# Patient Record
Sex: FEMALE | Race: BLACK OR AFRICAN AMERICAN | NOT HISPANIC OR LATINO | Employment: FULL TIME | URBAN - METROPOLITAN AREA
[De-identification: names, ages, dates, MRNs, and addresses within clinical notes are randomized per-mention and may not be internally consistent; named-entity substitution may affect disease eponyms.]

---

## 2017-01-05 ENCOUNTER — OFFICE VISIT (OUTPATIENT)
Dept: OBSTETRICS AND GYNECOLOGY | Facility: CLINIC | Age: 42
End: 2017-01-05
Payer: COMMERCIAL

## 2017-01-05 DIAGNOSIS — R10.9 COMBINED ABDOMINAL AND PELVIC PAIN, UNSPECIFIED LATERALITY: Primary | ICD-10-CM

## 2017-01-05 DIAGNOSIS — R10.2 COMBINED ABDOMINAL AND PELVIC PAIN, UNSPECIFIED LATERALITY: Primary | ICD-10-CM

## 2017-01-05 DIAGNOSIS — R10.30 LOWER ABDOMINAL PAIN: ICD-10-CM

## 2017-01-05 PROCEDURE — 99999 PR PBB SHADOW E&M-EST. PATIENT-LVL II: CPT | Mod: PBBFAC,,, | Performed by: ADVANCED PRACTICE MIDWIFE

## 2017-01-05 PROCEDURE — 99213 OFFICE O/P EST LOW 20 MIN: CPT | Mod: S$GLB,,, | Performed by: ADVANCED PRACTICE MIDWIFE

## 2017-01-05 PROCEDURE — 1159F MED LIST DOCD IN RCRD: CPT | Mod: S$GLB,,, | Performed by: ADVANCED PRACTICE MIDWIFE

## 2017-01-06 PROBLEM — R10.9 ABDOMINAL PAIN: Status: ACTIVE | Noted: 2017-01-06

## 2017-01-06 NOTE — PROGRESS NOTES
"Pt presents with C/O abdominal discomfort and feels "like something coming out"  Hx significant for hysterectomy and bilateral salpingectomy Aug 31st 2016 with Dr Johnston. I reviewed records and see that there was extensive FU and testing/treatment with Dr Johnston and Dr Patel. Pt was also evaluated by GI - gastric reflux and Neurology- Unremarkable. Treated for UTI with Cipro last week, states still has some symptoms but "urine was checked and clean". Also given Diflucan and "STI" tests pending    Appears a little anxious, reassurance given  O/E   Abdomen- obese and difficult to determine but no localized tenderness, vague discomfort in lower abdomen   Speculum- Normal D/C noted. Pink healthy appearance  Pelvic- Uterus/cervix- absent, unable to appreciate any masses or tenderness, restricted exam secondary to habitus    Assessment. Low abdominal discomfort    PLAN  D/W Dr Johnston who pt desires to see and FU with  A CT scan and FU with Dr Johnston is scheduled. Pt happy with this plan  "

## 2017-01-10 ENCOUNTER — HOSPITAL ENCOUNTER (OUTPATIENT)
Dept: RADIOLOGY | Facility: HOSPITAL | Age: 42
Discharge: HOME | End: 2017-01-10
Attending: OBSTETRICS & GYNECOLOGY
Payer: COMMERCIAL

## 2017-01-10 DIAGNOSIS — R10.9 COMBINED ABDOMINAL AND PELVIC PAIN, UNSPECIFIED LATERALITY: ICD-10-CM

## 2017-01-10 DIAGNOSIS — R10.2 COMBINED ABDOMINAL AND PELVIC PAIN, UNSPECIFIED LATERALITY: ICD-10-CM

## 2017-01-10 PROCEDURE — 74178 CT ABD&PLV WO CNTR FLWD CNTR: CPT | Mod: TC,PO

## 2017-01-10 PROCEDURE — 25500020 PHARM REV CODE 255: Mod: PO | Performed by: OBSTETRICS & GYNECOLOGY

## 2017-01-10 PROCEDURE — 74178 CT ABD&PLV WO CNTR FLWD CNTR: CPT | Mod: 26,,, | Performed by: RADIOLOGY

## 2017-01-10 RX ADMIN — IOHEXOL 100 ML: 350 INJECTION, SOLUTION INTRAVENOUS at 10:01

## 2017-01-10 RX ADMIN — IOHEXOL 30 ML: 350 INJECTION, SOLUTION INTRAVENOUS at 09:01

## 2017-01-30 ENCOUNTER — TELEPHONE (OUTPATIENT)
Dept: OBSTETRICS AND GYNECOLOGY | Facility: CLINIC | Age: 42
End: 2017-01-30

## 2017-01-30 NOTE — TELEPHONE ENCOUNTER
Pt was not able to come to 1/12 appt. Pt reports pain not as bad. CT results discussed. Observation at this time

## 2017-02-17 ENCOUNTER — OFFICE VISIT (OUTPATIENT)
Dept: INTERNAL MEDICINE | Facility: CLINIC | Age: 42
End: 2017-02-17
Payer: COMMERCIAL

## 2017-02-17 ENCOUNTER — LAB VISIT (OUTPATIENT)
Dept: LAB | Facility: HOSPITAL | Age: 42
End: 2017-02-17
Attending: NURSE PRACTITIONER
Payer: COMMERCIAL

## 2017-02-17 VITALS
TEMPERATURE: 99 F | OXYGEN SATURATION: 98 % | SYSTOLIC BLOOD PRESSURE: 121 MMHG | WEIGHT: 254.88 LBS | DIASTOLIC BLOOD PRESSURE: 56 MMHG | HEART RATE: 55 BPM | HEIGHT: 65 IN | BODY MASS INDEX: 42.46 KG/M2 | RESPIRATION RATE: 20 BRPM

## 2017-02-17 DIAGNOSIS — Z11.3 SCREEN FOR STD (SEXUALLY TRANSMITTED DISEASE): ICD-10-CM

## 2017-02-17 DIAGNOSIS — E66.01 MORBID OBESITY DUE TO EXCESS CALORIES: ICD-10-CM

## 2017-02-17 DIAGNOSIS — D50.9 IRON DEFICIENCY ANEMIA, UNSPECIFIED IRON DEFICIENCY ANEMIA TYPE: ICD-10-CM

## 2017-02-17 DIAGNOSIS — E11.9 TYPE 2 DIABETES MELLITUS WITHOUT COMPLICATION, WITHOUT LONG-TERM CURRENT USE OF INSULIN: ICD-10-CM

## 2017-02-17 DIAGNOSIS — F41.9 ANXIETY: ICD-10-CM

## 2017-02-17 DIAGNOSIS — E55.9 VITAMIN D DEFICIENCY: ICD-10-CM

## 2017-02-17 DIAGNOSIS — F32.A DEPRESSION, UNSPECIFIED DEPRESSION TYPE: ICD-10-CM

## 2017-02-17 DIAGNOSIS — N76.0 BV (BACTERIAL VAGINOSIS): Primary | ICD-10-CM

## 2017-02-17 DIAGNOSIS — K80.20 GALLSTONES: ICD-10-CM

## 2017-02-17 DIAGNOSIS — R73.02 IMPAIRED GLUCOSE TOLERANCE: ICD-10-CM

## 2017-02-17 DIAGNOSIS — R44.0 AUDITORY HALLUCINATIONS: ICD-10-CM

## 2017-02-17 DIAGNOSIS — B96.89 BV (BACTERIAL VAGINOSIS): Primary | ICD-10-CM

## 2017-02-17 LAB
ALBUMIN SERPL BCP-MCNC: 3.5 G/DL
ALP SERPL-CCNC: 53 U/L
ALT SERPL W/O P-5'-P-CCNC: 14 U/L
ANION GAP SERPL CALC-SCNC: 11 MMOL/L
AST SERPL-CCNC: 18 U/L
BASOPHILS # BLD AUTO: 0.02 K/UL
BASOPHILS NFR BLD: 0.4 %
BILIRUB SERPL-MCNC: 0.3 MG/DL
BILIRUB SERPL-MCNC: NEGATIVE MG/DL
BLOOD URINE, POC: ABNORMAL
BUN SERPL-MCNC: 9 MG/DL
CALCIUM SERPL-MCNC: 9 MG/DL
CHLORIDE SERPL-SCNC: 104 MMOL/L
CHOLEST/HDLC SERPL: 2.9 {RATIO}
CO2 SERPL-SCNC: 23 MMOL/L
COLOR, POC UA: YELLOW
CREAT SERPL-MCNC: 0.8 MG/DL
DIFFERENTIAL METHOD: ABNORMAL
EOSINOPHIL # BLD AUTO: 0.1 K/UL
EOSINOPHIL NFR BLD: 1.1 %
ERYTHROCYTE [DISTWIDTH] IN BLOOD BY AUTOMATED COUNT: 14.1 %
EST. GFR  (AFRICAN AMERICAN): >60 ML/MIN/1.73 M^2
EST. GFR  (NON AFRICAN AMERICAN): >60 ML/MIN/1.73 M^2
GARDNERELLA VAGINALIS: POSITIVE
GLUCOSE SERPL-MCNC: 104 MG/DL
GLUCOSE UR QL STRIP: NEGATIVE
HCT VFR BLD AUTO: 34.3 %
HDL/CHOLESTEROL RATIO: 34.1 %
HDLC SERPL-MCNC: 132 MG/DL
HDLC SERPL-MCNC: 45 MG/DL
HGB BLD-MCNC: 11.5 G/DL
KETONES UR QL STRIP: NEGATIVE
LDLC SERPL CALC-MCNC: 72.4 MG/DL
LEUKOCYTE ESTERASE URINE, POC: NEGATIVE
LYMPHOCYTES # BLD AUTO: 2.3 K/UL
LYMPHOCYTES NFR BLD: 40 %
MCH RBC QN AUTO: 28.3 PG
MCHC RBC AUTO-ENTMCNC: 33.5 %
MCV RBC AUTO: 85 FL
MONOCYTES # BLD AUTO: 0.4 K/UL
MONOCYTES NFR BLD: 7.2 %
NEUTROPHILS # BLD AUTO: 2.9 K/UL
NEUTROPHILS NFR BLD: 51.1 %
NITRITE, POC UA: NEGATIVE
NONHDLC SERPL-MCNC: 87 MG/DL
OTHER MICROSC. OBSERVATIONS: ABNORMAL
PH, POC UA: 5
PLATELET # BLD AUTO: 328 K/UL
PMV BLD AUTO: 11.2 FL
POTASSIUM SERPL-SCNC: 3.9 MMOL/L
PROT SERPL-MCNC: 7.6 G/DL
PROTEIN, POC: ABNORMAL
RBC # BLD AUTO: 4.06 M/UL
SODIUM SERPL-SCNC: 138 MMOL/L
SPECIFIC GRAVITY, POC UA: 1.02
T4 FREE SERPL-MCNC: 1.25 NG/DL
TRICHOMONAS, POC: NEGATIVE
TRIGL SERPL-MCNC: 73 MG/DL
TSH SERPL DL<=0.005 MIU/L-ACNC: 0.21 UIU/ML
UROBILINOGEN, POC UA: NORMAL
WBC # BLD AUTO: 5.67 K/UL
YEAST WET PREP: NEGATIVE

## 2017-02-17 PROCEDURE — 36415 COLL VENOUS BLD VENIPUNCTURE: CPT | Mod: PO

## 2017-02-17 PROCEDURE — 83036 HEMOGLOBIN GLYCOSYLATED A1C: CPT

## 2017-02-17 PROCEDURE — 86703 HIV-1/HIV-2 1 RESULT ANTBDY: CPT

## 2017-02-17 PROCEDURE — 80061 LIPID PANEL: CPT

## 2017-02-17 PROCEDURE — 87591 N.GONORRHOEAE DNA AMP PROB: CPT

## 2017-02-17 PROCEDURE — 84443 ASSAY THYROID STIM HORMONE: CPT

## 2017-02-17 PROCEDURE — 99999 PR PBB SHADOW E&M-EST. PATIENT-LVL IV: CPT | Mod: PBBFAC,,, | Performed by: NURSE PRACTITIONER

## 2017-02-17 PROCEDURE — 80053 COMPREHEN METABOLIC PANEL: CPT

## 2017-02-17 PROCEDURE — 99203 OFFICE O/P NEW LOW 30 MIN: CPT | Mod: 25,S$GLB,, | Performed by: NURSE PRACTITIONER

## 2017-02-17 PROCEDURE — 84439 ASSAY OF FREE THYROXINE: CPT

## 2017-02-17 PROCEDURE — 85025 COMPLETE CBC W/AUTO DIFF WBC: CPT

## 2017-02-17 PROCEDURE — 2022F DILAT RTA XM EVC RTNOPTHY: CPT | Mod: S$GLB,,, | Performed by: NURSE PRACTITIONER

## 2017-02-17 PROCEDURE — 3060F POS MICROALBUMINURIA REV: CPT | Mod: S$GLB,,, | Performed by: NURSE PRACTITIONER

## 2017-02-17 PROCEDURE — 86695 HERPES SIMPLEX TYPE 1 TEST: CPT

## 2017-02-17 PROCEDURE — 86592 SYPHILIS TEST NON-TREP QUAL: CPT

## 2017-02-17 PROCEDURE — 3044F HG A1C LEVEL LT 7.0%: CPT | Mod: S$GLB,,, | Performed by: NURSE PRACTITIONER

## 2017-02-17 PROCEDURE — 81002 URINALYSIS NONAUTO W/O SCOPE: CPT | Mod: S$GLB,,, | Performed by: NURSE PRACTITIONER

## 2017-02-17 RX ORDER — LANCETS
EACH MISCELLANEOUS
COMMUNITY
Start: 2016-12-07 | End: 2017-02-17 | Stop reason: SDUPTHER

## 2017-02-17 RX ORDER — DEXTROSE 4 G
TABLET,CHEWABLE ORAL
COMMUNITY
Start: 2016-12-07 | End: 2017-02-17 | Stop reason: SDUPTHER

## 2017-02-17 RX ORDER — METRONIDAZOLE 500 MG/1
500 TABLET ORAL EVERY 12 HOURS
Qty: 14 TABLET | Refills: 0 | Status: SHIPPED | OUTPATIENT
Start: 2017-02-17 | End: 2017-02-24

## 2017-02-17 RX ORDER — DEXTROSE 4 G
TABLET,CHEWABLE ORAL
Qty: 1 EACH | Refills: 0 | Status: SHIPPED | OUTPATIENT
Start: 2017-02-17 | End: 2019-05-23 | Stop reason: ALTCHOICE

## 2017-02-17 RX ORDER — LANCETS
1 EACH MISCELLANEOUS 3 TIMES DAILY
Qty: 100 EACH | Refills: 0 | Status: SHIPPED | OUTPATIENT
Start: 2017-02-17 | End: 2018-02-07 | Stop reason: SDUPTHER

## 2017-02-17 RX ORDER — SUCRALFATE 1 G/10ML
1 SUSPENSION ORAL DAILY
COMMUNITY
Start: 2016-11-03 | End: 2017-06-15

## 2017-02-17 RX ORDER — ESCITALOPRAM OXALATE 10 MG/1
15 TABLET ORAL DAILY PRN
Refills: 0 | COMMUNITY
Start: 2017-02-15 | End: 2017-06-12 | Stop reason: SDUPTHER

## 2017-02-17 NOTE — MR AVS SNAPSHOT
[facility] - Internal Medicine  [address] [address] [address]  Phone: [phone]                  Yuli Milton   2017 11:40 AM   Office Visit    Description:  Female : 1975   Provider:  ANNA Davis FNP-C   Department:  [facility] Internal Medicine           Reason for Visit     Dysuria           Diagnoses this Visit        Comments    BV (bacterial vaginosis)    -  Primary     Screen for STD (sexually transmitted disease)         Morbid obesity due to excess calories         Impaired glucose tolerance         Iron deficiency anemia, unspecified iron deficiency anemia type                To Do List           Future Appointments        Provider Department Dept Phone    2017 1:40 PM [facility] LABORATORY [facility] [phone]    2017 1:00 PM ANNA Davis FNP-C [facility] [phone]      Goals (5 Years of Data)     None       These Medications        Disp Refills Start End    metronidazole (FLAGYL) 500 MG tablet 14 tablet 0 2017    Take 1 tablet (500 mg total) by mouth every 12 (twelve) hours. - Oral    Pharmacy: [facility] [address] [place] [address] [facility] Ph #: [phone]       lancets Misc 100 each 0 2017     1 each by Other route 3 (three) times daily. - Other    Pharmacy: [facility] [address] [place] [address] AT [facility] Courtney Cooley Ph #: [phone]       blood-glucose meter Misc 1 each 0 2017     Dispense 1    Pharmacy: [facility] [address] [place] [address] AT [facility] Courtney Cooley Ph #: [phone]       blood sugar diagnostic Strp 100 each 11 2017     1 each by Other route 3 (three) times daily. - Other    Pharmacy: [facility] [address] [place] [address] AT [facility] Courtney Quintero Ph #: [phone]          [facility]     [facility] Care [facility] -  Assistance  Registered nurses in the [facility] provide clinical advisement, health education, appointment booking, and other advisory services.  Call for this free service at [phone].             Medications           Message regarding Medications     Verify the changes and/or additions to your medication regime listed below are the same as discussed with your clinician today.  If any of these changes or additions are incorrect, please notify your healthcare provider.        START taking these NEW medications        Refills    metronidazole (FLAGYL) 500 MG tablet 0    Sig: Take 1 tablet (500 mg total) by mouth every 12 (twelve) hours.    Class: Normal    Route: Oral    lancets Misc 0    Si each by Other route 3 (three) times daily.    Class: Normal    Route: Other    blood-glucose meter Misc 0    Sig: Dispense 1    Class: Normal    blood sugar diagnostic Strp 11    Si each by Other route 3 (three) times daily.    Class: Normal    Route: Other           Verify that the below list of medications is an accurate representation of the medications you are currently taking.  If none reported, the list may be blank. If incorrect, please contact your healthcare provider. Carry this list with you in case of emergency.           Current Medications     ALBUTEROL INHL Inhale 2 puffs into the lungs continuous prn.    alprazolam (XANAX) 0.5 MG tablet Take 1 tablet by mouth daily as needed.    blood sugar diagnostic Strp 1 each by Other route 3 (three) times daily.    blood-glucose meter Misc Dispense 1    cyclobenzaprine (FLEXERIL) 10 MG tablet TAKE 1 TABLET BY MOUTH 3 TIMES A DAY FOR 4 DAYS AS NEEDED FOR SPASM    escitalopram oxalate (LEXAPRO) 10 MG tablet 5 mg.    gabapentin (NEURONTIN) 300 MG capsule     hydrocodone-acetaminophen 5-325mg (NORCO) 5-325 mg per tablet     ibuprofen (ADVIL,MOTRIN) 800 MG tablet Take 1 tablet (800 mg total) by  "mouth 3 (three) times daily.    lancets Misc 1 each by Other route 3 (three) times daily.    levalbuterol (XOPENEX) 0.63 mg/3 mL nebulizer solution Take 1 ampule by nebulization every 4 (four) hours as needed for Wheezing.    meloxicam (MOBIC) 15 MG tablet     metformin (GLUCOPHAGE) 500 MG tablet Take 500 mg by mouth 2 (two) times daily with meals.    methocarbamol (ROBAXIN) 750 MG Tab     metronidazole (FLAGYL) 500 MG tablet Take 1 tablet (500 mg total) by mouth every 12 (twelve) hours.    MULTIVIT-MIN/FA/HERBAL NO.245 (ALIVE WOMEN'S GUMMY VITAMINS ORAL) Take by mouth.    sucralfate (CARAFATE) 100 mg/mL suspension Take 1 g by mouth.           Clinical Reference Information           Your Vitals Were     BP Pulse Temp Resp Height Weight    121/56 (BP Location: Left arm, Patient Position: Sitting, BP Method: Automatic) 55 98.9 °F (37.2 °C) 20 5' 5" (1.651 m) 115.6 kg (254 lb 13.6 oz)    Last Period SpO2 BMI          07/22/2016 98% 42.41 kg/m2        Blood Pressure          Most Recent Value    BP  (!)  121/56      Allergies as of 2/17/2017     No Known Allergies      Immunizations Administered on Date of Encounter - 2/17/2017     None      Orders Placed During Today's Visit      Normal Orders This Visit    C. trachomatis/N. gonorrhoeae by AMP DNA Urine     POCT urine dipstick without microscope     Future Labs/Procedures Expected by Expires    CBC auto differential  2/17/2017 4/18/2018    Comprehensive metabolic panel  2/17/2017 5/18/2017    Hemoglobin A1c  2/17/2017 2/17/2018    Herpes simplex type 1&2 IgG,Herpes titer  2/17/2017 5/18/2017    HIV-1 and HIV-2 antibodies  2/17/2017 4/18/2018    Lipid panel  2/17/2017 4/18/2018    RPR  2/17/2017 4/18/2018    TSH  2/17/2017 4/18/2018 2/17/2017 12:23 PM - Анна Carrillo LPN      Component Results     Component    Color, UA    Yellow    Spec Grav, UA    1.020    pH, UA    5    WBC, UA    negative    Nitrite, UA    negative    Protein, UA    trace    " Glucose, UA    negative    Ketones, UA    negative    Urobilinogen, UA    normal    Bilirubin, UA    negative    Blood, UA    trace            Instructions      Bacterial Vaginosis    You have a vaginal infection called bacterial vaginosis (BV). Both good and bad bacteria are present in a healthy vagina. BV occurs when these bacteria get out of balance. The number of bad bacteria increase. And the number of good bacteria decrease.  BV may or may not cause symptoms. If symptoms do occur, they can include:  · Thin, gray, milky-white, or sometimes green discharge  · Unpleasant odor or fishy smell  · Itching, burning, or pain in or around the vagina  It is not known what causes BV, but certain factors can make the problem more likely. This can include:  · Douching  · Having sex with a new partner  · Having sex with more than one partner  BV will sometimes go away on its own. But treatment is usually recommended. This is because untreated BV can increase the risk of more serious health problems such as:  · Pelvic inflammatory disease (PID)  ·  delivery (giving birth to a baby early if youre pregnant)  · HIV and certain other sexually transmitted diseases (STDs)  · Infection after surgery on the reproductive organs  Home care  General care  · BV is most often treated with medicines called antibiotics. These may be given as pills or as a vaginal cream. If antibiotics are prescribed, be sure to use them exactly as directed. Also, be sure to complete all of the medicine, even if your symptoms go away.  · Avoid douching or having sex during treatment.  · If you have sex with a female partner, ask your healthcare provider if she should also be treated.  Prevention  · Limit or avoid douching.  · Avoid having sex. If you do have sex, then take steps to lower your risk:  ¨ Use condoms when having sex.  ¨ Limit the number of partners you have sex with.  Follow-up care  Follow up with your healthcare provider, or as  advised.  When to seek medical advice  Call your healthcare provider right away if:  · You have a fever of 100.4ºF (38ºC) or higher, or as directed by your provider.  · Your symptoms worsen, or they dont go away within a few days of starting treatment.  · You have new pain in the lower belly or pelvic region.  · You have side effects that bother you or a reaction to the pills or cream youre prescribed.  · You or any partners you have sex with have new symptoms, such as a rash, joint pain, or sores.  Date Last Reviewed: 7/30/2015 © 2000-2016 [employer]. [address] [place] [address]. All rights reserved. This information is not intended as a substitute for professional medical care. Always follow your healthcare professional's instructions.             Language Assistance Services     ATTENTION: Language assistance services are available, free of charge. Please call [phone].      ATENCIÓN: Si navid antoinette, tiene a hernandez disposición servicios gratuitos de asistencia lingüística. Llame al [phone].     [facility] Ý: N?u b?n nói Ti?ng Vi?t, có các d?ch v? h? tr? ngôn ng? mi?n phí dành cho b?n. G?i s? [phone].         [facility] - Internal Medicine complies with applicable Federal civil rights laws and does not discriminate on the basis of race, color, national origin, age, disability, or sex.

## 2017-02-17 NOTE — PATIENT INSTRUCTIONS

## 2017-02-18 LAB
ESTIMATED AVG GLUCOSE: 137 MG/DL
HBA1C MFR BLD HPLC: 6.4 %
RPR SER QL: NORMAL

## 2017-02-20 LAB — HIV 1+2 AB+HIV1 P24 AG SERPL QL IA: NEGATIVE

## 2017-02-21 LAB
C TRACH DNA SPEC QL NAA+PROBE: NEGATIVE
HSV1 IGG SERPL QL IA: POSITIVE
HSV2 IGG SERPL QL IA: POSITIVE
N GONORRHOEA DNA SPEC QL NAA+PROBE: NEGATIVE

## 2017-02-22 ENCOUNTER — TELEPHONE (OUTPATIENT)
Dept: INTERNAL MEDICINE | Facility: CLINIC | Age: 42
End: 2017-02-22

## 2017-02-22 DIAGNOSIS — R79.89 LOW TSH LEVEL: Primary | ICD-10-CM

## 2017-02-23 NOTE — TELEPHONE ENCOUNTER
----- Message from Raleigh Pickard sent at 2/23/2017  1:43 PM CST -----  Contact: pt  She's calling in regards to a missed call, please advise, [phone] (home)

## 2017-02-23 NOTE — TELEPHONE ENCOUNTER
She's calling in regards to a missed call, please advise, [phone] (home)

## 2017-02-23 NOTE — TELEPHONE ENCOUNTER
Attempted to contact patient in [facility] to lab again, unable to leave VM    Will send to [facility] and discuss at follow up scheduled 2/27    1. Blood count is improving since Hysterectomy.   2. Kidney and liver function appropriate. Electrolytes good.   3. Diabetes marker, A1C, is good. Continue current meds.  4. Cholesterol good  5. Negative for syphilis, HIV, gonorrhea and chlamydia   6. Positive for Type 1 and Type 2 herpes.  7. Thyroid function is abnormal. Need to have repeat labs prior to follow up schedule for 2/27/2017.

## 2017-02-23 NOTE — TELEPHONE ENCOUNTER
It appears that the pt has viewed her results per russ. I have called her again and it was busy, If she calls back please give her the lab results as below. Thanks

## 2017-02-24 ENCOUNTER — TELEPHONE (OUTPATIENT)
Dept: INTERNAL MEDICINE | Facility: CLINIC | Age: 42
End: 2017-02-24

## 2017-02-24 DIAGNOSIS — A60.04 HERPES SIMPLEX VULVOVAGINITIS: ICD-10-CM

## 2017-02-24 PROBLEM — A60.00 HERPESVIRUS 2: Status: ACTIVE | Noted: 2017-02-24

## 2017-02-24 RX ORDER — VALACYCLOVIR HYDROCHLORIDE 1 G/1
1000 TABLET, FILM COATED ORAL 2 TIMES DAILY
Qty: 20 TABLET | Refills: 0 | Status: SHIPPED | OUTPATIENT
Start: 2017-02-24 | End: 2017-03-06

## 2017-02-24 NOTE — TELEPHONE ENCOUNTER
The patient called requesting to speak directly to Gabby Carter. She would like some clarification in regards to positive labs as well to know if any medications is needed at this time. Please review and advise. Thank you.

## 2017-02-24 NOTE — TELEPHONE ENCOUNTER
Please give results to pt per previous message if she calls back. If she has specific questions please let her know that it may be best for her to send them via [employer] because I am unable to get an answer when I call.

## 2017-02-24 NOTE — TELEPHONE ENCOUNTER
----- Message from Wanda Baires sent at 2/24/2017  1:02 PM CST -----  Contact: Pt  Pt is requesting to speak to the nurse regarding test results and medication. Pls call pt back at [phone].

## 2017-02-24 NOTE — TELEPHONE ENCOUNTER
Called patient, she's requesting a call from Gabby Carter in regards to labs. Please contact the patient at [phone].

## 2017-02-24 NOTE — TELEPHONE ENCOUNTER
Pt is concerned that she is having a current outbreak as has irritation to her vagina, will treat over the weekend and eval at follow up

## 2017-02-27 ENCOUNTER — OFFICE VISIT (OUTPATIENT)
Dept: INTERNAL MEDICINE | Facility: CLINIC | Age: 42
End: 2017-02-27
Payer: COMMERCIAL

## 2017-02-27 VITALS
SYSTOLIC BLOOD PRESSURE: 127 MMHG | RESPIRATION RATE: 18 BRPM | OXYGEN SATURATION: 95 % | BODY MASS INDEX: 42.54 KG/M2 | WEIGHT: 255.31 LBS | TEMPERATURE: 98 F | HEART RATE: 60 BPM | DIASTOLIC BLOOD PRESSURE: 67 MMHG | HEIGHT: 65 IN

## 2017-02-27 DIAGNOSIS — F41.9 ANXIETY: Primary | ICD-10-CM

## 2017-02-27 DIAGNOSIS — A60.00 GENITAL HERPES SIMPLEX, UNSPECIFIED SITE: ICD-10-CM

## 2017-02-27 DIAGNOSIS — R79.89 LOW TSH LEVEL: ICD-10-CM

## 2017-02-27 PROCEDURE — 99999 PR PBB SHADOW E&M-EST. PATIENT-LVL III: CPT | Mod: PBBFAC,,, | Performed by: FAMILY MEDICINE

## 2017-02-27 PROCEDURE — 99214 OFFICE O/P EST MOD 30 MIN: CPT | Mod: S$GLB,,, | Performed by: FAMILY MEDICINE

## 2017-02-27 PROCEDURE — 1160F RVW MEDS BY RX/DR IN RCRD: CPT | Mod: S$GLB,,, | Performed by: FAMILY MEDICINE

## 2017-02-27 RX ORDER — ALPRAZOLAM 0.5 MG/1
0.5 TABLET ORAL DAILY PRN
Qty: 10 TABLET | Refills: 0 | Status: SHIPPED | OUTPATIENT
Start: 2017-02-27 | End: 2017-05-15 | Stop reason: ALTCHOICE

## 2017-02-27 NOTE — MR AVS SNAPSHOT
[facility] - Internal Medicine  [address] [address] [address]  Phone: [phone]                  Yuli Milton   2017 2:00 PM   Office Visit    Description:  Female : 1975   Provider:  Scottie Buitrago DO   Department:  [facility] - Internal Medicine           Reason for Visit     Follow-up     Anxiety           Diagnoses this Visit        Comments    Anxiety    -  Primary     Genital herpes simplex, unspecified site         Low TSH level                To Do List           Future Appointments        Provider Department Dept Phone    2017 2:00 PM Scottie Buitrago DO [facility] Internal Medicine [phone]      Goals (5 Years of Data)     None      Follow-Up and Disposition     Return in about 3 months (around 2017).    Follow-up and Disposition History       These Medications        Disp Refills Start End    alprazolam (XANAX) 0.5 MG tablet 10 tablet 0 2017 3/9/2017    Take 1 tablet (0.5 mg total) by mouth daily as needed. - Oral    Pharmacy: [facility] [address] [place] [address] AT [facility] [address] Ph #: [phone]         [facility]     [facility] Care [facility] -  Assistance  Registered nurses in the [facility] provide clinical advisement, health education, appointment booking, and other advisory services.  Call for this free service at [phone].             Medications           Message regarding Medications     Verify the changes and/or additions to your medication regime listed below are the same as discussed with your clinician today.  If any of these changes or additions are incorrect, please notify your healthcare provider.        CHANGE how you are taking these medications     Start Taking Instead of    alprazolam (XANAX) 0.5 MG tablet alprazolam (XANAX) 0.5 MG tablet    Dosage:  Take 1 tablet (0.5 mg total) by mouth daily as needed. Dosage:  Take 1 tablet by mouth daily  as needed.    Reason for Change:  Reorder       STOP taking these medications     cyclobenzaprine (FLEXERIL) 10 MG tablet TAKE 1 TABLET BY MOUTH 3 TIMES A DAY FOR 4 DAYS AS NEEDED FOR SPASM    gabapentin (NEURONTIN) 300 MG capsule     hydrocodone-acetaminophen 5-325mg (NORCO) 5-325 mg per tablet     meloxicam (MOBIC) 15 MG tablet     methocarbamol (ROBAXIN) 750 MG Tab            Verify that the below list of medications is an accurate representation of the medications you are currently taking.  If none reported, the list may be blank. If incorrect, please contact your healthcare provider. Carry this list with you in case of emergency.           Current Medications     ALBUTEROL INHL Inhale 2 puffs into the lungs continuous prn.    alprazolam (XANAX) 0.5 MG tablet Take 1 tablet (0.5 mg total) by mouth daily as needed.    blood sugar diagnostic Strp 1 each by Other route 3 (three) times daily.    blood-glucose meter Misc Dispense 1    escitalopram oxalate (LEXAPRO) 10 MG tablet 5 mg.    ibuprofen (ADVIL,MOTRIN) 800 MG tablet Take 1 tablet (800 mg total) by mouth 3 (three) times daily.    lancets Misc 1 each by Other route 3 (three) times daily.    levalbuterol (XOPENEX) 0.63 mg/3 mL nebulizer solution Take 1 ampule by nebulization every 4 (four) hours as needed for Wheezing.    metformin (GLUCOPHAGE) 500 MG tablet Take 500 mg by mouth 2 (two) times daily with meals.    MULTIVIT-MIN/FA/HERBAL NO.245 (ALIVE WOMEN'S GUMMY VITAMINS ORAL) Take by mouth.    sucralfate (CARAFATE) 100 mg/mL suspension Take 1 g by mouth.    valacyclovir (VALTREX) 1000 MG tablet Take 1 tablet (1,000 mg total) by mouth 2 (two) times daily.           Clinical Reference Information           Your Vitals Were     BP                   127/67 (BP Location: Left arm, Patient Position: Sitting, BP Method: Automatic)           Blood Pressure          Most Recent Value    BP  127/67      Allergies as of 2/27/2017     No Known Allergies      Immunizations  Administered on Date of Encounter - 2/27/2017     None      Language Assistance Services     ATTENTION: Language assistance services are available, free of charge. Please call [phone].      ATENCIÓN: Si habdeana curry, tiene a hernandez disposición servicios gratuitos de asistencia lingüística. Llame al [phone].     CHÚ Ý: N?u b?n nói Ti?ng Vi?t, có các d?ch v? h? tr? ngôn ng? mi?n phí dành cho b?n. G?i s? [phone].         [facility] - [facility] complies with applicable Federal civil rights laws and does not discriminate on the basis of race, color, national origin, age, disability, or sex.

## 2017-02-27 NOTE — PROGRESS NOTES
Subjective:       Patient ID: Yuli Milton is a 41 y.o. female.    Chief Complaint: Follow-up and Anxiety    HPI  Here today to follow-up on lab results.  She also has a few other concerns.  Positive HSV serology: She was concerned about positive HSV serology and was wondering if we could tell her when she may have been in contact with this.  I explained to her that many people have this and I'm not sure why testing was done.  She has never had any outbreak other than possible fever blisters.  Low TSH: Explained to her that since the T4 was normal there is nothing further 1 units of 2.  We can recheck later in about 3-6 months to see if this has completely normalized.  Anxiety: She has been seeing a psychiatrist and was recently put on 2.5 mg of Lexapro but continues to have a lot of anxiety and panic attacks.  She will sometimes take Xanax from her boyfriend which helps her to relax and fall asleep.  Previously she was put on 10 mg of Lexapro but this made her feel nauseous.  She continues to follow-up with psychiatrist and has an appointment next month.    Family History   Problem Relation Age of Onset    Breast cancer Paternal Grandmother     Diabetes Maternal Grandmother     Hypertension Mother     Breast cancer Maternal Aunt     Colon cancer Maternal Aunt     Miscarriages / Stillbirths Cousin     Stroke Maternal Aunt     Ovarian cancer Neg Hx     Deep vein thrombosis Neg Hx     Pulmonary embolism Neg Hx        Current Outpatient Prescriptions:     ALBUTEROL INHL, Inhale 2 puffs into the lungs continuous prn., Disp: , Rfl:     blood sugar diagnostic Strp, 1 each by Other route 3 (three) times daily., Disp: 100 each, Rfl: 11    blood-glucose meter Misc, Dispense 1, Disp: 1 each, Rfl: 0    escitalopram oxalate (LEXAPRO) 10 MG tablet, 5 mg., Disp: , Rfl: 0    ibuprofen (ADVIL,MOTRIN) 800 MG tablet, Take 1 tablet (800 mg total) by mouth 3 (three) times daily., Disp: 90 tablet, Rfl: 0    lancets  "Misc, 1 each by Other route 3 (three) times daily., Disp: 100 each, Rfl: 0    levalbuterol (XOPENEX) 0.63 mg/3 mL nebulizer solution, Take 1 ampule by nebulization every 4 (four) hours as needed for Wheezing., Disp: , Rfl:     metformin (GLUCOPHAGE) 500 MG tablet, Take 500 mg by mouth 2 (two) times daily with meals., Disp: , Rfl:     MULTIVIT-MIN/FA/HERBAL NO.245 (ALIVE WOMEN'S GUMMY VITAMINS ORAL), Take by mouth., Disp: , Rfl:     sucralfate (CARAFATE) 100 mg/mL suspension, Take 1 g by mouth., Disp: , Rfl:     valacyclovir (VALTREX) 1000 MG tablet, Take 1 tablet (1,000 mg total) by mouth 2 (two) times daily., Disp: 20 tablet, Rfl: 0    alprazolam (XANAX) 0.5 MG tablet, Take 1 tablet (0.5 mg total) by mouth daily as needed., Disp: 10 tablet, Rfl: 0    Review of Systems   Constitutional: Negative for chills and fever.   HENT: Negative for congestion and sore throat.    Eyes: Negative for visual disturbance.   Respiratory: Negative for cough and shortness of breath.    Cardiovascular: Negative for chest pain.   Gastrointestinal: Negative for abdominal pain, constipation and diarrhea.   Genitourinary: Negative for difficulty urinating and menstrual problem.   Skin: Negative for rash.   Neurological: Negative for dizziness.   Psychiatric/Behavioral: The patient is nervous/anxious.        Objective:   /67 (BP Location: Left arm, Patient Position: Sitting, BP Method: Automatic)  Pulse 60  Temp 98 °F (36.7 °C) (Tympanic)   Resp 18  Ht 5' 5" (1.651 m)  Wt 115.8 kg (255 lb 4.7 oz)  LMP 07/22/2016  SpO2 95%  BMI 42.48 kg/m2     Physical Exam   Constitutional: She is oriented to person, place, and time. She appears well-developed and well-nourished.   HENT:   Head: Normocephalic and atraumatic.   Eyes: Conjunctivae are normal.   Cardiovascular: Normal rate.    Pulmonary/Chest: Effort normal. No respiratory distress.   Musculoskeletal: She exhibits no edema.   Neurological: She is alert and oriented to " person, place, and time. Coordination normal.   Skin: Skin is warm and dry. No rash noted.   Psychiatric: She has a normal mood and affect. Her behavior is normal.   Vitals reviewed.      Assessment & Plan     1. Anxiety  Advised her to maintain follow-up with psychiatry.  Suggested she may try to increase to 5 mg of Lexapro since she has been tolerating the 2.5 well.  Gave her 10 tablets of Xanax for breakthrough anxiety/panic.  Counseled her on the importance of not becoming dependent on this medication as it should not be used once we are able to achieve adequate dose of Lexapro.    2. Genital herpes simplex, unspecified site  Reassured her.  Once her that if she does have any outbreak of lesions and she should be seen so that she can be evaluated for appropriate treatment.  Otherwise, there is nothing she needs to at this time.    3. Low TSH level  Normal T4 levels.  May consider rechecking in 3-6 months.

## 2017-03-09 ENCOUNTER — OFFICE VISIT (OUTPATIENT)
Dept: INTERNAL MEDICINE | Facility: CLINIC | Age: 42
End: 2017-03-09
Payer: COMMERCIAL

## 2017-03-09 VITALS
TEMPERATURE: 97 F | OXYGEN SATURATION: 100 % | DIASTOLIC BLOOD PRESSURE: 73 MMHG | HEART RATE: 62 BPM | SYSTOLIC BLOOD PRESSURE: 115 MMHG | HEIGHT: 65 IN | WEIGHT: 253.5 LBS | BODY MASS INDEX: 42.24 KG/M2 | RESPIRATION RATE: 18 BRPM

## 2017-03-09 DIAGNOSIS — R31.9 URINARY TRACT INFECTION WITH HEMATURIA, SITE UNSPECIFIED: Primary | ICD-10-CM

## 2017-03-09 DIAGNOSIS — B96.89 BACTERIAL VAGINOSIS: ICD-10-CM

## 2017-03-09 DIAGNOSIS — N76.0 BACTERIAL VAGINOSIS: ICD-10-CM

## 2017-03-09 DIAGNOSIS — N39.0 URINARY TRACT INFECTION WITH HEMATURIA, SITE UNSPECIFIED: Primary | ICD-10-CM

## 2017-03-09 LAB
BILIRUB SERPL-MCNC: ABNORMAL MG/DL
BLOOD URINE, POC: ABNORMAL
COLOR, POC UA: ABNORMAL
GLUCOSE UR QL STRIP: ABNORMAL
KETONES UR QL STRIP: ABNORMAL
LEUKOCYTE ESTERASE URINE, POC: ABNORMAL
NITRITE, POC UA: ABNORMAL
PH, POC UA: 6
PROTEIN, POC: ABNORMAL
SPECIFIC GRAVITY, POC UA: 1.02
UROBILINOGEN, POC UA: ABNORMAL

## 2017-03-09 PROCEDURE — 1160F RVW MEDS BY RX/DR IN RCRD: CPT | Mod: S$GLB,,, | Performed by: NURSE PRACTITIONER

## 2017-03-09 PROCEDURE — 99999 PR PBB SHADOW E&M-EST. PATIENT-LVL IV: CPT | Mod: PBBFAC,,, | Performed by: NURSE PRACTITIONER

## 2017-03-09 PROCEDURE — 81002 URINALYSIS NONAUTO W/O SCOPE: CPT | Mod: S$GLB,,, | Performed by: NURSE PRACTITIONER

## 2017-03-09 PROCEDURE — 99214 OFFICE O/P EST MOD 30 MIN: CPT | Mod: 25,S$GLB,, | Performed by: NURSE PRACTITIONER

## 2017-03-09 RX ORDER — METRONIDAZOLE 500 MG/1
500 TABLET ORAL EVERY 12 HOURS
Qty: 14 TABLET | Refills: 0 | Status: SHIPPED | OUTPATIENT
Start: 2017-03-09 | End: 2017-03-16

## 2017-03-09 RX ORDER — CIPROFLOXACIN 500 MG/1
500 TABLET ORAL EVERY 12 HOURS
Qty: 14 TABLET | Refills: 0 | Status: SHIPPED | OUTPATIENT
Start: 2017-03-09 | End: 2017-03-16

## 2017-03-09 NOTE — MR AVS SNAPSHOT
[facility]  [address] [address] [address]  Phone: [phone]                  Yuli Milton   3/9/2017 10:40 AM   Office Visit    Description:  Female : 1975   Provider:  ANNA Davis FNP-C   Department:  [facility] - Internal Medicine           Reason for Visit     Vaginal Pain     Abdominal Pain           Diagnoses this Visit        Comments    Urinary tract infection with hematuria, site unspecified    -  Primary     Bacterial vaginosis                To Do List           Future Appointments        Provider Department Dept Phone    2017 2:00 PM ANNA Davis,SHAGUFTA [facility] Internal Medicine [phone]      Goals (5 Years of Data)     None       These Medications        Disp Refills Start End    metronidazole (FLAGYL) 500 MG tablet 14 tablet 0 3/9/2017 3/16/2017    Take 1 tablet (500 mg total) by mouth every 12 (twelve) hours. - Oral    Pharmacy: [facility] [address] [place] [address] AT [facility] [address] Jaden Dang [facility] Ph #: [phone]       ciprofloxacin HCl (CIPRO) 500 MG tablet 14 tablet 0 3/9/2017 3/16/2017    Take 1 tablet (500 mg total) by mouth every 12 (twelve) hours. - Oral    Pharmacy: [facility] [place] [address] AT [facility] [address] Jaden Dang [facility] Ph #: [phone]         PURCHASE these Medications (No prescription required)        Start End    lactobacillus combination no.4 (PROBIOTIC) 3 billion cell Cap 3/9/2017     Sig - Route: Take 1 capsule by mouth once daily. - Oral    Class: OTC      [facility]     [facility] -  Assistance  Registered nurses in the [facility] provide clinical advisement, health education, appointment booking, and other advisory services.  Call for this free service at [phone].             Medications           Message regarding Medications     Verify the changes and/or additions  to your medication regime listed below are the same as discussed with your clinician today.  If any of these changes or additions are incorrect, please notify your healthcare provider.        START taking these NEW medications        Refills    metronidazole (FLAGYL) 500 MG tablet 0    Sig: Take 1 tablet (500 mg total) by mouth every 12 (twelve) hours.    Class: Normal    Route: Oral    lactobacillus combination no.4 (PROBIOTIC) 3 billion cell Cap     Sig: Take 1 capsule by mouth once daily.    Class: OTC    Route: Oral    ciprofloxacin HCl (CIPRO) 500 MG tablet 0    Sig: Take 1 tablet (500 mg total) by mouth every 12 (twelve) hours.    Class: Normal    Route: Oral           Verify that the below list of medications is an accurate representation of the medications you are currently taking.  If none reported, the list may be blank. If incorrect, please contact your healthcare provider. Carry this list with you in case of emergency.           Current Medications     ALBUTEROL INHL Inhale 2 puffs into the lungs continuous prn.    alprazolam (XANAX) 0.5 MG tablet Take 1 tablet (0.5 mg total) by mouth daily as needed.    blood sugar diagnostic Strp 1 each by Other route 3 (three) times daily.    blood-glucose meter Misc Dispense 1    escitalopram oxalate (LEXAPRO) 10 MG tablet 5 mg.    ibuprofen (ADVIL,MOTRIN) 800 MG tablet Take 1 tablet (800 mg total) by mouth 3 (three) times daily.    lancets Misc 1 each by Other route 3 (three) times daily.    levalbuterol (XOPENEX) 0.63 mg/3 mL nebulizer solution Take 1 ampule by nebulization every 4 (four) hours as needed for Wheezing.    metformin (GLUCOPHAGE) 500 MG tablet Take 500 mg by mouth 2 (two) times daily with meals.    MULTIVIT-MIN/FA/HERBAL NO.245 (ALIVE WOMEN'S GUMMY VITAMINS ORAL) Take by mouth.    sucralfate (CARAFATE) 100 mg/mL suspension Take 1 g by mouth.    ciprofloxacin HCl (CIPRO) 500 MG tablet Take 1 tablet (500 mg total) by mouth every 12 (twelve) hours.     "lactobacillus combination no.4 (PROBIOTIC) 3 billion cell Cap Take 1 capsule by mouth once daily.    metronidazole (FLAGYL) 500 MG tablet Take 1 tablet (500 mg total) by mouth every 12 (twelve) hours.           Clinical Reference Information           Your Vitals Were     BP Pulse Temp Resp Height    115/73 (BP Location: Left arm, Patient Position: Sitting, BP Method: Automatic) 62 96.9 °F (36.1 °C) (Tympanic) 18 5' 5" (1.651 m)    Weight Last Period SpO2 BMI    115 kg (253 lb 8.5 oz) 07/22/2016 100% 42.19 kg/m2      Blood Pressure          Most Recent Value    BP  115/73      Allergies as of 3/9/2017     No Known Allergies      Immunizations Administered on Date of Encounter - 3/9/2017     None      Orders Placed During Today's Visit      Normal Orders This Visit    POCT urine dipstick without microscope          3/9/2017 11:44 AM - Gabby Carter, APRN,FNP-C      Component Results     Component    Color    mick    Spec Grav    1.020    pH, UA    6    WBC, UA    +    Nitrite    +    Protein    neg    Glucose, UA    neg    Ketones, UA    neg    Urobilinogen    neg    Bilirubin    neg    Blood, UA    +            Language Assistance Services     ATTENTION: Language assistance services are available, free of charge. Please call [phone].      ATENCIÓN: Si habla antoinette, tiene a hernandez disposición servicios gratuitos de asistencia lingüística. Llame al [phone].     CHÚ Ý: N?u b?n nói Ti?ng Vi?t, có các d?ch v? h? tr? ngôn ng? mi?n phí dành cho b?n. G?i s? [phone].         [facility] complies with applicable Federal civil rights laws and does not discriminate on the basis of race, color, national origin, age, disability, or sex.        "

## 2017-03-09 NOTE — PROGRESS NOTES
Subjective:       Patient ID: Yuli Milton is a 41 y.o. female.    Chief Complaint: Vaginal Pain (Intermittement since after hysterectomy in 08/2016. Worsening last night. ) and Abdominal Pain (Lowver abd pain with vaginal pain)    HPI Comments: Vaginal burning. No d/c no itching. No odor. Admits to using dial soap to clean vagina. States feels same as when had BV few weeks ago      Abdominal Pain   This is a chronic (intermittently) problem. The onset quality is sudden. The problem occurs constantly. The problem has been gradually worsening. The pain is located in the LLQ. The pain is mild. The quality of the pain is aching (pressure). Pain radiation: thighs. Associated symptoms include constipation. Pertinent negatives include no arthralgias, diarrhea, dysuria, fever, frequency, hematuria, myalgias, nausea or vomiting. Nothing aggravates the pain. The pain is relieved by nothing. Treatments tried: IBP 800mg. The treatment provided no relief.     Review of Systems   Constitutional: Negative for chills, diaphoresis, fatigue and fever.   Respiratory: Negative for shortness of breath and wheezing.    Cardiovascular: Negative for chest pain and palpitations.   Gastrointestinal: Positive for abdominal pain and constipation. Negative for abdominal distention, anal bleeding, blood in stool, diarrhea, nausea and vomiting.   Genitourinary: Positive for vaginal pain (associated with abd pain). Negative for decreased urine volume, difficulty urinating, dysuria, flank pain, frequency, hematuria and urgency.   Musculoskeletal: Negative for arthralgias and myalgias.   Neurological: Negative for dizziness, weakness and light-headedness.   Hematological: Negative for adenopathy.       Objective:      Physical Exam   Constitutional: She is oriented to person, place, and time. She appears well-developed and well-nourished.   obese   Eyes: Conjunctivae are normal. Pupils are equal, round, and reactive to light. Right eye  exhibits no discharge. Left eye exhibits no discharge.   Neck: Normal range of motion. Neck supple.   Cardiovascular: Normal rate, regular rhythm, normal heart sounds and intact distal pulses.  Exam reveals no friction rub.    No murmur heard.  Pulmonary/Chest: Effort normal and breath sounds normal. No respiratory distress. She has no wheezes.   Abdominal: Soft. Bowel sounds are normal. She exhibits no distension and no mass. There is tenderness (suprapubic).   Musculoskeletal: Normal range of motion.   Lymphadenopathy:     She has no cervical adenopathy.   Neurological: She is alert and oriented to person, place, and time.   Skin: Skin is warm and dry. She is not diaphoretic.   Psychiatric: She has a normal mood and affect. Her behavior is normal. Judgment and thought content normal.       Assessment:       1. Urinary tract infection with hematuria, site unspecified    2. Bacterial vaginosis        Plan:       *Urinary tract infection with hematuria, site unspecified  -     POCT urine dipstick without microscope  -     ciprofloxacin HCl (CIPRO) 500 MG tablet; Take 1 tablet (500 mg total) by mouth every 12 (twelve) hours.  Dispense: 14 tablet; Refill: 0    Bacterial vaginosis  -     metronidazole (FLAGYL) 500 MG tablet; Take 1 tablet (500 mg total) by mouth every 12 (twelve) hours.  Dispense: 14 tablet; Refill: 0  -     lactobacillus combination no.4 (PROBIOTIC) 3 billion cell Cap; Take 1 capsule by mouth once daily.      Abdominal pain may be exacerbated by adhesions from hyst due to chronic, intermittent nature of the pain. Discussed this with pt, if continues will see her OB for eval.    **  Discussed cleanliness and proper hygiene. No baths, scented or antibacterial, wipe front to back. Hydrate well  Follow up if not improving over next 2-3 days.

## 2017-03-09 NOTE — LETTER
March 9, 2017                 [facility] Internal Medicine  Internal Medicine  [address] [place] [address]  Phone: [phone]   March 9, 2017     Patient: Yuli Milton   YOB: 1975   Date of Visit: 3/9/2017       To Whom it May Concern:    Yuli Milton was seen in my clinic on 3/9/2017. She may return to work on 3/10/2017.    If you have any questions or concerns, please don't hesitate to call.    Sincerely,         ANNA Davis,FNP-C

## 2017-03-24 ENCOUNTER — HOSPITAL ENCOUNTER (OUTPATIENT)
Dept: CARDIOLOGY | Facility: CLINIC | Age: 42
Discharge: HOME | End: 2017-03-24
Payer: COMMERCIAL

## 2017-03-24 ENCOUNTER — TELEPHONE (OUTPATIENT)
Dept: INTERNAL MEDICINE | Facility: CLINIC | Age: 42
End: 2017-03-24

## 2017-03-24 ENCOUNTER — OFFICE VISIT (OUTPATIENT)
Dept: INTERNAL MEDICINE | Facility: CLINIC | Age: 42
End: 2017-03-24
Payer: COMMERCIAL

## 2017-03-24 VITALS
DIASTOLIC BLOOD PRESSURE: 68 MMHG | OXYGEN SATURATION: 99 % | BODY MASS INDEX: 42.69 KG/M2 | SYSTOLIC BLOOD PRESSURE: 121 MMHG | HEIGHT: 65 IN | HEART RATE: 72 BPM | RESPIRATION RATE: 18 BRPM | TEMPERATURE: 97 F | WEIGHT: 256.19 LBS

## 2017-03-24 DIAGNOSIS — R44.0 AUDITORY HALLUCINATIONS: ICD-10-CM

## 2017-03-24 DIAGNOSIS — R07.89 ATYPICAL CHEST PAIN: Primary | ICD-10-CM

## 2017-03-24 DIAGNOSIS — F33.1 MODERATE EPISODE OF RECURRENT MAJOR DEPRESSIVE DISORDER: ICD-10-CM

## 2017-03-24 DIAGNOSIS — R07.89 ATYPICAL CHEST PAIN: ICD-10-CM

## 2017-03-24 DIAGNOSIS — F41.9 ANXIETY: ICD-10-CM

## 2017-03-24 PROCEDURE — 99214 OFFICE O/P EST MOD 30 MIN: CPT | Mod: S$GLB,,, | Performed by: NURSE PRACTITIONER

## 2017-03-24 PROCEDURE — 1160F RVW MEDS BY RX/DR IN RCRD: CPT | Mod: S$GLB,,, | Performed by: NURSE PRACTITIONER

## 2017-03-24 PROCEDURE — 99999 PR PBB SHADOW E&M-EST. PATIENT-LVL IV: CPT | Mod: PBBFAC,,, | Performed by: NURSE PRACTITIONER

## 2017-03-24 PROCEDURE — 93005 ELECTROCARDIOGRAM TRACING: CPT | Mod: S$GLB,,, | Performed by: NURSE PRACTITIONER

## 2017-03-24 PROCEDURE — 93010 ELECTROCARDIOGRAM REPORT: CPT | Mod: S$GLB,,, | Performed by: INTERNAL MEDICINE

## 2017-03-24 NOTE — PROGRESS NOTES
Subjective:       Patient ID: Yuli Milton is a 41 y.o. female.    Chief Complaint: Shortness of Breath; Chest Injury; Palpitations; Numbness (right arm); and Back Pain (upper)    Chest Pain    This is a new problem. The current episode started in the past 7 days. The onset quality is gradual. The problem occurs intermittently. The problem has been waxing and waning. The pain is present in the substernal region. The pain is moderate. The quality of the pain is described as sharp and pressure. The pain radiates to the right arm and upper back. Associated symptoms include back pain (upper), diaphoresis, dizziness, headaches, palpitations and shortness of breath. Pertinent negatives include no abdominal pain, claudication, cough, exertional chest pressure, fever, hemoptysis, irregular heartbeat, leg pain, lower extremity edema, malaise/fatigue, nausea, near-syncope, numbness, syncope, vomiting or weakness. She has tried nothing for the symptoms. Risk factors include lack of exercise, obesity, sedentary lifestyle and stress.   Her past medical history is significant for anxiety/panic attacks and diabetes.   Pertinent negatives for past medical history include no arrhythmia, no CAD, no congenital heart disease, no connective tissue disease, no COPD, no CHF, no DVT, no mitral valve prolapse, no strokes, no TIA and no valve disorder.     Review of Systems   Constitutional: Positive for diaphoresis. Negative for fever and malaise/fatigue.   Respiratory: Positive for shortness of breath. Negative for cough and hemoptysis.    Cardiovascular: Positive for chest pain and palpitations. Negative for claudication, syncope and near-syncope.   Gastrointestinal: Negative for abdominal pain, nausea and vomiting.   Musculoskeletal: Positive for back pain (upper).   Neurological: Positive for dizziness and headaches. Negative for weakness and numbness.       Objective:      Physical Exam   Constitutional: She is oriented to  person, place, and time. Vital signs are normal. She appears well-developed.   obese   HENT:   Head: Normocephalic and atraumatic.   Eyes: Conjunctivae, EOM and lids are normal. Pupils are equal, round, and reactive to light.   Neck: Trachea normal. No tracheal deviation present.   Cardiovascular: Normal rate, regular rhythm, S1 normal, S2 normal, normal heart sounds and intact distal pulses.    No murmur heard.  Pulmonary/Chest: Effort normal and breath sounds normal. No respiratory distress. She has no wheezes. She exhibits no tenderness.   Abdominal: Soft. Normal appearance and bowel sounds are normal. She exhibits no distension. There is no tenderness.   Musculoskeletal: Normal range of motion.   Lymphadenopathy:     She has no cervical adenopathy.   Neurological: She is alert and oriented to person, place, and time. She has normal reflexes.   Skin: Skin is warm, dry and intact. No rash noted.   Psychiatric: She has a normal mood and affect. Her behavior is normal.   Vitals reviewed.      Assessment:       1. Atypical chest pain    2. Anxiety    3. Moderate episode of recurrent major depressive disorder    4. Auditory hallucinations        Plan:       *Atypical chest pain  EKG showed sinus otf. Due to stable VS and rhythm likely not cardiac in nature. Most likely r/t her psych illnesses as below.  -     EKG 12-lead; Future; Expected date: 3/24/17    Anxiety  Moderate episode of recurrent major depressive disorder  Auditory hallucinations  Continue current meds  Continue follow up with Psych. Recommended her to contact him today in regards to her anxiety reaction.   **

## 2017-03-24 NOTE — TELEPHONE ENCOUNTER
Pt has overdue TSH, T3, T3Free, T4, T4Free ordered on 02/22/17. She had an appt today, how would you like to proceed with labs?

## 2017-03-24 NOTE — MR AVS SNAPSHOT
[facility] Internal Medicine  [address] [address] [address]  Phone: [phone]                  Yuli Milton   3/24/2017 10:00 AM   Office Visit    Description:  Female : 1975   Provider:  ANNA Davis FNP-C   Department:  [facility] Internal Medicine           Reason for Visit     Shortness of Breath     Chest Injury     Palpitations     Numbness     Back Pain           Diagnoses this Visit        Comments    Atypical chest pain    -  Primary     Anxiety         Moderate episode of recurrent major depressive disorder         Auditory hallucinations                To Do List           Future Appointments        Provider Department Dept Phone    3/24/2017  1:00 PM EKG, [facility] Cardiology [phone]    2017 2:00 PM ANNA Davis,SHAGUFTA [facility] Internal Medicine [phone]      Goals (5 Years of Data)     None      [facility]     [facility] -  Assistance  Registered nurses in the [facility] provide clinical advisement, health education, appointment booking, and other advisory services.  Call for this free service at [phone].             Medications           Message regarding Medications     Verify the changes and/or additions to your medication regime listed below are the same as discussed with your clinician today.  If any of these changes or additions are incorrect, please notify your healthcare provider.             Verify that the below list of medications is an accurate representation of the medications you are currently taking.  If none reported, the list may be blank. If incorrect, please contact your healthcare provider. Carry this list with you in case of emergency.           Current Medications     ALBUTEROL INHL Inhale 2 puffs into the lungs continuous prn.    blood sugar diagnostic Strp 1 each by Other route 3 (three) times daily.    blood-glucose meter Misc Dispense 1     "escitalopram oxalate (LEXAPRO) 10 MG tablet 15 mg.    ibuprofen (ADVIL,MOTRIN) 800 MG tablet Take 1 tablet (800 mg total) by mouth 3 (three) times daily.    lactobacillus combination no.4 (PROBIOTIC) 3 billion cell Cap Take 1 capsule by mouth once daily.    lancets Misc 1 each by Other route 3 (three) times daily.    levalbuterol (XOPENEX) 0.63 mg/3 mL nebulizer solution Take 1 ampule by nebulization every 4 (four) hours as needed for Wheezing.    metformin (GLUCOPHAGE) 500 MG tablet Take 500 mg by mouth 2 (two) times daily with meals.    MULTIVIT-MIN/FA/HERBAL NO.245 (ALIVE WOMEN'S GUMMY VITAMINS ORAL) Take by mouth.    sucralfate (CARAFATE) 100 mg/mL suspension Take 1 g by mouth.    alprazolam (XANAX) 0.5 MG tablet Take 1 tablet (0.5 mg total) by mouth daily as needed.           Clinical Reference Information           Your Vitals Were     BP Pulse Temp Resp Height    121/68 (BP Location: Left arm, Patient Position: Sitting, BP Method: Automatic) 72 97.2 °F (36.2 °C) (Tympanic) 18 5' 5" (1.651 m)    Weight Last Period SpO2 BMI    116.2 kg (256 lb 2.8 oz) 08/21/2016 99% 42.63 kg/m2      Blood Pressure          Most Recent Value    BP  121/68      Allergies as of 3/24/2017     No Known Allergies      Immunizations Administered on Date of Encounter - 3/24/2017     None      Orders Placed During Today's Visit     Future Labs/Procedures Expected by Expires    EKG 12-lead  3/24/2017 3/24/2018      Language Assistance Services     ATTENTION: Language assistance services are available, free of charge. Please call [phone].      ATENCIÓN: Si habla español, tiene a hernandez disposición servicios gratuitos de asistencia lingüística. Llame al [phone].     CHÚ Ý: N?u b?n nói Ti?ng Vi?t, có các d?ch v? h? tr? ngôn ng? mi?n phí dành cho b?n. G?i s? [phone].         [facility] complies with applicable Federal civil rights laws and does not discriminate on the basis of race, color, national origin, " age, disability, or sex.

## 2017-04-24 ENCOUNTER — HOSPITAL ENCOUNTER (OUTPATIENT)
Dept: RADIOLOGY | Facility: HOSPITAL | Age: 42
Discharge: HOME | End: 2017-04-24
Attending: NURSE PRACTITIONER
Payer: COMMERCIAL

## 2017-04-24 ENCOUNTER — OFFICE VISIT (OUTPATIENT)
Dept: INTERNAL MEDICINE | Facility: CLINIC | Age: 42
End: 2017-04-24
Payer: COMMERCIAL

## 2017-04-24 ENCOUNTER — INITIAL CONSULT (OUTPATIENT)
Dept: GASTROENTEROLOGY | Facility: CLINIC | Age: 42
End: 2017-04-24
Payer: COMMERCIAL

## 2017-04-24 VITALS
SYSTOLIC BLOOD PRESSURE: 122 MMHG | HEIGHT: 65 IN | WEIGHT: 252.44 LBS | HEART RATE: 72 BPM | DIASTOLIC BLOOD PRESSURE: 94 MMHG | BODY MASS INDEX: 42.06 KG/M2

## 2017-04-24 VITALS
OXYGEN SATURATION: 98 % | DIASTOLIC BLOOD PRESSURE: 62 MMHG | TEMPERATURE: 98 F | RESPIRATION RATE: 16 BRPM | HEART RATE: 66 BPM | SYSTOLIC BLOOD PRESSURE: 125 MMHG | HEIGHT: 65 IN | BODY MASS INDEX: 42.09 KG/M2 | WEIGHT: 252.63 LBS

## 2017-04-24 DIAGNOSIS — K21.9 GASTROESOPHAGEAL REFLUX DISEASE, ESOPHAGITIS PRESENCE NOT SPECIFIED: ICD-10-CM

## 2017-04-24 DIAGNOSIS — R07.89 ATYPICAL CHEST PAIN: ICD-10-CM

## 2017-04-24 DIAGNOSIS — R10.9 ABDOMINAL CRAMPING: ICD-10-CM

## 2017-04-24 DIAGNOSIS — R10.84 GENERALIZED ABDOMINAL PAIN: ICD-10-CM

## 2017-04-24 DIAGNOSIS — K59.00 CONSTIPATION, UNSPECIFIED CONSTIPATION TYPE: ICD-10-CM

## 2017-04-24 DIAGNOSIS — K21.9 GASTROESOPHAGEAL REFLUX DISEASE, ESOPHAGITIS PRESENCE NOT SPECIFIED: Primary | ICD-10-CM

## 2017-04-24 DIAGNOSIS — R07.89 ATYPICAL CHEST PAIN: Primary | ICD-10-CM

## 2017-04-24 PROCEDURE — 99213 OFFICE O/P EST LOW 20 MIN: CPT | Mod: S$GLB,,, | Performed by: NURSE PRACTITIONER

## 2017-04-24 PROCEDURE — 74020 XR ABDOMEN FLAT AND ERECT: CPT | Mod: 26,,, | Performed by: RADIOLOGY

## 2017-04-24 PROCEDURE — 1160F RVW MEDS BY RX/DR IN RCRD: CPT | Mod: S$GLB,,, | Performed by: NURSE PRACTITIONER

## 2017-04-24 PROCEDURE — 99244 OFF/OP CNSLTJ NEW/EST MOD 40: CPT | Mod: S$GLB,,, | Performed by: NURSE PRACTITIONER

## 2017-04-24 PROCEDURE — 99999 PR PBB SHADOW E&M-EST. PATIENT-LVL IV: CPT | Mod: PBBFAC,,, | Performed by: NURSE PRACTITIONER

## 2017-04-24 PROCEDURE — 99999 PR PBB SHADOW E&M-EST. PATIENT-LVL III: CPT | Mod: PBBFAC,,, | Performed by: NURSE PRACTITIONER

## 2017-04-24 PROCEDURE — 74020 XR ABDOMEN FLAT AND ERECT: CPT | Mod: TC

## 2017-04-24 RX ORDER — DICYCLOMINE HYDROCHLORIDE 10 MG/1
10 CAPSULE ORAL
Qty: 120 CAPSULE | Refills: 0 | Status: SHIPPED | OUTPATIENT
Start: 2017-04-24 | End: 2017-05-24

## 2017-04-24 RX ORDER — PANTOPRAZOLE SODIUM 40 MG/1
40 TABLET, DELAYED RELEASE ORAL DAILY
Qty: 30 TABLET | Refills: 5 | Status: SHIPPED | OUTPATIENT
Start: 2017-04-24 | End: 2017-11-02

## 2017-04-24 NOTE — PROGRESS NOTES
Subjective:       Patient ID: Yuli Milton is a 41 y.o. female.    Chief Complaint: Follow-up (ER for stomach pains)    Abdominal Pain   This is a new problem. The current episode started in the past 7 days. The onset quality is sudden. The problem occurs constantly. The problem has been unchanged. The pain is located in the epigastric region, LLQ and RLQ. The pain is severe. The quality of the pain is sharp and aching. The abdominal pain radiates to the back. Associated symptoms include flatus and nausea. Pertinent negatives include no anorexia, arthralgias, belching, constipation, diarrhea, fever, frequency, headaches, hematochezia, hematuria, melena, myalgias, vomiting or weight loss. The pain is aggravated by eating. The pain is relieved by nothing. She has tried proton pump inhibitors (carafate) for the symptoms. The treatment provided no relief. Prior diagnostic workup includes CT scan. Her past medical history is significant for GERD. There is no history of abdominal surgery, colon cancer, Crohn's disease, gallstones, irritable bowel syndrome, pancreatitis, PUD or ulcerative colitis.     Review of Systems   Constitutional: Negative.  Negative for fever and weight loss.   HENT: Negative.    Respiratory: Negative.    Cardiovascular: Negative.    Gastrointestinal: Positive for abdominal pain, flatus and nausea. Negative for anorexia, constipation, diarrhea, hematochezia, melena and vomiting.   Genitourinary: Negative.  Negative for frequency and hematuria.   Musculoskeletal: Negative.  Negative for arthralgias and myalgias.   Neurological: Negative.  Negative for headaches.   Psychiatric/Behavioral: Negative.        Objective:      Physical Exam   Constitutional: She is oriented to person, place, and time. She appears well-developed and well-nourished.   Eyes: Lids are normal.   Neck: Trachea normal and normal range of motion. Neck supple. No thyromegaly present.   Cardiovascular: Normal rate, regular  rhythm, S1 normal, S2 normal, normal heart sounds and intact distal pulses.    No murmur heard.  Pulmonary/Chest: Effort normal and breath sounds normal. No respiratory distress. She has no wheezes. She exhibits no tenderness.   Abdominal: Soft. Normal appearance. She exhibits no distension. Bowel sounds are increased. There is tenderness in the right lower quadrant, epigastric area and left lower quadrant.   Musculoskeletal: Normal range of motion.   Lymphadenopathy:     She has no cervical adenopathy.   Neurological: She is alert and oriented to person, place, and time. She has normal reflexes.   Skin: Skin is warm, dry and intact. No rash noted.   Psychiatric: She has a normal mood and affect. Her behavior is normal. Judgment and thought content normal.   Vitals reviewed.      Assessment:       1. Gastroesophageal reflux disease, esophagitis presence not specified    2. Abdominal cramping        Plan:       *Gastroesophageal reflux disease, esophagitis presence not specified  Continue prilosec and carafate as RX by [facility]    Abdominal cramping  Pt requesting something for the pain and to be sent to GI. She has been est with [facility] for years but missed her appt this morning and states that she has been going there for years and they have not foxed her problems.   She did take a laxative this AM and the lower abd pain is likely related to this. Also pt's anxiety could be playing a large part in her symptoms  -     Ambulatory consult to Gastroenterology  -     dicyclomine (BENTYL) 10 MG capsule; Take 1 capsule (10 mg total) by mouth 4 (four) times daily before meals and nightly.  Dispense: 120 capsule; Refill: 0    **

## 2017-04-24 NOTE — MR AVS SNAPSHOT
[facility]  [address] [address] [address]  Phone: [phone]                  Yuli Milton   2017 1:20 PM   Office Visit    Description:  Female : 1975   Provider:  ANNA Davis FNP-C   Department:  [facility] Internal Medicine           Reason for Visit     Follow-up           Diagnoses this Visit        Comments    Abdominal cramping    -  Primary            To Do List           Future Appointments        Provider Department Dept Phone    2017 3:40 PM Lisa Melendez NP [facility] [phone]    2017 2:00 PM ANNA Davis,SHAGUFTA [facility] [phone]      Goals (5 Years of Data)     None       These Medications        Disp Refills Start End    dicyclomine (BENTYL) 10 MG capsule 120 capsule 0 2017    Take 1 capsule (10 mg total) by mouth 4 (four) times daily before meals and nightly. - Oral    Pharmacy: [facility] [address] [place] [address] [address] [place] [address] Ph #: [phone]         [facility]     [facility] On [facility] Nurse Care Line -  Assistance  Unless otherwise directed by your provider, please contact [facility], our nurse care line that is available for  assistance.     Registered nurses in the [facility] Center provide: appointment scheduling, clinical advisement, health education, and other advisory services.  Call: [phone] (toll free)               Medications           Message regarding Medications     Verify the changes and/or additions to your medication regime listed below are the same as discussed with your clinician today.  If any of these changes or additions are incorrect, please notify your healthcare provider.        START taking these NEW medications        Refills    dicyclomine (BENTYL) 10 MG capsule 0    Sig: Take 1 capsule (10 mg total) by mouth 4 (four) times daily before  "meals and nightly.    Class: Normal    Route: Oral           Verify that the below list of medications is an accurate representation of the medications you are currently taking.  If none reported, the list may be blank. If incorrect, please contact your healthcare provider. Carry this list with you in case of emergency.           Current Medications     ALBUTEROL INHL Inhale 2 puffs into the lungs continuous prn.    blood sugar diagnostic Strp 1 each by Other route 3 (three) times daily.    blood-glucose meter Misc Dispense 1    escitalopram oxalate (LEXAPRO) 10 MG tablet 15 mg.    ibuprofen (ADVIL,MOTRIN) 800 MG tablet Take 1 tablet (800 mg total) by mouth 3 (three) times daily.    lancets Misc 1 each by Other route 3 (three) times daily.    levalbuterol (XOPENEX) 0.63 mg/3 mL nebulizer solution Take 1 ampule by nebulization every 4 (four) hours as needed for Wheezing.    metformin (GLUCOPHAGE) 500 MG tablet Take 500 mg by mouth 2 (two) times daily with meals.    MULTIVIT-MIN/FA/HERBAL NO.245 (ALIVE WOMEN'S GUMMY VITAMINS ORAL) Take by mouth.    sucralfate (CARAFATE) 100 mg/mL suspension Take 1 g by mouth.    alprazolam (XANAX) 0.5 MG tablet Take 1 tablet (0.5 mg total) by mouth daily as needed.    dicyclomine (BENTYL) 10 MG capsule Take 1 capsule (10 mg total) by mouth 4 (four) times daily before meals and nightly.    lactobacillus combination no.4 (PROBIOTIC) 3 billion cell Cap Take 1 capsule by mouth once daily.           Clinical Reference Information           Your Vitals Were     BP Pulse Temp Resp Height    125/62 (BP Location: Left arm, Patient Position: Sitting, BP Method: Automatic) 66 97.6 °F (36.4 °C) (Tympanic) 16 5' 5" (1.651 m)    Weight Last Period SpO2 BMI    114.6 kg (252 lb 10.4 oz) 08/21/2016 98% 42.04 kg/m2      Blood Pressure          Most Recent Value    BP  125/62      Allergies as of 4/24/2017     No Known Allergies      Immunizations Administered on Date of Encounter - 4/24/2017     None "      Orders Placed During Today's Visit      Normal Orders This Visit    Ambulatory consult to Gastroenterology       Language Assistance Services     ATTENTION: Language assistance services are available, free of charge. Please call [phone].      ATENCIÓN: Si habla antoinette, tiene a hernandez disposición servicios gratuitos de asistencia lingüística. Llame al [phone].     CHÚ Ý: N?u b?n nói Ti?ng Vi?t, có các d?ch v? h? tr? ngôn ng? mi?n phí dành cho b?n. G?i s? [phone].         [facility] complies with applicable Federal civil rights laws and does not discriminate on the basis of race, color, national origin, age, disability, or sex.

## 2017-04-24 NOTE — MR AVS SNAPSHOT
[facility]  [address] [facility] [address]  Phone: [phone]  Fax: [phone]                  Yuli Milton   2017 3:40 PM   Initial consult    Description:  Female : 1975   Provider:  Lisa Melendez NP   Department:  [facility]           Reason for Visit     Abdominal Cramping           Diagnoses this Visit        Comments    Atypical chest pain    -  Primary     Gastroesophageal reflux disease, esophagitis presence not specified         Generalized abdominal pain         Constipation, unspecified constipation type                To Do List           Future Appointments        Provider Department Dept Phone    2017 4:30 PM ON XR1- [facility] [phone]    2017 11:00 AM Lisa Melendez NP [facility] [phone]    2017 2:00 PM ANNA Davis,FNP-C [facility] [phone]      Goals (5 Years of Data)     None      Follow-Up and Disposition     Return in about 4 weeks (around 2017).       These Medications        Disp Refills Start End    pantoprazole (PROTONIX) 40 MG tablet 30 tablet 5 2017    Take 1 tablet (40 mg total) by mouth once daily. - Oral    Pharmacy: [facility] [address] [place] [address] AT [facility] [address] Jaden Dang [facility] Ph #: [phone]         [facility]     [facility] Nurse Care Line -  Assistance  Unless otherwise directed by your provider, please contact [facility], our nurse care line that is available for  assistance.     Registered nurses in the [facility] provide: appointment scheduling, clinical advisement, health education, and other advisory services.  Call: [phone] (toll free)               Medications           Message regarding Medications     Verify the changes and/or additions to your medication regime listed below are the  "same as discussed with your clinician today.  If any of these changes or additions are incorrect, please notify your healthcare provider.        START taking these NEW medications        Refills    pantoprazole (PROTONIX) 40 MG tablet 5    Sig: Take 1 tablet (40 mg total) by mouth once daily.    Class: Normal    Route: Oral           Verify that the below list of medications is an accurate representation of the medications you are currently taking.  If none reported, the list may be blank. If incorrect, please contact your healthcare provider. Carry this list with you in case of emergency.           Current Medications     ALBUTEROL INHL Inhale 2 puffs into the lungs continuous prn.    blood sugar diagnostic Strp 1 each by Other route 3 (three) times daily.    blood-glucose meter Misc Dispense 1    escitalopram oxalate (LEXAPRO) 10 MG tablet 15 mg.    ibuprofen (ADVIL,MOTRIN) 800 MG tablet Take 1 tablet (800 mg total) by mouth 3 (three) times daily.    lancets Misc 1 each by Other route 3 (three) times daily.    levalbuterol (XOPENEX) 0.63 mg/3 mL nebulizer solution Take 1 ampule by nebulization every 4 (four) hours as needed for Wheezing.    metformin (GLUCOPHAGE) 500 MG tablet Take 500 mg by mouth 2 (two) times daily with meals.    MULTIVIT-MIN/FA/HERBAL NO.245 (ALIVE WOMEN'S GUMMY VITAMINS ORAL) Take by mouth.    sucralfate (CARAFATE) 100 mg/mL suspension Take 1 g by mouth.    alprazolam (XANAX) 0.5 MG tablet Take 1 tablet (0.5 mg total) by mouth daily as needed.    dicyclomine (BENTYL) 10 MG capsule Take 1 capsule (10 mg total) by mouth 4 (four) times daily before meals and nightly.    lactobacillus combination no.4 (PROBIOTIC) 3 billion cell Cap Take 1 capsule by mouth once daily.    pantoprazole (PROTONIX) 40 MG tablet Take 1 tablet (40 mg total) by mouth once daily.           Clinical Reference Information           Your Vitals Were     BP Pulse Height Weight Last Period BMI    122/94 72 5' 5" (1.651 m) 114.5 " kg (252 lb 6.8 oz) 08/21/2016 42.01 kg/m2      Blood Pressure          Most Recent Value    BP  (!)  122/94      Allergies as of 4/24/2017     No Known Allergies      Immunizations Administered on Date of Encounter - 4/24/2017     None      Orders Placed During Today's Visit     Future Labs/Procedures Expected by Expires    X-Ray Abdomen Flat And Erect  4/24/2017 4/24/2018      Instructions    May continue Carafate and Pepcid as prescribed for the next few days, then stop Carafate. May continue Pepcid 20mg twice a day as needed. Take Miralax once to twice a day for constipation.        Language Assistance Services     ATTENTION: Language assistance services are available, free of charge. Please call [phone].      ATENCIÓN: Si kasila antoinette, tiene a hernandez disposición servicios gratuitos de asistencia lingüística. Llame al [phone].     CHÚ Ý: N?u b?n nói Ti?ng Vi?t, có các d?ch v? h? tr? ngôn ng? mi?n phí dành cho b?n. G?i s? [phone].         [facility] complies with applicable Federal civil rights laws and does not discriminate on the basis of race, color, national origin, age, disability, or sex.

## 2017-04-24 NOTE — LETTER
April 25, 2017      ANNA Davis,FNP-C  [address] [address] [address]           [facility]  [address] [place] [address]  Phone: [phone]  Fax: [phone]          Patient: Yuli Milton   MR Number: [id number]   YOB: 1975   Date of Visit: 4/24/2017       Dear Gabby Carter:    Thank you for referring Yuli Milton to me for evaluation. Attached you will find relevant portions of my assessment and plan of care.    If you have questions, please do not hesitate to call me. I look forward to following Yuli Milton along with you.    Sincerely,    Lisa Melendez, NP    Enclosure  CC:  No Recipients    If you would like to receive this communication electronically, please contact [email or web address] or [phone] to request more information on [facility] access.    For providers and/or their staff who would like to refer a patient to [facility], please contact us through our one-stop-shop provider referral line, [facility] , at [phone].    If you feel you have received this communication in error or would no longer like to receive these types of communications, please e-mail [email or web address]

## 2017-04-24 NOTE — PATIENT INSTRUCTIONS
May continue Carafate and Pepcid as prescribed for the next few days, then stop Carafate. May continue Pepcid 20mg twice a day as needed. Take Miralax once to twice a day for constipation.

## 2017-04-25 ENCOUNTER — TELEPHONE (OUTPATIENT)
Dept: INTERNAL MEDICINE | Facility: CLINIC | Age: 42
End: 2017-04-25

## 2017-04-25 DIAGNOSIS — Z12.31 ENCOUNTER FOR SCREENING MAMMOGRAM FOR BREAST CANCER: Primary | ICD-10-CM

## 2017-04-25 NOTE — TELEPHONE ENCOUNTER
----- Message from Megan Hastings sent at 4/25/2017 10:57 AM CDT -----  Please put a order in for mammo. Pt is schedule for 5-02-17. Thanks

## 2017-04-25 NOTE — PROGRESS NOTES
"Clinic Consult:  [facility] Consultation Note    Reason for Consult:  The primary encounter diagnosis was Atypical chest pain. Diagnoses of Gastroesophageal reflux disease, esophagitis presence not specified, Generalized abdominal pain, and Constipation, unspecified constipation type were also pertinent to this visit.    PCP: Scottie Buitrago   [address] [facility] [address] [facility] [address]    HPI:  This is a 41 y.o. female here for evaluation of the above. She was referred to me by PRANAV Carter and presents to clinic with continued complaints of GERD, chest pain, and abdominal pain. Patient has had similar symptoms for a few years now and has had prior workup by [facility] GI department that included EGD and colonosocpy. She reports no improvement in symptoms while seeing them. She was recently seen in the ER for her symptoms. She complains of episodic atypical chest pain that radiates to her back. She reports it feels as if she has "trapped gas". She also complains of generalized abdominal pain and associated abdominal bloating and pressure. Symptoms are worsened by eating but cannot identify an relieving factors. He does have a history of constipation. She reports having a bowel movement once every few days and may go as long as a week. Her stools are hard in character. She denies any hematochezia or melena, or weight loss. She is currently taking Pepcid 20mg and Carafate. She has had no improvements on medications. Recent CT scan reviewed with patient and noted adrenal nodule that appears unchanged from previous scans. Patient will follow up with PCP related to adrenal cyst.     Review of Systems   Constitutional: Negative for fever, malaise/fatigue and weight loss.   HENT: Negative for sore throat.    Respiratory: Negative for cough and wheezing.    Cardiovascular: Positive for chest pain. Negative for palpitations.   Gastrointestinal: Positive for abdominal pain (generalized), constipation, " heartburn and nausea. Negative for blood in stool, diarrhea, melena and vomiting.   Musculoskeletal: Positive for back pain. Negative for joint pain, myalgias and neck pain.   Skin: Negative for itching and rash.   Neurological: Negative for dizziness, speech change, seizures, loss of consciousness and headaches.   Psychiatric/Behavioral: Negative for depression and substance abuse. The patient is not nervous/anxious.        Medical History:  has a past medical history of Abnormal Pap smear of cervix; Abnormal Pap smear of vagina; Anemia; Anxiety and depression; Asthma; Depression (emotion); Diabetes mellitus; Gallstones; Gastritis; History of ovarian cyst; History of syphilis; History of trichomoniasis; Mental disorder; and PONV (postoperative nausea and vomiting).    Surgical History:  has a past surgical history that includes Appendectomy; Dilation and curettage of uterus; and Hysterectomy (08/31/2016).    Family History: family history includes Breast cancer in her maternal aunt and paternal grandmother; Colon cancer in her maternal aunt; Diabetes in her maternal grandmother; Hypertension in her mother; Miscarriages / Stillbirths in her cousin; Stroke in her maternal aunt. There is no history of Ovarian cancer, Deep vein thrombosis, or Pulmonary embolism..     Social History:  reports that she has never smoked. She has never used smokeless tobacco. She reports that she does not drink alcohol or use illicit drugs.    Allergies: Reviewed    Home Medications:   Current Outpatient Prescriptions on File Prior to Visit   Medication Sig Dispense Refill    ALBUTEROL INHL Inhale 2 puffs into the lungs continuous prn.      blood sugar diagnostic Strp 1 each by Other route 3 (three) times daily. 100 each 11    blood-glucose meter Misc Dispense 1 1 each 0    escitalopram oxalate (LEXAPRO) 10 MG tablet 15 mg.  0    ibuprofen (ADVIL,MOTRIN) 800 MG tablet Take 1 tablet (800 mg total) by mouth 3 (three) times daily. 90  "tablet 0    lancets Misc 1 each by Other route 3 (three) times daily. 100 each 0    levalbuterol (XOPENEX) 0.63 mg/3 mL nebulizer solution Take 1 ampule by nebulization every 4 (four) hours as needed for Wheezing.      metformin (GLUCOPHAGE) 500 MG tablet Take 500 mg by mouth 2 (two) times daily with meals.      MULTIVIT-MIN/FA/HERBAL NO.245 (ALIVE WOMEN'S GUMMY VITAMINS ORAL) Take by mouth.      sucralfate (CARAFATE) 100 mg/mL suspension Take 1 g by mouth.      alprazolam (XANAX) 0.5 MG tablet Take 1 tablet (0.5 mg total) by mouth daily as needed. 10 tablet 0    dicyclomine (BENTYL) 10 MG capsule Take 1 capsule (10 mg total) by mouth 4 (four) times daily before meals and nightly. 120 capsule 0    lactobacillus combination no.4 (PROBIOTIC) 3 billion cell Cap Take 1 capsule by mouth once daily.       No current facility-administered medications on file prior to visit.        Physical Exam:  Vital Signs:  BP (!) 122/94  Pulse 72  Ht 5' 5" (1.651 m)  Wt 114.5 kg (252 lb 6.8 oz)  LMP 08/21/2016  BMI 42.01 kg/m2  Body mass index is 42.01 kg/(m^2).  Physical Exam   Constitutional: She is oriented to person, place, and time and well-developed, well-nourished, and in no distress. No distress.   HENT:   Head: Normocephalic.   Eyes: Conjunctivae are normal. Pupils are equal, round, and reactive to light.   Cardiovascular: Normal rate, regular rhythm and normal heart sounds.    Pulmonary/Chest: Effort normal and breath sounds normal. No respiratory distress.   Abdominal: Soft. Bowel sounds are normal. She exhibits no distension.   Neurological: She is alert and oriented to person, place, and time. No cranial nerve deficit.   Skin: Skin is warm and dry. No rash noted.   Psychiatric: Mood and affect normal.       Labs: Pertinent labs reviewed.    Endoscopy:  Had had prior EGD. Getting medical records.    CRC Screening: Has had prior. Getting medical records.     Assessment:  1. Atypical chest pain    2. " Gastroesophageal reflux disease, esophagitis presence not specified    3. Generalized abdominal pain    4. Constipation, unspecified constipation type           Recommendations:  Atypical chest pain  Gastroesophageal reflux disease, esophagitis presence not specified  Generalized abdominal pain  Constipation, unspecified constipation type  - No improvement in symptoms despite medications  - Will start daily PPI. Continue current medications for the next week then try and discontinue Pepcid and Carafate. However, she may take them as needed.   - Suspect patient has constipation and possible is a contributory factor in chest pain/GERD symptoms.   - Will get abdominal xray to assess extent of constipation.  - Will start Miralax once to twice a day.  - Will treat constipation and re-evaluate at follow up visit in 4 weeks.   -     X-Ray Abdomen Flat And Erect; Future; Expected date: 4/24/17  -     pantoprazole (PROTONIX) 40 MG tablet; Take 1 tablet (40 mg total) by mouth once daily.  Dispense: 30 tablet; Refill: 5    Return in about 4 weeks (around 5/22/2017).    Thank you so much for allowing me to participate in the care of SHAGUFTA Tian

## 2017-05-01 ENCOUNTER — OFFICE VISIT (OUTPATIENT)
Dept: GASTROENTEROLOGY | Facility: CLINIC | Age: 42
End: 2017-05-01
Payer: COMMERCIAL

## 2017-05-01 VITALS
BODY MASS INDEX: 41.77 KG/M2 | DIASTOLIC BLOOD PRESSURE: 70 MMHG | WEIGHT: 250.69 LBS | HEART RATE: 64 BPM | SYSTOLIC BLOOD PRESSURE: 114 MMHG | HEIGHT: 65 IN

## 2017-05-01 DIAGNOSIS — R07.89 ATYPICAL CHEST PAIN: Primary | ICD-10-CM

## 2017-05-01 DIAGNOSIS — K59.00 CONSTIPATION, UNSPECIFIED CONSTIPATION TYPE: ICD-10-CM

## 2017-05-01 DIAGNOSIS — R10.11 RUQ PAIN: ICD-10-CM

## 2017-05-01 PROCEDURE — 1160F RVW MEDS BY RX/DR IN RCRD: CPT | Mod: S$GLB,,, | Performed by: NURSE PRACTITIONER

## 2017-05-01 PROCEDURE — 99999 PR PBB SHADOW E&M-EST. PATIENT-LVL III: CPT | Mod: PBBFAC,,, | Performed by: NURSE PRACTITIONER

## 2017-05-01 PROCEDURE — 99213 OFFICE O/P EST LOW 20 MIN: CPT | Mod: S$GLB,,, | Performed by: NURSE PRACTITIONER

## 2017-05-01 RX ORDER — ONDANSETRON 4 MG/1
4 TABLET, FILM COATED ORAL
COMMUNITY
Start: 2017-04-30 | End: 2017-06-22

## 2017-05-01 RX ORDER — HYDROCODONE BITARTRATE AND ACETAMINOPHEN 5; 325 MG/1; MG/1
1 TABLET ORAL
COMMUNITY
Start: 2017-04-30 | End: 2017-05-15 | Stop reason: ALTCHOICE

## 2017-05-01 NOTE — MR AVS SNAPSHOT
[facility]  [address] [facility] [address]  Phone: [phone]  Fax: [phone]                  Yuli Milton   2017 11:40 AM   Office Visit    Description:  Female : 1975   Provider:  Lisa Melendez NP   Department:  [facility]           Reason for Visit     Abdominal Pain           Diagnoses this Visit        Comments    Atypical chest pain    -  Primary     RUQ pain         Constipation, unspecified constipation type                To Do List           Future Appointments        Provider Department Dept Phone    2017 10:30 AM [facility] MAMMO1 [facility] [phone]    2017 11:00 AM Lisa Melendez NP [facility]astroenter[facility] [phone]    2017 2:00 PM ANNA Davis,FNP-C [facility] Internal Medicine [phone]      Goals (5 Years of Data)     None       These Medications        Disp Refills Start End    linaclotide (LINZESS) 145 mcg Cap capsule 30 capsule 5 2017     Take 1 capsule (145 mcg total) by mouth once daily. - Oral    Pharmacy: [facility] [address] [place] [address] AT [facility] [address] Jaden Dang [facility] Ph #: [phone]         [facility] On [facility]     [facility] Nurse Care Line -  Assistance  Unless otherwise directed by your provider, please contact [facility], our nurse care line that is available for  assistance.     Registered nurses in the [facility] Center provide: appointment scheduling, clinical advisement, health education, and other advisory services.  Call: [phone] (toll free)               Medications           Message regarding Medications     Verify the changes and/or additions to your medication regime listed below are the same as discussed with your clinician today.  If any of these changes or additions are incorrect, please notify your healthcare provider.        START taking these NEW medications         Refills    linaclotide (LINZESS) 145 mcg Cap capsule 5    Sig: Take 1 capsule (145 mcg total) by mouth once daily.    Class: Normal    Route: Oral      STOP taking these medications     lactobacillus combination no.4 (PROBIOTIC) 3 billion cell Cap Take 1 capsule by mouth once daily.           Verify that the below list of medications is an accurate representation of the medications you are currently taking.  If none reported, the list may be blank. If incorrect, please contact your healthcare provider. Carry this list with you in case of emergency.           Current Medications     ALBUTEROL INHL Inhale 2 puffs into the lungs continuous prn.    blood sugar diagnostic Strp 1 each by Other route 3 (three) times daily.    blood-glucose meter Misc Dispense 1    dicyclomine (BENTYL) 10 MG capsule Take 1 capsule (10 mg total) by mouth 4 (four) times daily before meals and nightly.    escitalopram oxalate (LEXAPRO) 10 MG tablet 15 mg.    hydrocodone-acetaminophen 5-325mg (NORCO) 5-325 mg per tablet 1 tablet as needed.     ibuprofen (ADVIL,MOTRIN) 800 MG tablet Take 1 tablet (800 mg total) by mouth 3 (three) times daily.    lancets Misc 1 each by Other route 3 (three) times daily.    levalbuterol (XOPENEX) 0.63 mg/3 mL nebulizer solution Take 1 ampule by nebulization every 4 (four) hours as needed for Wheezing.    metformin (GLUCOPHAGE) 500 MG tablet Take 500 mg by mouth 2 (two) times daily with meals.    MULTIVIT-MIN/FA/HERBAL NO.245 (ALIVE WOMEN'S GUMMY VITAMINS ORAL) Take by mouth.    ondansetron (ZOFRAN) 4 MG tablet Take 4 mg by mouth.    pantoprazole (PROTONIX) 40 MG tablet Take 1 tablet (40 mg total) by mouth once daily.    sucralfate (CARAFATE) 100 mg/mL suspension Take 1 g by mouth.    alprazolam (XANAX) 0.5 MG tablet Take 1 tablet (0.5 mg total) by mouth daily as needed.    linaclotide (LINZESS) 145 mcg Cap capsule Take 1 capsule (145 mcg total) by mouth once daily.           Clinical Reference Information     "       Your Vitals Were     BP Pulse Height Weight Last Period BMI    114/70 64 5' 5" (1.651 m) 113.7 kg (250 lb 10.6 oz) 08/21/2016 41.71 kg/m2      Blood Pressure          Most Recent Value    BP  114/70      Allergies as of 5/1/2017     No Known Allergies      Immunizations Administered on Date of Encounter - 5/1/2017     None      Orders Placed During Today's Visit     Future Labs/Procedures Expected by Expires    NM Hepatobiliary (HIDA) W Pharm and [facility]  5/1/2017 5/1/2018      Language Assistance Services     ATTENTION: Language assistance services are available, free of charge. Please call [phone].      ATENCIÓN: Si kasila antoinette, tiene a hernandez disposición servicios gratuitos de asistencia lingüística. Llame al [phone].     CHÚ Ý: N?u b?n nói Ti?ng Vi?t, có các d?ch v? h? tr? ngôn ng? mi?n phí dành cho b?n. G?i s? [phone].         [facility] complies with applicable Federal civil rights laws and does not discriminate on the basis of race, color, national origin, age, disability, or sex.        "

## 2017-05-02 ENCOUNTER — HOSPITAL ENCOUNTER (OUTPATIENT)
Dept: RADIOLOGY | Facility: HOSPITAL | Age: 42
Discharge: HOME | End: 2017-05-02
Attending: FAMILY MEDICINE
Payer: COMMERCIAL

## 2017-05-02 DIAGNOSIS — Z12.31 ENCOUNTER FOR SCREENING MAMMOGRAM FOR BREAST CANCER: ICD-10-CM

## 2017-05-02 PROCEDURE — 77067 SCR MAMMO BI INCL CAD: CPT | Mod: TC

## 2017-05-02 PROCEDURE — 77067 SCR MAMMO BI INCL CAD: CPT | Mod: 26,,, | Performed by: RADIOLOGY

## 2017-05-03 ENCOUNTER — TELEPHONE (OUTPATIENT)
Dept: GASTROENTEROLOGY | Facility: CLINIC | Age: 42
End: 2017-05-03

## 2017-05-03 DIAGNOSIS — K21.9 GASTROESOPHAGEAL REFLUX DISEASE, ESOPHAGITIS PRESENCE NOT SPECIFIED: Primary | ICD-10-CM

## 2017-05-03 DIAGNOSIS — R10.11 RUQ PAIN: ICD-10-CM

## 2017-05-03 DIAGNOSIS — R07.89 ATYPICAL CHEST PAIN: ICD-10-CM

## 2017-05-03 NOTE — TELEPHONE ENCOUNTER
Spoke with patient. Notified her that we are still out of CCK and unable to perform the HIDA scan.  She agreed to do the upper endoscopy.  States she has never been diagnosed with H. Pylori.  Informed her will call back to schedule once the orders are placed. Pt voiced understanding.

## 2017-05-03 NOTE — TELEPHONE ENCOUNTER
Please call patient and notify her we are still out of [facility] and we are unable to perform the HIDA scan at this point. Has she made a decision on whether or not to do the upper endoscopy? If so, I will place the order and we can get it scheduled. If she still doesn't want to do the EGD, I would at least like to do a blood test for H. Pylori. Have you ever been diagnosed with that previously?

## 2017-05-03 NOTE — PROGRESS NOTES
Clinic Follow Up:  [facility] Follow Up Note    Reason for Follow Up:  The primary encounter diagnosis was Atypical chest pain. Diagnoses of RUQ pain and Constipation, unspecified constipation type were also pertinent to this visit.    PCP: Scottie Buitrago       HPI:  This is a 41 y.o. female here for follow up of the above. Since our last visit, patient had to return to the ER for intense abdominal pain and chest pain. She reports no improvement in constipation with Miralax. Recent CT scan in January revealed unremarkable gallbladder. Her abdominal pain is located in her RUQ and worsens with eating. She also has associated atypical chest pain. This chest pain is intermittent as well and worsens after eating. She denies any dysphagia or recent NSAID use. I received past medical records from her ER visit on 4/22/17 for RUQ pain chest pain. CT scan with and without was done at that visit but makes no mention of gallbladder. It was recommended she follow up with GI for a HIDA scan. She denies any hematochezia, melena, nausea and vomiting.     Review of Systems   Constitutional: Negative for activity change, appetite change and fever.        As per interval history above   Respiratory: Negative for cough and shortness of breath.    Cardiovascular: Negative for chest pain.   Gastrointestinal: Positive for abdominal pain (RUQ), blood in stool and constipation. Negative for anal bleeding, diarrhea, nausea, rectal pain and vomiting.   Skin: Negative for color change and rash.       Medical History:  Past Medical History:   Diagnosis Date    Abnormal Pap smear of cervix     treatment??    Abnormal Pap smear of vagina     Anemia     Anxiety and depression     Asthma     Depression (emotion)     Diabetes mellitus     Gallstones     Gastritis     History of ovarian cyst     History of syphilis     History of trichomoniasis     Mental disorder     PONV (postoperative nausea and vomiting)         Surgical History:   Past Surgical History:   Procedure Laterality Date    APPENDECTOMY      DILATION AND CURETTAGE OF UTERUS      bleeding    HYSTERECTOMY  08/31/2016    BS       Family History:   Family History   Problem Relation Age of Onset    Breast cancer Paternal Grandmother     Diabetes Maternal Grandmother     Hypertension Mother     Breast cancer Maternal Aunt     Colon cancer Maternal Aunt     Miscarriages / Stillbirths Cousin     Stroke Maternal Aunt     Ovarian cancer Neg Hx     Deep vein thrombosis Neg Hx     Pulmonary embolism Neg Hx        Social History:   Social History   Substance Use Topics    Smoking status: Never Smoker    Smokeless tobacco: Never Used    Alcohol use No       Allergies: Review of patient's allergies indicates:  No Known Allergies    Home Medications:  Current Outpatient Prescriptions on File Prior to Visit   Medication Sig Dispense Refill    ALBUTEROL INHL Inhale 2 puffs into the lungs continuous prn.      blood sugar diagnostic Strp 1 each by Other route 3 (three) times daily. 100 each 11    blood-glucose meter Misc Dispense 1 1 each 0    dicyclomine (BENTYL) 10 MG capsule Take 1 capsule (10 mg total) by mouth 4 (four) times daily before meals and nightly. 120 capsule 0    escitalopram oxalate (LEXAPRO) 10 MG tablet 15 mg.  0    ibuprofen (ADVIL,MOTRIN) 800 MG tablet Take 1 tablet (800 mg total) by mouth 3 (three) times daily. 90 tablet 0    lancets Misc 1 each by Other route 3 (three) times daily. 100 each 0    levalbuterol (XOPENEX) 0.63 mg/3 mL nebulizer solution Take 1 ampule by nebulization every 4 (four) hours as needed for Wheezing.      metformin (GLUCOPHAGE) 500 MG tablet Take 500 mg by mouth 2 (two) times daily with meals.      MULTIVIT-MIN/FA/HERBAL NO.245 (ALIVE WOMEN'S GUMMY VITAMINS ORAL) Take by mouth.      pantoprazole (PROTONIX) 40 MG tablet Take 1 tablet (40 mg total) by mouth once daily. 30 tablet 5    sucralfate (CARAFATE)  "100 mg/mL suspension Take 1 g by mouth.      alprazolam (XANAX) 0.5 MG tablet Take 1 tablet (0.5 mg total) by mouth daily as needed. 10 tablet 0     No current facility-administered medications on file prior to visit.        Physical Exam:  Vital Signs:  /70  Pulse 64  Ht 5' 5" (1.651 m)  Wt 113.7 kg (250 lb 10.6 oz)  LMP 08/21/2016  BMI 41.71 kg/m2  Body mass index is 41.71 kg/(m^2).  Physical Exam   Constitutional: She is oriented to person, place, and time and well-developed, well-nourished, and in no distress. No distress.   HENT:   Head: Normocephalic.   Eyes: Conjunctivae are normal. Pupils are equal, round, and reactive to light.   Neck: Neck supple.   Cardiovascular: Normal rate, regular rhythm and normal heart sounds.    Pulmonary/Chest: Effort normal and breath sounds normal. No respiratory distress.   Abdominal: Soft. Bowel sounds are normal. There is tenderness (generalized to upper abdomen).   Neurological: She is alert and oriented to person, place, and time. No cranial nerve deficit.   Skin: Skin is warm and dry. No rash noted.   Psychiatric: Mood and affect normal.       Labs: Pertinent labs reviewed.    CRC Screening: unknown    Assessment:  1. Atypical chest pain    2. RUQ pain    3. Constipation, unspecified constipation type        Recommendations:  Atypical chest pain  RUQ pain  Constipation, unspecified constipation type  - Patient with constipation despite taking Miralax. Will try Linzess for constipation.  - Persistent RUQ and chest pain. Will try to schedule HIDA scan with [facility] for further evaluation of gallbladder.   - Recommended EGD to R/O PUD vs gastritis vs esophagitis vs H. Pylori. Patient does not wish to get EGD done at this time but states she will think about it.   - Patient will send a message through patient portal in about 2 weeks for an update on her symptoms.   -     NM Hepatobiliary (HIDA) [facility]; Future; Expected date: 5/1/17  -     linaclotide (LINZESS) " 145 mcg Cap capsule; Take 1 capsule (145 mcg total) by mouth once daily.  Dispense: 30 capsule; Refill: 5    Thank you for the opportunity to participate in the care of this patient.  SHAGUFTA Hernandez

## 2017-05-07 ENCOUNTER — PATIENT MESSAGE (OUTPATIENT)
Dept: GASTROENTEROLOGY | Facility: CLINIC | Age: 42
End: 2017-05-07

## 2017-05-08 NOTE — TELEPHONE ENCOUNTER
Spoke with patient. States that she will have to check her work schedule and will call back to schedule. Contact information given to patient. Pt voiced understanding.

## 2017-05-08 NOTE — TELEPHONE ENCOUNTER
Spoke to patient about her email sent to me. She reports a worsening epigastric pain and chest pain. She feels food is now getting stuck. She also reports that she is having blurry vision and feels it is either the Protonix or Bentyl. Recommended patient stop Bentyl and continue Protonix. She will notify me if her symptoms continue. She has not scheduled her EGD yet. Told her my nurse will call her today to schedule her EGD. She verbalized understanding.

## 2017-05-11 ENCOUNTER — OFFICE VISIT (OUTPATIENT)
Dept: INTERNAL MEDICINE | Facility: CLINIC | Age: 42
End: 2017-05-11
Payer: COMMERCIAL

## 2017-05-11 ENCOUNTER — LAB VISIT (OUTPATIENT)
Dept: LAB | Facility: HOSPITAL | Age: 42
End: 2017-05-11
Attending: FAMILY MEDICINE
Payer: COMMERCIAL

## 2017-05-11 VITALS
TEMPERATURE: 96 F | HEIGHT: 65 IN | RESPIRATION RATE: 16 BRPM | BODY MASS INDEX: 42.24 KG/M2 | WEIGHT: 253.5 LBS | OXYGEN SATURATION: 98 % | DIASTOLIC BLOOD PRESSURE: 79 MMHG | HEART RATE: 70 BPM | SYSTOLIC BLOOD PRESSURE: 131 MMHG

## 2017-05-11 DIAGNOSIS — R82.4 KETONURIA: ICD-10-CM

## 2017-05-11 DIAGNOSIS — R31.29 HEMATURIA, MICROSCOPIC: ICD-10-CM

## 2017-05-11 DIAGNOSIS — R80.9 PROTEINURIA, UNSPECIFIED TYPE: ICD-10-CM

## 2017-05-11 DIAGNOSIS — M54.17 RIGHT LUMBOSACRAL RADICULOPATHY: Primary | ICD-10-CM

## 2017-05-11 LAB
BILIRUB SERPL-MCNC: ABNORMAL MG/DL
BLOOD URINE, POC: ABNORMAL
COLOR, POC UA: YELLOW
GLUCOSE UR QL STRIP: ABNORMAL
KETONES UR QL STRIP: ABNORMAL
LEUKOCYTE ESTERASE URINE, POC: ABNORMAL
NITRITE, POC UA: ABNORMAL
PH, POC UA: 5
PROTEIN, POC: ABNORMAL
SPECIFIC GRAVITY, POC UA: 1.02
UROBILINOGEN, POC UA: ABNORMAL

## 2017-05-11 PROCEDURE — 81002 URINALYSIS NONAUTO W/O SCOPE: CPT | Mod: S$GLB,,, | Performed by: NURSE PRACTITIONER

## 2017-05-11 PROCEDURE — 1160F RVW MEDS BY RX/DR IN RCRD: CPT | Mod: S$GLB,,, | Performed by: NURSE PRACTITIONER

## 2017-05-11 PROCEDURE — 99999 PR PBB SHADOW E&M-EST. PATIENT-LVL V: CPT | Mod: PBBFAC,,, | Performed by: NURSE PRACTITIONER

## 2017-05-11 PROCEDURE — 99214 OFFICE O/P EST MOD 30 MIN: CPT | Mod: 25,S$GLB,, | Performed by: NURSE PRACTITIONER

## 2017-05-11 PROCEDURE — 83036 HEMOGLOBIN GLYCOSYLATED A1C: CPT

## 2017-05-11 PROCEDURE — 87086 URINE CULTURE/COLONY COUNT: CPT

## 2017-05-11 PROCEDURE — 36415 COLL VENOUS BLD VENIPUNCTURE: CPT | Mod: PO

## 2017-05-11 RX ORDER — CYCLOBENZAPRINE HCL 10 MG
10 TABLET ORAL 2 TIMES DAILY PRN
Qty: 60 TABLET | Refills: 0 | Status: SHIPPED | OUTPATIENT
Start: 2017-05-11 | End: 2017-05-21

## 2017-05-11 RX ORDER — MELOXICAM 15 MG/1
15 TABLET ORAL DAILY
Qty: 30 TABLET | Refills: 5 | Status: SHIPPED | OUTPATIENT
Start: 2017-05-11 | End: 2017-06-22

## 2017-05-11 NOTE — MR AVS SNAPSHOT
[facility]  [address] [address] [address]  Phone: [phone]                  Yuli Milton   2017 10:20 AM   Office Visit    Description:  Female : 1975   Provider:  ANNA Davis FNP-C   Department:  [facility]           Reason for Visit     Urinary Tract Infection     Hip Pain           Diagnoses this Visit        Comments    Right lumbosacral radiculopathy    -  Primary     Ketonuria         Hematuria, microscopic         Proteinuria, unspecified type                To Do List           Future Appointments        Provider Department Dept Phone    2017 11:55 AM [facility] LABORATORY [facility] [phone]    2017 11:00 AM Lisa Melendez NP [facility] - Gastroenterology [phone]    2017 2:00 PM ANNA Davis,SHAGUFTA [facility] Internal [facility] [phone]      Goals (5 Years of Data)     None       These Medications        Disp Refills Start End    meloxicam (MOBIC) 15 MG tablet 30 tablet 5 2017     Take 1 tablet (15 mg total) by mouth once daily. - Oral    Pharmacy: [facility] [address] [place] [address] AT [facility] Ph #: [phone]       cyclobenzaprine (FLEXERIL) 10 MG tablet 60 tablet 0 2017    Take 1 tablet (10 mg total) by mouth 2 (two) times daily as needed for Muscle spasms. - Oral    Pharmacy: [facility] [address] [place] [address] AT [facility] Courtney Cooley[address] Ph #: [phone]         [facility]     [facility] Nurse Care Line -  Assistance  Unless otherwise directed by your provider, please contact [facility], our nurse care line that is available for  assistance.     Registered nurses in the [facility] provide: appointment scheduling, clinical advisement, health education, and other advisory services.  Call: [phone] (toll  free)               Medications           Message regarding Medications     Verify the changes and/or additions to your medication regime listed below are the same as discussed with your clinician today.  If any of these changes or additions are incorrect, please notify your healthcare provider.        START taking these NEW medications        Refills    meloxicam (MOBIC) 15 MG tablet 5    Sig: Take 1 tablet (15 mg total) by mouth once daily.    Class: Normal    Route: Oral    cyclobenzaprine (FLEXERIL) 10 MG tablet 0    Sig: Take 1 tablet (10 mg total) by mouth 2 (two) times daily as needed for Muscle spasms.    Class: Normal    Route: Oral           Verify that the below list of medications is an accurate representation of the medications you are currently taking.  If none reported, the list may be blank. If incorrect, please contact your healthcare provider. Carry this list with you in case of emergency.           Current Medications     ALBUTEROL INHL Inhale 2 puffs into the lungs continuous prn.    blood sugar diagnostic Strp 1 each by Other route 3 (three) times daily.    blood-glucose meter Misc Dispense 1    dicyclomine (BENTYL) 10 MG capsule Take 1 capsule (10 mg total) by mouth 4 (four) times daily before meals and nightly.    escitalopram oxalate (LEXAPRO) 10 MG tablet 15 mg.    lancets Misc 1 each by Other route 3 (three) times daily.    levalbuterol (XOPENEX) 0.63 mg/3 mL nebulizer solution Take 1 ampule by nebulization every 4 (four) hours as needed for Wheezing.    linaclotide (LINZESS) 145 mcg Cap capsule Take 1 capsule (145 mcg total) by mouth once daily.    metformin (GLUCOPHAGE) 500 MG tablet Take 500 mg by mouth 2 (two) times daily with meals.    MULTIVIT-MIN/FA/HERBAL NO.245 (ALIVE WOMEN'S GUMMY VITAMINS ORAL) Take by mouth.    sucralfate (CARAFATE) 100 mg/mL suspension Take 1 g by mouth.    alprazolam (XANAX) 0.5 MG tablet Take 1 tablet (0.5 mg total) by mouth daily as needed.     "cyclobenzaprine (FLEXERIL) 10 MG tablet Take 1 tablet (10 mg total) by mouth 2 (two) times daily as needed for Muscle spasms.    hydrocodone-acetaminophen 5-325mg (NORCO) 5-325 mg per tablet 1 tablet as needed.     ibuprofen (ADVIL,MOTRIN) 800 MG tablet Take 1 tablet (800 mg total) by mouth 3 (three) times daily.    meloxicam (MOBIC) 15 MG tablet Take 1 tablet (15 mg total) by mouth once daily.    ondansetron (ZOFRAN) 4 MG tablet Take 4 mg by mouth.    pantoprazole (PROTONIX) 40 MG tablet Take 1 tablet (40 mg total) by mouth once daily.           Clinical Reference Information           Your Vitals Were     BP Pulse Temp Resp Height    131/79 (BP Location: Left arm, Patient Position: Sitting, BP Method: Automatic) 70 96.1 °F (35.6 °C) (Tympanic) 16 5' 5" (1.651 m)    Weight Last Period SpO2 BMI    115 kg (253 lb 8.5 oz) 08/21/2016 98% 42.19 kg/m2      Blood Pressure          Most Recent Value    BP  131/79      Allergies as of 5/11/2017     No Known Allergies      Immunizations Administered on Date of Encounter - 5/11/2017     None      Orders Placed During Today's Visit      Normal Orders This Visit    Ambulatory Referral to Physical/Occupational Therapy     POCT urine dipstick without microscope     Urine culture     Future Labs/Procedures Expected by Expires    Hemoglobin A1c  5/11/2017 8/9/2017 5/11/2017 10:44 AM - Lyly Sheldon LPN      Component Results     Component    Color, UA    yellow    Spec Grav UA    1.025    pH, UA    5    WBC, UA    neg    Nitrite, UA    neg    Protein    trace    Glucose, UA    neg    Ketones, UA    small    Urobilinogen, UA    nor    Bilirubin    neg    Blood, UA    about 50            Instructions      Sciatica    Sciatica is a condition that causes pain in the lower back that spreads down into the buttock, hip, and leg. Sometimes the leg pain can happen without any back pain. Sciatica happens when a spinal nerve is irritated or has pressure put on it as comes " out of the spinal canal in the lower back. This most often happens when a bulge or rupture of a nearby spinal disk presses on the nerve. Sciatica can also be caused by a narrowing of the spinal canal (spinal stenosis) or spasm of the muscle in the buttocks that the sciatic nerve passes through (pyriform muscle). Sciatica is also called lumbar radiculopathy.  Sciatica may begin after a sudden twisting or bending force, such as in a car accident. Or it can happen after a simple awkward movement. In either case, muscle spasm often also happens. Muscle spasm makes the pain worse.  A healthcare provider makes a diagnosis of sciatica from your symptoms and a physical exam. Unless you had an injury from a car accident or fall, you usually wont have X-rays taken at this time. This is because the nerves and disks in your back cant be seen on an X-ray. If the provider sees signs of a compressed nerve, you will need to schedule an MRI scan as an outpatient. Signs of a compressed nerve include loss of strength in a leg.  Most sciatica gets better with medicine, exercise, and physical therapy. If your symptoms continue after at least 3 months of medical treatment, you may need surgery or injections to your lower back.  Home care  Follow these tips when caring for yourself at home:  · You may need to stay in bed the first few days. But as soon as possible, begin sitting up or walking. This will help you avoid problems that come from staying in bed for long periods.  · When in bed, try to find a position that is comfortable. A firm mattress is best. Try lying flat on your back with pillows under your knees. You can also try lying on your side with your knees bent up toward your chest and a pillow between your knees.  · Avoid sitting for long periods. This puts more stress on your lower back than standing or walking.  · Use heat from a hot shower, hot bath, or heating pad to help ease pain. Massage can also help. You can also try  using an ice pack. You can make your own ice pack by putting ice cubes in a plastic bag. Wrap the bag in a thin towel. Try both heat and cold to see which works best. Use the method that feels best for 20 minutes several times a day.  · You may use acetaminophen or ibuprofen to ease pain, unless another pain medicine was prescribed. Note: If you have chronic liver or kidney disease, talk with your healthcare provider before taking these medicines. Also talk with your provider if youve had a stomach ulcer or gastrointestinal bleeding.  · Use safe lifting methods. Dont lift anything heavier than 15 pounds until all of the pain is gone.  Follow-up care  Follow up with your healthcare provider, or as advised. You may need physical therapy or additional tests.  If X-rays were taken, a radiologist will look at them. You will be told of any new findings that may affect your care.  When to seek medical advice  Call your healthcare provider right away if any of these occur:  · Pain gets worse even after taking prescribed medicine  · Weakness or numbness in 1 or both legs or hips  · Numbness in your groin or genital area  · You cant control your bowel or bladder  · Fever  · Redness or swelling over your back or spine   Date Last Reviewed: 8/1/2016  © 2367-4217 [employer]. [address] [place] [address], [place] [address]. All rights reserved. This information is not intended as a substitute for professional medical care. Always follow your healthcare professional's instructions.        Caring for Your Back Throughout the Day  Take care of your back throughout the day. You will likely have fewer back problems if you do. Try to warm up before you move. Shift positions often. Also do your best to form healthy habits.    Warm up for the day  Do a few slow, catlike stretches before starting your day. This simple warmup can soften your disks, stretch your back muscles, and help prevent injuries.  Shift positions often  At  work and at home, change positions often. This helps keep your body from getting stiff. Stand up or lean back while you sit. If you can, get up and move every 1/2 hour.  Form healthy habits  Here are some suggestions:   · Keep a healthy weight. When you weigh too much, your back is under excess strain. But losing just a few extra pounds can help a lot.  · Try not to overeat. Learn about serving sizes. The size of a serving depends on the food and the food group. Many foods list serving sizes on the labels.  · Handle minor aches with cold and heat. Apply cold the first 24 to 48 hours. Use heat after that. Always place a thin cloth between your skin and the source of cold or heat.  · Take medicines as directed. This helps keep pain under control. Always read labels, and call your healthcare provider or pharmacist if you have any questions.  Walk each day  A daily walk keeps your back and thigh muscles stretched and strong. This gives your back better support. Be sure to walk with your spines three curves aligned, by keeping your head, hips, and toes connected by a vertical line.   Date Last Reviewed: 10/18/2015  © 9946-5194 [employer] [address] [place] [address]. All rights reserved. This information is not intended as a substitute for professional medical care. Always follow your healthcare professional's instructions.        Back Exercises: Arm Reach    Do this exercise on your hands and knees. Keep your knees under your hips and your hands under your shoulders. Keep your spine in a neutral position (not arched or sagging). Be sure to maintain your necks natural curve:  · Stretch one arm straight out in front of you. Dont raise your head or let your supporting shoulder sag.  · Hold for 5 seconds.  · Return to starting position.  · Repeat 5 times.  · Switch arms.    Date Last Reviewed: 8/16/2015  © 1529-8979 [employer] [address] [place] [address]. All  rights reserved. This information is not intended as a substitute for professional medical care. Always follow your healthcare professional's instructions.        Back Exercises: Back Press    Do this exercise on your hands and knees. Keep your knees under your hips and your hands under your shoulders. Keep your spine in a neutral position (not arched or sagging). Be sure to maintain your necks natural curve:  · Tighten your stomach and buttock muscles to press your back upward. Let your head drop slightly.  · Hold for 5 seconds. Return to starting position.  · Repeat 5 times.  Date Last Reviewed: 10/11/2015  © 8563-3034 [employer]. [address] [place] [address]. All rights reserved. This information is not intended as a substitute for professional medical care. Always follow your healthcare professional's instructions.        Back Exercises: Back Release  Do this exercise on your hands and knees. Keep your knees under your hips and your hands under your shoulders.  · Relax your abdominal and buttocks muscles, lift your head, and let your back sag. Be sure to keep your weight evenly distributed. Dont sit back on your hips.   · Hold for 5 seconds.  · Return to starting position.  · Tuck your head and lift (arch) your back.  · Hold for 5 seconds  · Return to starting position.  · Repeat 5 times.  Date Last Reviewed: 8/16/2015  © 4213-3619 [employer]. [address] [place] [address]. All rights reserved. This information is not intended as a substitute for professional medical care. Always follow your healthcare professional's instructions.        Back Exercises: Bridge  The bridge exercise strengthens your abdominal, buttock, and hamstring muscles. This helps keep your back stable and aligned when you walk.  · Lie on the floor with your back and palms flat. Bend your knees. Keep your feet flat on the floor.  · Contract your abdominal and buttock muscles. Slowly  lift your buttocks off the floor until there is a straight line from your knees to your shoulders.  · Hold for 5 to 15  seconds. Repeat 5 to10 times.    Date Last Reviewed: 8/31/2015  © 1439-8121 [employer]. [address] [place] [address]. All rights reserved. This information is not intended as a substitute for professional medical care. Always follow your healthcare professional's instructions.        Back Exercises: Back Extension with Elbow Press    To start, lie face down on your stomach, feet slightly apart, forehead on the floor. Breathe deeply. You should feel comfortable and relaxed in this position.  · Press up on your forearms. Keep your stomach and hips on the floor. Stay within your painfree range.  · Hold for 20 seconds. Lower slowly.  · Repeat 2 times.  · Return to starting position.  Date Last Reviewed: 10/13/2015  © 3702-1648 [employer]. [address] [place] [address]. All rights reserved. This information is not intended as a substitute for professional medical care. Always follow your healthcare professional's instructions.        Back Exercises: Hip Rotator Stretch        To start, lie on your back with your knees bent and feet flat on the floor. Dont press your neck or lower back to the floor. Breathe deeply. You should feel comfortable and relaxed in this position.  · Rest your right ankle on your left knee.  · Place a towel behind your left thigh, and use it to pull the knee toward your chest. Feel the stretch in your buttocks.  · Hold for 30 to 60 seconds. Release.  · Repeat 2 times.  · Switch legs.   · If there is any pain other than stretch in the knee or buttock, stop and contact your healthcare provider.  For your safety, check with your healthcare provider before starting an exercise program.   Date Last Reviewed: 8/16/2015  © 8711-6228 [employer]. [address] [place] [address]. All rights reserved. This  information is not intended as a substitute for professional medical care. Always follow your healthcare professional's instructions.      Back Exercise to Keep Fit for Low Back Pain  To help in your recovery and to prevent further back problems, keep your back, abdominal muscles and legs strong. Walk daily as soon as you can. Gradually add other physical activities such as swimming and biking, which can help improve lower back strength. Begin as soon as you can do them comfortably. Do not do any exercises that make your pain a lot worse. The following are some back exercises that can help relieve low back pain.     Pelvic tilt   Repeat 5-10 times, twice per day.  Lie flat on your back (or stand with your back to a wall), knees bent, feet flat on the floor, body relaxed. Tighten your abdominal and buttock muscles, and tilt your pelvis. The curve of the small of your back should flatten towards the floor (or wall). Hold 10 seconds and then relax.       Knee raise     Repeat 5-10 times, twice per day.    Lie flat on your back, knees bent. Bring one knee slowly to your chest. Hug your knee gently. Then lower your leg toward the floor, keeping your knee bent. Do not straighten your legs. Repeat exercise with your other leg.              Partial press-up     Lie face down on a soft, firm surface. Do not turn your head to either side. Rest your arms bent at the elbows alongside your body. Relax for a few minutes. Then raise your upper body enough to lean on your elbows. Relax your lower back and legs as much as possible. Hold this position for 30 seconds at first. Gradually work up to five minutes. Or try slow press-ups. Hold each for five seconds, and repeat five to six times.              Copyright © 2012 by [employer]   Chris SILVA, Linwood DONALDSON, Yajaira CONLEY, Brock VOGEL, Mark R, Aron B, Nicolsá K, Eber C, Karlee B, Vinnie S, Trini L, Abbie WOO. [employer]. Adult Acute and Subacute Low Back Pain. Updated November 2012.     Lumbar Flexion (Flexibility)    1. Lie on your back on the floor, with your knees bent and your feet flat on the floor.  2. Gently pull your knees up toward your chest. Put your hands under your thighs to help pull your knees up.  3. Press your lower back down to the floor. Hold for 20 seconds.  4. Lower your legs back down to the floor and relax.  5. Repeat 2 times, or as instructed.  Date Last Reviewed: 3/10/2016  © 3678-0584 [employer]. [address] [place] [address]. All rights reserved. This information is not intended as a substitute for professional medical care. Always follow your healthcare professional's instructions.             Language Assistance Services     ATTENTION: Language assistance services are available, free of charge. Please call [phone].      ATENCIÓN: Si navid curry, tiene a hernandez disposición servicios gratuitos de asistencia lingüística. Llame al [phone].     MARIBEL Ý: N?u b?n nói Ti?ng Vi?t, có các d?ch v? h? tr? ngôn ng? mi?n phí dành cho b?n. G?i s? [phone].         [facility] complies with applicable Federal civil rights laws and does not discriminate on the basis of race, color, national origin, age, disability, or sex.

## 2017-05-11 NOTE — LETTER
May 11, 2017               [facility]  Internal Medicine  [address] [place] [address]  Phone: [phone]   May 11, 2017     Patient: Yuli Milton   YOB: 1975   Date of Visit: 5/11/2017       To Whom it May Concern:    Yuli Milton was seen in my clinic on 5/11/2017. She may return to work on 5/12/2017.    If you have any questions or concerns, please don't hesitate to call.    Sincerely,         ANNA Davis,FNP-C

## 2017-05-11 NOTE — PATIENT INSTRUCTIONS
Sciatica    Sciatica is a condition that causes pain in the lower back that spreads down into the buttock, hip, and leg. Sometimes the leg pain can happen without any back pain. Sciatica happens when a spinal nerve is irritated or has pressure put on it as comes out of the spinal canal in the lower back. This most often happens when a bulge or rupture of a nearby spinal disk presses on the nerve. Sciatica can also be caused by a narrowing of the spinal canal (spinal stenosis) or spasm of the muscle in the buttocks that the sciatic nerve passes through (pyriform muscle). Sciatica is also called lumbar radiculopathy.  Sciatica may begin after a sudden twisting or bending force, such as in a car accident. Or it can happen after a simple awkward movement. In either case, muscle spasm often also happens. Muscle spasm makes the pain worse.  A healthcare provider makes a diagnosis of sciatica from your symptoms and a physical exam. Unless you had an injury from a car accident or fall, you usually wont have X-rays taken at this time. This is because the nerves and disks in your back cant be seen on an X-ray. If the provider sees signs of a compressed nerve, you will need to schedule an MRI scan as an outpatient. Signs of a compressed nerve include loss of strength in a leg.  Most sciatica gets better with medicine, exercise, and physical therapy. If your symptoms continue after at least 3 months of medical treatment, you may need surgery or injections to your lower back.  Home care  Follow these tips when caring for yourself at home:  · You may need to stay in bed the first few days. But as soon as possible, begin sitting up or walking. This will help you avoid problems that come from staying in bed for long periods.  · When in bed, try to find a position that is comfortable. A firm mattress is best. Try lying flat on your back with pillows under your knees. You can also try lying on your side with your knees bent up  toward your chest and a pillow between your knees.  · Avoid sitting for long periods. This puts more stress on your lower back than standing or walking.  · Use heat from a hot shower, hot bath, or heating pad to help ease pain. Massage can also help. You can also try using an ice pack. You can make your own ice pack by putting ice cubes in a plastic bag. Wrap the bag in a thin towel. Try both heat and cold to see which works best. Use the method that feels best for 20 minutes several times a day.  · You may use acetaminophen or ibuprofen to ease pain, unless another pain medicine was prescribed. Note: If you have chronic liver or kidney disease, talk with your healthcare provider before taking these medicines. Also talk with your provider if youve had a stomach ulcer or gastrointestinal bleeding.  · Use safe lifting methods. Dont lift anything heavier than 15 pounds until all of the pain is gone.  Follow-up care  Follow up with your healthcare provider, or as advised. You may need physical therapy or additional tests.  If X-rays were taken, a radiologist will look at them. You will be told of any new findings that may affect your care.  When to seek medical advice  Call your healthcare provider right away if any of these occur:  · Pain gets worse even after taking prescribed medicine  · Weakness or numbness in 1 or both legs or hips  · Numbness in your groin or genital area  · You cant control your bowel or bladder  · Fever  · Redness or swelling over your back or spine   Date Last Reviewed: 8/1/2016  © 3141-4161 [employer]. [address] [place] [address], [place] [address]. All rights reserved. This information is not intended as a substitute for professional medical care. Always follow your healthcare professional's instructions.        Caring for Your Back Throughout the Day  Take care of your back throughout the day. You will likely have fewer back problems if you do. Try to warm up before you move.  Shift positions often. Also do your best to form healthy habits.    Warm up for the day  Do a few slow, catlike stretches before starting your day. This simple warmup can soften your disks, stretch your back muscles, and help prevent injuries.  Shift positions often  At work and at home, change positions often. This helps keep your body from getting stiff. Stand up or lean back while you sit. If you can, get up and move every 1/2 hour.  Form healthy habits  Here are some suggestions:   · Keep a healthy weight. When you weigh too much, your back is under excess strain. But losing just a few extra pounds can help a lot.  · Try not to overeat. Learn about serving sizes. The size of a serving depends on the food and the food group. Many foods list serving sizes on the labels.  · Handle minor aches with cold and heat. Apply cold the first 24 to 48 hours. Use heat after that. Always place a thin cloth between your skin and the source of cold or heat.  · Take medicines as directed. This helps keep pain under control. Always read labels, and call your healthcare provider or pharmacist if you have any questions.  Walk each day  A daily walk keeps your back and thigh muscles stretched and strong. This gives your back better support. Be sure to walk with your spines three curves aligned, by keeping your head, hips, and toes connected by a vertical line.   Date Last Reviewed: 10/18/2015  © 3733-0731 [employer]. [address] [place] [address], [place] [address]. All rights reserved. This information is not intended as a substitute for professional medical care. Always follow your healthcare professional's instructions.        Back Exercises: Arm Reach    Do this exercise on your hands and knees. Keep your knees under your hips and your hands under your shoulders. Keep your spine in a neutral position (not arched or sagging). Be sure to maintain your necks natural curve:  · Stretch one arm straight out in front of you.  Dont raise your head or let your supporting shoulder sag.  · Hold for 5 seconds.  · Return to starting position.  · Repeat 5 times.  · Switch arms.    Date Last Reviewed: 8/16/2015  © 6146-1265 [employer]. [address] [place] [address]. All rights reserved. This information is not intended as a substitute for professional medical care. Always follow your healthcare professional's instructions.        Back Exercises: Back Press    Do this exercise on your hands and knees. Keep your knees under your hips and your hands under your shoulders. Keep your spine in a neutral position (not arched or sagging). Be sure to maintain your necks natural curve:  · Tighten your stomach and buttock muscles to press your back upward. Let your head drop slightly.  · Hold for 5 seconds. Return to starting position.  · Repeat 5 times.  Date Last Reviewed: 10/11/2015  © 3203-1428 [employer]. [address] [place] [address]. All rights reserved. This information is not intended as a substitute for professional medical care. Always follow your healthcare professional's instructions.        Back Exercises: Back Release  Do this exercise on your hands and knees. Keep your knees under your hips and your hands under your shoulders.  · Relax your abdominal and buttocks muscles, lift your head, and let your back sag. Be sure to keep your weight evenly distributed. Dont sit back on your hips.   · Hold for 5 seconds.  · Return to starting position.  · Tuck your head and lift (arch) your back.  · Hold for 5 seconds  · Return to starting position.  · Repeat 5 times.  Date Last Reviewed: 8/16/2015 © 2000-2016 [employer]. [address] [place] [address]. All rights reserved. This information is not intended as a substitute for professional medical care. Always follow your healthcare professional's instructions.        Back Exercises: Bridge  The bridge exercise strengthens  your abdominal, buttock, and hamstring muscles. This helps keep your back stable and aligned when you walk.  · Lie on the floor with your back and palms flat. Bend your knees. Keep your feet flat on the floor.  · Contract your abdominal and buttock muscles. Slowly lift your buttocks off the floor until there is a straight line from your knees to your shoulders.  · Hold for 5 to 15  seconds. Repeat 5 to10 times.    Date Last Reviewed: 8/31/2015  © 0616-4627 [employer]. [address] [place] [address]. All rights reserved. This information is not intended as a substitute for professional medical care. Always follow your healthcare professional's instructions.        Back Exercises: Back Extension with Elbow Press    To start, lie face down on your stomach, feet slightly apart, forehead on the floor. Breathe deeply. You should feel comfortable and relaxed in this position.  · Press up on your forearms. Keep your stomach and hips on the floor. Stay within your painfree range.  · Hold for 20 seconds. Lower slowly.  · Repeat 2 times.  · Return to starting position.  Date Last Reviewed: 10/13/2015  © 7985-3008 [employer]. [address] [place] [address]. All rights reserved. This information is not intended as a substitute for professional medical care. Always follow your healthcare professional's instructions.        Back Exercises: Hip Rotator Stretch        To start, lie on your back with your knees bent and feet flat on the floor. Dont press your neck or lower back to the floor. Breathe deeply. You should feel comfortable and relaxed in this position.  · Rest your right ankle on your left knee.  · Place a towel behind your left thigh, and use it to pull the knee toward your chest. Feel the stretch in your buttocks.  · Hold for 30 to 60 seconds. Release.  · Repeat 2 times.  · Switch legs.   · If there is any pain other than stretch in the knee or buttock, stop and  contact your healthcare provider.  For your safety, check with your healthcare provider before starting an exercise program.   Date Last Reviewed: 8/16/2015  © 7296-0251 [employer]. [address] [place] [address], [place] [address]. All rights reserved. This information is not intended as a substitute for professional medical care. Always follow your healthcare professional's instructions.      Back Exercise to Keep Fit for Low Back Pain  To help in your recovery and to prevent further back problems, keep your back, abdominal muscles and legs strong. Walk daily as soon as you can. Gradually add other physical activities such as swimming and biking, which can help improve lower back strength. Begin as soon as you can do them comfortably. Do not do any exercises that make your pain a lot worse. The following are some back exercises that can help relieve low back pain.     Pelvic tilt   Repeat 5-10 times, twice per day.  Lie flat on your back (or stand with your back to a wall), knees bent, feet flat on the floor, body relaxed. Tighten your abdominal and buttock muscles, and tilt your pelvis. The curve of the small of your back should flatten towards the floor (or wall). Hold 10 seconds and then relax.       Knee raise     Repeat 5-10 times, twice per day.    Lie flat on your back, knees bent. Bring one knee slowly to your chest. Hug your knee gently. Then lower your leg toward the floor, keeping your knee bent. Do not straighten your legs. Repeat exercise with your other leg.              Partial press-up     Lie face down on a soft, firm surface. Do not turn your head to either side. Rest your arms bent at the elbows alongside your body. Relax for a few minutes. Then raise your upper body enough to lean on your elbows. Relax your lower back and legs as much as possible. Hold this position for 30 seconds at first. Gradually work up to five minutes. Or try slow press-ups. Hold each for five seconds, and repeat  five to six times.              Copyright © 2012 by [employer]   Chirs M, Linwood D, Yajaira J, Brock B, Mark R, Aron B, Nicolás K, Eber C, Karlee B, Vinnie S, Trini L, Abbie R. [employer]. Adult Acute and Subacute Low Back Pain. Updated November 2012.     Lumbar Flexion (Flexibility)    1. Lie on your back on the floor, with your knees bent and your feet flat on the floor.  2. Gently pull your knees up toward your chest. Put your hands under your thighs to help pull your knees up.  3. Press your lower back down to the floor. Hold for 20 seconds.  4. Lower your legs back down to the floor and relax.  5. Repeat 2 times, or as instructed.  Date Last Reviewed: 3/10/2016  © 3215-5015 [employer]. [address] [place] [address], [place] [address]. All rights reserved. This information is not intended as a substitute for professional medical care. Always follow your healthcare professional's instructions.

## 2017-05-11 NOTE — PROGRESS NOTES
Subjective:       Patient ID: Yuli Milton is a 41 y.o. female.    Chief Complaint: Urinary Tract Infection and Hip Pain (radiates down rt leg)    HPI Comments: Mrs Milton is here for UTI symptoms and flare of her sciatica as below. She has had sciatica for about 10 years.     Urinary Tract Infection    This is a new problem. The current episode started in the past 7 days. The problem occurs every urination. The quality of the pain is described as burning. The pain is moderate. There has been no fever. She is sexually active. There is no history of pyelonephritis. Associated symptoms include frequency and nausea. Pertinent negatives include no discharge, flank pain, hematuria, hesitancy, sweats, urgency, vomiting, rash or withholding. She has tried nothing for the symptoms. Her past medical history is significant for diabetes mellitus. There is no history of hypertension, kidney stones or recurrent UTIs.   Hip Pain    The incident occurred 3 to 5 days ago. The incident occurred at home. There was no injury mechanism. The pain is present in the right hip. The quality of the pain is described as burning. The pain is moderate. The pain has been constant since onset. Associated symptoms include tingling. Pertinent negatives include no inability to bear weight, loss of motion, loss of sensation, muscle weakness or numbness. The symptoms are aggravated by weight bearing. She has tried NSAIDs (muscle cream) for the symptoms. The treatment provided mild relief.     Review of Systems   Constitutional: Negative.    Respiratory: Negative.    Cardiovascular: Negative.    Gastrointestinal: Positive for nausea. Negative for vomiting.   Endocrine: Positive for polyphagia and polyuria. Negative for polydipsia.   Genitourinary: Positive for dysuria and frequency. Negative for flank pain, hematuria, hesitancy, pelvic pain, urgency, vaginal discharge and vaginal pain.   Musculoskeletal: Positive for myalgias. Negative for  arthralgias.   Skin: Negative for rash and wound.   Neurological: Positive for tingling. Negative for numbness.       Objective:      Physical Exam   Constitutional: She is oriented to person, place, and time. Vital signs are normal. She appears well-developed and well-nourished.   obese   Cardiovascular: Normal rate, regular rhythm and normal heart sounds.    Pulmonary/Chest: Effort normal and breath sounds normal.   Abdominal: Soft. Bowel sounds are normal. She exhibits no distension. There is no tenderness. There is no CVA tenderness.   Musculoskeletal:        Lumbar back: She exhibits tenderness, pain and spasm. She exhibits normal range of motion and no bony tenderness.        Legs:  Neurological: She is alert and oriented to person, place, and time.   Skin: Skin is warm and dry. No rash noted.   Psychiatric: She has a normal mood and affect. Her behavior is normal. Judgment and thought content normal.   Vitals reviewed.      Assessment:       1. UTI symptoms    2. Right sciatic nerve pain        Plan:       *Right lumbosacral radiculopathy  Discussed supportive home care such as massage, ice/heat, gentle stretching. Also discussed proper ergonomics for lifting/bending and home exercises.  Discussed need for core and low back strengthening after recovery for prevention. Follow up if pain not resolved over next 1-2 weeks will consider referral  To pain mgmt at that time since this is a chronic intermittent issue.  -     meloxicam (MOBIC) 15 MG tablet; Take 1 tablet (15 mg total) by mouth once daily.  Dispense: 30 tablet; Refill: 5  -     cyclobenzaprine (FLEXERIL) 10 MG tablet; Take 1 tablet (10 mg total) by mouth 2 (two) times daily as needed for Muscle spasms.  Dispense: 60 tablet; Refill: 0  -     Ambulatory Referral to Physical/Occupational Therapy    Ketonuria/ Hematuria, microscopic/ Proteinuria, unspecified type  Unsure of cause of abnormal UA. Will culture to see if infection since no WBC or nitrates.  Could be worsening of DM or kidney stone. Stone less likely since she has no CVA tenderness. Will follow after culture and lab results  -     POCT urine dipstick without microscope  -     Hemoglobin A1c; Future; Expected date: 5/11/17  -     Urine culture    **

## 2017-05-12 ENCOUNTER — TELEPHONE (OUTPATIENT)
Dept: INTERNAL MEDICINE | Facility: CLINIC | Age: 42
End: 2017-05-12

## 2017-05-12 LAB
BACTERIA UR CULT: NO GROWTH
ESTIMATED AVG GLUCOSE: 137 MG/DL
HBA1C MFR BLD HPLC: 6.4 %

## 2017-05-12 NOTE — TELEPHONE ENCOUNTER
Pt contacted results given as per NP. Pt voiced understanding. Pt also request to have ABX sent to pharmacy as discussed at office visit.

## 2017-05-15 ENCOUNTER — OFFICE VISIT (OUTPATIENT)
Dept: INTERNAL MEDICINE | Facility: CLINIC | Age: 42
End: 2017-05-15
Payer: COMMERCIAL

## 2017-05-15 ENCOUNTER — HOSPITAL ENCOUNTER (OUTPATIENT)
Dept: RADIOLOGY | Facility: HOSPITAL | Age: 42
Discharge: HOME | End: 2017-05-15
Attending: FAMILY MEDICINE
Payer: COMMERCIAL

## 2017-05-15 ENCOUNTER — PATIENT OUTREACH (OUTPATIENT)
Dept: ADMINISTRATIVE | Facility: HOSPITAL | Age: 42
End: 2017-05-15
Payer: MEDICAID

## 2017-05-15 ENCOUNTER — TELEPHONE (OUTPATIENT)
Dept: INTERNAL MEDICINE | Facility: CLINIC | Age: 42
End: 2017-05-15

## 2017-05-15 VITALS
HEIGHT: 65 IN | DIASTOLIC BLOOD PRESSURE: 70 MMHG | RESPIRATION RATE: 18 BRPM | TEMPERATURE: 96 F | WEIGHT: 254.63 LBS | OXYGEN SATURATION: 98 % | SYSTOLIC BLOOD PRESSURE: 102 MMHG | BODY MASS INDEX: 42.42 KG/M2 | HEART RATE: 71 BPM

## 2017-05-15 DIAGNOSIS — E11.9 TYPE 2 DIABETES MELLITUS WITHOUT COMPLICATION, WITHOUT LONG-TERM CURRENT USE OF INSULIN: Primary | ICD-10-CM

## 2017-05-15 DIAGNOSIS — M54.41 CHRONIC RIGHT-SIDED LOW BACK PAIN WITH RIGHT-SIDED SCIATICA: Primary | ICD-10-CM

## 2017-05-15 DIAGNOSIS — G89.29 CHRONIC RIGHT-SIDED LOW BACK PAIN WITH RIGHT-SIDED SCIATICA: ICD-10-CM

## 2017-05-15 DIAGNOSIS — E11.9 TYPE 2 DIABETES MELLITUS WITHOUT COMPLICATION, WITHOUT LONG-TERM CURRENT USE OF INSULIN: ICD-10-CM

## 2017-05-15 DIAGNOSIS — G89.29 CHRONIC RIGHT-SIDED LOW BACK PAIN WITH RIGHT-SIDED SCIATICA: Primary | ICD-10-CM

## 2017-05-15 DIAGNOSIS — M54.41 CHRONIC RIGHT-SIDED LOW BACK PAIN WITH RIGHT-SIDED SCIATICA: ICD-10-CM

## 2017-05-15 PROCEDURE — 72114 X-RAY EXAM L-S SPINE BENDING: CPT | Mod: 26,,, | Performed by: RADIOLOGY

## 2017-05-15 PROCEDURE — 96372 THER/PROPH/DIAG INJ SC/IM: CPT | Mod: S$GLB,,, | Performed by: FAMILY MEDICINE

## 2017-05-15 PROCEDURE — 72114 X-RAY EXAM L-S SPINE BENDING: CPT | Mod: TC,PO

## 2017-05-15 PROCEDURE — 3060F POS MICROALBUMINURIA REV: CPT | Mod: 8P,S$GLB,, | Performed by: FAMILY MEDICINE

## 2017-05-15 PROCEDURE — 99214 OFFICE O/P EST MOD 30 MIN: CPT | Mod: 25,S$GLB,, | Performed by: FAMILY MEDICINE

## 2017-05-15 PROCEDURE — 99999 PR PBB SHADOW E&M-EST. PATIENT-LVL III: CPT | Mod: PBBFAC,,, | Performed by: FAMILY MEDICINE

## 2017-05-15 PROCEDURE — 1160F RVW MEDS BY RX/DR IN RCRD: CPT | Mod: S$GLB,,, | Performed by: FAMILY MEDICINE

## 2017-05-15 PROCEDURE — 3044F HG A1C LEVEL LT 7.0%: CPT | Mod: S$GLB,,, | Performed by: FAMILY MEDICINE

## 2017-05-15 RX ORDER — HYDROCODONE BITARTRATE AND ACETAMINOPHEN 5; 325 MG/1; MG/1
1 TABLET ORAL EVERY 8 HOURS PRN
Qty: 15 TABLET | Refills: 0 | Status: ON HOLD | OUTPATIENT
Start: 2017-05-15 | End: 2017-06-08

## 2017-05-15 RX ORDER — KETOROLAC TROMETHAMINE 30 MG/ML
30 INJECTION, SOLUTION INTRAMUSCULAR; INTRAVENOUS ONCE
Status: COMPLETED | OUTPATIENT
Start: 2017-05-15 | End: 2017-05-15

## 2017-05-15 RX ORDER — METHOCARBAMOL 750 MG/1
750 TABLET, FILM COATED ORAL 3 TIMES DAILY
Qty: 45 TABLET | Refills: 0 | Status: SHIPPED | OUTPATIENT
Start: 2017-05-15 | End: 2017-05-25

## 2017-05-15 RX ORDER — KETOROLAC TROMETHAMINE 30 MG/ML
30 INJECTION, SOLUTION INTRAMUSCULAR; INTRAVENOUS ONCE
Status: DISCONTINUED | OUTPATIENT
Start: 2017-05-15 | End: 2017-05-15

## 2017-05-15 RX ADMIN — KETOROLAC TROMETHAMINE 30 MG: 30 INJECTION, SOLUTION INTRAMUSCULAR; INTRAVENOUS at 10:05

## 2017-05-15 NOTE — MR AVS SNAPSHOT
[facility]  [address]  [place] [address]  Phone: [phone]  Fax: [phone]                  Yuli Milton   5/15/2017 10:20 AM   Office Visit    Description:  Female : 1975   Provider:  Tony Salinas MD   Department:  [facility] - Internal Medicine           Reason for Visit     Sciatica     Dysuria           Diagnoses this Visit        Comments    Chronic right-sided low back pain with right-sided sciatica    -  Primary Will do xray of lumbar and add methocarbamol 750 mg and flexirl. Will do ketorolac.     Type 2 diabetes mellitus without complication, without long-term current use of insulin     Not doing steroids due to DM.            To Do List           Future Appointments        Provider Department Dept Phone    2017 11:00 AM Lisa Melendez NP [facility] Gastroenterology [phone]    2017 2:00 PM ANNA Davis,FNP-C [facility] Internal Medicine [phone]      Goals (5 Years of Data)     None       These Medications        Disp Refills Start End    methocarbamol (ROBAXIN) 750 MG Tab 45 tablet 0 5/15/2017 2017    Take 1 tablet (750 mg total) by mouth 3 (three) times daily. - Oral    Pharmacy: [facility] [address] [place] [address] [facility] Ph #: [phone]       hydrocodone-acetaminophen 5-325mg (NORCO) 5-325 mg per tablet 15 tablet 0 5/15/2017     Take 1 tablet by mouth every 8 (eight) hours as needed. - Oral    Pharmacy: [facility] [address] [place] [address] [address] [facility] Ph #: [phone]         [facility]     [facility] - / Assistance  Unless otherwise directed by your provider, please contact [facility], our nurse care line that is available for / assistance.     Registered nurses in the [facility] provide: appointment scheduling, clinical advisement, health education,  and other advisory services.  Call: [phone] (toll free)               Medications           Message regarding Medications     Verify the changes and/or additions to your medication regime listed below are the same as discussed with your clinician today.  If any of these changes or additions are incorrect, please notify your healthcare provider.        START taking these NEW medications        Refills    methocarbamol (ROBAXIN) 750 MG Tab 0    Sig: Take 1 tablet (750 mg total) by mouth 3 (three) times daily.    Class: Normal    Route: Oral      These medications were administered today        Dose Freq    ketorolac injection 30 mg 30 mg Once    Sig: Inject 30 mg into the vein once.    Class: Normal    Route: Intravenous      CHANGE how you are taking these medications     Start Taking Instead of    hydrocodone-acetaminophen 5-325mg (NORCO) 5-325 mg per tablet hydrocodone-acetaminophen 5-325mg (NORCO) 5-325 mg per tablet    Dosage:  Take 1 tablet by mouth every 8 (eight) hours as needed. Dosage:  1 tablet as needed.     Reason for Change:  Therapy completed       STOP taking these medications     alprazolam (XANAX) 0.5 MG tablet Take 1 tablet (0.5 mg total) by mouth daily as needed.           Verify that the below list of medications is an accurate representation of the medications you are currently taking.  If none reported, the list may be blank. If incorrect, please contact your healthcare provider. Carry this list with you in case of emergency.           Current Medications     ALBUTEROL INHL Inhale 2 puffs into the lungs continuous prn.    blood sugar diagnostic Strp 1 each by Other route 3 (three) times daily.    blood-glucose meter Misc Dispense 1    cyclobenzaprine (FLEXERIL) 10 MG tablet Take 1 tablet (10 mg total) by mouth 2 (two) times daily as needed for Muscle spasms.    dicyclomine (BENTYL) 10 MG capsule Take 1 capsule (10 mg total) by mouth 4 (four) times daily before meals and nightly.     "escitalopram oxalate (LEXAPRO) 10 MG tablet Take 15 mg by mouth daily as needed.     ibuprofen (ADVIL,MOTRIN) 800 MG tablet Take 1 tablet (800 mg total) by mouth 3 (three) times daily.    lancets Misc 1 each by Other route 3 (three) times daily.    levalbuterol (XOPENEX) 0.63 mg/3 mL nebulizer solution Take 1 ampule by nebulization every 4 (four) hours as needed for Wheezing.    linaclotide (LINZESS) 145 mcg Cap capsule Take 1 capsule (145 mcg total) by mouth once daily.    meloxicam (MOBIC) 15 MG tablet Take 1 tablet (15 mg total) by mouth once daily.    MULTIVIT-MIN/FA/HERBAL NO.245 (ALIVE WOMEN'S GUMMY VITAMINS ORAL) Take by mouth as needed.     ondansetron (ZOFRAN) 4 MG tablet Take 4 mg by mouth as needed.     pantoprazole (PROTONIX) 40 MG tablet Take 1 tablet (40 mg total) by mouth once daily.    sucralfate (CARAFATE) 100 mg/mL suspension Take 1 g by mouth once daily.     hydrocodone-acetaminophen 5-325mg (NORCO) 5-325 mg per tablet Take 1 tablet by mouth every 8 (eight) hours as needed.    metformin (GLUCOPHAGE) 500 MG tablet Take 500 mg by mouth 2 (two) times daily with meals.    methocarbamol (ROBAXIN) 750 MG Tab Take 1 tablet (750 mg total) by mouth 3 (three) times daily.           Clinical Reference Information           Your Vitals Were     BP Pulse Temp Resp Height    102/70 (BP Location: Right arm, Patient Position: Sitting, BP Method: Manual) 71 96.2 °F (35.7 °C) (Tympanic) 18 5' 5" (1.651 m)    Weight Last Period SpO2 BMI    115.5 kg (254 lb 10.1 oz) 08/21/2016 98% 42.37 kg/m2      Blood Pressure          Most Recent Value    BP  102/70      Allergies as of 5/15/2017     No Known Allergies      Immunizations Administered on Date of Encounter - 5/15/2017     None      Orders Placed During Today's Visit     Future Labs/Procedures Expected by Expires    X-Ray Lumbar Complete With Flex And Ext  5/15/2017 5/15/2018      Language Assistance Services     ATTENTION: Language assistance services are available, " free of charge. Please call [phone].      ATENCIÓN: Si habla español, tiene a hernandez disposición servicios gratuitos de asistencia lingüística. Llame al [phone].     CHÚ Ý: N?u b?n nói Ti?ng Vi?t, có các d?ch v? h? tr? ngôn ng? mi?n phí dành cho b?n. G?i s? [phone].         [facility] complies with applicable Federal civil rights laws and does not discriminate on the basis of race, color, national origin, age, disability, or sex.

## 2017-05-15 NOTE — TELEPHONE ENCOUNTER
----- Message from Kallie Armstrong sent at 5/15/2017  8:10 AM CDT -----  states that she's still having sciatica pain (saw kellie), pls adv..[phone]
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
## 2017-05-15 NOTE — TELEPHONE ENCOUNTER
----- Message from ANNA Davis,FNP-C sent at 5/15/2017  7:17 AM CDT -----  No sign of bladder infection

## 2017-05-15 NOTE — TELEPHONE ENCOUNTER
Nurse was unable to leave voice message at this time. Voice message box unavailable to accept message.

## 2017-05-16 ENCOUNTER — TELEPHONE (OUTPATIENT)
Dept: INTERNAL MEDICINE | Facility: CLINIC | Age: 42
End: 2017-05-16

## 2017-05-16 NOTE — TELEPHONE ENCOUNTER
Patient stated that she is taking all prescribed medication that was given to her yesterday. She stated that she is still in a lot of pain and wants to know what can be done. The pain is in her lower back down to her leg.

## 2017-05-16 NOTE — TELEPHONE ENCOUNTER
----- Message from Hortencia Phelps sent at 5/16/2017  8:13 AM CDT -----  Call pt at [phone]//pt say the medication that she has is not helping at all she still in so much pain and want to know if she can come back in//thks ht

## 2017-05-19 DIAGNOSIS — E11.9 TYPE 2 DIABETES MELLITUS WITHOUT COMPLICATION: ICD-10-CM

## 2017-05-25 ENCOUNTER — TELEPHONE (OUTPATIENT)
Dept: INTERNAL MEDICINE | Facility: CLINIC | Age: 42
End: 2017-05-25

## 2017-05-25 NOTE — TELEPHONE ENCOUNTER
What does she want antibiotics for? The last time she wanted ABX it was for UTI symptoms however her UA and culture were both normal. She did not have any infection in her urine or need any antibiotics. She should come in if she is having new issues for evaluation

## 2017-05-25 NOTE — TELEPHONE ENCOUNTER
----- Message from Raleigh Pickard sent at 5/24/2017  2:00 PM CDT -----  Contact: pt  She's calling in regards to a new RX for antibiotics, [facility] in [place], please advise, [phone] (home)

## 2017-05-25 NOTE — TELEPHONE ENCOUNTER
No voice message system set up at this time. Pt call requesting a new Rx for antibiotics. Nurse unable to speak with pt. Please advise

## 2017-06-05 ENCOUNTER — OFFICE VISIT (OUTPATIENT)
Dept: INTERNAL MEDICINE | Facility: CLINIC | Age: 42
End: 2017-06-05
Payer: COMMERCIAL

## 2017-06-05 VITALS
RESPIRATION RATE: 20 BRPM | DIASTOLIC BLOOD PRESSURE: 67 MMHG | TEMPERATURE: 97 F | WEIGHT: 256.19 LBS | BODY MASS INDEX: 41.17 KG/M2 | HEART RATE: 77 BPM | SYSTOLIC BLOOD PRESSURE: 121 MMHG | HEIGHT: 66 IN | OXYGEN SATURATION: 98 %

## 2017-06-05 DIAGNOSIS — E11.9 TYPE 2 DIABETES MELLITUS WITHOUT COMPLICATION, WITHOUT LONG-TERM CURRENT USE OF INSULIN: ICD-10-CM

## 2017-06-05 DIAGNOSIS — B96.89 BACTERIAL VAGINITIS: Primary | ICD-10-CM

## 2017-06-05 DIAGNOSIS — N76.0 BACTERIAL VAGINITIS: Primary | ICD-10-CM

## 2017-06-05 DIAGNOSIS — E27.8 MASS OF ADRENAL GLAND: ICD-10-CM

## 2017-06-05 DIAGNOSIS — Z23 NEED FOR PNEUMOCOCCAL VACCINE: ICD-10-CM

## 2017-06-05 PROCEDURE — 99214 OFFICE O/P EST MOD 30 MIN: CPT | Mod: S$GLB,,, | Performed by: NURSE PRACTITIONER

## 2017-06-05 PROCEDURE — 90471 IMMUNIZATION ADMIN: CPT | Mod: S$GLB,,, | Performed by: NURSE PRACTITIONER

## 2017-06-05 PROCEDURE — 82570 ASSAY OF URINE CREATININE: CPT

## 2017-06-05 PROCEDURE — 3044F HG A1C LEVEL LT 7.0%: CPT | Mod: S$GLB,,, | Performed by: NURSE PRACTITIONER

## 2017-06-05 PROCEDURE — 90732 PPSV23 VACC 2 YRS+ SUBQ/IM: CPT | Mod: S$GLB,,, | Performed by: NURSE PRACTITIONER

## 2017-06-05 PROCEDURE — 99999 PR PBB SHADOW E&M-EST. PATIENT-LVL V: CPT | Mod: PBBFAC,,, | Performed by: NURSE PRACTITIONER

## 2017-06-05 RX ORDER — METRONIDAZOLE 500 MG/1
500 TABLET ORAL EVERY 12 HOURS
Qty: 14 TABLET | Refills: 0 | Status: SHIPPED | OUTPATIENT
Start: 2017-06-05 | End: 2017-06-22

## 2017-06-05 NOTE — Clinical Note
Ms Milton is scheduled to see you next week for consideration of weightloss meds. She mentioned at the end of last visit that she had an adrenal mass found incidentally on CT that was evaluated by gen sx about a year ago and may need follow up eval. See my note. All scans/labs and surgeons note are on care everywhere

## 2017-06-05 NOTE — PROGRESS NOTES
Subjective:       Patient ID: Yuli Milton is a 41 y.o. female.    Chief Complaint: Vaginal Pain (c/o vaginal discomfort and burning. Also c/o intermittent vaginal odor. ) and Follow-up    Vaginal Discharge   The patient's primary symptoms include genital itching, a genital odor and vaginal discharge. The patient's pertinent negatives include no genital lesions, genital rash, pelvic pain or vaginal bleeding. This is a recurrent problem. The current episode started 1 to 4 weeks ago. The problem occurs constantly. The problem has been waxing and waning. The pain is moderate. Associated symptoms include diarrhea and painful intercourse. Pertinent negatives include no abdominal pain, anorexia, back pain, chills, constipation, discolored urine, dysuria, fever, flank pain, frequency, headaches, hematuria, joint pain, joint swelling, nausea, rash, sore throat, urgency or vomiting. The vaginal discharge was normal. There has been no bleeding. The symptoms are aggravated by intercourse. She has tried nothing for the symptoms. The treatment provided no relief. She is sexually active. No, her partner does not have an STD. She uses hysterectomy for contraception.     Review of Systems   Constitutional: Negative for chills and fever.   HENT: Negative.  Negative for sore throat.    Respiratory: Negative.    Cardiovascular: Negative.    Gastrointestinal: Positive for diarrhea. Negative for abdominal pain, anorexia, constipation, nausea and vomiting.   Endocrine: Negative.    Genitourinary: Positive for vaginal discharge. Negative for dysuria, flank pain, frequency, hematuria, pelvic pain, urgency and vaginal bleeding.   Musculoskeletal: Negative for back pain, joint pain and myalgias.   Skin: Negative.  Negative for rash.   Allergic/Immunologic: Negative.    Neurological: Negative.  Negative for headaches.   Hematological: Negative.    Psychiatric/Behavioral: The patient is nervous/anxious.        Objective:      Physical  Exam   Constitutional: She is oriented to person, place, and time. She appears well-developed. No distress.   obese   Abdominal: Soft. Bowel sounds are normal. She exhibits no distension. There is no tenderness.   Genitourinary: There is no rash, tenderness or lesion on the right labia. There is no rash, tenderness or lesion on the left labia. No tenderness or bleeding in the vagina. Vaginal discharge (grey, thin) found.   Neurological: She is alert and oriented to person, place, and time.   Skin: Skin is warm and dry. No rash noted. She is not diaphoretic.   Psychiatric: She has a normal mood and affect. Her behavior is normal.       Assessment:       1. Bacterial vaginitis    2. Type 2 diabetes mellitus without complication, without long-term current use of insulin    3. Need for pneumococcal vaccine    4. Mass of adrenal gland        Plan:       *Bacterial vaginitis  -     POCT Wet Prep- showed clue cells  -      C. trachomatis/N. gonorrhoeae by AMP DNA Urine   metronidazole (FLAGYL) 500 MG tablet; Take 1 tablet (500 mg total) by mouth every 12 (twelve) hours.  Dispense: 14 tablet; Refill: 0    Type 2 diabetes mellitus without complication, without long-term current use of insulin  -     MICROALBUMIN / CREATININE RATIO URINE  -     Pneumococcal Polysaccharide Vaccine (23 Valent) (SQ/IM)    Need for pneumococcal vaccine  -     Pneumococcal Polysaccharide Vaccine (23 Valent) (SQ/IM)    Mass of adrenal gland  8/29/2015 CT Abd w/o contrast showed 1.7 cm left adrenal adenoma.  10/29/2016 CT Abd w/o contrast showed   Small 1.58 cm left adrenal nodule is unchanged in appearance when compared with previous exam.  4/22/2017 CT abd w/o contrast showed  the adrenal glands appear normal  Pt was seen and evaluated by Dr Pompa, general sx, 3/22/16. All relevant labs were done to rule out autonomy as well as pheochromocytoma and aldosteronoma.   Recommendation was to have a follow up CT in 1 year. A recent non contrast CT was  done and showed normal adrenals, may need contrast CT.   Guidelines also recommend following labs for 4 years, would be due now if needed.  Discuss need for further evaluation at next visit    Follow up 1 week with Dr Buitrago for consideration of weight loss medication as requested by pt     **

## 2017-06-05 NOTE — PATIENT INSTRUCTIONS
Bacterial Vaginosis    You have a vaginal infection called bacterial vaginosis (BV). Both good and bad bacteria are present in a healthy vagina. BV occurs when these bacteria get out of balance. The number of bad bacteria increase. And the number of good bacteria decrease.  BV may or may not cause symptoms. If symptoms do occur, they can include:  · Thin, gray, milky-white, or sometimes green discharge  · Unpleasant odor or fishy smell  · Itching, burning, or pain in or around the vagina  It is not known what causes BV, but certain factors can make the problem more likely. This can include:  · Douching  · Having sex with a new partner  · Having sex with more than one partner  BV will sometimes go away on its own. But treatment is usually recommended. This is because untreated BV can increase the risk of more serious health problems such as:  · Pelvic inflammatory disease (PID)  ·  delivery (giving birth to a baby early if youre pregnant)  · HIV and certain other sexually transmitted diseases (STDs)  · Infection after surgery on the reproductive organs  Home care  General care  · BV is most often treated with medicines called antibiotics. These may be given as pills or as a vaginal cream. If antibiotics are prescribed, be sure to use them exactly as directed. Also, be sure to complete all of the medicine, even if your symptoms go away.  · Avoid douching or having sex during treatment.  · If you have sex with a female partner, ask your healthcare provider if she should also be treated.  Prevention  · Limit or avoid douching.  · Avoid having sex. If you do have sex, then take steps to lower your risk:  ¨ Use condoms when having sex.  ¨ Limit the number of partners you have sex with.  Follow-up care  Follow up with your healthcare provider, or as advised.  When to seek medical advice  Call your healthcare provider right away if:  · You have a fever of 100.4ºF (38ºC) or higher, or as directed by your  provider.  · Your symptoms worsen, or they dont go away within a few days of starting treatment.  · You have new pain in the lower belly or pelvic region.  · You have side effects that bother you or a reaction to the pills or cream youre prescribed.  · You or any partners you have sex with have new symptoms, such as a rash, joint pain, or sores.  Date Last Reviewed: 7/30/2015 © 2000-2016 [employer]. [address] [place] [address], [place] [address]. All rights reserved. This information is not intended as a substitute for professional medical care. Always follow your healthcare professional's instructions.        Vaginal Infection: Understanding the Vaginal Environment  The vagina is a canal. It connects the uterus (womb) to the outside of the body. It is home to many types of bacteria and other tiny organisms. These different bacteria most often stay balanced in number. This keeps the vagina healthy. If the balance changes, it can cause infection.   A healthy environment  Many types of bacteria are present in a healthy vagina. They dont cause problems if their amounts stay balanced. Small amounts of yeast may also be present without causing problems. The most common type of bacteria in the vagina is lactobacillus. It helps keep the vagina at a low pH. A low pH keeps bad bacteria from taking over.  Normal vaginal discharge  The vagina makes fluid. It is sent out as discharge. This also keeps the vagina healthy. Normal discharge can be clear, white, or yellowish. Most women find that normal discharge varies in amount and color through the month.  An unhealthy environment  The vaginal environment may get out of balance. This may result in a vaginal infection. There are a few reasons this can happen. The pH may have changed. The amount of one organism, such as yeast, may increase. Or an outside organism may get into the vagina and throw off the balance:  · Bacterial vaginosis (BV). BV is due to an imbalance  in the normal bacteria in the vagina. Lactobacillus bacteria decrease. As a result, the numbers of bad bacteria increase.  · Candidiasis (yeast infection). Yeast is a type of fungus. A yeast infection occurs when yeast cells in the vagina increase. They then attack vaginal tissues. A type of yeast called Candida albicans is often involved.  · Trichomoniasis (trich). Trich is a parasite. It is passed from one person to another during sex. Men with trich often dont have any symptoms. In women, it can take weeks or months before symptoms appear.  Date Last Reviewed: 5/12/2015  © 1879-4184 [employer]. [address] [place] [address]. All rights reserved. This information is not intended as a substitute for professional medical care. Always follow your healthcare professional's instructions.        Vaginal Infection: Bacterial Vaginosis  Both good and bad bacteria are present in a healthy vagina. Bacterial vaginosis (BV) occurs when these bacteria get out of balance. The numbers of good bacteria decrease. This allows the numbers of bad bacteria to increase and cause BV. In most cases, BV is not a serious problem.  Causes of bacterial vaginosis  The cause of BV is not clear. Douching may lead to it. Having sex with a new partner or more than 1 partner makes it more likely.  Symptoms of bacterial vaginosis  Symptoms of BV vary for each woman. Some women have few symptoms or none at all. If symptoms are present, they can include:  · Thin, milky white or gray or sometimes green discharge  · Unpleasant fishy odor  · Irritation, itching, and burning at opening of vagina which may indicate mixed vaginitis    · Burning or irritation with sex or urination which may indicate mixed vaginitis  Diagnosing bacterial vaginosis  Your health care provider will ask about your symptoms and health history. He or she will also do a pelvic exam. This is an exam of your vagina and cervix. A sample of vaginal fluid  or discharge may be taken. This sample is checked for signs of BV.  Treating bacterial vaginosis  BV is often treated with antibiotics. They may be given in oral pill form or as a vaginal cream. To use these medications:  · Be sure to take all of your medication, even if your symptoms go away.  · If youre taking antibiotic pills, do not drink alcohol until youre finished with all of your medication.  · If youre using vaginal cream, apply it as directed. Be aware that the cream may make condoms and diaphragms less effective.  · Call your health care provider if symptoms do not go away within 4 days of starting treatment. Also call if you have a reaction to the medication.  Why treatment matters  Even if you have no symptoms or your symptoms are mild, BV should be treated. Untreated BV can lead to health problems such as:  · Increased risk of  delivery if youre pregnant.  · Increased risk of complications after surgery on the reproductive organs.  · Possible increased risk of pelvic inflammatory disease (PID).   Date Last Reviewed: 2015  © 6438-5204 [employer]. [address] [place] [address], [place] [address]. All rights reserved. This information is not intended as a substitute for professional medical care. Always follow your healthcare professional's instructions.

## 2017-06-06 ENCOUNTER — TELEPHONE (OUTPATIENT)
Dept: INTERNAL MEDICINE | Facility: CLINIC | Age: 42
End: 2017-06-06

## 2017-06-06 ENCOUNTER — PATIENT MESSAGE (OUTPATIENT)
Dept: GASTROENTEROLOGY | Facility: CLINIC | Age: 42
End: 2017-06-06

## 2017-06-06 DIAGNOSIS — N89.8 VAGINAL DISCHARGE: Primary | ICD-10-CM

## 2017-06-06 LAB
CREAT UR-MCNC: 279 MG/DL
GARDNERELLA VAGINALIS: POSITIVE
MICROALBUMIN UR DL<=1MG/L-MCNC: 9 UG/ML
MICROALBUMIN/CREATININE RATIO: 3.2 UG/MG
OTHER MICROSC. OBSERVATIONS: ABNORMAL
TRICHOMONAS, POC: NEGATIVE
YEAST WET PREP: NEGATIVE

## 2017-06-06 NOTE — TELEPHONE ENCOUNTER
Please let pt know there was not enough urine in her cup yesterday to do both microalbumin / creatinine and test for STDs. If pt would still like to be screened for   Gonorrhea and chlamydia she will need to come give another sample. If she wants to do this please schedule, the order is in    Mircoalbumin/creatinine was normal, no sign of kidney disease from DM

## 2017-06-06 NOTE — TELEPHONE ENCOUNTER
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 We had a problem with one of the labs on your patient if you could please call us at [phone] or [phone] that would be great. ANYONE in my department can help. Thank You.

## 2017-06-06 NOTE — TELEPHONE ENCOUNTER
Spoke with patient. Still having problems with reflux.  Patient requesting an appointment. States she has not done the EGD yet. Please advise.

## 2017-06-07 ENCOUNTER — LAB VISIT (OUTPATIENT)
Dept: LAB | Facility: HOSPITAL | Age: 42
End: 2017-06-07
Attending: FAMILY MEDICINE
Payer: COMMERCIAL

## 2017-06-07 ENCOUNTER — TELEPHONE (OUTPATIENT)
Dept: GASTROENTEROLOGY | Facility: CLINIC | Age: 42
End: 2017-06-07

## 2017-06-07 DIAGNOSIS — N89.8 VAGINAL DISCHARGE: ICD-10-CM

## 2017-06-07 PROCEDURE — 87591 N.GONORRHOEAE DNA AMP PROB: CPT

## 2017-06-07 NOTE — TELEPHONE ENCOUNTER
----- Message from Aarti Ravi sent at 6/7/2017  8:03 AM CDT -----  Contact: [phone]  Patient returning call. Please call back at [phone].    Thanks,  Aarti Ravi

## 2017-06-08 ENCOUNTER — SURGERY (OUTPATIENT)
Age: 42
End: 2017-06-08

## 2017-06-08 ENCOUNTER — ANESTHESIA EVENT (OUTPATIENT)
Dept: ENDOSCOPY | Facility: HOSPITAL | Age: 42
End: 2017-06-08
Payer: COMMERCIAL

## 2017-06-08 ENCOUNTER — HOSPITAL ENCOUNTER (OUTPATIENT)
Facility: HOSPITAL | Age: 42
Discharge: HOME | End: 2017-06-08
Attending: INTERNAL MEDICINE | Admitting: INTERNAL MEDICINE
Payer: COMMERCIAL

## 2017-06-08 ENCOUNTER — ANESTHESIA (OUTPATIENT)
Dept: ENDOSCOPY | Facility: HOSPITAL | Age: 42
End: 2017-06-08
Payer: COMMERCIAL

## 2017-06-08 VITALS
BODY MASS INDEX: 42.32 KG/M2 | WEIGHT: 254 LBS | RESPIRATION RATE: 18 BRPM | TEMPERATURE: 98 F | DIASTOLIC BLOOD PRESSURE: 76 MMHG | SYSTOLIC BLOOD PRESSURE: 122 MMHG | OXYGEN SATURATION: 100 % | HEIGHT: 65 IN | HEART RATE: 66 BPM

## 2017-06-08 DIAGNOSIS — K29.70 GASTRITIS WITHOUT BLEEDING, UNSPECIFIED CHRONICITY, UNSPECIFIED GASTRITIS TYPE: ICD-10-CM

## 2017-06-08 DIAGNOSIS — R07.89 ATYPICAL CHEST PAIN: Primary | ICD-10-CM

## 2017-06-08 LAB
C TRACH DNA SPEC QL NAA+PROBE: NOT DETECTED
N GONORRHOEA DNA SPEC QL NAA+PROBE: NOT DETECTED
POCT GLUCOSE: 129 MG/DL (ref 70–110)

## 2017-06-08 PROCEDURE — 25000003 PHARM REV CODE 250: Performed by: NURSE ANESTHETIST, CERTIFIED REGISTERED

## 2017-06-08 PROCEDURE — 88305 TISSUE EXAM BY PATHOLOGIST: CPT | Mod: 26,,, | Performed by: PATHOLOGY

## 2017-06-08 PROCEDURE — 88305 TISSUE EXAM BY PATHOLOGIST: CPT | Performed by: PATHOLOGY

## 2017-06-08 PROCEDURE — 25000003 PHARM REV CODE 250: Performed by: INTERNAL MEDICINE

## 2017-06-08 PROCEDURE — 63600175 PHARM REV CODE 636 W HCPCS: Performed by: NURSE ANESTHETIST, CERTIFIED REGISTERED

## 2017-06-08 PROCEDURE — 43239 EGD BIOPSY SINGLE/MULTIPLE: CPT | Performed by: INTERNAL MEDICINE

## 2017-06-08 PROCEDURE — 37000009 HC ANESTHESIA EA ADD 15 MINS: Performed by: INTERNAL MEDICINE

## 2017-06-08 PROCEDURE — 82962 GLUCOSE BLOOD TEST: CPT | Performed by: INTERNAL MEDICINE

## 2017-06-08 PROCEDURE — 37000008 HC ANESTHESIA 1ST 15 MINUTES: Performed by: INTERNAL MEDICINE

## 2017-06-08 PROCEDURE — 43239 EGD BIOPSY SINGLE/MULTIPLE: CPT | Mod: ,,, | Performed by: INTERNAL MEDICINE

## 2017-06-08 PROCEDURE — 27201012 HC FORCEPS, HOT/COLD, DISP: Performed by: INTERNAL MEDICINE

## 2017-06-08 RX ORDER — LIDOCAINE HCL/PF 100 MG/5ML
SYRINGE (ML) INTRAVENOUS
Status: DISCONTINUED | OUTPATIENT
Start: 2017-06-08 | End: 2017-06-08

## 2017-06-08 RX ORDER — PROPOFOL 10 MG/ML
INJECTION, EMULSION INTRAVENOUS
Status: DISCONTINUED | OUTPATIENT
Start: 2017-06-08 | End: 2017-06-08

## 2017-06-08 RX ORDER — SODIUM CHLORIDE, SODIUM LACTATE, POTASSIUM CHLORIDE, CALCIUM CHLORIDE 600; 310; 30; 20 MG/100ML; MG/100ML; MG/100ML; MG/100ML
INJECTION, SOLUTION INTRAVENOUS CONTINUOUS
Status: DISCONTINUED | OUTPATIENT
Start: 2017-06-08 | End: 2017-06-08 | Stop reason: HOSPADM

## 2017-06-08 RX ORDER — SODIUM CHLORIDE, SODIUM LACTATE, POTASSIUM CHLORIDE, CALCIUM CHLORIDE 600; 310; 30; 20 MG/100ML; MG/100ML; MG/100ML; MG/100ML
INJECTION, SOLUTION INTRAVENOUS CONTINUOUS PRN
Status: DISCONTINUED | OUTPATIENT
Start: 2017-06-08 | End: 2017-06-08

## 2017-06-08 RX ADMIN — SODIUM CHLORIDE, SODIUM LACTATE, POTASSIUM CHLORIDE, AND CALCIUM CHLORIDE: 600; 310; 30; 20 INJECTION, SOLUTION INTRAVENOUS at 11:06

## 2017-06-08 RX ADMIN — PROPOFOL 140 MG: 10 INJECTION, EMULSION INTRAVENOUS at 11:06

## 2017-06-08 RX ADMIN — PROPOFOL 30 MG: 10 INJECTION, EMULSION INTRAVENOUS at 11:06

## 2017-06-08 RX ADMIN — SODIUM CHLORIDE, SODIUM LACTATE, POTASSIUM CHLORIDE, AND CALCIUM CHLORIDE: .6; .31; .03; .02 INJECTION, SOLUTION INTRAVENOUS at 10:06

## 2017-06-08 RX ADMIN — LIDOCAINE HYDROCHLORIDE 50 MG: 20 INJECTION, SOLUTION INTRAVENOUS at 11:06

## 2017-06-08 NOTE — DISCHARGE INSTRUCTIONS
Gastritis (Adult)    Gastritis is inflammation and irritation of the stomach lining. It can be present for a short time (acute) or be long lasting (chronic). Gastritis is often caused by infection with bacteria called H pylori. More than a third of people in the [place] have this bacteria in their bodies. In many cases, H pylori causes no problems or symptoms. In some people, though, the infection irritates the stomach lining and causes gastritis. Other causes of stomach irritation include drinking alcohol or taking pain-relieving medicines called NSAIDs (such as aspirin or ibuprofen).   Symptoms of gastritis can include:  · Abdominal pain or bloating  · Loss of appetite  · Nausea or vomiting  · Vomiting blood or having black stools  · Feeling more tired than usual  An inflamed and irritated stomach lining is more likely to develop a sore called an ulcer. To help prevent this, gastritis should be treated.  Home care  If needed, medicines may be prescribed. If you have H pylori infection, treating it will likely relieve your symptoms. Other changes can help reduce stomach irritation and help it heal.  · If you have been prescribed medicines for H pylori infection, take them as directed. Take all of the medicine until it is finished or your healthcare provider tells you to stop, even if you feel better.  · Your healthcare provider may recommend avoiding NSAIDs. If you take daily aspirin for your heart or other medical reasons, do not stop without talking to your healthcare provider first.  · Avoid drinking alcohol.  · Stop smoking. Smoking can irritate the stomach and delay healing. As much as possible, stay away from second hand smoke.  Follow-up care  Follow up with your healthcare provider, or as advised by our staff. Testing may be needed to check for inflammation or an ulcer.  When to seek medical advice  Call your healthcare provider for any of the following:  · Stomach pain that gets worse or moves to the lower  right abdomen (appendix area)  · Chest pain that appears or gets worse, or spreads to the back, neck, shoulder, or arm  · Frequent vomiting (cant keep down liquids)  · Blood in the stool or vomit (red or black in color)  · Feeling weak or dizzy  · Fever of 100.4ºF (38ºC) or higher, or as directed by your healthcare provider  Date Last Reviewed: 6/22/2015  © 1788-5028 [employer]. [address] [place] [address]. All rights reserved. This information is not intended as a substitute for professional medical care. Always follow your healthcare professional's instructions.

## 2017-06-08 NOTE — TRANSFER OF CARE
"Anesthesia Transfer of Care Note    Patient: Yuli Milton    Procedure(s) Performed: Procedure(s) (LRB):  ESOPHAGOGASTRODUODENOSCOPY (EGD) (N/A)    Patient location: PACU    Anesthesia Type: MAC    Transport from OR: Transported from OR on room air with adequate spontaneous ventilation    Post pain: adequate analgesia    Post assessment: no apparent anesthetic complications    Post vital signs: stable    Level of consciousness: awake, alert and oriented    Nausea/Vomiting: no nausea/vomiting    Complications: none    Transfer of care protocol was followed      Last vitals:   Visit Vitals  BP (!) 124/42 (BP Location: Left arm, Patient Position: Lying, BP Method: Automatic)   Pulse 63   Temp 36.6 °C (97.9 °F) (Oral)   Resp 17   Ht 5' 5" (1.651 m)   Wt 115.2 kg (254 lb)   LMP 08/21/2016   SpO2 99%   Breastfeeding? No   BMI 42.27 kg/m²     "

## 2017-06-08 NOTE — ANESTHESIA RELEASE NOTE
"Anesthesia Release from PACU Note    Patient: Yuli Milton    Procedure(s) Performed: Procedure(s) (LRB):  ESOPHAGOGASTRODUODENOSCOPY (EGD) (N/A)    Anesthesia type: MAC    Post pain: Adequate analgesia    Post assessment: no apparent anesthetic complications, tolerated procedure well and no evidence of recall    Last Vitals:   Visit Vitals  BP (!) 124/42 (BP Location: Left arm, Patient Position: Lying, BP Method: Automatic)   Pulse 63   Temp 36.6 °C (97.9 °F) (Oral)   Resp 17   Ht 5' 5" (1.651 m)   Wt 115.2 kg (254 lb)   LMP 08/21/2016   SpO2 99%   Breastfeeding? No   BMI 42.27 kg/m²       Post vital signs: stable    Level of consciousness: awake, alert  and oriented    Nausea/Vomiting: no nausea/no vomiting    Complications: none    Airway Patency: patent    Respiratory: unassisted, spontaneous ventilation, room air    Cardiovascular: stable    Hydration: euvolemic  "

## 2017-06-08 NOTE — ANESTHESIA PREPROCEDURE EVALUATION
06/08/2017  Yuli Milton is a 41 y.o., female.    Anesthesia Evaluation    I have reviewed the Patient Summary Reports.    I have reviewed the Nursing Notes.   I have reviewed the Medications.     Review of Systems  Anesthesia Hx:  Hx of Anesthetic complications    Social:  Non-Smoker, No Alcohol Use    Hematology/Oncology:     Oncology Normal    -- Anemia:   Cardiovascular:   ECG has been reviewed. Sinus bradycardia  Otherwise normal ECG  No previous ECGs available   Pulmonary:   Asthma moderate Shortness of breath Snore   Renal/:  Renal/ Normal     Hepatic/GI:   GERD, poorly controlled 2200 last drink of fluid.  2100 last of solid fluid.  gastriis   Musculoskeletal:   Arthritis     Neurological:  Neurology Normal    Endocrine:   Diabetes, well controlled, type 2    Psych:   Psychiatric History          Physical Exam  General:  Well nourished, Morbid Obesity    Airway/Jaw/Neck:  Airway Findings: Mallampati: II                Anesthesia Plan  Type of Anesthesia, risks & benefits discussed:  Anesthesia Type:  MAC  Patient's Preference:   Intra-op Monitoring Plan:   Intra-op Monitoring Plan Comments:   Post Op Pain Control Plan:   Post Op Pain Control Plan Comments:   Induction:   IV  Beta Blocker:  Patient is not currently on a Beta-Blocker (No further documentation required).       Informed Consent: Patient understands risks and agrees with Anesthesia plan.  Questions answered. Anesthesia consent signed with patient.  ASA Score: 3     Day of Surgery Review of History & Physical: I have interviewed and examined the patient. I have reviewed the patient's H&P dated: 06/08/17. There are no significant changes.  H&P update referred to the surgeon.         Ready For Surgery From Anesthesia Perspective.

## 2017-06-08 NOTE — ANESTHESIA POSTPROCEDURE EVALUATION
"Anesthesia Post Evaluation    Patient: Yuli Milton    Procedure(s) Performed: Procedure(s) (LRB):  ESOPHAGOGASTRODUODENOSCOPY (EGD) (N/A)    Final Anesthesia Type: MAC  Patient location during evaluation: PACU  Patient participation: Yes- Able to Participate  Level of consciousness: awake and alert and oriented  Post-procedure vital signs: reviewed and stable  Pain management: adequate  Airway patency: patent  PONV status at discharge: No PONV  Anesthetic complications: no      Cardiovascular status: blood pressure returned to baseline  Respiratory status: unassisted, spontaneous ventilation and room air  Hydration status: euvolemic  Follow-up not needed.        Visit Vitals  BP (!) 124/42 (BP Location: Left arm, Patient Position: Lying, BP Method: Automatic)   Pulse 63   Temp 36.6 °C (97.9 °F) (Oral)   Resp 17   Ht 5' 5" (1.651 m)   Wt 115.2 kg (254 lb)   LMP 08/21/2016   SpO2 99%   Breastfeeding? No   BMI 42.27 kg/m²       Pain/Rosamaria Score: Pain Assessment Performed: Yes (6/8/2017 10:37 AM)  Presence of Pain: denies (6/8/2017 10:37 AM)      "

## 2017-06-08 NOTE — H&P
Short Stay Endoscopy History and Physical    PCP - Scottie Buitrago, DO    Procedure -EGD  ASA - 3  Mallampati - per anesthesia  History of Anesthesia problems - no  Family history Anesthesia problems -  no     HPI:  This is a 41 y.o.female here for evaluation of : Atypical chest and RUQ abdominal pain    Reflux - no  Dysphagia - no  Abdominal pain - yes  Diarrhea - no  Anemia - no  GI bleeding - no  Nausea- yes and vomiting-no  Early satiety-no  aversion to sight or smell of food-no    ROS:  Constitutional: No fevers, chills, No weight loss  ENT: No allergies  CV: No chest pain  Pulm: No cough, No shortness of breath  Ophtho: No vision changes  GI: see HPI  Derm: No rash  Heme: No lymphadenopathy, No bruising  MSK: No arthritis  : No dysuria, No hematuria  Endo: No hot or cold intolerance  Neuro: No syncope, No seizure  Psych: No anxiety, No depression    Medical History:  Past Medical History:   Diagnosis Date    Abnormal Pap smear of cervix     treatment??    Abnormal Pap smear of vagina     Anemia     Anxiety and depression     Asthma     Depression (emotion)     Diabetes mellitus     Gallstones     Gastritis     History of ovarian cyst     History of syphilis     History of trichomoniasis     Mental disorder     PONV (postoperative nausea and vomiting)        Surgical History:  Past Surgical History:   Procedure Laterality Date    APPENDECTOMY      DILATION AND CURETTAGE OF UTERUS      bleeding    HYSTERECTOMY  08/31/2016    BS       Family History:  Family History   Problem Relation Age of Onset    Breast cancer Paternal Grandmother     Diabetes Maternal Grandmother     Hypertension Mother     Breast cancer Maternal Aunt     Colon cancer Maternal Aunt     Miscarriages / Stillbirths Cousin     Stroke Maternal Aunt     Ovarian cancer Neg Hx     Deep vein thrombosis Neg Hx     Pulmonary embolism Neg Hx        Social History:  Social History     Social History    Marital status:       Spouse name: N/A    Number of children: 3    Years of education: N/A     Occupational History    Not on file.     Social History Main Topics    Smoking status: Never Smoker    Smokeless tobacco: Never Used    Alcohol use No    Drug use: No    Sexual activity: Yes     Partners: Male     Birth control/ protection: See Surgical Hx, Post-menopausal      Comment: mut monog     Other Topics Concern    Not on file     Social History Narrative    Full time employed.       Allergies: Review of patient's allergies indicates:  No Known Allergies    Medications:   No current facility-administered medications on file prior to encounter.      Current Outpatient Prescriptions on File Prior to Encounter   Medication Sig Dispense Refill    ALBUTEROL INHL Inhale 2 puffs into the lungs continuous prn.      blood sugar diagnostic Strp 1 each by Other route 3 (three) times daily. 100 each 11    blood-glucose meter Misc Dispense 1 1 each 0    escitalopram oxalate (LEXAPRO) 10 MG tablet Take 15 mg by mouth daily as needed.   0    ibuprofen (ADVIL,MOTRIN) 800 MG tablet Take 1 tablet (800 mg total) by mouth 3 (three) times daily. 90 tablet 0    lancets Misc 1 each by Other route 3 (three) times daily. 100 each 0    levalbuterol (XOPENEX) 0.63 mg/3 mL nebulizer solution Take 1 ampule by nebulization every 4 (four) hours as needed for Wheezing.      linaclotide (LINZESS) 145 mcg Cap capsule Take 1 capsule (145 mcg total) by mouth once daily. 30 capsule 5    metformin (GLUCOPHAGE) 500 MG tablet Take 500 mg by mouth 2 (two) times daily with meals.      MULTIVIT-MIN/FA/HERBAL NO.245 (ALIVE WOMEN'S GUMMY VITAMINS ORAL) Take by mouth as needed.       ondansetron (ZOFRAN) 4 MG tablet Take 4 mg by mouth as needed.       pantoprazole (PROTONIX) 40 MG tablet Take 1 tablet (40 mg total) by mouth once daily. 30 tablet 5    sucralfate (CARAFATE) 100 mg/mL suspension Take 1 g by mouth once daily.          Objective  Findings:    Vital Signs:There were no vitals filed for this visit.        Physical Exam:  General Appearance: Well appearing in no acute distress  Eyes:    No scleral icterus  ENT: Neck supple, Lips, mucosa, and tongue normal; teeth and gums normal  Lungs: CTA bilaterally in anterior and posterior fields, no wheezes, no crackles.  Heart:  Regular rate, S1, S2 normal, no murmurs heard.  Abdomen: Soft, non tender, non distended with normal bowel sounds. No hepatosplenomegaly, ascites, or mass.  Extremities: No clubbing, cyanosis or edema  Skin: No rash    Labs:  Reviewed    Plan:EGD  I have explained the risks and benefits of endoscopy procedures to the patient including but not limited to bleeding, perforation, infection, and death. The patient wishes to proceed.

## 2017-06-08 NOTE — DISCHARGE SUMMARY
[facility]  Brief Operative Note     SUMMARY     Surgery Date: 6/8/2017     Surgeon(s) and Role:     * Too Cordova III, MD - Primary    Assisting Surgeon: None    Pre-op Diagnosis:  RUQ pain [R10.11]  Atypical chest pain [R07.89]  Gastroesophageal reflux disease, esophagitis presence not specified [K21.9]    Post-op Diagnosis:  Post-Op Diagnosis Codes:     * RUQ pain [R10.11]     * Atypical chest pain [R07.89]     * Gastroesophageal reflux disease, esophagitis presence not specified [K21.9]      - Gastritis  Procedure(s) (LRB):  ESOPHAGOGASTRODUODENOSCOPY (EGD) (N/A)    Anesthesia: Monitor Anesthesia Care    Description of the findings of the procedure: Procedures completed. See Procedure note for full details.    Findings/Key Components: Procedures completed. See Procedure note for full details.    Prosthetics/Devices: None    Estimated Blood Loss: * No values recorded between 6/8/2017 12:00 AM and 6/8/2017 12:15 PM *         Specimens:   Specimen (12h ago through future)    Start     Ordered    06/08/17 1157  Specimen to Pathology - Surgery  Once     Comments:  #1 Antral Bx's mild gastritis R/O H pylori      06/08/17 1158          Discharge Note    SUMMARY     Admit Date: 6/8/2017    Discharge Date and Time: 6/8/2017    Hospital Course (synopsis of major diagnoses, care, treatment, and services provided during the course of the hospital stay):  Procedures completed. See Procedure note for full details. Discharge patient when discharge criteria met.    Final Diagnosis: Post-Op Diagnosis Codes:     * RUQ pain [R10.11]     * Atypical chest pain [R07.89]     * Gastroesophageal reflux disease, esophagitis presence not specified [K21.9]      - Gastritis  Disposition: Discharge patient when discharge criteria met.    Follow Up/Patient Instructions:       Medications:  Reconciled Home Medications: Current Discharge Medication List      CONTINUE these medications which have NOT CHANGED    Details    ALBUTEROL INHL Inhale 2 puffs into the lungs continuous prn.      levalbuterol (XOPENEX) 0.63 mg/3 mL nebulizer solution Take 1 ampule by nebulization every 4 (four) hours as needed for Wheezing.      linaclotide (LINZESS) 145 mcg Cap capsule Take 1 capsule (145 mcg total) by mouth once daily.  Qty: 30 capsule, Refills: 5    Associated Diagnoses: Atypical chest pain; RUQ pain; Constipation, unspecified constipation type      metronidazole (FLAGYL) 500 MG tablet Take 1 tablet (500 mg total) by mouth every 12 (twelve) hours.  Qty: 14 tablet, Refills: 0      pantoprazole (PROTONIX) 40 MG tablet Take 1 tablet (40 mg total) by mouth once daily.  Qty: 30 tablet, Refills: 5    Associated Diagnoses: Atypical chest pain; Gastroesophageal reflux disease, esophagitis presence not specified; Generalized abdominal pain; Constipation, unspecified constipation type      sucralfate (CARAFATE) 100 mg/mL suspension Take 1 g by mouth once daily.       blood sugar diagnostic Strp 1 each by Other route 3 (three) times daily.  Qty: 100 each, Refills: 11    Associated Diagnoses: Type 2 diabetes mellitus without complication, without long-term current use of insulin      blood-glucose meter Misc Dispense 1  Qty: 1 each, Refills: 0    Associated Diagnoses: Type 2 diabetes mellitus without complication, without long-term current use of insulin      escitalopram oxalate (LEXAPRO) 10 MG tablet Take 15 mg by mouth daily as needed.   Refills: 0      ibuprofen (ADVIL,MOTRIN) 800 MG tablet Take 1 tablet (800 mg total) by mouth 3 (three) times daily.  Qty: 90 tablet, Refills: 0      lancets Misc 1 each by Other route 3 (three) times daily.  Qty: 100 each, Refills: 0    Associated Diagnoses: Type 2 diabetes mellitus without complication, without long-term current use of insulin      meloxicam (MOBIC) 15 MG tablet Take 1 tablet (15 mg total) by mouth once daily.  Qty: 30 tablet, Refills: 5    Associated Diagnoses: Right lumbosacral radiculopathy       metformin (GLUCOPHAGE) 500 MG tablet Take 500 mg by mouth 2 (two) times daily with meals.      MULTIVIT-MIN/FA/HERBAL NO.245 (ALIVE WOMEN'S GUMMY VITAMINS ORAL) Take by mouth as needed.       ondansetron (ZOFRAN) 4 MG tablet Take 4 mg by mouth as needed.               Discharge Procedure Orders  Diet general     Activity as tolerated

## 2017-06-12 ENCOUNTER — OFFICE VISIT (OUTPATIENT)
Dept: INTERNAL MEDICINE | Facility: CLINIC | Age: 42
End: 2017-06-12
Payer: COMMERCIAL

## 2017-06-12 VITALS
TEMPERATURE: 97 F | HEIGHT: 65 IN | HEART RATE: 62 BPM | BODY MASS INDEX: 42.86 KG/M2 | WEIGHT: 257.25 LBS | RESPIRATION RATE: 18 BRPM | DIASTOLIC BLOOD PRESSURE: 57 MMHG | OXYGEN SATURATION: 97 % | SYSTOLIC BLOOD PRESSURE: 112 MMHG

## 2017-06-12 DIAGNOSIS — E27.8 ADRENAL MASS: ICD-10-CM

## 2017-06-12 DIAGNOSIS — F33.3 SEVERE EPISODE OF RECURRENT MAJOR DEPRESSIVE DISORDER, WITH PSYCHOTIC FEATURES: ICD-10-CM

## 2017-06-12 DIAGNOSIS — E66.01 MORBID OBESITY DUE TO EXCESS CALORIES: ICD-10-CM

## 2017-06-12 DIAGNOSIS — E11.9 TYPE 2 DIABETES MELLITUS WITHOUT COMPLICATION, WITHOUT LONG-TERM CURRENT USE OF INSULIN: Primary | ICD-10-CM

## 2017-06-12 PROBLEM — R10.9 ABDOMINAL PAIN: Status: RESOLVED | Noted: 2017-01-06 | Resolved: 2017-06-12

## 2017-06-12 PROCEDURE — 3044F HG A1C LEVEL LT 7.0%: CPT | Mod: S$GLB,,, | Performed by: FAMILY MEDICINE

## 2017-06-12 PROCEDURE — 99999 PR PBB SHADOW E&M-EST. PATIENT-LVL III: CPT | Mod: PBBFAC,,, | Performed by: FAMILY MEDICINE

## 2017-06-12 PROCEDURE — 99214 OFFICE O/P EST MOD 30 MIN: CPT | Mod: S$GLB,,, | Performed by: FAMILY MEDICINE

## 2017-06-12 RX ORDER — PEN NEEDLE, DIABETIC 33 GX5/32"
1 NEEDLE, DISPOSABLE MISCELLANEOUS NIGHTLY
Qty: 100 EACH | Refills: 5 | Status: SHIPPED | OUTPATIENT
Start: 2017-06-12 | End: 2019-01-21

## 2017-06-12 RX ORDER — ESCITALOPRAM OXALATE 20 MG/1
20 TABLET ORAL DAILY
Qty: 30 TABLET | Refills: 5 | Status: SHIPPED | OUTPATIENT
Start: 2017-06-12 | End: 2017-10-04 | Stop reason: DRUGHIGH

## 2017-06-12 NOTE — MEDICAL/APP STUDENT
Subjective:       Patient ID: Yuli Milton is a 41 y.o. female.    Chief Complaint: Weight Loss (Wants to discuss medication options. )    HPI  42 yo female presents to discuss weight loss medications. She states that she has had increased her junk food intake since her hysterectomy last year. She reports that she is unable to lose weight long term, and always fluctuates 5-10 lbs. She is interested in joining a gym.     She further reports that she is no longer taking her Lexapro as her psychiatrist is in [place] and it was too difficult to make the appointments. She has noticed depressed mood, anhedonia, and increased appetite. She has a history of psychiatric hospitalization last year for what sounds like severe depression with psychotic features.    She currently endorses occasional passive suicidal ideation, but denies any organized plan or plans to harm others. She reports occasional auditory hallucinations which are of a self-harm nature. Her protective factors are her 3 children and her partner, although she states she is unable to discuss some of her issues with her partner. She does not have any friends that she's able to share her thoughts with. 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   Patient reports dyspareunia that has worsened since her hysterectomy, but denies any history of sexual abuse.   Patient denies having issues with concentration, or sleeping.    With regards to her on-going issues with constipation, she reports taking Linzess every 3-4 days which makes her go all day. She says she has been having recurring right-sided abdominal pain and previous imaging revealed significant amounts of stool.     With regards to her DM2, she checks her sugars occasionally, and reports ranges wnl. Her most recent BS was 129.      Review of Systems   Constitutional: Positive for appetite change. Negative for  activity change, fatigue and unexpected weight change.   HENT: Negative.    Eyes: Negative.    Respiratory: Negative for chest tightness and shortness of breath.    Cardiovascular: Negative for chest pain and palpitations.   Gastrointestinal: Positive for abdominal pain and constipation. Negative for abdominal distention, nausea and vomiting.   Endocrine: Negative.    Genitourinary: Negative.    Musculoskeletal: Negative.  Negative for back pain.   Skin: Negative.    Allergic/Immunologic: Negative.    Neurological: Negative.  Negative for headaches.   Hematological: Negative.    Psychiatric/Behavioral: Positive for dysphoric mood, hallucinations and suicidal ideas. Negative for agitation, behavioral problems, confusion, decreased concentration, self-injury and sleep disturbance. The patient is not nervous/anxious and is not hyperactive.        Objective:      Physical Exam   Constitutional: She is oriented to person, place, and time. She appears well-developed and well-nourished. No distress.   HENT:   Head: Normocephalic and atraumatic.   Eyes: Conjunctivae and EOM are normal. Pupils are equal, round, and reactive to light.   Neck: Normal range of motion. Neck supple.   Cardiovascular: Normal rate, regular rhythm, normal heart sounds and intact distal pulses.    No murmur heard.  Pulmonary/Chest: Effort normal and breath sounds normal.   Abdominal: Soft. Bowel sounds are normal. She exhibits no distension. There is no tenderness.   Musculoskeletal: Normal range of motion.   Neurological: She is alert and oriented to person, place, and time.   Skin: Skin is warm and dry. Capillary refill takes less than 2 seconds.   Psychiatric: Judgment normal. Her mood appears not anxious. Her affect is not blunt, not labile and not inappropriate. Her speech is not rapid and/or pressured and not tangential. She is not hyperactive, not slowed, not withdrawn and not actively hallucinating. Thought content is not paranoid and not  delusional. Cognition and memory are normal. She does not express impulsivity or inappropriate judgment. She does not exhibit a depressed mood. She expresses no homicidal ideation. She expresses no suicidal plans and no homicidal plans.   Occasional passive suicidal ideation, none today She is attentive.         Assessment & Plan:       Depression with likely psychotic features  -Restart Lexapro 20mg  -F/u in 2 weeks to discuss any improvement or worsening sx    Weight loss  -Rx sent for Dulaglutide injection once a week  -Discussed diet & exercise options    Constipation  -Recommended halving her Linzess and taking every day to promote regular BM    Health Maintenance  -Foot exam performed today: Monofilament sensation intact    VISHNU Phillips IV

## 2017-06-12 NOTE — PROGRESS NOTES
Subjective:       Patient ID: Yuli Milton is a 41 y.o. female.    Chief Complaint: Weight Loss (Wants to discuss medication options. )    HPI  Came in today mainly with a concern about losing weight.  She has been struggling with losing weight and admits to not having much motivation for exercise recently.  She also has been having some issues controlling her appetite and has been succumbing to eating a lot of junk food.  She does eat normal, healthy food also.  She is wondering if there is some sort of treatment that she could get for the obesity.  She has a history of depression and in the past she was on Lexapro.  Says that she had a psychiatrist that she saw in [place] but it has been difficult for her to get to those appointments.  Reports being hospitalized at our [facility] at some point last year whenever she was hearing voices.  She occasionally still hears the voices but not as bad as they were.  She has had no suicidal attempts nor significant ideations.    Family History   Problem Relation Age of Onset    Breast cancer Paternal Grandmother     Diabetes Maternal Grandmother     Hypertension Mother     Breast cancer Maternal Aunt     Cancer Maternal Aunt      colon cancer    Colon cancer Maternal Aunt     Miscarriages / Stillbirths Cousin     Stroke Maternal Aunt     Ovarian cancer Neg Hx     Deep vein thrombosis Neg Hx     Pulmonary embolism Neg Hx        Current Outpatient Prescriptions:     ALBUTEROL INHL, Inhale 2 puffs into the lungs continuous prn., Disp: , Rfl:     blood sugar diagnostic Strp, 1 each by Other route 3 (three) times daily., Disp: 100 each, Rfl: 11    blood-glucose meter Misc, Dispense 1, Disp: 1 each, Rfl: 0    escitalopram oxalate (LEXAPRO) 20 MG tablet, Take 1 tablet (20 mg total) by mouth once daily., Disp: 30 tablet, Rfl: 5    ibuprofen (ADVIL,MOTRIN) 800 MG tablet, Take 1 tablet (800 mg total) by mouth 3 (three) times daily., Disp: 90 tablet, Rfl:  "0    lancets Misc, 1 each by Other route 3 (three) times daily., Disp: 100 each, Rfl: 0    levalbuterol (XOPENEX) 0.63 mg/3 mL nebulizer solution, Take 1 ampule by nebulization every 4 (four) hours as needed for Wheezing., Disp: , Rfl:     linaclotide (LINZESS) 145 mcg Cap capsule, Take 1 capsule (145 mcg total) by mouth once daily., Disp: 30 capsule, Rfl: 5    meloxicam (MOBIC) 15 MG tablet, Take 1 tablet (15 mg total) by mouth once daily., Disp: 30 tablet, Rfl: 5    metformin (GLUCOPHAGE) 500 MG tablet, Take 500 mg by mouth 2 (two) times daily with meals., Disp: , Rfl:     metronidazole (FLAGYL) 500 MG tablet, Take 1 tablet (500 mg total) by mouth every 12 (twelve) hours., Disp: 14 tablet, Rfl: 0    MULTIVIT-MIN/FA/HERBAL NO.245 (ALIVE WOMEN'S GUMMY VITAMINS ORAL), Take by mouth as needed. , Disp: , Rfl:     ondansetron (ZOFRAN) 4 MG tablet, Take 4 mg by mouth as needed. , Disp: , Rfl:     pantoprazole (PROTONIX) 40 MG tablet, Take 1 tablet (40 mg total) by mouth once daily., Disp: 30 tablet, Rfl: 5    sucralfate (CARAFATE) 100 mg/mL suspension, Take 1 g by mouth once daily. , Disp: , Rfl:     dulaglutide 0.75 mg/0.5 mL PnIj, Inject 0.5 mLs (0.75 mg total) into the skin every 7 days., Disp: 6 mL, Rfl: 3    pen needle, diabetic (COMFORT EZ PEN NEEDLES) 33 gauge x 1/4" Ndle, 1 Units by Misc.(Non-Drug; Combo Route) route every evening., Disp: 100 each, Rfl: 5    Review of Systems   Constitutional: Negative for activity change and unexpected weight change.   HENT: Negative for hearing loss, rhinorrhea and trouble swallowing.    Eyes: Negative for discharge and visual disturbance.   Respiratory: Negative for chest tightness and wheezing.    Cardiovascular: Negative for chest pain and palpitations.   Gastrointestinal: Negative for blood in stool, constipation, diarrhea and vomiting.   Endocrine: Positive for polydipsia and polyuria.   Genitourinary: Negative for difficulty urinating, dysuria, hematuria and " "menstrual problem.   Musculoskeletal: Positive for arthralgias. Negative for joint swelling and neck pain.   Neurological: Negative for weakness and headaches.   Psychiatric/Behavioral: Positive for dysphoric mood. Negative for confusion.       Objective:   BP (!) 112/57 (BP Location: Left arm, Patient Position: Sitting, BP Method: Automatic)   Pulse 62   Temp 97.2 °F (36.2 °C) (Tympanic)   Resp 18   Ht 5' 5" (1.651 m)   Wt 116.7 kg (257 lb 4.4 oz)   LMP 08/21/2016   SpO2 97%   BMI 42.81 kg/m²      Physical Exam   Constitutional: She is oriented to person, place, and time. She appears well-developed and well-nourished.   HENT:   Head: Normocephalic and atraumatic.   Eyes: Conjunctivae are normal.   Cardiovascular: Normal rate.    Pulmonary/Chest: Effort normal. No respiratory distress.   Musculoskeletal: She exhibits no edema.   Neurological: She is alert and oriented to person, place, and time. Coordination normal.   Skin: Skin is warm and dry. No rash noted.   Psychiatric: She has a normal mood and affect. Her behavior is normal. Judgment and thought content normal.   Vitals reviewed.      Assessment & Plan     1. Type 2 diabetes mellitus without complication, without long-term current use of insulin  Her last hemoglobin A1c was 6.4 and she has been on metformin.  Discussed the desire to lose weight and the benefit that Trulicity could offer to help curb her appetite and also possibly help her lose some weight.    2. Morbid obesity due to excess calories  She needs to improve her eating habits and become more motivated to get the gym and exercise.  Part of this comes down to treating her depression adequately.    3. Severe episode of recurrent major depressive disorder, with psychotic features  Restarting on Lexapro at 20 mg daily.  In the past she was on 15 mg and tolerated it fine.  It does not seem that she was ever on any antipsychotic medication however I am concerned with her report of voices that " she may need different therapy.  We'll have her follow-up in 2 weeks for monitoring of this.    4. Adrenal mass  Did not discuss this today during the encounter but I evaluated the past nose from [facility] and she is due for a repeat CT of the abdomen with contrast.

## 2017-06-13 ENCOUNTER — PATIENT MESSAGE (OUTPATIENT)
Dept: GASTROENTEROLOGY | Facility: CLINIC | Age: 42
End: 2017-06-13

## 2017-06-13 ENCOUNTER — TELEPHONE (OUTPATIENT)
Dept: GASTROENTEROLOGY | Facility: CLINIC | Age: 42
End: 2017-06-13

## 2017-06-13 DIAGNOSIS — R07.89 ATYPICAL CHEST PAIN: Primary | ICD-10-CM

## 2017-06-13 NOTE — TELEPHONE ENCOUNTER
----- Message from Lisa Melendez NP sent at 6/13/2017 12:55 PM CDT -----  Please see that patient received my email response. Please schedule her an appt to see me as well as a cardiology appt. Thank you!

## 2017-06-13 NOTE — TELEPHONE ENCOUNTER
Spoke with patient. Appointments scheduled. Pt will call back to reschedule if needed. Pt voiced understanding.

## 2017-06-15 ENCOUNTER — OFFICE VISIT (OUTPATIENT)
Dept: GASTROENTEROLOGY | Facility: CLINIC | Age: 42
End: 2017-06-15
Payer: COMMERCIAL

## 2017-06-15 VITALS
HEIGHT: 65 IN | SYSTOLIC BLOOD PRESSURE: 104 MMHG | BODY MASS INDEX: 42.61 KG/M2 | HEART RATE: 60 BPM | DIASTOLIC BLOOD PRESSURE: 62 MMHG | WEIGHT: 255.75 LBS

## 2017-06-15 DIAGNOSIS — R10.13 EPIGASTRIC PAIN: ICD-10-CM

## 2017-06-15 DIAGNOSIS — R07.89 ATYPICAL CHEST PAIN: ICD-10-CM

## 2017-06-15 DIAGNOSIS — R10.30 LOWER ABDOMINAL PAIN: Primary | ICD-10-CM

## 2017-06-15 DIAGNOSIS — K21.9 GASTROESOPHAGEAL REFLUX DISEASE, ESOPHAGITIS PRESENCE NOT SPECIFIED: ICD-10-CM

## 2017-06-15 PROCEDURE — 99999 PR PBB SHADOW E&M-EST. PATIENT-LVL III: CPT | Mod: PBBFAC,,, | Performed by: NURSE PRACTITIONER

## 2017-06-15 PROCEDURE — 99214 OFFICE O/P EST MOD 30 MIN: CPT | Mod: S$GLB,,, | Performed by: NURSE PRACTITIONER

## 2017-06-15 RX ORDER — SUCRALFATE 1 G/1
1 TABLET ORAL
Qty: 120 TABLET | Refills: 0 | Status: SHIPPED | OUTPATIENT
Start: 2017-06-15 | End: 2017-06-22

## 2017-06-15 NOTE — PROGRESS NOTES
Clinic Follow Up:  [facility] Follow Up Note    Reason for Follow Up:  The primary encounter diagnosis was Lower abdominal pain. Diagnoses of Epigastric pain and Atypical chest pain were also pertinent to this visit.    PCP: Scottie Buitrago       HPI:  This is a 41 y.o. female here for follow up of the above. Patient reports no improvement in symptoms. She recently had an EGD done which showed area of mild gastritis, otherwise was unremarkable. She still complaints of atypical chest pain and epigastric pain. Patient is currently taking Protonix 40mg daily and Carafate QID. She reports having lower abdominal pain as well. Onset of these symptoms started after her hysterectomy last year. She has not mentioned this to me in prior visits/communications. This is a constant pain. She is unable to identify any aggravating or relieving factors. Her constipation has improved on Linzess. She reports daily bowel movements with the feeling of complete evacuation. She denies any nausea and vomiting, hematochezia, melena, or weight loss. She recently had an EGD which showed localized mildly erythematous mucosa in the antrum and prepyloric region of the stomach. Pathology was consistent with mild gastritis. Prior workup also included CT scan which was unrevealing for abdominal pain.     Review of Systems   Constitutional: Negative for activity change and appetite change.   HENT: Negative for sore throat and trouble swallowing.    Eyes: Negative for pain and discharge.   Respiratory: Negative for cough and shortness of breath.    Cardiovascular: Positive for chest pain. Negative for palpitations.   Gastrointestinal: Positive for abdominal pain (lower abdominal pain). Negative for abdominal distention, anal bleeding, blood in stool, constipation, diarrhea, nausea, rectal pain and vomiting.        GERD   Genitourinary: Negative for dysuria.   Skin: Negative for color change and rash.   Neurological: Negative for  speech difficulty, weakness and headaches.   Psychiatric/Behavioral: Negative for confusion and sleep disturbance.       Medical History:  Past Medical History:   Diagnosis Date    Abnormal Pap smear of cervix     treatment??    Abnormal Pap smear of vagina     Anemia     Anxiety and depression     Asthma     Depression (emotion)     Diabetes mellitus     Gallstones     Gastritis     History of ovarian cyst     History of syphilis     History of trichomoniasis     Mental disorder     PONV (postoperative nausea and vomiting)        Surgical History:   Past Surgical History:   Procedure Laterality Date    APPENDECTOMY      DILATION AND CURETTAGE OF UTERUS      bleeding    HYSTERECTOMY  08/31/2016    BS       Family History:   Family History   Problem Relation Age of Onset    Breast cancer Paternal Grandmother     Diabetes Maternal Grandmother     Hypertension Mother     Breast cancer Maternal Aunt     Cancer Maternal Aunt      colon cancer    Colon cancer Maternal Aunt     Miscarriages / Stillbirths Cousin     Stroke Maternal Aunt     Ovarian cancer Neg Hx     Deep vein thrombosis Neg Hx     Pulmonary embolism Neg Hx        Social History:   Social History   Substance Use Topics    Smoking status: Never Smoker    Smokeless tobacco: Never Used    Alcohol use No       Allergies: Review of patient's allergies indicates:  No Known Allergies    Home Medications:  Current Outpatient Prescriptions on File Prior to Visit   Medication Sig Dispense Refill    ALBUTEROL INHL Inhale 2 puffs into the lungs continuous prn.      blood sugar diagnostic Strp 1 each by Other route 3 (three) times daily. 100 each 11    blood-glucose meter Misc Dispense 1 1 each 0    dulaglutide 0.75 mg/0.5 mL PnIj Inject 0.5 mLs (0.75 mg total) into the skin every 7 days. 6 mL 3    escitalopram oxalate (LEXAPRO) 20 MG tablet Take 1 tablet (20 mg total) by mouth once daily. 30 tablet 5    ibuprofen (ADVIL,MOTRIN) 800  "MG tablet Take 1 tablet (800 mg total) by mouth 3 (three) times daily. 90 tablet 0    lancets Misc 1 each by Other route 3 (three) times daily. 100 each 0    levalbuterol (XOPENEX) 0.63 mg/3 mL nebulizer solution Take 1 ampule by nebulization every 4 (four) hours as needed for Wheezing.      linaclotide (LINZESS) 145 mcg Cap capsule Take 1 capsule (145 mcg total) by mouth once daily. 30 capsule 5    meloxicam (MOBIC) 15 MG tablet Take 1 tablet (15 mg total) by mouth once daily. 30 tablet 5    metformin (GLUCOPHAGE) 500 MG tablet Take 500 mg by mouth 2 (two) times daily with meals.      metronidazole (FLAGYL) 500 MG tablet Take 1 tablet (500 mg total) by mouth every 12 (twelve) hours. 14 tablet 0    MULTIVIT-MIN/FA/HERBAL NO.245 (ALIVE WOMEN'S GUMMY VITAMINS ORAL) Take by mouth as needed.       ondansetron (ZOFRAN) 4 MG tablet Take 4 mg by mouth as needed.       pantoprazole (PROTONIX) 40 MG tablet Take 1 tablet (40 mg total) by mouth once daily. 30 tablet 5    pen needle, diabetic (COMFORT EZ PEN NEEDLES) 33 gauge x 1/4" Ndle 1 Units by Misc.(Non-Drug; Combo Route) route every evening. 100 each 5    [DISCONTINUED] sucralfate (CARAFATE) 100 mg/mL suspension Take 1 g by mouth once daily.        No current facility-administered medications on file prior to visit.        Physical Exam:  Vital Signs:  /62   Pulse 60   Ht 5' 5" (1.651 m)   Wt 116 kg (255 lb 11.7 oz)   LMP 08/21/2016   BMI 42.56 kg/m²   Body mass index is 42.56 kg/m².  Physical Exam   Constitutional: She is oriented to person, place, and time and well-developed, well-nourished, and in no distress. No distress.   HENT:   Head: Normocephalic.   Eyes: Conjunctivae are normal. Pupils are equal, round, and reactive to light.   Cardiovascular: Normal rate, regular rhythm and normal heart sounds.    Pulmonary/Chest: Effort normal and breath sounds normal. No respiratory distress.   Abdominal: Soft. Bowel sounds are normal. She exhibits no " distension. There is tenderness (lower abdomen).   Neurological: She is alert and oriented to person, place, and time. No cranial nerve deficit.   Skin: Skin is warm and dry. No rash noted.   Psychiatric: Mood and affect normal.       Labs: Pertinent labs reviewed.    Endoscopy:  EGD 6/8/17     Assessment:  1. Lower abdominal pain    2. Epigastric pain    3. Atypical chest pain        Recommendations:  Lower abdominal pain  - Constipation improved.   - Recommended colonoscopy at this point. Patient does not wish to have colonoscopy now.  - Will refer patient to GYN to R/O ovarian cyst vs other GYN  Etiologies as a cause of her lower abdominal pain  - ? Possible adhesions?  - If GYN evaluation unrevealing, will schedule colonoscopy at that time.  -     Ambulatory Referral to Gynecology    Epigastric pain  Atypical chest pain  - EGD findings not consistent with the severity of symptoms  - Referred patient to Cardiology to R/O cardiac etiologies for persistent chest pain  - Will max her our on reflux medication. She will take Protonix BID, Zantac BID, and Carafate QID  - If cardiology evaluation unrevealing and no improvement with medications, will schedule manometry with pH impedence.   -     sucralfate (CARAFATE) 1 gram tablet; Take 1 tablet (1 g total) by mouth 4 (four) times daily before meals and nightly.  Dispense: 120 tablet; Refill: 0    Return to Clinic:  After cardiology and GYN evaluations.     Thank you for the opportunity to participate in the care of this patient.  SHAGUFTA Hernandez

## 2017-06-15 NOTE — PATIENT INSTRUCTIONS
Pantoprazole 40mg twice a day  Zantac 150mg twice a day  Follow up after evaluations by Cardiology and GYN

## 2017-06-19 ENCOUNTER — PATIENT MESSAGE (OUTPATIENT)
Dept: GASTROENTEROLOGY | Facility: CLINIC | Age: 42
End: 2017-06-19

## 2017-06-19 NOTE — TELEPHONE ENCOUNTER
Spoke with patient. C/o constant abdominal pain, like a burning pain that medication is not helping. States she is taking all medication as prescribed but nothing is helping. It hurts worst after eating.  She does have appt with OB/GYN and Cardiology scheduled.  Please advise.

## 2017-06-21 ENCOUNTER — PATIENT MESSAGE (OUTPATIENT)
Dept: INTERNAL MEDICINE | Facility: CLINIC | Age: 42
End: 2017-06-21

## 2017-06-22 ENCOUNTER — OFFICE VISIT (OUTPATIENT)
Dept: OBSTETRICS AND GYNECOLOGY | Facility: CLINIC | Age: 42
End: 2017-06-22
Payer: COMMERCIAL

## 2017-06-22 VITALS
DIASTOLIC BLOOD PRESSURE: 70 MMHG | WEIGHT: 256 LBS | SYSTOLIC BLOOD PRESSURE: 124 MMHG | HEIGHT: 65 IN | BODY MASS INDEX: 42.65 KG/M2

## 2017-06-22 DIAGNOSIS — R10.2 PELVIC PAIN IN FEMALE: Primary | ICD-10-CM

## 2017-06-22 PROCEDURE — 99999 PR PBB SHADOW E&M-EST. PATIENT-LVL III: CPT | Mod: PBBFAC,,, | Performed by: NURSE PRACTITIONER

## 2017-06-22 PROCEDURE — 99214 OFFICE O/P EST MOD 30 MIN: CPT | Mod: S$GLB,,, | Performed by: NURSE PRACTITIONER

## 2017-06-22 NOTE — PROGRESS NOTES
"Yuli Milton is a 41 y.o. female  presents for chronic pelvic pain over last year.  Hysterectomy done with Dr. Johnston and has seen Dr. Patel and Midwife Wolf since for the pain.  Has not actually seen Dr. Johnston but a CT scan in 2017 was ordered and Dr. Herzog reviewed and it negative.   Has had ovarian cyst - pt states her pain is all over pelvis but more painful to right side.     Past Medical History:   Diagnosis Date    Abnormal Pap smear of cervix     treatment??    Abnormal Pap smear of vagina     Anemia     Anxiety and depression     Asthma     Depression (emotion)     Diabetes mellitus     Gallstones     Gastritis     History of ovarian cyst     History of syphilis     History of trichomoniasis     Mental disorder     PONV (postoperative nausea and vomiting)      Past Surgical History:   Procedure Laterality Date    APPENDECTOMY      DILATION AND CURETTAGE OF UTERUS      bleeding    HYSTERECTOMY  2016    BS     Family History   Problem Relation Age of Onset    Breast cancer Paternal Grandmother     Diabetes Maternal Grandmother     Hypertension Mother     Breast cancer Maternal Aunt     Cancer Maternal Aunt      colon cancer    Colon cancer Maternal Aunt     Miscarriages / Stillbirths Cousin     Stroke Maternal Aunt     Ovarian cancer Neg Hx     Deep vein thrombosis Neg Hx     Pulmonary embolism Neg Hx      Social History   Substance Use Topics    Smoking status: Never Smoker    Smokeless tobacco: Never Used    Alcohol use No     OB History      Para Term  AB Living    3 3 2 1   3    SAB TAB Ectopic Multiple Live Births          1 4          /70   Ht 5' 5" (1.651 m)   Wt 116.1 kg (256 lb)   LMP 2016   BMI 42.60 kg/m²     ROS:  Per hpi    PE:   APPEARANCE: Well nourished, well developed, in no acute distress.  AFFECT: WNL, alert and oriented x 3.  SKIN: No acne or hirsutism.    PELVIC: Normal external female genitalia without " lesions. Normal hair distribution. Adequate perineal body, normal urethral meatus. Vagina atrophic without lesions or discharge. No significant cystocele or rectocele. Bimanual exam shows uterus and cervix to be surgically absent. Adnexa difficult to assess due to body habitus but appear without masses or tenderness.      1. Pelvic pain in female  US Pelvis Comp with Transvag NON-OB (xpd    and PLAN:    Check pelvic us and f/u with Dr. Johnston

## 2017-06-22 NOTE — LETTER
June 22, 2017      Lisa Melendez NP  [address] [facility]  [place] [address]           [facility]  [address] [place] [address]  Phone: [phone]  Fax: [phone]          Patient: Yuli Milton   MR Number: [id number]   YOB: 1975   Date of Visit: 6/22/2017       Dear Lisa Melendez:    Thank you for referring Yuli Milton to me for evaluation. Attached you will find relevant portions of my assessment and plan of care.    If you have questions, please do not hesitate to call me. I look forward to following Yuli Milton along with you.    Sincerely,    Carrie Warner NP    Enclosure  CC:  No Recipients    If you would like to receive this communication electronically, please contact [email or web address] or [phone] to request more information on [employer] Link access.    For providers and/or their staff who would like to refer a patient to [facility], please contact us through our one-stop-shop provider referral line, [facility], at [phone].    If you feel you have received this communication in error or would no longer like to receive these types of communications, please e-mail [email or web address]

## 2017-06-26 ENCOUNTER — OFFICE VISIT (OUTPATIENT)
Dept: CARDIOLOGY | Facility: CLINIC | Age: 42
End: 2017-06-26
Payer: COMMERCIAL

## 2017-06-26 ENCOUNTER — TELEPHONE (OUTPATIENT)
Dept: RADIOLOGY | Facility: HOSPITAL | Age: 42
End: 2017-06-26

## 2017-06-26 VITALS
SYSTOLIC BLOOD PRESSURE: 102 MMHG | HEIGHT: 65 IN | DIASTOLIC BLOOD PRESSURE: 68 MMHG | HEART RATE: 64 BPM | BODY MASS INDEX: 42.33 KG/M2 | WEIGHT: 254.06 LBS

## 2017-06-26 DIAGNOSIS — E66.01 MORBID OBESITY DUE TO EXCESS CALORIES: ICD-10-CM

## 2017-06-26 DIAGNOSIS — R00.2 PALPITATION: ICD-10-CM

## 2017-06-26 DIAGNOSIS — R07.89 CHEST PAIN, ATYPICAL: Primary | ICD-10-CM

## 2017-06-26 DIAGNOSIS — E11.9 TYPE 2 DIABETES MELLITUS WITHOUT COMPLICATION, WITHOUT LONG-TERM CURRENT USE OF INSULIN: ICD-10-CM

## 2017-06-26 DIAGNOSIS — R06.09 DOE (DYSPNEA ON EXERTION): ICD-10-CM

## 2017-06-26 PROCEDURE — 99244 OFF/OP CNSLTJ NEW/EST MOD 40: CPT | Mod: S$GLB,,, | Performed by: INTERNAL MEDICINE

## 2017-06-26 PROCEDURE — 99999 PR PBB SHADOW E&M-EST. PATIENT-LVL III: CPT | Mod: PBBFAC,,, | Performed by: INTERNAL MEDICINE

## 2017-06-26 NOTE — LETTER
June 26, 2017      [place]  [address] [facility] [address]           [facility]  [address] [place] [address]  Phone: [phone]  Fax: [phone]          Patient: Yuli Milton   MR Number: [id number]   YOB: 1975   Date of Visit: 6/26/2017       Dear Lisa Melendez:    Thank you for referring Yuli Milton to me for evaluation. Attached you will find relevant portions of my assessment and plan of care.    If you have questions, please do not hesitate to call me. I look forward to following Yuli Milton along with you.    Sincerely,    Isaac Velázquez MD    Enclosure  CC:  No Recipients    If you would like to receive this communication electronically, please contact [email or web address] or [phone] to request more information on [employer] Link access.    For providers and/or their staff who would like to refer a patient to [facility], please contact us through our one-stop-shop provider referral line, Jennifer Bautista, at [phone].    If you feel you have received this communication in error or would no longer like to receive these types of communications, please e-mail [email or web address]

## 2017-06-26 NOTE — PROGRESS NOTES
Subjective:   Patient ID:  Yuli Milton is a 41 y.o. 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  PMH DM, obesity, depression and asthma.   Chest pressure pain for years, on-and-off, with shoulder and neck pain on right side.  SANTOS, by walking around. Partially helped by inhaler. No chest pan. Has intermittent palpitation.  Has pelvic pain since hysterectomy in 2016.  No smoking/drinking. 3 kids  No f/h premature CAD   EKG NSR            Past Medical History:   Diagnosis Date    Abnormal Pap smear of cervix     treatment??    Abnormal Pap smear of vagina     Anemia     Anxiety and depression     Asthma     Depression (emotion)     Diabetes mellitus     Gallstones     Gastritis     History of ovarian cyst     History of syphilis     History of trichomoniasis     Mental disorder     PONV (postoperative nausea and vomiting)        Past Surgical History:   Procedure Laterality Date    APPENDECTOMY      DILATION AND CURETTAGE OF UTERUS      bleeding    HYSTERECTOMY  08/31/2016    BS       Social History   Substance Use Topics    Smoking status: Never Smoker    Smokeless tobacco: Never Used    Alcohol use No       Family History   Problem Relation Age of Onset    Breast cancer Paternal Grandmother     Diabetes Maternal Grandmother     Hypertension Mother     Breast cancer Maternal Aunt     Cancer Maternal Aunt      colon cancer    Colon cancer Maternal Aunt     Miscarriages / Stillbirths Cousin     Stroke Maternal Aunt     Ovarian cancer Neg Hx     Deep vein thrombosis Neg Hx     Pulmonary embolism Neg Hx        Review of Systems   Constitution: Negative for decreased appetite, diaphoresis, fever, weakness, malaise/fatigue and night sweats.   HENT: Negative for headaches and nosebleeds.    Eyes: Negative for blurred vision and double vision.   Cardiovascular: Positive for chest  pain, dyspnea on exertion and palpitations. Negative for claudication, irregular heartbeat, leg swelling, near-syncope, orthopnea, paroxysmal nocturnal dyspnea and syncope.   Respiratory: Negative for cough, shortness of breath, sleep disturbances due to breathing, snoring, sputum production and wheezing.    Endocrine: Negative for cold intolerance and polyuria.   Hematologic/Lymphatic: Does not bruise/bleed easily.   Skin: Negative for rash.   Musculoskeletal: Negative for back pain, falls, joint pain, joint swelling and neck pain.   Gastrointestinal: Negative for abdominal pain, heartburn, nausea and vomiting.   Genitourinary: Negative for dysuria, frequency and hematuria.   Neurological: Negative for difficulty with concentration, dizziness, focal weakness, light-headedness, numbness and seizures.   Psychiatric/Behavioral: Negative for depression, memory loss and substance abuse. The patient does not have insomnia.    Allergic/Immunologic: Negative for HIV exposure and hives.       Objective:   Physical Exam   Constitutional: She is oriented to person, place, and time. She appears well-nourished.   HENT:   Head: Normocephalic.   Eyes: Pupils are equal, round, and reactive to light.   Neck: Normal carotid pulses and no JVD present. Carotid bruit is not present. No thyromegaly present.   Cardiovascular: Normal rate, regular rhythm, normal heart sounds and normal pulses.   No extrasystoles are present. PMI is not displaced.  Exam reveals no gallop and no S3.    No murmur heard.  Pulmonary/Chest: Breath sounds normal. No stridor. No respiratory distress.   Abdominal: Soft. Bowel sounds are normal. There is no tenderness. There is no rebound.   Musculoskeletal: Normal range of motion.   Neurological: She is alert and oriented to person, place, and time.   Skin: Skin is intact. No rash noted.   Psychiatric: Her behavior is normal.       Lab Results   Component Value Date    CHOL 132 02/17/2017     Lab Results    Component Value Date    HDL 45 02/17/2017     Lab Results   Component Value Date    LDLCALC 72.4 02/17/2017     Lab Results   Component Value Date    TRIG 73 02/17/2017     Lab Results   Component Value Date    CHOLHDL 34.1 02/17/2017       Chemistry        Component Value Date/Time     02/17/2017 1310    K 3.9 02/17/2017 1310     02/17/2017 1310    CO2 23 02/17/2017 1310    BUN 9 02/17/2017 1310    CREATININE 0.8 02/17/2017 1310     02/17/2017 1310        Component Value Date/Time    CALCIUM 9.0 02/17/2017 1310    ALKPHOS 53 (L) 02/17/2017 1310    AST 18 02/17/2017 1310    ALT 14 02/17/2017 1310    BILITOT 0.3 02/17/2017 1310          Lab Results   Component Value Date    TSH 0.209 (L) 02/17/2017     Lab Results   Component Value Date    INR 1.0 09/10/2016     Lab Results   Component Value Date    WBC 5.67 02/17/2017    HGB 11.5 (L) 02/17/2017    HCT 34.3 (L) 02/17/2017    MCV 85 02/17/2017     02/17/2017     BNP  @LABRCNTIP(BNP,BNPTRIAGEBLO)@  CrCl cannot be calculated (Patient's most recent sCr result is older than the maximum 14 days allowed.).     Assessment:      1. Chest pain, atypical    2. SANTOS (dyspnea on exertion)    3. Palpitation    4. Type 2 diabetes mellitus without complication, without long-term current use of insulin    5. Morbid obesity due to excess calories        Plan:   Echo and ETT  Will call for the results  Continue current meds.  Recommend heart-healthy diet, weight control and regular exercise.  Terri. Risk modification.   RTC as indicated    I have reviewed all pertinent labs and cardiac studies. Plans and recommendations have been formulated under my direct supervision. All questions answered and patient voiced understanding. Patient to continue current medications.

## 2017-06-27 ENCOUNTER — PATIENT MESSAGE (OUTPATIENT)
Dept: OBSTETRICS AND GYNECOLOGY | Facility: CLINIC | Age: 42
End: 2017-06-27

## 2017-06-27 ENCOUNTER — HOSPITAL ENCOUNTER (OUTPATIENT)
Dept: RADIOLOGY | Facility: HOSPITAL | Age: 42
Discharge: HOME | End: 2017-06-27
Attending: NURSE PRACTITIONER
Payer: COMMERCIAL

## 2017-06-27 ENCOUNTER — OFFICE VISIT (OUTPATIENT)
Dept: INTERNAL MEDICINE | Facility: CLINIC | Age: 42
End: 2017-06-27
Payer: COMMERCIAL

## 2017-06-27 VITALS
HEART RATE: 61 BPM | BODY MASS INDEX: 42.86 KG/M2 | DIASTOLIC BLOOD PRESSURE: 59 MMHG | TEMPERATURE: 96 F | HEIGHT: 65 IN | WEIGHT: 257.25 LBS | OXYGEN SATURATION: 99 % | SYSTOLIC BLOOD PRESSURE: 116 MMHG | RESPIRATION RATE: 16 BRPM

## 2017-06-27 DIAGNOSIS — F43.21 ADJUSTMENT DISORDER WITH DEPRESSED MOOD: Primary | ICD-10-CM

## 2017-06-27 DIAGNOSIS — R35.0 URINARY FREQUENCY: ICD-10-CM

## 2017-06-27 DIAGNOSIS — E66.01 MORBID OBESITY DUE TO EXCESS CALORIES: ICD-10-CM

## 2017-06-27 DIAGNOSIS — E11.9 TYPE 2 DIABETES MELLITUS WITHOUT COMPLICATION, WITHOUT LONG-TERM CURRENT USE OF INSULIN: ICD-10-CM

## 2017-06-27 DIAGNOSIS — E27.8 ADRENAL MASS: ICD-10-CM

## 2017-06-27 DIAGNOSIS — F41.9 ANXIETY: ICD-10-CM

## 2017-06-27 DIAGNOSIS — R44.0 AUDITORY HALLUCINATIONS: ICD-10-CM

## 2017-06-27 DIAGNOSIS — R10.2 PELVIC PAIN IN FEMALE: ICD-10-CM

## 2017-06-27 LAB
BILIRUB SERPL-MCNC: NORMAL MG/DL
BLOOD URINE, POC: NORMAL
COLOR, POC UA: CLEAR
GLUCOSE UR QL STRIP: NORMAL
KETONES UR QL STRIP: NORMAL
LEUKOCYTE ESTERASE URINE, POC: NORMAL
NITRITE, POC UA: NORMAL
PH, POC UA: 7
PROTEIN, POC: NORMAL
SPECIFIC GRAVITY, POC UA: 1
UROBILINOGEN, POC UA: NORMAL

## 2017-06-27 PROCEDURE — 76830 TRANSVAGINAL US NON-OB: CPT | Mod: 26,,, | Performed by: RADIOLOGY

## 2017-06-27 PROCEDURE — 81002 URINALYSIS NONAUTO W/O SCOPE: CPT | Mod: S$GLB,,, | Performed by: NURSE PRACTITIONER

## 2017-06-27 PROCEDURE — 3044F HG A1C LEVEL LT 7.0%: CPT | Mod: S$GLB,,, | Performed by: NURSE PRACTITIONER

## 2017-06-27 PROCEDURE — 76856 US EXAM PELVIC COMPLETE: CPT | Mod: 26,,, | Performed by: RADIOLOGY

## 2017-06-27 PROCEDURE — 99214 OFFICE O/P EST MOD 30 MIN: CPT | Mod: 25,S$GLB,, | Performed by: NURSE PRACTITIONER

## 2017-06-27 PROCEDURE — 99999 PR PBB SHADOW E&M-EST. PATIENT-LVL V: CPT | Mod: PBBFAC,,, | Performed by: NURSE PRACTITIONER

## 2017-06-27 PROCEDURE — 76856 US EXAM PELVIC COMPLETE: CPT | Mod: TC,PO

## 2017-06-27 NOTE — PROGRESS NOTES
"Subjective:       Patient ID: Yuli Milton is a 41 y.o. female.    Chief Complaint: Follow-up; Abdominal Pain (lower 6/10); and Anxiety    Abdominal pain- Lower, had transvag us today and this is being followed by OBGYN post hysterectomy  Anxiety/ adjustment disorder- Recently restarted lexapro. Starting to improve but only been 2 weeks. Still having some issues. No adverse effects from lexapro at this time  DM/ obesity- Has not started trulicity as she was not aware that PA was received. She also stopped metformin as she states it makes her "feel funny" She has not taken steps to improve diet or start exercise      Review of Systems   Constitutional: Negative.    HENT: Negative.    Respiratory: Negative.    Cardiovascular: Negative.    Gastrointestinal: Positive for abdominal pain. Negative for diarrhea and nausea.   Endocrine: Negative.    Genitourinary: Negative.    Musculoskeletal: Negative.    Skin: Negative.    Neurological: Negative.    Psychiatric/Behavioral: Positive for hallucinations (improving) and sleep disturbance. Negative for agitation, dysphoric mood, self-injury and suicidal ideas. The patient is nervous/anxious.        Objective:      Physical Exam   Constitutional: She is oriented to person, place, and time. She appears well-developed. No distress.   obese   HENT:   Head: Normocephalic and atraumatic.   Right Ear: External ear normal.   Left Ear: External ear normal.   Nose: Nose normal.   Mouth/Throat: Oropharynx is clear and moist. No oropharyngeal exudate.   Eyes: Conjunctivae are normal. Pupils are equal, round, and reactive to light.   Cardiovascular: Normal rate, regular rhythm and normal heart sounds.    Pulmonary/Chest: Effort normal. No respiratory distress.   Abdominal: Soft. Bowel sounds are normal. There is tenderness (lower mild).   Musculoskeletal: Normal range of motion. She exhibits no edema or tenderness.   Neurological: She is alert and oriented to person, place, and time. " Coordination normal.   Skin: Skin is warm and dry. No rash noted. She is not diaphoretic.   Psychiatric: Her behavior is normal. Judgment and thought content normal. Her mood appears anxious. She does not exhibit a depressed mood.   Nursing note and vitals reviewed.      Assessment:       1. Adjustment disorder with depressed mood    2. Adrenal mass    3. Anxiety    4. Auditory hallucinations    5. Type 2 diabetes mellitus without complication, without long-term current use of insulin    6. Morbid obesity due to excess calories    7. Urinary frequency        Plan:       *Adjustment disorder with depressed mood/ Anxiety/ Auditory hallucinations  Continue lexapro. Pt not wanting to go back to psych at this point, may push for this in the future    Adrenal mass  Will determine need for referral and further f/u based on CT  -     CT Abdomen With Without Contrast; Future; Expected date: 06/27/2017    Type 2 diabetes mellitus without complication, without long-term current use of insulin/ Morbid obesity due to excess calories  Start trulicity today  Educated on importance of healthy/balanced diet and getting 150 minutes of exercise per week to promote weight loss, reach optimal BMI, improve comorbidities and improve lifestyle.     Urinary frequency  Continue to hydrate with 60-80 oz water daily  -     POCT urine dipstick without microscope    RTC 4 weeks to assess efficacy of lexapro and trulicity   **

## 2017-06-28 ENCOUNTER — TELEPHONE (OUTPATIENT)
Dept: INTERNAL MEDICINE | Facility: CLINIC | Age: 42
End: 2017-06-28

## 2017-06-28 NOTE — TELEPHONE ENCOUNTER
----- Message from Sissy Phelps sent at 6/28/2017 12:51 PM CDT -----  Contact: pt  She's calling to check on the status of a prior authorization for RX Tralixity going to [facility] in [place], please advise,  [phone] (home)

## 2017-06-30 ENCOUNTER — TELEPHONE (OUTPATIENT)
Dept: RADIOLOGY | Facility: HOSPITAL | Age: 42
End: 2017-06-30

## 2017-06-30 ENCOUNTER — TELEPHONE (OUTPATIENT)
Dept: INTERNAL MEDICINE | Facility: CLINIC | Age: 42
End: 2017-06-30

## 2017-06-30 NOTE — TELEPHONE ENCOUNTER
Spoke with Pharm and pt who states copay is $75. Patient unable to pay copay. I printed online coupons to help with copay. Pt will come  Monday to see if they help. If not, she will let us know so that we can look at rx'ing other alternatives.

## 2017-06-30 NOTE — TELEPHONE ENCOUNTER
Can we ensure her [facility] PA was completed? I see that you emailed her it was done a week ago and I discussed this with her on her visit this week but she called yesterday inquiring. Maybe we need to confirm the pharmacy received the PA. Or maybe she is concerned that the copay is still too high even after PA

## 2017-07-03 ENCOUNTER — HOSPITAL ENCOUNTER (OUTPATIENT)
Dept: RADIOLOGY | Facility: HOSPITAL | Age: 42
Discharge: HOME | End: 2017-07-03
Attending: NURSE PRACTITIONER
Payer: MEDICAID

## 2017-07-03 PROCEDURE — 74170 CT ABD WO CNTRST FLWD CNTRST: CPT | Mod: TC,PO

## 2017-07-03 PROCEDURE — 25500020 PHARM REV CODE 255: Mod: PO | Performed by: NURSE PRACTITIONER

## 2017-07-03 PROCEDURE — 74170 CT ABD WO CNTRST FLWD CNTRST: CPT | Mod: 26,,, | Performed by: RADIOLOGY

## 2017-07-03 RX ADMIN — IOHEXOL 15 ML: 350 INJECTION, SOLUTION INTRAVENOUS at 11:07

## 2017-07-03 RX ADMIN — IOHEXOL 100 ML: 350 INJECTION, SOLUTION INTRAVENOUS at 11:07

## 2017-07-05 ENCOUNTER — PATIENT MESSAGE (OUTPATIENT)
Dept: GASTROENTEROLOGY | Facility: CLINIC | Age: 42
End: 2017-07-05

## 2017-07-07 DIAGNOSIS — R10.30 LOWER ABDOMINAL PAIN: Primary | ICD-10-CM

## 2017-07-07 RX ORDER — SODIUM, POTASSIUM,MAG SULFATES 17.5-3.13G
SOLUTION, RECONSTITUTED, ORAL ORAL
Qty: 354 ML | Refills: 0 | Status: ON HOLD | OUTPATIENT
Start: 2017-07-07 | End: 2017-07-31 | Stop reason: CLARIF

## 2017-07-10 ENCOUNTER — OFFICE VISIT (OUTPATIENT)
Dept: OBSTETRICS AND GYNECOLOGY | Facility: CLINIC | Age: 42
End: 2017-07-10
Payer: MEDICAID

## 2017-07-10 ENCOUNTER — PATIENT MESSAGE (OUTPATIENT)
Dept: GASTROENTEROLOGY | Facility: CLINIC | Age: 42
End: 2017-07-10

## 2017-07-10 ENCOUNTER — PATIENT OUTREACH (OUTPATIENT)
Dept: ADMINISTRATIVE | Facility: HOSPITAL | Age: 42
End: 2017-07-10

## 2017-07-10 VITALS
SYSTOLIC BLOOD PRESSURE: 110 MMHG | BODY MASS INDEX: 42.78 KG/M2 | DIASTOLIC BLOOD PRESSURE: 86 MMHG | WEIGHT: 257.06 LBS

## 2017-07-10 DIAGNOSIS — R31.9 HEMATURIA: Primary | ICD-10-CM

## 2017-07-10 DIAGNOSIS — R10.2 PELVIC PAIN IN FEMALE: ICD-10-CM

## 2017-07-10 DIAGNOSIS — N89.8 VAGINAL ODOR: ICD-10-CM

## 2017-07-10 DIAGNOSIS — R35.0 URINARY FREQUENCY: ICD-10-CM

## 2017-07-10 LAB
BILIRUB UR QL STRIP: NEGATIVE
CLARITY UR REFRACT.AUTO: CLEAR
COLOR UR AUTO: ABNORMAL
GLUCOSE UR QL STRIP: NEGATIVE
HGB UR QL STRIP: ABNORMAL
KETONES UR QL STRIP: NEGATIVE
LEUKOCYTE ESTERASE UR QL STRIP: NEGATIVE
MICROSCOPIC COMMENT: NORMAL
NITRITE UR QL STRIP: NEGATIVE
PH UR STRIP: 6 [PH] (ref 5–8)
PROT UR QL STRIP: NEGATIVE
RBC #/AREA URNS AUTO: 2 /HPF (ref 0–4)
SP GR UR STRIP: 1.01 (ref 1–1.03)
SQUAMOUS #/AREA URNS AUTO: 0 /HPF
URN SPEC COLLECT METH UR: ABNORMAL
UROBILINOGEN UR STRIP-ACNC: NEGATIVE EU/DL
WBC #/AREA URNS AUTO: 0 /HPF (ref 0–5)

## 2017-07-10 PROCEDURE — 87086 URINE CULTURE/COLONY COUNT: CPT

## 2017-07-10 PROCEDURE — 81001 URINALYSIS AUTO W/SCOPE: CPT

## 2017-07-10 PROCEDURE — 99999 PR PBB SHADOW E&M-EST. PATIENT-LVL III: CPT | Mod: PBBFAC,,, | Performed by: OBSTETRICS & GYNECOLOGY

## 2017-07-10 PROCEDURE — 99213 OFFICE O/P EST LOW 20 MIN: CPT | Mod: S$PBB,,, | Performed by: OBSTETRICS & GYNECOLOGY

## 2017-07-10 PROCEDURE — 99213 OFFICE O/P EST LOW 20 MIN: CPT | Mod: PBBFAC | Performed by: OBSTETRICS & GYNECOLOGY

## 2017-07-10 PROCEDURE — 87070 CULTURE OTHR SPECIMN AEROBIC: CPT

## 2017-07-10 PROCEDURE — 87210 SMEAR WET MOUNT SALINE/INK: CPT | Mod: PBBFAC | Performed by: OBSTETRICS & GYNECOLOGY

## 2017-07-10 RX ORDER — LOTEPREDNOL ETABONATE 5 MG/G
1 GEL OPHTHALMIC
Refills: 0 | COMMUNITY
Start: 2017-06-28 | End: 2018-02-07 | Stop reason: ALTCHOICE

## 2017-07-11 ENCOUNTER — CLINICAL SUPPORT (OUTPATIENT)
Dept: CARDIOLOGY | Facility: CLINIC | Age: 42
End: 2017-07-11
Payer: MEDICAID

## 2017-07-11 ENCOUNTER — TELEPHONE (OUTPATIENT)
Dept: GASTROENTEROLOGY | Facility: CLINIC | Age: 42
End: 2017-07-11

## 2017-07-11 DIAGNOSIS — R07.89 CHEST PAIN, ATYPICAL: ICD-10-CM

## 2017-07-11 DIAGNOSIS — R06.09 DOE (DYSPNEA ON EXERTION): ICD-10-CM

## 2017-07-11 LAB
DIASTOLIC DYSFUNCTION: NO
DIASTOLIC DYSFUNCTION: NO
ESTIMATED PA SYSTOLIC PRESSURE: 31.73
RETIRED EF AND QEF - SEE NOTES: 60 (ref 55–65)
TRICUSPID VALVE REGURGITATION: NORMAL

## 2017-07-11 PROCEDURE — 93016 CV STRESS TEST SUPVJ ONLY: CPT | Mod: S$PBB,,, | Performed by: INTERNAL MEDICINE

## 2017-07-11 PROCEDURE — 93018 CV STRESS TEST I&R ONLY: CPT | Mod: S$PBB,,, | Performed by: INTERNAL MEDICINE

## 2017-07-11 PROCEDURE — 93306 TTE W/DOPPLER COMPLETE: CPT | Mod: PBBFAC,PO | Performed by: NUCLEAR MEDICINE

## 2017-07-11 PROCEDURE — 93017 CV STRESS TEST TRACING ONLY: CPT | Mod: PBBFAC,PO | Performed by: INTERNAL MEDICINE

## 2017-07-11 NOTE — PROGRESS NOTES
Subjective:       Patient ID: Yuli Milton is a 41 y.o. female.    Chief Complaint:  Follow-up and Gynecologic Exam (vaginal odor)      History of Present Illness  HPI  presents c/o lower abdominal pain, vaginal odor but no discharge. had u/s done  showing only 3.8 cm simple cyst in right ovary. pt has h/o RALH/BS with re-admit for IV antibiotics secondary to pelvic fluid collection/abscess. normal appetite. pain mild today. Also reports urinary frequency    GYN & OB History  Patient's last menstrual period was 2016.   Date of Last Pap: 2016    OB History    Para Term  AB Living   3 3 2 1   3   SAB TAB Ectopic Multiple Live Births         1 4      # Outcome Date GA Lbr Christopher/2nd Weight Sex Delivery Anes PTL Lv   3 Term      Vag-Spont EPI  MAX   2 Term      Vag-Spont EPI  MAX   1A      M Vag-Spont EPI  MAX   1B      F Vag-Spont EPI  ND          Review of Systems  Review of Systems   All other systems reviewed and are negative.          Objective:    Physical Exam:   Constitutional: She is oriented to person, place, and time. She appears well-developed and well-nourished.        Pulmonary/Chest: Effort normal.          Genitourinary:   Genitourinary Comments: Normal external genitalia/cuff, normal bimanual exam.  Wet prep negative               Neurological: She is alert and oriented to person, place, and time.    Skin: Skin is warm and dry.    Psychiatric: She has a normal mood and affect. Her behavior is normal.        wet prep negative; urine dip some blood    Assessment:        1. Hematuria    2. Urinary frequency    3. Vaginal odor    4. Pelvic pain in female      Plan:      1. Urinalysis/culture  2. Genital culture  3. Pt scheduling colonoscopy   4. Diff dx pelvic discussed

## 2017-07-11 NOTE — PROGRESS NOTES
Spoke with patient in attempt  to schedule colonoscopy. Patient states that she will have to call back once she gets her work schedule. Scheduling contact information was given to patient. Pt voiced understanding of what was told to her.

## 2017-07-11 NOTE — TELEPHONE ENCOUNTER
Patient was given results from EGD (see result note).  She would like to know if more medications will be prescribed for reactive gastritis because what she is taking is not working (pantoprazole, carafate,pll form, and ranitidine).  Please advise.

## 2017-07-11 NOTE — TELEPHONE ENCOUNTER
----- Message from Khadra Elliott sent at 7/11/2017  9:22 AM CDT -----  Contact: [phone]  Pt is requesting to speak with the nurse in regards to her test results. Please advise [phone] (home)

## 2017-07-12 ENCOUNTER — PATIENT MESSAGE (OUTPATIENT)
Dept: OBSTETRICS AND GYNECOLOGY | Facility: HOSPITAL | Age: 42
End: 2017-07-12

## 2017-07-12 LAB — BACTERIA UR CULT: NORMAL

## 2017-07-12 RX ORDER — POLYETHYLENE GLYCOL 3350, SODIUM SULFATE ANHYDROUS, SODIUM BICARBONATE, SODIUM CHLORIDE, POTASSIUM CHLORIDE 236; 22.74; 6.74; 5.86; 2.97 G/4L; G/4L; G/4L; G/4L; G/4L
4000 POWDER, FOR SOLUTION ORAL ONCE
Qty: 4000 ML | Refills: 0 | Status: SHIPPED | OUTPATIENT
Start: 2017-07-12 | End: 2017-07-12

## 2017-07-12 NOTE — TELEPHONE ENCOUNTER
Patient notified per provider that findings on the EGD and pathology report is consistent with mild gastritis. Pantoprazole twice daily, Zantac twice daily, and Carafate four times a day is the max dose of medications. There are really no additional medication. Patient states that she is taking the medication and they are not doing any good. Explained to patient the there is a dosage adjustment that the provider would like her to try. Patient instructed to proceed with the colonoscopy. Patient voiced understanding of what was told to her.

## 2017-07-14 ENCOUNTER — TELEPHONE (OUTPATIENT)
Dept: GASTROENTEROLOGY | Facility: CLINIC | Age: 42
End: 2017-07-14

## 2017-07-14 LAB — BACTERIA GENITAL AEROBE CULT: NORMAL

## 2017-07-14 NOTE — TELEPHONE ENCOUNTER
Spoke with patient. Informed her that another prescription that your insurance should cover has been sent in to your pharmacy. Patient voiced understanding.

## 2017-07-14 NOTE — TELEPHONE ENCOUNTER
----- Message from Maya Shipman sent at 7/12/2017  3:35 PM CDT -----  Contact: Patient   Patient stated that a prior authorization is needed Rx for she is not sure which once she said two was called in for her, Please call her at [phone].      [facility] [id number] - [place] - [address] AT [facility] [id number] [place] [address]  Phone: [phone] Fax: [phone]    Thanks  td

## 2017-07-25 ENCOUNTER — OFFICE VISIT (OUTPATIENT)
Dept: INTERNAL MEDICINE | Facility: CLINIC | Age: 42
End: 2017-07-25
Payer: MEDICAID

## 2017-07-25 ENCOUNTER — TELEPHONE (OUTPATIENT)
Dept: INTERNAL MEDICINE | Facility: CLINIC | Age: 42
End: 2017-07-25

## 2017-07-25 VITALS
WEIGHT: 254.19 LBS | BODY MASS INDEX: 42.35 KG/M2 | HEART RATE: 72 BPM | SYSTOLIC BLOOD PRESSURE: 154 MMHG | RESPIRATION RATE: 18 BRPM | OXYGEN SATURATION: 98 % | TEMPERATURE: 96 F | HEIGHT: 65 IN | DIASTOLIC BLOOD PRESSURE: 67 MMHG

## 2017-07-25 DIAGNOSIS — K29.70 GASTRITIS, PRESENCE OF BLEEDING UNSPECIFIED, UNSPECIFIED CHRONICITY, UNSPECIFIED GASTRITIS TYPE: ICD-10-CM

## 2017-07-25 DIAGNOSIS — E66.01 MORBID OBESITY DUE TO EXCESS CALORIES: ICD-10-CM

## 2017-07-25 DIAGNOSIS — R44.0 AUDITORY HALLUCINATION: Primary | ICD-10-CM

## 2017-07-25 DIAGNOSIS — L97.309 DIABETIC ULCER OF ANKLE: ICD-10-CM

## 2017-07-25 DIAGNOSIS — E11.622 DIABETIC ULCER OF ANKLE: ICD-10-CM

## 2017-07-25 DIAGNOSIS — E11.9 TYPE 2 DIABETES MELLITUS WITHOUT COMPLICATION, WITHOUT LONG-TERM CURRENT USE OF INSULIN: ICD-10-CM

## 2017-07-25 PROCEDURE — 99214 OFFICE O/P EST MOD 30 MIN: CPT | Mod: S$PBB,,, | Performed by: FAMILY MEDICINE

## 2017-07-25 PROCEDURE — 99999 PR PBB SHADOW E&M-EST. PATIENT-LVL III: CPT | Mod: PBBFAC,,, | Performed by: FAMILY MEDICINE

## 2017-07-25 PROCEDURE — 3044F HG A1C LEVEL LT 7.0%: CPT | Mod: ,,, | Performed by: FAMILY MEDICINE

## 2017-07-25 PROCEDURE — 99213 OFFICE O/P EST LOW 20 MIN: CPT | Mod: PBBFAC,PO | Performed by: FAMILY MEDICINE

## 2017-07-25 RX ORDER — SULFAMETHOXAZOLE AND TRIMETHOPRIM 800; 160 MG/1; MG/1
1 TABLET ORAL 2 TIMES DAILY
Qty: 10 TABLET | Refills: 0 | Status: SHIPPED | OUTPATIENT
Start: 2017-07-25 | End: 2017-07-31

## 2017-07-25 NOTE — MEDICAL/APP STUDENT
[facility] Internal Medicine  History & Physical      SUBJECTIVE:     Chief Complaint/Reason for Admission: fu Abdominal pain, DM, auditory hallucinations, and fatigue    History of Present Illness:  Patient is a 41 y.o. female with a history of DM, anxiety and depression who presented with multiple complaints.    Abdominal pain: Pain is burning in quality and midline. Formerly worsened with meals, but has progressed to chronic. Has tried zantac and pantoprazole without relief.    DM: Patient has not been checking fasting glucose levels. No hypoglycemic episodes. No adverse effects from recent switch to Trulicity. Patient saw optometrist yesterday. Some complaints of intermittent mild paresthesias in toes and fingers. Two week old dog scratch on R foot that is still present, though no pus or other signs of infection at present.    Fatigue: Ms. Milton has noted generalized fatigue without muscle weakness. Sleep is roughly 6 hours per night, but she reports feeling fatigued in mornings with occasional waking headache and waking up from sleep with a choking sensation.    Auditory hallucinations: Have not improved since starting lexapro. Anxiety still present. Family history of depression and psychiatric hospital admission.    Review of patient's allergies indicates:  No Known Allergies    Past Medical History:   Diagnosis Date    Abnormal Pap smear of cervix     treatment??    Abnormal Pap smear of vagina     Anemia     Anxiety and depression     Asthma     Chlamydia     Depression (emotion)     Diabetes mellitus     Gallstones     Gastritis     History of ovarian cyst     History of syphilis     History of trichomoniasis     Mental disorder     PONV (postoperative nausea and vomiting)      Past Surgical History:   Procedure Laterality Date    APPENDECTOMY      DILATION AND CURETTAGE OF UTERUS      bleeding    HYSTERECTOMY  08/31/2016    BS     Family History   Problem Relation Age of Onset    Breast  cancer Paternal Grandmother     Diabetes Maternal Grandmother     Hypertension Mother     Breast cancer Maternal Aunt     Cancer Maternal Aunt      colon cancer    Colon cancer Maternal Aunt     Miscarriages / Stillbirths Cousin     Stroke Maternal Aunt     Ovarian cancer Neg Hx     Deep vein thrombosis Neg Hx     Pulmonary embolism Neg Hx      Social History   Substance Use Topics    Smoking status: Never Smoker    Smokeless tobacco: Never Used    Alcohol use No        Review of Systems:  Constitutional- No recent weight change. Fatigue present. No fever.  Eyes- No vision changes.  ENT- No hearing changes. No runny nose or epistaxis. Sore throat present.   CV- No chest pain. No edema.  Resp- No SOB.  GI- Abdominal pain present. Reduced appetite. Nausea present but no vomiting.  MSK- No chronic pain. No recent trauma.  Neuro- Headache present. Numbness in fingers and toes present.  Endocrine- No hot or cold intolerance.  Psych- Anxiety present along with auditory hallucinations.    OBJECTIVE:     Vital Signs (Most Recent):  Temp: 96 °F (35.6 °C) (07/25/17 0901)  Pulse: 72 (07/25/17 0901)  Resp: 18 (07/25/17 0901)  BP: (!) 154/67 (07/25/17 0901)  SpO2: 98 % (07/25/17 0901)    Physical Exam:  General- Patient alert and oriented x3 in NAD  Extrem- R ankle - Ulcer present. No erythema, warmth, but cracked with secretion of serous fluid. No cyanosis, clubbing, edema.   Neuro- Intact sensation to monofilament in feet bilaterally.    Laboratory Data:  Hemoglobin A1C   Date Value Ref Range Status   05/11/2017 6.4 (H) 4.5 - 6.2 % Final     Comment:     According to ADA guidelines, hemoglobin A1C <7.0% represents  optimal control in non-pregnant diabetic patients.  Different  metrics may apply to specific populations.   Standards of Medical Care in Diabetes - 2016.  For the purpose of screening for the presence of diabetes:  <5.7%     Consistent with the absence of diabetes  5.7-6.4%  Consistent with increasing  risk for diabetes   (prediabetes)  >or=6.5%  Consistent with diabetes  Currently no consensus exists for use of hemoglobin A1C  for diagnosis of diabetes for children.     02/17/2017 6.4 (H) 4.5 - 6.2 % Final     Comment:     According to ADA guidelines, hemoglobin A1C <7.0% represents  optimal control in non-pregnant diabetic patients.  Different  metrics may apply to specific populations.   Standards of Medical Care in Diabetes - 2016.  For the purpose of screening for the presence of diabetes:  <5.7%     Consistent with the absence of diabetes  5.7-6.4%  Consistent with increasing risk for diabetes   (prediabetes)  >or=6.5%  Consistent with diabetes  Currently no consensus exists for use of hemoglobin A1C  for diagnosis of diabetes for children.     09/01/2016 6.6 (H) 4.5 - 6.2 % Final     Comment:     According to ADA guidelines, hemoglobin A1C <7.0% represents  optimal control in non-pregnant diabetic patients.  Different  metrics may apply to specific populations.   Standards of Medical Care in Diabetes - 2016.  For the purpose of screening for the presence of diabetes:  <5.7%     Consistent with the absence of diabetes  5.7-6.4%  Consistent with increasing risk for diabetes   (prediabetes)  >or=6.5%  Consistent with diabetes  Currently no consensus exists for use of hemoglobin A1C  for diagnosis of diabetes for children.           ASSESSMENT/PLAN:     Ms. Yuli Milton is a 41 y.o. who is generally well, but here for management of multiple medical conditions.    #Well controlled type 2 diabetes mellitus: Continue trulicity. One week follow up for observation of ulcer on R ankle.    #Abdominal pain: Failure of medical therapy for GERD. Endoscopy and colonoscopy    #Fatigue: Concern for Sleep Apnea, referral for sleep study.    #Auditory hallucinations: Psychiatry consult for diagnosis and optimization of management.    Follow up: 1 week  Tevin PEREZ-[facility] Student

## 2017-07-25 NOTE — TELEPHONE ENCOUNTER
----- Message from Loan Tate sent at 7/25/2017  4:00 PM CDT -----  States she needs a prior authorization. Pt uses.  [facility] [address] [place] [address] [place] [id number] [facility] [id number] [address]  Phone: [phone] Fax: [phone]    Please call back at [phone]. thanks

## 2017-07-25 NOTE — PROGRESS NOTES
Subjective:       Patient ID: Yuli Milton is a 41 y.o. female.    Chief Complaint: Follow-up; Diabetes; and Abdominal Pain    HPI  Here today to follow-up on numerous issues.  She has been taking trulicity on a weekly basis now.  We will switch to this in June and efforts to help her lose weight.  Previously she was taking metformin for diabetes and her last hemoglobin A1c was well controlled below 7.  She has not lost any weight despite changing her eating habits somewhat.  She is not exercising.  She is concerned that maybe the Medicaid which she is now going to be on exclusively will not continue to cover this medication.  Also continues to have abdominal pain both epigastric and lower abdominal pain.  She had EGD in the past that showed gastritis and she has been taking pantoprazole once a day as well as intermittent Zantac and Carafate.  She has been seen by gastroenterology already and is currently trying to get a colonoscopy covered.  She has awoken at times with a sour taste in her mouth and also has felt a bit short of breath and had a choking sensation.  Reports that her voice is more hoarse than normal.  Has been back on the Lexapro now for about a month and she continues to have some anxiety symptoms and reports auditory hallucinations.  She is not having any command hallucinations that are telling her to harm herself.  She does not have any paranoid type of symptoms.  No known history of schizophrenia.  Has not seen psychiatry in quite some time.  Previously it was difficult for her to travel back and forth to [place]    Family History   Problem Relation Age of Onset    Breast cancer Paternal Grandmother     Diabetes Maternal Grandmother     Hypertension Mother     Breast cancer Maternal Aunt     Cancer Maternal Aunt      colon cancer    Colon cancer Maternal Aunt     Miscarriages / Stillbirths Cousin     Stroke Maternal Aunt     Ovarian cancer Neg Hx     Deep vein thrombosis Neg Hx  "    Pulmonary embolism Neg Hx        Current Outpatient Prescriptions:     ALBUTEROL INHL, Inhale 2 puffs into the lungs continuous prn., Disp: , Rfl:     blood sugar diagnostic Strp, 1 each by Other route 3 (three) times daily., Disp: 100 each, Rfl: 11    blood-glucose meter Misc, Dispense 1, Disp: 1 each, Rfl: 0    dulaglutide 0.75 mg/0.5 mL PnIj, Inject 0.5 mLs (0.75 mg total) into the skin every 7 days., Disp: 6 mL, Rfl: 3    escitalopram oxalate (LEXAPRO) 20 MG tablet, Take 1 tablet (20 mg total) by mouth once daily., Disp: 30 tablet, Rfl: 5    ibuprofen (ADVIL,MOTRIN) 800 MG tablet, Take 1 tablet (800 mg total) by mouth 3 (three) times daily., Disp: 90 tablet, Rfl: 0    lancets [facility], 1 each by Other route 3 (three) times daily., Disp: 100 each, Rfl: 0    levalbuterol (XOPENEX) 0.63 mg/3 mL nebulizer solution, Take 1 ampule by nebulization every 4 (four) hours as needed for Wheezing., Disp: , Rfl:     linaclotide (LINZESS) 145 mcg Cap capsule, Take 1 capsule (145 mcg total) by mouth once daily., Disp: 30 capsule, Rfl: 5    LOTEMAX 0.5 % DrpG, Place 1 drop into both eyes., Disp: , Rfl: 0    pantoprazole (PROTONIX) 40 MG tablet, Take 1 tablet (40 mg total) by mouth once daily., Disp: 30 tablet, Rfl: 5    pen needle, diabetic (COMFORT EZ PEN NEEDLES) 33 gauge x 1/4" Ndle, 1 Units by Misc.(Non-Drug; Combo Route) route every evening., Disp: 100 each, Rfl: 5    sulfamethoxazole-trimethoprim 800-160mg (BACTRIM DS) 800-160 mg Tab, Take 1 tablet by mouth 2 (two) times daily., Disp: 10 tablet, Rfl: 0    SUPREP BOWEL PREP KIT 17.5-3.13-1.6 gram SolR, Use as directed, Disp: 354 mL, Rfl: 0    Review of Systems   Constitutional: Negative for activity change and unexpected weight change.   HENT: Positive for voice change. Negative for hearing loss, rhinorrhea and trouble swallowing.    Eyes: Negative for discharge and visual disturbance.   Respiratory: Negative for chest tightness and wheezing.  " "  Cardiovascular: Negative for chest pain and palpitations.   Gastrointestinal: Positive for abdominal pain. Negative for blood in stool, constipation, diarrhea and vomiting.        Reflux   Endocrine: Negative for polydipsia and polyuria.   Genitourinary: Negative for difficulty urinating, dysuria, hematuria and menstrual problem.   Musculoskeletal: Positive for arthralgias. Negative for joint swelling and neck pain.   Skin: Positive for wound.   Neurological: Negative for weakness and headaches.   Psychiatric/Behavioral: Positive for dysphoric mood, hallucinations and sleep disturbance. Negative for confusion and suicidal ideas. The patient is nervous/anxious.        Objective:   BP (!) 154/67 (BP Location: Left arm, Patient Position: Sitting, BP Method: Automatic)   Pulse 72   Temp 96 °F (35.6 °C) (Tympanic)   Resp 18   Ht 5' 5" (1.651 m)   Wt 115.3 kg (254 lb 3.1 oz)   LMP 08/21/2016   SpO2 98%   BMI 42.30 kg/m²      Physical Exam   Constitutional: She is oriented to person, place, and time. She appears well-developed and well-nourished.   HENT:   Head: Normocephalic and atraumatic.   Voice notably hoarse   Eyes: Conjunctivae are normal.   Cardiovascular: Normal rate.    Pulmonary/Chest: Effort normal. No respiratory distress.   Musculoskeletal: She exhibits no edema.        Feet:    Neurological: She is alert and oriented to person, place, and time. Coordination normal.   Skin: Skin is warm and dry. No rash noted.   Psychiatric: She has a normal mood and affect. Her behavior is normal.   Vitals reviewed.      Assessment & Plan     1. Auditory hallucination  Need to refer to psychiatry for assistance with diagnosis and the proper treatment.  She does not have any acute psychotic symptoms nor is she on to herself at this time.  I advised her to continue taking the Lexapro for now  - Ambulatory Referral to Psychiatry    2. Type 2 diabetes mellitus without complication, without long-term current use of " insulin  Continue on Trulicity for now.  If her new insurance does not continue to cover that have to switch back to metformin.  I emphasized the importance of needing to exercise.    3. Morbid obesity due to excess calories  Counseled on importance on diet and exercise to decrease risk of comorbid conditions and for improved quality of life.    4. Gastritis, presence of bleeding unspecified, unspecified chronicity, unspecified gastritis type  Emphasized that she should try to take the pantoprazole twice daily for at least the next couple weeks.  Continue follow-up with gastroenterology.    5. Diabetic ulcer of ankle  Due to the fact that she has had this for several days now and it is not completely resolved and has some purulence I'm treating with Bactrim and having her follow up in one week

## 2017-07-25 NOTE — TELEPHONE ENCOUNTER
Pt call returned, informed pt will retrieve prior auth from pharmacy. Pt informed 24-48  To process form once completed and submitted. Pt voiced understanding

## 2017-07-31 ENCOUNTER — ANESTHESIA EVENT (OUTPATIENT)
Dept: ENDOSCOPY | Facility: HOSPITAL | Age: 42
End: 2017-07-31
Payer: MEDICAID

## 2017-07-31 ENCOUNTER — ANESTHESIA (OUTPATIENT)
Dept: ENDOSCOPY | Facility: HOSPITAL | Age: 42
End: 2017-07-31
Payer: MEDICAID

## 2017-07-31 ENCOUNTER — SURGERY (OUTPATIENT)
Age: 42
End: 2017-07-31

## 2017-07-31 ENCOUNTER — HOSPITAL ENCOUNTER (OUTPATIENT)
Facility: HOSPITAL | Age: 42
Discharge: HOME | End: 2017-07-31
Attending: INTERNAL MEDICINE | Admitting: INTERNAL MEDICINE
Payer: MEDICAID

## 2017-07-31 VITALS
OXYGEN SATURATION: 98 % | TEMPERATURE: 98 F | HEIGHT: 65 IN | BODY MASS INDEX: 41.48 KG/M2 | DIASTOLIC BLOOD PRESSURE: 74 MMHG | SYSTOLIC BLOOD PRESSURE: 121 MMHG | WEIGHT: 249 LBS | RESPIRATION RATE: 16 BRPM | HEART RATE: 55 BPM

## 2017-07-31 DIAGNOSIS — R10.30 LOWER ABDOMINAL PAIN: Primary | ICD-10-CM

## 2017-07-31 LAB — POCT GLUCOSE: 74 MG/DL (ref 70–110)

## 2017-07-31 PROCEDURE — 45378 DIAGNOSTIC COLONOSCOPY: CPT | Mod: ,,, | Performed by: INTERNAL MEDICINE

## 2017-07-31 PROCEDURE — 45378 DIAGNOSTIC COLONOSCOPY: CPT | Performed by: INTERNAL MEDICINE

## 2017-07-31 PROCEDURE — 25000003 PHARM REV CODE 250: Performed by: INTERNAL MEDICINE

## 2017-07-31 PROCEDURE — 63600175 PHARM REV CODE 636 W HCPCS: Performed by: NURSE ANESTHETIST, CERTIFIED REGISTERED

## 2017-07-31 PROCEDURE — 37000009 HC ANESTHESIA EA ADD 15 MINS: Performed by: INTERNAL MEDICINE

## 2017-07-31 PROCEDURE — 25000003 PHARM REV CODE 250: Performed by: NURSE ANESTHETIST, CERTIFIED REGISTERED

## 2017-07-31 PROCEDURE — 37000008 HC ANESTHESIA 1ST 15 MINUTES: Performed by: INTERNAL MEDICINE

## 2017-07-31 RX ORDER — PROPOFOL 10 MG/ML
VIAL (ML) INTRAVENOUS
Status: DISCONTINUED | OUTPATIENT
Start: 2017-07-31 | End: 2017-07-31

## 2017-07-31 RX ORDER — LIDOCAINE HYDROCHLORIDE 10 MG/ML
INJECTION INFILTRATION; PERINEURAL
Status: DISCONTINUED | OUTPATIENT
Start: 2017-07-31 | End: 2017-07-31

## 2017-07-31 RX ORDER — SODIUM CHLORIDE, SODIUM LACTATE, POTASSIUM CHLORIDE, CALCIUM CHLORIDE 600; 310; 30; 20 MG/100ML; MG/100ML; MG/100ML; MG/100ML
INJECTION, SOLUTION INTRAVENOUS CONTINUOUS
Status: DISCONTINUED | OUTPATIENT
Start: 2017-07-31 | End: 2017-07-31 | Stop reason: HOSPADM

## 2017-07-31 RX ADMIN — SODIUM CHLORIDE, SODIUM LACTATE, POTASSIUM CHLORIDE, AND CALCIUM CHLORIDE: .6; .31; .03; .02 INJECTION, SOLUTION INTRAVENOUS at 02:07

## 2017-07-31 RX ADMIN — PROPOFOL 50 MG: 10 INJECTION, EMULSION INTRAVENOUS at 03:07

## 2017-07-31 RX ADMIN — LIDOCAINE HYDROCHLORIDE 40 MG: 10 INJECTION, SOLUTION INFILTRATION; PERINEURAL at 03:07

## 2017-07-31 RX ADMIN — PROPOFOL 100 MG: 10 INJECTION, EMULSION INTRAVENOUS at 03:07

## 2017-07-31 NOTE — ANESTHESIA PREPROCEDURE EVALUATION
07/31/2017  Yuli Milton is a 41 y.o., female.    Anesthesia Evaluation    I have reviewed the Patient Summary Reports.    I have reviewed the Nursing Notes.   I have reviewed the Medications.     Review of Systems  Anesthesia Hx:  Hx of Anesthetic complications PONV Denies Family Hx of Anesthesia complications.  Personal Hx of Anesthesia complications, Post-Operative Nausea/Vomiting   Social:  Non-Smoker    Cardiovascular:  Cardiovascular Normal     Pulmonary:   Asthma moderate    Renal/:  Renal/ Normal     Hepatic/GI:   GERD    Musculoskeletal:  Musculoskeletal Normal    Neurological:  Neurology Normal    Endocrine:   Diabetes, type 2    Dermatological:  Skin Normal    Psych:   anxiety depression          Physical Exam  General:  Obesity    Airway/Jaw/Neck:  Airway Findings: Mouth Opening: Normal Tongue: Normal  General Airway Assessment: Adult  Mallampati: II  TM Distance: Normal, at least 6 cm  Jaw/Neck Findings:  Neck ROM: Normal ROM      Dental:  Dental Findings: In tact   Chest/Lungs:  Chest/Lungs Findings: Clear to auscultation, Normal Respiratory Rate     Heart/Vascular:  Heart Findings: Rate: Normal  Rhythm: Regular Rhythm             Anesthesia Plan  Type of Anesthesia, risks & benefits discussed:  Anesthesia Type:  MAC  Patient's Preference:   Intra-op Monitoring Plan:   Intra-op Monitoring Plan Comments:   Post Op Pain Control Plan:   Post Op Pain Control Plan Comments:   Induction:   IV  Beta Blocker:  Patient is not currently on a Beta-Blocker (No further documentation required).       Informed Consent: Patient understands risks and agrees with Anesthesia plan.  Questions answered. Anesthesia consent signed with patient.  ASA Score: 2     Day of Surgery Review of History & Physical: I have interviewed and examined the patient. I have reviewed the patient's H&P dated:  There are no  significant changes.          Ready For Surgery From Anesthesia Perspective.

## 2017-07-31 NOTE — ANESTHESIA POSTPROCEDURE EVALUATION
"Anesthesia Post Evaluation    Patient: Yuli Milton    Procedure(s) Performed: Procedure(s) (LRB):  COLONOSCOPY (N/A)    Final Anesthesia Type: MAC  Patient location during evaluation: GI PACU  Patient participation: Yes- Able to Participate  Level of consciousness: awake and alert  Post-procedure vital signs: reviewed and stable  Pain management: adequate  Airway patency: patent  PONV status at discharge: No PONV  Anesthetic complications: no      Cardiovascular status: blood pressure returned to baseline  Respiratory status: unassisted and spontaneous ventilation  Hydration status: euvolemic  Follow-up not needed.        Visit Vitals  /75   Pulse 66   Temp 36.4 °C (97.6 °F) (Oral)   Resp 14   Ht 5' 5" (1.651 m)   Wt 112.9 kg (249 lb)   LMP 08/21/2016   SpO2 100%   Breastfeeding? No   BMI 41.44 kg/m²       Pain/Rosamaria Score: Pain Assessment Performed: Yes (7/31/2017  2:26 PM)  Presence of Pain: denies (7/31/2017  2:26 PM)      "

## 2017-07-31 NOTE — TRANSFER OF CARE
"Anesthesia Transfer of Care Note    Patient: Yuli Milton    Procedure(s) Performed: Procedure(s) (LRB):  COLONOSCOPY (N/A)    Patient location: GI    Anesthesia Type: MAC    Transport from OR: Transported from OR on room air with adequate spontaneous ventilation    Post pain: adequate analgesia    Post assessment: no apparent anesthetic complications    Post vital signs: stable    Level of consciousness: awake, alert and oriented    Nausea/Vomiting: no nausea/vomiting    Complications: none    Transfer of care protocol was followed      Last vitals:   Visit Vitals  /75   Pulse 66   Temp 36.4 °C (97.6 °F) (Oral)   Resp 14   Ht 5' 5" (1.651 m)   Wt 112.9 kg (249 lb)   LMP 08/21/2016   SpO2 100%   Breastfeeding? No   BMI 41.44 kg/m²     "

## 2017-07-31 NOTE — DISCHARGE INSTRUCTIONS
Colonoscopy     A camera attached to a flexible tube with a viewing lens is used to take video pictures.     Colonoscopy is a test to view the inside of your lower digestive tract (colon and rectum). Sometimes it can show the last part of the small intestine (ileum). During the test, small pieces of tissue may be removed for testing. This is called a biopsy. Small growths, such as polyps, may also be removed.   Why is colonoscopy done?  The test is done to help look for colon cancer. And it can help find the source of abdominal pain, bleeding, and changes in bowel habits. It may be needed once a year, depending on factors such as your:  · Age  · Health history  · Family health history  · Symptoms  · Results from any prior colonoscopy  Risks and possible complications  These include:  · Bleeding               · A puncture or tear in the colon   · Risks of anesthesia  · A cancer lesion not being seen  Getting ready   To prepare for the test:  · Talk with your healthcare provider about the risks of the test (see below). Also ask your healthcare provider about alternatives to the test.  · Tell your healthcare provider about any medicines you take. Also tell him or her about any health conditions you may have.  · Make sure your rectum and colon are empty for the test. Follow the diet and bowel prep instructions exactly. If you dont, the test may need to be rescheduled.  · Plan for a friend or family member to drive you home after the test.     Colonoscopy provides an inside view of the entire colon.     You may discuss the results with your doctor right away or at a future visit.  During the test   The test is usually done in the hospital on an outpatient basis. This means you go home the same day. The procedure takes about 30 minutes. During that time:  · You are given relaxing (sedating) medicine through an IV line. You may be drowsy, or fully asleep.  · The healthcare provider will first give you a physical exam to  check for anal and rectal problems.  · Then the anus is lubricated and the scope inserted.  · If you are awake, you may have a feeling similar to needing to have a bowel movement. You may also feel pressure as air is pumped into the colon. Its OK to pass gas during the procedure.  · Biopsy, polyp removal, or other treatments may be done during the test.  After the test   You may have gas right after the test. It can help to try to pass it to help prevent later bloating. Your healthcare provider may discuss the results with you right away. Or you may need to schedule a follow-up visit to talk about the results. After the test, you can go back to your normal eating and other activities. You may be tired from the sedation and need to rest for a few hours.  Date Last Reviewed: 11/1/2016  © 8870-7125 [employer]. [address] [place] [address], [place] [address]. All rights reserved. This information is not intended as a substitute for professional medical care. Always follow your healthcare professional's instructions.

## 2017-07-31 NOTE — ANESTHESIA RELEASE NOTE
"Anesthesia Release from PACU Note    Patient: Yuli Milton    Procedure(s) Performed: Procedure(s) (LRB):  COLONOSCOPY (N/A)    Anesthesia type: MAC    Post pain: Adequate analgesia    Post assessment: no apparent anesthetic complications, tolerated procedure well and no evidence of recall    Last Vitals:   Visit Vitals  /75   Pulse 66   Temp 36.4 °C (97.6 °F) (Oral)   Resp 14   Ht 5' 5" (1.651 m)   Wt 112.9 kg (249 lb)   LMP 08/21/2016   SpO2 100%   Breastfeeding? No   BMI 41.44 kg/m²       Post vital signs: stable    Level of consciousness: awake and alert     Nausea/Vomiting: no nausea/no vomiting    Complications: none    Airway Patency: patent    Respiratory: unassisted, spontaneous ventilation    Cardiovascular: stable and blood pressure at baseline    Hydration: euvolemic  "

## 2017-07-31 NOTE — PROGRESS NOTES
Short Stay Endoscopy History and Physical    PCP - Scottie Buitrago, DO    Procedure - Colonoscopy    H/o lower abdominal pain. No acute issues.    ROS:  Constitutional: No fevers, chills, No weight loss  ENT: No allergies  CV: No chest pain  Pulm: No cough, No shortness of breath  Ophtho: No vision changes  GI: see HPI  Derm: No rash  Heme: No lymphadenopathy, No bruising  MSK: No arthritis  : No dysuria, No hematuria  Endo: No hot or cold intolerance  Neuro: No syncope, No seizure  Psych: No anxiety, No depression    Medical History:  has a past medical history of Abnormal Pap smear of cervix; Abnormal Pap smear of vagina; Anemia; Anxiety and depression; Asthma; Chlamydia; Depression (emotion); Diabetes mellitus; Gallstones; Gastritis; History of ovarian cyst; History of syphilis; History of trichomoniasis; Mental disorder; and PONV (postoperative nausea and vomiting).    Surgical History:  has a past surgical history that includes Appendectomy; Dilation and curettage of uterus; and Hysterectomy (08/31/2016).    Family History: family history includes Breast cancer in her maternal aunt and paternal grandmother; Cancer in her maternal aunt; Colon cancer in her maternal aunt; Diabetes in her maternal grandmother; Hypertension in her mother; Miscarriages / Stillbirths in her cousin; Stroke in her maternal aunt.. Otherwise no colon cancer, inflammatory bowel disease, or GI malignancies.    Social History:  reports that she has never smoked. She has never used smokeless tobacco. She reports that she does not drink alcohol or use drugs.    Review of patient's allergies indicates:  No Known Allergies    Medications:   Prescriptions Prior to Admission   Medication Sig Dispense Refill Last Dose    ALBUTEROL INHL Inhale 2 puffs into the lungs continuous prn.   Past Week at Unknown time    blood sugar diagnostic Strp 1 each by Other route 3 (three) times daily. 100 each 11 7/30/2017 at Unknown time    blood-glucose meter  "Misc Dispense 1 1 each 0 7/30/2017 at Unknown time    dulaglutide 0.75 mg/0.5 mL PnIj Inject 0.5 mLs (0.75 mg total) into the skin every 7 days. 6 mL 3 Past Week at Unknown time    escitalopram oxalate (LEXAPRO) 20 MG tablet Take 1 tablet (20 mg total) by mouth once daily. 30 tablet 5 7/30/2017 at Unknown time    lancets Mis 1 each by Other route 3 (three) times daily. 100 each 0 7/30/2017 at Unknown time    levalbuterol (XOPENEX) 0.63 mg/3 mL nebulizer solution Take 1 ampule by nebulization every 4 (four) hours as needed for Wheezing.   Past Month at Unknown time    linaclotide (LINZESS) 145 mcg Cap capsule Take 1 capsule (145 mcg total) by mouth once daily. 30 capsule 5 Past Week at Unknown time    LOTEMAX 0.5 % DrpG Place 1 drop into both eyes.  0 7/30/2017 at Unknown time    pantoprazole (PROTONIX) 40 MG tablet Take 1 tablet (40 mg total) by mouth once daily. 30 tablet 5 7/30/2017 at Unknown time    pen needle, diabetic (COMFORT EZ PEN NEEDLES) 33 gauge x 1/4" Ndle 1 Units by Misc.(Non-Drug; Combo Route) route every evening. 100 each 5 7/30/2017 at Unknown time    sulfamethoxazole-trimethoprim 800-160mg (BACTRIM DS) 800-160 mg Tab Take 1 tablet by mouth 2 (two) times daily. 10 tablet 0 Past Week at Unknown time    ibuprofen (ADVIL,MOTRIN) 800 MG tablet Take 1 tablet (800 mg total) by mouth 3 (three) times daily. 90 tablet 0 More than a month at Unknown time       Objective Findings:    Vital Signs:   Vitals:    07/31/17 1431   BP: 128/75   Pulse:    Resp:    Temp:          Physical Exam:  General Appearance: Well appearing in no acute distress  Eyes:    No scleral icterus  ENT: Neck supple, Lips, mucosa, and tongue normal; teeth and gums normal  Lungs: CTA bilaterally in anterior and posterior fields, no wheezes, no crackles.  Heart:  Regular rate, S1, S2 normal, no murmurs heard.  Abdomen: Soft, non tender, non distended with normal bowel sounds. No hepatosplenomegaly, ascites, or mass.  Extremities: " No clubbing, cyanosis or edema  Skin: No rash    Labs:  Lab Results   Component Value Date    WBC 5.67 02/17/2017    HGB 11.5 (L) 02/17/2017    HCT 34.3 (L) 02/17/2017     02/17/2017    CHOL 132 02/17/2017    TRIG 73 02/17/2017    HDL 45 02/17/2017    ALT 14 02/17/2017    AST 18 02/17/2017     02/17/2017    K 3.9 02/17/2017     02/17/2017    CREATININE 0.8 02/17/2017    BUN 9 02/17/2017    CO2 23 02/17/2017    TSH 0.209 (L) 02/17/2017    INR 1.0 09/10/2016    GLUF 107 06/16/2016    HGBA1C 6.4 (H) 05/11/2017       I have explained the risks and benefits of endoscopy procedures to the patient including but not limited to bleeding, perforation, infection, and death.    Proceed with colonoscopy for evaluation of lower abdominal pain.

## 2017-08-01 ENCOUNTER — OFFICE VISIT (OUTPATIENT)
Dept: INTERNAL MEDICINE | Facility: CLINIC | Age: 42
End: 2017-08-01
Payer: MEDICAID

## 2017-08-01 VITALS
OXYGEN SATURATION: 98 % | DIASTOLIC BLOOD PRESSURE: 56 MMHG | WEIGHT: 250.69 LBS | RESPIRATION RATE: 18 BRPM | TEMPERATURE: 97 F | SYSTOLIC BLOOD PRESSURE: 109 MMHG | HEART RATE: 79 BPM | HEIGHT: 65 IN | BODY MASS INDEX: 41.77 KG/M2

## 2017-08-01 DIAGNOSIS — L97.411 DIABETIC ULCER OF RIGHT MIDFOOT ASSOCIATED WITH TYPE 2 DIABETES MELLITUS, LIMITED TO BREAKDOWN OF SKIN: ICD-10-CM

## 2017-08-01 DIAGNOSIS — R10.13 DYSPEPSIA: Primary | ICD-10-CM

## 2017-08-01 DIAGNOSIS — R14.1 ABDOMINAL GAS PAIN: ICD-10-CM

## 2017-08-01 DIAGNOSIS — E11.621 DIABETIC ULCER OF RIGHT MIDFOOT ASSOCIATED WITH TYPE 2 DIABETES MELLITUS, LIMITED TO BREAKDOWN OF SKIN: ICD-10-CM

## 2017-08-01 PROCEDURE — 99214 OFFICE O/P EST MOD 30 MIN: CPT | Mod: PBBFAC,PO | Performed by: FAMILY MEDICINE

## 2017-08-01 PROCEDURE — 3044F HG A1C LEVEL LT 7.0%: CPT | Mod: ,,, | Performed by: FAMILY MEDICINE

## 2017-08-01 PROCEDURE — 99214 OFFICE O/P EST MOD 30 MIN: CPT | Mod: S$PBB,,, | Performed by: FAMILY MEDICINE

## 2017-08-01 PROCEDURE — 99999 PR PBB SHADOW E&M-EST. PATIENT-LVL IV: CPT | Mod: PBBFAC,,, | Performed by: FAMILY MEDICINE

## 2017-08-01 RX ORDER — SIMETHICONE 125 MG
125 TABLET,CHEWABLE ORAL EVERY 6 HOURS PRN
Qty: 30 TABLET | Refills: 0 | Status: SHIPPED | OUTPATIENT
Start: 2017-08-01 | End: 2017-11-02

## 2017-08-01 NOTE — PATIENT INSTRUCTIONS
Abdominal Pain    Abdominal pain is pain in the stomach or belly area. Everyone has this pain from time to time. In many cases it goes away on its own. But abdominal pain can sometimes be due to a serious problem, such as appendicitis. So its important to know when to seek help.  Causes of abdominal pain  There are many possible causes of abdominal pain. Common causes in adults include:  · Constipation, diarrhea, or gas  · Stomach acid flowing back up into the esophagus (acid reflux or heartburn)  · Severe acid reflux, called GERD (gastroesophageal reflux disease)  · A sore in the lining of the stomach or small intestine (peptic ulcer)  · Inflammation of the gallbladder, liver, or pancreas  · Gallstones or kidney stones  · Appendicitis   · Intestinal blockage   · An internal organ pushing through a muscle or other tissue (hernia)  · Urinary tract infections  · In women, menstrual cramps, fibroids, or endometriosis  · Inflammation or infection of the intestines  Diagnosing the cause of abdominal pain  Your healthcare provider will do a physical exam help find the cause of your pain. If needed, tests will be ordered. Belly pain has many possible causes. So it can be hard to find the reason for your pain. Giving details about your pain can help. Tell your provider where and when you feel the pain, and what makes it better or worse. Also let your provider know if you have other symptoms such as:  · Fever  · Tiredness  · Upset stomach (nausea)  · Vomiting  · Changes in bathroom habits  Treating abdominal pain  Some causes of pain need emergency medical treatment right away. These include appendicitis or a bowel blockage. Other problems can be treated with rest, fluids, or medicines. Your healthcare provider can give you specific instructions for treatment or self-care based on what is causing your pain.  If you have vomiting or diarrhea, sip water or other clear fluids. When you are ready to eat solid foods again,  start with small amounts of easy-to-digest, low-fat foods. These include apple sauce, toast, or crackers.   When to seek medical care  Call 911 or go to the hospital right away if you:  · Cant pass stool and are vomiting  · Are vomiting blood or have bloody diarrhea or black, tarry diarrhea  · Have chest, neck, or shoulder pain  · Feel like you might pass out  · Have pain in your shoulder blades with nausea  · Have sudden, severe belly pain  · Have new, severe pain unlike any you have felt before  · Have a belly that is rigid, hard, and tender to touch  Call your healthcare provider if you have:  · Pain for more than 5 days  · Bloating for more than 2 days  · Diarrhea for more than 5 days  · A fever of 100.4°F (38.0°C) or higher, or as directed by your provider  · Pain that gets worse  · Weight loss for no reason  · Continued lack of appetite  · Blood in your stool  How to prevent abdominal pain  Here are some tips to help prevent abdominal pain:  · Eat smaller amounts of food at one time.  · Avoid greasy, fried, or other high-fat foods.  · Avoid foods that give you gas.  · Exercise regularly.  · Drink plenty of fluids.  To help prevent GERD symptoms:  · Quit smoking.  · Reduce alcohol and certain foods that increase stomach acid.  · Avoid aspirin and over-the-counter pain and fever medicines (NSAIDS or nonsteroidal anti-inflammatory drugs), if possible  · Lose extra weight.  · Finish eating at least 2 hours before you go to bed or lie down.  · Raise the head of your bed.  Date Last Reviewed: 7/1/2016  © 0954-1617 [employer]. [address] [place] [address], [place] [address]. All rights reserved. This information is not intended as a substitute for professional medical care. Always follow your healthcare professional's instructions.

## 2017-08-01 NOTE — PROGRESS NOTES
Subjective:       Patient ID: Yuli Milton is a 41 y.o. female.    Chief Complaint: Follow-up    HPI  Came in today to follow-up on right foot sore.  At the last visit we evaluated and treated with 5 days of Bactrim.  It has had some delayed healing and at the last visit had very scant purulence.  She finished the antibiotic course and feels that it has not changed very much and continues to have a slight amount of tenderness.  Also complaining of new onset of diffuse abdominal pain.  She had a colonoscopy yesterday and since then has just been having some back pain and diffuse abdominal pain but more so in the upper abdomen.  She tried taking pantoprazole as well as Carafate.  She doesn't feel like these medications helped.  She has not had any nausea nor vomiting.  Has not yet passed any stools but has had flatulence.  No fever    Family History   Problem Relation Age of Onset    Breast cancer Paternal Grandmother     Diabetes Maternal Grandmother     Hypertension Mother     Breast cancer Maternal Aunt     Cancer Maternal Aunt      colon cancer    Colon cancer Maternal Aunt     Miscarriages / Stillbirths Cousin     Stroke Maternal Aunt     Ovarian cancer Neg Hx     Deep vein thrombosis Neg Hx     Pulmonary embolism Neg Hx        Current Outpatient Prescriptions:     ALBUTEROL INHL, Inhale 2 puffs into the lungs continuous prn., Disp: , Rfl:     blood sugar diagnostic Strp, 1 each by Other route 3 (three) times daily., Disp: 100 each, Rfl: 11    blood-glucose meter Misc, Dispense 1, Disp: 1 each, Rfl: 0    dulaglutide 0.75 mg/0.5 mL PnIj, Inject 0.5 mLs (0.75 mg total) into the skin every 7 days., Disp: 6 mL, Rfl: 3    escitalopram oxalate (LEXAPRO) 20 MG tablet, Take 1 tablet (20 mg total) by mouth once daily., Disp: 30 tablet, Rfl: 5    ibuprofen (ADVIL,MOTRIN) 800 MG tablet, Take 1 tablet (800 mg total) by mouth 3 (three) times daily., Disp: 90 tablet, Rfl: 0    lancets Misc, 1 each by  "Other route 3 (three) times daily., Disp: 100 each, Rfl: 0    levalbuterol (XOPENEX) 0.63 mg/3 mL nebulizer solution, Take 1 ampule by nebulization every 4 (four) hours as needed for Wheezing., Disp: , Rfl:     linaclotide (LINZESS) 145 mcg Cap capsule, Take 1 capsule (145 mcg total) by mouth once daily., Disp: 30 capsule, Rfl: 5    LOTEMAX 0.5 % DrpG, Place 1 drop into both eyes., Disp: , Rfl: 0    pantoprazole (PROTONIX) 40 MG tablet, Take 1 tablet (40 mg total) by mouth once daily., Disp: 30 tablet, Rfl: 5    pen needle, diabetic (COMFORT EZ PEN NEEDLES) 33 gauge x 1/4" Ndle, 1 Units by Misc.(Non-Drug; Combo Route) route every evening., Disp: 100 each, Rfl: 5    simethicone (MYLICON) 125 MG chewable tablet, Take 1 tablet (125 mg total) by mouth every 6 (six) hours as needed for Flatulence., Disp: 30 tablet, Rfl: 0  No current facility-administered medications for this visit.     Review of Systems   Constitutional: Negative for chills and fever.   Respiratory: Negative for cough and shortness of breath.    Cardiovascular: Negative for chest pain.   Gastrointestinal: Positive for abdominal pain. Negative for anal bleeding, blood in stool, diarrhea, nausea and vomiting.   Skin: Positive for wound. Negative for rash.   Neurological: Negative for dizziness.       Objective:   BP (!) 109/56 (BP Location: Left arm, Patient Position: Sitting, BP Method: Automatic)   Pulse 79   Temp 96.9 °F (36.1 °C) (Tympanic)   Resp 18   Ht 5' 5" (1.651 m)   Wt 113.7 kg (250 lb 10.6 oz)   LMP 08/21/2016   SpO2 98%   BMI 41.71 kg/m²      Physical Exam   Constitutional: She is oriented to person, place, and time. She appears well-developed and well-nourished.   HENT:   Head: Normocephalic and atraumatic.   Eyes: Conjunctivae are normal.   Cardiovascular: Normal rate.    Pulmonary/Chest: Effort normal. No respiratory distress.   Abdominal: Soft. Bowel sounds are normal. She exhibits no distension and no mass. There is " tenderness. There is no rebound and no guarding.   Musculoskeletal: She exhibits no edema.   Neurological: She is alert and oriented to person, place, and time. Coordination normal.   Skin: Skin is warm and dry. No rash noted.   Wound on the lateral surface of her right foot looks unchanged since the last visit.  There is no erythema but she has some mild tenderness to palpation around the perimeter of ulceration.   Psychiatric: She has a normal mood and affect. Her behavior is normal.   Vitals reviewed.      Assessment & Plan     1. Dyspepsia  Recommended continuing the medicine she was on of pantoprazole and Carafate.    2. Abdominal gas pain  No signs of acute abdomen.  Recommended that she use simethicone as some of her symptoms may be related to gas pain.  Gave her warning signs to look out for such as fever or inability to keep down liquids.  These could be from all obstruction or bowel perforation.  She understands and will return to care if not improving.    3. Diabetic ulcer of right midfoot associated with type 2 diabetes mellitus, limited to breakdown of skin  She has finished antibiotics without much change.  Due to the delayed healing I'm recommending that she go to wound care.  - Ambulatory Referral to Wound Clinic

## 2017-08-02 ENCOUNTER — PATIENT MESSAGE (OUTPATIENT)
Dept: GASTROENTEROLOGY | Facility: CLINIC | Age: 42
End: 2017-08-02

## 2017-08-14 ENCOUNTER — HOSPITAL ENCOUNTER (OUTPATIENT)
Dept: RADIOLOGY | Facility: HOSPITAL | Age: 42
Discharge: HOME | End: 2017-08-14
Attending: NURSE PRACTITIONER
Payer: MEDICAID

## 2017-08-14 DIAGNOSIS — K59.00 CONSTIPATION, UNSPECIFIED CONSTIPATION TYPE: ICD-10-CM

## 2017-08-14 DIAGNOSIS — R10.11 RUQ PAIN: ICD-10-CM

## 2017-08-14 DIAGNOSIS — R07.89 ATYPICAL CHEST PAIN: ICD-10-CM

## 2017-08-14 PROCEDURE — 78227 HEPATOBIL SYST IMAGE W/DRUG: CPT | Mod: TC,PO

## 2017-08-14 PROCEDURE — A9537 TC99M MEBROFENIN: HCPCS | Mod: PO

## 2017-08-14 PROCEDURE — 78227 HEPATOBIL SYST IMAGE W/DRUG: CPT | Mod: 26,,, | Performed by: RADIOLOGY

## 2017-08-15 DIAGNOSIS — R07.89 ATYPICAL CHEST PAIN: ICD-10-CM

## 2017-08-15 DIAGNOSIS — R10.13 EPIGASTRIC PAIN: ICD-10-CM

## 2017-08-15 DIAGNOSIS — R10.30 LOWER ABDOMINAL PAIN: ICD-10-CM

## 2017-08-16 ENCOUNTER — TELEPHONE (OUTPATIENT)
Dept: GASTROENTEROLOGY | Facility: CLINIC | Age: 42
End: 2017-08-16

## 2017-08-16 RX ORDER — SUCRALFATE 1 G/1
TABLET ORAL
Qty: 120 TABLET | Refills: 0 | Status: SHIPPED | OUTPATIENT
Start: 2017-08-16 | End: 2018-01-22 | Stop reason: SDUPTHER

## 2017-08-16 NOTE — TELEPHONE ENCOUNTER
Spoke with patient and informed her per provider that she had a complete Gastroenterology evaluation which essentially negative for any identifiable causes of her abdominal pain.  Recommended to schedule a followup visit to discuss potential referral to general surgeon to further evaluate possible adhesions from previous abdominal surgery.  Appointment for Office visit scheduled and patient expressed understanding of what was told to her.

## 2017-08-16 NOTE — TELEPHONE ENCOUNTER
Patient contacted with BEVERLEY garrett. States what's next, she is still having the stomach pains.  Please advise

## 2017-08-23 ENCOUNTER — OFFICE VISIT (OUTPATIENT)
Dept: PSYCHIATRY | Facility: CLINIC | Age: 42
End: 2017-08-23
Payer: MEDICAID

## 2017-08-23 DIAGNOSIS — F33.3 SEVERE RECURRENT MAJOR DEPRESSION WITH PSYCHOTIC FEATURES: Primary | ICD-10-CM

## 2017-08-23 DIAGNOSIS — Z63.0 STRESS DUE TO MARITAL PROBLEMS: ICD-10-CM

## 2017-08-23 PROCEDURE — 90791 PSYCH DIAGNOSTIC EVALUATION: CPT | Mod: AJ,HB,S$PBB, | Performed by: SOCIAL WORKER

## 2017-08-23 PROCEDURE — 90791 PSYCH DIAGNOSTIC EVALUATION: CPT | Mod: PBBFAC,PO | Performed by: SOCIAL WORKER

## 2017-08-23 SDOH — SOCIAL DETERMINANTS OF HEALTH (SDOH): PROBLEMS IN RELATIONSHIP WITH SPOUSE OR PARTNER: Z63.0
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
## 2017-09-06 ENCOUNTER — TELEPHONE (OUTPATIENT)
Dept: INTERNAL MEDICINE | Facility: CLINIC | Age: 42
End: 2017-09-06

## 2017-09-06 NOTE — TELEPHONE ENCOUNTER
Spoke with pt and she said that for the past 2 weeks she has been tired, light headed, nauseated, and had chills. Pt then spoke with LESLIE Fabian and Caren explained to her that  did not have anything available right now and that [facility] did not have an opening until 9/29. That if she did not want to wait she could go to an urgent care clinic. PT said she just might do that.

## 2017-09-06 NOTE — TELEPHONE ENCOUNTER
----- Message from Loan Elliott sent at 9/6/2017  2:01 PM CDT -----  Est medicaid patient-Requesting an appt for tomorrow. Patient states she do not feel good. Patient was advised of availability and declined to see a different provider. Patient was offered an appt on 9/29 with [facility], but declined.    Please adv/call [phone].//thanks. cw

## 2017-09-11 NOTE — PROGRESS NOTES
9/13/2017 4:46 PM 
 
Ms. Morelia Rojas [address] [facility] [id number] Morelia Rojas, 19 y.o. female is here today returning for PPD reading. PPD administered on 09/11/2017 No redness or induration noted on left forearm , 0 mm. No allergic reactions noted. Patient verbalized an understanding of results and did not voice any concerns at this time. Subjective:       Patient ID: Yuli Milton is a 41 y.o. female.    Chief Complaint: Dysuria (burning and pressure in abd area upon urination x 2-3 days. Pt requesting STD testing. )    Urinary Tract Infection    This is a new problem. Episode onset: 2/14. The pain is mild. There has been no fever. Sexually active: not for 4 months. There is no history of pyelonephritis. Associated symptoms include frequency, nausea and urgency. Pertinent negatives include no chills, discharge, flank pain, hematuria, sweats, vomiting or rash. Associated symptoms comments: Foul smell with urination . She has tried increased fluids for the symptoms. The treatment provided no relief. Her past medical history is significant for diabetes mellitus, recurrent UTIs and STD. There is no history of kidney stones. partial hyst, PID     Review of Systems   Constitutional: Positive for appetite change and fatigue. Negative for chills, diaphoresis and fever.   Gastrointestinal: Positive for abdominal pain (suprapubic) and nausea. Negative for diarrhea and vomiting.   Endocrine: Positive for polydipsia, polyphagia and polyuria.   Genitourinary: Positive for dysuria, frequency, pelvic pain and urgency. Negative for difficulty urinating, dyspareunia, flank pain and hematuria.   Musculoskeletal: Negative for arthralgias and myalgias.   Skin: Negative for rash.   Neurological: Positive for headaches. Negative for dizziness, weakness and light-headedness.   Psychiatric/Behavioral: Positive for confusion, dysphoric mood and hallucinations (auditory, nonviolent). Negative for self-injury, sleep disturbance and suicidal ideas. The patient is nervous/anxious.        Objective:      Physical Exam   Constitutional: She is oriented to person, place, and time. She appears well-developed.   obese   Eyes: Conjunctivae are normal. Pupils are equal, round, and reactive to light.   Cardiovascular: Normal rate, regular rhythm, normal heart sounds and intact  distal pulses.    Pulmonary/Chest: Effort normal and breath sounds normal.   Abdominal: Soft. Bowel sounds are normal. She exhibits no distension. There is tenderness (suprapubic).   Genitourinary: Pelvic exam was performed with patient supine. There is no rash, tenderness or lesion on the right labia. There is no rash, tenderness or lesion on the left labia. Cervix exhibits discharge. Cervix exhibits no motion tenderness. Vaginal discharge found.   Neurological: She is alert and oriented to person, place, and time.   Skin: Skin is warm and dry. No rash noted.   Vitals reviewed.      Assessment:       1. BV (bacterial vaginosis)    2. Screen for STD (sexually transmitted disease)    3. Morbid obesity due to excess calories    4. Type 2 diabetes mellitus without complication, without long-term current use of insulin    5. Iron deficiency anemia, unspecified iron deficiency anemia type    6. Depression, unspecified depression type    7. Anxiety    8. Auditory hallucinations    9. Vitamin D deficiency    10. Gallstones        Plan:       *BV (bacterial vaginosis)  Discussed vag hygine. Do not use scented productes  No drinking while on flagyl  -     POCT urine dipstick without microscope  -     metronidazole (FLAGYL) 500 MG tablet; Take 1 tablet (500 mg total) by mouth every 12 (twelve) hours.  Dispense: 14 tablet; Refill: 0  -     POCT Wet Prep    Screen for STD (sexually transmitted disease)  Discussed safe sex practices  -     RPR; Future; Expected date: 2/17/17  -     HIV-1 and HIV-2 antibodies; Future; Expected date: 2/17/17  -     C. trachomatis/N. gonorrhoeae by AMP DNA Urine  -     Herpes simplex type 1&2 IgG,Herpes titer; Future; Expected date: 2/17/17    Morbid obesity due to excess calories  -     TSH; Future; Expected date: 2/17/17  -     Lipid panel; Future; Expected date: 2/17/17    Type 2 diabetes mellitus without complication, without long-term current use of insulin  F/u in 2 weeks  -     Comprehensive  metabolic panel; Future; Expected date: 2/17/17  -     Hemoglobin A1c; Future; Expected date: 2/17/17  -     lancets Misc; 1 each by Other route 3 (three) times daily.  Dispense: 100 each; Refill: 0  -     blood-glucose meter Misc; Dispense 1  Dispense: 1 each; Refill: 0  -     blood sugar diagnostic Strp; 1 each by Other route 3 (three) times daily.  Dispense: 100 each; Refill: 11    Iron deficiency anemia, unspecified iron deficiency anemia type  Likely improved after hyst, will treat per labs   -     CBC auto differential; Future; Expected date: 2/17/17    Depression, unspecified depression type/ Anxiety/ Auditory hallucinations   Seen by psych, Dr Reeves with [facility]. Has recommended lexapro and seropuel. Instructed her to f/u with him as scheduled and reach out to him with any psych issues    Vitamin D deficiency   As per med records through care everywhere- evaluate at next visit in 2 weeks    Gallstones   Saw gen [facility] and has not f/u on their recommendation for a vinnie- discuss at next visit.       **    Reviewed records from prev PCP through care everywhere

## 2017-09-21 ENCOUNTER — TELEPHONE (OUTPATIENT)
Dept: INTERNAL MEDICINE | Facility: CLINIC | Age: 42
End: 2017-09-21

## 2017-09-21 NOTE — TELEPHONE ENCOUNTER
----- Message from Kallie Armstrong sent at 9/21/2017  1:43 PM CDT -----  pls work pt in tmrw, available in morning..[phone] (will see kellie also)

## 2017-09-28 ENCOUNTER — TELEPHONE (OUTPATIENT)
Dept: INTERNAL MEDICINE | Facility: CLINIC | Age: 42
End: 2017-09-28

## 2017-09-28 NOTE — TELEPHONE ENCOUNTER
----- Message from Karin Meneses sent at 9/28/2017 10:04 AM CDT -----  Contact: pt  Please call pt @ [phone] regarding an worked in appt to see doctor today, pt is having some mental issues.

## 2017-09-28 NOTE — TELEPHONE ENCOUNTER
Advise patient that will check with provider to see if can work her in,but she can go to our walk in /urgerent clinic on [address] or [address]. As advise will contact her as soon as .    Spoke with patient states she will think about going to [facility] as recommended by doctor. Patient states she do not want to be put in hospital. Strongly advise patient to go to [facility] as recommended.
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
## 2017-10-04 ENCOUNTER — OFFICE VISIT (OUTPATIENT)
Dept: PSYCHIATRY | Facility: CLINIC | Age: 42
End: 2017-10-04
Payer: MEDICAID

## 2017-10-04 DIAGNOSIS — F41.9 ANXIETY: ICD-10-CM

## 2017-10-04 DIAGNOSIS — F33.9 EPISODE OF RECURRENT MAJOR DEPRESSIVE DISORDER, UNSPECIFIED DEPRESSION EPISODE SEVERITY: ICD-10-CM

## 2017-10-04 DIAGNOSIS — R44.0 AUDITORY HALLUCINATIONS: Primary | ICD-10-CM

## 2017-10-04 PROCEDURE — 90792 PSYCH DIAG EVAL W/MED SRVCS: CPT | Mod: AF,HB,S$PBB, | Performed by: PSYCHIATRY & NEUROLOGY

## 2017-10-04 PROCEDURE — 90792 PSYCH DIAG EVAL W/MED SRVCS: CPT | Mod: PBBFAC,PO | Performed by: PSYCHIATRY & NEUROLOGY

## 2017-10-04 RX ORDER — ESCITALOPRAM OXALATE 20 MG/1
TABLET ORAL
Qty: 30 TABLET | Refills: 2 | Status: SHIPPED | OUTPATIENT
Start: 2017-10-04 | End: 2017-11-02

## 2017-10-04 RX ORDER — ARIPIPRAZOLE 10 MG/1
10 TABLET ORAL DAILY
Qty: 30 TABLET | Refills: 2 | Status: SHIPPED | OUTPATIENT
Start: 2017-10-04 | End: 2017-10-11

## 2017-10-10 ENCOUNTER — TELEPHONE (OUTPATIENT)
Dept: PSYCHIATRY | Facility: CLINIC | Age: 42
End: 2017-10-10

## 2017-10-10 PROBLEM — M35.01 KERATOCONJUNCTIVITIS SICCA: Status: ACTIVE | Noted: 2017-10-10

## 2017-10-10 PROBLEM — H16.229 KERATOCONJUNCTIVITIS SICCA: Status: ACTIVE | Noted: 2017-10-10

## 2017-10-10 NOTE — TELEPHONE ENCOUNTER
Pt states the Lexapro is making her ill with symptoms of; irritability, sleepiness, upset stomach, and anxiousness.  She has not started the Abilify yet because her pharmacy told her it requires a Prior Auth and we have not received the fax yet.  Please call pt at [phone] to advise.

## 2017-10-11 RX ORDER — RISPERIDONE 1 MG/1
1 TABLET ORAL NIGHTLY
Qty: 30 TABLET | Refills: 1 | Status: SHIPPED | OUTPATIENT
Start: 2017-10-11 | End: 2017-11-02

## 2017-11-01 ENCOUNTER — TELEPHONE (OUTPATIENT)
Dept: INTERNAL MEDICINE | Facility: CLINIC | Age: 42
End: 2017-11-01

## 2017-11-01 NOTE — TELEPHONE ENCOUNTER
----- Message from Nikkie Nazario sent at 11/1/2017  9:34 AM CDT -----  This is an established medicaid patient.  She has a UTI and she also needs a TB test done for her job.   She would like to be worked in today.  Call her at [phone].                                                  de jesus

## 2017-11-02 ENCOUNTER — OFFICE VISIT (OUTPATIENT)
Dept: INTERNAL MEDICINE | Facility: CLINIC | Age: 42
End: 2017-11-02
Payer: MEDICAID

## 2017-11-02 ENCOUNTER — TELEPHONE (OUTPATIENT)
Dept: OBSTETRICS AND GYNECOLOGY | Facility: CLINIC | Age: 42
End: 2017-11-02

## 2017-11-02 ENCOUNTER — LAB VISIT (OUTPATIENT)
Dept: LAB | Facility: HOSPITAL | Age: 42
End: 2017-11-02
Attending: FAMILY MEDICINE
Payer: MEDICAID

## 2017-11-02 VITALS
TEMPERATURE: 97 F | HEIGHT: 65 IN | RESPIRATION RATE: 16 BRPM | DIASTOLIC BLOOD PRESSURE: 65 MMHG | HEART RATE: 71 BPM | WEIGHT: 254.63 LBS | BODY MASS INDEX: 42.42 KG/M2 | SYSTOLIC BLOOD PRESSURE: 136 MMHG

## 2017-11-02 DIAGNOSIS — Z91.148 NONCOMPLIANCE WITH MEDICATIONS: ICD-10-CM

## 2017-11-02 DIAGNOSIS — F33.3 SEVERE EPISODE OF RECURRENT MAJOR DEPRESSIVE DISORDER, WITH PSYCHOTIC FEATURES: ICD-10-CM

## 2017-11-02 DIAGNOSIS — N30.01 ACUTE CYSTITIS WITH HEMATURIA: Primary | ICD-10-CM

## 2017-11-02 DIAGNOSIS — E66.01 MORBID OBESITY WITH BMI OF 40.0-44.9, ADULT: ICD-10-CM

## 2017-11-02 DIAGNOSIS — N89.8 VAGINAL DRYNESS: ICD-10-CM

## 2017-11-02 DIAGNOSIS — E11.9 TYPE 2 DIABETES MELLITUS WITHOUT COMPLICATION, WITHOUT LONG-TERM CURRENT USE OF INSULIN: ICD-10-CM

## 2017-11-02 LAB
ALBUMIN SERPL BCP-MCNC: 3.5 G/DL
ALP SERPL-CCNC: 68 U/L
ALT SERPL W/O P-5'-P-CCNC: 18 U/L
ANION GAP SERPL CALC-SCNC: 9 MMOL/L
AST SERPL-CCNC: 15 U/L
BASOPHILS # BLD AUTO: 0.02 K/UL
BASOPHILS NFR BLD: 0.4 %
BILIRUB SERPL-MCNC: 0.5 MG/DL
BILIRUB SERPL-MCNC: 2 MG/DL
BLOOD URINE, POC: 250
BUN SERPL-MCNC: 9 MG/DL
CALCIUM SERPL-MCNC: 9.3 MG/DL
CHLORIDE SERPL-SCNC: 106 MMOL/L
CO2 SERPL-SCNC: 25 MMOL/L
COLOR, POC UA: ABNORMAL
CREAT SERPL-MCNC: 0.8 MG/DL
DIFFERENTIAL METHOD: NORMAL
EOSINOPHIL # BLD AUTO: 0.1 K/UL
EOSINOPHIL NFR BLD: 1.1 %
ERYTHROCYTE [DISTWIDTH] IN BLOOD BY AUTOMATED COUNT: 14.1 %
EST. GFR  (AFRICAN AMERICAN): >60 ML/MIN/1.73 M^2
EST. GFR  (NON AFRICAN AMERICAN): >60 ML/MIN/1.73 M^2
ESTIMATED AVG GLUCOSE: 131 MG/DL
GLUCOSE SERPL-MCNC: 97 MG/DL
GLUCOSE UR QL STRIP: ABNORMAL
HBA1C MFR BLD HPLC: 6.2 %
HCT VFR BLD AUTO: 37.5 %
HGB BLD-MCNC: 12.2 G/DL
KETONES UR QL STRIP: 2
LEUKOCYTE ESTERASE URINE, POC: ABNORMAL
LYMPHOCYTES # BLD AUTO: 2.2 K/UL
LYMPHOCYTES NFR BLD: 46.4 %
MCH RBC QN AUTO: 28.2 PG
MCHC RBC AUTO-ENTMCNC: 32.5 G/DL
MCV RBC AUTO: 87 FL
MONOCYTES # BLD AUTO: 0.3 K/UL
MONOCYTES NFR BLD: 7.1 %
NEUTROPHILS # BLD AUTO: 2.1 K/UL
NEUTROPHILS NFR BLD: 45 %
NITRITE, POC UA: ABNORMAL
PH, POC UA: 5
PLATELET # BLD AUTO: 347 K/UL
PMV BLD AUTO: 10.3 FL
POTASSIUM SERPL-SCNC: 3.9 MMOL/L
PROT SERPL-MCNC: 8.4 G/DL
PROTEIN, POC: ABNORMAL
RBC # BLD AUTO: 4.32 M/UL
SODIUM SERPL-SCNC: 140 MMOL/L
SPECIFIC GRAVITY, POC UA: 1.02
T4 FREE SERPL-MCNC: 1 NG/DL
TSH SERPL DL<=0.005 MIU/L-ACNC: 0.35 UIU/ML
UROBILINOGEN, POC UA: ABNORMAL
WBC # BLD AUTO: 4.63 K/UL

## 2017-11-02 PROCEDURE — 84443 ASSAY THYROID STIM HORMONE: CPT | Mod: PO

## 2017-11-02 PROCEDURE — 81002 URINALYSIS NONAUTO W/O SCOPE: CPT | Mod: PBBFAC,PO | Performed by: FAMILY MEDICINE

## 2017-11-02 PROCEDURE — 84439 ASSAY OF FREE THYROXINE: CPT | Mod: PO

## 2017-11-02 PROCEDURE — 36415 COLL VENOUS BLD VENIPUNCTURE: CPT | Mod: PO

## 2017-11-02 PROCEDURE — 99213 OFFICE O/P EST LOW 20 MIN: CPT | Mod: PBBFAC,PO | Performed by: FAMILY MEDICINE

## 2017-11-02 PROCEDURE — 99999 PR PBB SHADOW E&M-EST. PATIENT-LVL III: CPT | Mod: PBBFAC,,, | Performed by: FAMILY MEDICINE

## 2017-11-02 PROCEDURE — 80053 COMPREHEN METABOLIC PANEL: CPT | Mod: PO

## 2017-11-02 PROCEDURE — 99215 OFFICE O/P EST HI 40 MIN: CPT | Mod: S$PBB,,, | Performed by: FAMILY MEDICINE

## 2017-11-02 PROCEDURE — 83036 HEMOGLOBIN GLYCOSYLATED A1C: CPT

## 2017-11-02 PROCEDURE — 85025 COMPLETE CBC W/AUTO DIFF WBC: CPT | Mod: PO

## 2017-11-02 RX ORDER — METFORMIN HYDROCHLORIDE 500 MG/1
500 TABLET ORAL 2 TIMES DAILY WITH MEALS
Qty: 180 TABLET | Refills: 3 | Status: SHIPPED | OUTPATIENT
Start: 2017-11-02 | End: 2018-07-27 | Stop reason: SDUPTHER

## 2017-11-02 RX ORDER — CIPROFLOXACIN 500 MG/1
500 TABLET ORAL EVERY 12 HOURS
Qty: 20 TABLET | Refills: 0 | Status: SHIPPED | OUTPATIENT
Start: 2017-11-02 | End: 2017-11-13 | Stop reason: ALTCHOICE

## 2017-11-02 NOTE — TELEPHONE ENCOUNTER
LEft msg for pt that all the other meds she needed were sent to her pharmacy to call us back if she has any issues.

## 2017-11-02 NOTE — TELEPHONE ENCOUNTER
----- Message from Elmira Shipman sent at 11/2/2017  3:17 PM CDT -----  Contact: pt   Call pt regarding her scripts not being at the pharmacy. Pt states that she is suppose to have a total of 3 scripts and only 1 is at the pharmacy.   .[phone] (home)

## 2017-11-02 NOTE — PROGRESS NOTES
Subjective:       Patient ID: Yuli Milton is a 42 y.o. female.    Chief Complaint: Abdominal Pain (lower); Dysuria; Shortness of Breath; and Chest Pain (times several weeks)    Abdominal Pain   This is a new problem. The current episode started in the past 7 days. The onset quality is gradual. The problem occurs constantly. The problem has been waxing and waning. The pain is located in the suprapubic region. The pain is mild. The quality of the pain is burning and cramping. The abdominal pain radiates to the right flank. Associated symptoms include dysuria and frequency. Pertinent negatives include no diarrhea, fever, flatus, headaches, hematochezia, hematuria, melena, nausea, vomiting or weight loss. Nothing aggravates the pain. The pain is relieved by nothing. She has tried nothing for the symptoms.     Has self discontinued trulicity.  She had some issues with taking metformin in the past.  Claims to have had some gastrointestinal issues.  Has not been exercising.  Stop taking the Lexapro and also does not want to stay on risperidone which she never started since he has psychiatry due to concerns about side effects and the way it makes her feel.  She hasn't really been having the negative auditory hallucinations recently. No SI.    Patient Active Problem List   Diagnosis    Adjustment disorder with depressed mood    Adrenal mass    Anxiety    Asthma    Bronchitis    Depression    Iron deficiency anemia    Morbid obesity due to excess calories    Type 2 diabetes mellitus without complication, without long-term current use of insulin    Status post laparoscopic hysterectomy    Pelvic fluid collection    Auditory hallucinations    Vitamin D deficiency    Gallstones    Herpesvirus 2    Chronic right-sided low back pain with right-sided sciatica    Atypical chest pain    Chest pain, atypical    SANTOS (dyspnea on exertion)    Palpitation    Lower abdominal pain    Keratoconjunctivitis sicca  "      Family History   Problem Relation Age of Onset    Breast cancer Paternal Grandmother     Diabetes Maternal Grandmother     Hypertension Mother     Breast cancer Maternal Aunt     Cancer Maternal Aunt      colon cancer    Colon cancer Maternal Aunt     Miscarriages / Stillbirths Cousin     Stroke Maternal Aunt     Ovarian cancer Neg Hx     Deep vein thrombosis Neg Hx     Pulmonary embolism Neg Hx      Past Surgical History:   Procedure Laterality Date    APPENDECTOMY      COLONOSCOPY N/A 7/31/2017    Procedure: COLONOSCOPY;  Surgeon: Yuliana Michael MD;  Location: [facility];  Service: Endoscopy;  Laterality: N/A;    DILATION AND CURETTAGE OF UTERUS      bleeding    HYSTERECTOMY  08/31/2016    BS         Current Outpatient Prescriptions:     ALBUTEROL INHL, Inhale 2 puffs into the lungs continuous prn., Disp: , Rfl:     blood sugar diagnostic Strp, 1 each by Other route 3 (three) times daily., Disp: 100 each, Rfl: 11    blood-glucose meter Misc, Dispense 1, Disp: 1 each, Rfl: 0    ibuprofen (ADVIL,MOTRIN) 800 MG tablet, Take 1 tablet (800 mg total) by mouth 3 (three) times daily., Disp: 90 tablet, Rfl: 0    lancets Mis, 1 each by Other route 3 (three) times daily., Disp: 100 each, Rfl: 0    levalbuterol (XOPENEX) 0.63 mg/3 mL nebulizer solution, Take 1 ampule by nebulization every 4 (four) hours as needed for Wheezing., Disp: , Rfl:     LOTEMAX 0.5 % DrpG, Place 1 drop into both eyes., Disp: , Rfl: 0    pantoprazole (PROTONIX) 40 MG tablet, Take 1 tablet (40 mg total) by mouth once daily., Disp: 30 tablet, Rfl: 5    pen needle, diabetic (COMFORT EZ PEN NEEDLES) 33 gauge x 1/4" Ndle, 1 Units by Misc.(Non-Drug; Combo Route) route every evening., Disp: 100 each, Rfl: 5    sucralfate (CARAFATE) 1 gram tablet, TAKE 1 TABLET(1 GRAM) BY MOUTH FOUR TIMES DAILY BEFORE MEALS AND AT NIGHT, Disp: 120 tablet, Rfl: 0    escitalopram oxalate (LEXAPRO) 20 MG tablet, Take 1/2 tablet daily for 3 days then " "1 tablet daily, Disp: 30 tablet, Rfl: 2    linaclotide (LINZESS) 145 mcg Cap capsule, Take 1 capsule (145 mcg total) by mouth once daily., Disp: 30 capsule, Rfl: 5    risperidone (RISPERDAL) 1 MG tablet, Take 1 tablet (1 mg total) by mouth every evening., Disp: 30 tablet, Rfl: 1    simethicone (MYLICON) 125 MG chewable tablet, Take 1 tablet (125 mg total) by mouth every 6 (six) hours as needed for Flatulence., Disp: 30 tablet, Rfl: 0    Review of Systems   Constitutional: Positive for chills and fatigue. Negative for fever and weight loss.   HENT: Negative for congestion.    Respiratory: Negative for cough and shortness of breath.    Cardiovascular: Negative for chest pain.   Gastrointestinal: Negative for abdominal pain, diarrhea, flatus, hematochezia, melena, nausea and vomiting.   Endocrine: Positive for polyuria.   Genitourinary: Positive for dysuria, flank pain, frequency and urgency. Negative for hematuria.   Musculoskeletal: Positive for back pain.   Skin: Negative for rash.   Neurological: Negative for dizziness and headaches.   Psychiatric/Behavioral: The patient is not nervous/anxious.        Objective:   /65 (BP Location: Left arm, Patient Position: Sitting, BP Method: X-Large (Automatic))   Pulse 71   Temp 96.9 °F (36.1 °C) (Tympanic)   Resp 16   Ht 5' 5" (1.651 m)   Wt 115.5 kg (254 lb 10.1 oz)   LMP 08/21/2016   BMI 42.37 kg/m²      Physical Exam   Constitutional: She is oriented to person, place, and time. She appears well-developed and well-nourished. No distress.   HENT:   Head: Normocephalic and atraumatic.   Nose: Nose normal.   Eyes: Conjunctivae and EOM are normal. Pupils are equal, round, and reactive to light. Right eye exhibits no discharge. Left eye exhibits no discharge.   Neck: No thyromegaly present.   Cardiovascular: Normal rate and regular rhythm.    No murmur heard.  Pulmonary/Chest: Effort normal and breath sounds normal. No respiratory distress.   Abdominal: Soft. She " exhibits no distension.   Musculoskeletal: She exhibits no edema.   No exquisite CVA tenderness   Neurological: She is alert and oriented to person, place, and time.   Skin: No rash noted. She is not diaphoretic.   Psychiatric: She has a normal mood and affect. Her behavior is normal.       Assessment & Plan     1. Acute cystitis with hematuria  Treating with higher dose of Cipro for 1 week since she has been having symptoms for a while and has some symptoms that could be consistent with pyelonephritis.  She has no nausea and no fever and I cautioned that if she develops any of that she should come to the emergency room.  - POCT URINE DIPSTICK WITHOUT MICROSCOPE    2. Type 2 diabetes mellitus without complication, without long-term current use of insulin  Has not been consistent with trulicity and in the past has had issues with metformin but would try to get her on metformin again.  We discussed an exercise plan and will have her follow-up in 3 weeks.  - Comprehensive metabolic panel; Future  - TSH; Future  - CBC auto differential; Future  - Hemoglobin A1c; Future    3. Noncompliance with medications  She does not want to take risperidone nor Lexapro any longer.  She doesn't like the side effects of these.  She prefers to focus on lifestyle modifications to improve this.    4. Morbid obesity with BMI of 40.0-44.9, adult  Counseled on importance on diet and exercise to decrease risk of comorbid conditions and for improved quality of life.    5. Vaginal dryness  Recommended topical Premarin    6. Severe episode of recurrent major depressive disorder, with psychotic features  Certainly needs to follow-up closely as she may need to be restarted on medication despite some of the negative side effects.  Continue to follow up with psychiatry.  Implement exercise.

## 2017-11-02 NOTE — TELEPHONE ENCOUNTER
Pt said only the Rx for cipro was at the pharmacy. It shows in Epic that is all that was sent too. She said she needs the Rx's for trulicity, metformin, and a cream for vaginal dryness. Please advise.

## 2017-11-07 ENCOUNTER — TELEPHONE (OUTPATIENT)
Dept: INTERNAL MEDICINE | Facility: CLINIC | Age: 42
End: 2017-11-07

## 2017-11-07 DIAGNOSIS — Z11.1 PPD SCREENING TEST: Primary | ICD-10-CM

## 2017-11-10 ENCOUNTER — TELEPHONE (OUTPATIENT)
Dept: INTERNAL MEDICINE | Facility: CLINIC | Age: 42
End: 2017-11-10

## 2017-11-10 NOTE — TELEPHONE ENCOUNTER
Spoke with pt and informed her per Gabby Carter NP that she may need to go to Urgent care wherever she is today bc it would be worse to give her an additional ABX that is ineffective. She has to have a UA and culture for additional ABX. Pt scheduled with Gabby Carter, for Monday.

## 2017-11-10 NOTE — TELEPHONE ENCOUNTER
Patient is unable to come in today,she is out of town. She can come in on Monday to do the urine . Is there anything she can do until then.

## 2017-11-10 NOTE — TELEPHONE ENCOUNTER
She may need to go to urgent care wherever she is today bc it would be worse to give her an additional ABX that is ineffective. She has to have UA and culture for addition ABX. If she wants you can schedule her with me on monday

## 2017-11-10 NOTE — TELEPHONE ENCOUNTER
----- Message from Holly Bonds sent at 11/9/2017  1:27 PM CST -----  Pt is requesting a refill on an antibiotics.        Please call pt back at [phone]        .  [facility] [id number] - [place], [place] - [address] [place] [address]  AT [facility] [id number] [place] [address]  Phone: [phone] Fax: [phone]

## 2017-11-10 NOTE — TELEPHONE ENCOUNTER
She really should be seen so that a urine culture can be performed. At that time Gabby or Dr. Gutierrez can decide on antibiotic depending on her clinical presentation.

## 2017-11-10 NOTE — TELEPHONE ENCOUNTER
Spoke with pt and she said the Cipro  prescribed her for her bladder infection is not working. Can he please send her in something else. I explained to her he is not in today. But as soon as we get an answer from him or another provider I will call her back. Pt verbalized understanding.

## 2017-11-11 ENCOUNTER — HOSPITAL ENCOUNTER (EMERGENCY)
Facility: HOSPITAL | Age: 42
Discharge: HOME | End: 2017-11-11
Attending: INTERNAL MEDICINE
Payer: MEDICAID

## 2017-11-11 ENCOUNTER — OFFICE VISIT (OUTPATIENT)
Dept: URGENT CARE | Facility: CLINIC | Age: 42
End: 2017-11-11
Payer: MEDICAID

## 2017-11-11 VITALS
DIASTOLIC BLOOD PRESSURE: 80 MMHG | TEMPERATURE: 96 F | HEIGHT: 65 IN | BODY MASS INDEX: 42.54 KG/M2 | HEART RATE: 52 BPM | OXYGEN SATURATION: 98 % | WEIGHT: 255.31 LBS | SYSTOLIC BLOOD PRESSURE: 116 MMHG

## 2017-11-11 VITALS
SYSTOLIC BLOOD PRESSURE: 130 MMHG | RESPIRATION RATE: 18 BRPM | TEMPERATURE: 98 F | BODY MASS INDEX: 42.69 KG/M2 | DIASTOLIC BLOOD PRESSURE: 80 MMHG | HEIGHT: 65 IN | WEIGHT: 256.19 LBS | HEART RATE: 98 BPM | OXYGEN SATURATION: 98 %

## 2017-11-11 DIAGNOSIS — M54.2 NECK PAIN: ICD-10-CM

## 2017-11-11 DIAGNOSIS — R51.9 NONINTRACTABLE HEADACHE, UNSPECIFIED CHRONICITY PATTERN, UNSPECIFIED HEADACHE TYPE: ICD-10-CM

## 2017-11-11 DIAGNOSIS — R07.9 CHEST PAIN, UNSPECIFIED TYPE: Primary | ICD-10-CM

## 2017-11-11 DIAGNOSIS — M47.22 OSTEOARTHRITIS OF SPINE WITH RADICULOPATHY, CERVICAL REGION: Primary | ICD-10-CM

## 2017-11-11 DIAGNOSIS — E11.9 TYPE 2 DIABETES MELLITUS WITHOUT COMPLICATION, WITHOUT LONG-TERM CURRENT USE OF INSULIN: ICD-10-CM

## 2017-11-11 DIAGNOSIS — R07.9 CHEST PAIN: ICD-10-CM

## 2017-11-11 PROCEDURE — 99214 OFFICE O/P EST MOD 30 MIN: CPT | Mod: PBBFAC,25,PO | Performed by: NURSE PRACTITIONER

## 2017-11-11 PROCEDURE — 93010 ELECTROCARDIOGRAM REPORT: CPT | Mod: ,,, | Performed by: INTERNAL MEDICINE

## 2017-11-11 PROCEDURE — 63600175 PHARM REV CODE 636 W HCPCS: Performed by: INTERNAL MEDICINE

## 2017-11-11 PROCEDURE — 99284 EMERGENCY DEPT VISIT MOD MDM: CPT | Mod: 25,27

## 2017-11-11 PROCEDURE — 93000 ELECTROCARDIOGRAM COMPLETE: CPT | Mod: S$GLB,,, | Performed by: NUCLEAR MEDICINE

## 2017-11-11 PROCEDURE — 99214 OFFICE O/P EST MOD 30 MIN: CPT | Mod: S$PBB,,, | Performed by: NURSE PRACTITIONER

## 2017-11-11 PROCEDURE — 93005 ELECTROCARDIOGRAM TRACING: CPT

## 2017-11-11 PROCEDURE — 99999 PR PBB SHADOW E&M-EST. PATIENT-LVL IV: CPT | Mod: PBBFAC,,, | Performed by: NURSE PRACTITIONER

## 2017-11-11 PROCEDURE — 96372 THER/PROPH/DIAG INJ SC/IM: CPT

## 2017-11-11 RX ORDER — ZIPRASIDONE MESYLATE 20 MG/ML
20 INJECTION, POWDER, LYOPHILIZED, FOR SOLUTION INTRAMUSCULAR
Status: COMPLETED | OUTPATIENT
Start: 2017-11-11 | End: 2017-11-11

## 2017-11-11 RX ORDER — ASPIRIN 325 MG
325 TABLET ORAL
Status: COMPLETED | OUTPATIENT
Start: 2017-11-11 | End: 2017-11-11

## 2017-11-11 RX ORDER — GABAPENTIN 100 MG/1
100 CAPSULE ORAL 3 TIMES DAILY
Qty: 90 CAPSULE | Refills: 11 | Status: SHIPPED | OUTPATIENT
Start: 2017-11-11 | End: 2017-12-04

## 2017-11-11 RX ADMIN — Medication 325 MG: at 03:11

## 2017-11-11 RX ADMIN — ZIPRASIDONE MESYLATE 20 MG: 20 INJECTION, POWDER, LYOPHILIZED, FOR SOLUTION INTRAMUSCULAR at 05:11

## 2017-11-11 NOTE — ED PROVIDER NOTES
SCRIBE #1 NOTE: I, Loreto Pickard, am scribing for, and in the presence of, Aiyana Mcgregor MD. I have scribed the entire note.      History      Chief Complaint   Patient presents with    Chest Pain     was sent by [facility]       Review of patient's allergies indicates:  No Known Allergies     HPI   HPI    11/11/2017, 4:27 PM   History obtained from the patient      History of Present Illness: Yuli Milton is a 42 y.o. female patient who presents to the Emergency Department for CP which onset gradually today around 11 AM. Pt states that she was told to come to ED by [facility]. Pt has a hx of schizophrenia. Symptoms are constant and moderate in severity.  No mitigating or exacerbating factors reported. Associated sxs include R arm numbness, palpations, SOB, HA, and R neck pain. Patient denies any fever, chills, SOB, diaphoresis, extremity weakness/numbness, leg pain/swelling, dizziness, cough, n/v, and all other sxs at this time. Prior Tx includes BC powder. No further complaints or concerns at this time.         Arrival mode: Personal vehicle      PCP: Scottie Buitrago DO       Past Medical History:  Past Medical History:   Diagnosis Date    Abnormal Pap smear of cervix     treatment??    Abnormal Pap smear of vagina     Anemia     Anxiety and depression     Asthma     Chlamydia     Depression (emotion)     Diabetes mellitus     Gallstones     Gastritis     History of ovarian cyst     History of syphilis     History of trichomoniasis     Mental disorder     PONV (postoperative nausea and vomiting)        Past Surgical History:  Past Surgical History:   Procedure Laterality Date    APPENDECTOMY      COLONOSCOPY N/A 7/31/2017    Procedure: COLONOSCOPY;  Surgeon: Yuliana Michael MD;  Location: [facility];  Service: Endoscopy;  Laterality: N/A;    DILATION AND CURETTAGE OF UTERUS      bleeding    HYSTERECTOMY  08/31/2016    BS         Family History:  Family History   Problem Relation Age  of Onset    Breast cancer Paternal Grandmother     Diabetes Maternal Grandmother     Hypertension Mother     Breast cancer Maternal Aunt     Cancer Maternal Aunt      colon cancer    Colon cancer Maternal Aunt     Miscarriages / Stillbirths Cousin     Stroke Maternal Aunt     Ovarian cancer Neg Hx     Deep vein thrombosis Neg Hx     Pulmonary embolism Neg Hx        Social History:  Social History     Social History Main Topics    Smoking status: Never Smoker    Smokeless tobacco: Never Used    Alcohol use No    Drug use: No    Sexual activity: Yes     Partners: Male     Birth control/ protection: See Surgical Hx, Post-menopausal      Comment: mut monog       ROS   Review of Systems   Constitutional: Negative for chills, diaphoresis and fever.   HENT: Negative for sore throat.    Respiratory: Positive for shortness of breath. Negative for cough.    Cardiovascular: Positive for chest pain and palpitations. Negative for leg swelling.   Gastrointestinal: Negative for nausea and vomiting.   Genitourinary: Negative for dysuria.   Musculoskeletal: Positive for neck pain. Negative for back pain.        - leg pain    Skin: Negative for rash.   Neurological: Positive for numbness. Negative for dizziness and weakness.   Hematological: Does not bruise/bleed easily.       Physical Exam      Initial Vitals [11/11/17 1534]   BP Pulse Resp Temp SpO2   129/76 107 -- 97.9 °F (36.6 °C) 96 %      MAP       93.67          Physical Exam  Nursing Notes and Vital Signs Reviewed.  Constitutional: Patient is in no apparent distress. Well-developed and well-nourished.  Head: Atraumatic. Normocephalic.  Eyes: PERRL. EOM intact. Conjunctivae are not pale. No scleral icterus.  ENT: Mucous membranes are moist. Oropharynx is clear and symmetric.    Neck: Supple. Full ROM. No lymphadenopathy. R neck TTP.   Cardiovascular: Regular rate. Regular rhythm. No murmurs, rubs, or gallops. Distal pulses are 2+ and  "symmetric.  Pulmonary/Chest: No respiratory distress. Clear to auscultation bilaterally. No wheezing, rales, or rhonchi.  Abdominal: Soft and non-distended.  There is no tenderness.  No rebound, guarding, or rigidity. Good bowel sounds.  Genitourinary: No CVA tenderness  Musculoskeletal: Moves all extremities. No obvious deformities. No edema. No calf tenderness.  Skin: Warm and dry.  Neurological:  Alert, awake, and appropriate.  Normal speech.  No acute focal neurological deficits are appreciated.  Psychiatric: Normal affect. Good eye contact. Appropriate in content.    ED Course    Procedures  ED Vital Signs:  Vitals:    11/11/17 1534   BP: 129/76   Pulse: 107   Temp: 97.9 °F (36.6 °C)   SpO2: 96%   Weight: 116.2 kg (256 lb 2.8 oz)   Height: 5' 5" (1.651 m)         Imaging Results:  Imaging Results          X-Ray Cervical Spine AP And Lateral (Final result)  Result time 11/11/17 16:54:35    Final result by Bridgette Hoang III, MD (11/11/17 16:54:35)                 Impression:     No acute cervical spine abnormality identified.       Electronically signed by: BRIDGETTE HOANG MD  Date:     11/11/17  Time:    16:54              Narrative:    XR CERVICAL SPINE AP LATERAL    Clinical history: M54.2 Cervicalgia    Findings: No fracture, prevertebral soft tissue swelling or other acute abnormality is seen. Cervical spine alignment is anatomic.  There is minimal disc height loss at C4-5.  There are small anterior osteophytes at C6-7 without significant disc height loss.  The remaining intervertebral disk heights are well-maintained.                                  The EKG was ordered, reviewed, and independently interpreted by the ED provider.  Interpretation time: 1543  Rate: 54 BPM  Rhythm: sinus bradycardia  Interpretation: No STEMI.  When compared to EKG performed 3/2017 and 7/2017, there are no significant changes.      The Emergency Provider reviewed the vital signs and test results, which are outlined above.    ED " Discussion   6:35 PM: Reassessed pt at this time.  Pt states her condition has improved at this time. Discussed with pt all pertinent ED information and results. Discussed pt dx and plan of tx. Gave pt all f/u and return to the ED instructions. All questions and concerns were addressed at this time. Pt expresses understanding of information and instructions, and is comfortable with plan to discharge. Pt is stable for discharge.      I have discussed with patient and/or family/caretaker chest pain precautions, specifically to return for worsening chest pain, shortness of breath, fever, or any concern.  I have low suspicion for cardiopulmonary, vascular, infectious, respiratory, or other emergent medical condition based on my evaluation in the ED.      ED Medication(s):  Medications   ziprasidone injection 20 mg (20 mg Intramuscular Given 11/11/17 1741)   ziprasidone injection 20 mg (0 mg Intramuscular Override Pull 11/11/17 1745)       New Prescriptions    GABAPENTIN (NEURONTIN) 100 MG CAPSULE    Take 1 capsule (100 mg total) by mouth 3 (three) times daily.             Medical Decision Making    Medical Decision Making:   Clinical Tests:   Radiological Study: Ordered and Reviewed  Medical Tests: Ordered and Reviewed           Scribe Attestation:   Scribe #1: I performed the above scribed service and the documentation accurately describes the services I performed. I attest to the accuracy of the note.    Attending:   Physician Attestation Statement for Scribe #1: I, Aiyana Mcgregor MD, personally performed the services described in this documentation, as scribed by Loreto Pickard, in my presence, and it is both accurate and complete.          Clinical Impression       ICD-10-CM ICD-9-CM   1. Osteoarthritis of spine with radiculopathy, cervical region M47.22 721.0   2. Chest pain R07.9 786.50   3. Neck pain M54.2 723.1       After detailed evaluation of the patient has come to the conclusion the patient has chronic  chest pain with a negative stress test.  She has been having this chest pain for many months.  Has been evaluated many times including cardiology.  In the emergency room on her history and physical did not pertain to any cardiac etiology of chest pain.  EKG done in the emergency room shows no new changes.  I have suggested the patient to follow-up with cardiology for chest pain for further evaluation if needed.    Patient also has paresthesias which may be related to her current medications.  No clinically relevant signs or symptoms of stroke or found in the emergency room.  No head CT was performed.  Further evaluation by neurology as recommended.    I have also reviewed her psychiatry records in detail.    I have also tried to get more history from the patient's daughter which was not very helpful.  According the daughter patient always has some complaint all  her life which are not significant.        Disposition:   Disposition: Discharged  Condition: Stable         Aiyana Mcgregor MD  11/12/17 0757

## 2017-11-11 NOTE — PROGRESS NOTES
"Subjective:      Patient ID: Yuli Milton is a 42 y.o. female.    Chief Complaint: No chief complaint on file.    Chest Pain    This is a recurrent problem. The current episode started 1 to 4 weeks ago (2 weeks). The onset quality is gradual. The problem occurs intermittently. The problem has been waxing and waning. The pain is present in the substernal region. The pain is moderate. The quality of the pain is described as pressure. The pain radiates to the right arm. Associated symptoms include exertional chest pressure, headaches, palpitations and shortness of breath. Pertinent negatives include no fever. The pain is aggravated by nothing. She has tried nothing for the symptoms. The treatment provided no relief. Risk factors include obesity.   Her past medical history is significant for diabetes.     Review of Systems   Constitutional: Negative for fever.   HENT: Negative.    Respiratory: Positive for shortness of breath.    Cardiovascular: Positive for chest pain and palpitations.   Gastrointestinal: Negative.    Genitourinary: Negative.    Musculoskeletal: Negative.    Skin: Negative.    Neurological: Positive for headaches.   Psychiatric/Behavioral: The patient is nervous/anxious.        Objective:   /80 (BP Location: Left arm, Patient Position: Sitting, BP Method: Large (Manual))   Pulse (!) 52   Temp 96 °F (35.6 °C) (Tympanic)   Ht 5' 5" (1.651 m)   Wt 115.8 kg (255 lb 4.7 oz)   LMP 08/21/2016   SpO2 98%   BMI 42.48 kg/m²   Physical Exam   Constitutional: She is oriented to person, place, and time. She appears well-developed and well-nourished. No distress.   Complete exam deferred as patient was sent to ER for further workup.    HENT:   Head: Normocephalic and atraumatic.   Nose: Nose normal.   Eyes: Conjunctivae are normal.   Neck: Normal range of motion. Neck supple.   Cardiovascular: Normal rate.    Pulmonary/Chest: Effort normal. No respiratory distress.   Musculoskeletal: Normal range " of motion.   Neurological: She is alert and oriented to person, place, and time.   Skin: Skin is warm and dry. She is not diaphoretic.   Psychiatric: Her speech is normal and behavior is normal. Thought content normal. Her mood appears anxious.   Nursing note and vitals reviewed.    Assessment:      1. Chest pain, unspecified type    2. Nonintractable headache, unspecified chronicity pattern, unspecified headache type    3. Type 2 diabetes mellitus without complication, without long-term current use of insulin    4. Class 3 obesity with body mass index (BMI) of 40.0 to 44.9 in adult, unspecified obesity type, unspecified whether serious comorbidity present       Plan:   Chest pain, unspecified type  -     aspirin tablet 325 mg; Take 1 tablet (325 mg total) by mouth one time.    Nonintractable headache, unspecified chronicity pattern, unspecified headache type    Type 2 diabetes mellitus without complication, without long-term current use of insulin    Class 3 obesity with body mass index (BMI) of 40.0 to 44.9 in adult, unspecified obesity type, unspecified whether serious comorbidity present    Advised to go to ER for further workup due to possible change on EKG and inability to obtain stat cardiac labs at this time. Offered ambulance transport, however, her daughter is here and will drive her. I called report to Yisel at [facility].   ASA administered prior to discharge from Urgent Care.

## 2017-11-12 NOTE — ED NOTES
Patient complains of neck pain and chest discomfort. Symptoms have been present since approx 11 am today.   Level of Consciousness: The patient is awake, alert, and oriented.  Appearance: Sitting up in ED stretcher with no acute distress noted. Clothing and hygiene are clean and worn appropriately.  Skin: Skin is intact; color consistent with ethnicity.    Musculoskeletal: Moves all extremities well in full range of motion. No obvious deformities or swelling noted.  Respiratory: Airway open and patent, respirations spontaneous, even and unlabored. No accessory muscles in use.   Cardiac: Regular rate, no peripheral edema noted..  Abdomen:  No distention noted.  Neurologic: PERRLA, face exhibits symmetrical expression, reports normal sensation to all extremities and face.    Patient verbalized understanding of status and plan of care.

## 2017-11-13 ENCOUNTER — OFFICE VISIT (OUTPATIENT)
Dept: INTERNAL MEDICINE | Facility: CLINIC | Age: 42
End: 2017-11-13
Payer: MEDICAID

## 2017-11-13 VITALS
TEMPERATURE: 96 F | HEIGHT: 65 IN | RESPIRATION RATE: 18 BRPM | WEIGHT: 258.81 LBS | BODY MASS INDEX: 43.12 KG/M2 | DIASTOLIC BLOOD PRESSURE: 68 MMHG | SYSTOLIC BLOOD PRESSURE: 110 MMHG | HEART RATE: 84 BPM | OXYGEN SATURATION: 98 %

## 2017-11-13 DIAGNOSIS — N30.01 ACUTE CYSTITIS WITH HEMATURIA: Primary | ICD-10-CM

## 2017-11-13 LAB
BILIRUB SERPL-MCNC: 2 MG/DL
BLOOD, POC UA: 50
GLUCOSE UR QL STRIP: NORMAL
KETONES UR QL STRIP: NEGATIVE
LEUKOCYTE ESTERASE URINE, POC: ABNORMAL
NITRITE, POC UA: NEGATIVE
PH, POC UA: 6
PROTEIN, POC: NEGATIVE
SPECIFIC GRAVITY, POC UA: 1.02
UROBILINOGEN, POC UA: 1

## 2017-11-13 PROCEDURE — 99215 OFFICE O/P EST HI 40 MIN: CPT | Mod: PBBFAC,PO | Performed by: NURSE PRACTITIONER

## 2017-11-13 PROCEDURE — 99999 PR PBB SHADOW E&M-EST. PATIENT-LVL V: CPT | Mod: PBBFAC,,, | Performed by: NURSE PRACTITIONER

## 2017-11-13 PROCEDURE — 99213 OFFICE O/P EST LOW 20 MIN: CPT | Mod: S$PBB,,, | Performed by: NURSE PRACTITIONER

## 2017-11-13 PROCEDURE — 81000 URINALYSIS NONAUTO W/SCOPE: CPT | Mod: PBBFAC,PO | Performed by: NURSE PRACTITIONER

## 2017-11-13 PROCEDURE — 87086 URINE CULTURE/COLONY COUNT: CPT

## 2017-11-13 RX ORDER — DULAGLUTIDE 0.75 MG/.5ML
INJECTION, SOLUTION SUBCUTANEOUS
Refills: 2 | COMMUNITY
Start: 2017-11-02 | End: 2018-02-07 | Stop reason: SDUPTHER

## 2017-11-13 RX ORDER — NITROFURANTOIN 25; 75 MG/1; MG/1
100 CAPSULE ORAL 2 TIMES DAILY
Qty: 20 CAPSULE | Refills: 0 | Status: SHIPPED | OUTPATIENT
Start: 2017-11-13 | End: 2017-11-23

## 2017-11-15 LAB — BACTERIA UR CULT: NO GROWTH

## 2017-11-17 ENCOUNTER — PATIENT MESSAGE (OUTPATIENT)
Dept: INTERNAL MEDICINE | Facility: CLINIC | Age: 42
End: 2017-11-17

## 2017-11-21 ENCOUNTER — PATIENT OUTREACH (OUTPATIENT)
Dept: ADMINISTRATIVE | Facility: HOSPITAL | Age: 42
End: 2017-11-21

## 2017-11-27 ENCOUNTER — OFFICE VISIT (OUTPATIENT)
Dept: INTERNAL MEDICINE | Facility: CLINIC | Age: 42
End: 2017-11-27
Payer: MEDICAID

## 2017-11-27 VITALS
HEIGHT: 65 IN | HEART RATE: 77 BPM | BODY MASS INDEX: 42.79 KG/M2 | DIASTOLIC BLOOD PRESSURE: 74 MMHG | SYSTOLIC BLOOD PRESSURE: 116 MMHG | TEMPERATURE: 96 F | WEIGHT: 256.81 LBS | RESPIRATION RATE: 18 BRPM | OXYGEN SATURATION: 98 %

## 2017-11-27 DIAGNOSIS — J30.89 ACUTE NON-SEASONAL ALLERGIC RHINITIS, UNSPECIFIED TRIGGER: Primary | ICD-10-CM

## 2017-11-27 PROCEDURE — 99214 OFFICE O/P EST MOD 30 MIN: CPT | Mod: S$PBB,,, | Performed by: FAMILY MEDICINE

## 2017-11-27 PROCEDURE — 99213 OFFICE O/P EST LOW 20 MIN: CPT | Mod: PBBFAC,PO | Performed by: FAMILY MEDICINE

## 2017-11-27 PROCEDURE — 99999 PR PBB SHADOW E&M-EST. PATIENT-LVL III: CPT | Mod: PBBFAC,,, | Performed by: FAMILY MEDICINE

## 2017-11-27 RX ORDER — FLUTICASONE PROPIONATE 50 MCG
1 SPRAY, SUSPENSION (ML) NASAL DAILY
Qty: 16 G | Refills: 5 | Status: SHIPPED | OUTPATIENT
Start: 2017-11-27 | End: 2018-02-05

## 2017-11-27 RX ORDER — METHYLPREDNISOLONE 4 MG/1
TABLET ORAL
Qty: 1 PACKAGE | Refills: 0 | Status: SHIPPED | OUTPATIENT
Start: 2017-11-27 | End: 2017-12-04 | Stop reason: ALTCHOICE

## 2017-11-27 RX ORDER — POLYETHYLENE GLYCOL 400 AND PROPYLENE GLYCOL 4; 3 MG/ML; MG/ML
SOLUTION/ DROPS OPHTHALMIC
Refills: 6 | COMMUNITY
Start: 2017-11-09 | End: 2019-01-21

## 2017-11-27 NOTE — PROGRESS NOTES
Subjective:       Patient ID: Yuli Milton is a 42 y.o. female.    Chief Complaint: Follow-up and Cough    HPI  Has been having a cough for almost two weeks now  Went to [facility] on 11/16 and had steroid shot and has tried tessalon. Feeling sob and has used albuterol nebulizer. No fever/chills. Cough worse at night and she feels she has to keep clearing her throat.     Moving into a new older home recently.     Patient Active Problem List   Diagnosis    Adjustment disorder with depressed mood    Adrenal mass    Anxiety    Asthma    Bronchitis    Depression    Iron deficiency anemia    Morbid obesity due to excess calories    Type 2 diabetes mellitus without complication, without long-term current use of insulin    Status post laparoscopic hysterectomy    Pelvic fluid collection    Auditory hallucinations    Vitamin D deficiency    Gallstones    Herpesvirus 2    Chronic right-sided low back pain with right-sided sciatica    Atypical chest pain    Chest pain, atypical    SANTOS (dyspnea on exertion)    Palpitation    Lower abdominal pain    Keratoconjunctivitis sicca       Family History   Problem Relation Age of Onset    Breast cancer Paternal Grandmother     Diabetes Maternal Grandmother     Hypertension Mother     Breast cancer Maternal Aunt     Cancer Maternal Aunt      colon cancer    Colon cancer Maternal Aunt     Miscarriages / Stillbirths Cousin     Stroke Maternal Aunt     Ovarian cancer Neg Hx     Deep vein thrombosis Neg Hx     Pulmonary embolism Neg Hx      Past Surgical History:   Procedure Laterality Date    APPENDECTOMY      COLONOSCOPY N/A 7/31/2017    Procedure: COLONOSCOPY;  Surgeon: Yuliana Michael MD;  Location[facility];  Service: Endoscopy;  Laterality: N/A;    DILATION AND CURETTAGE OF UTERUS      bleeding    HYSTERECTOMY  08/31/2016    BS         Current Outpatient Prescriptions:     ALBUTEROL INHL, Inhale 2 puffs into the lungs continuous prn., Disp: , Rfl:  "    blood sugar diagnostic Str, 1 each by Other route 3 (three) times daily., Disp: 100 each, Rfl: 11    blood-glucose meter Misc, Dispense 1, Disp: 1 each, Rfl: 0    conjugated estrogens (PREMARIN) vaginal cream, Place 0.5 g vaginally once daily., Disp: 1 applicator, Rfl: 5    gabapentin (NEURONTIN) 100 MG capsule, Take 1 capsule (100 mg total) by mouth 3 (three) times daily., Disp: 90 capsule, Rfl: 11    lancets [facility], 1 each by Other route 3 (three) times daily., Disp: 100 each, Rfl: 0    levalbuterol (XOPENEX) 0.63 mg/3 mL nebulizer solution, Take 1 ampule by nebulization every 4 (four) hours as needed for Wheezing., Disp: , Rfl:     LOTEMAX 0.5 % DrpG, Place 1 drop into both eyes., Disp: , Rfl: 0    metFORMIN (GLUCOPHAGE) 500 MG tablet, Take 1 tablet (500 mg total) by mouth 2 (two) times daily with meals., Disp: 180 tablet, Rfl: 3    pen needle, diabetic (COMFORT EZ PEN NEEDLES) 33 gauge x 1/4" Ndle, 1 Units by Misc.(Non-Drug; Combo Route) route every evening., Disp: 100 each, Rfl: 5    sucralfate (CARAFATE) 1 gram tablet, TAKE 1 TABLET(1 GRAM) BY MOUTH FOUR TIMES DAILY BEFORE MEALS AND AT NIGHT, Disp: 120 tablet, Rfl: 0    TRULICITY 0.75 mg/0.5 mL PnIj, INJECT 0.75 MG  INTO THE SKIN EVERY 7 DAYS, Disp: , Rfl: 2    SYSTANE, PROPYLENE GLYCOL, 0.4-0.3 % Drop, INSTILL 1 GTT INTO OU BID, Disp: , Rfl: 6    Review of Systems   Constitutional: Negative for chills and fever.   HENT: Positive for congestion and postnasal drip.    Respiratory: Positive for cough and shortness of breath.    Cardiovascular: Negative for chest pain.   Gastrointestinal: Negative for abdominal pain.   Skin: Negative for rash.   Neurological: Negative for dizziness.       Objective:   /74 (BP Location: Right arm, Patient Position: Sitting, BP Method: Large (Manual))   Pulse 77   Temp 96 °F (35.6 °C) (Tympanic)   Resp 18   Ht 5' 5" (1.651 m)   Wt 116.5 kg (256 lb 13.4 oz)   LMP 08/21/2016   SpO2 98%   BMI 42.74 kg/m² "      Physical Exam   Constitutional: She is oriented to person, place, and time. She appears well-developed and well-nourished. No distress.   HENT:   Head: Normocephalic and atraumatic.   Nose: Mucosal edema present. Right sinus exhibits no maxillary sinus tenderness. Left sinus exhibits no maxillary sinus tenderness.   Eyes: Conjunctivae and EOM are normal. Pupils are equal, round, and reactive to light. Right eye exhibits no discharge. Left eye exhibits no discharge.   Neck: No thyromegaly present.   Cardiovascular: Normal rate and regular rhythm.    No murmur heard.  Pulmonary/Chest: Effort normal and breath sounds normal. No respiratory distress. She has no wheezes.   Abdominal: Soft. She exhibits no distension.   Musculoskeletal: She exhibits no edema.   Neurological: She is alert and oriented to person, place, and time.   Skin: No rash noted. She is not diaphoretic.   Psychiatric: She has a normal mood and affect. Her behavior is normal.       Assessment & Plan      1. Acute non-seasonal allergic rhinitis, unspecified trigger  Symptoms and exam consistent with allergic cough. Maybe triggered by new exposures in her new home. Recommended medrol dosepak and flonase.

## 2017-11-27 NOTE — PATIENT INSTRUCTIONS

## 2017-12-04 ENCOUNTER — OFFICE VISIT (OUTPATIENT)
Dept: INTERNAL MEDICINE | Facility: CLINIC | Age: 42
End: 2017-12-04
Payer: MEDICAID

## 2017-12-04 ENCOUNTER — TELEPHONE (OUTPATIENT)
Dept: INTERNAL MEDICINE | Facility: CLINIC | Age: 42
End: 2017-12-04

## 2017-12-04 VITALS
DIASTOLIC BLOOD PRESSURE: 70 MMHG | WEIGHT: 255.5 LBS | OXYGEN SATURATION: 99 % | TEMPERATURE: 97 F | HEART RATE: 62 BPM | RESPIRATION RATE: 16 BRPM | SYSTOLIC BLOOD PRESSURE: 108 MMHG | BODY MASS INDEX: 42.57 KG/M2 | HEIGHT: 65 IN

## 2017-12-04 DIAGNOSIS — J45.909 ASTHMA, UNSPECIFIED ASTHMA SEVERITY, UNSPECIFIED WHETHER COMPLICATED, UNSPECIFIED WHETHER PERSISTENT: Primary | ICD-10-CM

## 2017-12-04 DIAGNOSIS — J11.1 FLU SYNDROME: ICD-10-CM

## 2017-12-04 DIAGNOSIS — R00.2 PALPITATION: ICD-10-CM

## 2017-12-04 LAB
FLUAV AG SPEC QL IA: NEGATIVE
FLUBV AG SPEC QL IA: NEGATIVE
SPECIMEN SOURCE: NORMAL

## 2017-12-04 PROCEDURE — 99214 OFFICE O/P EST MOD 30 MIN: CPT | Mod: S$PBB,,, | Performed by: FAMILY MEDICINE

## 2017-12-04 PROCEDURE — 87400 INFLUENZA A/B EACH AG IA: CPT | Mod: 59,PO

## 2017-12-04 PROCEDURE — 99214 OFFICE O/P EST MOD 30 MIN: CPT | Mod: PBBFAC,PO | Performed by: FAMILY MEDICINE

## 2017-12-04 PROCEDURE — 99999 PR PBB SHADOW E&M-EST. PATIENT-LVL IV: CPT | Mod: PBBFAC,,, | Performed by: FAMILY MEDICINE

## 2017-12-04 RX ORDER — MONTELUKAST SODIUM 10 MG/1
10 TABLET ORAL NIGHTLY
Qty: 30 TABLET | Refills: 0 | Status: SHIPPED | OUTPATIENT
Start: 2017-12-04 | End: 2018-01-03

## 2017-12-04 RX ORDER — CETIRIZINE HYDROCHLORIDE 10 MG/1
10 TABLET ORAL DAILY
COMMUNITY
Start: 2017-12-04 | End: 2018-05-03

## 2017-12-04 NOTE — PROGRESS NOTES
Subjective:       Patient ID: Yuli Milton is a 42 y.o. female.    Chief Complaint: Cough; Fatigue; Palpitations; and Shortness of Breath    Patient has been assessed at [facility]  As well as Dr. Buitrago recently, and given steroids.  Patient does not have resolution of symptoms.       Cough   This is a chronic problem. The current episode started more than 1 month ago. The problem has been gradually worsening. The problem occurs constantly. Associated symptoms include shortness of breath. She has tried steroid inhaler and oral steroids for the symptoms. The treatment provided no relief. Her past medical history is significant for asthma and bronchitis.   Fatigue   Associated symptoms include coughing and fatigue.   Palpitations    Associated symptoms include coughing and shortness of breath.   Shortness of Breath   Her past medical history is significant for asthma.     Review of Systems   Constitutional: Positive for fatigue.   Respiratory: Positive for cough and shortness of breath.    Cardiovascular: Positive for palpitations.       Objective:      Physical Exam   Constitutional: She appears well-developed and well-nourished. She appears distressed.   HENT:   Head: Normocephalic and atraumatic.   Nose: Nose normal.   Mouth/Throat: Oropharynx is clear and moist.   Pulmonary/Chest: Effort normal and breath sounds normal. No respiratory distress. She has no wheezes.   Skin: Skin is warm and dry. No rash noted. She is not diaphoretic. No erythema.   Nursing note and vitals reviewed.      Assessment:       1. Asthma, unspecified asthma severity, unspecified whether complicated, unspecified whether persistent    2. Flu syndrome    3. Palpitation        Plan:           Palpitation  patient states that she is having she feels like his palpitations. Very recent EKG performed, no changes in care from cardiology    Asthma  Patient states that she is short of breath just walking to the bathroom.  She has no  wheezing on exam, but feel it would be appropriate to repeat patient's PFT at this time    Flu syndrome  Flu swab negative    Asthma, unspecified asthma severity, unspecified whether complicated, unspecified whether persistent  -     cetirizine (ZYRTEC) 10 MG tablet; Take 1 tablet (10 mg total) by mouth once daily.  -     montelukast (SINGULAIR) 10 mg tablet; Take 1 tablet (10 mg total) by mouth every evening.  Dispense: 30 tablet; Refill: 0  -     Complete PFT with bronchodilator; Future; Expected date: 12/05/2017    Flu syndrome  -     Cancel: POCT Influenza A/B  -     Influenza antigen Nasal Swab    Palpitation

## 2017-12-04 NOTE — ASSESSMENT & PLAN NOTE
patient states that she is having she feels like his palpitations. Very recent EKG performed, no changes in care from cardiology

## 2017-12-04 NOTE — TELEPHONE ENCOUNTER
Pt call returned, pt scheduled with Dr Gutierrez for 1pm next appt with Dr. Buitrago for 3:40 did not work, pt offered sooner appt with another provider and agreed. appt confirmed.

## 2017-12-04 NOTE — ASSESSMENT & PLAN NOTE
Patient states that she is short of breath just walking to the bathroom.  She has no wheezing on exam, but feel it would be appropriate to repeat patient's PFT at this time

## 2017-12-04 NOTE — LETTER
12/04/2017    }             [facility]  [address] [address] [address]  Phone: [phone]   12/04/2017    Patient: Yuli Milton   YOB: 1975   Date of Visit: 12/4/2017       To Whom it May Concern:    Yuli Milton was seen in my clinic on 12/4/2017. She may return to work on 12/5/17.    If you have any questions or concerns, please don't hesitate to call.    Sincerely,         Lisa Lanier LPN

## 2017-12-05 ENCOUNTER — PATIENT OUTREACH (OUTPATIENT)
Dept: ADMINISTRATIVE | Facility: HOSPITAL | Age: 42
End: 2017-12-05

## 2017-12-11 ENCOUNTER — PROCEDURE VISIT (OUTPATIENT)
Dept: PULMONOLOGY | Facility: CLINIC | Age: 42
End: 2017-12-11
Payer: MEDICAID

## 2017-12-11 DIAGNOSIS — J45.909 ASTHMA, UNSPECIFIED ASTHMA SEVERITY, UNSPECIFIED WHETHER COMPLICATED, UNSPECIFIED WHETHER PERSISTENT: ICD-10-CM

## 2017-12-11 LAB
POST FEF 25 75: 2.42 L/S (ref 2.41–3.68)
POST FET 100: 9.94 S
POST FEV1 FVC: 79 %
POST FEV1: 2.44 L (ref 2.73–3.33)
POST FIF 50: 1.86 L/S
POST FVC: 3.07 L (ref 3.4–4.12)
POST PEF: 5.15 L/S (ref 6.17–7.93)
PRE DLCO: 17.09 ML/MMHG/MIN (ref 25.28–33.57)
PRE ERV: 0.74 L
PRE FEF 25 75: 2.29 L/S (ref 2.41–3.68)
PRE FET 100: 10.49 S
PRE FEV1 FVC: 78 %
PRE FEV1: 2.57 L (ref 2.73–3.33)
PRE FIF 50: 5.03 L/S
PRE FRC PL: 2.74 L (ref 1.58–2.53)
PRE FVC: 3.29 L (ref 3.4–4.12)
PRE KROGHS K: 4.72 1/MIN
PRE PEF: 6.17 L/S (ref 6.17–7.93)
PRE RV: 1.29 L (ref 1.43–2.13)
PRE SVC: 3.29 L
PRE TLC: 4.59 L (ref 4.84–5.61)
PREDICTED DLCO: 29.42 ML/MMHG/MIN (ref 25.28–33.57)
PREDICTED FEV1 FVC: 81.46 % (ref 76.56–86.36)
PREDICTED FEV1: 3.03 L (ref 2.73–3.33)
PREDICTED FRC N2: 2.06 L (ref 1.58–2.53)
PREDICTED FRC PL: 2.06 L (ref 1.58–2.53)
PREDICTED FVC: 3.76 L (ref 3.4–4.12)
PREDICTED RV: 1.78 L (ref 1.43–2.13)
PREDICTED SVC: 3.39 L
PREDICTED TLC: 5.22 L (ref 4.84–5.61)
PROVOCATION PROTOCOL: ABNORMAL

## 2017-12-11 PROCEDURE — 94729 DIFFUSING CAPACITY: CPT | Mod: PBBFAC,PO

## 2017-12-11 PROCEDURE — 94060 EVALUATION OF WHEEZING: CPT | Mod: PBBFAC,PO

## 2017-12-11 PROCEDURE — 94060 EVALUATION OF WHEEZING: CPT | Mod: 26,S$PBB,, | Performed by: INTERNAL MEDICINE

## 2017-12-11 PROCEDURE — 94729 DIFFUSING CAPACITY: CPT | Mod: 26,S$PBB,, | Performed by: INTERNAL MEDICINE

## 2017-12-11 PROCEDURE — 94726 PLETHYSMOGRAPHY LUNG VOLUMES: CPT | Mod: PBBFAC,PO

## 2017-12-11 PROCEDURE — 94726 PLETHYSMOGRAPHY LUNG VOLUMES: CPT | Mod: 26,S$PBB,, | Performed by: INTERNAL MEDICINE

## 2017-12-18 ENCOUNTER — TELEPHONE (OUTPATIENT)
Dept: PULMONOLOGY | Facility: CLINIC | Age: 42
End: 2017-12-18

## 2018-01-05 ENCOUNTER — OFFICE VISIT (OUTPATIENT)
Dept: INTERNAL MEDICINE | Facility: CLINIC | Age: 43
End: 2018-01-05
Payer: MEDICAID

## 2018-01-05 ENCOUNTER — TELEPHONE (OUTPATIENT)
Dept: INTERNAL MEDICINE | Facility: CLINIC | Age: 43
End: 2018-01-05

## 2018-01-05 VITALS
HEIGHT: 66 IN | WEIGHT: 261 LBS | HEART RATE: 106 BPM | RESPIRATION RATE: 16 BRPM | DIASTOLIC BLOOD PRESSURE: 70 MMHG | TEMPERATURE: 96 F | OXYGEN SATURATION: 100 % | SYSTOLIC BLOOD PRESSURE: 108 MMHG | BODY MASS INDEX: 41.95 KG/M2

## 2018-01-05 DIAGNOSIS — B96.89 BACTERIAL VAGINOSIS: ICD-10-CM

## 2018-01-05 DIAGNOSIS — J06.9 VIRAL UPPER RESPIRATORY TRACT INFECTION: ICD-10-CM

## 2018-01-05 DIAGNOSIS — N76.0 BACTERIAL VAGINOSIS: ICD-10-CM

## 2018-01-05 DIAGNOSIS — Z72.51 HIGH-RISK SEXUAL BEHAVIOR: ICD-10-CM

## 2018-01-05 DIAGNOSIS — N89.8 VAGINAL ITCHING: Primary | ICD-10-CM

## 2018-01-05 LAB
BACTERIA GENITAL QL WET PREP: ABNORMAL
CLUE CELLS VAG QL WET PREP: ABNORMAL
FILAMENT FUNGI VAG WET PREP-#/AREA: ABNORMAL
SPECIMEN SOURCE: ABNORMAL
T VAGINALIS GENITAL QL WET PREP: ABNORMAL
WBC #/AREA VAG WET PREP: ABNORMAL
YEAST GENITAL QL WET PREP: ABNORMAL

## 2018-01-05 PROCEDURE — 87491 CHLMYD TRACH DNA AMP PROBE: CPT

## 2018-01-05 PROCEDURE — 99213 OFFICE O/P EST LOW 20 MIN: CPT | Mod: PBBFAC,PO | Performed by: FAMILY MEDICINE

## 2018-01-05 PROCEDURE — 87210 SMEAR WET MOUNT SALINE/INK: CPT | Mod: PO

## 2018-01-05 PROCEDURE — 99999 PR PBB SHADOW E&M-EST. PATIENT-LVL III: CPT | Mod: PBBFAC,,, | Performed by: FAMILY MEDICINE

## 2018-01-05 PROCEDURE — 99214 OFFICE O/P EST MOD 30 MIN: CPT | Mod: S$PBB,,, | Performed by: FAMILY MEDICINE

## 2018-01-05 RX ORDER — PROMETHAZINE HYDROCHLORIDE AND DEXTROMETHORPHAN HYDROBROMIDE 6.25; 15 MG/5ML; MG/5ML
5 SYRUP ORAL NIGHTLY
Qty: 118 ML | Refills: 0 | Status: SHIPPED | OUTPATIENT
Start: 2018-01-05 | End: 2018-01-15

## 2018-01-05 RX ORDER — CYCLOSPORINE 0.5 MG/ML
1 EMULSION OPHTHALMIC 2 TIMES DAILY
COMMUNITY
End: 2020-07-13

## 2018-01-05 RX ORDER — METRONIDAZOLE 500 MG/1
500 TABLET ORAL EVERY 12 HOURS
Qty: 14 TABLET | Refills: 0 | Status: SHIPPED | OUTPATIENT
Start: 2018-01-05 | End: 2018-02-05 | Stop reason: ALTCHOICE

## 2018-01-05 NOTE — TELEPHONE ENCOUNTER
Spoke with pt and she said that she had sex and the condom broke. That she has vaginal itching and irritation and wants to be checked for STD's. No foul smell.  Pt scheduled to see  today at 1:40pm.

## 2018-01-05 NOTE — TELEPHONE ENCOUNTER
----- Message from Lali Serrato sent at 1/5/2018  9:00 AM CST -----  Pt at [phone]//states she is having female issues and would like to know if possible to be seen today//pt has Medicaid//please call//thanks/willie

## 2018-01-05 NOTE — PROGRESS NOTES
Subjective:       Patient ID: Yuli Milton is a 42 y.o. female.    Chief Complaint: Vaginal Itching; Dysuria; Nausea; and Sore Throat    Throat pain   O: 2 d  L throat  C: achy /sore  D:constant  Domingo / exac: none      Vaginal Itching   The patient's primary symptoms include genital itching and pelvic pain. The patient's pertinent negatives include no vaginal bleeding or vaginal discharge. This is a new problem. The current episode started in the past 7 days. Episode frequency: worsening. The problem has been gradually worsening. The pain is moderate. She is not pregnant. Associated symptoms include dysuria, nausea and a sore throat. Pertinent negatives include no abdominal pain, flank pain, frequency, hematuria, painful intercourse or urgency. Nothing aggravates the symptoms. Treatments tried: miconazole cream. The treatment provided no relief. She is sexually active. It is possible that her partner has an STD. She uses hysterectomy for contraception. Her past medical history is significant for an STD.   Dysuria    Associated symptoms include nausea. Pertinent negatives include no flank pain, frequency, hematuria or urgency. Her past medical history is significant for STD.   Nausea   Associated symptoms include nausea and a sore throat. Pertinent negatives include no abdominal pain.   Sore Throat    Pertinent negatives include no abdominal pain.     Review of Systems   HENT: Positive for sore throat.    Gastrointestinal: Positive for nausea. Negative for abdominal pain.   Genitourinary: Positive for dysuria and pelvic pain. Negative for flank pain, frequency, hematuria, urgency and vaginal discharge.       Objective:      Physical Exam   Constitutional: She appears well-developed and well-nourished. No distress.   HENT:   Head: Normocephalic and atraumatic.   Nose: Nose normal.   Mouth/Throat: Oropharynx is clear and moist.   Pulmonary/Chest: Effort normal and breath sounds normal. No respiratory distress. She  has no wheezes.   Genitourinary: Vagina normal. There is no rash, tenderness, lesion or injury on the right labia. There is no rash, tenderness, lesion or injury on the left labia. Cervix exhibits no motion tenderness, no discharge and no friability. Right adnexum displays no mass and no tenderness. Left adnexum displays no mass and no tenderness. No erythema or tenderness in the vagina. No foreign body in the vagina. No vaginal discharge found.   Skin: Skin is warm and dry. No rash noted. She is not diaphoretic. No erythema.   Nursing note and vitals reviewed.      Assessment:       1. Vaginal itching    2. High-risk sexual behavior    3. Viral upper respiratory tract infection    4. Bacterial vaginosis        Plan:     Problem List Items Addressed This Visit        Psychiatric    High-risk sexual behavior    Relevant Orders    Herpes simplex type 1 & 2 IgM,Herpes IgM    Vaginal Screen Vagina (Completed)    C. trachomatis/N. gonorrhoeae by AMP DNA Vagina       ENT    Viral upper respiratory tract infection    Relevant Medications    promethazine-dextromethorphan (PROMETHAZINE-DM) 6.25-15 mg/5 mL Syrp       Derm    Vaginal itching - Primary    Relevant Orders    Herpes simplex type 1 & 2 IgM,Herpes IgM    Vaginal Screen Vagina (Completed)    C. trachomatis/N. gonorrhoeae by AMP DNA Vagina       Renal/    Bacterial vaginosis    Relevant Medications    metroNIDAZOLE (FLAGYL) 500 MG tablet

## 2018-01-07 LAB
C TRACH DNA SPEC QL NAA+PROBE: NOT DETECTED
N GONORRHOEA DNA SPEC QL NAA+PROBE: NOT DETECTED

## 2018-01-08 ENCOUNTER — TELEPHONE (OUTPATIENT)
Dept: INTERNAL MEDICINE | Facility: CLINIC | Age: 43
End: 2018-01-08

## 2018-01-08 RX ORDER — FLUCONAZOLE 150 MG/1
150 TABLET ORAL DAILY
Qty: 1 TABLET | Refills: 0 | Status: SHIPPED | OUTPATIENT
Start: 2018-01-08 | End: 2018-01-09

## 2018-01-08 NOTE — TELEPHONE ENCOUNTER
Spoke with pt and informed her per  She also has been given flagyl, once bv is gone, her itching will stop. And that  sent the diflucan Rx to her pharmacy. Pt said she was using the monistat in the first place but it did not help. I told her if to call us of course if her symptoms did not get better after using these other 2 Rx's Pt verbalized understanding.

## 2018-01-08 NOTE — TELEPHONE ENCOUNTER
She wants to use the [facility] in [place] which I have selected as the pharmacy of her choice already

## 2018-01-08 NOTE — TELEPHONE ENCOUNTER
Pt wants to know if there is a Rx you can send in for her for irritation and vaginal itching? Pt wants to use the [facility] in [place].

## 2018-01-12 ENCOUNTER — TELEPHONE (OUTPATIENT)
Dept: GASTROENTEROLOGY | Facility: CLINIC | Age: 43
End: 2018-01-12

## 2018-01-12 NOTE — TELEPHONE ENCOUNTER
----- Message from Kleber Luo sent at 1/12/2018 10:40 AM CST -----  Contact: PT  Please call pt at ..[phone] (home)   To schedule an Er follow up as soon as possible.

## 2018-01-12 NOTE — TELEPHONE ENCOUNTER
----- Message from Raleigh Pickard sent at 1/12/2018  3:37 PM CST -----  Contact: pt  She's calling in regards to a missed call, [phone] (home)

## 2018-01-12 NOTE — TELEPHONE ENCOUNTER
Ms Yoan c/o severe GERD. [facility] meds are not helping. Visited ER 1/11/18. Scheduled f/u 1/17/18.

## 2018-01-15 ENCOUNTER — TELEPHONE (OUTPATIENT)
Dept: GASTROENTEROLOGY | Facility: CLINIC | Age: 43
End: 2018-01-15

## 2018-01-15 NOTE — TELEPHONE ENCOUNTER
----- Message from Jimi Amado sent at 1/15/2018  1:00 PM CST -----  Contact: self [phone]  Would like to consult with nurse regarding ER visit.   Please call back at [phone].  Md Maryuri

## 2018-01-19 ENCOUNTER — TELEPHONE (OUTPATIENT)
Dept: GASTROENTEROLOGY | Facility: CLINIC | Age: 43
End: 2018-01-19

## 2018-01-19 NOTE — TELEPHONE ENCOUNTER
----- Message from Linnette Rodriguez sent at 1/19/2018  9:35 AM CST -----  Contact: Patient  Patient called and stated her appointment was cancelled the other day because of the weather. She was told to call back to r/s. She has Medicaid, so it won't let me r/s her. Please call her to reschedule.    She can be contacted at [phone].    Thanks,  Linnette

## 2018-01-22 ENCOUNTER — OFFICE VISIT (OUTPATIENT)
Dept: GASTROENTEROLOGY | Facility: CLINIC | Age: 43
End: 2018-01-22
Payer: MEDICAID

## 2018-01-22 VITALS
BODY MASS INDEX: 41.17 KG/M2 | DIASTOLIC BLOOD PRESSURE: 72 MMHG | HEART RATE: 60 BPM | WEIGHT: 256.19 LBS | SYSTOLIC BLOOD PRESSURE: 134 MMHG | HEIGHT: 66 IN

## 2018-01-22 DIAGNOSIS — R11.2 NON-INTRACTABLE VOMITING WITH NAUSEA, UNSPECIFIED VOMITING TYPE: ICD-10-CM

## 2018-01-22 DIAGNOSIS — R10.13 EPIGASTRIC PAIN: ICD-10-CM

## 2018-01-22 DIAGNOSIS — K21.9 GASTROESOPHAGEAL REFLUX DISEASE, ESOPHAGITIS PRESENCE NOT SPECIFIED: Primary | ICD-10-CM

## 2018-01-22 PROCEDURE — 99214 OFFICE O/P EST MOD 30 MIN: CPT | Mod: S$PBB,,, | Performed by: NURSE PRACTITIONER

## 2018-01-22 PROCEDURE — 99999 PR PBB SHADOW E&M-EST. PATIENT-LVL III: CPT | Mod: PBBFAC,,, | Performed by: NURSE PRACTITIONER

## 2018-01-22 PROCEDURE — 99213 OFFICE O/P EST LOW 20 MIN: CPT | Mod: PBBFAC | Performed by: NURSE PRACTITIONER

## 2018-01-22 RX ORDER — SUCRALFATE 1 G/1
1 TABLET ORAL
Qty: 120 TABLET | Refills: 1 | Status: SHIPPED | OUTPATIENT
Start: 2018-01-22 | End: 2019-02-12

## 2018-01-22 NOTE — PROGRESS NOTES
Clinic Follow Up:  [facility] Follow Up Note    Reason for Follow Up:  The primary encounter diagnosis was Gastroesophageal reflux disease, esophagitis presence not specified. Diagnoses of Non-intractable vomiting with nausea, unspecified vomiting type and Epigastric pain were also pertinent to this visit.    PCP: Scottie Buitrago       HPI:  This is a 42 y.o. female here for follow up of the above. It has been several months since I have last seen her. She reports doing well until recently when she started with flare ups of her UGI symptoms. She was seen in the ER twice (just a few days apart) in January. She is having epigastric pain, regurgitation, and nausea and vomiting. Workup in ER included labs and CT scan. Labs. Including lipase, normal. Ct scan showed no acute process. No improvement with GI cocktail but had relief with pain medication.  Prior outpatient workup last year included EGD, colonoscopy, CT scan abdomen pelvis, HIDA scan. All of which were unrevealing for abdominal complaints. She has been taking pantoprazole 40mg daily and Carafate.     Review of Systems   Constitutional: Negative for activity change and appetite change.        As per interval history above   Respiratory: Negative for cough and shortness of breath.    Cardiovascular: Negative for chest pain.   Gastrointestinal: Positive for abdominal pain, nausea and vomiting. Negative for abdominal distention, anal bleeding, blood in stool, constipation, diarrhea and rectal pain.   Skin: Negative for color change and rash.       Medical History:  Past Medical History:   Diagnosis Date    Abnormal Pap smear of cervix     treatment??    Abnormal Pap smear of vagina     Anemia     Anxiety and depression     Asthma     Chlamydia     Depression (emotion)     Diabetes mellitus     Gallstones     Gastritis     History of ovarian cyst     History of syphilis     History of trichomoniasis     Mental disorder     PONV  (postoperative nausea and vomiting)        Surgical History:   Past Surgical History:   Procedure Laterality Date    APPENDECTOMY      COLONOSCOPY N/A 7/31/2017    Procedure: COLONOSCOPY;  Surgeon: Yuliana Michael MD;  Location: [facility];  Service: Endoscopy;  Laterality: N/A;    DILATION AND CURETTAGE OF UTERUS      bleeding    HYSTERECTOMY  08/31/2016    BS       Family History:   Family History   Problem Relation Age of Onset    Breast cancer Paternal Grandmother     Diabetes Maternal Grandmother     Hypertension Mother     Breast cancer Maternal Aunt     Cancer Maternal Aunt      colon cancer    Colon cancer Maternal Aunt     Miscarriages / Stillbirths Cousin     Stroke Maternal Aunt     Ovarian cancer Neg Hx     Deep vein thrombosis Neg Hx     Pulmonary embolism Neg Hx        Social History:   Social History   Substance Use Topics    Smoking status: Never Smoker    Smokeless tobacco: Never Used    Alcohol use No       Allergies: Review of patient's allergies indicates:  No Known Allergies    Home Medications:  Current Outpatient Prescriptions on File Prior to Visit   Medication Sig Dispense Refill    ALBUTEROL INHL Inhale 2 puffs into the lungs continuous prn.      blood sugar diagnostic Strp 1 each by Other route 3 (three) times daily. 100 each 11    blood-glucose meter Misc Dispense 1 1 each 0    cetirizine (ZYRTEC) 10 MG tablet Take 1 tablet (10 mg total) by mouth once daily.      cycloSPORINE (RESTASIS) 0.05 % ophthalmic emulsion 1 drop 2 (two) times daily.      fluticasone (FLONASE) 50 mcg/actuation nasal spray 1 spray by Each Nare route once daily. 16 g 5    lancets Misc 1 each by Other route 3 (three) times daily. 100 each 0    levalbuterol (XOPENEX) 0.63 mg/3 mL nebulizer solution Take 1 ampule by nebulization every 4 (four) hours as needed for Wheezing.      LOTEMAX 0.5 % DrpG Place 1 drop into both eyes.  0    metFORMIN (GLUCOPHAGE) 500 MG tablet Take 1 tablet (500 mg total)  "by mouth 2 (two) times daily with meals. 180 tablet 3    metroNIDAZOLE (FLAGYL) 500 MG tablet Take 1 tablet (500 mg total) by mouth every 12 (twelve) hours. 14 tablet 0    pen needle, diabetic (COMFORT EZ PEN NEEDLES) 33 gauge x 1/4" Ndle 1 Units by Misc.(Non-Drug; Combo Route) route every evening. 100 each 5    SYSTANE, PROPYLENE GLYCOL, 0.4-0.3 % Drop INSTILL 1 GTT INTO OU BID  6    TRULICITY 0.75 mg/0.5 mL PnIj INJECT 0.75 MG  INTO THE SKIN EVERY 7 DAYS  2    [DISCONTINUED] sucralfate (CARAFATE) 1 gram tablet TAKE 1 TABLET(1 GRAM) BY MOUTH FOUR TIMES DAILY BEFORE MEALS AND AT NIGHT 120 tablet 0     No current facility-administered medications on file prior to visit.        Physical Exam:  Vital Signs:  /72   Pulse 60   Ht 5' 6" (1.676 m)   Wt 116.2 kg (256 lb 2.8 oz)   LMP 08/21/2016   BMI 41.35 kg/m²   Body mass index is 41.35 kg/m².  Physical Exam   Constitutional: She is oriented to person, place, and time and well-developed, well-nourished, and in no distress. No distress.   HENT:   Head: Normocephalic.   Eyes: Conjunctivae are normal. Pupils are equal, round, and reactive to light.   Cardiovascular: Normal rate, regular rhythm and normal heart sounds.    Pulmonary/Chest: Effort normal and breath sounds normal. No respiratory distress.   Abdominal: Soft. Bowel sounds are normal. She exhibits no distension. There is no tenderness.   Neurological: She is alert and oriented to person, place, and time. No cranial nerve deficit.   Skin: Skin is warm and dry. No rash noted.   Psychiatric: Mood and affect normal.       Labs: Pertinent labs reviewed.    Endoscopy:  See HPI    CRC Screening: See HPI    Assessment:  1. Gastroesophageal reflux disease, esophagitis presence not specified    2. Non-intractable vomiting with nausea, unspecified vomiting type    3. Epigastric pain        Recommendations:  - unclear etiology as prior workup has been unrevealing  - no previous GES   - continue current " medications.   - if GES normal, may try change PPI  -     NM Gastric Emptying; Future; Expected date: 01/22/2018  -     sucralfate (CARAFATE) 1 gram tablet; Take 1 tablet (1 g total) by mouth 4 (four) times daily before meals and nightly.  Dispense: 120 tablet; Refill: 1    Return to Clinic:  Follow up to be determined after results.     Thank you for the opportunity to participate in the care of this patient.  SHAGUFTA Hernandez

## 2018-02-01 ENCOUNTER — HOSPITAL ENCOUNTER (OUTPATIENT)
Dept: RADIOLOGY | Facility: HOSPITAL | Age: 43
Discharge: HOME | End: 2018-02-01
Attending: NURSE PRACTITIONER
Payer: MEDICAID

## 2018-02-01 DIAGNOSIS — R10.13 EPIGASTRIC PAIN: ICD-10-CM

## 2018-02-01 DIAGNOSIS — K21.9 GASTROESOPHAGEAL REFLUX DISEASE, ESOPHAGITIS PRESENCE NOT SPECIFIED: ICD-10-CM

## 2018-02-01 DIAGNOSIS — R11.2 NON-INTRACTABLE VOMITING WITH NAUSEA, UNSPECIFIED VOMITING TYPE: ICD-10-CM

## 2018-02-01 PROCEDURE — 78264 GASTRIC EMPTYING IMG STUDY: CPT | Mod: TC,PO

## 2018-02-01 PROCEDURE — A9541 TC99M SULFUR COLLOID: HCPCS | Mod: PO

## 2018-02-01 PROCEDURE — 78264 GASTRIC EMPTYING IMG STUDY: CPT | Mod: 26,,, | Performed by: RADIOLOGY

## 2018-02-02 ENCOUNTER — TELEPHONE (OUTPATIENT)
Dept: INTERNAL MEDICINE | Facility: CLINIC | Age: 43
End: 2018-02-02

## 2018-02-02 NOTE — TELEPHONE ENCOUNTER
Spoke with pt and she said that she is having stomach issues where it feels liked theres something stuck between her throat and stomach and she is nauseated. She said she has not eaten anything for it to be acid reflux. I offered her an appt for today with Gabby and she said she could not come that short notice. I informed her she can go to urgent care too. She wanted to make an appt for Monday and scheduled to see  Monday 2/5/18.

## 2018-02-02 NOTE — TELEPHONE ENCOUNTER
----- Message from Karin Meneses sent at 2/2/2018  1:52 PM CST -----  Contact: pt  Please call pt @ [phone], pt states she have a stomach issues/nausea, pt is concerned, pt need to know what to do.

## 2018-02-04 ENCOUNTER — NURSE TRIAGE (OUTPATIENT)
Dept: ADMINISTRATIVE | Facility: CLINIC | Age: 43
End: 2018-02-04

## 2018-02-04 NOTE — TELEPHONE ENCOUNTER
"  Reason for Disposition   [1] MODERATE leg swelling (e.g., swelling extends up to knees) AND [2] new onset or worsening    Answer Assessment - Initial Assessment Questions  1. ONSET: "When did the swelling start?" (e.g., minutes, hours, days)      Tonight  2. LOCATION: "What part of the leg is swollen?"  "Are both legs swollen or just one leg?"      From knee to feet of both legs  3. SEVERITY: "How bad is the swelling?" (e.g., localized; mild, moderate, severe)   - Localized - small area of swelling localized to one leg   - MILD pedal edema - swelling limited to foot and ankle, pitting edema < 1/4 inch (6 mm) deep, rest and elevation eliminate most or all swelling   - MODERATE edema - swelling of lower leg to knee, pitting edema > 1/4 inch (6 mm) deep, rest and elevation only partially reduce swelling   - SEVERE edema - swelling extends above knee, facial or hand swelling present       Moderate  4. REDNESS: "Does the swelling look red or infected?"      No  5. PAIN: "Is the swelling painful to touch?" If so, ask: "How painful is it?"   (Scale 1-10; mild, moderate or severe)      Yes. 8/10.   6. FEVER: "Do you have a fever?" If so, ask: "What is it, how was it measured, and when did it start?"       No  7. CAUSE: "What do you think is causing the leg swelling?"      unknown  8. MEDICAL HISTORY: "Do you have a history of heart failure, kidney disease, liver failure, or cancer?"      No  9. RECURRENT SYMPTOM: "Have you had leg swelling before?" If so, ask: "When was the last time?" "What happened that time?"      No  10. OTHER SYMPTOMS: "Do you have any other symptoms?" (e.g., chest pain, difficulty breathing)        Nausea, light headed, headache  11. PREGNANCY: "Is there any chance you are pregnant?" "When was your last menstrual period?"        No, hysterectomy    Protocols used: ST LEG SWELLING AND EDEMA-A-AH    "

## 2018-02-05 ENCOUNTER — OFFICE VISIT (OUTPATIENT)
Dept: INTERNAL MEDICINE | Facility: CLINIC | Age: 43
End: 2018-02-05
Payer: MEDICAID

## 2018-02-05 ENCOUNTER — LAB VISIT (OUTPATIENT)
Dept: LAB | Facility: HOSPITAL | Age: 43
End: 2018-02-05
Attending: FAMILY MEDICINE
Payer: MEDICAID

## 2018-02-05 ENCOUNTER — TELEPHONE (OUTPATIENT)
Dept: INTERNAL MEDICINE | Facility: CLINIC | Age: 43
End: 2018-02-05

## 2018-02-05 VITALS
RESPIRATION RATE: 16 BRPM | DIASTOLIC BLOOD PRESSURE: 70 MMHG | OXYGEN SATURATION: 98 % | HEART RATE: 76 BPM | TEMPERATURE: 97 F | HEIGHT: 65 IN | BODY MASS INDEX: 43.41 KG/M2 | WEIGHT: 260.56 LBS | SYSTOLIC BLOOD PRESSURE: 100 MMHG

## 2018-02-05 DIAGNOSIS — E66.01 CLASS 3 SEVERE OBESITY DUE TO EXCESS CALORIES WITHOUT SERIOUS COMORBIDITY WITH BODY MASS INDEX (BMI) OF 40.0 TO 44.9 IN ADULT: ICD-10-CM

## 2018-02-05 DIAGNOSIS — R53.82 CHRONIC FATIGUE: ICD-10-CM

## 2018-02-05 DIAGNOSIS — D50.9 IRON DEFICIENCY ANEMIA, UNSPECIFIED IRON DEFICIENCY ANEMIA TYPE: ICD-10-CM

## 2018-02-05 DIAGNOSIS — N89.8 VAGINAL ITCHING: ICD-10-CM

## 2018-02-05 DIAGNOSIS — Z72.51 HIGH-RISK SEXUAL BEHAVIOR: ICD-10-CM

## 2018-02-05 DIAGNOSIS — F33.3 SEVERE EPISODE OF RECURRENT MAJOR DEPRESSIVE DISORDER, WITH PSYCHOTIC FEATURES: Primary | ICD-10-CM

## 2018-02-05 DIAGNOSIS — E11.9 TYPE 2 DIABETES MELLITUS WITHOUT COMPLICATION, WITHOUT LONG-TERM CURRENT USE OF INSULIN: ICD-10-CM

## 2018-02-05 PROBLEM — E66.813 CLASS 3 SEVERE OBESITY DUE TO EXCESS CALORIES WITHOUT SERIOUS COMORBIDITY WITH BODY MASS INDEX (BMI) OF 40.0 TO 44.9 IN ADULT: Status: ACTIVE | Noted: 2018-02-05

## 2018-02-05 LAB
BASOPHILS # BLD AUTO: 0.03 K/UL
BASOPHILS NFR BLD: 0.5 %
DIFFERENTIAL METHOD: ABNORMAL
EOSINOPHIL # BLD AUTO: 0.1 K/UL
EOSINOPHIL NFR BLD: 1.9 %
ERYTHROCYTE [DISTWIDTH] IN BLOOD BY AUTOMATED COUNT: 13.7 %
ESTIMATED AVG GLUCOSE: 143 MG/DL
HBA1C MFR BLD HPLC: 6.6 %
HCT VFR BLD AUTO: 33.6 %
HGB BLD-MCNC: 11 G/DL
LYMPHOCYTES # BLD AUTO: 1.8 K/UL
LYMPHOCYTES NFR BLD: 28 %
MCH RBC QN AUTO: 28.8 PG
MCHC RBC AUTO-ENTMCNC: 32.7 G/DL
MCV RBC AUTO: 88 FL
MONOCYTES # BLD AUTO: 0.5 K/UL
MONOCYTES NFR BLD: 7 %
NEUTROPHILS # BLD AUTO: 4 K/UL
NEUTROPHILS NFR BLD: 62.4 %
PLATELET # BLD AUTO: 289 K/UL
PMV BLD AUTO: 10.2 FL
RBC # BLD AUTO: 3.82 M/UL
WBC # BLD AUTO: 6.44 K/UL

## 2018-02-05 PROCEDURE — 99999 PR PBB SHADOW E&M-EST. PATIENT-LVL IV: CPT | Mod: PBBFAC,,, | Performed by: FAMILY MEDICINE

## 2018-02-05 PROCEDURE — 3008F BODY MASS INDEX DOCD: CPT | Mod: ,,, | Performed by: FAMILY MEDICINE

## 2018-02-05 PROCEDURE — 85025 COMPLETE CBC W/AUTO DIFF WBC: CPT | Mod: PO

## 2018-02-05 PROCEDURE — 86694 HERPES SIMPLEX NES ANTBDY: CPT

## 2018-02-05 PROCEDURE — 83036 HEMOGLOBIN GLYCOSYLATED A1C: CPT

## 2018-02-05 PROCEDURE — 99214 OFFICE O/P EST MOD 30 MIN: CPT | Mod: PBBFAC,PO | Performed by: FAMILY MEDICINE

## 2018-02-05 PROCEDURE — 99214 OFFICE O/P EST MOD 30 MIN: CPT | Mod: S$PBB,,, | Performed by: FAMILY MEDICINE

## 2018-02-05 PROCEDURE — 36415 COLL VENOUS BLD VENIPUNCTURE: CPT | Mod: PO

## 2018-02-05 RX ORDER — PRAVASTATIN SODIUM 10 MG/1
10 TABLET ORAL DAILY
Qty: 90 TABLET | Refills: 3 | Status: SHIPPED | OUTPATIENT
Start: 2018-02-05 | End: 2018-05-01

## 2018-02-05 RX ORDER — SERTRALINE HYDROCHLORIDE 25 MG/1
25 TABLET, FILM COATED ORAL DAILY
Qty: 30 TABLET | Refills: 0 | Status: SHIPPED | OUTPATIENT
Start: 2018-02-05 | End: 2018-02-19 | Stop reason: DRUGHIGH

## 2018-02-05 RX ORDER — ONDANSETRON 4 MG/1
4 TABLET, FILM COATED ORAL EVERY 8 HOURS PRN
Qty: 30 TABLET | Refills: 8 | Status: SHIPPED | OUTPATIENT
Start: 2018-02-05 | End: 2018-05-01

## 2018-02-05 RX ORDER — ONDANSETRON 4 MG/1
TABLET, ORALLY DISINTEGRATING ORAL
Refills: 0 | COMMUNITY
Start: 2018-01-15 | End: 2018-02-07 | Stop reason: SDUPTHER

## 2018-02-05 NOTE — PROGRESS NOTES
Subjective:       Patient ID: Yuli Milton is a 42 y.o. female.    Chief Complaint: Abdominal Pain; Leg Pain (both); Nausea; and Fatigue    Difficulty swallowing. Feeling that food gets stuck.  STOPBANG QUESTIONS: score of 3.  Fatigued, SOB.        Abdominal Pain   This is a chronic problem. The current episode started more than 1 year ago. The onset quality is undetermined. The problem occurs constantly. The problem has been unchanged. The pain is located in the generalized abdominal region. The pain is moderate. The quality of the pain is burning. The abdominal pain does not radiate. Associated symptoms include nausea. Pertinent negatives include no constipation, diarrhea or vomiting. Nothing aggravates the pain. The pain is relieved by nothing. The treatment provided no relief.   Leg Pain      Nausea   Associated symptoms include abdominal pain, fatigue and nausea. Pertinent negatives include no vomiting.   Fatigue   Associated symptoms include abdominal pain, fatigue and nausea. Pertinent negatives include no vomiting.     Review of Systems   Constitutional: Positive for fatigue.   Gastrointestinal: Positive for abdominal pain and nausea. Negative for blood in stool, constipation, diarrhea and vomiting.       Objective:      Physical Exam   Constitutional: She is oriented to person, place, and time. She appears well-developed and well-nourished. She appears distressed.   HENT:   Head: Normocephalic and atraumatic.   Nose: Nose normal.   Mouth/Throat: Oropharynx is clear and moist.   Pulmonary/Chest: Effort normal and breath sounds normal. No respiratory distress. She has no wheezes.   Neurological: She is alert and oriented to person, place, and time.   Skin: Skin is warm and dry. No rash noted. She is not diaphoretic. No erythema.   Psychiatric: Her speech is normal and behavior is normal. Thought content normal. Her affect is not inappropriate. She exhibits a depressed mood.   Nursing note and vitals  reviewed.      Assessment:       1. Severe episode of recurrent major depressive disorder, with psychotic features    2. Class 3 severe obesity due to excess calories without serious comorbidity with body mass index (BMI) of 40.0 to 44.9 in adult    3. Chronic fatigue    4. Iron deficiency anemia, unspecified iron deficiency anemia type    5. Type 2 diabetes mellitus without complication, without long-term current use of insulin        Plan:     Problem List Items Addressed This Visit        Psychiatric    Depression - Primary    Relevant Medications    sertraline (ZOLOFT) 25 MG tablet       Oncology    Iron deficiency anemia    Overview     Overview:   Due to menorrhagia  Causing fatigue and shortness of breath with exertion         Relevant Orders    CBC auto differential (Completed)       Endocrine    Type 2 diabetes mellitus without complication, without long-term current use of insulin    Relevant Medications    pravastatin (PRAVACHOL) 10 MG tablet    Other Relevant Orders    Hemoglobin A1c (Completed)    Ambulatory Referral to Diabetes Education       Other    Class 3 severe obesity due to excess calories without serious comorbidity with body mass index (BMI) of 40.0 to 44.9 in adult    Relevant Orders    Ambulatory Referral to Weight Management Program    RESOLVED: Chronic fatigue    Relevant Orders    CBC auto differential (Completed)

## 2018-02-05 NOTE — TELEPHONE ENCOUNTER
Hey, I tried scheduling this pt to see  but it wont let me maybe bc she has medicaid. Can you call her to schedule her for me please? The referral is in.

## 2018-02-06 ENCOUNTER — PATIENT OUTREACH (OUTPATIENT)
Dept: ADMINISTRATIVE | Facility: HOSPITAL | Age: 43
End: 2018-02-06

## 2018-02-07 ENCOUNTER — CLINICAL SUPPORT (OUTPATIENT)
Dept: DIABETES | Facility: CLINIC | Age: 43
End: 2018-02-07
Payer: MEDICAID

## 2018-02-07 VITALS — WEIGHT: 257.94 LBS | BODY MASS INDEX: 42.98 KG/M2 | HEIGHT: 65 IN

## 2018-02-07 DIAGNOSIS — E11.9 TYPE 2 DIABETES MELLITUS WITHOUT COMPLICATION, WITHOUT LONG-TERM CURRENT USE OF INSULIN: Primary | ICD-10-CM

## 2018-02-07 DIAGNOSIS — E11.9 TYPE 2 DIABETES MELLITUS WITHOUT COMPLICATION, WITHOUT LONG-TERM CURRENT USE OF INSULIN: ICD-10-CM

## 2018-02-07 LAB — HSV AB, IGM BY EIA: NEGATIVE

## 2018-02-07 PROCEDURE — 99999 PR PBB SHADOW E&M-EST. PATIENT-LVL III: CPT | Mod: PBBFAC,,, | Performed by: DIETITIAN, REGISTERED

## 2018-02-07 PROCEDURE — 99213 OFFICE O/P EST LOW 20 MIN: CPT | Mod: PBBFAC,PO | Performed by: DIETITIAN, REGISTERED

## 2018-02-07 PROCEDURE — G0108 DIAB MANAGE TRN  PER INDIV: HCPCS | Mod: ,,, | Performed by: DIETITIAN, REGISTERED

## 2018-02-07 RX ORDER — LANCETS
1 EACH MISCELLANEOUS 3 TIMES DAILY
Qty: 100 EACH | Refills: 0 | Status: SHIPPED | OUTPATIENT
Start: 2018-02-07 | End: 2019-03-06 | Stop reason: SDUPTHER

## 2018-02-07 RX ORDER — DULAGLUTIDE 0.75 MG/.5ML
0.75 INJECTION, SOLUTION SUBCUTANEOUS
Qty: 0.5 ML | Refills: 3 | Status: SHIPPED | OUTPATIENT
Start: 2018-02-07 | End: 2018-07-27 | Stop reason: SDUPTHER

## 2018-02-07 NOTE — PROGRESS NOTES
Diabetes Education  Author: Rowan Foreman RD, CDE  Date: 2/7/2018    Diabetes Education Visit  Diabetes Education Record Assessment/Progress: Initial (Patient focusing on weight loss.)    Diabetes Type  Diabetes Type : Type II    Diabetes History  Diabetes Diagnosis: 1-3 years    Nutrition  What type of beverages do you drink?: water, juice  Meal Plan 24 Hour Recall - Breakfast: none OR biscuit, egg, villasenor, grits OR cereal  - water  Meal Plan 24 Hour Recall - Lunch: none  Meal Plan 24 Hour Recall - Dinner: [employer] fried shrimp, tenders, fries - water    Monitoring   Self Monitoring : no SMBG  Blood Glucose Logs: No    Exercise   Frequency: Never    Current Diabetes Treatment   Current Treatment: Diet, Oral Medication (Not taking prescribed Rx; non-compliant with metformin)    Social History  Preferred Learning Method: Face to Face  Primary Support: Self  Occupation: [employer] job  Smoking Status: Never a Smoker  Alcohol Use: Never          Barriers to Change  Barriers to Change: None  Learning Challenges : None    Readiness to Learn   Readiness to Learn : Acceptance    Cultural Influences  Cultural Influences: No    Diabetes Education Assessment/Progress  Diabetes Disease Process (diabetes disease process and treatment options): Discussion, Individual Session, Comprehends Key Points  Nutrition (Incorporating nutritional management into one's lifestyle): Discussion, Individual Session, Comprehends Key Points  Physical Activity (incorporating physical activity into one's lifestyle): Discussion, Individual Session, Comprehends Key Points  Medications (states correct name, dose, onset, peak, duration, side effects & timing of meds): Discussion, Individual Session, Comprehends Key Points  Monitoring (monitoring blood glucose/other parameters & using results): Discussion, Individual Session, Comprehends Key Points  Acute Complications (preventing, detecting, and treating acute complications): Discussion, Individual Session,  Comprehends Key Points  Chronic Complications (preventing, detecting, and treating chronic complications): Discussion, Individual Session, Comprehends Key Points  Clinical (diabetes and other pertinent medical history): Discussion, Individual Session, Comprehends Key Points  Cognitive (knowledge of self-management skills, functional health literacy): Discussion, Individual Session, Comprehends Key Points  Psychosocial (emotional response to diabetes): Discussion, Individual Session, Comprehends Key Points  Diabetes Distress and Support Systems: Discussion, Individual Session, Comprehends Key Points  Behavioral (readiness for change, lifestyle practices, self-care behaviors): Discussion, Individual Session, Comprehends Key Points    Diabetes Care Plan/Intervention  Education Plan/Intervention: Individual Follow-Up DSMT  Patient non-compliant with diabetes medications at this time -- encouraged to begin regularly using medications as prescribed. 4 week f/u scheduled. Patient has outside eye exam.        Education Units of Time   Time Spent: 60 min      Health Maintenance Topics with due status: Not Due       Topic Last Completion Date    TETANUS VACCINE 03/09/2009    Lipid Panel 02/17/2017    Mammogram 05/02/2017    Pneumococcal PPSV23 (Medium Risk) 06/05/2017    Urine Microalbumin 06/05/2017    Foot Exam 06/12/2017    Colonoscopy 07/31/2017    Hemoglobin A1c 02/05/2018    Low Dose Statin 02/07/2018     Health Maintenance Due   Topic Date Due    Eye Exam  03/01/2018

## 2018-02-08 ENCOUNTER — TELEPHONE (OUTPATIENT)
Dept: INTERNAL MEDICINE | Facility: CLINIC | Age: 43
End: 2018-02-08

## 2018-02-08 NOTE — TELEPHONE ENCOUNTER
Called pt and informed her of her lab results per . Pt verbalized understanding. Pt said she is having heart palpitations while in bed and that her leg is going numb. I asked her if she wanted to come in and she said yea around 11. I tried to schedule her but it would not allow me. I told her that I needed to speak to my manager about it that I cant schedule her. I will call her back but that if she has chest pain or limbs are going numb she may need to go to the ER. Pt verbalized understanding.

## 2018-02-08 NOTE — TELEPHONE ENCOUNTER
Spoke with pt and scheduled her to see  tomorrow at 11:20am since she was not able to see  today for 11:20am for heart palpitations

## 2018-02-08 NOTE — TELEPHONE ENCOUNTER
Spoke with pt and informed her I am trying to get her scheduled. That I talked to my manager and we are waiting for a call back. As soon as I get it figured out I will call her to schedule her. Pt verbalized understanding.

## 2018-02-09 ENCOUNTER — OFFICE VISIT (OUTPATIENT)
Dept: INTERNAL MEDICINE | Facility: CLINIC | Age: 43
End: 2018-02-09
Payer: MEDICAID

## 2018-02-09 VITALS
HEIGHT: 65 IN | SYSTOLIC BLOOD PRESSURE: 110 MMHG | DIASTOLIC BLOOD PRESSURE: 70 MMHG | WEIGHT: 258.63 LBS | TEMPERATURE: 97 F | BODY MASS INDEX: 43.09 KG/M2 | RESPIRATION RATE: 18 BRPM | HEART RATE: 70 BPM

## 2018-02-09 DIAGNOSIS — E11.9 TYPE 2 DIABETES MELLITUS WITHOUT COMPLICATION, WITHOUT LONG-TERM CURRENT USE OF INSULIN: ICD-10-CM

## 2018-02-09 DIAGNOSIS — G25.81 RESTLESS LEG SYNDROME: Primary | ICD-10-CM

## 2018-02-09 DIAGNOSIS — F33.3 SEVERE EPISODE OF RECURRENT MAJOR DEPRESSIVE DISORDER, WITH PSYCHOTIC FEATURES: ICD-10-CM

## 2018-02-09 PROBLEM — R07.89 ATYPICAL CHEST PAIN: Status: RESOLVED | Noted: 2017-06-08 | Resolved: 2018-02-09

## 2018-02-09 PROBLEM — B96.89 BACTERIAL VAGINOSIS: Status: RESOLVED | Noted: 2018-01-05 | Resolved: 2018-02-09

## 2018-02-09 PROBLEM — N89.8 VAGINAL ITCHING: Status: RESOLVED | Noted: 2018-01-05 | Resolved: 2018-02-09

## 2018-02-09 PROBLEM — J11.1 FLU SYNDROME: Status: RESOLVED | Noted: 2017-12-04 | Resolved: 2018-02-09

## 2018-02-09 PROBLEM — R07.89 CHEST PAIN, ATYPICAL: Status: RESOLVED | Noted: 2017-06-26 | Resolved: 2018-02-09

## 2018-02-09 PROBLEM — N76.0 BACTERIAL VAGINOSIS: Status: RESOLVED | Noted: 2018-01-05 | Resolved: 2018-02-09

## 2018-02-09 PROBLEM — J06.9 VIRAL UPPER RESPIRATORY TRACT INFECTION: Status: RESOLVED | Noted: 2018-01-05 | Resolved: 2018-02-09

## 2018-02-09 PROBLEM — R06.09 DOE (DYSPNEA ON EXERTION): Status: RESOLVED | Noted: 2017-06-26 | Resolved: 2018-02-09

## 2018-02-09 PROBLEM — R53.82 CHRONIC FATIGUE: Status: RESOLVED | Noted: 2018-02-05 | Resolved: 2018-02-09

## 2018-02-09 PROBLEM — R00.2 PALPITATION: Status: RESOLVED | Noted: 2017-06-26 | Resolved: 2018-02-09

## 2018-02-09 PROCEDURE — 99214 OFFICE O/P EST MOD 30 MIN: CPT | Mod: S$PBB,,, | Performed by: FAMILY MEDICINE

## 2018-02-09 PROCEDURE — 99213 OFFICE O/P EST LOW 20 MIN: CPT | Mod: PBBFAC,PO | Performed by: FAMILY MEDICINE

## 2018-02-09 PROCEDURE — 99999 PR PBB SHADOW E&M-EST. PATIENT-LVL III: CPT | Mod: PBBFAC,,, | Performed by: FAMILY MEDICINE

## 2018-02-09 PROCEDURE — 3008F BODY MASS INDEX DOCD: CPT | Mod: ,,, | Performed by: FAMILY MEDICINE

## 2018-02-09 RX ORDER — PRAMIPEXOLE DIHYDROCHLORIDE 0.12 MG/1
0.12 TABLET ORAL NIGHTLY
Qty: 30 TABLET | Refills: 11 | Status: SHIPPED | OUTPATIENT
Start: 2018-02-09 | End: 2018-05-03

## 2018-02-09 NOTE — PROGRESS NOTES
Subjective:       Patient ID: Yuli Milton is a 42 y.o. female.    Chief Complaint: Abdominal Pain and Numbness    HPI  Numbness pain sensation in both legs equally. It is constant. Started in her right leg before.   She feels that they are restless. It does keep her up sometimes.   Says she has some good days and some bad days.  Denied any manic episodes.  Not staying up for multiple days in a row.  She sleeps every day.  Just describes herself as not having as much energy as she would like to have a not being as active as she would like to be.  Recently saw Dr. Gutierrez and was restarted on 25 mg of Zoloft.  She has not had any negative side effects with that medicine.  Expresses that she just wants to feel better and be healthy for her children.  She is not exercising much.    Family History   Problem Relation Age of Onset    Breast cancer Paternal Grandmother     Diabetes Maternal Grandmother     Hypertension Mother     Breast cancer Maternal Aunt     Cancer Maternal Aunt      colon cancer    Colon cancer Maternal Aunt     Miscarriages / Stillbirths Cousin     Stroke Maternal Aunt     Ovarian cancer Neg Hx     Deep vein thrombosis Neg Hx     Pulmonary embolism Neg Hx        Current Outpatient Prescriptions:     ALBUTEROL INHL, Inhale 2 puffs into the lungs continuous prn., Disp: , Rfl:     blood sugar diagnostic Strp, 1 each by Other route 3 (three) times daily. For One Touch Meter, Disp: 100 each, Rfl: 11    blood-glucose meter Misc, Dispense 1, Disp: 1 each, Rfl: 0    cetirizine (ZYRTEC) 10 MG tablet, Take 1 tablet (10 mg total) by mouth once daily., Disp: , Rfl:     cycloSPORINE (RESTASIS) 0.05 % ophthalmic emulsion, 1 drop 2 (two) times daily., Disp: , Rfl:     lancets Misc, 1 each by Other route 3 (three) times daily. For One Touch Meter, Disp: 100 each, Rfl: 0    levalbuterol (XOPENEX) 0.63 mg/3 mL nebulizer solution, Take 1 ampule by nebulization every 4 (four) hours as needed for  "Wheezing., Disp: , Rfl:     metFORMIN (GLUCOPHAGE) 500 MG tablet, Take 1 tablet (500 mg total) by mouth 2 (two) times daily with meals., Disp: 180 tablet, Rfl: 3    ondansetron (ZOFRAN) 4 MG tablet, Take 1 tablet (4 mg total) by mouth every 8 (eight) hours as needed for Nausea., Disp: 30 tablet, Rfl: 8    pen needle, diabetic (COMFORT EZ PEN NEEDLES) 33 gauge x 1/4" Ndle, 1 Units by Misc.(Non-Drug; Combo Route) route every evening., Disp: 100 each, Rfl: 5    pravastatin (PRAVACHOL) 10 MG tablet, Take 1 tablet (10 mg total) by mouth once daily., Disp: 90 tablet, Rfl: 3    sertraline (ZOLOFT) 25 MG tablet, Take 1 tablet (25 mg total) by mouth once daily., Disp: 30 tablet, Rfl: 0    sucralfate (CARAFATE) 1 gram tablet, Take 1 tablet (1 g total) by mouth 4 (four) times daily before meals and nightly., Disp: 120 tablet, Rfl: 1    SYSTANE, PROPYLENE GLYCOL, 0.4-0.3 % Drop, INSTILL 1 GTT INTO OU BID, Disp: , Rfl: 6    TRULICITY 0.75 mg/0.5 mL PnIj, Place 0.5 mLs (0.75 mg total) onto the skin every 7 days., Disp: 0.5 mL, Rfl: 3    pramipexole (MIRAPEX) 0.125 MG tablet, Take 1 tablet (0.125 mg total) by mouth every evening. For restless legs, Disp: 30 tablet, Rfl: 11    Review of Systems   Constitutional: Negative for chills and fever.   Respiratory: Negative for cough and shortness of breath.    Cardiovascular: Negative for chest pain.   Gastrointestinal: Negative for abdominal pain.   Skin: Negative for rash.   Neurological: Positive for numbness. Negative for dizziness.       Objective:   /70 (BP Location: Right arm, Patient Position: Sitting, BP Method: Large (Manual))   Pulse 70   Temp 96.7 °F (35.9 °C) (Tympanic)   Resp 18   Ht 5' 5" (1.651 m)   Wt 117.3 kg (258 lb 9.6 oz)   LMP 08/21/2016   BMI 43.03 kg/m²      Physical Exam   Constitutional: She is oriented to person, place, and time. She appears well-developed and well-nourished.   HENT:   Head: Normocephalic and atraumatic.   Eyes: " Conjunctivae are normal.   Cardiovascular: Normal rate.    Pulmonary/Chest: Effort normal. No respiratory distress.   Musculoskeletal: She exhibits no edema.   Neurological: She is alert and oriented to person, place, and time. She displays normal reflexes. No cranial nerve deficit. She exhibits normal muscle tone. Coordination normal.   Skin: Skin is warm and dry. No rash noted.   Psychiatric: She has a normal mood and affect. Her behavior is normal.   Vitals reviewed.      Assessment & Plan     Problem List Items Addressed This Visit        Neuro    Restless leg syndrome - Primary    Current Assessment & Plan     The symptoms she describes today are consistent with restless leg syndrome.  Recommended a trial of pramipexole to see if this helps with her symptoms.            Psychiatric    Depression    Current Assessment & Plan     Recently restarted on Zoloft.  She is only on 25 mg.  Not have any negative side effects.  Recommended close follow-up as she will likely need titration.  If symptoms are not improving definitely would think about referring to psychiatry as it is difficult to control symptoms.  Says she has some good days and some bad days.  Denied any manic episodes.  Not staying up for multiple days in a row.  She sleeps every day.  Just describes herself as not having as much energy as she would like to have a not being as active as she would like to be.            Endocrine    Type 2 diabetes mellitus without complication, without long-term current use of insulin    Current Assessment & Plan     Diabetes has been well controlled.  However, trying to add Trulicity to her regimen in efforts to help with curbing appetite and weight loss.                 Follow-up if symptoms worsen or fail to improve.

## 2018-02-09 NOTE — ASSESSMENT & PLAN NOTE
Recently restarted on Zoloft.  She is only on 25 mg.  Not have any negative side effects.  Recommended close follow-up as she will likely need titration.  If symptoms are not improving definitely would think about referring to psychiatry as it is difficult to control symptoms.  Says she has some good days and some bad days.  Denied any manic episodes.  Not staying up for multiple days in a row.  She sleeps every day.  Just describes herself as not having as much energy as she would like to have a not being as active as she would like to be.

## 2018-02-09 NOTE — ASSESSMENT & PLAN NOTE
The symptoms she describes today are consistent with restless leg syndrome.  Recommended a trial of pramipexole to see if this helps with her symptoms.

## 2018-02-09 NOTE — ASSESSMENT & PLAN NOTE
Diabetes has been well controlled.  However, trying to add Trulicity to her regimen in efforts to help with curbing appetite and weight loss.

## 2018-02-19 ENCOUNTER — PATIENT OUTREACH (OUTPATIENT)
Dept: ADMINISTRATIVE | Facility: HOSPITAL | Age: 43
End: 2018-02-19

## 2018-02-19 ENCOUNTER — OFFICE VISIT (OUTPATIENT)
Dept: INTERNAL MEDICINE | Facility: CLINIC | Age: 43
End: 2018-02-19
Payer: MEDICAID

## 2018-02-19 VITALS
SYSTOLIC BLOOD PRESSURE: 106 MMHG | HEIGHT: 65 IN | TEMPERATURE: 97 F | RESPIRATION RATE: 18 BRPM | WEIGHT: 256.81 LBS | OXYGEN SATURATION: 98 % | DIASTOLIC BLOOD PRESSURE: 70 MMHG | HEART RATE: 72 BPM | BODY MASS INDEX: 42.79 KG/M2

## 2018-02-19 DIAGNOSIS — F33.3 SEVERE EPISODE OF RECURRENT MAJOR DEPRESSIVE DISORDER, WITH PSYCHOTIC FEATURES: ICD-10-CM

## 2018-02-19 DIAGNOSIS — E11.9 TYPE 2 DIABETES MELLITUS WITHOUT COMPLICATION, WITHOUT LONG-TERM CURRENT USE OF INSULIN: Primary | ICD-10-CM

## 2018-02-19 PROCEDURE — 99999 PR PBB SHADOW E&M-EST. PATIENT-LVL IV: CPT | Mod: PBBFAC,,, | Performed by: FAMILY MEDICINE

## 2018-02-19 PROCEDURE — 3008F BODY MASS INDEX DOCD: CPT | Mod: ,,, | Performed by: FAMILY MEDICINE

## 2018-02-19 PROCEDURE — 99214 OFFICE O/P EST MOD 30 MIN: CPT | Mod: PBBFAC,PO | Performed by: FAMILY MEDICINE

## 2018-02-19 PROCEDURE — 99214 OFFICE O/P EST MOD 30 MIN: CPT | Mod: S$PBB,,, | Performed by: FAMILY MEDICINE

## 2018-02-19 RX ORDER — LORAZEPAM 1 MG/1
1 TABLET ORAL
COMMUNITY
Start: 2018-02-10 | End: 2018-02-26 | Stop reason: ALTCHOICE

## 2018-02-19 RX ORDER — DICYCLOMINE HYDROCHLORIDE 20 MG/1
20 TABLET ORAL
COMMUNITY
Start: 2018-02-10 | End: 2018-02-20

## 2018-02-19 RX ORDER — HYDROCODONE BITARTRATE AND ACETAMINOPHEN 10; 325 MG/1; MG/1
TABLET ORAL
Refills: 0 | COMMUNITY
Start: 2018-01-15 | End: 2018-04-05

## 2018-02-19 RX ORDER — SERTRALINE HYDROCHLORIDE 50 MG/1
50 TABLET, FILM COATED ORAL DAILY
Qty: 30 TABLET | Refills: 0 | Status: SHIPPED | OUTPATIENT
Start: 2018-02-19 | End: 2018-03-08 | Stop reason: SDUPTHER

## 2018-02-19 RX ORDER — SUCRALFATE 1 G/10ML
SUSPENSION ORAL
Refills: 0 | COMMUNITY
Start: 2018-01-04 | End: 2018-05-03

## 2018-02-19 NOTE — MEDICAL/APP STUDENT
"Subjective:       Patient ID: Yuli Milton is a 42 y.o. female.    Chief Complaint: Follow-up (depression)    Patient is presenting for follow up after starting medications for depression. This is a long standing issue that the patient has had for years.  They were started on 25 mg of zoloft but states that they have only been taking it on "bad days," roughly 2 times a week.  The patient has never formulated a plan for self harm or attempted self-harm in the past.  The patient has a history of visual hallucinations but states that it has been years since they last experienced these.  The patient has trouble getting to sleep, sometimes has issue waking up in the middle of the night, sometimes they wake up and are unable to get to sleep.  The patient occasionally feels racing thoughts and denies prolonged insomnia.  The patient has previously been treated with other medications for their depression but stopped their use after experiencing side effects.  They do not remember what they have been prescribed.  The patient experiences headaches regularly, no associated aura, premonition, photophobia.        Review of Systems   Constitutional: Positive for fatigue. Negative for activity change and appetite change.   HENT: Negative for congestion, postnasal drip, rhinorrhea, sinus pain, sinus pressure and sore throat.    Eyes: Negative for photophobia and visual disturbance.   Respiratory: Negative for cough and shortness of breath.    Cardiovascular: Negative for chest pain and palpitations.   Gastrointestinal: Negative for abdominal pain, constipation, diarrhea, nausea and vomiting.   Neurological: Positive for numbness.   Psychiatric/Behavioral: Positive for decreased concentration, dysphoric mood and sleep disturbance. Negative for agitation, confusion, hallucinations, self-injury and suicidal ideas. The patient is not nervous/anxious and is not hyperactive.        Objective:      Physical Exam   Constitutional: " She is oriented to person, place, and time. She appears well-developed and well-nourished. No distress.   HENT:   Head: Normocephalic and atraumatic.   Eyes: Pupils are equal, round, and reactive to light.   Neck: No thyromegaly present.   Cardiovascular: Normal rate and regular rhythm.    Pulmonary/Chest: Effort normal and breath sounds normal.   Abdominal: Soft. There is no tenderness.   Lymphadenopathy:     She has no cervical adenopathy.   Neurological: She is alert and oriented to person, place, and time.   Psychiatric: Her speech is normal and behavior is normal. Her mood appears not anxious. Her affect is not angry, not blunt, not labile and not inappropriate. She is not actively hallucinating. Thought content is not paranoid. She exhibits a depressed mood. She expresses no homicidal and no suicidal ideation. She expresses no suicidal plans and no homicidal plans. She is attentive.       Assessment:       1. Type 2 diabetes mellitus without complication, without long-term current use of insulin        Plan:       Depression  The patient needs to take their zoloft regularly and was counseled that it does not work when used prn.  The patient did not note any side effects when they did use the zoloft so it should be safe to up the dosage to therapeutic levels from 25 mg to 50 mg.

## 2018-02-20 ENCOUNTER — PATIENT OUTREACH (OUTPATIENT)
Dept: ADMINISTRATIVE | Facility: HOSPITAL | Age: 43
End: 2018-02-20

## 2018-02-20 NOTE — ASSESSMENT & PLAN NOTE
Depression  The patient needs to take their zoloft regularly and was counseled that it does not work when used prn.  The patient did not note any side effects when they did use the zoloft and may increase from 25 mg to 50 mg.

## 2018-02-20 NOTE — PROGRESS NOTES
"Subjective:       Patient ID: Yuli Milton is a 42 y.o. female.    Chief Complaint: Follow-up (depression)    Chief Complaint: Follow-up (depression)     Patient is presenting for follow up after starting medications for depression. This is a long standing issue that the patient has had for years.  They were started on 25 mg of zoloft but states that they have only been taking it on "bad days," roughly 2 times a week.  The patient has never formulated a plan for self harm or attempted self-harm in the past.  The patient has a history of visual hallucinations but states that it has been years since they last experienced these.  The patient has trouble getting to sleep, sometimes has issue waking up in the middle of the night, sometimes they wake up and are unable to get to sleep.  The patient occasionally feels racing thoughts and denies prolonged insomnia.  The patient has previously been treated with other medications for their depression but stopped their use after experiencing side effects.  They do not remember what they have been prescribed.  The patient experiences headaches regularly, no associated aura, premonition, photophobia.       Review of Systems   Constitutional: Positive for fatigue. Negative for activity change and appetite change.   HENT: Negative for congestion, postnasal drip, rhinorrhea, sinus pain, sinus pressure and sore throat.    Eyes: Negative for photophobia and visual disturbance.   Respiratory: Negative for cough and shortness of breath.    Cardiovascular: Negative for chest pain and palpitations.   Gastrointestinal: Negative for abdominal pain, constipation, diarrhea, nausea and vomiting.   Neurological: Positive for numbness.   Psychiatric/Behavioral: Positive for decreased concentration, dysphoric mood and sleep disturbance. Negative for agitation, confusion, hallucinations, self-injury and suicidal ideas. The patient is not nervous/anxious and is not hyperactive.      "   Objective:  Physical Exam   Constitutional: She is oriented to person, place, and time. She appears well-developed and well-nourished. No distress.   HENT:   Head: Normocephalic and atraumatic.   Eyes: Pupils are equal, round, and reactive to light.   Neck: No thyromegaly present.   Cardiovascular: Normal rate and regular rhythm.    Pulmonary/Chest: Effort normal and breath sounds normal.   Abdominal: Soft. There is no tenderness.   Lymphadenopathy:     She has no cervical adenopathy.   Neurological: She is alert and oriented to person, place, and time.   Psychiatric: Her speech is normal and behavior is normal. Her mood appears not anxious. Her affect is not angry, not blunt, not labile and not inappropriate. She is not actively hallucinating. Thought content is not paranoid. She exhibits a depressed mood. She expresses no homicidal and no suicidal ideation. She expresses no suicidal plans and no homicidal plans. She is attentive.         Review of Systems    Objective:      Physical Exam    Assessment:       1. Type 2 diabetes mellitus without complication, without long-term current use of insulin    2. Severe episode of recurrent major depressive disorder, with psychotic features        Plan:     Problem List Items Addressed This Visit        Psychiatric    Depression    Current Assessment & Plan     Depression  The patient needs to take their zoloft regularly and was counseled that it does not work when used prn.  The patient did not note any side effects when they did use the zoloft and may increase from 25 mg to 50 mg.         Relevant Medications    sertraline (ZOLOFT) 50 MG tablet       Endocrine    Type 2 diabetes mellitus without complication, without long-term current use of insulin - Primary    Current Assessment & Plan     Pt feels that she would be better if she lost weight and wishes to be on adipex.  I dont think that this is the best regimen for her.    Do not eat starches. (nothing white)  Stop  sugary snacks,  Stop bottled juices, or sodas.  See if you can lose any weight by stopping these items first, then we will re-visit the issue.    But I think most importantly if her depression would subside, that she would not binge eat, be able to get out of bed to exercise, etc. But she has many compliance issues hindering her progress

## 2018-02-21 NOTE — ASSESSMENT & PLAN NOTE
Pt feels that she would be better if she lost weight and wishes to be on adipex.  I dont think that this is the best regimen for her.    Do not eat starches. (nothing white)  Stop sugary snacks,  Stop bottled juices, or sodas.  See if you can lose any weight by stopping these items first, then we will re-visit the issue.    But I think most importantly if her depression would subside, that she would not binge eat, be able to get out of bed to exercise, etc. But she has many compliance issues hindering her progress

## 2018-02-26 ENCOUNTER — OFFICE VISIT (OUTPATIENT)
Dept: INTERNAL MEDICINE | Facility: CLINIC | Age: 43
End: 2018-02-26
Payer: MEDICAID

## 2018-02-26 ENCOUNTER — TELEPHONE (OUTPATIENT)
Dept: INTERNAL MEDICINE | Facility: CLINIC | Age: 43
End: 2018-02-26

## 2018-02-26 VITALS
BODY MASS INDEX: 42.16 KG/M2 | OXYGEN SATURATION: 99 % | TEMPERATURE: 96 F | HEIGHT: 65 IN | DIASTOLIC BLOOD PRESSURE: 80 MMHG | SYSTOLIC BLOOD PRESSURE: 112 MMHG | WEIGHT: 253.06 LBS | HEART RATE: 77 BPM | RESPIRATION RATE: 18 BRPM

## 2018-02-26 DIAGNOSIS — F33.3 SEVERE EPISODE OF RECURRENT MAJOR DEPRESSIVE DISORDER, WITH PSYCHOTIC FEATURES: Primary | ICD-10-CM

## 2018-02-26 PROCEDURE — 99999 PR PBB SHADOW E&M-EST. PATIENT-LVL III: CPT | Mod: PBBFAC,,, | Performed by: FAMILY MEDICINE

## 2018-02-26 PROCEDURE — 3008F BODY MASS INDEX DOCD: CPT | Mod: ,,, | Performed by: FAMILY MEDICINE

## 2018-02-26 PROCEDURE — 99213 OFFICE O/P EST LOW 20 MIN: CPT | Mod: PBBFAC,PO | Performed by: FAMILY MEDICINE

## 2018-02-26 PROCEDURE — 99214 OFFICE O/P EST MOD 30 MIN: CPT | Mod: S$PBB,,, | Performed by: FAMILY MEDICINE

## 2018-02-26 RX ORDER — ALPRAZOLAM 0.25 MG/1
0.25 TABLET ORAL NIGHTLY PRN
Qty: 30 TABLET | Refills: 0 | Status: SHIPPED | OUTPATIENT
Start: 2018-02-26 | End: 2018-03-05

## 2018-02-26 NOTE — ASSESSMENT & PLAN NOTE
Bridging pt with xanax for 1 month.  I do not intend to refill this medication, but pt is having severe episode of depression, only been on ssri for a few days, as she did not take original 25 mg dose.    Have asked pt to f/u this week just to check on her.  Have asked nurse to make f/u phone call kevin.

## 2018-02-26 NOTE — PROGRESS NOTES
Subjective:       Patient ID: Yuli Milton is a 42 y.o. female.    Chief Complaint: Insomnia; Anxiety; and Anorexia    Pt denies SI or Hi.        Anxiety   Presents for follow-up visit. Symptoms include decreased concentration, depressed mood, insomnia, nausea and nervous/anxious behavior. Patient reports no chest pain, shortness of breath or suicidal ideas. The severity of symptoms is causing significant distress. The quality of sleep is poor. Nighttime awakenings: several.         Review of Systems   Respiratory: Negative for shortness of breath.    Cardiovascular: Negative for chest pain.   Gastrointestinal: Positive for nausea.   Psychiatric/Behavioral: Positive for decreased concentration. Negative for suicidal ideas. The patient is nervous/anxious and has insomnia.        Objective:      Physical Exam   Constitutional: She appears well-developed and well-nourished. No distress.   HENT:   Head: Normocephalic and atraumatic.   Right Ear: External ear normal.   Left Ear: External ear normal.   Nose: Nose normal.   Pulmonary/Chest: Effort normal and breath sounds normal. No respiratory distress. She has no wheezes.   Skin: Skin is warm and dry. No rash noted. She is not diaphoretic. No erythema.   Nursing note and vitals reviewed.      Assessment:       1. Severe episode of recurrent major depressive disorder, with psychotic features        Plan:     Problem List Items Addressed This Visit        Psychiatric    Depression - Primary    Current Assessment & Plan     Bridging pt with xanax for 1 month.  I do not intend to refill this medication, but pt is having severe episode of depression, only been on ssri for a few days, as she did not take original 25 mg dose.    Have asked pt to f/u this week just to check on her.  Have asked nurse to make f/u phone call kevin.         Relevant Medications    ALPRAZolam (XANAX) 0.25 MG tablet

## 2018-02-26 NOTE — LETTER
02/26/2018                 [facility]  [address] [address] [address]  Phone: [phone]   02/26/2018    Patient: Yuli Milton   YOB: 1975   Date of Visit: 2/26/2018       To Whom it May Concern:    Yuli Milton was seen in my clinic on 2/26/2018. She may return to work on 2/27/2018.    If you have any questions or concerns, please don't hesitate to call.    Sincerely,   MD Lisa Olmedo LPN

## 2018-02-26 NOTE — TELEPHONE ENCOUNTER
----- Message from Maya Almonte sent at 2/26/2018  9:21 AM CST -----  Contact: Pt  Pt calling in regards to she does not feel good and not eating and feels depressed.  Pt would like to be seen today if possible.  Pt can be reached at .[phone] (home)

## 2018-02-26 NOTE — TELEPHONE ENCOUNTER
Spoke with pt and she said that she has not been able to eat or sleep, And has been crying a lot. Pt scheduled to see  today at 4:40 pm.

## 2018-02-26 NOTE — LETTER
02/26/2018                 [facility]  [address] [address] [address]  Phone: [phone]   02/26/2018    Patient: Yuli Milton   YOB: 1975   Date of Visit: 2/26/2018       To Whom it May Concern:    Yuli Milton was seen in my clinic on 2/26/2018. She may return to work on 2/28/18.    If you have any questions or concerns, please don't hesitate to call.    Sincerely,         Lisa Lanier LPN

## 2018-02-27 ENCOUNTER — TELEPHONE (OUTPATIENT)
Dept: INTERNAL MEDICINE | Facility: CLINIC | Age: 43
End: 2018-02-27

## 2018-02-27 NOTE — TELEPHONE ENCOUNTER
Spoke with pt and informed her per  As counseling may be appropriate.   SSRI are not effective for 4-6 weeks.   The xanax should help her sx, until the SSRI starts working. Pt verbalized understanding and I gave her the number to Psychiatry.

## 2018-02-27 NOTE — TELEPHONE ENCOUNTER
Spoke with pt and asked how she was feeling and she said she was feeling the same. I told her I would let  know. Pt verbalized understanding.

## 2018-03-01 ENCOUNTER — TELEPHONE (OUTPATIENT)
Dept: INTERNAL MEDICINE | Facility: CLINIC | Age: 43
End: 2018-03-01

## 2018-03-01 NOTE — TELEPHONE ENCOUNTER
Spoke with pt and informed her per  that She can come in, but he likely would not change med, BC it hasn't had time reach peak effectiveness.   Did she contact psych? Pt said she did and their next available was in April. I explained to her there are other psychiatrist around that she can try but she may have to pay for it. She said she may try that and scheduled an appt to see  tomorrow at 10:20 am BC she says the xanax is not helping her anxiety.

## 2018-03-01 NOTE — TELEPHONE ENCOUNTER
Spoke with pt and she said that her medication is not working.She wants something different. That the Zoloft nor the xanax is working that she is jittery and anxious and not eating. I explained to her that the Zoloft takes about 30 days to kick in. That I will let  know that she wants a diff med but that she will probably have to come for a medication change. Pt verbalized understanding.

## 2018-03-01 NOTE — TELEPHONE ENCOUNTER
----- Message from Maya Almonte sent at 3/1/2018  2:15 PM CST -----  Contact: Pt  Pt calling in regards to her medication. Pt is requesting another type of medication due to the meds she is taking does not seem to be helping her.    She can be reached at .[phone] (home)

## 2018-03-02 ENCOUNTER — TELEPHONE (OUTPATIENT)
Dept: INTERNAL MEDICINE | Facility: CLINIC | Age: 43
End: 2018-03-02

## 2018-03-02 NOTE — TELEPHONE ENCOUNTER
Spoke with pt to see how she is feeling today and bc she missed her appt with  today at 10:20 am. To see if she wanted to reschedule. She said she has an appt to see  Monday 3/5/18 so she will see us then.

## 2018-03-05 ENCOUNTER — OFFICE VISIT (OUTPATIENT)
Dept: INTERNAL MEDICINE | Facility: CLINIC | Age: 43
End: 2018-03-05
Payer: MEDICAID

## 2018-03-05 VITALS
DIASTOLIC BLOOD PRESSURE: 80 MMHG | HEART RATE: 76 BPM | SYSTOLIC BLOOD PRESSURE: 130 MMHG | WEIGHT: 249.13 LBS | OXYGEN SATURATION: 99 % | RESPIRATION RATE: 18 BRPM | TEMPERATURE: 96 F | HEIGHT: 65 IN | BODY MASS INDEX: 41.51 KG/M2

## 2018-03-05 DIAGNOSIS — F33.3 SEVERE EPISODE OF RECURRENT MAJOR DEPRESSIVE DISORDER, WITH PSYCHOTIC FEATURES: ICD-10-CM

## 2018-03-05 PROCEDURE — 99999 PR PBB SHADOW E&M-EST. PATIENT-LVL IV: CPT | Mod: PBBFAC,,, | Performed by: FAMILY MEDICINE

## 2018-03-05 PROCEDURE — 99214 OFFICE O/P EST MOD 30 MIN: CPT | Mod: S$PBB,,, | Performed by: FAMILY MEDICINE

## 2018-03-05 PROCEDURE — 99214 OFFICE O/P EST MOD 30 MIN: CPT | Mod: PBBFAC,PO | Performed by: FAMILY MEDICINE

## 2018-03-05 RX ORDER — OLANZAPINE 5 MG/1
5 TABLET ORAL NIGHTLY
Qty: 30 TABLET | Refills: 11 | Status: SHIPPED | OUTPATIENT
Start: 2018-03-05 | End: 2018-04-02

## 2018-03-05 NOTE — ASSESSMENT & PLAN NOTE
Considering her depression seems to have some psychotic features, will add Zyprexa in the evening to be 50 mg of Zoloft that she is taking during the day.  We'll attempt to get her into psychiatry sooner possibly this week but if not, we'll have her follow-up with me in 3 days to plan to titrate up the Zyprexa.

## 2018-03-05 NOTE — PROGRESS NOTES
Subjective:       Patient ID: Yuli Milton is a 42 y.o. female.    Chief Complaint: Follow-up; Depression (severe this weekend ); and Anorexia    HPI  Here today to follow-up on depression.  She had a very bad weekend has been feeling more and more depressed.  She continues to hear voices and also sometimes has visual hallucinations.  She does admit to having suicidal ideation but has not developed a plan and has never had any attempts.  She says the thing that keeps her going is her case isn't knowing that she has to stick, take care of them. Has psych appt but not for a month.   Recently started taking zoloft 50mg on a consistent basis and was also taking xanax in evening without any effect noticed    Family History   Problem Relation Age of Onset    Breast cancer Paternal Grandmother     Diabetes Maternal Grandmother     Hypertension Mother     Breast cancer Maternal Aunt     Cancer Maternal Aunt      colon cancer    Colon cancer Maternal Aunt     Miscarriages / Stillbirths Cousin     Stroke Maternal Aunt     Ovarian cancer Neg Hx     Deep vein thrombosis Neg Hx     Pulmonary embolism Neg Hx        Current Outpatient Prescriptions:     ALBUTEROL INHL, Inhale 2 puffs into the lungs continuous prn., Disp: , Rfl:     blood sugar diagnostic Strp, 1 each by Other route 3 (three) times daily. For One Touch Meter, Disp: 100 each, Rfl: 11    blood-glucose meter Misc, Dispense 1, Disp: 1 each, Rfl: 0    CARAFATE 100 mg/mL suspension, SHAKE LIQUID AND TAKE 10 ML PO QID, Disp: , Rfl: 0    cetirizine (ZYRTEC) 10 MG tablet, Take 1 tablet (10 mg total) by mouth once daily., Disp: , Rfl:     cycloSPORINE (RESTASIS) 0.05 % ophthalmic emulsion, 1 drop 2 (two) times daily., Disp: , Rfl:     lancets Misc, 1 each by Other route 3 (three) times daily. For One Touch Meter, Disp: 100 each, Rfl: 0    levalbuterol (XOPENEX) 0.63 mg/3 mL nebulizer solution, Take 1 ampule by nebulization every 4 (four) hours as  "needed for Wheezing., Disp: , Rfl:     metFORMIN (GLUCOPHAGE) 500 MG tablet, Take 1 tablet (500 mg total) by mouth 2 (two) times daily with meals., Disp: 180 tablet, Rfl: 3    ondansetron (ZOFRAN) 4 MG tablet, Take 1 tablet (4 mg total) by mouth every 8 (eight) hours as needed for Nausea., Disp: 30 tablet, Rfl: 8    pen needle, diabetic (COMFORT EZ PEN NEEDLES) 33 gauge x 1/4" Ndle, 1 Units by Misc.(Non-Drug; Combo Route) route every evening., Disp: 100 each, Rfl: 5    pramipexole (MIRAPEX) 0.125 MG tablet, Take 1 tablet (0.125 mg total) by mouth every evening. For restless legs, Disp: 30 tablet, Rfl: 11    pravastatin (PRAVACHOL) 10 MG tablet, Take 1 tablet (10 mg total) by mouth once daily., Disp: 90 tablet, Rfl: 3    sertraline (ZOLOFT) 50 MG tablet, Take 1 tablet (50 mg total) by mouth once daily., Disp: 30 tablet, Rfl: 0    sucralfate (CARAFATE) 1 gram tablet, Take 1 tablet (1 g total) by mouth 4 (four) times daily before meals and nightly., Disp: 120 tablet, Rfl: 1    SYSTANE, PROPYLENE GLYCOL, 0.4-0.3 % Drop, INSTILL 1 GTT INTO OU BID, Disp: , Rfl: 6    TRULICITY 0.75 mg/0.5 mL PnIj, Place 0.5 mLs (0.75 mg total) onto the skin every 7 days., Disp: 0.5 mL, Rfl: 3    hydrocodone-acetaminophen 10-325mg (NORCO)  mg Tab, TK 1 T PO Q 6 H PRN P FOR UP TO 4 DAYS, Disp: , Rfl: 0    OLANZapine (ZYPREXA) 5 MG tablet, Take 1 tablet (5 mg total) by mouth every evening., Disp: 30 tablet, Rfl: 11    Review of Systems   Constitutional: Negative for chills and fever.   Respiratory: Negative for cough and shortness of breath.    Cardiovascular: Negative for chest pain.   Gastrointestinal: Negative for abdominal pain.   Skin: Negative for rash.   Neurological: Negative for dizziness.   Psychiatric/Behavioral: Positive for decreased concentration, dysphoric mood, hallucinations, sleep disturbance and suicidal ideas.       Objective:   /80 (BP Location: Right arm, Patient Position: Sitting, BP Method: " "X-Large (Manual))   Pulse 76   Temp 96 °F (35.6 °C) (Tympanic)   Resp 18   Ht 5' 5" (1.651 m)   Wt 113 kg (249 lb 1.9 oz)   LMP 08/21/2016   SpO2 99%   BMI 41.46 kg/m²      Physical Exam   Constitutional: She is oriented to person, place, and time. She appears well-developed and well-nourished.   HENT:   Head: Normocephalic and atraumatic.   Eyes: Conjunctivae are normal.   Cardiovascular: Normal rate.    Pulmonary/Chest: Effort normal. No respiratory distress.   Musculoskeletal: She exhibits no edema.   Neurological: She is alert and oriented to person, place, and time. Coordination normal.   Skin: Skin is warm and dry. No rash noted.   Psychiatric: Her speech is normal and behavior is normal. Judgment and thought content normal. Thought content is not delusional. Cognition and memory are normal. She does not express impulsivity or inappropriate judgment. She exhibits a depressed mood (tearful). Suicidal: no active suicidal plan. only passive thoughts. She expresses no suicidal plans and no homicidal plans.   She understands that she needs to keep going to keep a positive attitude for her children.  She is not a suicidal threat.  Advised her to call 911 if she feels worse and also talked to her daughter and told her daughter that if there is concern for them to also on the 1 range to the ER.   Vitals reviewed.      Assessment & Plan     Problem List Items Addressed This Visit        Psychiatric    Depression    Current Assessment & Plan     Considering her depression seems to have some psychotic features, will add Zyprexa in the evening to be 50 mg of Zoloft that she is taking during the day.  We'll attempt to get her into psychiatry sooner possibly this week but if not, we'll have her follow-up with me in 3 days to plan to titrate up the Zyprexa.                 Follow-up in about 3 days (around 3/8/2018).  "

## 2018-03-08 ENCOUNTER — OFFICE VISIT (OUTPATIENT)
Dept: INTERNAL MEDICINE | Facility: CLINIC | Age: 43
End: 2018-03-08
Payer: MEDICAID

## 2018-03-08 VITALS
HEIGHT: 65 IN | WEIGHT: 254 LBS | HEART RATE: 68 BPM | SYSTOLIC BLOOD PRESSURE: 148 MMHG | TEMPERATURE: 96 F | RESPIRATION RATE: 18 BRPM | DIASTOLIC BLOOD PRESSURE: 70 MMHG | BODY MASS INDEX: 42.32 KG/M2

## 2018-03-08 DIAGNOSIS — F33.3 SEVERE EPISODE OF RECURRENT MAJOR DEPRESSIVE DISORDER, WITH PSYCHOTIC FEATURES: ICD-10-CM

## 2018-03-08 PROCEDURE — 99214 OFFICE O/P EST MOD 30 MIN: CPT | Mod: S$PBB,,, | Performed by: FAMILY MEDICINE

## 2018-03-08 PROCEDURE — 99999 PR PBB SHADOW E&M-EST. PATIENT-LVL III: CPT | Mod: PBBFAC,,, | Performed by: FAMILY MEDICINE

## 2018-03-08 PROCEDURE — 99213 OFFICE O/P EST LOW 20 MIN: CPT | Mod: PBBFAC,PO | Performed by: FAMILY MEDICINE

## 2018-03-08 RX ORDER — SERTRALINE HYDROCHLORIDE 50 MG/1
100 TABLET, FILM COATED ORAL DAILY
Qty: 30 TABLET | Refills: 0
Start: 2018-03-08 | End: 2018-03-15

## 2018-03-08 NOTE — PROGRESS NOTES
Subjective:       Patient ID: Yuli Milton is a 42 y.o. female.    Chief Complaint: Follow-up (depression) and anxious    HPI  Mrs. Shen is here today to follow-up on depression.  At the last visit 3 days ago she was at quite a low point and we had decided to start her on Zyprexa since she was having psychotic symptoms of hallucinations and having suicidal thoughts.  Since that time she is feeling better although not normal.  She still has some passive suicidal thoughts but has not developed a plan and has not had any attempts.  She continues to have a good support system and motivation for helping herself since she has children.  She went to a local psychology clinic and did the intake earlier today however she says that she would rather not be seen in the clinic since she knows a lot of the people as they are from her home town.  She still has an upcoming visit with psychiatry next month    Says that she has known since being on the Zyprexa she does feel a little bit groggy in the morning.    Family History   Problem Relation Age of Onset    Breast cancer Paternal Grandmother     Diabetes Maternal Grandmother     Hypertension Mother     Breast cancer Maternal Aunt     Cancer Maternal Aunt      colon cancer    Colon cancer Maternal Aunt     Miscarriages / Stillbirths Cousin     Stroke Maternal Aunt     Ovarian cancer Neg Hx     Deep vein thrombosis Neg Hx     Pulmonary embolism Neg Hx        Current Outpatient Prescriptions:     ALBUTEROL INHL, Inhale 2 puffs into the lungs continuous prn., Disp: , Rfl:     blood sugar diagnostic Strp, 1 each by Other route 3 (three) times daily. For One Touch Meter, Disp: 100 each, Rfl: 11    blood-glucose meter Misc, Dispense 1, Disp: 1 each, Rfl: 0    CARAFATE 100 mg/mL suspension, SHAKE LIQUID AND TAKE 10 ML PO QID, Disp: , Rfl: 0    cetirizine (ZYRTEC) 10 MG tablet, Take 1 tablet (10 mg total) by mouth once daily., Disp: , Rfl:     cycloSPORINE  "(RESTASIS) 0.05 % ophthalmic emulsion, 1 drop 2 (two) times daily., Disp: , Rfl:     hydrocodone-acetaminophen 10-325mg (NORCO)  mg Tab, TK 1 T PO Q 6 H PRN P FOR UP TO 4 DAYS, Disp: , Rfl: 0    lancets Misc, 1 each by Other route 3 (three) times daily. For One Touch Meter, Disp: 100 each, Rfl: 0    levalbuterol (XOPENEX) 0.63 mg/3 mL nebulizer solution, Take 1 ampule by nebulization every 4 (four) hours as needed for Wheezing., Disp: , Rfl:     metFORMIN (GLUCOPHAGE) 500 MG tablet, Take 1 tablet (500 mg total) by mouth 2 (two) times daily with meals., Disp: 180 tablet, Rfl: 3    OLANZapine (ZYPREXA) 5 MG tablet, Take 1 tablet (5 mg total) by mouth every evening., Disp: 30 tablet, Rfl: 11    ondansetron (ZOFRAN) 4 MG tablet, Take 1 tablet (4 mg total) by mouth every 8 (eight) hours as needed for Nausea., Disp: 30 tablet, Rfl: 8    pen needle, diabetic (COMFORT EZ PEN NEEDLES) 33 gauge x 1/4" Ndle, 1 Units by Misc.(Non-Drug; Combo Route) route every evening., Disp: 100 each, Rfl: 5    pramipexole (MIRAPEX) 0.125 MG tablet, Take 1 tablet (0.125 mg total) by mouth every evening. For restless legs, Disp: 30 tablet, Rfl: 11    pravastatin (PRAVACHOL) 10 MG tablet, Take 1 tablet (10 mg total) by mouth once daily., Disp: 90 tablet, Rfl: 3    sertraline (ZOLOFT) 50 MG tablet, Take 2 tablets (100 mg total) by mouth once daily., Disp: 30 tablet, Rfl: 0    sucralfate (CARAFATE) 1 gram tablet, Take 1 tablet (1 g total) by mouth 4 (four) times daily before meals and nightly., Disp: 120 tablet, Rfl: 1    SYSTANE, PROPYLENE GLYCOL, 0.4-0.3 % Drop, INSTILL 1 GTT INTO OU BID, Disp: , Rfl: 6    TRULICITY 0.75 mg/0.5 mL PnIj, Place 0.5 mLs (0.75 mg total) onto the skin every 7 days., Disp: 0.5 mL, Rfl: 3    Review of Systems   Constitutional: Negative for chills and fever.   Respiratory: Negative for cough and shortness of breath.    Cardiovascular: Negative for chest pain.   Gastrointestinal: Negative for abdominal " "pain.   Skin: Negative for rash.   Neurological: Negative for dizziness.   Psychiatric/Behavioral: Positive for decreased concentration, dysphoric mood, hallucinations, sleep disturbance and suicidal ideas.       Objective:   BP (!) 148/70 (BP Location: Right arm, Patient Position: Sitting, BP Method: X-Large (Manual))   Pulse 68   Temp 96 °F (35.6 °C) (Tympanic)   Resp 18   Ht 5' 5" (1.651 m)   Wt 115.2 kg (253 lb 15.5 oz)   LMP 08/21/2016   BMI 42.26 kg/m²      Physical Exam   Constitutional: She is oriented to person, place, and time. She appears well-developed and well-nourished.   Overall she appears much more put together today.  She is smiling.  Not tearful as she was a couple days ago.   HENT:   Head: Normocephalic and atraumatic.   Eyes: Conjunctivae are normal.   Cardiovascular: Normal rate.    Pulmonary/Chest: Effort normal. No respiratory distress.   Musculoskeletal: She exhibits no edema.   Neurological: She is alert and oriented to person, place, and time. Coordination normal.   Skin: Skin is warm and dry. No rash noted.   Psychiatric: She has a normal mood and affect. Her behavior is normal.   Vitals reviewed.      Assessment & Plan     Problem List Items Addressed This Visit        Psychiatric    Depression    Current Assessment & Plan     Continue with same dose of Zyprexa in the evenings but will augment the Zoloft 200 mg daily.  Will have her follow-up in 1 week and possibly increased Zyprexa however the limiting factor may be the somnolence or grogginess that she has from that.         Relevant Medications    sertraline (ZOLOFT) 50 MG tablet            Follow-up in about 1 week (around 3/15/2018).  "

## 2018-03-08 NOTE — ASSESSMENT & PLAN NOTE
Continue with same dose of Zyprexa in the evenings but will augment the Zoloft 200 mg daily.  Will have her follow-up in 1 week and possibly increased Zyprexa however the limiting factor may be the somnolence or grogginess that she has from that.

## 2018-03-09 ENCOUNTER — PRE FLIGHT (OUTPATIENT)
Dept: ADMINISTRATIVE | Facility: HOSPITAL | Age: 43
End: 2018-03-09

## 2018-03-15 ENCOUNTER — TELEPHONE (OUTPATIENT)
Dept: INTERNAL MEDICINE | Facility: CLINIC | Age: 43
End: 2018-03-15

## 2018-03-15 ENCOUNTER — OFFICE VISIT (OUTPATIENT)
Dept: INTERNAL MEDICINE | Facility: CLINIC | Age: 43
End: 2018-03-15
Payer: MEDICAID

## 2018-03-15 VITALS
HEART RATE: 80 BPM | BODY MASS INDEX: 43.27 KG/M2 | SYSTOLIC BLOOD PRESSURE: 109 MMHG | RESPIRATION RATE: 18 BRPM | HEIGHT: 65 IN | WEIGHT: 259.69 LBS | TEMPERATURE: 96 F | DIASTOLIC BLOOD PRESSURE: 77 MMHG

## 2018-03-15 DIAGNOSIS — F33.3 SEVERE EPISODE OF RECURRENT MAJOR DEPRESSIVE DISORDER, WITH PSYCHOTIC FEATURES: ICD-10-CM

## 2018-03-15 DIAGNOSIS — F33.3 SEVERE EPISODE OF RECURRENT MAJOR DEPRESSIVE DISORDER, WITH PSYCHOTIC FEATURES: Primary | ICD-10-CM

## 2018-03-15 PROCEDURE — 99213 OFFICE O/P EST LOW 20 MIN: CPT | Mod: PBBFAC,PO | Performed by: FAMILY MEDICINE

## 2018-03-15 PROCEDURE — 99213 OFFICE O/P EST LOW 20 MIN: CPT | Mod: S$PBB,,, | Performed by: FAMILY MEDICINE

## 2018-03-15 PROCEDURE — 99999 PR PBB SHADOW E&M-EST. PATIENT-LVL III: CPT | Mod: PBBFAC,,, | Performed by: FAMILY MEDICINE

## 2018-03-15 RX ORDER — SERTRALINE HYDROCHLORIDE 100 MG/1
100 TABLET, FILM COATED ORAL DAILY
Qty: 30 TABLET | Refills: 11 | Status: CANCELLED | OUTPATIENT
Start: 2018-03-15 | End: 2019-03-15

## 2018-03-15 RX ORDER — SERTRALINE HYDROCHLORIDE 50 MG/1
100 TABLET, FILM COATED ORAL DAILY
Qty: 30 TABLET | Refills: 0 | Status: CANCELLED
Start: 2018-03-15 | End: 2019-03-15

## 2018-03-15 NOTE — ASSESSMENT & PLAN NOTE
Overall her symptoms have greatly improved.  I recommend that she continues on the Zoloft and Zyprexa at the same dose for now.  Encouraged her to try exercising as this should help with her mood and also help to counteract some of the increased appetite and she has had.  I told her to keep follow-up with psychiatry but to let me know she needs anything before then.  She may need different medication adjustment after she has a thorough psychiatric evaluation.

## 2018-03-15 NOTE — PROGRESS NOTES
Subjective:       Patient ID: Yuli Milton is a 42 y.o. female.    Chief Complaint: Follow-up (depression)    HPI  Ms. Sanchez is here today to follow-up on depression.  She has improved since the last time she was seen here about one week ago.  She has been taking One Touch milligrams of Zoloft daily and taking the Zyprexa every evening at a dose of 5 mg.  She does find it a bit difficult to get up in the morning due to drowsiness from the Zyprexa but overall has been doing okay.  Still has some hallucinations but this is better than it was before.  No suicidal ideations.  She has an appointment with psychiatry on April 3.    Family History   Problem Relation Age of Onset    Breast cancer Paternal Grandmother     Diabetes Maternal Grandmother     Hypertension Mother     Breast cancer Maternal Aunt     Cancer Maternal Aunt      colon cancer    Colon cancer Maternal Aunt     Miscarriages / Stillbirths Cousin     Stroke Maternal Aunt     Ovarian cancer Neg Hx     Deep vein thrombosis Neg Hx     Pulmonary embolism Neg Hx        Current Outpatient Prescriptions:     ALBUTEROL INHL, Inhale 2 puffs into the lungs continuous prn., Disp: , Rfl:     blood sugar diagnostic Strp, 1 each by Other route 3 (three) times daily. For One Touch Meter, Disp: 100 each, Rfl: 11    blood-glucose meter Misc, Dispense 1, Disp: 1 each, Rfl: 0    CARAFATE 100 mg/mL suspension, SHAKE LIQUID AND TAKE 10 ML PO QID, Disp: , Rfl: 0    cetirizine (ZYRTEC) 10 MG tablet, Take 1 tablet (10 mg total) by mouth once daily., Disp: , Rfl:     cycloSPORINE (RESTASIS) 0.05 % ophthalmic emulsion, 1 drop 2 (two) times daily., Disp: , Rfl:     hydrocodone-acetaminophen 10-325mg (NORCO)  mg Tab, TK 1 T PO Q 6 H PRN P FOR UP TO 4 DAYS, Disp: , Rfl: 0    lancets Misc, 1 each by Other route 3 (three) times daily. For One Touch Meter, Disp: 100 each, Rfl: 0    levalbuterol (XOPENEX) 0.63 mg/3 mL nebulizer solution, Take 1 ampule by  "nebulization every 4 (four) hours as needed for Wheezing., Disp: , Rfl:     metFORMIN (GLUCOPHAGE) 500 MG tablet, Take 1 tablet (500 mg total) by mouth 2 (two) times daily with meals., Disp: 180 tablet, Rfl: 3    OLANZapine (ZYPREXA) 5 MG tablet, Take 1 tablet (5 mg total) by mouth every evening., Disp: 30 tablet, Rfl: 11    ondansetron (ZOFRAN) 4 MG tablet, Take 1 tablet (4 mg total) by mouth every 8 (eight) hours as needed for Nausea., Disp: 30 tablet, Rfl: 8    pen needle, diabetic (COMFORT EZ PEN NEEDLES) 33 gauge x 1/4" Ndle, 1 Units by Misc.(Non-Drug; Combo Route) route every evening., Disp: 100 each, Rfl: 5    pramipexole (MIRAPEX) 0.125 MG tablet, Take 1 tablet (0.125 mg total) by mouth every evening. For restless legs, Disp: 30 tablet, Rfl: 11    pravastatin (PRAVACHOL) 10 MG tablet, Take 1 tablet (10 mg total) by mouth once daily., Disp: 90 tablet, Rfl: 3    sertraline (ZOLOFT) 50 MG tablet, Take 2 tablets (100 mg total) by mouth once daily., Disp: 30 tablet, Rfl: 0    sucralfate (CARAFATE) 1 gram tablet, Take 1 tablet (1 g total) by mouth 4 (four) times daily before meals and nightly., Disp: 120 tablet, Rfl: 1    SYSTANE, PROPYLENE GLYCOL, 0.4-0.3 % Drop, INSTILL 1 GTT INTO OU BID, Disp: , Rfl: 6    TRULICITY 0.75 mg/0.5 mL PnIj, Place 0.5 mLs (0.75 mg total) onto the skin every 7 days., Disp: 0.5 mL, Rfl: 3    Review of Systems   Constitutional: Negative for chills and fever.   Respiratory: Negative for cough and shortness of breath.    Cardiovascular: Negative for chest pain.   Gastrointestinal: Negative for abdominal pain.   Skin: Negative for rash.   Neurological: Negative for dizziness.   Psychiatric/Behavioral: Positive for decreased concentration and hallucinations.       Objective:   /77 (BP Location: Right arm, Patient Position: Sitting, BP Method: X-Large (Manual))   Pulse 80   Temp 96.1 °F (35.6 °C) (Tympanic)   Resp 18   Ht 5' 5" (1.651 m)   Wt 117.8 kg (259 lb 11.2 oz)   " LMP 08/21/2016   BMI 43.22 kg/m²      Physical Exam   Constitutional: She is oriented to person, place, and time. She appears well-developed and well-nourished.   HENT:   Head: Normocephalic and atraumatic.   Eyes: Conjunctivae are normal.   Cardiovascular: Normal rate.    Pulmonary/Chest: Effort normal. No respiratory distress.   Musculoskeletal: She exhibits no edema.   Neurological: She is alert and oriented to person, place, and time. Coordination normal.   Skin: Skin is warm and dry. No rash noted.   Psychiatric: She has a normal mood and affect. Her behavior is normal.   Vitals reviewed.      Assessment & Plan     Problem List Items Addressed This Visit        Psychiatric    Severe episode of recurrent major depressive disorder, with psychotic features - Primary    Current Assessment & Plan     Overall her symptoms have greatly improved.  I recommend that she continues on the Zoloft and Zyprexa at the same dose for now.  Encouraged her to try exercising as this should help with her mood and also help to counteract some of the increased appetite and she has had.  I told her to keep follow-up with psychiatry but to let me know she needs anything before then.  She may need different medication adjustment after she has a thorough psychiatric evaluation.                 No Follow-up on file.

## 2018-03-15 NOTE — TELEPHONE ENCOUNTER
----- Message from Stephanie Salgado sent at 3/15/2018  1:02 PM CDT -----  Contact: patient  1. What is the name of the medication you are requesting? Zoloft generic   2. What is the dose? 50 mg  3. How do you take the medication? Orally, topically, etc? oral  4. How often do you take this medication? 2 a dyana  5. Do you need a 30 day or 90 day supply? 30   6. How many refills are you requesting? 1  7. What is your preferred pharmacy and location of the pharmacy?   [facility] [id number] [place], [place] [address], [place] - [address] [facility], [place] AT [place] [address]  [address] [facility] [address]  Phone: [phone] Fax: [phone]      8. Who can we contact with further questions? Patient @ [phone]. Thanks, Alice

## 2018-03-16 DIAGNOSIS — E11.9 TYPE 2 DIABETES MELLITUS WITHOUT COMPLICATION: ICD-10-CM

## 2018-03-26 ENCOUNTER — TELEPHONE (OUTPATIENT)
Dept: INTERNAL MEDICINE | Facility: CLINIC | Age: 43
End: 2018-03-26

## 2018-03-26 NOTE — TELEPHONE ENCOUNTER
----- Message from Angie Crouch sent at 3/26/2018  2:08 PM CDT -----  Contact: pt  States she needs to schedule a f/u for depression and anxiety. She an established patient, nothing coming up until May. Please call pt at [phone]. Thank you

## 2018-03-27 ENCOUNTER — OFFICE VISIT (OUTPATIENT)
Dept: INTERNAL MEDICINE | Facility: CLINIC | Age: 43
End: 2018-03-27
Payer: MEDICAID

## 2018-03-27 VITALS
SYSTOLIC BLOOD PRESSURE: 110 MMHG | RESPIRATION RATE: 18 BRPM | BODY MASS INDEX: 43.41 KG/M2 | WEIGHT: 260.56 LBS | DIASTOLIC BLOOD PRESSURE: 62 MMHG | HEIGHT: 65 IN | TEMPERATURE: 96 F | OXYGEN SATURATION: 99 % | HEART RATE: 68 BPM

## 2018-03-27 DIAGNOSIS — R30.0 DYSURIA: Primary | ICD-10-CM

## 2018-03-27 DIAGNOSIS — F33.3 SEVERE EPISODE OF RECURRENT MAJOR DEPRESSIVE DISORDER, WITH PSYCHOTIC FEATURES: ICD-10-CM

## 2018-03-27 LAB
BILIRUB SERPL-MCNC: ABNORMAL MG/DL
BLOOD URINE, POC: ABNORMAL
COLOR, POC UA: ABNORMAL
GLUCOSE UR QL STRIP: ABNORMAL
KETONES UR QL STRIP: ABNORMAL
LEUKOCYTE ESTERASE URINE, POC: ABNORMAL
NITRITE, POC UA: ABNORMAL
PH, POC UA: 5
PROTEIN, POC: ABNORMAL
SPECIFIC GRAVITY, POC UA: 1.03
UROBILINOGEN, POC UA: ABNORMAL

## 2018-03-27 PROCEDURE — 99214 OFFICE O/P EST MOD 30 MIN: CPT | Mod: S$PBB,,, | Performed by: FAMILY MEDICINE

## 2018-03-27 PROCEDURE — 81002 URINALYSIS NONAUTO W/O SCOPE: CPT | Mod: PBBFAC,PO | Performed by: FAMILY MEDICINE

## 2018-03-27 PROCEDURE — 99213 OFFICE O/P EST LOW 20 MIN: CPT | Mod: PBBFAC,PO | Performed by: FAMILY MEDICINE

## 2018-03-27 PROCEDURE — 99999 PR PBB SHADOW E&M-EST. PATIENT-LVL III: CPT | Mod: PBBFAC,,, | Performed by: FAMILY MEDICINE

## 2018-03-27 PROCEDURE — 87086 URINE CULTURE/COLONY COUNT: CPT

## 2018-03-27 RX ORDER — SERTRALINE HYDROCHLORIDE 100 MG/1
TABLET, FILM COATED ORAL
Refills: 11 | COMMUNITY
Start: 2018-03-20 | End: 2019-01-21

## 2018-03-27 NOTE — PROGRESS NOTES
Subjective:       Patient ID: Yuli Milton is a 42 y.o. female.    Chief Complaint: Follow-up (severe depression) and Urinary Frequency    HPI  Here today to follow-up on depression.  Over the last several weeks we have been titrating up her Zoloft and using Zyprexa since she has had some psychotic features associated with the depression.  Since the initial visit she has improved however does continue to have a lot of ups and downs.  She continues to negative voices although she has not contemplated any suicidal nor homicidal thoughts.  She says she has also had some thoughts of cutting herself but has been able to rationalize her way out of that.  She is wondering why she is having these issues.  She knows that in the past she was found to have some adrenal gland issues but it was confirmed to be stable on repeat imaging.  She is concerned about her increased appetite since being on the Zyprexa and she is not happy about gaining weight.  She has not had the motivation to start exercising.    Family History   Problem Relation Age of Onset    Breast cancer Paternal Grandmother     Diabetes Maternal Grandmother     Hypertension Mother     Breast cancer Maternal Aunt     Cancer Maternal Aunt      colon cancer    Colon cancer Maternal Aunt     Miscarriages / Stillbirths Cousin     Stroke Maternal Aunt     Ovarian cancer Neg Hx     Deep vein thrombosis Neg Hx     Pulmonary embolism Neg Hx        Current Outpatient Prescriptions:     ALBUTEROL INHL, Inhale 2 puffs into the lungs continuous prn., Disp: , Rfl:     blood sugar diagnostic Strp, 1 each by Other route 3 (three) times daily. For One Touch Meter, Disp: 100 each, Rfl: 11    blood-glucose meter Misc, Dispense 1, Disp: 1 each, Rfl: 0    CARAFATE 100 mg/mL suspension, SHAKE LIQUID AND TAKE 10 ML PO QID, Disp: , Rfl: 0    cetirizine (ZYRTEC) 10 MG tablet, Take 1 tablet (10 mg total) by mouth once daily., Disp: , Rfl:     cycloSPORINE (RESTASIS)  "0.05 % ophthalmic emulsion, 1 drop 2 (two) times daily., Disp: , Rfl:     lancets Misc, 1 each by Other route 3 (three) times daily. For One Touch Meter, Disp: 100 each, Rfl: 0    levalbuterol (XOPENEX) 0.63 mg/3 mL nebulizer solution, Take 1 ampule by nebulization every 4 (four) hours as needed for Wheezing., Disp: , Rfl:     metFORMIN (GLUCOPHAGE) 500 MG tablet, Take 1 tablet (500 mg total) by mouth 2 (two) times daily with meals., Disp: 180 tablet, Rfl: 3    OLANZapine (ZYPREXA) 5 MG tablet, Take 1 tablet (5 mg total) by mouth every evening., Disp: 30 tablet, Rfl: 11    ondansetron (ZOFRAN) 4 MG tablet, Take 1 tablet (4 mg total) by mouth every 8 (eight) hours as needed for Nausea., Disp: 30 tablet, Rfl: 8    pen needle, diabetic (COMFORT EZ PEN NEEDLES) 33 gauge x 1/4" Ndle, 1 Units by Misc.(Non-Drug; Combo Route) route every evening., Disp: 100 each, Rfl: 5    pramipexole (MIRAPEX) 0.125 MG tablet, Take 1 tablet (0.125 mg total) by mouth every evening. For restless legs, Disp: 30 tablet, Rfl: 11    pravastatin (PRAVACHOL) 10 MG tablet, Take 1 tablet (10 mg total) by mouth once daily., Disp: 90 tablet, Rfl: 3    sertraline (ZOLOFT) 100 MG tablet, TK 1 T PO  QD, Disp: , Rfl: 11    sucralfate (CARAFATE) 1 gram tablet, Take 1 tablet (1 g total) by mouth 4 (four) times daily before meals and nightly., Disp: 120 tablet, Rfl: 1    SYSTANE, PROPYLENE GLYCOL, 0.4-0.3 % Drop, INSTILL 1 GTT INTO OU BID, Disp: , Rfl: 6    TRULICITY 0.75 mg/0.5 mL PnIj, Place 0.5 mLs (0.75 mg total) onto the skin every 7 days., Disp: 0.5 mL, Rfl: 3    hydrocodone-acetaminophen 10-325mg (NORCO)  mg Tab, TK 1 T PO Q 6 H PRN P FOR UP TO 4 DAYS, Disp: , Rfl: 0    Review of Systems   Constitutional: Negative for chills and fever.   Respiratory: Negative for cough and shortness of breath.    Cardiovascular: Negative for chest pain.   Gastrointestinal: Negative for abdominal pain.   Skin: Negative for rash.   Neurological: " "Negative for dizziness.   Psychiatric/Behavioral: Positive for hallucinations.       Objective:   /62 (BP Location: Right arm, Patient Position: Sitting, BP Method: X-Large (Manual))   Pulse 68   Temp 96 °F (35.6 °C) (Tympanic)   Resp 18   Ht 5' 5" (1.651 m)   Wt 118.2 kg (260 lb 9.3 oz)   LMP 08/21/2016   SpO2 99%   BMI 43.36 kg/m²      Physical Exam   Constitutional: She is oriented to person, place, and time. She appears well-developed and well-nourished.   HENT:   Head: Normocephalic and atraumatic.   Eyes: Conjunctivae are normal.   Cardiovascular: Normal rate.    Pulmonary/Chest: Effort normal. No respiratory distress.   Musculoskeletal: She exhibits no edema.   Neurological: She is alert and oriented to person, place, and time. Coordination normal.   Skin: Skin is warm and dry. No rash noted.   Psychiatric: She has a normal mood and affect. Her behavior is normal.   Vitals reviewed.      Assessment & Plan     Problem List Items Addressed This Visit        Psychiatric    Severe episode of recurrent major depressive disorder, with psychotic features    Current Assessment & Plan     She has not had satisfactory resolution with the current dose of Zoloft and Zyprexa.  I am considering switching her to something different such as possibly Seroquel since she continues to have negative flows Nations and having a lot of ups and downs with pervasive depression.  She has an appointment pending with psychology.            Renal/    Dysuria - Primary    Current Assessment & Plan     Symptoms are questionable for UTI and urine dip is not consistent with this.  Will not treat with antibiotics pending urine culture.         Relevant Orders    POCT URINE DIPSTICK WITHOUT MICROSCOPE (Completed)    CULTURE, URINE (Completed)            Follow-up in about 4 weeks (around 4/24/2018).  "

## 2018-03-29 ENCOUNTER — TELEPHONE (OUTPATIENT)
Dept: INTERNAL MEDICINE | Facility: CLINIC | Age: 43
End: 2018-03-29

## 2018-03-29 LAB — BACTERIA UR CULT: NO GROWTH

## 2018-03-31 PROBLEM — R30.0 DYSURIA: Status: ACTIVE | Noted: 2018-03-31

## 2018-04-01 ENCOUNTER — NURSE TRIAGE (OUTPATIENT)
Dept: ADMINISTRATIVE | Facility: CLINIC | Age: 43
End: 2018-04-01

## 2018-04-01 NOTE — ASSESSMENT & PLAN NOTE
Symptoms are questionable for UTI and urine dip is not consistent with this.  Will not treat with antibiotics pending urine culture.

## 2018-04-01 NOTE — ASSESSMENT & PLAN NOTE
She has not had satisfactory resolution with the current dose of Zoloft and Zyprexa.  I am considering switching her to something different such as possibly Seroquel since she continues to have negative flows Nations and having a lot of ups and downs with pervasive depression.  She has an appointment pending with psychology.

## 2018-04-02 ENCOUNTER — TELEPHONE (OUTPATIENT)
Dept: INTERNAL MEDICINE | Facility: CLINIC | Age: 43
End: 2018-04-02

## 2018-04-02 ENCOUNTER — OFFICE VISIT (OUTPATIENT)
Dept: INTERNAL MEDICINE | Facility: CLINIC | Age: 43
End: 2018-04-02
Payer: MEDICAID

## 2018-04-02 VITALS
HEIGHT: 65 IN | TEMPERATURE: 97 F | DIASTOLIC BLOOD PRESSURE: 64 MMHG | OXYGEN SATURATION: 99 % | RESPIRATION RATE: 18 BRPM | HEART RATE: 58 BPM | WEIGHT: 263.69 LBS | BODY MASS INDEX: 43.93 KG/M2 | SYSTOLIC BLOOD PRESSURE: 114 MMHG

## 2018-04-02 DIAGNOSIS — F33.3 SEVERE EPISODE OF RECURRENT MAJOR DEPRESSIVE DISORDER, WITH PSYCHOTIC FEATURES: ICD-10-CM

## 2018-04-02 DIAGNOSIS — G89.18 POST-OP PAIN: Primary | ICD-10-CM

## 2018-04-02 PROCEDURE — 99999 PR PBB SHADOW E&M-EST. PATIENT-LVL III: CPT | Mod: PBBFAC,,, | Performed by: FAMILY MEDICINE

## 2018-04-02 PROCEDURE — 99214 OFFICE O/P EST MOD 30 MIN: CPT | Mod: S$PBB,,, | Performed by: FAMILY MEDICINE

## 2018-04-02 PROCEDURE — 99213 OFFICE O/P EST LOW 20 MIN: CPT | Mod: PBBFAC,PO | Performed by: FAMILY MEDICINE

## 2018-04-02 RX ORDER — DOCUSATE SODIUM 100 MG/1
100 CAPSULE, LIQUID FILLED ORAL 2 TIMES DAILY
COMMUNITY
End: 2019-07-01

## 2018-04-02 RX ORDER — ACETAMINOPHEN AND CODEINE PHOSPHATE 300; 30 MG/1; MG/1
TABLET ORAL EVERY 6 HOURS
COMMUNITY
End: 2018-04-03

## 2018-04-02 RX ORDER — QUETIAPINE FUMARATE 100 MG/1
TABLET, FILM COATED ORAL
Qty: 90 TABLET | Refills: 0 | Status: SHIPPED | OUTPATIENT
Start: 2018-04-02 | End: 2019-02-12

## 2018-04-02 NOTE — TELEPHONE ENCOUNTER
----- Message from Jaelyn Prado sent at 4/2/2018  9:44 AM CDT -----  Contact: self [phone]  States that she needs to speak to nurse regarding being worked in for an appt to get a release to go back to work after gallbladder surgery. Please call back at [phone]//thank you acc

## 2018-04-02 NOTE — TELEPHONE ENCOUNTER
----- Message from Elmira Shipman sent at 4/2/2018 10:16 AM CDT -----  Contact: pt   Call pt regarding appt. Pt states that she has a appt today but want to know if she can come in this morning cause she wont have a ride at 2:20.     .[phone] (home)

## 2018-04-02 NOTE — ASSESSMENT & PLAN NOTE
She is continuing to have ileus but no concern for bowel obstruction.  I encouraged her to ambulate and be active.  Warned if she develops any fever or vomiting or worsening abdominal pain she needs to be seen in the ER.  Will have her follow-up on this Thursday for staple removal.  No Signs of wound infection today.

## 2018-04-02 NOTE — ASSESSMENT & PLAN NOTE
We'll attempt to switch from Zyprexa in the evening to Seroquel to see if this helps with the depression and hearing of voices.

## 2018-04-02 NOTE — TELEPHONE ENCOUNTER
Pt called re going out of town. Seen in ER [place] ED Fri pm- emergency surg on Gallbladder. Home today. No pain meds given. Dr Jarod Avila surg   walg Thibo - norco doesn't work.   Spoke with Shereen OCHOA at [facility] - RN states that she will get in touch with MD on call and contact pt re pain meds.   Reason for Disposition   Caller has URGENT medication question about med that PCP prescribed and triager unable to answer question    Protocols used: ST MEDICATION QUESTION CALL-A-AH

## 2018-04-03 ENCOUNTER — NURSE TRIAGE (OUTPATIENT)
Dept: ADMINISTRATIVE | Facility: CLINIC | Age: 43
End: 2018-04-03

## 2018-04-03 RX ORDER — OXYCODONE AND ACETAMINOPHEN 10; 325 MG/1; MG/1
0.5 TABLET ORAL EVERY 8 HOURS PRN
Qty: 9 TABLET | Refills: 0 | Status: SHIPPED | OUTPATIENT
Start: 2018-04-03 | End: 2018-04-09

## 2018-04-03 RX ORDER — HYDROCODONE BITARTRATE AND ACETAMINOPHEN 10; 325 MG/1; MG/1
TABLET ORAL
Refills: 0 | OUTPATIENT
Start: 2018-04-03

## 2018-04-03 NOTE — TELEPHONE ENCOUNTER
Sent low supply of percocet. She should start with half tablet no more than 3 times a day. If pain is worsening she needs to be evaluated. Has she had Bowel movement yet?

## 2018-04-03 NOTE — TELEPHONE ENCOUNTER
PT said that the tylenol 3 is not working and she wants to know if she can get something else for pain. Please advise.

## 2018-04-03 NOTE — TELEPHONE ENCOUNTER
----- Message from Kallie Armstrong sent at 4/3/2018  2:11 PM CDT -----  pain med needed...[phone] (home)   ..  [facility] [employer] [id number] [address] [facility] [address] [facility] [id number] [address]  Phone: [phone] Fax: [phone]

## 2018-04-03 NOTE — TELEPHONE ENCOUNTER
Spoke with pt and informed her per  that he Sent a low supply of percocet. She should start with half tablet no more than 3 times a day. If pain is worsening she needs to be evaluated. Has she had Bowel movement yet?     Pt said yes she had a bowel movement this morning.

## 2018-04-04 NOTE — TELEPHONE ENCOUNTER
Reason for Disposition   Caller has NON-URGENT medication question about med that PCP prescribed and triager unable to answer question    Protocols used: ST MEDICATION QUESTION CALL-A-JASON Roldan called to say the 9 Percocet that was prescribed in clinic by Dr Buitrago today requires prior auth.  Explained to her that this will be taken care of by clinic staff, and during clinic hours.  Explained to her also that if she does not want to wait for the prior authorization, then she can purchase the 9 Percocet herself.  She said she wants her insurance to pay for it.  Message to Dr Buitrago. Please contact caller directly with any additional care advice.

## 2018-04-04 NOTE — TELEPHONE ENCOUNTER
----- Message from Dia Elliott sent at 4/4/2018 11:11 AM CDT -----  Contact: pt  The pt states she needs a prior authorization on her pain meds, the pt can be reached at [phone]///thxMW

## 2018-04-05 ENCOUNTER — OFFICE VISIT (OUTPATIENT)
Dept: INTERNAL MEDICINE | Facility: CLINIC | Age: 43
End: 2018-04-05
Payer: MEDICAID

## 2018-04-05 VITALS
TEMPERATURE: 96 F | RESPIRATION RATE: 18 BRPM | DIASTOLIC BLOOD PRESSURE: 73 MMHG | HEART RATE: 57 BPM | SYSTOLIC BLOOD PRESSURE: 130 MMHG | OXYGEN SATURATION: 99 % | WEIGHT: 265.63 LBS | BODY MASS INDEX: 44.26 KG/M2 | HEIGHT: 65 IN

## 2018-04-05 DIAGNOSIS — F33.3 SEVERE EPISODE OF RECURRENT MAJOR DEPRESSIVE DISORDER, WITH PSYCHOTIC FEATURES: ICD-10-CM

## 2018-04-05 DIAGNOSIS — Z48.02 ENCOUNTER FOR STAPLE REMOVAL: Primary | ICD-10-CM

## 2018-04-05 PROBLEM — G89.18 POST-OP PAIN: Status: RESOLVED | Noted: 2018-04-02 | Resolved: 2018-04-05

## 2018-04-05 PROCEDURE — 99999 PR PBB SHADOW E&M-EST. PATIENT-LVL III: CPT | Mod: PBBFAC,,, | Performed by: FAMILY MEDICINE

## 2018-04-05 PROCEDURE — 99213 OFFICE O/P EST LOW 20 MIN: CPT | Mod: PBBFAC,PO | Performed by: FAMILY MEDICINE

## 2018-04-05 PROCEDURE — 99024 POSTOP FOLLOW-UP VISIT: CPT | Mod: ,,, | Performed by: FAMILY MEDICINE

## 2018-04-05 PROCEDURE — 99213 OFFICE O/P EST LOW 20 MIN: CPT | Mod: S$PBB,,, | Performed by: FAMILY MEDICINE

## 2018-04-05 RX ORDER — ACETAMINOPHEN AND CODEINE PHOSPHATE 300; 30 MG/1; MG/1
TABLET ORAL
COMMUNITY
Start: 2018-04-02 | End: 2018-05-01

## 2018-04-05 NOTE — ASSESSMENT & PLAN NOTE
Remove the staples without any problem.  Told her that her pain will likely improve since some of them had quite a bit of tension.  There are no signs of wound the since and no issues with infection.  Told her that she can likely return to the work next week but will have her call the surgeon and see what they say.

## 2018-04-05 NOTE — ASSESSMENT & PLAN NOTE
Still need more time to assess whether Seroquel will be more appropriate for her.  Not having any new negative symptoms at this time.

## 2018-04-05 NOTE — PROGRESS NOTES
Subjective:       Patient ID: Yuli Milton is a 42 y.o. female.    Chief Complaint: Suture / Staple Removal (abd)    HPI  Came in today for staple removal there having laparoscopic cholecystectomy last week.  She is doing well but does continue to have some pain of the abdomen.  Mainly focused over the umbilicus region.  She is passing bowel movements without any problems now.  Recently.  switched over to Seroquel and is doing okay with that over the last couple of days.  She changed from Zyprexa.    Family History   Problem Relation Age of Onset    Breast cancer Paternal Grandmother     Diabetes Maternal Grandmother     Hypertension Mother     Breast cancer Maternal Aunt     Cancer Maternal Aunt      colon cancer    Colon cancer Maternal Aunt     Miscarriages / Stillbirths Cousin     Stroke Maternal Aunt     Ovarian cancer Neg Hx     Deep vein thrombosis Neg Hx     Pulmonary embolism Neg Hx        Current Outpatient Prescriptions:     acetaminophen-codeine 300-30mg (TYLENOL #3) 300-30 mg Tab, Take by mouth., Disp: , Rfl:     ALBUTEROL INHL, Inhale 2 puffs into the lungs continuous prn., Disp: , Rfl:     blood sugar diagnostic Strp, 1 each by Other route 3 (three) times daily. For One Touch Meter, Disp: 100 each, Rfl: 11    blood-glucose meter Misc, Dispense 1, Disp: 1 each, Rfl: 0    CARAFATE 100 mg/mL suspension, SHAKE LIQUID AND TAKE 10 ML PO QID, Disp: , Rfl: 0    cetirizine (ZYRTEC) 10 MG tablet, Take 1 tablet (10 mg total) by mouth once daily., Disp: , Rfl:     cycloSPORINE (RESTASIS) 0.05 % ophthalmic emulsion, 1 drop 2 (two) times daily., Disp: , Rfl:     docusate sodium (COLACE) 100 MG capsule, Take 100 mg by mouth 2 (two) times daily., Disp: , Rfl:     lancets Misc, 1 each by Other route 3 (three) times daily. For One Touch Meter, Disp: 100 each, Rfl: 0    levalbuterol (XOPENEX) 0.63 mg/3 mL nebulizer solution, Take 1 ampule by nebulization every 4 (four) hours as needed for  "Wheezing., Disp: , Rfl:     metFORMIN (GLUCOPHAGE) 500 MG tablet, Take 1 tablet (500 mg total) by mouth 2 (two) times daily with meals., Disp: 180 tablet, Rfl: 3    ondansetron (ZOFRAN) 4 MG tablet, Take 1 tablet (4 mg total) by mouth every 8 (eight) hours as needed for Nausea., Disp: 30 tablet, Rfl: 8    pen needle, diabetic (COMFORT EZ PEN NEEDLES) 33 gauge x 1/4" Ndle, 1 Units by Misc.(Non-Drug; Combo Route) route every evening., Disp: 100 each, Rfl: 5    pramipexole (MIRAPEX) 0.125 MG tablet, Take 1 tablet (0.125 mg total) by mouth every evening. For restless legs, Disp: 30 tablet, Rfl: 11    pravastatin (PRAVACHOL) 10 MG tablet, Take 1 tablet (10 mg total) by mouth once daily., Disp: 90 tablet, Rfl: 3    QUEtiapine (SEROQUEL) 100 MG Tab, Start with 100 mg at night on first day then increase to 200 on the next night followed by 300 mg thereafter., Disp: 90 tablet, Rfl: 0    sertraline (ZOLOFT) 100 MG tablet, TK 1 T PO  QD, Disp: , Rfl: 11    sucralfate (CARAFATE) 1 gram tablet, Take 1 tablet (1 g total) by mouth 4 (four) times daily before meals and nightly., Disp: 120 tablet, Rfl: 1    SYSTANE, PROPYLENE GLYCOL, 0.4-0.3 % Drop, INSTILL 1 GTT INTO OU BID, Disp: , Rfl: 6    TRULICITY 0.75 mg/0.5 mL PnIj, Place 0.5 mLs (0.75 mg total) onto the skin every 7 days., Disp: 0.5 mL, Rfl: 3    oxyCODONE-acetaminophen (PERCOCET)  mg per tablet, Take 0.5 tablets by mouth every 8 (eight) hours as needed for Pain (severe pain only)., Disp: 9 tablet, Rfl: 0    Review of Systems   Constitutional: Negative for chills and fever.   Respiratory: Negative for cough and shortness of breath.    Cardiovascular: Negative for chest pain.   Gastrointestinal: Positive for abdominal pain.   Skin: Negative for rash.   Neurological: Negative for dizziness.       Objective:   /73 (BP Location: Right arm, Patient Position: Sitting, BP Method: X-Large (Manual))   Pulse (!) 57   Temp 96 °F (35.6 °C) (Tympanic)   Resp 18 " "  Ht 5' 5" (1.651 m)   Wt 120.5 kg (265 lb 10.5 oz)   LMP 08/21/2016   SpO2 99%   BMI 44.21 kg/m²      Physical Exam   Constitutional: She is oriented to person, place, and time. She appears well-developed and well-nourished.   HENT:   Head: Normocephalic and atraumatic.   Eyes: Conjunctivae are normal.   Cardiovascular: Normal rate.    Pulmonary/Chest: Effort normal. No respiratory distress.   Abdominal:   No signs of infection around the staple sites.   Musculoskeletal: She exhibits no edema.   Neurological: She is alert and oriented to person, place, and time. Coordination normal.   Skin: Skin is warm and dry. No rash noted.   Psychiatric: She has a normal mood and affect. Her behavior is normal.   Vitals reviewed.      Procedure note:  Was able to remove 7 staples from her surgical wounds.  She has a deep umbilicus and I explored using a light to make sure that we had all of them because one was quite deep inside.  Patient tolerated the procedure well.  Had Nursing place Neosporin and Band-Aids over wounds.  Assessment & Plan     Problem List Items Addressed This Visit        Psychiatric    Severe episode of recurrent major depressive disorder, with psychotic features    Current Assessment & Plan     Still need more time to assess whether Seroquel will be more appropriate for her.  Not having any new negative symptoms at this time.            Other    Encounter for staple removal - Primary    Current Assessment & Plan     Remove the staples without any problem.  Told her that her pain will likely improve since some of them had quite a bit of tension.  There are no signs of wound the since and no issues with infection.  Told her that she can likely return to the work next week but will have her call the surgeon and see what they say.                 No Follow-up on file.  "

## 2018-04-11 ENCOUNTER — TELEPHONE (OUTPATIENT)
Dept: INTERNAL MEDICINE | Facility: CLINIC | Age: 43
End: 2018-04-11

## 2018-04-11 NOTE — TELEPHONE ENCOUNTER
----- Message from Sissy Phelps sent at 4/11/2018  4:43 PM CDT -----  Contact: pt  She's calling stating that she faxed over a form for Dr. Buitrago to complete, wanted to know if it was done, please advise [phone] (home)

## 2018-04-11 NOTE — TELEPHONE ENCOUNTER
----- Message from Angie Crouch sent at 4/11/2018 10:12 AM CDT -----  Contact: pt  States she needs to speak to the nurse regarding a paper she needs to get filled out today. She would like to know if she can fax it or if the needs to bring it in. Please call pt at [phone]. Thank you

## 2018-04-11 NOTE — TELEPHONE ENCOUNTER
Called patient will fax forms that needs to be filled out. Informed patient to put a number where it needs to be fax back too.

## 2018-04-12 ENCOUNTER — TELEPHONE (OUTPATIENT)
Dept: INTERNAL MEDICINE | Facility: CLINIC | Age: 43
End: 2018-04-12

## 2018-04-12 NOTE — TELEPHONE ENCOUNTER
Spoke with patient, inform that paper work is not completed at this time. Will contact her when it is ready

## 2018-04-12 NOTE — TELEPHONE ENCOUNTER
----- Message from Abdulaziz Nevarez sent at 4/12/2018 11:49 AM CDT -----  Contact: pt  Call pt at [phone] (home)   Regarding paperwork that was brought in to be filled out. Pt wants to check the status on when the paperwork will be faxed back

## 2018-04-16 ENCOUNTER — TELEPHONE (OUTPATIENT)
Dept: INTERNAL MEDICINE | Facility: CLINIC | Age: 43
End: 2018-04-16

## 2018-04-16 NOTE — TELEPHONE ENCOUNTER
----- Message from Lali Serrato sent at 4/16/2018  3:42 PM CDT -----  Pt at [phone]//states she is checking on the status of her paperwork//please call//thanks/[facility]

## 2018-04-16 NOTE — TELEPHONE ENCOUNTER
Pt is wanting to know the status of her paper work, I explained to her that Cecilia is out today and may be back tomorrow that we think it was filled out but not sure where it is. That I will check with  and get back with her sometime today or tomorrow once I figure out where it is. Pt verbalized understanding.

## 2018-04-17 NOTE — TELEPHONE ENCOUNTER
Spoke with pt and informed her that  has not completed that paper work yet. That we are not sure when it will be done bc we are in the middle of clinic and he gets to paper work when he can. That we will call her once its done. Pt verbalized understanding.

## 2018-04-18 ENCOUNTER — TELEPHONE (OUTPATIENT)
Dept: INTERNAL MEDICINE | Facility: CLINIC | Age: 43
End: 2018-04-18

## 2018-04-18 NOTE — TELEPHONE ENCOUNTER
Call and spoke with patient, states information that was filled out is incorrect. She need it form the time she had surgery up until she was seen in our office for follow up. Inform patient we do not have acsses to those record and we can not fill the form out. She should try and contact the doctor who preformed the surgery. Patient states she has tried several times and no one is returning her call. Patient will come by an sign a BALTAZAR so we can get record for hospital.

## 2018-04-18 NOTE — TELEPHONE ENCOUNTER
----- Message from Loan Elliott sent at 4/18/2018  2:03 PM CDT -----  Patient state she would like to speak to the nurse regarding a form that was filled out for her. Please adv/call [phone].//thanks.cw

## 2018-04-24 ENCOUNTER — PATIENT OUTREACH (OUTPATIENT)
Dept: ADMINISTRATIVE | Facility: HOSPITAL | Age: 43
End: 2018-04-24

## 2018-04-24 DIAGNOSIS — E11.9 TYPE 2 DIABETES MELLITUS WITHOUT COMPLICATION, WITHOUT LONG-TERM CURRENT USE OF INSULIN: Primary | ICD-10-CM

## 2018-04-24 DIAGNOSIS — Z12.39 ENCOUNTER FOR SPECIAL SCREENING EXAMINATION FOR NEOPLASM OF BREAST: ICD-10-CM

## 2018-05-01 ENCOUNTER — TELEPHONE (OUTPATIENT)
Dept: INTERNAL MEDICINE | Facility: CLINIC | Age: 43
End: 2018-05-01

## 2018-05-01 ENCOUNTER — OFFICE VISIT (OUTPATIENT)
Dept: URGENT CARE | Facility: CLINIC | Age: 43
End: 2018-05-01
Payer: MEDICAID

## 2018-05-01 VITALS
HEART RATE: 66 BPM | DIASTOLIC BLOOD PRESSURE: 78 MMHG | OXYGEN SATURATION: 98 % | BODY MASS INDEX: 43.32 KG/M2 | SYSTOLIC BLOOD PRESSURE: 126 MMHG | WEIGHT: 260 LBS | HEIGHT: 65 IN | TEMPERATURE: 97 F | RESPIRATION RATE: 16 BRPM

## 2018-05-01 DIAGNOSIS — J32.9 SINUSITIS, BACTERIAL: Primary | ICD-10-CM

## 2018-05-01 DIAGNOSIS — B37.31 VAGINAL CANDIDIASIS: ICD-10-CM

## 2018-05-01 DIAGNOSIS — B96.89 SINUSITIS, BACTERIAL: Primary | ICD-10-CM

## 2018-05-01 DIAGNOSIS — R59.0 CERVICAL LYMPHADENOPATHY: ICD-10-CM

## 2018-05-01 LAB — GLUCOSE SERPL-MCNC: 152 MG/DL (ref 70–110)

## 2018-05-01 PROCEDURE — 99999 PR PBB SHADOW E&M-EST. PATIENT-LVL III: CPT | Mod: PBBFAC,,, | Performed by: FAMILY MEDICINE

## 2018-05-01 PROCEDURE — 82948 REAGENT STRIP/BLOOD GLUCOSE: CPT | Mod: PBBFAC,PO | Performed by: FAMILY MEDICINE

## 2018-05-01 PROCEDURE — 99214 OFFICE O/P EST MOD 30 MIN: CPT | Mod: S$PBB,,, | Performed by: FAMILY MEDICINE

## 2018-05-01 PROCEDURE — 99213 OFFICE O/P EST LOW 20 MIN: CPT | Mod: PBBFAC,PO | Performed by: FAMILY MEDICINE

## 2018-05-01 RX ORDER — FLUCONAZOLE 150 MG/1
150 TABLET ORAL ONCE
Qty: 2 TABLET | Refills: 0 | Status: SHIPPED | OUTPATIENT
Start: 2018-05-01 | End: 2018-05-01

## 2018-05-01 RX ORDER — AZITHROMYCIN 250 MG/1
TABLET, FILM COATED ORAL
Qty: 6 TABLET | Refills: 0 | Status: SHIPPED | OUTPATIENT
Start: 2018-05-01 | End: 2018-05-29

## 2018-05-01 RX ORDER — BETAMETHASONE SODIUM PHOSPHATE AND BETAMETHASONE ACETATE 3; 3 MG/ML; MG/ML
6 INJECTION, SUSPENSION INTRA-ARTICULAR; INTRALESIONAL; INTRAMUSCULAR; SOFT TISSUE
Status: COMPLETED | OUTPATIENT
Start: 2018-05-01 | End: 2018-05-01

## 2018-05-01 RX ADMIN — BETAMETHASONE ACETATE AND BETAMETHASONE SODIUM PHOSPHATE 6 MG: 3; 3 INJECTION, SUSPENSION INTRA-ARTICULAR; INTRALESIONAL; INTRAMUSCULAR; SOFT TISSUE at 12:05

## 2018-05-01 NOTE — TELEPHONE ENCOUNTER
----- Message from No Diaz sent at 5/1/2018  7:32 AM CDT -----  Pt needs to know if she can be fit in today due to pain in her ear and not feeling well/please call [phone]/ma

## 2018-05-01 NOTE — TELEPHONE ENCOUNTER
"Ricardo Hatfield PT (PT- Physical Therapist)            [facility]  Outpatient PT - Daily Note     Date of Service: 2017     Visit 3/10    Patient Name: Fabiana Torres   YOB: Patient has an appointment for Thursday at 2:00. Next available until 08/2018. Patient states will keep appointment that is schedule for Thursday and if get worse will got to urgent care   

## 2018-05-01 NOTE — PROGRESS NOTES
"Subjective:       Patient ID: Yuli Milton is a 42 y.o. female.    Chief Complaint: Headache (sore throat, ear pain x 1 week )    /78   Pulse 66   Temp 97.3 °F (36.3 °C) (Tympanic)   Resp 16   Ht 5' 5" (1.651 m)   Wt 117.9 kg (260 lb)   LMP 08/21/2016   SpO2 98%   BMI 43.27 kg/m²     HPI  Pt presented with sinus congestion headache right ear pain for about 10 days. And sore throat. Vaginal itchy discharge. Hx of diabetes. Has not checked BS in a while. PCP appointment in 2 days.       Review of Systems   Constitutional: Negative.  Negative for chills and fever.   HENT: Positive for congestion, ear pain, sinus pain, sinus pressure and sore throat. Negative for facial swelling, hearing loss, postnasal drip and rhinorrhea.    Eyes: Negative.    Respiratory: Negative.  Negative for cough, shortness of breath and wheezing.    Cardiovascular: Negative.    Genitourinary: Positive for vaginal discharge (itch).   Neurological: Positive for headaches.       Objective:      Physical Exam   Constitutional: She is oriented to person, place, and time. She appears well-developed and well-nourished. No distress.   HENT:   Head: Normocephalic and atraumatic.   Mouth/Throat: No oropharyngeal exudate.   Left TM no erythema, fibrous   Right TM mild erythema, (++) effusion   Eyes: EOM are normal. Pupils are equal, round, and reactive to light. Right eye exhibits no discharge. Left eye exhibits no discharge.   Neck: Normal range of motion. Neck supple.   Cardiovascular: Normal rate, regular rhythm and normal heart sounds.    No murmur heard.  Pulmonary/Chest: Effort normal and breath sounds normal. She has no wheezes. She has no rales.   Musculoskeletal: Normal range of motion.   Lymphadenopathy:     She has no cervical adenopathy (right submandibular nodes mobile).   Neurological: She is alert and oriented to person, place, and time.   Skin: Skin is warm and dry. She is not diaphoretic.   Nursing note and vitals " reviewed.      Assessment:       1. Sinusitis, bacterial    2. Cervical lymphadenopathy    3. Vaginal candidiasis        Plan:     Yuli was seen today for headache.    Diagnoses and all orders for this visit:    Sinusitis, bacterial  -     azithromycin (Z-LIDYA) 250 MG tablet; As per packet instructions  -     betamethasone acetate-betamethasone sodium phosphate injection 6 mg; Inject 1 mL (6 mg total) into the muscle one time.    Cervical lymphadenopathy  -     azithromycin (Z-LIDYA) 250 MG tablet; As per packet instructions    Vaginal candidiasis  -     POCT Glucose - 150. fasting  -     fluconazole (DIFLUCAN) 150 MG Tab; Take 1 tablet (150 mg total) by mouth once. Repeat in 3 days if symptoms still persist.        Instructions  1. Fluids, rest, OTC cold medications, ibuprofen or acetaminophen for sore throat or fever   2. Keep your PCP appointment in 2 days    Discussed with pt/family all information and results pertaining to this visit. Discussed diagnosis and plan of treatment.  All questions and concerns were addressed at this time. Pt/family expresses understanding of information and instructions.  Care and follow up instruction provided.

## 2018-05-01 NOTE — PATIENT INSTRUCTIONS
1. Fluids, rest, OTC cold medications, ibuprofen or acetaminophen for sore throat or fever   2. Keep your PCP appointment in 2 days      Vaginal Infection: Understanding the Vaginal Environment  The vagina is a canal. It connects the uterus (womb) to the outside of the body. It is home to many types of bacteria and other tiny organisms. These different bacteria most often stay balanced in number. This keeps the vagina healthy. If the balance changes, it can cause infection.   A healthy environment  Many types of bacteria are present in a healthy vagina. When balanced, they dont cause problems. Small amounts of yeast may also be present without causing problems. The most common type of bacteria in the vagina is lactobacillus. It helps keep the vagina at a low pH. A low pH keeps bad bacteria from taking over.  Normal vaginal discharge  The vagina makes fluid. It is sent out as discharge. This also keeps the vagina healthy. Normal discharge can be clear, white, or yellowish. Most women find that normal discharge varies in amount and color through the month.  An unhealthy environment  The vaginal environment may get out of balance. This may result in a vaginal infection. There are a few reasons this can happen. The pH may have changed. The amount of one organism, such as yeast, may increase. Or an outside organism may get into the vagina and throw off the balance:  · Bacterial vaginosis (BV). BV is due to an imbalance in the normal bacteria in the vagina. Lactobacillus bacteria decrease. As a result, the numbers of bad bacteria increase.  · Candidiasis (yeast infection). Yeast is a type of fungus. A yeast infection occurs when yeast cells in the vagina increase. They then attack vaginal tissues. A type of yeast called Candida albicans is often involved.  · Trichomoniasis (trich). Trich is a parasite. It is passed from one person to another during sex. Men with trich often dont have any symptoms. In women, it can  take weeks or months before symptoms appear.  Date Last Reviewed: 3/1/2017  © 1246-9595 [employer]. [address] [place] [address], [place] [address]. All rights reserved. This information is not intended as a substitute for professional medical care. Always follow your healthcare professional's instructions.

## 2018-05-03 ENCOUNTER — HOSPITAL ENCOUNTER (OUTPATIENT)
Dept: RADIOLOGY | Facility: HOSPITAL | Age: 43
Discharge: HOME | End: 2018-05-03
Attending: FAMILY MEDICINE
Payer: MEDICAID

## 2018-05-03 ENCOUNTER — OFFICE VISIT (OUTPATIENT)
Dept: INTERNAL MEDICINE | Facility: CLINIC | Age: 43
End: 2018-05-03
Payer: MEDICAID

## 2018-05-03 VITALS
RESPIRATION RATE: 16 BRPM | DIASTOLIC BLOOD PRESSURE: 76 MMHG | WEIGHT: 260.38 LBS | SYSTOLIC BLOOD PRESSURE: 100 MMHG | OXYGEN SATURATION: 98 % | HEART RATE: 68 BPM | TEMPERATURE: 97 F | HEIGHT: 65 IN | BODY MASS INDEX: 43.38 KG/M2

## 2018-05-03 DIAGNOSIS — R61 NIGHT SWEATS: ICD-10-CM

## 2018-05-03 DIAGNOSIS — F33.3 SEVERE EPISODE OF RECURRENT MAJOR DEPRESSIVE DISORDER, WITH PSYCHOTIC FEATURES: ICD-10-CM

## 2018-05-03 DIAGNOSIS — E11.9 TYPE 2 DIABETES MELLITUS WITHOUT COMPLICATION, WITHOUT LONG-TERM CURRENT USE OF INSULIN: Primary | ICD-10-CM

## 2018-05-03 DIAGNOSIS — Z12.31 ENCOUNTER FOR SCREENING MAMMOGRAM FOR BREAST CANCER: ICD-10-CM

## 2018-05-03 DIAGNOSIS — Z72.51 HIGH-RISK SEXUAL BEHAVIOR: ICD-10-CM

## 2018-05-03 DIAGNOSIS — N89.8 VAGINAL ITCHING: ICD-10-CM

## 2018-05-03 PROBLEM — R30.0 DYSURIA: Status: RESOLVED | Noted: 2018-03-31 | Resolved: 2018-05-03

## 2018-05-03 PROBLEM — Z48.02 ENCOUNTER FOR STAPLE REMOVAL: Status: RESOLVED | Noted: 2018-04-05 | Resolved: 2018-05-03

## 2018-05-03 LAB
BACTERIA #/AREA URNS AUTO: NORMAL /HPF
BILIRUB UR QL STRIP: NEGATIVE
CAOX CRY UR QL COMP ASSIST: NORMAL
CLARITY UR REFRACT.AUTO: CLEAR
COLOR UR AUTO: YELLOW
GLUCOSE UR QL STRIP: NEGATIVE
HGB UR QL STRIP: ABNORMAL
KETONES UR QL STRIP: ABNORMAL
LEUKOCYTE ESTERASE UR QL STRIP: NEGATIVE
MICROSCOPIC COMMENT: NORMAL
NITRITE UR QL STRIP: NEGATIVE
PH UR STRIP: 6 [PH] (ref 5–8)
PROT UR QL STRIP: NEGATIVE
RBC #/AREA URNS AUTO: 1 /HPF (ref 0–4)
SP GR UR STRIP: >=1.03 (ref 1–1.03)
SQUAMOUS #/AREA URNS AUTO: 1 /HPF
TRICHOMONAS UR QL COMP ASSIST: NORMAL
URN SPEC COLLECT METH UR: ABNORMAL
UROBILINOGEN UR STRIP-ACNC: NEGATIVE EU/DL

## 2018-05-03 PROCEDURE — 99213 OFFICE O/P EST LOW 20 MIN: CPT | Mod: PBBFAC,PO | Performed by: FAMILY MEDICINE

## 2018-05-03 PROCEDURE — 99214 OFFICE O/P EST MOD 30 MIN: CPT | Mod: S$PBB,,, | Performed by: FAMILY MEDICINE

## 2018-05-03 PROCEDURE — 77067 SCR MAMMO BI INCL CAD: CPT | Mod: TC,PO

## 2018-05-03 PROCEDURE — 77067 SCR MAMMO BI INCL CAD: CPT | Mod: 26,,, | Performed by: RADIOLOGY

## 2018-05-03 PROCEDURE — 99999 PR PBB SHADOW E&M-EST. PATIENT-LVL III: CPT | Mod: PBBFAC,,, | Performed by: FAMILY MEDICINE

## 2018-05-03 PROCEDURE — 81000 URINALYSIS NONAUTO W/SCOPE: CPT | Mod: PO

## 2018-05-03 NOTE — PROGRESS NOTES
Subjective:       Patient ID: Yuli Milton is a 42 y.o. female.    Chief Complaint: Otalgia; Vaginal Itching; and Diabetes    HPI  Here today with concerns about some ear pain and right-sided jaw pain for the last few days.  Also has been having some night sweats over the last few days.  Not having any fever.  No weight loss.  No body aches.  Did have some associated nasal congestion and she was started on azithromycin after she was seen at urgent care recently.  She also was treated for vaginal itching with Diflucan as well as Monistat that she has been using over-the-counter.  She does not find that these symptoms are helping much.  She does not have any dysuria.  No vaginal discharge and no foul odor.  She admits to having sex with a man about 16 days ago.  They did use a condom.  She says that it popped.  She is concerned about possible STDs.  In regards to her depression, she has been doing better.  Continues to take the Effexor on a routine basis but has been taking the Seroquel somewhat intermittently and not every day.  She missed her psychiatry appointment    Family History   Problem Relation Age of Onset    Breast cancer Paternal Grandmother     Diabetes Maternal Grandmother     Hypertension Mother     Breast cancer Maternal Aunt     Cancer Maternal Aunt      colon cancer    Colon cancer Maternal Aunt     Miscarriages / Stillbirths Cousin     Stroke Maternal Aunt     Ovarian cancer Neg Hx     Deep vein thrombosis Neg Hx     Pulmonary embolism Neg Hx        Current Outpatient Prescriptions:     ALBUTEROL INHL, Inhale 2 puffs into the lungs continuous prn., Disp: , Rfl:     azithromycin (Z-LIDYA) 250 MG tablet, As per packet instructions, Disp: 6 tablet, Rfl: 0    blood sugar diagnostic Strp, 1 each by Other route 3 (three) times daily. For One Touch Meter, Disp: 100 each, Rfl: 11    blood-glucose meter Misc, Dispense 1, Disp: 1 each, Rfl: 0    cycloSPORINE (RESTASIS) 0.05 % ophthalmic  "emulsion, 1 drop 2 (two) times daily., Disp: , Rfl:     docusate sodium (COLACE) 100 MG capsule, Take 100 mg by mouth 2 (two) times daily., Disp: , Rfl:     lancets Misc, 1 each by Other route 3 (three) times daily. For One Touch Meter, Disp: 100 each, Rfl: 0    levalbuterol (XOPENEX) 0.63 mg/3 mL nebulizer solution, Take 1 ampule by nebulization every 4 (four) hours as needed for Wheezing., Disp: , Rfl:     metFORMIN (GLUCOPHAGE) 500 MG tablet, Take 1 tablet (500 mg total) by mouth 2 (two) times daily with meals., Disp: 180 tablet, Rfl: 3    pen needle, diabetic (COMFORT EZ PEN NEEDLES) 33 gauge x 1/4" Ndle, 1 Units by Misc.(Non-Drug; Combo Route) route every evening., Disp: 100 each, Rfl: 5    QUEtiapine (SEROQUEL) 100 MG Tab, Start with 100 mg at night on first day then increase to 200 on the next night followed by 300 mg thereafter., Disp: 90 tablet, Rfl: 0    sertraline (ZOLOFT) 100 MG tablet, TK 1 T PO  QD, Disp: , Rfl: 11    sucralfate (CARAFATE) 1 gram tablet, Take 1 tablet (1 g total) by mouth 4 (four) times daily before meals and nightly., Disp: 120 tablet, Rfl: 1    SYSTANE, PROPYLENE GLYCOL, 0.4-0.3 % Drop, INSTILL 1 GTT INTO OU BID, Disp: , Rfl: 6    TRULICITY 0.75 mg/0.5 mL PnIj, Place 0.5 mLs (0.75 mg total) onto the skin every 7 days., Disp: 0.5 mL, Rfl: 3    Review of Systems   Constitutional: Positive for diaphoresis. Negative for chills and fever.   HENT: Positive for congestion and ear pain.    Respiratory: Positive for cough. Negative for shortness of breath.    Cardiovascular: Negative for chest pain.   Gastrointestinal: Negative for abdominal pain.   Genitourinary: Negative for decreased urine volume, dysuria, urgency, vaginal bleeding, vaginal discharge and vaginal pain.   Skin: Negative for rash.   Neurological: Negative for dizziness.       Objective:   /76   Pulse 68   Temp 96.5 °F (35.8 °C) (Tympanic)   Resp 16   Ht 5' 5" (1.651 m)   Wt 118.1 kg (260 lb 5.8 oz)   LMP " 08/21/2016   SpO2 98%   BMI 43.33 kg/m²      Physical Exam   Constitutional: She is oriented to person, place, and time. She appears well-developed and well-nourished. No distress.   HENT:   Head: Normocephalic and atraumatic.   Nose: Nose normal.   Some tenderness over the right TMJ.   Eyes: Conjunctivae and EOM are normal. Pupils are equal, round, and reactive to light. Right eye exhibits no discharge. Left eye exhibits no discharge.   Neck: No thyromegaly present.   Cardiovascular: Normal rate and regular rhythm.    No murmur heard.  Pulmonary/Chest: Effort normal and breath sounds normal. No respiratory distress.   Abdominal: Soft. She exhibits no distension.   Musculoskeletal: She exhibits no edema.   Neurological: She is alert and oriented to person, place, and time.   Skin: No rash noted. She is not diaphoretic.   Psychiatric: She has a normal mood and affect. Her behavior is normal.       Assessment & Plan     Problem List Items Addressed This Visit        Psychiatric    High-risk sexual behavior    Current Assessment & Plan     Considering that she recently had sexual intercourse about 16 days ago and is having some systemic symptoms which could be consistent with acute HIV infection, will get viral load testing.         Relevant Orders    URINALYSIS    RPR    C. trachomatis/N. gonorrhoeae by AMP DNA Urine    HIV RNA, QUANTITATIVE, PCR    Severe episode of recurrent major depressive disorder, with psychotic features    Current Assessment & Plan     Encouraged her to continue with the Effexor but also to take the Seroquel on a more consistent basis.            Derm    Vaginal itching    Current Assessment & Plan     Evaluation for Trichomonas.  Low chance that she has a yeast infection since the Monistat and Diflucan done nothing for her.            Endocrine    Type 2 diabetes mellitus without complication, without long-term current use of insulin - Primary    Relevant Medications    blood sugar  diagnostic Strp    Other Relevant Orders    Lipid panel      Other Visit Diagnoses     Night sweats        Encounter for screening mammogram for breast cancer        Relevant Orders    Mammo Digital Screening Bilat with CAD            Follow-up in about 3 months (around 8/3/2018).

## 2018-05-03 NOTE — ASSESSMENT & PLAN NOTE
Considering that she recently had sexual intercourse about 16 days ago and is having some systemic symptoms which could be consistent with acute HIV infection, will get viral load testing.

## 2018-05-03 NOTE — ASSESSMENT & PLAN NOTE
Evaluation for Trichomonas.  Low chance that she has a yeast infection since the Monistat and Diflucan done nothing for her.

## 2018-05-03 NOTE — ASSESSMENT & PLAN NOTE
Encouraged her to continue with the Effexor but also to take the Seroquel on a more consistent basis.

## 2018-05-03 NOTE — LETTER
May 3, 2018                   [facility]  Internal Medicine  [id number] [place] [id number]  Phone: [phone]   May 3, 2018     Patient: Yuli Milton   YOB: 1975   Date of Visit: 5/3/2018       To Whom it May Concern:    Yuli Milton was seen in my clinic on 5/3/2018. She may return to work on 05/04/2018.    If you have any questions or concerns, please don't hesitate to call.    Sincerely,       DO Kasey Byrne MA

## 2018-05-24 ENCOUNTER — NURSE TRIAGE (OUTPATIENT)
Dept: ADMINISTRATIVE | Facility: CLINIC | Age: 43
End: 2018-05-24

## 2018-05-24 NOTE — TELEPHONE ENCOUNTER
Reason for Disposition   New or worsened shortness of breath with activity (dyspnea on exertion)    Protocols used: ST HEART RATE AND HEARTBEAT [id number]  pt reports palpitations with chest pain on and off for several days. Denies any symptoms right now but does reports shortness of breath with exertion. Per protocol advised ER. Please contact patient to further advise

## 2018-05-24 NOTE — TELEPHONE ENCOUNTER
Spoke with pt and she could not make it to see  today so she scheduled with him for Tuesday 5/29/18 at 11:20am

## 2018-05-24 NOTE — TELEPHONE ENCOUNTER
Pt said she has been having a little chest pain and hear palpitations for a few days. She was advised to go to the ER but did not.Pt wants to see . Is it ok to schedule this pt?

## 2018-05-29 ENCOUNTER — OFFICE VISIT (OUTPATIENT)
Dept: INTERNAL MEDICINE | Facility: CLINIC | Age: 43
End: 2018-05-29
Payer: MEDICAID

## 2018-05-29 ENCOUNTER — LAB VISIT (OUTPATIENT)
Dept: LAB | Facility: HOSPITAL | Age: 43
End: 2018-05-29
Attending: FAMILY MEDICINE
Payer: MEDICAID

## 2018-05-29 VITALS
DIASTOLIC BLOOD PRESSURE: 74 MMHG | BODY MASS INDEX: 44.15 KG/M2 | WEIGHT: 265 LBS | HEIGHT: 65 IN | SYSTOLIC BLOOD PRESSURE: 140 MMHG | HEART RATE: 83 BPM | TEMPERATURE: 96 F | OXYGEN SATURATION: 100 % | RESPIRATION RATE: 18 BRPM

## 2018-05-29 DIAGNOSIS — E11.9 TYPE 2 DIABETES MELLITUS WITHOUT COMPLICATION, WITHOUT LONG-TERM CURRENT USE OF INSULIN: ICD-10-CM

## 2018-05-29 DIAGNOSIS — E11.9 TYPE 2 DIABETES MELLITUS WITHOUT COMPLICATION, WITHOUT LONG-TERM CURRENT USE OF INSULIN: Primary | ICD-10-CM

## 2018-05-29 DIAGNOSIS — F33.3 SEVERE EPISODE OF RECURRENT MAJOR DEPRESSIVE DISORDER, WITH PSYCHOTIC FEATURES: ICD-10-CM

## 2018-05-29 DIAGNOSIS — J45.20 MILD INTERMITTENT ASTHMA WITHOUT COMPLICATION: ICD-10-CM

## 2018-05-29 LAB
ALBUMIN SERPL BCP-MCNC: 3.5 G/DL
ALP SERPL-CCNC: 62 U/L
ALT SERPL W/O P-5'-P-CCNC: 19 U/L
ANION GAP SERPL CALC-SCNC: 8 MMOL/L
AST SERPL-CCNC: 14 U/L
BASOPHILS # BLD AUTO: 0.02 K/UL
BASOPHILS NFR BLD: 0.3 %
BILIRUB SERPL-MCNC: 0.3 MG/DL
BUN SERPL-MCNC: 13 MG/DL
CALCIUM SERPL-MCNC: 9.7 MG/DL
CHLORIDE SERPL-SCNC: 105 MMOL/L
CO2 SERPL-SCNC: 25 MMOL/L
CREAT SERPL-MCNC: 0.8 MG/DL
DIFFERENTIAL METHOD: ABNORMAL
EOSINOPHIL # BLD AUTO: 0.1 K/UL
EOSINOPHIL NFR BLD: 1.3 %
ERYTHROCYTE [DISTWIDTH] IN BLOOD BY AUTOMATED COUNT: 13.5 %
EST. GFR  (AFRICAN AMERICAN): >60 ML/MIN/1.73 M^2
EST. GFR  (NON AFRICAN AMERICAN): >60 ML/MIN/1.73 M^2
ESTIMATED AVG GLUCOSE: 166 MG/DL
GLUCOSE SERPL-MCNC: 131 MG/DL
HBA1C MFR BLD HPLC: 7.4 %
HCT VFR BLD AUTO: 36.4 %
HGB BLD-MCNC: 12.2 G/DL
LYMPHOCYTES # BLD AUTO: 2.6 K/UL
LYMPHOCYTES NFR BLD: 33.6 %
MCH RBC QN AUTO: 29.1 PG
MCHC RBC AUTO-ENTMCNC: 33.5 G/DL
MCV RBC AUTO: 87 FL
MONOCYTES # BLD AUTO: 0.6 K/UL
MONOCYTES NFR BLD: 8.4 %
NEUTROPHILS # BLD AUTO: 4.3 K/UL
NEUTROPHILS NFR BLD: 56.3 %
PLATELET # BLD AUTO: 348 K/UL
PMV BLD AUTO: 10 FL
POTASSIUM SERPL-SCNC: 4.3 MMOL/L
PROT SERPL-MCNC: 7.8 G/DL
RBC # BLD AUTO: 4.19 M/UL
SODIUM SERPL-SCNC: 138 MMOL/L
WBC # BLD AUTO: 7.66 K/UL

## 2018-05-29 PROCEDURE — 85025 COMPLETE CBC W/AUTO DIFF WBC: CPT | Mod: PO

## 2018-05-29 PROCEDURE — 99999 PR PBB SHADOW E&M-EST. PATIENT-LVL III: CPT | Mod: PBBFAC,,, | Performed by: FAMILY MEDICINE

## 2018-05-29 PROCEDURE — 99214 OFFICE O/P EST MOD 30 MIN: CPT | Mod: S$PBB,,, | Performed by: FAMILY MEDICINE

## 2018-05-29 PROCEDURE — 83036 HEMOGLOBIN GLYCOSYLATED A1C: CPT

## 2018-05-29 PROCEDURE — 99213 OFFICE O/P EST LOW 20 MIN: CPT | Mod: PBBFAC,PO | Performed by: FAMILY MEDICINE

## 2018-05-29 PROCEDURE — 36415 COLL VENOUS BLD VENIPUNCTURE: CPT | Mod: PO

## 2018-05-29 PROCEDURE — 80053 COMPREHEN METABOLIC PANEL: CPT | Mod: PO

## 2018-05-29 RX ORDER — AMOXICILLIN 500 MG/1
CAPSULE ORAL
COMMUNITY
Start: 2018-05-16 | End: 2018-05-29

## 2018-05-29 RX ORDER — LEVALBUTEROL INHALATION SOLUTION 0.63 MG/3ML
1 SOLUTION RESPIRATORY (INHALATION) EVERY 4 HOURS PRN
Qty: 1 BOX | Refills: 2 | Status: SHIPPED | OUTPATIENT
Start: 2018-05-29 | End: 2018-06-01

## 2018-05-29 RX ORDER — ATORVASTATIN CALCIUM 10 MG/1
10 TABLET, FILM COATED ORAL DAILY
Qty: 90 TABLET | Refills: 3 | Status: SHIPPED | OUTPATIENT
Start: 2018-05-29 | End: 2019-01-21

## 2018-05-29 RX ORDER — ALBUTEROL SULFATE 90 UG/1
2 AEROSOL, METERED RESPIRATORY (INHALATION) EVERY 4 HOURS PRN
Qty: 18 G | Refills: 4 | Status: SHIPPED | OUTPATIENT
Start: 2018-05-29 | End: 2019-05-29

## 2018-05-29 NOTE — ASSESSMENT & PLAN NOTE
Doing much better on current treatment of 100 mg of Zoloft along with 100 mg of Seroquel nightly.  Encouraged to continue taking the medication as prescribed.

## 2018-05-29 NOTE — ASSESSMENT & PLAN NOTE
Due for repeat laboratory studies.  She continues to take Trulicity and metformin.  Her last hemoglobin A1c was at goal.  Stressed the importance of continue diet and exercise.

## 2018-05-29 NOTE — PROGRESS NOTES
Subjective:       Patient ID: Yuli Milton is a 42 y.o. female.    Chief Complaint: Cough and Shortness of Breath    HPI  Ms. Milton is here today to get refill on her albuterol inhaler and nebulizer solution.  She has been having some cough and intermittent shortness of breath.  She is happy to say that she started exercising and actually went on a 2 mi walk today and had no problems while she was exercising.  Did not have any shortness of breath, wheezing or chest pain while she was walking.  In regards to her recent depression challenges, she is doing much better.  She is stressed that she has gained some weight which I told her maybe secondary to using Seroquel but we need to continue on this for now and she needs to compensate with continued exercise and healthy dietary choices.    Family History   Problem Relation Age of Onset    Breast cancer Paternal Grandmother     Diabetes Maternal Grandmother     Hypertension Mother     Breast cancer Maternal Aunt     Cancer Maternal Aunt         colon cancer    Colon cancer Maternal Aunt     Miscarriages / Stillbirths Cousin     Stroke Maternal Aunt     Ovarian cancer Neg Hx     Deep vein thrombosis Neg Hx     Pulmonary embolism Neg Hx        Current Outpatient Prescriptions:     albuterol 90 mcg/actuation inhaler, Inhale 2 puffs into the lungs every 4 (four) hours as needed. Rescue, Disp: 18 g, Rfl: 4    atorvastatin (LIPITOR) 10 MG tablet, Take 1 tablet (10 mg total) by mouth once daily., Disp: 90 tablet, Rfl: 3    blood sugar diagnostic Strp, 1 each by Other route 3 (three) times daily. For One Touch Meter, Disp: 100 each, Rfl: 11    blood-glucose meter Misc, Dispense 1, Disp: 1 each, Rfl: 0    cycloSPORINE (RESTASIS) 0.05 % ophthalmic emulsion, 1 drop 2 (two) times daily., Disp: , Rfl:     docusate sodium (COLACE) 100 MG capsule, Take 100 mg by mouth 2 (two) times daily., Disp: , Rfl:     lancets Misc, 1 each by Other route 3 (three) times  "daily. For One Touch Meter, Disp: 100 each, Rfl: 0    levalbuterol (XOPENEX) 0.63 mg/3 mL nebulizer solution, Take 3 mLs (0.63 mg total) by nebulization every 4 (four) hours as needed for Wheezing., Disp: 1 Box, Rfl: 2    metFORMIN (GLUCOPHAGE) 500 MG tablet, Take 1 tablet (500 mg total) by mouth 2 (two) times daily with meals., Disp: 180 tablet, Rfl: 3    pen needle, diabetic (COMFORT EZ PEN NEEDLES) 33 gauge x 1/4" Ndle, 1 Units by Misc.(Non-Drug; Combo Route) route every evening., Disp: 100 each, Rfl: 5    QUEtiapine (SEROQUEL) 100 MG Tab, Start with 100 mg at night on first day then increase to 200 on the next night followed by 300 mg thereafter., Disp: 90 tablet, Rfl: 0    sertraline (ZOLOFT) 100 MG tablet, TK 1 T PO  QD, Disp: , Rfl: 11    sucralfate (CARAFATE) 1 gram tablet, Take 1 tablet (1 g total) by mouth 4 (four) times daily before meals and nightly., Disp: 120 tablet, Rfl: 1    SYSTANE, PROPYLENE GLYCOL, 0.4-0.3 % Drop, INSTILL 1 GTT INTO OU BID, Disp: , Rfl: 6    TRULICITY 0.75 mg/0.5 mL PnIj, Place 0.5 mLs (0.75 mg total) onto the skin every 7 days., Disp: 0.5 mL, Rfl: 3    Review of Systems   Constitutional: Negative for chills and fever.   Respiratory: Positive for cough and shortness of breath.    Cardiovascular: Negative for chest pain.   Gastrointestinal: Negative for abdominal pain.   Skin: Negative for rash.   Neurological: Negative for dizziness.       Objective:   BP (!) 140/74 (BP Location: Left arm, Patient Position: Sitting, BP Method: X-Large (Manual))   Pulse 83   Temp 96 °F (35.6 °C) (Tympanic)   Resp 18   Ht 5' 5" (1.651 m)   Wt 120.2 kg (264 lb 15.9 oz)   LMP 08/21/2016   SpO2 100%   BMI 44.10 kg/m²      Physical Exam   Constitutional: She is oriented to person, place, and time. She appears well-developed and well-nourished.   HENT:   Head: Normocephalic and atraumatic.   Eyes: Conjunctivae are normal.   Cardiovascular: Normal rate and normal heart sounds.    No murmur " heard.  Pulmonary/Chest: Effort normal and breath sounds normal. No respiratory distress. She has no wheezes. She has no rales.   Musculoskeletal: She exhibits no edema.   Neurological: She is alert and oriented to person, place, and time. Coordination normal.   Skin: Skin is warm and dry. No rash noted.   Psychiatric: She has a normal mood and affect. Her behavior is normal.   Vitals reviewed.      Assessment & Plan     Problem List Items Addressed This Visit        Psychiatric    Severe episode of recurrent major depressive disorder, with psychotic features    Current Assessment & Plan     Doing much better on current treatment of 100 mg of Zoloft along with 100 mg of Seroquel nightly.  Encouraged to continue taking the medication as prescribed.            Pulmonary    Asthma    Current Assessment & Plan     No signs of exacerbation at this time.  Refilling her albuterol inhaler and nebulizer.            Endocrine    Type 2 diabetes mellitus without complication, without long-term current use of insulin - Primary    Current Assessment & Plan     Due for repeat laboratory studies.  She continues to take Trulicity and metformin.  Her last hemoglobin A1c was at goal.  Stressed the importance of continue diet and exercise.         Relevant Orders    Hemoglobin A1c    Comprehensive metabolic panel (Completed)    CBC auto differential (Completed)            No Follow-up on file.

## 2018-05-30 ENCOUNTER — TELEPHONE (OUTPATIENT)
Dept: INTERNAL MEDICINE | Facility: CLINIC | Age: 43
End: 2018-05-30

## 2018-05-30 DIAGNOSIS — E11.9 TYPE 2 DIABETES MELLITUS WITHOUT COMPLICATION, WITHOUT LONG-TERM CURRENT USE OF INSULIN: Primary | ICD-10-CM

## 2018-05-30 NOTE — TELEPHONE ENCOUNTER
Called pt to give her lab results. She said to please tell  that she still has a cough and is still SOB, is there something he can call her in for that. I explained to her he was out today and once we hear back from him we will call her. Pt verbalized understanding.

## 2018-05-31 RX ORDER — METHYLPREDNISOLONE 4 MG/1
TABLET ORAL
Qty: 1 PACKAGE | Refills: 0 | Status: SHIPPED | OUTPATIENT
Start: 2018-05-31 | End: 2018-06-19 | Stop reason: ALTCHOICE

## 2018-06-01 ENCOUNTER — TELEPHONE (OUTPATIENT)
Dept: INTERNAL MEDICINE | Facility: CLINIC | Age: 43
End: 2018-06-01

## 2018-06-01 RX ORDER — ALBUTEROL SULFATE 0.83 MG/ML
2.5 SOLUTION RESPIRATORY (INHALATION) EVERY 6 HOURS PRN
Qty: 1 BOX | Refills: 3 | Status: SHIPPED | OUTPATIENT
Start: 2018-06-01 | End: 2019-06-05

## 2018-06-18 ENCOUNTER — TELEPHONE (OUTPATIENT)
Dept: INTERNAL MEDICINE | Facility: CLINIC | Age: 43
End: 2018-06-18

## 2018-06-18 NOTE — TELEPHONE ENCOUNTER
----- Message from Jaelyn Prado sent at 6/18/2018  8:36 AM CDT -----  Contact: self [phone]  States that she would like to be worked in for an appt for tomorrow with Dr Buitrago for breathing problems and stomach problems. Pt is an established medicaid pt. Please call back at [phone]//thank you acc

## 2018-06-19 ENCOUNTER — OFFICE VISIT (OUTPATIENT)
Dept: INTERNAL MEDICINE | Facility: CLINIC | Age: 43
End: 2018-06-19
Payer: MEDICAID

## 2018-06-19 ENCOUNTER — HOSPITAL ENCOUNTER (OUTPATIENT)
Dept: RADIOLOGY | Facility: HOSPITAL | Age: 43
Discharge: HOME | End: 2018-06-19
Attending: FAMILY MEDICINE
Payer: MEDICAID

## 2018-06-19 VITALS
DIASTOLIC BLOOD PRESSURE: 71 MMHG | BODY MASS INDEX: 43.45 KG/M2 | SYSTOLIC BLOOD PRESSURE: 103 MMHG | HEIGHT: 65 IN | RESPIRATION RATE: 18 BRPM | TEMPERATURE: 96 F | WEIGHT: 260.81 LBS | HEART RATE: 96 BPM | OXYGEN SATURATION: 98 %

## 2018-06-19 DIAGNOSIS — R06.02 SHORTNESS OF BREATH: ICD-10-CM

## 2018-06-19 DIAGNOSIS — R06.02 SHORTNESS OF BREATH: Primary | ICD-10-CM

## 2018-06-19 DIAGNOSIS — R10.11 RIGHT UPPER QUADRANT ABDOMINAL PAIN: ICD-10-CM

## 2018-06-19 PROCEDURE — 99213 OFFICE O/P EST LOW 20 MIN: CPT | Mod: PBBFAC,PO,25 | Performed by: FAMILY MEDICINE

## 2018-06-19 PROCEDURE — 71046 X-RAY EXAM CHEST 2 VIEWS: CPT | Mod: 26,,, | Performed by: RADIOLOGY

## 2018-06-19 PROCEDURE — 99214 OFFICE O/P EST MOD 30 MIN: CPT | Mod: S$PBB,,, | Performed by: FAMILY MEDICINE

## 2018-06-19 PROCEDURE — 71046 X-RAY EXAM CHEST 2 VIEWS: CPT | Mod: TC,PO

## 2018-06-19 PROCEDURE — 99999 PR PBB SHADOW E&M-EST. PATIENT-LVL III: CPT | Mod: PBBFAC,,, | Performed by: FAMILY MEDICINE

## 2018-06-19 RX ORDER — AZITHROMYCIN 250 MG/1
TABLET, FILM COATED ORAL
Qty: 6 TABLET | Refills: 0 | Status: SHIPPED | OUTPATIENT
Start: 2018-06-19 | End: 2018-06-24

## 2018-06-19 NOTE — ASSESSMENT & PLAN NOTE
Normal physical exam findings.   Low suspicion for pneumonia or bronchitis.  Getting chest imaging since she has been having the symptoms for more than 2 weeks now without any improvement.  She does not have any chest pain or other symptoms of suggest angina.

## 2018-06-19 NOTE — ASSESSMENT & PLAN NOTE
Getting lab work today since she has been having this for a few weeks now and she recently had gallbladder removed.  Differential could be subacute pancreatitis or a retained stone.  May also be related to some indigestion however she is taking Carafate frequently.  Do not think that is constipation related.

## 2018-06-19 NOTE — PROGRESS NOTES
Subjective:       Patient ID: Yuli Milton is a 42 y.o. female.    Chief Complaint: Abdominal Pain; Back Pain; Shortness of Breath; and Cough    HPI    Here today complaining of shortness of breath associated with abdominal pain that radiates to her back.  She has been having these symptoms to some extent ever since she had her gallbladder removed several weeks ago.  She said that her abdominal pain is worse after eating.  She is not having any nausea nor vomiting.  Is not really constipated and has been taking a stool softener.  She is not having any blood in her stools or dark stools.  The shortness of breath has not been alleviated by using her albuterol inhaler and she also had completed a Medrol Dosepak.  She does have a history of asthma but does not feel like this is similar to her aspirin problems in the past.  Family History   Problem Relation Age of Onset    Breast cancer Paternal Grandmother     Diabetes Maternal Grandmother     Hypertension Mother     Breast cancer Maternal Aunt     Cancer Maternal Aunt         colon cancer    Colon cancer Maternal Aunt     Miscarriages / Stillbirths Cousin     Stroke Maternal Aunt     Ovarian cancer Neg Hx     Deep vein thrombosis Neg Hx     Pulmonary embolism Neg Hx        Current Outpatient Prescriptions:     albuterol (PROVENTIL) 2.5 mg /3 mL (0.083 %) nebulizer solution, Take 3 mLs (2.5 mg total) by nebulization every 6 (six) hours as needed for Wheezing. Rescue, Disp: 1 Box, Rfl: 3    albuterol 90 mcg/actuation inhaler, Inhale 2 puffs into the lungs every 4 (four) hours as needed. Rescue, Disp: 18 g, Rfl: 4    atorvastatin (LIPITOR) 10 MG tablet, Take 1 tablet (10 mg total) by mouth once daily., Disp: 90 tablet, Rfl: 3    blood sugar diagnostic Strp, 1 each by Other route 3 (three) times daily. For One Touch Meter, Disp: 100 each, Rfl: 11    blood-glucose meter Misc, Dispense 1, Disp: 1 each, Rfl: 0    cycloSPORINE (RESTASIS) 0.05 %  "ophthalmic emulsion, 1 drop 2 (two) times daily., Disp: , Rfl:     docusate sodium (COLACE) 100 MG capsule, Take 100 mg by mouth 2 (two) times daily., Disp: , Rfl:     lancets Misc, 1 each by Other route 3 (three) times daily. For One Touch Meter, Disp: 100 each, Rfl: 0    metFORMIN (GLUCOPHAGE) 500 MG tablet, Take 1 tablet (500 mg total) by mouth 2 (two) times daily with meals., Disp: 180 tablet, Rfl: 3    pen needle, diabetic (COMFORT EZ PEN NEEDLES) 33 gauge x 1/4" Ndle, 1 Units by Misc.(Non-Drug; Combo Route) route every evening., Disp: 100 each, Rfl: 5    QUEtiapine (SEROQUEL) 100 MG Tab, Start with 100 mg at night on first day then increase to 200 on the next night followed by 300 mg thereafter., Disp: 90 tablet, Rfl: 0    sertraline (ZOLOFT) 100 MG tablet, TK 1 T PO  QD, Disp: , Rfl: 11    sucralfate (CARAFATE) 1 gram tablet, Take 1 tablet (1 g total) by mouth 4 (four) times daily before meals and nightly., Disp: 120 tablet, Rfl: 1    SYSTANE, PROPYLENE GLYCOL, 0.4-0.3 % Drop, INSTILL 1 GTT INTO OU BID, Disp: , Rfl: 6    TRULICITY 0.75 mg/0.5 mL PnIj, Place 0.5 mLs (0.75 mg total) onto the skin every 7 days., Disp: 0.5 mL, Rfl: 3    Review of Systems   Constitutional: Negative for chills and fever.   HENT: Positive for congestion.    Respiratory: Positive for cough and shortness of breath. Negative for wheezing.    Cardiovascular: Negative for chest pain.   Gastrointestinal: Positive for abdominal pain. Negative for blood in stool and constipation.   Skin: Negative for rash.   Neurological: Negative for dizziness.       Objective:   /71 Comment: machine  Pulse 96   Temp 96.2 °F (35.7 °C) (Tympanic)   Resp 18   Ht 5' 5" (1.651 m)   Wt 118.3 kg (260 lb 12.9 oz)   LMP 08/21/2016   SpO2 98%   BMI 43.40 kg/m²      Physical Exam   Constitutional: She is oriented to person, place, and time. She appears well-developed and well-nourished. No distress.   HENT:   Head: Normocephalic and " atraumatic.   Nose: Nose normal.   Eyes: Conjunctivae and EOM are normal. Pupils are equal, round, and reactive to light. Right eye exhibits no discharge. Left eye exhibits no discharge.   Neck: No thyromegaly present.   Cardiovascular: Normal rate and regular rhythm.    No murmur heard.  Pulmonary/Chest: Effort normal and breath sounds normal. No respiratory distress. She has no wheezes. She has no rales.   Abdominal: Soft. She exhibits no distension and no mass. There is tenderness (mild tenderness in RUQ/epigastric regioni). There is no rebound and no guarding.   Musculoskeletal: She exhibits no edema.   Neurological: She is alert and oriented to person, place, and time.   Skin: No rash noted. She is not diaphoretic.   Psychiatric: She has a normal mood and affect. Her behavior is normal.       Assessment & Plan     Problem List Items Addressed This Visit        GI    Right upper quadrant abdominal pain    Current Assessment & Plan       Getting lab work today since she has been having this for a few weeks now and she recently had gallbladder removed.  Differential could be subacute pancreatitis or a retained stone.  May also be related to some indigestion however she is taking Carafate frequently.  Do not think that is constipation related.         Relevant Orders    LIPASE    Comprehensive metabolic panel    CBC auto differential       Other    Shortness of breath - Primary    Current Assessment & Plan       Normal physical exam findings.   Low suspicion for pneumonia or bronchitis.  Getting chest imaging since she has been having the symptoms for more than 2 weeks now without any improvement.  She does not have any chest pain or other symptoms of suggest angina.         Relevant Orders    X-Ray Chest PA And Lateral            No Follow-up on file.

## 2018-06-21 ENCOUNTER — TELEPHONE (OUTPATIENT)
Dept: INTERNAL MEDICINE | Facility: CLINIC | Age: 43
End: 2018-06-21

## 2018-06-21 NOTE — TELEPHONE ENCOUNTER
----- Message from Emilie Petty sent at 6/21/2018 11:56 AM CDT -----  Contact: pt  Pt stated she was seen in the er and to see if doctor can call her some medication in for a cough, she can be reached at [phone] Thanks

## 2018-06-21 NOTE — TELEPHONE ENCOUNTER
Nurse spoke with patient. Pt stated she was seen at [facility] ER on 6/20/18 for same symptoms seen here in clinic on 6/19/18. Pt request follow up visit for symptoms. Pt  advised to contact ER for cough medication that would be covered under plan. Nurse advised pt to try OTC robitussin. Nurse called pt and informed of medical advise per Dr. Buitrago. Pt voiced understanding.

## 2018-06-21 NOTE — TELEPHONE ENCOUNTER
Again, if symptoms   Are not improving by next week, however recommended we do a CT scan.  If her symptoms worsen she should let us know or go back to the ER.

## 2018-07-23 ENCOUNTER — TELEPHONE (OUTPATIENT)
Dept: INTERNAL MEDICINE | Facility: CLINIC | Age: 43
End: 2018-07-23

## 2018-07-23 NOTE — TELEPHONE ENCOUNTER
Left a message to contact office at [phone] to schedule with Carlyle Buitrago or Gabby. Dr. Buitrago had no opening today.

## 2018-07-23 NOTE — TELEPHONE ENCOUNTER
----- Message from Loan Elliott sent at 7/23/2018 11:42 AM CDT -----  Establish Medicaid patient requesting an appt on today for low back pain and stomach pain. Please adv/call [phone].//cw      Please advise

## 2018-07-24 ENCOUNTER — HOSPITAL ENCOUNTER (EMERGENCY)
Facility: HOSPITAL | Age: 43
Discharge: HOME | End: 2018-07-24
Attending: EMERGENCY MEDICINE
Payer: MEDICAID

## 2018-07-24 VITALS
OXYGEN SATURATION: 100 % | TEMPERATURE: 98 F | SYSTOLIC BLOOD PRESSURE: 152 MMHG | WEIGHT: 262.13 LBS | RESPIRATION RATE: 18 BRPM | BODY MASS INDEX: 43.62 KG/M2 | DIASTOLIC BLOOD PRESSURE: 85 MMHG | HEART RATE: 68 BPM

## 2018-07-24 DIAGNOSIS — J06.9 VIRAL UPPER RESPIRATORY TRACT INFECTION: ICD-10-CM

## 2018-07-24 DIAGNOSIS — R30.0 DYSURIA: Primary | ICD-10-CM

## 2018-07-24 DIAGNOSIS — B37.31 VAGINAL CANDIDIASIS: ICD-10-CM

## 2018-07-24 DIAGNOSIS — N39.0 URINARY TRACT INFECTION WITHOUT HEMATURIA, SITE UNSPECIFIED: ICD-10-CM

## 2018-07-24 DIAGNOSIS — R73.9 HYPERGLYCEMIA: ICD-10-CM

## 2018-07-24 LAB
AMPHET+METHAMPHET UR QL: NEGATIVE
BACTERIA #/AREA URNS AUTO: ABNORMAL /HPF
BARBITURATES UR QL SCN>200 NG/ML: NEGATIVE
BENZODIAZ UR QL SCN>200 NG/ML: NEGATIVE
BILIRUB UR QL STRIP: NEGATIVE
BZE UR QL SCN: NEGATIVE
CANNABINOIDS UR QL SCN: NEGATIVE
CLARITY UR REFRACT.AUTO: CLEAR
COLOR UR AUTO: YELLOW
CREAT UR-MCNC: 137.4 MG/DL
GLUCOSE UR QL STRIP: NEGATIVE
HGB UR QL STRIP: ABNORMAL
KETONES UR QL STRIP: NEGATIVE
LEUKOCYTE ESTERASE UR QL STRIP: NEGATIVE
METHADONE UR QL SCN>300 NG/ML: NEGATIVE
MICROSCOPIC COMMENT: ABNORMAL
NITRITE UR QL STRIP: POSITIVE
OPIATES UR QL SCN: NEGATIVE
PCP UR QL SCN>25 NG/ML: NEGATIVE
PH UR STRIP: 6 [PH] (ref 5–8)
POCT GLUCOSE: 239 MG/DL (ref 70–110)
PROT UR QL STRIP: NEGATIVE
RBC #/AREA URNS AUTO: 1 /HPF (ref 0–4)
SP GR UR STRIP: 1.02 (ref 1–1.03)
SQUAMOUS #/AREA URNS AUTO: 1 /HPF
TOXICOLOGY INFORMATION: NORMAL
URN SPEC COLLECT METH UR: ABNORMAL
UROBILINOGEN UR STRIP-ACNC: NEGATIVE EU/DL
WBC #/AREA URNS AUTO: 1 /HPF (ref 0–5)
YEAST UR QL AUTO: ABNORMAL

## 2018-07-24 PROCEDURE — 99283 EMERGENCY DEPT VISIT LOW MDM: CPT

## 2018-07-24 PROCEDURE — 82962 GLUCOSE BLOOD TEST: CPT

## 2018-07-24 PROCEDURE — 25000003 PHARM REV CODE 250: Performed by: EMERGENCY MEDICINE

## 2018-07-24 PROCEDURE — 81000 URINALYSIS NONAUTO W/SCOPE: CPT | Mod: 59

## 2018-07-24 PROCEDURE — 80307 DRUG TEST PRSMV CHEM ANLYZR: CPT

## 2018-07-24 RX ORDER — SULFAMETHOXAZOLE AND TRIMETHOPRIM 800; 160 MG/1; MG/1
1 TABLET ORAL 2 TIMES DAILY
Qty: 14 TABLET | Refills: 0 | Status: SHIPPED | OUTPATIENT
Start: 2018-07-24 | End: 2018-07-27

## 2018-07-24 RX ORDER — FLUCONAZOLE 150 MG/1
150 TABLET ORAL ONCE
Qty: 1 TABLET | Refills: 0 | Status: SHIPPED | OUTPATIENT
Start: 2018-07-24 | End: 2018-07-24

## 2018-07-24 RX ORDER — ONDANSETRON 4 MG/1
4 TABLET, ORALLY DISINTEGRATING ORAL
Status: COMPLETED | OUTPATIENT
Start: 2018-07-24 | End: 2018-07-24

## 2018-07-24 RX ORDER — PROMETHAZINE HYDROCHLORIDE AND DEXTROMETHORPHAN HYDROBROMIDE 6.25; 15 MG/5ML; MG/5ML
SYRUP ORAL
Qty: 118 ML | Refills: 0 | OUTPATIENT
Start: 2018-07-24

## 2018-07-24 RX ORDER — ACETAMINOPHEN 500 MG
1000 TABLET ORAL
Status: COMPLETED | OUTPATIENT
Start: 2018-07-24 | End: 2018-07-24

## 2018-07-24 RX ADMIN — ONDANSETRON 4 MG: 4 TABLET, ORALLY DISINTEGRATING ORAL at 04:07

## 2018-07-24 RX ADMIN — ACETAMINOPHEN 1000 MG: 500 TABLET, FILM COATED ORAL at 04:07

## 2018-07-24 NOTE — DISCHARGE INSTRUCTIONS
For URINARY TRACT INFECTION, I instructed patient to avoid holding in urine and recommended that she urinate when she feels the urge.  Also advised patient to drink plenty of liquids and reminded patient that she may need to drink more fluids than usual to help flush out the bacteria. Instructed patient to avoid alcohol, caffeine, and citrus juices as these substances can irritate your bladder and increase your symptoms.  Also recommended that patient apply heat to lower abdomen for 20 to 30 minutes every two hours to help decrease discomfort and pressure in the bladder.    Patient was counseled today on vaginitis prevention including :  a. avoiding feminine products such as deoderant soaps, body wash, bubble bath, douches, scented toilet paper, deoderant tampons or pads, feminine wipes, chronic pad use, etc.  b. avoiding other vulvovaginal irritants such as long hot baths, humidity, tight, synthetic clothing, chlorine and sitting around in wet bathing suits  c. wearing cotton underwear, avoiding thong underwear and no underwear to bed  d. taking showers instead of baths and use a hair dryer on cool setting afterwards to dry  e. wearing cotton to exercise and shower immediately after exercise and change clothes  f. using polyurethane condoms without spermicide if sexually active and symptoms are triggered by intercourse    Regarding HYPERGLYCEMIA, I advised patient that symptoms of an elevated blood glucose include fatigue, frequent urination, blurry vision, and feeling thirsty.  I advised patient that hyperglycemia can occur when one eats too much food, is ill, is under a lot of stress,  or does not take medications for diabetes as prescribed.  I reiterated that healthy eating is an important part of controlling blood glucose level.  I recommended that the patient: eat a variety of foods every day from all the food groups; eat meals at regular times every day and do not skip meals; choose high fiber carbohydrates  (such as whole grains, legumes, and fruit) more often than less healthy carbohydrates (like white bread and sweets); and drink water and sugar-free beverages rather than sweetened beverages. I instructed patient to see care in the ED or with primary care provider if they experience: severe abdominal pain;  pain that spreads to the back; have increased vomiting; have trouble staying awake or focusing; are shaking or sweating; have blurred or double vision; have a fruity, sweet smell to breath; experience breathing  that is deep and labored, or rapid and shallow; or have a heartbeat that is fast and weak.

## 2018-07-24 NOTE — TELEPHONE ENCOUNTER
Return call to pt. She stated that she went to the ER today and she is still not feeling well. She is concerned because they did not do blood work. She was treated for an UTI. But she feels that something else is wrong with her. Pt stated she has chills, and body aches, but  no fever.

## 2018-07-24 NOTE — ED PROVIDER NOTES
Encounter Date: 7/24/2018       History     Chief Complaint   Patient presents with    Multiple Compaints     c/o burning with urination/fever/chills/sob/nausea/generalized body aches. onset x weeks. states she felt worse tonight.        Dysuria    This is a new problem. The current episode started several days ago. The problem occurs intermittently. The quality of the pain is described as burning. The pain is at a severity of 0/10 (mild). She is not sexually active. Associated symptoms include chills, nausea and hesitancy. Pertinent negatives include no sweats, no vomiting, no discharge, no frequency, no hematuria, no possible pregnancy, no urgency and no flank pain. She has tried nothing for the symptoms. Her past medical history does not include kidney stones, single kidney, urological procedure, recurrent UTIs, urinary stasis or catheterization.     Pt also described an occasional dry cough, worse at night and when lying down.  Denies any overt symptoms of GERD.  rx'd protonix, but has not taken this in several weeks, possibly months.  Pt also states she occasionally gets seasonal allergies, but has not been taking any OTC meds.  Intermittent use of home nebs.  Last used last night.  Denies any chest pain, sob, ha, vomiting, fever, diarrhea, or recent illness.  She was seen last month for cough, and was d/c'd with rx for tessalon, which intermittently improves her cough.    Review of patient's allergies indicates:   Allergen Reactions    Hibiclens (isopropyl alcohol) Itching     Past Medical History:   Diagnosis Date    Abnormal Pap smear of cervix     treatment??    Abnormal Pap smear of vagina     Anemia     Anxiety     Anxiety and depression     Asthma     Chlamydia     Depression (emotion)     Diabetes mellitus     Gallstones     Gastritis     History of ovarian cyst     History of syphilis     History of trichomoniasis     Mental disorder     PONV (postoperative nausea and vomiting)       Past Surgical History:   Procedure Laterality Date    APPENDECTOMY      CHOLECYSTECTOMY      COLONOSCOPY N/A 7/31/2017    Procedure: COLONOSCOPY;  Surgeon: Yuliana Michael MD;  [facility];  Service: Endoscopy;  Laterality: N/A;    DILATION AND CURETTAGE OF UTERUS      bleeding    HYSTERECTOMY  08/31/2016    BS    OOPHORECTOMY       Family History   Problem Relation Age of Onset    Breast cancer Paternal Grandmother     Diabetes Maternal Grandmother     Hypertension Mother     Breast cancer Maternal Aunt     Cancer Maternal Aunt         colon cancer    Colon cancer Maternal Aunt     Miscarriages / Stillbirths Cousin     Stroke Maternal Aunt     Ovarian cancer Neg Hx     Deep vein thrombosis Neg Hx     Pulmonary embolism Neg Hx      Social History   Substance Use Topics    Smoking status: Never Smoker    Smokeless tobacco: Never Used    Alcohol use No     Review of Systems   Constitutional: Positive for chills. Negative for fever.   HENT: Negative for sore throat.    Respiratory: Negative for shortness of breath.    Cardiovascular: Negative for chest pain.   Gastrointestinal: Positive for nausea. Negative for vomiting.   Genitourinary: Positive for dysuria and hesitancy. Negative for flank pain, frequency, hematuria and urgency.   Musculoskeletal: Negative for back pain.   Skin: Negative for rash.   Neurological: Negative for weakness.   Hematological: Does not bruise/bleed easily.   All other systems reviewed and are negative.      Physical Exam     Initial Vitals [07/24/18 0410]   BP Pulse Resp Temp SpO2   (!) 152/85 68 18 97.9 °F (36.6 °C) 100 %      MAP       --         Physical Exam    Nursing note and vitals reviewed.  Constitutional: Vital signs are normal. She appears well-developed and well-nourished. She is not diaphoretic. She is Obese . She is active and cooperative.  Non-toxic appearance. She does not have a sickly appearance. She does not appear ill. No distress.   HENT:    Head: Normocephalic and atraumatic.   Right Ear: Hearing, tympanic membrane, external ear and ear canal normal.   Left Ear: Hearing, tympanic membrane, external ear and ear canal normal.   Nose: Nose normal. No mucosal edema or rhinorrhea. Right sinus exhibits no maxillary sinus tenderness and no frontal sinus tenderness. Left sinus exhibits no maxillary sinus tenderness and no frontal sinus tenderness.   Mouth/Throat: Uvula is midline, oropharynx is clear and moist and mucous membranes are normal. No oral lesions. No trismus in the jaw. No uvula swelling. No oropharyngeal exudate, posterior oropharyngeal edema, posterior oropharyngeal erythema or tonsillar abscesses.   Eyes: Conjunctivae, EOM and lids are normal. Pupils are equal, round, and reactive to light.   Neck: Trachea normal, normal range of motion, full passive range of motion without pain and phonation normal. Neck supple. No thyroid mass and no thyromegaly present. No stridor present. No spinous process tenderness and no muscular tenderness present. No neck rigidity.   Cardiovascular: Normal rate, regular rhythm, normal heart sounds, intact distal pulses and normal pulses. Exam reveals no gallop and no friction rub.    No murmur heard.  Pulses:       Radial pulses are 2+ on the right side, and 2+ on the left side.        Dorsalis pedis pulses are 2+ on the right side, and 2+ on the left side.   Pulmonary/Chest: Effort normal and breath sounds normal. No accessory muscle usage. No respiratory distress. She has no decreased breath sounds. She has no wheezes. She has no rhonchi. She exhibits no tenderness.   Abdominal: Soft. Normal appearance and bowel sounds are normal. She exhibits no distension. There is no hepatosplenomegaly. There is no tenderness. There is no rigidity, no rebound, no guarding and no CVA tenderness. No hernia.       Musculoskeletal: Normal range of motion. She exhibits no edema or tenderness.   Lymphadenopathy:     She has no  cervical adenopathy.   Neurological: She is alert and oriented to person, place, and time. She has normal strength. No cranial nerve deficit or sensory deficit. GCS eye subscore is 4. GCS verbal subscore is 5. GCS motor subscore is 6.   Skin: Skin is warm and dry. No rash noted.   Psychiatric: Her speech is normal and behavior is normal. Judgment and thought content normal. Her mood appears anxious. Cognition and memory are normal.         ED Course   Procedures  Labs Reviewed   URINALYSIS, REFLEX TO URINE CULTURE - Abnormal; Notable for the following:        Result Value    Occult Blood UA 1+ (*)     Nitrite, UA Positive (*)     All other components within normal limits    Narrative:     Preferred Collection Type->Urine, Clean Catch   URINALYSIS MICROSCOPIC - Abnormal; Notable for the following:     Bacteria, UA Many (*)     Yeast, UA Rare (*)     All other components within normal limits    Narrative:     Preferred Collection Type->Urine, Clean Catch   DRUG SCREEN PANEL, URINE EMERGENCY   DRUG SCREEN PANEL, URINE EMERGENCY   POCT GLUCOSE MONITORING CONTINUOUS          Imaging Results    None             Vitals:    07/24/18 0410   BP: (!) 152/85   Pulse: 68   Resp: 18   Temp: 97.9 °F (36.6 °C)   TempSrc: Oral   SpO2: 100%   Weight: 118.9 kg (262 lb 2 oz)       Results for orders placed or performed during the hospital encounter of 07/24/18   Urinalysis, Reflex to Urine Culture Urine, Clean Catch   Result Value Ref Range    Specimen UA Urine, Clean Catch     Color, UA Yellow Yellow, Straw, Yumiko    Appearance, UA Clear Clear    pH, UA 6.0 5.0 - 8.0    Specific Gravity, UA 1.025 1.005 - 1.030    Protein, UA Negative Negative    Glucose, UA Negative Negative    Ketones, UA Negative Negative    Bilirubin (UA) Negative Negative    Occult Blood UA 1+ (A) Negative    Nitrite, UA Positive (A) Negative    Urobilinogen, UA Negative <2.0 EU/dL    Leukocytes, UA Negative Negative   Urinalysis Microscopic   Result Value Ref  Range    RBC, UA 1 0 - 4 /hpf    WBC, UA 1 0 - 5 /hpf    Bacteria, UA Many (A) None-Occ /hpf    Yeast, UA Rare (A) None    Squam Epithel, UA 1 /hpf    Microscopic Comment SEE COMMENT    Drug screen panel, emergency   Result Value Ref Range    Benzodiazepines Negative     Methadone metabolites Negative     Cocaine (Metab.) Negative     Opiate Scrn, Ur Negative     Barbiturate Screen, Ur Negative     Amphetamine Screen, Ur Negative     THC Negative     Phencyclidine Negative     Creatinine, Random Ur 137.4 15.0 - 325.0 mg/dL    Toxicology Information SEE COMMENT          Imaging Results    None         Medications   acetaminophen tablet 1,000 mg (1,000 mg Oral Given 7/24/18 0444)   ondansetron disintegrating tablet 4 mg (4 mg Oral Given 7/24/18 0444)       5:19 AM - Re-evaluation: The patient is resting comfortably and is in no acute distress. She states that her symptoms have improved after treatment within ER. Discussed test results, shared treatment plan, specific conditions for return, and importance of follow up with patient and family.  She understands and agrees with the plan as discussed. Answered  her questions at this time. She has remained hemodynamically stable throughout the ED course and is appropriate for discharge home.     For URINARY TRACT INFECTION, I instructed patient to avoid holding in urine and recommended that she urinate when she feels the urge.  Also advised patient to drink plenty of liquids and reminded patient that she may need to drink more fluids than usual to help flush out the bacteria. Instructed patient to avoid alcohol, caffeine, and citrus juices as these substances can irritate your bladder and increase your symptoms.  Also recommended that patient apply heat to lower abdomen for 20 to 30 minutes every two hours to help decrease discomfort and pressure in the bladder.    Patient was counseled today on vaginitis prevention including :  a. avoiding feminine products such as  deoderant soaps, body wash, bubble bath, douches, scented toilet paper, deoderant tampons or pads, feminine wipes, chronic pad use, etc.  b. avoiding other vulvovaginal irritants such as long hot baths, humidity, tight, synthetic clothing, chlorine and sitting around in wet bathing suits  c. wearing cotton underwear, avoiding thong underwear and no underwear to bed  d. taking showers instead of baths and use a hair dryer on cool setting afterwards to dry  e. wearing cotton to exercise and shower immediately after exercise and change clothes  f. using polyurethane condoms without spermicide if sexually active and symptoms are triggered by intercourse    Regarding HYPERGLYCEMIA, I advised patient that symptoms of an elevated blood glucose include fatigue, frequent urination, blurry vision, and feeling thirsty.  I advised patient that hyperglycemia can occur when one eats too much food, is ill, is under a lot of stress,  or does not take medications for diabetes as prescribed.  I reiterated that healthy eating is an important part of controlling blood glucose level.  I recommended that the patient: eat a variety of foods every day from all the food groups; eat meals at regular times every day and do not skip meals; choose high fiber carbohydrates (such as whole grains, legumes, and fruit) more often than less healthy carbohydrates (like white bread and sweets); and drink water and sugar-free beverages rather than sweetened beverages. I instructed patient to see care in the ED or with primary care provider if they experience: severe abdominal pain;  pain that spreads to the back; have increased vomiting; have trouble staying awake or focusing; are shaking or sweating; have blurred or double vision; have a fruity, sweet smell to breath; experience breathing  that is deep and labored, or rapid and shallow; or have a heartbeat that is fast and weak.    Pre-hypertension/Hypertension: The pt has been informed that they may  have pre-hypertension or hypertension based on a blood pressure reading in the ED. I recommend that the pt call the PCP listed on their discharge instructions or a physician of their choice this week to arrange f/u for further evaluation of possible pre-hypertension or hypertension.     Yuli Milton was given a handout which discussed their disease process, precautions, and instructions for follow-up and therapy.    Follow-up Information     Scottie Buitrago DO. Schedule an appointment as soon as possible for a visit in 1 week.    Specialty:  Family Medicine  Contact information:  [address] [address] [address]  [phone]             [facility].    Specialty:  Emergency Medicine  Why:  As needed, If symptoms worsen  Contact information:  [address] [address] [id number] [phone]                 Discharge Medication List as of 7/24/2018  5:19 AM      START taking these medications    Details   fluconazole (DIFLUCAN) 150 MG Tab Take 1 tablet (150 mg total) by mouth once. for 1 dose, Starting Tue 7/24/2018, Print      sulfamethoxazole-trimethoprim 800-160mg (BACTRIM DS) 800-160 mg Tab Take 1 tablet by mouth 2 (two) times daily. for 5 days, Starting Tue 7/24/2018, Until Sun 7/29/2018, Print                ED Diagnosis  1. Dysuria    2. Urinary tract infection without hematuria, site unspecified    3. Vaginal candidiasis    4. Hyperglycemia                             Clinical Impression:   The primary encounter diagnosis was Dysuria. Diagnoses of Urinary tract infection without hematuria, site unspecified, Vaginal candidiasis, and Hyperglycemia were also pertinent to this visit.      Disposition:   Disposition: Discharged  Condition: Stable                        Rahul Escobar Jr., MD  07/24/18 0575

## 2018-07-24 NOTE — TELEPHONE ENCOUNTER
----- Message from Sissy Phelps sent at 7/24/2018  9:05 AM CDT -----  Contact: pt  She's calling stating that she went to the ER today and still is not feeling well, please advise [phone] (home)

## 2018-07-27 ENCOUNTER — LAB VISIT (OUTPATIENT)
Dept: LAB | Facility: HOSPITAL | Age: 43
End: 2018-07-27
Attending: FAMILY MEDICINE
Payer: MEDICAID

## 2018-07-27 ENCOUNTER — OFFICE VISIT (OUTPATIENT)
Dept: INTERNAL MEDICINE | Facility: CLINIC | Age: 43
End: 2018-07-27
Payer: MEDICAID

## 2018-07-27 VITALS
BODY MASS INDEX: 43.12 KG/M2 | TEMPERATURE: 96 F | SYSTOLIC BLOOD PRESSURE: 111 MMHG | OXYGEN SATURATION: 98 % | HEART RATE: 73 BPM | WEIGHT: 258.81 LBS | HEIGHT: 65 IN | RESPIRATION RATE: 18 BRPM | DIASTOLIC BLOOD PRESSURE: 62 MMHG

## 2018-07-27 DIAGNOSIS — N39.0 URINARY TRACT INFECTION WITHOUT HEMATURIA, SITE UNSPECIFIED: ICD-10-CM

## 2018-07-27 DIAGNOSIS — N39.0 URINARY TRACT INFECTION WITHOUT HEMATURIA, SITE UNSPECIFIED: Primary | ICD-10-CM

## 2018-07-27 DIAGNOSIS — F33.3 SEVERE EPISODE OF RECURRENT MAJOR DEPRESSIVE DISORDER, WITH PSYCHOTIC FEATURES: ICD-10-CM

## 2018-07-27 DIAGNOSIS — R05.3 PERSISTENT COUGH: ICD-10-CM

## 2018-07-27 DIAGNOSIS — E11.9 TYPE 2 DIABETES MELLITUS WITHOUT COMPLICATION, WITHOUT LONG-TERM CURRENT USE OF INSULIN: ICD-10-CM

## 2018-07-27 PROBLEM — R10.11 RIGHT UPPER QUADRANT ABDOMINAL PAIN: Status: RESOLVED | Noted: 2018-06-19 | Resolved: 2018-07-27

## 2018-07-27 PROCEDURE — 99214 OFFICE O/P EST MOD 30 MIN: CPT | Mod: PBBFAC,PO | Performed by: FAMILY MEDICINE

## 2018-07-27 PROCEDURE — 87086 URINE CULTURE/COLONY COUNT: CPT

## 2018-07-27 PROCEDURE — 99214 OFFICE O/P EST MOD 30 MIN: CPT | Mod: S$PBB,,, | Performed by: FAMILY MEDICINE

## 2018-07-27 PROCEDURE — 99999 PR PBB SHADOW E&M-EST. PATIENT-LVL IV: CPT | Mod: PBBFAC,,, | Performed by: FAMILY MEDICINE

## 2018-07-27 PROCEDURE — 36415 COLL VENOUS BLD VENIPUNCTURE: CPT | Mod: PO

## 2018-07-27 PROCEDURE — 86703 HIV-1/HIV-2 1 RESULT ANTBDY: CPT

## 2018-07-27 PROCEDURE — 87491 CHLMYD TRACH DNA AMP PROBE: CPT

## 2018-07-27 RX ORDER — NITROFURANTOIN 25; 75 MG/1; MG/1
100 CAPSULE ORAL 2 TIMES DAILY
Qty: 10 CAPSULE | Refills: 0 | Status: SHIPPED | OUTPATIENT
Start: 2018-07-27 | End: 2018-08-01

## 2018-07-27 RX ORDER — SULFAMETHOXAZOLE AND TRIMETHOPRIM 800; 160 MG/1; MG/1
TABLET ORAL
COMMUNITY
End: 2018-07-27 | Stop reason: SDUPTHER

## 2018-07-27 RX ORDER — FLUCONAZOLE 150 MG/1
150 TABLET ORAL DAILY
Qty: 1 TABLET | Refills: 2 | Status: SHIPPED | OUTPATIENT
Start: 2018-07-27 | End: 2018-07-28

## 2018-07-27 RX ORDER — FERROUS SULFATE 325(65) MG
325 TABLET ORAL
Refills: 0 | COMMUNITY
Start: 2018-07-27 | End: 2019-02-12

## 2018-07-27 RX ORDER — METFORMIN HYDROCHLORIDE 1000 MG/1
1000 TABLET ORAL 2 TIMES DAILY WITH MEALS
Qty: 180 TABLET | Refills: 3 | Status: SHIPPED | OUTPATIENT
Start: 2018-07-27 | End: 2019-05-23 | Stop reason: SDUPTHER

## 2018-07-27 NOTE — PROGRESS NOTES
Subjective:       Patient ID: Yuli Milton is a 42 y.o. female.    Chief Complaint: Follow-up and Urinary Tract Infection    HPI   here today to follow-up from emergency room visit where she was seen at 2 different ERs on the 24th.  She was treated with Bactrim for urinary tract infection says that her symptoms have not really improved.  I reviewed the results of her urine dipstick which were indeed consistent with urinary tract infection.  No urine culture was sent unfortunately.  She continues to have some back pain along with lower abdominal discomfort.  Also, she says for the last month she has just felt chills on and off in fatigued and just not feeling herself.  Previous to the last month she was doing pretty well in regards to her depression.  Had felt like she was making headway.  Recently her diabetes also has been worsening.  She has noticed her glucose numbers creeping up.  Her last A1c was still at goal but had increased from the previous time.  She continues to take the medication in the same fashion.    Also mention that she continues to have a cough.  This has been going on ever since her gallbladder surgery.  She has had normal chest x-rays.  No wheezing.   some shortness of breath and just continued cough.  She has tried Tessalon Perles which have not helped at all  Family History   Problem Relation Age of Onset    Breast cancer Paternal Grandmother     Diabetes Maternal Grandmother     Hypertension Mother     Breast cancer Maternal Aunt     Cancer Maternal Aunt         colon cancer    Colon cancer Maternal Aunt     Miscarriages / Stillbirths Cousin     Stroke Maternal Aunt     Ovarian cancer Neg Hx     Deep vein thrombosis Neg Hx     Pulmonary embolism Neg Hx        Current Outpatient Prescriptions:     albuterol (PROVENTIL) 2.5 mg /3 mL (0.083 %) nebulizer solution, Take 3 mLs (2.5 mg total) by nebulization every 6 (six) hours as needed for Wheezing. Rescue, Disp: 1 Box, Rfl:  "3    albuterol 90 mcg/actuation inhaler, Inhale 2 puffs into the lungs every 4 (four) hours as needed. Rescue, Disp: 18 g, Rfl: 4    atorvastatin (LIPITOR) 10 MG tablet, Take 1 tablet (10 mg total) by mouth once daily., Disp: 90 tablet, Rfl: 3    blood sugar diagnostic Strp, 1 each by Other route 3 (three) times daily. For One Touch Meter, Disp: 100 each, Rfl: 11    blood-glucose meter Misc, Dispense 1, Disp: 1 each, Rfl: 0    cycloSPORINE (RESTASIS) 0.05 % ophthalmic emulsion, 1 drop 2 (two) times daily., Disp: , Rfl:     docusate sodium (COLACE) 100 MG capsule, Take 100 mg by mouth 2 (two) times daily., Disp: , Rfl:     lancets Mis, 1 each by Other route 3 (three) times daily. For One Touch Meter, Disp: 100 each, Rfl: 0    metFORMIN (GLUCOPHAGE) 1000 MG tablet, Take 1 tablet (1,000 mg total) by mouth 2 (two) times daily with meals., Disp: 180 tablet, Rfl: 3    pen needle, diabetic (COMFORT EZ PEN NEEDLES) 33 gauge x 1/4" Ndle, 1 Units by Misc.(Non-Drug; Combo Route) route every evening., Disp: 100 each, Rfl: 5    QUEtiapine (SEROQUEL) 100 MG Tab, Start with 100 mg at night on first day then increase to 200 on the next night followed by 300 mg thereafter., Disp: 90 tablet, Rfl: 0    sertraline (ZOLOFT) 100 MG tablet, TK 1 T PO  QD, Disp: , Rfl: 11    sucralfate (CARAFATE) 1 gram tablet, Take 1 tablet (1 g total) by mouth 4 (four) times daily before meals and nightly., Disp: 120 tablet, Rfl: 1    SYSTANE, PROPYLENE GLYCOL, 0.4-0.3 % Drop, INSTILL 1 GTT INTO OU BID, Disp: , Rfl: 6    TRULICITY 0.75 mg/0.5 mL PnIj, Place 0.5 mLs (0.75 mg total) onto the skin every 7 days., Disp: 0.5 mL, Rfl: 3    ferrous sulfate 325 mg (65 mg iron) Tab tablet, Take 1 tablet (325 mg total) by mouth daily with breakfast., Disp: , Rfl: 0    fluconazole (DIFLUCAN) 150 MG Tab, Take 1 tablet (150 mg total) by mouth once daily. for 1 day, Disp: 1 tablet, Rfl: 2    nitrofurantoin, macrocrystal-monohydrate, (MACROBID) 100 MG " "capsule, Take 1 capsule (100 mg total) by mouth 2 (two) times daily. for 5 days, Disp: 10 capsule, Rfl: 0    Review of Systems   Constitutional: Positive for fatigue. Negative for chills and fever.   Respiratory: Positive for cough. Negative for shortness of breath.    Cardiovascular: Negative for chest pain.   Gastrointestinal: Positive for abdominal pain.   Genitourinary: Positive for dysuria and frequency.   Musculoskeletal: Positive for back pain.   Skin: Negative for rash.   Neurological: Negative for dizziness.       Objective:   /62 (BP Location: Left arm, Patient Position: Sitting, BP Method: Medium (Automatic))   Pulse 73   Temp 96 °F (35.6 °C) (Tympanic)   Resp 18   Ht 5' 5" (1.651 m)   Wt 117.4 kg (258 lb 13.1 oz)   LMP 08/21/2016   SpO2 98%   BMI 43.07 kg/m²      Physical Exam   Constitutional: She is oriented to person, place, and time. She appears well-developed and well-nourished.   HENT:   Head: Normocephalic and atraumatic.   Eyes: Conjunctivae are normal.   Cardiovascular: Normal rate, regular rhythm and normal heart sounds.    Pulmonary/Chest: Effort normal. No respiratory distress. She has no wheezes. She has no rales.   Musculoskeletal: She exhibits no edema.   Neurological: She is alert and oriented to person, place, and time. Coordination normal.   Skin: Skin is warm and dry. No rash noted.   Psychiatric: She has a normal mood and affect. Her behavior is normal.   Vitals reviewed.      Assessment & Plan     Problem List Items Addressed This Visit        Psychiatric    Severe episode of recurrent major depressive disorder, with psychotic features    Current Assessment & Plan       Continue on same medications.  Has been doing well until just recently when she started feeling physically bad            Pulmonary    Persistent cough    Current Assessment & Plan       Reviewed recent chest x-ray from [facility].  She would benefit from getting spirometry testing since " she continues to have shortness of breath and cough.         Relevant Orders    Spirometry with/without bronchodilator       Renal/    Urinary tract infection without hematuria - Primary    Current Assessment & Plan      I am concerned that possibly the bacteria causing her urinary tract infection could be resistant to Bactrim.  Sending Macrobid and also getting urinary culture today.  This could explain a lot of her symptoms that she has had an ongoing urinary tract infection.         Relevant Orders    CULTURE, URINE    C. trachomatis/N. gonorrhoeae by AMP DNA    HIV-1 and HIV-2 antibodies       Endocrine    Type 2 diabetes mellitus without complication, without long-term current use of insulin    Current Assessment & Plan      Recommended that she increase the metformin to a 1000 mg twice a day.                 Follow-up if symptoms worsen or fail to improve.

## 2018-07-27 NOTE — ASSESSMENT & PLAN NOTE
Reviewed recent chest x-ray from [facility].  She would benefit from getting spirometry testing since she continues to have shortness of breath and cough.

## 2018-07-27 NOTE — ASSESSMENT & PLAN NOTE
I am concerned that possibly the bacteria causing her urinary tract infection could be resistant to Bactrim.  Sending Macrobid and also getting urinary culture today.  This could explain a lot of her symptoms that she has had an ongoing urinary tract infection.

## 2018-07-27 NOTE — ASSESSMENT & PLAN NOTE
Continue on same medications.  Has been doing well until just recently when she started feeling physically bad

## 2018-07-29 LAB
BACTERIA UR CULT: NO GROWTH
C TRACH DNA SPEC QL NAA+PROBE: NOT DETECTED
N GONORRHOEA DNA SPEC QL NAA+PROBE: NOT DETECTED

## 2018-07-30 LAB — HIV 1+2 AB+HIV1 P24 AG SERPL QL IA: NEGATIVE

## 2018-07-30 RX ORDER — DULAGLUTIDE 0.75 MG/.5ML
INJECTION, SOLUTION SUBCUTANEOUS
Qty: 2 ML | Refills: 0 | Status: SHIPPED | OUTPATIENT
Start: 2018-07-30 | End: 2019-02-12 | Stop reason: ALTCHOICE

## 2018-11-12 ENCOUNTER — TELEPHONE (OUTPATIENT)
Dept: INTERNAL MEDICINE | Facility: CLINIC | Age: 43
End: 2018-11-12

## 2018-11-12 NOTE — TELEPHONE ENCOUNTER
----- Message from Sissy Phelps sent at 11/12/2018  2:18 PM CST -----  Contact: pt  She's calling to see if she can be fit into schedule due to sore throat, please advise [phone] (home)

## 2018-11-16 ENCOUNTER — OFFICE VISIT (OUTPATIENT)
Dept: INTERNAL MEDICINE | Facility: CLINIC | Age: 43
End: 2018-11-16
Payer: MEDICAID

## 2018-11-16 ENCOUNTER — TELEPHONE (OUTPATIENT)
Dept: INTERNAL MEDICINE | Facility: CLINIC | Age: 43
End: 2018-11-16

## 2018-11-16 ENCOUNTER — LAB VISIT (OUTPATIENT)
Dept: LAB | Facility: HOSPITAL | Age: 43
End: 2018-11-16
Attending: FAMILY MEDICINE
Payer: MEDICAID

## 2018-11-16 VITALS
WEIGHT: 263.88 LBS | OXYGEN SATURATION: 99 % | HEIGHT: 65 IN | SYSTOLIC BLOOD PRESSURE: 139 MMHG | DIASTOLIC BLOOD PRESSURE: 66 MMHG | TEMPERATURE: 97 F | RESPIRATION RATE: 18 BRPM | BODY MASS INDEX: 43.96 KG/M2 | HEART RATE: 101 BPM

## 2018-11-16 DIAGNOSIS — E11.9 TYPE 2 DIABETES MELLITUS WITHOUT COMPLICATION, WITHOUT LONG-TERM CURRENT USE OF INSULIN: ICD-10-CM

## 2018-11-16 DIAGNOSIS — R53.83 FATIGUE, UNSPECIFIED TYPE: ICD-10-CM

## 2018-11-16 DIAGNOSIS — F33.3 SEVERE EPISODE OF RECURRENT MAJOR DEPRESSIVE DISORDER, WITH PSYCHOTIC FEATURES: ICD-10-CM

## 2018-11-16 DIAGNOSIS — R30.0 DYSURIA: Primary | ICD-10-CM

## 2018-11-16 LAB
ALBUMIN SERPL BCP-MCNC: 3.4 G/DL
ALP SERPL-CCNC: 55 U/L
ALT SERPL W/O P-5'-P-CCNC: 12 U/L
ANION GAP SERPL CALC-SCNC: 7 MMOL/L
AST SERPL-CCNC: 13 U/L
BASOPHILS # BLD AUTO: 0.02 K/UL
BASOPHILS NFR BLD: 0.4 %
BILIRUB SERPL-MCNC: 0.4 MG/DL
BILIRUB SERPL-MCNC: NEGATIVE MG/DL
BLOOD URINE, POC: 50
BUN SERPL-MCNC: 10 MG/DL
CALCIUM SERPL-MCNC: 9.1 MG/DL
CHLORIDE SERPL-SCNC: 104 MMOL/L
CO2 SERPL-SCNC: 28 MMOL/L
COLOR, POC UA: YELLOW
CREAT SERPL-MCNC: 0.8 MG/DL
DIFFERENTIAL METHOD: ABNORMAL
EOSINOPHIL # BLD AUTO: 0.1 K/UL
EOSINOPHIL NFR BLD: 1.4 %
ERYTHROCYTE [DISTWIDTH] IN BLOOD BY AUTOMATED COUNT: 13.7 %
EST. GFR  (AFRICAN AMERICAN): >60 ML/MIN/1.73 M^2
EST. GFR  (NON AFRICAN AMERICAN): >60 ML/MIN/1.73 M^2
GLUCOSE SERPL-MCNC: 129 MG/DL
GLUCOSE UR QL STRIP: NORMAL
HCT VFR BLD AUTO: 34.7 %
HGB BLD-MCNC: 11.7 G/DL
KETONES UR QL STRIP: NEGATIVE
LEUKOCYTE ESTERASE URINE, POC: NEGATIVE
LYMPHOCYTES # BLD AUTO: 1.8 K/UL
LYMPHOCYTES NFR BLD: 35.7 %
MCH RBC QN AUTO: 29.3 PG
MCHC RBC AUTO-ENTMCNC: 33.7 G/DL
MCV RBC AUTO: 87 FL
MONOCYTES # BLD AUTO: 0.5 K/UL
MONOCYTES NFR BLD: 8.7 %
NEUTROPHILS # BLD AUTO: 2.8 K/UL
NEUTROPHILS NFR BLD: 53.8 %
NITRITE, POC UA: NEGATIVE
PH, POC UA: 5
PLATELET # BLD AUTO: 297 K/UL
PMV BLD AUTO: 9.8 FL
POTASSIUM SERPL-SCNC: 3.8 MMOL/L
PROT SERPL-MCNC: 7.3 G/DL
PROTEIN, POC: NEGATIVE
RBC # BLD AUTO: 4 M/UL
SODIUM SERPL-SCNC: 139 MMOL/L
SPECIFIC GRAVITY, POC UA: 1.02
TSH SERPL DL<=0.005 MIU/L-ACNC: 0.69 UIU/ML
UROBILINOGEN, POC UA: NORMAL
WBC # BLD AUTO: 5.15 K/UL

## 2018-11-16 PROCEDURE — 85025 COMPLETE CBC W/AUTO DIFF WBC: CPT | Mod: PO

## 2018-11-16 PROCEDURE — 99213 OFFICE O/P EST LOW 20 MIN: CPT | Mod: PBBFAC,PO | Performed by: FAMILY MEDICINE

## 2018-11-16 PROCEDURE — 80053 COMPREHEN METABOLIC PANEL: CPT | Mod: PO

## 2018-11-16 PROCEDURE — 84443 ASSAY THYROID STIM HORMONE: CPT | Mod: PO

## 2018-11-16 PROCEDURE — 83036 HEMOGLOBIN GLYCOSYLATED A1C: CPT

## 2018-11-16 PROCEDURE — 99999 PR PBB SHADOW E&M-EST. PATIENT-LVL III: CPT | Mod: PBBFAC,,, | Performed by: FAMILY MEDICINE

## 2018-11-16 PROCEDURE — 99214 OFFICE O/P EST MOD 30 MIN: CPT | Mod: S$PBB,,, | Performed by: FAMILY MEDICINE

## 2018-11-16 PROCEDURE — 36415 COLL VENOUS BLD VENIPUNCTURE: CPT | Mod: PO

## 2018-11-16 PROCEDURE — 81002 URINALYSIS NONAUTO W/O SCOPE: CPT | Mod: PBBFAC,PO | Performed by: FAMILY MEDICINE

## 2018-11-16 NOTE — TELEPHONE ENCOUNTER
----- Message from Charlene Carmonaite sent at 11/16/2018  4:42 PM CST -----  Contact: Pt  Pt called and stated she needs her excuse emailed to her at [email or web address] for her to return to work tomorrow on 11/17/18.    She can be reached at [phone].  Thanks,  Tf

## 2018-11-16 NOTE — TELEPHONE ENCOUNTER
----- Message from Dia Elliott sent at 11/16/2018  4:21 PM CST -----  Contact: pt  Please fax the pt work excuse to [phone] no Attn, can be reached at [phone]///[email or web address]

## 2018-11-16 NOTE — LETTER
November 16, 2018                 [facility] Internal Medicine  Internal Medicine  [address] [place] [id number]  Phone: [phone]   November 16, 2018     Patient: Yuli Milton   YOB: 1975   Date of Visit: 11/16/2018       To Whom it May Concern:    Yuli Milton was seen in my clinic on 11/16/2018. She may return to work on 11/17/18.    If you have any questions or concerns, please don't hesitate to call.    Sincerely,       Scottie Buitrago, DO Lisa Moralez LPN

## 2018-11-16 NOTE — MEDICAL/APP STUDENT
Subjective:       Patient ID: Yuli Milton is a 43 y.o. female.    Chief Complaint: Generalized Body Aches (right side); Fatigue; and Headache    Ms. Milton is here today with c/o pain on her entire right side off and on, states it used to just be in her right leg but is now all over the right side of her body. Pain has gotten worse over the past month. She has taken tylenol and motrin with no relief and has been taking xanax for sleep. She also has pain in her chest on the right side as well. Reports lightheadedness, and numbness in right leg. Denies history of trauma. Reports fatigue, denies fever. Reports weight gain. She slso reports vaginal irritation and dysuria.    With regards to her diabetes she does not check her sugar regularly and is also not always compliant with her medications.           Review of Systems   Constitutional: Positive for fatigue. Negative for fever.   HENT: Negative.    Respiratory: Negative for cough, chest tightness and shortness of breath.    Cardiovascular: Negative for chest pain, palpitations and leg swelling.   Gastrointestinal: Positive for nausea. Negative for abdominal pain, constipation, diarrhea and vomiting.   Endocrine: Negative.    Genitourinary: Positive for dysuria and flank pain. Negative for difficulty urinating.        Reports vaginal irritation.   Musculoskeletal: Positive for arthralgias, back pain and myalgias.   Skin: Negative for rash.   Neurological: Negative for light-headedness.   All other systems reviewed and are negative.      Objective:      Physical Exam   Constitutional: She is oriented to person, place, and time. She appears well-developed and well-nourished.   HENT:   Head: Normocephalic and atraumatic.   Right Ear: External ear normal.   Left Ear: External ear normal.   Nose: Nose normal.   Mouth/Throat: Oropharynx is clear and moist.   Eyes: Conjunctivae and EOM are normal. Pupils are equal, round, and reactive to light.   Neck: Normal range of  motion. Neck supple.   Cardiovascular: Normal rate, regular rhythm, normal heart sounds and intact distal pulses.   Pulmonary/Chest: Effort normal and breath sounds normal.   Abdominal: Soft. Bowel sounds are normal.   Genitourinary: Vagina normal.   Genitourinary Comments: LESLIE Parks at bedside for supervision of pelvic exam, external geneitalia normal, cervix normal, scant amount of white discharge noted. No cervical motion tenderness. Wet prep done, normal, no clue cells noted.    Musculoskeletal: Normal range of motion.   Neurological: She is alert and oriented to person, place, and time.   Skin: Skin is warm and dry.   Psychiatric: She has a normal mood and affect. Her behavior is normal. Judgment and thought content normal.       Assessment:       No diagnosis found.    Plan:       Problem List Items Addressed This Visit        Renal/    Dysuria - Primary    Relevant Orders    POCT URINE DIPSTICK WITHOUT MICROSCOPE (Completed)       Endocrine    Type 2 diabetes mellitus without complication, without long-term current use of insulin    Relevant Orders    Comprehensive metabolic panel    Hemoglobin A1c      Other Visit Diagnoses     Fatigue, unspecified type        Relevant Orders    TSH    CBC auto differential (Completed)

## 2018-11-17 LAB
ESTIMATED AVG GLUCOSE: 151 MG/DL
HBA1C MFR BLD HPLC: 6.9 %

## 2018-11-23 ENCOUNTER — TELEPHONE (OUTPATIENT)
Dept: INTERNAL MEDICINE | Facility: CLINIC | Age: 43
End: 2018-11-23

## 2018-11-23 PROBLEM — R73.9 HYPERGLYCEMIA: Status: RESOLVED | Noted: 2018-07-24 | Resolved: 2018-11-23

## 2018-11-23 PROBLEM — N39.0 URINARY TRACT INFECTION WITHOUT HEMATURIA: Status: RESOLVED | Noted: 2018-07-24 | Resolved: 2018-11-23

## 2018-11-23 PROBLEM — B37.31 VAGINAL CANDIDIASIS: Status: RESOLVED | Noted: 2018-07-24 | Resolved: 2018-11-23

## 2018-11-23 NOTE — TELEPHONE ENCOUNTER
----- Message from Lux Choi sent at 11/23/2018 10:33 AM CST -----  Contact: pt   Pt is inq. About being worked in today for appt ,   shortness of breathe, right side pain, headache.      ..[phone] (home)

## 2018-11-23 NOTE — ASSESSMENT & PLAN NOTE
Overall doing pretty well in regards to depression.  Continue on same medication.  Her symptoms of fatigue are overall nonspecific and there are no abnormal findings on exam.  This could be secondary to depression.  Getting lab work today to rule out hypoglycemia or other issue.

## 2018-11-23 NOTE — PROGRESS NOTES
Subjective:       Patient ID: Yuli Milton is a 43 y.o. female.    Chief Complaint: Generalized Body Aches (right side); Fatigue; and Headache    HPI  Ms. Milton is here today with c/o pain on her entire right side off and on, states it used to just be in her right leg but is now all over the right side of her body. Pain has gotten worse over the past month. She has taken tylenol and motrin with no relief and has been taking xanax for sleep. She also has pain in her chest on the right side as well. Reports lightheadedness, and numbness in right leg. Denies history of trauma. Reports fatigue, denies fever. Reports weight gain. She slso reports vaginal irritation and dysuria.    With regards to her diabetes she does not check her sugar regularly and is also not always compliant with her medications.         Family History   Problem Relation Age of Onset    Breast cancer Paternal Grandmother     Diabetes Maternal Grandmother     Hypertension Mother     Breast cancer Maternal Aunt     Cancer Maternal Aunt         colon cancer    Colon cancer Maternal Aunt     Miscarriages / Stillbirths Cousin     Stroke Maternal Aunt     Ovarian cancer Neg Hx     Deep vein thrombosis Neg Hx     Pulmonary embolism Neg Hx        Current Outpatient Medications:     albuterol (PROVENTIL) 2.5 mg /3 mL (0.083 %) nebulizer solution, Take 3 mLs (2.5 mg total) by nebulization every 6 (six) hours as needed for Wheezing. Rescue, Disp: 1 Box, Rfl: 3    albuterol 90 mcg/actuation inhaler, Inhale 2 puffs into the lungs every 4 (four) hours as needed. Rescue, Disp: 18 g, Rfl: 4    blood sugar diagnostic Strp, 1 each by Other route 3 (three) times daily. For One Touch Meter, Disp: 100 each, Rfl: 11    blood-glucose meter Misc, Dispense 1, Disp: 1 each, Rfl: 0    cycloSPORINE (RESTASIS) 0.05 % ophthalmic emulsion, 1 drop 2 (two) times daily., Disp: , Rfl:     docusate sodium (COLACE) 100 MG capsule, Take 100 mg by mouth 2 (two)  "times daily., Disp: , Rfl:     lancets Misc, 1 each by Other route 3 (three) times daily. For One Touch Meter, Disp: 100 each, Rfl: 0    metFORMIN (GLUCOPHAGE) 1000 MG tablet, Take 1 tablet (1,000 mg total) by mouth 2 (two) times daily with meals., Disp: 180 tablet, Rfl: 3    pen needle, diabetic (COMFORT EZ PEN NEEDLES) 33 gauge x 1/4" Ndle, 1 Units by Misc.(Non-Drug; Combo Route) route every evening., Disp: 100 each, Rfl: 5    sertraline (ZOLOFT) 100 MG tablet, TK 1 T PO  QD, Disp: , Rfl: 11    sucralfate (CARAFATE) 1 gram tablet, Take 1 tablet (1 g total) by mouth 4 (four) times daily before meals and nightly., Disp: 120 tablet, Rfl: 1    SYSTANE, PROPYLENE GLYCOL, 0.4-0.3 % Drop, INSTILL 1 GTT INTO OU BID, Disp: , Rfl: 6    TRULICITY 0.75 mg/0.5 mL PnIj, INJECT 0.75MG UNDER THE SKIN EVERY 7 DAYS., Disp: 2 mL, Rfl: 0    atorvastatin (LIPITOR) 10 MG tablet, Take 1 tablet (10 mg total) by mouth once daily., Disp: 90 tablet, Rfl: 3    ferrous sulfate 325 mg (65 mg iron) Tab tablet, Take 1 tablet (325 mg total) by mouth daily with breakfast., Disp: , Rfl: 0    QUEtiapine (SEROQUEL) 100 MG Tab, Start with 100 mg at night on first day then increase to 200 on the next night followed by 300 mg thereafter., Disp: 90 tablet, Rfl: 0    Review of Systems    Constitutional: Positive for fatigue. Negative for fever.   HENT: Negative.    Respiratory: Negative for cough, chest tightness and shortness of breath.    Cardiovascular: Negative for chest pain, palpitations and leg swelling.   Gastrointestinal: Positive for nausea. Negative for abdominal pain, constipation, diarrhea and vomiting.   Endocrine: Negative.    Genitourinary: Positive for dysuria and flank pain. Negative for difficulty urinating.        Reports vaginal irritation.   Musculoskeletal: Positive for arthralgias, back pain and myalgias.   Skin: Negative for rash.   Neurological: Negative for light-headedness.   All other systems reviewed and are " "negative.      Objective:   /66 (BP Location: Left arm, Patient Position: Sitting, BP Method: Large (Automatic))   Pulse 101   Temp 96.5 °F (35.8 °C) (Tympanic)   Resp 18   Ht 5' 5" (1.651 m)   Wt 119.7 kg (263 lb 14.3 oz)   LMP 08/21/2016   SpO2 99%   BMI 43.91 kg/m²      Physical Exam    Constitutional: She is oriented to person, place, and time. She appears well-developed and well-nourished.   HENT:   Head: Normocephalic and atraumatic.   Right Ear: External ear normal.   Left Ear: External ear normal.   Nose: Nose normal.   Mouth/Throat: Oropharynx is clear and moist.   Eyes: Conjunctivae and EOM are normal. Pupils are equal, round, and reactive to light.   Neck: Normal range of motion. Neck supple.   Cardiovascular: Normal rate, regular rhythm, normal heart sounds and intact distal pulses.   Pulmonary/Chest: Effort normal and breath sounds normal.   Abdominal: Soft. Bowel sounds are normal.   Genitourinary: Vagina normal.   Genitourinary Comments: LESLIE Parks at bedside for supervision of pelvic exam, external geneitalia normal, cervix normal, scant amount of white discharge noted. No cervical motion tenderness. Wet prep done, normal, no clue cells noted.    Musculoskeletal: Normal range of motion.   Neurological: She is alert and oriented to person, place, and time.   Skin: Skin is warm and dry.   Psychiatric: She has a normal mood and affect. Her behavior is normal. Judgment and thought content normal.       Assessment & Plan     Problem List Items Addressed This Visit        Psychiatric    Severe episode of recurrent major depressive disorder, with psychotic features    Current Assessment & Plan       Overall doing pretty well in regards to depression.  Continue on same medication.  Her symptoms of fatigue are overall nonspecific and there are no abnormal findings on exam.  This could be secondary to depression.  Getting lab work today to rule out hypoglycemia or other issue.            " Renal/    Dysuria - Primary    Current Assessment & Plan       No signs of urinary tract infection at this time.  No concerns of wet   Prep.  Offered reassurance.  Encouraged adequate hydration and to follow up if symptoms are not improving.         Relevant Orders    POCT URINE DIPSTICK WITHOUT MICROSCOPE (Completed)       Endocrine    Type 2 diabetes mellitus without complication, without long-term current use of insulin    Relevant Orders    Comprehensive metabolic panel (Completed)    Hemoglobin A1c (Completed)      Other Visit Diagnoses     Fatigue, unspecified type        Relevant Orders    TSH (Completed)    CBC auto differential (Completed)            No Follow-up on file.

## 2018-11-23 NOTE — TELEPHONE ENCOUNTER
Pt called and advised per Dr. Gutierrez to be elevated for SOB, right-sided pain with Aguayo in the nearest ER. Pt voiced understanding and will be headed to ER

## 2018-11-23 NOTE — ASSESSMENT & PLAN NOTE
No signs of urinary tract infection at this time.  No concerns of wet   Prep.  Offered reassurance.  Encouraged adequate hydration and to follow up if symptoms are not improving.

## 2019-01-07 ENCOUNTER — TELEPHONE (OUTPATIENT)
Dept: OBSTETRICS AND GYNECOLOGY | Facility: CLINIC | Age: 44
End: 2019-01-07

## 2019-01-07 NOTE — TELEPHONE ENCOUNTER
----- Message from Karin Meneses sent at 1/7/2019  8:27 AM CST -----  Contact: pt  Please call pt @ [phone] regarding an worked in appt to see doctor, pt went to ER last week, pt having serve abdominal pain and was told to see Gyn ASAP

## 2019-01-16 ENCOUNTER — TELEPHONE (OUTPATIENT)
Dept: INTERNAL MEDICINE | Facility: CLINIC | Age: 44
End: 2019-01-16

## 2019-01-16 DIAGNOSIS — F33.3 SEVERE EPISODE OF RECURRENT MAJOR DEPRESSIVE DISORDER, WITH PSYCHOTIC FEATURES: Primary | ICD-10-CM

## 2019-01-16 NOTE — TELEPHONE ENCOUNTER
----- Message from Bee Melgar sent at 1/16/2019  8:51 AM CST -----  Contact: Patient  Patient is requesting a referral for psychiatrist, Dr Bautista, please call this number to get fax # [phone]. Please call patient back if needed at [phone]. Thank you

## 2019-01-21 ENCOUNTER — CLINICAL SUPPORT (OUTPATIENT)
Dept: INTERNAL MEDICINE | Facility: CLINIC | Age: 44
End: 2019-01-21
Payer: MEDICAID

## 2019-01-21 ENCOUNTER — HOSPITAL ENCOUNTER (EMERGENCY)
Facility: HOSPITAL | Age: 44
Discharge: HOME | End: 2019-01-21
Attending: EMERGENCY MEDICINE
Payer: MEDICAID

## 2019-01-21 VITALS
TEMPERATURE: 98 F | BODY MASS INDEX: 44.44 KG/M2 | DIASTOLIC BLOOD PRESSURE: 72 MMHG | OXYGEN SATURATION: 100 % | RESPIRATION RATE: 18 BRPM | HEART RATE: 55 BPM | SYSTOLIC BLOOD PRESSURE: 136 MMHG | WEIGHT: 266.75 LBS | HEIGHT: 65 IN

## 2019-01-21 DIAGNOSIS — R07.9 CHEST PAIN: ICD-10-CM

## 2019-01-21 DIAGNOSIS — R73.9 HYPERGLYCEMIA: Primary | ICD-10-CM

## 2019-01-21 LAB
ALBUMIN SERPL BCP-MCNC: 3.4 G/DL
ALP SERPL-CCNC: 62 U/L
ALT SERPL W/O P-5'-P-CCNC: 14 U/L
ANION GAP SERPL CALC-SCNC: 7 MMOL/L
AST SERPL-CCNC: 11 U/L
BASOPHILS # BLD AUTO: 0.02 K/UL
BASOPHILS NFR BLD: 0.3 %
BILIRUB SERPL-MCNC: 0.4 MG/DL
BNP SERPL-MCNC: 18 PG/ML
BUN SERPL-MCNC: 12 MG/DL
CALCIUM SERPL-MCNC: 9.2 MG/DL
CHLORIDE SERPL-SCNC: 104 MMOL/L
CO2 SERPL-SCNC: 27 MMOL/L
CREAT SERPL-MCNC: 0.9 MG/DL
D DIMER PPP IA.FEU-MCNC: 0.41 MG/L FEU
DIFFERENTIAL METHOD: NORMAL
EOSINOPHIL # BLD AUTO: 0 K/UL
EOSINOPHIL NFR BLD: 0.5 %
ERYTHROCYTE [DISTWIDTH] IN BLOOD BY AUTOMATED COUNT: 13.1 %
EST. GFR  (AFRICAN AMERICAN): >60 ML/MIN/1.73 M^2
EST. GFR  (NON AFRICAN AMERICAN): >60 ML/MIN/1.73 M^2
GLUCOSE SERPL-MCNC: 197 MG/DL
HCT VFR BLD AUTO: 37.1 %
HGB BLD-MCNC: 12.2 G/DL
LYMPHOCYTES # BLD AUTO: 2.2 K/UL
LYMPHOCYTES NFR BLD: 35.1 %
MCH RBC QN AUTO: 28.2 PG
MCHC RBC AUTO-ENTMCNC: 32.9 G/DL
MCV RBC AUTO: 86 FL
MONOCYTES # BLD AUTO: 0.5 K/UL
MONOCYTES NFR BLD: 8.1 %
NEUTROPHILS # BLD AUTO: 3.4 K/UL
NEUTROPHILS NFR BLD: 55.8 %
PLATELET # BLD AUTO: 332 K/UL
PMV BLD AUTO: 10.3 FL
POCT GLUCOSE: 198 MG/DL (ref 70–110)
POTASSIUM SERPL-SCNC: 3.8 MMOL/L
PROT SERPL-MCNC: 7.5 G/DL
RBC # BLD AUTO: 4.32 M/UL
SODIUM SERPL-SCNC: 138 MMOL/L
TROPONIN I SERPL DL<=0.01 NG/ML-MCNC: <0.006 NG/ML
WBC # BLD AUTO: 6.16 K/UL

## 2019-01-21 PROCEDURE — 93010 EKG 12-LEAD: ICD-10-PCS | Mod: ,,, | Performed by: NUCLEAR MEDICINE

## 2019-01-21 PROCEDURE — 25000003 PHARM REV CODE 250: Mod: ER | Performed by: EMERGENCY MEDICINE

## 2019-01-21 PROCEDURE — 82962 GLUCOSE BLOOD TEST: CPT | Mod: ER

## 2019-01-21 PROCEDURE — 85025 COMPLETE CBC W/AUTO DIFF WBC: CPT | Mod: ER

## 2019-01-21 PROCEDURE — 99900035 HC TECH TIME PER 15 MIN (STAT): Mod: ER

## 2019-01-21 PROCEDURE — 80053 COMPREHEN METABOLIC PANEL: CPT | Mod: ER

## 2019-01-21 PROCEDURE — 99285 EMERGENCY DEPT VISIT HI MDM: CPT | Mod: 25,ER

## 2019-01-21 PROCEDURE — 93005 ELECTROCARDIOGRAM TRACING: CPT | Mod: ER

## 2019-01-21 PROCEDURE — 84484 ASSAY OF TROPONIN QUANT: CPT | Mod: ER

## 2019-01-21 PROCEDURE — 25500020 PHARM REV CODE 255: Mod: ER | Performed by: EMERGENCY MEDICINE

## 2019-01-21 PROCEDURE — 83880 ASSAY OF NATRIURETIC PEPTIDE: CPT | Mod: ER

## 2019-01-21 PROCEDURE — 93010 ELECTROCARDIOGRAM REPORT: CPT | Mod: ,,, | Performed by: NUCLEAR MEDICINE

## 2019-01-21 PROCEDURE — 85379 FIBRIN DEGRADATION QUANT: CPT | Mod: ER

## 2019-01-21 RX ORDER — KETOCONAZOLE 20 MG/G
CREAM TOPICAL
Refills: 1 | COMMUNITY
Start: 2019-01-02 | End: 2020-08-28

## 2019-01-21 RX ORDER — PEN NEEDLE, DIABETIC 30 GX3/16"
1 NEEDLE, DISPOSABLE MISCELLANEOUS DAILY
Qty: 100 EACH | Refills: 0 | Status: SHIPPED | OUTPATIENT
Start: 2019-01-21 | End: 2019-05-23 | Stop reason: SDUPTHER

## 2019-01-21 RX ORDER — ALPRAZOLAM 0.25 MG/1
0.25 TABLET ORAL DAILY PRN
COMMUNITY
End: 2019-07-01 | Stop reason: ALTCHOICE

## 2019-01-21 RX ORDER — INSULIN GLARGINE 100 [IU]/ML
20 INJECTION, SOLUTION SUBCUTANEOUS NIGHTLY
Qty: 18 ML | Refills: 3 | Status: SHIPPED | OUTPATIENT
Start: 2019-01-21 | End: 2019-05-23 | Stop reason: ALTCHOICE

## 2019-01-21 RX ORDER — ASPIRIN 325 MG
325 TABLET ORAL
Status: COMPLETED | OUTPATIENT
Start: 2019-01-21 | End: 2019-01-21

## 2019-01-21 RX ADMIN — IOHEXOL 100 ML: 350 INJECTION, SOLUTION INTRAVENOUS at 06:01

## 2019-01-21 RX ADMIN — ASPIRIN 325 MG: 325 TABLET ORAL at 04:01

## 2019-01-21 NOTE — TELEPHONE ENCOUNTER
Pt notified and verbalized understanding.   appts scheduled  Went to [facility] request records

## 2019-01-21 NOTE — PROGRESS NOTES
Pt came to clinic for insulin teaching.   I talked pt through on how to give herself injection.   Pt voiced understanding and states that she had to give herself insulin when she was pregnant.   Pt also c/o chest and back pain. Advised pt to go to ED. Pt voiced understanding

## 2019-01-21 NOTE — ED PROVIDER NOTES
Encounter Date: 1/21/2019       History     Chief Complaint   Patient presents with    Chest Pain     c/o mid chest pains radiating to back, blood sugar high [phone] since friday. Sent from Dr. moore office.     The history is provided by the patient.   Chest Pain   The current episode started several days ago (4 days). Chest pain occurs constantly. The chest pain is unchanged. Pain scale at highest: mild. Pain scale currently: mild. The quality of the pain is described as aching. The pain radiates to the upper back. Pertinent negatives for primary symptoms include no fever, no fatigue, no syncope, no shortness of breath, no cough, no wheezing, no palpitations, no abdominal pain, no nausea, no vomiting, no dizziness and no altered mental status.   Pertinent negatives for associated symptoms include no claudication, no diaphoresis, no lower extremity edema, no near-syncope, no numbness, no orthopnea, no paroxysmal nocturnal dyspnea and no weakness. She tried nothing for the symptoms. Risk factors include obesity and sedentary lifestyle.   Her past medical history is significant for anxiety/panic attacks and diabetes.   Her family medical history is significant for diabetes and hypertension.   Procedure history is positive for exercise treadmill test (2017 - normal).   Procedure history is negative for cardiac catheterization, echocardiogram, persantine thallium, stress echo, stress thallium and coronary CTA.     Review of patient's allergies indicates:   Allergen Reactions    Hibiclens (isopropyl alcohol) Itching     Past Medical History:   Diagnosis Date    Abnormal Pap smear of cervix     treatment??    Abnormal Pap smear of vagina     Anemia     Anxiety     Anxiety and depression     Asthma     Chlamydia     Depression (emotion)     Diabetes mellitus     Gallstones     Gastritis     History of ovarian cyst     History of syphilis     History of trichomoniasis     Mental disorder     PONV  (postoperative nausea and vomiting)     Vaginal candidiasis 7/24/2018     Past Surgical History:   Procedure Laterality Date    APPENDECTOMY      CHOLECYSTECTOMY      COLONOSCOPY N/A 7/31/2017    Performed by Yuliana [facility] MD at [facility]    DILATION AND CURETTAGE OF UTERUS      bleeding    ESOPHAGOGASTRODUODENOSCOPY (EGD) N/A 6/8/2017    Performed by Too Cordova III, MD at [facility]    HYSTERECTOMY  08/31/2016    BS    OOPHORECTOMY      SALPINGECTOMY-ROBOTIC ASSISTED Bilateral 8/31/2016    Performed by Poonam Johnston MD at [facility] OR    XI ROBOTIC ASSISTED LAPAROSCOPIC HYSTERECTOMY N/A 8/31/2016    Performed by Poonam Johnston MD at [facility] OR     Family History   Problem Relation Age of Onset    Breast cancer Paternal Grandmother     Diabetes Maternal Grandmother     Hypertension Mother     Breast cancer Maternal Aunt     Cancer Maternal Aunt         colon cancer    Colon cancer Maternal Aunt     Miscarriages / Stillbirths Cousin     Stroke Maternal Aunt     Ovarian cancer Neg Hx     Deep vein thrombosis Neg Hx     Pulmonary embolism Neg Hx      Social History     Tobacco Use    Smoking status: Never Smoker    Smokeless tobacco: Never Used   Substance Use Topics    Alcohol use: No     Alcohol/week: 0.0 oz    Drug use: No     Review of Systems   Constitutional: Negative for diaphoresis, fatigue and fever.   HENT: Negative for sore throat.    Respiratory: Negative for cough, shortness of breath and wheezing.    Cardiovascular: Positive for chest pain. Negative for palpitations, orthopnea, claudication, syncope and near-syncope.   Gastrointestinal: Negative for abdominal pain, nausea and vomiting.   Genitourinary: Negative for dysuria.   Musculoskeletal: Negative for back pain.   Skin: Negative for rash.   Neurological: Negative for dizziness, weakness and numbness.   Hematological: Does not bruise/bleed easily.   All other systems reviewed and are negative.      Physical Exam     Initial  Vitals [01/21/19 1548]   BP Pulse Resp Temp SpO2   127/82 78 18 97.5 °F (36.4 °C) 96 %      MAP       --         Physical Exam    Nursing note and vitals reviewed.  Constitutional: She appears well-developed and well-nourished.   HENT:   Head: Normocephalic and atraumatic.   Mouth/Throat: No oropharyngeal exudate.   Eyes: Conjunctivae and EOM are normal. Pupils are equal, round, and reactive to light.   Neck: Normal range of motion. Neck supple. No thyromegaly present.   Cardiovascular: Normal rate, regular rhythm, normal heart sounds and intact distal pulses. Exam reveals no gallop and no friction rub.    No murmur heard.  Pulmonary/Chest: Breath sounds normal. No respiratory distress. She has no wheezes. She has no rhonchi. She exhibits tenderness.       Abdominal: Soft. Bowel sounds are normal. She exhibits no distension. There is no tenderness. There is no rebound and no guarding.   Musculoskeletal: Normal range of motion. She exhibits no edema or tenderness.   Lymphadenopathy:     She has no cervical adenopathy.   Neurological: She is alert and oriented to person, place, and time. She has normal strength. No cranial nerve deficit or sensory deficit.   Skin: Skin is warm and dry. No rash noted.   Psychiatric: She has a normal mood and affect. Her behavior is normal. Judgment and thought content normal.         ED Course   Procedures  Labs Reviewed   COMPREHENSIVE METABOLIC PANEL - Abnormal; Notable for the following components:       Result Value    Glucose 197 (*)     Albumin 3.4 (*)     Anion Gap 7 (*)     All other components within normal limits   POCT GLUCOSE - Abnormal; Notable for the following components:    POCT Glucose 198 (*)     All other components within normal limits   CBC W/ AUTO DIFFERENTIAL   TROPONIN I   B-TYPE NATRIURETIC PEPTIDE   D DIMER, QUANTITATIVE   D DIMER, QUANTITATIVE   TROPONIN I     EKG Readings: (Independently Interpreted)   Initial Reading: No STEMI. Rhythm: Normal Sinus Rhythm.  Heart Rate: 71. Ectopy: No Ectopy. Conduction: Normal. ST Segments: Normal ST Segments. T Waves: Normal. Axis: Left Axis Deviation. Clinical Impression: Normal Sinus Rhythm       Imaging Results          CTA Chest Non-Coronary (PE Study) (Final result)  Result time 01/21/19 18:51:12    Final result by Jeffery Al MD (01/21/19 18:51:12)                 Impression:      1. No evidence to suggest pulmonary embolism.    2.  No acute pathology noted within the chest.    All CT scans at this facility use dose modulation, iterative reconstruction and/or weight based dosing when appropriate to reduce radiation dose to as low as reasonably achievable.      Electronically signed by: Jeffery Al MD  Date:    01/21/2019  Time:    18:51             Narrative:    EXAMINATION:  CTA CHEST NON CORONARY    CLINICAL HISTORY:  chest pain;    TECHNIQUE:  CT of the chest was acquired helically utilizing a low-dose technique from the lung apices through the posterior costophrenic angles status post administration of 100 cc of Omnipaque 350.  Bolus timing was utilized to optimize opacification of the pulmonary arterial system. 3-D maximum intensity projection and multiplanar reconstructions were created from the source data set and interpreted.    COMPARISON:  None    FINDINGS:  No infiltrates or pleural effusions are identified.  No discrete pulmonary nodules or masses are identified.    The aorta demonstrates normal caliber and contour. There is good opacification of the pulmonary arterial system. No intraluminal filling defects are notified within the pulmonary arterial system to suggest pulmonary embolism. There is no pericardial effusion.  No enlarged mediastinal, hilar or axillary lymph nodes are identified.    The visualized upper abdomen is unremarkable in appearance.    The osseous structures are unremarkable in appearance.                               X-Ray Chest AP Portable (Final result)  Result time 01/21/19 16:16:51     "Final result by Freddie De La Cruz MD (01/21/19 16:16:51)                 Impression:      No acute abnormality.      Electronically signed by: Freddie De La Cruz  Date:    01/21/2019  Time:    16:16             Narrative:    EXAMINATION:  XR CHEST AP PORTABLE    CLINICAL HISTORY:  Chest Pain;.    TECHNIQUE:  Single frontal portable view of the chest was performed.    COMPARISON:  06/19/2018    FINDINGS:  Support devices: None    The lungs are clear, with normal appearance of pulmonary vasculature and no pleural effusion or pneumothorax.    The cardiac silhouette is normal in size. The hilar and mediastinal contours are unremarkable.    Bones are intact.                                 Medical Decision Making:   Patient with chest pain x4 days line 1 troponin to rule out MI.  CTA within normal limits.  Chest wall tender to palpation.  Patient had stress test in July 2017 that showed no evidence of ischemia.  Patient resting comfortably in no acute distress here in the emergency department  Heart score 2.  Patient is stable for outpatient evaluation with primary care physician for chest pain and diabetes management       Vitals:    01/21/19 1548 01/21/19 1557 01/21/19 1606 01/21/19 1632   BP: 127/82   117/62   Pulse: 78 74 73 65   Resp: 18   13   Temp: 97.5 °F (36.4 °C)      TempSrc: Oral      SpO2: 96%   98%   Weight: 121 kg (266 lb 12.1 oz)      Height: 5' 5" (1.651 m)       01/21/19 1702 01/21/19 1745   BP: 116/64 121/75   Pulse: (!) 51 (!) 52   Resp: 17 17   Temp:     TempSrc:     SpO2: 100% 100%   Weight:     Height:         Results for orders placed or performed during the hospital encounter of 01/21/19   CBC auto differential   Result Value Ref Range    WBC 6.16 3.90 - 12.70 K/uL    RBC 4.32 4.00 - 5.40 M/uL    Hemoglobin 12.2 12.0 - 16.0 g/dL    Hematocrit 37.1 37.0 - 48.5 %    MCV 86 82 - 98 fL    MCH 28.2 27.0 - 31.0 pg    MCHC 32.9 32.0 - 36.0 g/dL    RDW 13.1 11.5 - 14.5 %    Platelets 332 150 - 350 K/uL    MPV " 10.3 9.2 - 12.9 fL    Gran # (ANC) 3.4 1.8 - 7.7 K/uL    Lymph # 2.2 1.0 - 4.8 K/uL    Mono # 0.5 0.3 - 1.0 K/uL    Eos # 0.0 0.0 - 0.5 K/uL    Baso # 0.02 0.00 - 0.20 K/uL    Gran% 55.8 38.0 - 73.0 %    Lymph% 35.1 18.0 - 48.0 %    Mono% 8.1 4.0 - 15.0 %    Eosinophil% 0.5 0.0 - 8.0 %    Basophil% 0.3 0.0 - 1.9 %    Differential Method Automated    Comprehensive metabolic panel   Result Value Ref Range    Sodium 138 136 - 145 mmol/L    Potassium 3.8 3.5 - 5.1 mmol/L    Chloride 104 95 - 110 mmol/L    CO2 27 23 - 29 mmol/L    Glucose 197 (H) 70 - 110 mg/dL    BUN, Bld 12 6 - 20 mg/dL    Creatinine 0.9 0.5 - 1.4 mg/dL    Calcium 9.2 8.7 - 10.5 mg/dL    Total Protein 7.5 6.0 - 8.4 g/dL    Albumin 3.4 (L) 3.5 - 5.2 g/dL    Total Bilirubin 0.4 0.1 - 1.0 mg/dL    Alkaline Phosphatase 62 55 - 135 U/L    AST 11 10 - 40 U/L    ALT 14 10 - 44 U/L    Anion Gap 7 (L) 8 - 16 mmol/L    eGFR if African American >60.0 >60 mL/min/1.73 m^2    eGFR if non African American >60.0 >60 mL/min/1.73 m^2   Troponin I #1   Result Value Ref Range    Troponin I <0.006 0.000 - 0.026 ng/mL   B-Type natriuretic peptide (BNP)   Result Value Ref Range    BNP 18 0 - 99 pg/mL   D dimer, quantitative   Result Value Ref Range    D-Dimer 0.41 <0.50 mg/L FEU   POCT glucose   Result Value Ref Range    POCT Glucose 198 (H) 70 - 110 mg/dL         Imaging Results          CTA Chest Non-Coronary (PE Study) (Final result)  Result time 01/21/19 18:51:12    Final result by Jeffery Al MD (01/21/19 18:51:12)                 Impression:      1. No evidence to suggest pulmonary embolism.    2.  No acute pathology noted within the chest.    All CT scans at this facility use dose modulation, iterative reconstruction and/or weight based dosing when appropriate to reduce radiation dose to as low as reasonably achievable.      Electronically signed by: Jeffery Al MD  Date:    01/21/2019  Time:    18:51             Narrative:    EXAMINATION:  CTA CHEST NON  CORONARY    CLINICAL HISTORY:  chest pain;    TECHNIQUE:  CT of the chest was acquired helically utilizing a low-dose technique from the lung apices through the posterior costophrenic angles status post administration of 100 cc of Omnipaque 350.  Bolus timing was utilized to optimize opacification of the pulmonary arterial system. 3-D maximum intensity projection and multiplanar reconstructions were created from the source data set and interpreted.    COMPARISON:  None    FINDINGS:  No infiltrates or pleural effusions are identified.  No discrete pulmonary nodules or masses are identified.    The aorta demonstrates normal caliber and contour. There is good opacification of the pulmonary arterial system. No intraluminal filling defects are notified within the pulmonary arterial system to suggest pulmonary embolism. There is no pericardial effusion.  No enlarged mediastinal, hilar or axillary lymph nodes are identified.    The visualized upper abdomen is unremarkable in appearance.    The osseous structures are unremarkable in appearance.                               X-Ray Chest AP Portable (Final result)  Result time 01/21/19 16:16:51    Final result by Freddie De La Cruz MD (01/21/19 16:16:51)                 Impression:      No acute abnormality.      Electronically signed by: Freddie De La Cruz  Date:    01/21/2019  Time:    16:16             Narrative:    EXAMINATION:  XR CHEST AP PORTABLE    CLINICAL HISTORY:  Chest Pain;.    TECHNIQUE:  Single frontal portable view of the chest was performed.    COMPARISON:  06/19/2018    FINDINGS:  Support devices: None    The lungs are clear, with normal appearance of pulmonary vasculature and no pleural effusion or pneumothorax.    The cardiac silhouette is normal in size. The hilar and mediastinal contours are unremarkable.    Bones are intact.                              EKG:  Normal sinus rhythm with rate 71, normal intervals, no ST depression, no ST elevation    Medications    aspirin tablet 325 mg (325 mg Oral Given 1/21/19 1611)   omnipaque 350 iohexol 100 mL (100 mLs Intravenous Given 1/21/19 1801)     6:05 PM - Transfer of Care: Patient care transferred from Dr. Escobar to Dr. José, pending CTA chest.       - Re-evaluation:  The patient is resting comfortably and is in no acute distress. Discussed test results and notified of pending labs. Answered questions at this time.     Pre-hypertension/Hypertension: The pt has been informed that they may have pre-hypertension or hypertension based on a blood pressure reading in the ED. I recommend that the pt call the PCP listed on their discharge instructions or a physician of their choice this week to arrange f/u for further evaluation of possible pre-hypertension or hypertension.     Yuli Milton was given a handout which discussed their disease process, precautions, and instructions for follow-up and therapy.    Follow-up Information     Scottie Buitrago DO.    Specialty:  Family Medicine  Why:  Call tomorrow  Contact information:  [address] [address] [address] [phone]             [facility].    Specialty:  Emergency Medicine  Why:  As needed, If symptoms worsen  Contact information:  [address] [address] [address] [phone]                    Medication List      STOP taking these medications    sertraline 100 MG tablet  Commonly known as:  ZOLOFT     SYSTANE (PROPYLENE GLYCOL) 0.4-0.3 % Drop  Generic drug:  peg 400-propylene glycol        ASK your doctor about these medications    * albuterol 90 mcg/actuation inhaler  Commonly known as:  PROVENTIL/VENTOLIN HFA  Inhale 2 puffs into the lungs every 4 (four) hours as needed. Rescue     * albuterol 2.5 mg /3 mL (0.083 %) nebulizer solution  Commonly known as:  PROVENTIL  Take 3 mLs (2.5 mg total) by nebulization every 6 (six) hours as needed for Wheezing. Rescue     ALPRAZolam 0.25 MG tablet  Commonly known as:  XANAX     blood  "sugar diagnostic Strp  1 each by Other route 3 (three) times daily. For One Touch Meter     blood-glucose meter Misc  Dispense 1     cycloSPORINE 0.05 % ophthalmic emulsion  Commonly known as:  RESTASIS     docusate sodium 100 MG capsule  Commonly known as:  COLACE     ferrous sulfate 325 mg (65 mg iron) Tab tablet  Commonly known as:  FEOSOL  Take 1 tablet (325 mg total) by mouth daily with breakfast.     insulin glargine 100 units/mL (3mL) SubQ pen  Commonly known as:  BASAGLAR KWIKPEN U-100 INSULIN  Inject 20 Units into the skin every evening.     ketoconazole 2 % cream  Commonly known as:  NIZORAL     lancets Misc  1 each by Other route 3 (three) times daily. For One Touch Meter     metFORMIN 1000 MG tablet  Commonly known as:  GLUCOPHAGE  Take 1 tablet (1,000 mg total) by mouth 2 (two) times daily with meals.     pen needle, diabetic 32 gauge x 5/32" Ndle  Commonly known as:  PEN NEEDLE  1 each by Misc.(Non-Drug; Combo Route) route once daily.     QUEtiapine 100 MG Tab  Commonly known as:  SEROQUEL  Start with 100 mg at night on first day then increase to 200 on the next night followed by 300 mg thereafter.     sucralfate 1 gram tablet  Commonly known as:  CARAFATE  Take 1 tablet (1 g total) by mouth 4 (four) times daily before meals and nightly.     TRULICITY 0.75 mg/0.5 mL Pnij  Generic drug:  dulaglutide  INJECT 0.75MG UNDER THE SKIN EVERY 7 DAYS.         * This list has 2 medication(s) that are the same as other medications prescribed for you. Read the directions carefully, and ask your doctor or other care provider to review them with you.               Where to Get Your Medications      These medications were sent to [facility] [id number] [facility] [address] [address]    Phone:  [phone]   · pen needle, diabetic 32 gauge x 5/32" Ndle        Current Discharge Medication List      START taking these medications " "   Details   pen needle, diabetic (PEN NEEDLE) 32 gauge x 5/32" Ndle 1 each by Misc.(Non-Drug; Combo Route) route once daily.  Qty: 100 each, Refills: 0               ED Diagnosis  1. Hyperglycemia    2. Chest pain        Additional MDM:   Heart Score:    History:          Slightly suspicious.  ECG:             Normal  Age:               45-65 years  Risk factors: 1-2 risk factors  Troponin:       Less than or equal to normal limit  Final Score: 2                       Clinical Impression:   Diagnosis:  Chest pain. Hyperglycemia  Disposition:  Discharged home in stable condition  Prescriptions:  No new prescriptions.  Patient currently in the midst of starting new insulin prescription from primary care physician  Follow-up Instructions:  Follow up with primary care physician.  Call office tomorrow.  Return to the emergency department for any new or worsening symptoms.  Educated about diabetes diet                           Jeffery José MD  01/21/19 1911       Jeffery José MD  01/21/19 1912    "

## 2019-01-21 NOTE — TELEPHONE ENCOUNTER
----- Message from Maya Shipman sent at 1/21/2019  9:33 AM CST -----  Contact: Patient   Patient would like a call back at [phone], Regards to getting her work in for a ER follow up, blood pressure.    Thanks  Td

## 2019-01-21 NOTE — TELEPHONE ENCOUNTER
Pt was seen in ED for high blood sugar.   Her blood sugar while I was on phone with her is 355, breakfast and metfomin was 5-10 mins ago.  No openings today but have openings tomorrow. Do you want her to wait or see her this afternoon?

## 2019-01-21 NOTE — TELEPHONE ENCOUNTER
If her blood sugar is that high she will need to be on insulin. Have her  script for insulin and bring it to clinic today for nurse visit/teaching on how to use. during nurse visit check poct glucose and give 20 units of basaglar. She can then follow up later this week. Which ED did she go to? [facility]?

## 2019-01-23 ENCOUNTER — OFFICE VISIT (OUTPATIENT)
Dept: INTERNAL MEDICINE | Facility: CLINIC | Age: 44
End: 2019-01-23
Payer: MEDICAID

## 2019-01-23 VITALS
DIASTOLIC BLOOD PRESSURE: 70 MMHG | HEART RATE: 79 BPM | SYSTOLIC BLOOD PRESSURE: 114 MMHG | WEIGHT: 267 LBS | BODY MASS INDEX: 44.48 KG/M2 | RESPIRATION RATE: 18 BRPM | TEMPERATURE: 96 F | OXYGEN SATURATION: 100 % | HEIGHT: 65 IN

## 2019-01-23 DIAGNOSIS — E11.9 TYPE 2 DIABETES MELLITUS WITHOUT COMPLICATION, WITHOUT LONG-TERM CURRENT USE OF INSULIN: Primary | ICD-10-CM

## 2019-01-23 LAB — GLUCOSE SERPL-MCNC: 263 MG/DL (ref 70–110)

## 2019-01-23 PROCEDURE — 99999 PR PBB SHADOW E&M-EST. PATIENT-LVL IV: CPT | Mod: PBBFAC,,, | Performed by: FAMILY MEDICINE

## 2019-01-23 PROCEDURE — 99214 PR OFFICE/OUTPT VISIT, EST, LEVL IV, 30-39 MIN: ICD-10-PCS | Mod: S$PBB,,, | Performed by: FAMILY MEDICINE

## 2019-01-23 PROCEDURE — 99214 OFFICE O/P EST MOD 30 MIN: CPT | Mod: S$PBB,,, | Performed by: FAMILY MEDICINE

## 2019-01-23 PROCEDURE — 99214 OFFICE O/P EST MOD 30 MIN: CPT | Mod: PBBFAC,PO | Performed by: FAMILY MEDICINE

## 2019-01-23 PROCEDURE — 82962 GLUCOSE BLOOD TEST: CPT | Mod: PBBFAC,PO | Performed by: FAMILY MEDICINE

## 2019-01-23 PROCEDURE — 99999 PR PBB SHADOW E&M-EST. PATIENT-LVL IV: ICD-10-PCS | Mod: PBBFAC,,, | Performed by: FAMILY MEDICINE

## 2019-01-23 RX ORDER — ONDANSETRON 4 MG/1
4 TABLET, FILM COATED ORAL EVERY 8 HOURS PRN
Qty: 30 TABLET | Refills: 0 | Status: ON HOLD | OUTPATIENT
Start: 2019-01-23 | End: 2019-12-12 | Stop reason: HOSPADM

## 2019-01-23 RX ORDER — ATORVASTATIN CALCIUM 20 MG/1
20 TABLET, FILM COATED ORAL DAILY
Qty: 90 TABLET | Refills: 3 | Status: SHIPPED | OUTPATIENT
Start: 2019-01-23 | End: 2019-02-12

## 2019-01-23 NOTE — PROGRESS NOTES
Subjective:       Patient ID: Yuli Milton is a 43 y.o. female.    Chief Complaint: Follow-up (ED) and Diabetes    Diabetes   She presents for her follow-up diabetic visit. She has type 2 diabetes mellitus. Her disease course has been worsening. Pertinent negatives for hypoglycemia include no dizziness. Pertinent negatives for diabetes include no chest pain. Current diabetic treatment includes diet and oral agent (dual therapy). She is compliant with treatment most of the time. Her weight is stable. She is following a generally healthy (improving recently with avoidance of carbs) diet. She rarely participates in exercise. Her home blood glucose trend is increasing steadily. Her breakfast blood glucose is taken between 9-10 am. Her breakfast blood glucose range is generally >200 mg/dl. Eye exam is not current.     Has been having a lot of numbers over 200 here recently went to the ER with a glucose readings greater than 300.  Sent prescription for basal insulin however she has not yet started taking it.    Family History   Problem Relation Age of Onset    Breast cancer Paternal Grandmother     Diabetes Maternal Grandmother     Hypertension Mother     Breast cancer Maternal Aunt     Cancer Maternal Aunt         colon cancer    Colon cancer Maternal Aunt     Miscarriages / Stillbirths Cousin     Stroke Maternal Aunt     Ovarian cancer Neg Hx     Deep vein thrombosis Neg Hx     Pulmonary embolism Neg Hx        Current Outpatient Medications:     albuterol (PROVENTIL) 2.5 mg /3 mL (0.083 %) nebulizer solution, Take 3 mLs (2.5 mg total) by nebulization every 6 (six) hours as needed for Wheezing. Rescue, Disp: 1 Box, Rfl: 3    albuterol 90 mcg/actuation inhaler, Inhale 2 puffs into the lungs every 4 (four) hours as needed. Rescue, Disp: 18 g, Rfl: 4    ALPRAZolam (XANAX) 0.25 MG tablet, Take 0.25 mg by mouth daily as needed for Anxiety., Disp: , Rfl:     blood sugar diagnostic Strp, 1 each by Other  "route 3 (three) times daily. For One Touch Meter, Disp: 100 each, Rfl: 11    blood-glucose meter Misc, Dispense 1, Disp: 1 each, Rfl: 0    cycloSPORINE (RESTASIS) 0.05 % ophthalmic emulsion, 1 drop 2 (two) times daily., Disp: , Rfl:     docusate sodium (COLACE) 100 MG capsule, Take 100 mg by mouth 2 (two) times daily., Disp: , Rfl:     ferrous sulfate 325 mg (65 mg iron) Tab tablet, Take 1 tablet (325 mg total) by mouth daily with breakfast., Disp: , Rfl: 0    insulin (BASAGLAR KWIKPEN U-100 INSULIN) glargine 100 units/mL (3mL) SubQ pen, Inject 20 Units into the skin every evening., Disp: 18 mL, Rfl: 3    ketoconazole (NIZORAL) 2 % cream, APPLY TO RASH ON THE FACE TWICE DAILY, Disp: , Rfl: 1    lancets Mis, 1 each by Other route 3 (three) times daily. For One Touch Meter, Disp: 100 each, Rfl: 0    metFORMIN (GLUCOPHAGE) 1000 MG tablet, Take 1 tablet (1,000 mg total) by mouth 2 (two) times daily with meals., Disp: 180 tablet, Rfl: 3    pen needle, diabetic (PEN NEEDLE) 32 gauge x 5/32" Ndle, 1 each by Misc.(Non-Drug; Combo Route) route once daily., Disp: 100 each, Rfl: 0    QUEtiapine (SEROQUEL) 100 MG Tab, Start with 100 mg at night on first day then increase to 200 on the next night followed by 300 mg thereafter., Disp: 90 tablet, Rfl: 0    sucralfate (CARAFATE) 1 gram tablet, Take 1 tablet (1 g total) by mouth 4 (four) times daily before meals and nightly., Disp: 120 tablet, Rfl: 1    TRULICITY 0.75 mg/0.5 mL PnIj, INJECT 0.75MG UNDER THE SKIN EVERY 7 DAYS., Disp: 2 mL, Rfl: 0    atorvastatin (LIPITOR) 20 MG tablet, Take 1 tablet (20 mg total) by mouth once daily., Disp: 90 tablet, Rfl: 3    ondansetron (ZOFRAN) 4 MG tablet, Take 1 tablet (4 mg total) by mouth every 8 (eight) hours as needed for Nausea., Disp: 30 tablet, Rfl: 0    Review of Systems   Constitutional: Negative for chills and fever.   Respiratory: Negative for cough and shortness of breath.    Cardiovascular: Negative for chest pain. " "  Gastrointestinal: Negative for abdominal pain.   Skin: Negative for rash.   Neurological: Negative for dizziness.       Objective:   /70 (BP Location: Left arm, Patient Position: Sitting, BP Method: Large (Automatic))   Pulse 79   Temp 96.2 °F (35.7 °C) (Tympanic)   Resp 18   Ht 5' 5" (1.651 m)   Wt 121.1 kg (266 lb 15.6 oz)   LMP 08/21/2016   SpO2 100%   BMI 44.43 kg/m²      Physical Exam   Constitutional: She is oriented to person, place, and time. She appears well-developed and well-nourished. No distress.   HENT:   Head: Normocephalic and atraumatic.   Nose: Nose normal.   Eyes: Conjunctivae and EOM are normal. Pupils are equal, round, and reactive to light. Right eye exhibits no discharge. Left eye exhibits no discharge.   Neck: No thyromegaly present.   Cardiovascular: Normal rate and regular rhythm.   No murmur heard.  Pulmonary/Chest: Effort normal and breath sounds normal. No respiratory distress.   Abdominal: Soft. She exhibits no distension.   Musculoskeletal: She exhibits no edema.   Neurological: She is alert and oriented to person, place, and time.   Skin: No rash noted. She is not diaphoretic.   Psychiatric: She has a normal mood and affect. Her behavior is normal.       Assessment & Plan     Problem List Items Addressed This Visit        Endocrine    Type 2 diabetes mellitus without complication, without long-term current use of insulin - Primary    Current Assessment & Plan     Having nursing teach her how to use the insulin pen today.  She was started using 20 units of basal insulin every day and continue with the metformin and Trulicity.  I told her that hopefully this is just a temporary requirement used insulin.  Focus on improved diet along with exercise.  Having her follow up with nursing to assess her glucose log in 2 weeks and also having her see diabetes management.         Relevant Orders    Ambulatory referral to Optometry    POCT Glucose, Hand-Held Device (Completed)    " Ambulatory consult to Diabetic Education            Follow-up in about 2 weeks (around 2/6/2019) for Diabetes.

## 2019-01-23 NOTE — ASSESSMENT & PLAN NOTE
Having nursing teach her how to use the insulin pen today.  She was started using 20 units of basal insulin every day and continue with the metformin and Trulicity.  I told her that hopefully this is just a temporary requirement used insulin.  Focus on improved diet along with exercise.  Having her follow up with nursing to assess her glucose log in 2 weeks and also having her see diabetes management.

## 2019-01-24 ENCOUNTER — TELEPHONE (OUTPATIENT)
Dept: DIABETES | Facility: CLINIC | Age: 44
End: 2019-01-24

## 2019-01-25 ENCOUNTER — TELEPHONE (OUTPATIENT)
Dept: DIABETES | Facility: CLINIC | Age: 44
End: 2019-01-25

## 2019-02-06 ENCOUNTER — CLINICAL SUPPORT (OUTPATIENT)
Dept: INTERNAL MEDICINE | Facility: CLINIC | Age: 44
End: 2019-02-06
Payer: MEDICAID

## 2019-02-06 VITALS
HEIGHT: 65 IN | SYSTOLIC BLOOD PRESSURE: 122 MMHG | TEMPERATURE: 98 F | RESPIRATION RATE: 16 BRPM | HEART RATE: 68 BPM | BODY MASS INDEX: 45.04 KG/M2 | DIASTOLIC BLOOD PRESSURE: 58 MMHG | WEIGHT: 270.31 LBS

## 2019-02-06 DIAGNOSIS — E11.9 TYPE 2 DIABETES MELLITUS WITHOUT COMPLICATION, WITHOUT LONG-TERM CURRENT USE OF INSULIN: Primary | ICD-10-CM

## 2019-02-06 LAB — GLUCOSE SERPL-MCNC: 68 MG/DL (ref 70–110)

## 2019-02-06 PROCEDURE — 99999 PR PBB SHADOW E&M-EST. PATIENT-LVL II: CPT | Mod: PBBFAC,,,

## 2019-02-06 PROCEDURE — 99212 OFFICE O/P EST SF 10 MIN: CPT | Mod: PBBFAC,PO

## 2019-02-06 PROCEDURE — 99999 PR PBB SHADOW E&M-EST. PATIENT-LVL II: ICD-10-PCS | Mod: PBBFAC,,,

## 2019-02-06 PROCEDURE — 82962 GLUCOSE BLOOD TEST: CPT | Mod: PBBFAC,PO

## 2019-02-12 ENCOUNTER — TELEPHONE (OUTPATIENT)
Dept: INTERNAL MEDICINE | Facility: CLINIC | Age: 44
End: 2019-02-12

## 2019-02-12 ENCOUNTER — OFFICE VISIT (OUTPATIENT)
Dept: INTERNAL MEDICINE | Facility: CLINIC | Age: 44
End: 2019-02-12
Payer: MEDICAID

## 2019-02-12 VITALS
HEART RATE: 78 BPM | DIASTOLIC BLOOD PRESSURE: 58 MMHG | HEIGHT: 65 IN | OXYGEN SATURATION: 98 % | WEIGHT: 270.31 LBS | RESPIRATION RATE: 19 BRPM | BODY MASS INDEX: 45.04 KG/M2 | SYSTOLIC BLOOD PRESSURE: 120 MMHG | TEMPERATURE: 97 F

## 2019-02-12 DIAGNOSIS — E11.9 TYPE 2 DIABETES MELLITUS WITHOUT COMPLICATION, WITHOUT LONG-TERM CURRENT USE OF INSULIN: Primary | ICD-10-CM

## 2019-02-12 DIAGNOSIS — M54.41 CHRONIC RIGHT-SIDED LOW BACK PAIN WITH RIGHT-SIDED SCIATICA: ICD-10-CM

## 2019-02-12 DIAGNOSIS — G89.29 CHRONIC RIGHT-SIDED LOW BACK PAIN WITH RIGHT-SIDED SCIATICA: ICD-10-CM

## 2019-02-12 PROBLEM — K80.20 GALLSTONES: Status: RESOLVED | Noted: 2017-02-17 | Resolved: 2019-02-12

## 2019-02-12 LAB
BACTERIA #/AREA URNS AUTO: NORMAL /HPF
BILIRUB UR QL STRIP: NEGATIVE
CLARITY UR REFRACT.AUTO: CLEAR
COLOR UR AUTO: YELLOW
GLUCOSE UR QL STRIP: NEGATIVE
HGB UR QL STRIP: ABNORMAL
KETONES UR QL STRIP: NEGATIVE
LEUKOCYTE ESTERASE UR QL STRIP: NEGATIVE
MICROSCOPIC COMMENT: NORMAL
NITRITE UR QL STRIP: NEGATIVE
PH UR STRIP: 6 [PH] (ref 5–8)
PROT UR QL STRIP: NEGATIVE
RBC #/AREA URNS AUTO: 3 /HPF (ref 0–4)
SP GR UR STRIP: >=1.03 (ref 1–1.03)
SQUAMOUS #/AREA URNS AUTO: 4 /HPF
URN SPEC COLLECT METH UR: ABNORMAL
UROBILINOGEN UR STRIP-ACNC: NEGATIVE EU/DL

## 2019-02-12 PROCEDURE — 99215 OFFICE O/P EST HI 40 MIN: CPT | Mod: PBBFAC,PO | Performed by: FAMILY MEDICINE

## 2019-02-12 PROCEDURE — 99999 PR PBB SHADOW E&M-EST. PATIENT-LVL V: ICD-10-PCS | Mod: PBBFAC,,, | Performed by: FAMILY MEDICINE

## 2019-02-12 PROCEDURE — 99214 PR OFFICE/OUTPT VISIT, EST, LEVL IV, 30-39 MIN: ICD-10-PCS | Mod: S$PBB,,, | Performed by: FAMILY MEDICINE

## 2019-02-12 PROCEDURE — 99214 OFFICE O/P EST MOD 30 MIN: CPT | Mod: S$PBB,,, | Performed by: FAMILY MEDICINE

## 2019-02-12 PROCEDURE — 99999 PR PBB SHADOW E&M-EST. PATIENT-LVL V: CPT | Mod: PBBFAC,,, | Performed by: FAMILY MEDICINE

## 2019-02-12 PROCEDURE — 81000 URINALYSIS NONAUTO W/SCOPE: CPT | Mod: PO

## 2019-02-12 RX ORDER — ATORVASTATIN CALCIUM 20 MG/1
20 TABLET, FILM COATED ORAL DAILY
Qty: 90 TABLET | Refills: 3 | Status: SHIPPED | OUTPATIENT
Start: 2019-02-12 | End: 2019-07-01 | Stop reason: SDUPTHER

## 2019-02-12 RX ORDER — LOSARTAN POTASSIUM 25 MG/1
12.5 TABLET ORAL DAILY
Qty: 90 TABLET | Refills: 0 | Status: SHIPPED | OUTPATIENT
Start: 2019-02-12 | End: 2019-05-23 | Stop reason: SDUPTHER

## 2019-02-12 NOTE — TELEPHONE ENCOUNTER
Good afternoon. Dr. Gutierrez would like pt to be seen by Cam for diabetic education at the [facility] location. Could someone assist with appointment?

## 2019-02-12 NOTE — PROGRESS NOTES
Subjective:       Patient ID: Yuli Milton is a 43 y.o. female.    Chief Complaint: Medication Management    Diabetes   She presents for her follow-up diabetic visit. She has type 2 diabetes mellitus. Her disease course has been stable. Pertinent negatives for hypoglycemia include no confusion, dizziness, headaches or hunger. Pertinent negatives for diabetes include no blurred vision, no chest pain, no fatigue and no weakness. Pertinent negatives for hypoglycemia complications include no blackouts and no hospitalization. Symptoms are stable. Pertinent negatives for diabetic complications include no heart disease. Risk factors for coronary artery disease include diabetes mellitus and obesity. Current diabetic treatment includes insulin injections. She is compliant with treatment most of the time. An ACE inhibitor/angiotensin II receptor blocker is being taken.     Review of Systems   Constitutional: Negative for fatigue.   Eyes: Negative for blurred vision.   Cardiovascular: Negative for chest pain.   Neurological: Negative for dizziness, weakness and headaches.   Psychiatric/Behavioral: Negative for confusion.       Objective:      Physical Exam   Constitutional: She appears well-developed and well-nourished. She appears distressed.   HENT:   Head: Normocephalic and atraumatic.   Nose: Nose normal.   Mouth/Throat: Oropharynx is clear and moist.   Pulmonary/Chest: Effort normal and breath sounds normal. No respiratory distress. She has no wheezes.   Abdominal: Soft. She exhibits no distension. There is no tenderness. There is no guarding.   Musculoskeletal:   Right cva tenderness   Skin: Skin is warm and dry. No rash noted. She is not diaphoretic. No erythema.   Nursing note and vitals reviewed.      Assessment:       1. Type 2 diabetes mellitus without complication, without long-term current use of insulin    2. Chronic right-sided low back pain with right-sided sciatica        Plan:     Problem List Items  Addressed This Visit        Endocrine    Type 2 diabetes mellitus without complication, without long-term current use of insulin - Primary    Current Assessment & Plan      Discussed with pt if they have a lump or swelling in your neck, hoarseness, trouble swallowing, or shortness of breath, that These may be symptoms of thyroid cancer. In studies with rodents, Ozempic® and medicines that work like Ozempic® caused thyroid tumors, including thyroid cancer. It is not known if Ozempic® will cause thyroid tumors or a type of thyroid cancer called medullary thyroid carcinoma (MTC) in people.  Discussed with pt that if they have a known history of MEN2, pancreatitis, history, or retinopathy that they would not be a good candidate. Pt has verbalized that they do not have any previous history as described above and are willing to start this medication.  Pt also understands that this medication may cause Gallbladder disease or hypersensitivity to Ozempic.    Will send ozempic, if it is not covered then I will send in Victoza.           Relevant Medications    atorvastatin (LIPITOR) 20 MG tablet    semaglutide (OZEMPIC) 0.25 mg or 0.5 mg(2 mg/1.5 mL) PnIj    losartan (COZAAR) 25 MG tablet    Other Relevant Orders    Ambulatory consult to Diabetic Education       Orthopedic    Chronic right-sided low back pain with right-sided sciatica    Relevant Orders    POCT URINE DIPSTICK WITHOUT MICROSCOPE    URINALYSIS    Ambulatory Referral to Pain Clinic

## 2019-02-12 NOTE — TELEPHONE ENCOUNTER
Attempted to reach patient by phone, no answer and unable to leave a message.    Appt scheduled for 4/18/19 at 11:00 am.  Please notify patient.    Thanks

## 2019-02-12 NOTE — ASSESSMENT & PLAN NOTE
Discussed with pt if they have a lump or swelling in your neck, hoarseness, trouble swallowing, or shortness of breath, that These may be symptoms of thyroid cancer. In studies with rodents, Ozempic® and medicines that work like Ozempic® caused thyroid tumors, including thyroid cancer. It is not known if Ozempic® will cause thyroid tumors or a type of thyroid cancer called medullary thyroid carcinoma (MTC) in people.  Discussed with pt that if they have a known history of MEN2, pancreatitis, history, or retinopathy that they would not be a good candidate. Pt has verbalized that they do not have any previous history as described above and are willing to start this medication.  Pt also understands that this medication may cause Gallbladder disease or hypersensitivity to Ozempic.    Will send ozempic, if it is not covered then I will send in Victoza.

## 2019-02-25 ENCOUNTER — TELEPHONE (OUTPATIENT)
Dept: INTERNAL MEDICINE | Facility: CLINIC | Age: 44
End: 2019-02-25

## 2019-02-25 NOTE — TELEPHONE ENCOUNTER
----- Message from Megan Wasserman sent at 2/25/2019 10:57 AM CST -----  Contact: pt  Calling regarding a refill request fax from her pharmacy for diabetic medication.

## 2019-02-25 NOTE — TELEPHONE ENCOUNTER
Spoke with pt about [place] for medication . Advised pt that her insurance will not cover the ozempic medication. Pt will talk more to Dr. Gutierrez about medication on tomorrow appt.

## 2019-02-26 ENCOUNTER — LAB VISIT (OUTPATIENT)
Dept: LAB | Facility: HOSPITAL | Age: 44
End: 2019-02-26
Attending: FAMILY MEDICINE
Payer: MEDICAID

## 2019-02-26 ENCOUNTER — OFFICE VISIT (OUTPATIENT)
Dept: INTERNAL MEDICINE | Facility: CLINIC | Age: 44
End: 2019-02-26
Payer: MEDICAID

## 2019-02-26 VITALS
SYSTOLIC BLOOD PRESSURE: 119 MMHG | OXYGEN SATURATION: 100 % | TEMPERATURE: 98 F | RESPIRATION RATE: 17 BRPM | BODY MASS INDEX: 44.88 KG/M2 | HEART RATE: 81 BPM | DIASTOLIC BLOOD PRESSURE: 68 MMHG | HEIGHT: 65 IN | WEIGHT: 269.38 LBS

## 2019-02-26 DIAGNOSIS — E11.9 TYPE 2 DIABETES MELLITUS WITHOUT COMPLICATION, WITHOUT LONG-TERM CURRENT USE OF INSULIN: Primary | ICD-10-CM

## 2019-02-26 DIAGNOSIS — Z72.51 HIGH RISK SEXUAL BEHAVIOR, UNSPECIFIED TYPE: ICD-10-CM

## 2019-02-26 PROBLEM — R30.0 DYSURIA: Status: RESOLVED | Noted: 2018-03-31 | Resolved: 2019-02-26

## 2019-02-26 PROCEDURE — 86703 HIV-1/HIV-2 1 RESULT ANTBDY: CPT

## 2019-02-26 PROCEDURE — 36415 COLL VENOUS BLD VENIPUNCTURE: CPT | Mod: PO

## 2019-02-26 PROCEDURE — 86694 HERPES SIMPLEX NES ANTBDY: CPT

## 2019-02-26 PROCEDURE — 99999 PR PBB SHADOW E&M-EST. PATIENT-LVL IV: CPT | Mod: PBBFAC,,, | Performed by: FAMILY MEDICINE

## 2019-02-26 PROCEDURE — 99214 OFFICE O/P EST MOD 30 MIN: CPT | Mod: S$PBB,,, | Performed by: FAMILY MEDICINE

## 2019-02-26 PROCEDURE — 86592 SYPHILIS TEST NON-TREP QUAL: CPT

## 2019-02-26 PROCEDURE — 99214 PR OFFICE/OUTPT VISIT, EST, LEVL IV, 30-39 MIN: ICD-10-PCS | Mod: S$PBB,,, | Performed by: FAMILY MEDICINE

## 2019-02-26 PROCEDURE — 99214 OFFICE O/P EST MOD 30 MIN: CPT | Mod: PBBFAC,PO | Performed by: FAMILY MEDICINE

## 2019-02-26 PROCEDURE — 99999 PR PBB SHADOW E&M-EST. PATIENT-LVL IV: ICD-10-PCS | Mod: PBBFAC,,, | Performed by: FAMILY MEDICINE

## 2019-02-26 RX ORDER — PIOGLITAZONEHYDROCHLORIDE 15 MG/1
15 TABLET ORAL DAILY
Qty: 30 TABLET | Refills: 0 | Status: SHIPPED | OUTPATIENT
Start: 2019-02-26 | End: 2019-07-01

## 2019-02-26 NOTE — PROGRESS NOTES
Subjective:       Patient ID: Yuli Milton is a 43 y.o. female.    Chief Complaint: Follow-up (2 week)    Diabetes   She presents for her follow-up diabetic visit. She has type 2 diabetes mellitus. Her disease course has been stable. Hypoglycemia symptoms include headaches. Pertinent negatives for hypoglycemia include no confusion, dizziness or hunger. Pertinent negatives for diabetes include no blurred vision, no chest pain, no fatigue and no foot paresthesias. Pertinent negatives for hypoglycemia complications include no blackouts and no hospitalization. Symptoms are stable. Risk factors for coronary artery disease include diabetes mellitus. Current diabetic treatment includes insulin injections and oral agent (dual therapy).     Review of Systems   Constitutional: Negative for fatigue.   Eyes: Negative for blurred vision.   Cardiovascular: Negative for chest pain.   Neurological: Positive for headaches. Negative for dizziness.   Psychiatric/Behavioral: Negative for confusion.       Objective:      Physical Exam   Constitutional: She appears well-developed and well-nourished. No distress.   HENT:   Head: Normocephalic and atraumatic.   Pulmonary/Chest: Effort normal and breath sounds normal. No respiratory distress. She has no wheezes.   Abdominal: Soft. She exhibits no distension. There is no tenderness.   Skin: Skin is warm and dry. No rash noted. She is not diaphoretic. No erythema.   Nursing note and vitals reviewed.      Assessment:       1. Type 2 diabetes mellitus without complication, without long-term current use of insulin    2. High risk sexual behavior, unspecified type        Plan:     Problem List Items Addressed This Visit        Psychiatric    High-risk sexual behavior    Relevant Orders    Herpes simplex type 1 & 2 IgM,Herpes IgM    HIV 1/2 Ag/Ab (4th Gen)    RPR    C. trachomatis/N. gonorrhoeae by AMP DNA       Endocrine    Type 2 diabetes mellitus without complication, without long-term  current use of insulin - Primary    Relevant Medications    liraglutide 0.6 mg/0.1 mL, 18 mg/3 mL, subq PNIJ (VICTOZA 2-LIDYA) 0.6 mg/0.1 mL (18 mg/3 mL) PnIj    pioglitazone (ACTOS) 15 MG tablet    Other Relevant Orders    Ambulatory Referral to Optometry

## 2019-02-27 LAB
HIV 1+2 AB+HIV1 P24 AG SERPL QL IA: NEGATIVE
RPR SER QL: NORMAL

## 2019-02-28 LAB — HSV AB, IGM BY IFA: NEGATIVE

## 2019-03-06 ENCOUNTER — TELEPHONE (OUTPATIENT)
Dept: INTERNAL MEDICINE | Facility: CLINIC | Age: 44
End: 2019-03-06

## 2019-03-06 DIAGNOSIS — E11.9 TYPE 2 DIABETES MELLITUS WITHOUT COMPLICATION, WITHOUT LONG-TERM CURRENT USE OF INSULIN: ICD-10-CM

## 2019-03-06 NOTE — PROGRESS NOTES
Please call pt with abnormal results. Pt does not need appt at this time, unless they have questions or wish to further discuss.  Very small amount of blood in urine, still pending 1 lab.

## 2019-03-06 NOTE — TELEPHONE ENCOUNTER
----- Message from Drew Gutierrez MD sent at 3/6/2019  7:31 AM CST -----  Please call pt with abnormal results. Pt does not need appt at this time, unless they have questions or wish to further discuss.  Very small amount of blood in urine, still pending 1 lab.

## 2019-03-13 ENCOUNTER — OFFICE VISIT (OUTPATIENT)
Dept: PAIN MEDICINE | Facility: CLINIC | Age: 44
End: 2019-03-13
Payer: MEDICAID

## 2019-03-13 VITALS
BODY MASS INDEX: 44.82 KG/M2 | HEART RATE: 64 BPM | DIASTOLIC BLOOD PRESSURE: 77 MMHG | RESPIRATION RATE: 18 BRPM | SYSTOLIC BLOOD PRESSURE: 116 MMHG | HEIGHT: 65 IN | WEIGHT: 269 LBS

## 2019-03-13 DIAGNOSIS — M47.22 OSTEOARTHRITIS OF SPINE WITH RADICULOPATHY, CERVICAL REGION: ICD-10-CM

## 2019-03-13 DIAGNOSIS — M47.816 LUMBAR SPONDYLOSIS: Primary | ICD-10-CM

## 2019-03-13 PROCEDURE — 99204 PR OFFICE/OUTPT VISIT, NEW, LEVL IV, 45-59 MIN: ICD-10-PCS | Mod: S$PBB,,, | Performed by: PAIN MEDICINE

## 2019-03-13 PROCEDURE — 99213 OFFICE O/P EST LOW 20 MIN: CPT | Mod: PBBFAC,PN | Performed by: PAIN MEDICINE

## 2019-03-13 PROCEDURE — 99999 PR PBB SHADOW E&M-EST. PATIENT-LVL III: CPT | Mod: PBBFAC,,, | Performed by: PAIN MEDICINE

## 2019-03-13 PROCEDURE — 99999 PR PBB SHADOW E&M-EST. PATIENT-LVL III: ICD-10-PCS | Mod: PBBFAC,,, | Performed by: PAIN MEDICINE

## 2019-03-13 PROCEDURE — 99204 OFFICE O/P NEW MOD 45 MIN: CPT | Mod: S$PBB,,, | Performed by: PAIN MEDICINE

## 2019-03-13 RX ORDER — GABAPENTIN 300 MG/1
CAPSULE ORAL
Qty: 90 CAPSULE | Refills: 3 | Status: SHIPPED | OUTPATIENT
Start: 2019-03-13 | End: 2019-05-23

## 2019-03-13 RX ORDER — BACLOFEN 10 MG/1
10 TABLET ORAL NIGHTLY PRN
Qty: 30 TABLET | Refills: 3 | Status: SHIPPED | OUTPATIENT
Start: 2019-03-13 | End: 2019-04-29

## 2019-03-13 NOTE — LETTER
March 13, 2019      Drew Gutierrez MD  [address] [address] [address]           [facility]  [address] [facility] [address]  Phone: [phone]  Fax: [phone]          Patient: Yuli Milton   MR Number: [id number]   YOB: 1975   Date of Visit: 3/13/2019       Dear Dr. Drew Gutierrez:    Thank you for referring Yuli Milton to me for evaluation. Attached you will find relevant portions of my assessment and plan of care.    If you have questions, please do not hesitate to call me. I look forward to following Yuli Milton along with you.    Sincerely,    Tra Ambrose MD    Enclosure  CC:  No Recipients    If you would like to receive this communication electronically, please contact [email or web address] or [phone] to request more information on [employer] access.    For providers and/or their staff who would like to refer a patient to [facility], please contact us through our one-stop-shop provider referral line, [facility] , at [phone].    If you feel you have received this communication in error or would no longer like to receive these types of communications, please e-mail [email or web address]

## 2019-03-13 NOTE — PATIENT INSTRUCTIONS
-provide Baclofen and Gabapentin prescriptions today  -provide a referral for physical therapy  -follow up in clinic in 6 weeks

## 2019-04-01 ENCOUNTER — TELEPHONE (OUTPATIENT)
Dept: INTERNAL MEDICINE | Facility: CLINIC | Age: 44
End: 2019-04-01

## 2019-04-01 NOTE — TELEPHONE ENCOUNTER
Dr. Marge Austin would like to talk to Dr. Gutierrez to discuss a plan of care for Ms. Yuli Neely. Dr. Marge Austin could be reached at [phone].

## 2019-04-29 ENCOUNTER — OFFICE VISIT (OUTPATIENT)
Dept: PAIN MEDICINE | Facility: CLINIC | Age: 44
End: 2019-04-29
Payer: MEDICAID

## 2019-04-29 VITALS
BODY MASS INDEX: 44.82 KG/M2 | HEART RATE: 64 BPM | SYSTOLIC BLOOD PRESSURE: 134 MMHG | DIASTOLIC BLOOD PRESSURE: 77 MMHG | WEIGHT: 269 LBS | HEIGHT: 65 IN | RESPIRATION RATE: 18 BRPM

## 2019-04-29 DIAGNOSIS — M54.12 CERVICAL RADICULOPATHY: ICD-10-CM

## 2019-04-29 DIAGNOSIS — M53.3 SACROILIAC JOINT PAIN: Primary | ICD-10-CM

## 2019-04-29 DIAGNOSIS — M46.1 SACROILIITIS: ICD-10-CM

## 2019-04-29 DIAGNOSIS — M47.22 OSTEOARTHRITIS OF SPINE WITH RADICULOPATHY, CERVICAL REGION: ICD-10-CM

## 2019-04-29 DIAGNOSIS — M47.816 LUMBAR SPONDYLOSIS: ICD-10-CM

## 2019-04-29 PROCEDURE — 99214 OFFICE O/P EST MOD 30 MIN: CPT | Mod: S$PBB,,, | Performed by: PHYSICIAN ASSISTANT

## 2019-04-29 PROCEDURE — 99999 PR PBB SHADOW E&M-EST. PATIENT-LVL V: CPT | Mod: PBBFAC,,, | Performed by: PHYSICIAN ASSISTANT

## 2019-04-29 PROCEDURE — 99999 PR PBB SHADOW E&M-EST. PATIENT-LVL V: ICD-10-PCS | Mod: PBBFAC,,, | Performed by: PHYSICIAN ASSISTANT

## 2019-04-29 PROCEDURE — 99214 PR OFFICE/OUTPT VISIT, EST, LEVL IV, 30-39 MIN: ICD-10-PCS | Mod: S$PBB,,, | Performed by: PHYSICIAN ASSISTANT

## 2019-04-29 PROCEDURE — 99215 OFFICE O/P EST HI 40 MIN: CPT | Mod: PBBFAC,PN | Performed by: PHYSICIAN ASSISTANT

## 2019-04-29 RX ORDER — METHOCARBAMOL 500 MG/1
500 TABLET, FILM COATED ORAL 2 TIMES DAILY PRN
Qty: 60 TABLET | Refills: 0 | Status: SHIPPED | OUTPATIENT
Start: 2019-04-29 | End: 2019-08-27 | Stop reason: SDUPTHER

## 2019-04-29 NOTE — PATIENT INSTRUCTIONS
Exercises to Strengthen Your Lower Back  Strong lower back and abdominal muscles work together to support your spine. The exercises below will help strengthen the lower back. It is important that you begin exercising slowly and increase levels gradually.  Always begin any exercise program with stretching. If you feel pain while doing any of these exercises, stop and talk to your doctor about a more specific exercise program that better suits your condition.   Low back stretch  The point of stretching is to make you more flexible and increase your range of motion. Stretch only as much as you are able. Stretch slowly. Do not push your stretch to the limit. If at any point you feel pain while stretching, this is your (temporary) limit.  · Lie on your back with your knees bent and both feet on the ground.  · Slowly raise your left knee to your chest as you flatten your lower back against the floor. Hold for 5 seconds.  · Relax and repeat the exercise with your right knee.  · Do 10 of these exercises for each leg.  · Repeat hugging both knees to your chest at the same time.  Building lower back strength  Start your exercise routine with 10 to 30 minutes a day, 1 to 3 times a day.  Initial exercises  Lying on your back:  1. Ankle pumps: Move your foot up and down, towards your head, and then away. Repeat 10 times with each foot.  2. Heel slides: Slowly bend your knee, drawing the heel of your foot towards you. Then slide your heel/foot from you, straightening your knee. Do not lift your foot off the floor (this is not a leg lift).  3. Abdominal contraction: Bend your knees and put your hands on your stomach. Tighten your stomach muscles. Hold for 5 seconds, then relax. Repeat 10 times.  4. Straight leg raise: Bend one leg at the knee and keep the other leg straight. Tighten your stomach muscles. Slowly lift your straight leg 6 to 12 inches off the floor and hold for up to 5 seconds. Repeat 10 times on each  side.  Standin. Wall squats: Stand with your back against the wall. Move your feet about 12 inches away from the wall. Tighten your stomach muscles, and slowly bend your knees until they are at about a 45 degree angle. Do not go down too far. Hold about 5 seconds. Then slowly return to your starting position. Repeat 10 times.  2. Heel raises: Stand facing the wall. Slowly raise the heels of your feet up and down, while keeping your toes on the floor. If you have trouble balancing, you can touch the wall with your hands. Repeat 10 times.  More advanced exercises  When you feel comfortable enough, try these exercises.  1. Kneeling lumbar extension: Begin on your hands and knees. At the same time, raise and straighten your right arm and left leg until they are parallel to the ground. Hold for 2 seconds and come back slowly to a starting position. Repeat with left arm and right leg, alternating 10 times.  2. Prone lumbar extension: Lie face down, arms extended overhead, palms on the floor. At the same time, raise your right arm and left leg as high as comfortably possible. Hold for 10 seconds and slowly return to start. Repeat with left arm and right leg, alternating 10 times. Gradually build up to 20 times. (Advanced: Repeat this exercise raising both arms and both legs a few inches off the floor at the same time. Hold for 5 seconds and release.)  3. Pelvic tilt: Lie on the floor on your back with your knees bent at 90 degrees. Your feet should be flat on the floor. Inhale, exhale, then slowly contract your abdominal muscles bringing your navel toward your spine. Let your pelvis rock back until your lower back is flat on the floor. Hold for 10 seconds while breathing smoothly.  4. Abdominal crunch: Perform a pelvic tilt (above) flattening your lower back against the floor. Holding the tension in your abdominal muscles, take another breath and raise your shoulder blades off the ground (this is not a full sit-up).  Keep your head in line with your body (dont bend your neck forward). Hold for 2 seconds, then slowly lower.  © 2908-1133 [employer]. [address] [place] [address], [place] [address]. All rights reserved. This information is not intended as a substitute for professional medical care. Always follow your healthcare professional's instructions.
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
## 2019-05-07 ENCOUNTER — TELEPHONE (OUTPATIENT)
Dept: OBSTETRICS AND GYNECOLOGY | Facility: CLINIC | Age: 44
End: 2019-05-07

## 2019-05-07 NOTE — TELEPHONE ENCOUNTER
----- Message from Kati Hawkins sent at 5/7/2019  7:11 AM CDT -----  Contact: pt  Type:  Needs Medical Advice    Who Called: BUSTER POPE   Symptoms (please be specific): burning ,itching and lower abdominal pains   How long has patient had these symptoms:  05/03/2019  Pharmacy name and phone #:      [id number]  [place] - [address] [place] [address]  Phone: [phone] Fax: [phone]    Would the patient rather a call back or a response via My [facility]? Call   Best Call Back Number: [phone] (home)   Additional Information: pt is requesting a call back from the nurse in regards to pt getting in to see the

## 2019-05-07 NOTE — TELEPHONE ENCOUNTER
Spoke to patient and scheduled her appointment for 05/08/19 at 9:15am to see PRANAV Butler at the [facility] location. Patient verbalized understanding.

## 2019-05-08 ENCOUNTER — OFFICE VISIT (OUTPATIENT)
Dept: OBSTETRICS AND GYNECOLOGY | Facility: CLINIC | Age: 44
End: 2019-05-08
Payer: MEDICAID

## 2019-05-08 VITALS
BODY MASS INDEX: 44.88 KG/M2 | WEIGHT: 269.38 LBS | HEIGHT: 65 IN | SYSTOLIC BLOOD PRESSURE: 124 MMHG | DIASTOLIC BLOOD PRESSURE: 74 MMHG

## 2019-05-08 DIAGNOSIS — R10.2 PELVIC PAIN IN FEMALE: ICD-10-CM

## 2019-05-08 DIAGNOSIS — R30.0 DYSURIA: ICD-10-CM

## 2019-05-08 DIAGNOSIS — N76.2 ACUTE VULVITIS: Primary | ICD-10-CM

## 2019-05-08 LAB
BACTERIAL VAGINOSIS DNA: NEGATIVE
BILIRUB UR QL STRIP: NEGATIVE
CANDIDA GLABRATA DNA: NEGATIVE
CANDIDA KRUSEI DNA: NEGATIVE
CANDIDA RRNA VAG QL PROBE: NEGATIVE
CLARITY UR: CLEAR
COLOR UR: YELLOW
GLUCOSE UR QL STRIP: NEGATIVE
HGB UR QL STRIP: ABNORMAL
KETONES UR QL STRIP: NEGATIVE
LEUKOCYTE ESTERASE UR QL STRIP: NEGATIVE
NITRITE UR QL STRIP: NEGATIVE
PH UR STRIP: 6 [PH] (ref 5–8)
PROT UR QL STRIP: NEGATIVE
SP GR UR STRIP: >=1.03 (ref 1–1.03)
T VAGINALIS RRNA GENITAL QL PROBE: NEGATIVE
URN SPEC COLLECT METH UR: ABNORMAL

## 2019-05-08 PROCEDURE — 87510 GARDNER VAG DNA DIR PROBE: CPT

## 2019-05-08 PROCEDURE — 99214 OFFICE O/P EST MOD 30 MIN: CPT | Mod: PBBFAC | Performed by: NURSE PRACTITIONER

## 2019-05-08 PROCEDURE — 99999 PR PBB SHADOW E&M-EST. PATIENT-LVL IV: ICD-10-PCS | Mod: PBBFAC,,, | Performed by: NURSE PRACTITIONER

## 2019-05-08 PROCEDURE — 99214 PR OFFICE/OUTPT VISIT, EST, LEVL IV, 30-39 MIN: ICD-10-PCS | Mod: S$PBB,,, | Performed by: NURSE PRACTITIONER

## 2019-05-08 PROCEDURE — 87480 CANDIDA DNA DIR PROBE: CPT

## 2019-05-08 PROCEDURE — 87086 URINE CULTURE/COLONY COUNT: CPT

## 2019-05-08 PROCEDURE — 99999 PR PBB SHADOW E&M-EST. PATIENT-LVL IV: CPT | Mod: PBBFAC,,, | Performed by: NURSE PRACTITIONER

## 2019-05-08 PROCEDURE — 99214 OFFICE O/P EST MOD 30 MIN: CPT | Mod: S$PBB,,, | Performed by: NURSE PRACTITIONER

## 2019-05-08 PROCEDURE — 81003 URINALYSIS AUTO W/O SCOPE: CPT

## 2019-05-08 NOTE — PROGRESS NOTES
CC:  Pelvic pain    Yuli Milton is a 43 y.o. female  presents for persistant pain for the last 2 week(s) Was seen in ER for pain on right side, reviewing scans from 2017 - showed simple cyst on left ovary and right ovary was not seen - has had oophorectomy.  Has chronic pain to pelvis area and back.  Having pain with urination along with burning after urinates.    Patient's last menstrual period was 2016.    Past Medical History:   Diagnosis Date    Abnormal Pap smear of cervix     treatment??    Abnormal Pap smear of vagina     Anemia     Anxiety     Anxiety and depression     Asthma     Chlamydia     Depression (emotion)     Diabetes mellitus     Dysuria 3/31/2018    Gallstones     Gastritis     History of ovarian cyst     History of syphilis     History of trichomoniasis     Mental disorder     PONV (postoperative nausea and vomiting)     Vaginal candidiasis 2018     Past Surgical History:   Procedure Laterality Date    APPENDECTOMY      CHOLECYSTECTOMY      COLONOSCOPY N/A 2017    [facility] at [facility]    DILATION AND CURETTAGE OF UTERUS      bleeding    ESOPHAGOGASTRODUODENOSCOPY (EGD) N/A 2017    Performed by Too Cordova III, MD at [facility] ENDO    HYSTERECTOMY  2016    BS    OOPHORECTOMY      SALPINGECTOMY-ROBOTIC ASSISTED Bilateral 2016    [facility] at [facility] OR    XI ROBOTIC ASSISTED LAPAROSCOPIC HYSTERECTOMY N/A 2016    [facility] by [facility]am [facility] at [facility] OR     Family History   Problem Relation Age of Onset    Breast cancer Paternal Grandmother     Diabetes Maternal Grandmother     Hypertension Mother     Breast cancer Maternal Aunt     Cancer Maternal Aunt         colon cancer    Colon cancer Maternal Aunt     Miscarriages / Stillbirths Cousin     Stroke Maternal Aunt     Ovarian cancer Neg Hx     Deep vein thrombosis Neg Hx     Pulmonary embolism Neg Hx      Social History  "    Socioeconomic History    Marital status: Single     Spouse name: Not on file    Number of children: 3    Years of education: Not on file    Highest education level: Not on file   Occupational History    Not on file   Social Needs    Financial resource strain: Not on file    Food insecurity:     Worry: Not on file     Inability: Not on file    Transportation needs:     Medical: Not on file     Non-medical: Not on file   Tobacco Use    Smoking status: Never Smoker    Smokeless tobacco: Never Used   Substance and Sexual Activity    Alcohol use: No     Alcohol/week: 0.0 oz    Drug use: No    Sexual activity: Yes     Partners: Male     Birth control/protection: See Surgical Hx, Post-menopausal     Comment: mut monog   Lifestyle    Physical activity:     Days per week: Not on file     Minutes per session: Not on file    Stress: Not on file   Relationships    Social connections:     Talks on phone: Not on file     Gets together: Not on file     Attends Scientologist service: Not on file     Active member of club or organization: Not on file     Attends meetings of clubs or organizations: Not on file     Relationship status: Not on file   Other Topics Concern    Not on file   Social History Narrative    Full time employed - [employer]     OB History    Para Term  AB Living   4 4 3 1   3   SAB TAB Ectopic Multiple Live Births         1 4      # Outcome Date GA Lbr Christopher/2nd Weight Sex Delivery Anes PTL Lv   4 Term            3 Term      Vag-Spont EPI  MAX   2 Term      Vag-Spont EPI  MAX   1A      M Vag-Spont EPI  MAX   1B      F Vag-Spont EPI  ND       /74   Ht 5' 5" (1.651 m)   Wt 122.2 kg (269 lb 6.4 oz)   LMP 2016   BMI 44.83 kg/m²         ROS:  Per hpi    PHYSICAL EXAM:  APPEARANCE: Well nourished, well developed, in no acute distress.  AFFECT: WNL, alert and oriented x 3  PELVIC: Normal external genitalia without lesions.  Normal hair distribution.  " Adequate perineal body, normal urethral meatus.  Vagina moist and well rugated without lesions or discharge.  Cervix and uterus is absent,   No significant cystocele or rectocele.   Adnexa on left negative.   AND PLAN:    Yuli was seen today for vaginal pain and vaginitis.    Diagnoses and all orders for this visit:    Acute vulvitis  -     Vaginosis Screen by DNA Probe    Pelvic pain in female  -     POCT urine dipstick without microscope  -     Urinalysis  -     Urine culture  -     US Pelvis Comp with Transvag NON-OB (xpd; Future    Dysuria  -     POCT urine dipstick without microscope  -     Urinalysis  -     Urine culture      UA dip with blood - urine to lab       u/s for pain and f/u with MD for mgt of pain and consultation.  If pain were to Worsen to ER for workup

## 2019-05-09 ENCOUNTER — PATIENT MESSAGE (OUTPATIENT)
Dept: OBSTETRICS AND GYNECOLOGY | Facility: CLINIC | Age: 44
End: 2019-05-09

## 2019-05-09 LAB — BACTERIA UR CULT: NO GROWTH

## 2019-05-10 ENCOUNTER — PATIENT MESSAGE (OUTPATIENT)
Dept: OBSTETRICS AND GYNECOLOGY | Facility: CLINIC | Age: 44
End: 2019-05-10

## 2019-05-13 ENCOUNTER — HOSPITAL ENCOUNTER (OUTPATIENT)
Dept: RADIOLOGY | Facility: HOSPITAL | Age: 44
Discharge: HOME | End: 2019-05-13
Attending: NURSE PRACTITIONER
Payer: MEDICAID

## 2019-05-13 DIAGNOSIS — R10.2 PELVIC PAIN IN FEMALE: ICD-10-CM

## 2019-05-13 PROCEDURE — 76856 US EXAM PELVIC COMPLETE: CPT | Mod: 26,,, | Performed by: RADIOLOGY

## 2019-05-13 PROCEDURE — 76830 TRANSVAGINAL US NON-OB: CPT | Mod: 26,,, | Performed by: RADIOLOGY

## 2019-05-13 PROCEDURE — 76856 US PELVIS COMP WITH TRANSVAG NON-OB (XPD): ICD-10-PCS | Mod: 26,,, | Performed by: RADIOLOGY

## 2019-05-13 PROCEDURE — 76830 US PELVIS COMP WITH TRANSVAG NON-OB (XPD): ICD-10-PCS | Mod: 26,,, | Performed by: RADIOLOGY

## 2019-05-13 PROCEDURE — 76830 TRANSVAGINAL US NON-OB: CPT | Mod: TC,PO

## 2019-05-14 ENCOUNTER — TELEPHONE (OUTPATIENT)
Dept: OBSTETRICS AND GYNECOLOGY | Facility: CLINIC | Age: 44
End: 2019-05-14

## 2019-05-14 NOTE — TELEPHONE ENCOUNTER
Spoke to the pt. And advised that the Right ovary removed, left ovary not seen due to increased amounts of overlying bowel gas, do not feel her issues are gyn related but probable from her bowels, she does have a f/u with Dr. Johnston in about 3 weeks, she may keep this appt if she likes to review, but would recommend she see her pcp to address either IBS or constipation and then referral to gastric doctor     Pt. states she will follow-up with her PCP. kelsey Mehta

## 2019-05-23 ENCOUNTER — OFFICE VISIT (OUTPATIENT)
Dept: INTERNAL MEDICINE | Facility: CLINIC | Age: 44
End: 2019-05-23
Payer: MEDICAID

## 2019-05-23 ENCOUNTER — HOSPITAL ENCOUNTER (OUTPATIENT)
Dept: RADIOLOGY | Facility: HOSPITAL | Age: 44
Discharge: HOME | End: 2019-05-23
Attending: FAMILY MEDICINE
Payer: MEDICAID

## 2019-05-23 VITALS
HEART RATE: 61 BPM | OXYGEN SATURATION: 98 % | BODY MASS INDEX: 44.81 KG/M2 | RESPIRATION RATE: 20 BRPM | TEMPERATURE: 97 F | DIASTOLIC BLOOD PRESSURE: 60 MMHG | WEIGHT: 268.94 LBS | SYSTOLIC BLOOD PRESSURE: 124 MMHG | HEIGHT: 65 IN

## 2019-05-23 DIAGNOSIS — E11.9 TYPE 2 DIABETES MELLITUS WITHOUT COMPLICATION, WITHOUT LONG-TERM CURRENT USE OF INSULIN: Primary | ICD-10-CM

## 2019-05-23 DIAGNOSIS — Z12.31 SCREENING MAMMOGRAM, ENCOUNTER FOR: ICD-10-CM

## 2019-05-23 DIAGNOSIS — Z28.9 DELAYED IMMUNIZATIONS: ICD-10-CM

## 2019-05-23 PROCEDURE — 99999 PR PBB SHADOW E&M-EST. PATIENT-LVL IV: CPT | Mod: PBBFAC,,, | Performed by: FAMILY MEDICINE

## 2019-05-23 PROCEDURE — 99999 PR PBB SHADOW E&M-EST. PATIENT-LVL IV: ICD-10-PCS | Mod: PBBFAC,,, | Performed by: FAMILY MEDICINE

## 2019-05-23 PROCEDURE — 90471 IMMUNIZATION ADMIN: CPT | Mod: PBBFAC,PO

## 2019-05-23 PROCEDURE — 77067 SCR MAMMO BI INCL CAD: CPT | Mod: 26,,, | Performed by: RADIOLOGY

## 2019-05-23 PROCEDURE — 77067 SCR MAMMO BI INCL CAD: CPT | Mod: TC,PO

## 2019-05-23 PROCEDURE — 77067 MAMMO DIGITAL SCREENING BILAT WITH CAD: ICD-10-PCS | Mod: 26,,, | Performed by: RADIOLOGY

## 2019-05-23 PROCEDURE — 99214 PR OFFICE/OUTPT VISIT, EST, LEVL IV, 30-39 MIN: ICD-10-PCS | Mod: S$PBB,,, | Performed by: FAMILY MEDICINE

## 2019-05-23 PROCEDURE — 99214 OFFICE O/P EST MOD 30 MIN: CPT | Mod: PBBFAC,PO,25 | Performed by: FAMILY MEDICINE

## 2019-05-23 PROCEDURE — 99214 OFFICE O/P EST MOD 30 MIN: CPT | Mod: S$PBB,,, | Performed by: FAMILY MEDICINE

## 2019-05-23 RX ORDER — PEN NEEDLE, DIABETIC 30 GX3/16"
1 NEEDLE, DISPOSABLE MISCELLANEOUS DAILY
Qty: 100 EACH | Refills: 0 | Status: SHIPPED | OUTPATIENT
Start: 2019-05-23 | End: 2020-07-20

## 2019-05-23 RX ORDER — METFORMIN HYDROCHLORIDE 1000 MG/1
1000 TABLET ORAL 2 TIMES DAILY WITH MEALS
Qty: 180 TABLET | Refills: 3 | Status: SHIPPED | OUTPATIENT
Start: 2019-05-23 | End: 2020-02-17 | Stop reason: SDUPTHER

## 2019-05-23 RX ORDER — LOSARTAN POTASSIUM 25 MG/1
12.5 TABLET ORAL DAILY
Qty: 90 TABLET | Refills: 0 | Status: SHIPPED | OUTPATIENT
Start: 2019-05-23 | End: 2020-02-17 | Stop reason: SDUPTHER

## 2019-05-23 NOTE — PROGRESS NOTES
Subjective:       Patient ID: Yuli Milton is a 43 y.o. female.    Chief Complaint: Follow-up (3 month)    Diabetes   She presents for her follow-up diabetic visit. She has type 2 diabetes mellitus. Her disease course has been stable. Pertinent negatives for hypoglycemia include no confusion, dizziness, headaches or hunger. Pertinent negatives for diabetes include no blurred vision, no chest pain, no fatigue, no visual change and no weakness. Pertinent negatives for hypoglycemia complications include no blackouts and no hospitalization. Symptoms are stable. Risk factors for coronary artery disease include diabetes mellitus, hypertension and obesity. When asked about current treatments, none were reported. She is compliant with treatment all of the time.     Review of Systems   Constitutional: Negative for fatigue.   Eyes: Negative for blurred vision.   Cardiovascular: Negative for chest pain.   Neurological: Negative for dizziness, weakness and headaches.   Psychiatric/Behavioral: Negative for confusion.       Objective:      Physical Exam   Constitutional: She appears well-developed and well-nourished. No distress.   HENT:   Head: Normocephalic and atraumatic.   Pulmonary/Chest: Effort normal and breath sounds normal. No respiratory distress. She has no wheezes.   Abdominal: Soft. She exhibits no distension. There is no tenderness. There is no guarding.   Skin: Skin is warm and dry. No rash noted. She is not diaphoretic. No erythema.   Nursing note and vitals reviewed.      Assessment:       1. Type 2 diabetes mellitus without complication, without long-term current use of insulin    2. Screening mammogram, encounter for    3. Delayed immunizations        Plan:     Problem List Items Addressed This Visit        Renal/    Screening mammogram, encounter for    Relevant Orders    Mammo Digital Screening Bilateral With CAD       ID    Delayed immunizations    Relevant Orders    Tdap Vaccine (Completed)        "Endocrine    Type 2 diabetes mellitus without complication, without long-term current use of insulin - Primary    Relevant Medications    metFORMIN (GLUCOPHAGE) 1000 MG tablet    liraglutide 0.6 mg/0.1 mL, 18 mg/3 mL, subq PNIJ (VICTOZA 2-LIDYA) 0.6 mg/0.1 mL (18 mg/3 mL) PnIj    losartan (COZAAR) 25 MG tablet    pen needle, diabetic (PEN NEEDLE) 32 gauge x 5/32" Ndle    Other Relevant Orders    Lipid panel    Hemoglobin A1c        "

## 2019-05-24 ENCOUNTER — TELEPHONE (OUTPATIENT)
Dept: INTERNAL MEDICINE | Facility: CLINIC | Age: 44
End: 2019-05-24

## 2019-05-24 NOTE — TELEPHONE ENCOUNTER
----- Message from Drew Gutierrez MD sent at 5/24/2019  8:07 AM CDT -----  Results have been reviewed . All labs are within normal range.   If you have any questions please feel free to contact me.  a1c improving, keep up the good work

## 2019-05-24 NOTE — PROGRESS NOTES
Results have been reviewed . All labs are within normal range.   If you have any questions please feel free to contact me.  Impression:  No mammographic evidence of malignancy.     BI-RADS Category 1: Negative     Recommendation:  Routine screening mammogram in 1 year is recommended.

## 2019-06-05 ENCOUNTER — OFFICE VISIT (OUTPATIENT)
Dept: OBSTETRICS AND GYNECOLOGY | Facility: CLINIC | Age: 44
End: 2019-06-05
Payer: MEDICAID

## 2019-06-05 VITALS
WEIGHT: 272.5 LBS | DIASTOLIC BLOOD PRESSURE: 64 MMHG | SYSTOLIC BLOOD PRESSURE: 100 MMHG | BODY MASS INDEX: 45.4 KG/M2 | HEIGHT: 65 IN

## 2019-06-05 DIAGNOSIS — R10.30 LOWER ABDOMINAL PAIN: Primary | ICD-10-CM

## 2019-06-05 DIAGNOSIS — N89.8 VAGINAL DRYNESS: ICD-10-CM

## 2019-06-05 PROCEDURE — 99213 OFFICE O/P EST LOW 20 MIN: CPT | Mod: S$PBB,,, | Performed by: OBSTETRICS & GYNECOLOGY

## 2019-06-05 PROCEDURE — 99999 PR PBB SHADOW E&M-EST. PATIENT-LVL III: CPT | Mod: PBBFAC,,, | Performed by: OBSTETRICS & GYNECOLOGY

## 2019-06-05 PROCEDURE — 99213 PR OFFICE/OUTPT VISIT, EST, LEVL III, 20-29 MIN: ICD-10-PCS | Mod: S$PBB,,, | Performed by: OBSTETRICS & GYNECOLOGY

## 2019-06-05 PROCEDURE — 99213 OFFICE O/P EST LOW 20 MIN: CPT | Mod: PBBFAC | Performed by: OBSTETRICS & GYNECOLOGY

## 2019-06-05 PROCEDURE — 99999 PR PBB SHADOW E&M-EST. PATIENT-LVL III: ICD-10-PCS | Mod: PBBFAC,,, | Performed by: OBSTETRICS & GYNECOLOGY

## 2019-06-05 NOTE — PROGRESS NOTES
Subjective:       Patient ID: Yuli Milton is a 43 y.o. female.    Chief Complaint:  Follow-up      History of Present Illness  HPI  here to f/u on ultrasound. pt still reports lower abdominal pain (chronic, has been present since prior to hysterectomy); s/p RALH/BS done 2016 for hypermenorrhea & complex endometrial hyperplasia without atypia.  GI work up negative. Pt reports bloating but normal BM. Has not used anything for gas. Not physically active, obese. Just started on keto diet. C/o SOB w just doing chores-neg EKG/CXR/CTA in past. Dyspareunia, vaginal dryness    GYN & OB History  Patient's last menstrual period was 2016.   Date of Last Pap: 2016 normal, left ovary not visible due to bowel gas    OB History    Para Term  AB Living   4 4 3 1   3   SAB TAB Ectopic Multiple Live Births         1 4      # Outcome Date GA Lbr Christopher/2nd Weight Sex Delivery Anes PTL Lv   4 Term            3 Term      Vag-Spont EPI  MAX   2 Term      Vag-Spont EPI  MAX   1A      M Vag-Spont EPI  MAX   1B      F Vag-Spont EPI  ND       Review of Systems  Review of Systems   All other systems reviewed and are negative.    Objective:     Physical Exam   Constitutional: She is oriented to person, place, and time. She appears well-developed and well-nourished.   Pulmonary/Chest: Effort normal.   Musculoskeletal: Normal range of motion.   Neurological: She is alert and oriented to person, place, and time.   Skin: Skin is warm and dry.   Psychiatric: She has a normal mood and affect. Her behavior is normal.        Assessment:        1. Lower abdominal pain    2. Vaginal dryness         Plan:      1. Discussed systemic symptoms of morbid obesity, caution on keto diets; rec adding exercise  2. OTC gas medication   3. Olive oil for intercourse

## 2019-06-06 ENCOUNTER — TELEPHONE (OUTPATIENT)
Dept: INTERNAL MEDICINE | Facility: CLINIC | Age: 44
End: 2019-06-06

## 2019-06-06 ENCOUNTER — NURSE TRIAGE (OUTPATIENT)
Dept: ADMINISTRATIVE | Facility: CLINIC | Age: 44
End: 2019-06-06

## 2019-06-06 NOTE — TELEPHONE ENCOUNTER
----- Message from Josse Land sent at 6/6/2019  4:16 PM CDT -----  Contact: Pt is FST  Pt would like to be called back regarding needing to be seen sooner due to shortness of breathe. Pt has spoken with triage nurse and was cleared.    Pt can be reached at [phone].    Thank You.

## 2019-06-06 NOTE — TELEPHONE ENCOUNTER
Pt was trying to get an appointment with Dr. Gutierrez tomorrow but the system would not allow me to book it, gave her number for scheduling desk.

## 2019-06-17 RX ORDER — ALBUTEROL SULFATE 90 UG/1
2 AEROSOL, METERED RESPIRATORY (INHALATION) EVERY 6 HOURS PRN
Qty: 18 G | Refills: 0 | Status: SHIPPED | OUTPATIENT
Start: 2019-06-17 | End: 2019-08-27 | Stop reason: SDUPTHER

## 2019-06-17 NOTE — TELEPHONE ENCOUNTER
----- Message from No Diaz sent at 6/17/2019 11:30 AM CDT -----  Pt needs a refill on her on inhaler called into [facility] in [place] (pt does not remember the dosage/please call [phone]/ma

## 2019-07-01 ENCOUNTER — PATIENT OUTREACH (OUTPATIENT)
Dept: ADMINISTRATIVE | Facility: HOSPITAL | Age: 44
End: 2019-07-01

## 2019-07-01 ENCOUNTER — TELEPHONE (OUTPATIENT)
Dept: INTERNAL MEDICINE | Facility: CLINIC | Age: 44
End: 2019-07-01

## 2019-07-01 ENCOUNTER — OFFICE VISIT (OUTPATIENT)
Dept: INTERNAL MEDICINE | Facility: CLINIC | Age: 44
End: 2019-07-01
Payer: MEDICAID

## 2019-07-01 VITALS
TEMPERATURE: 97 F | BODY MASS INDEX: 44.41 KG/M2 | HEART RATE: 86 BPM | OXYGEN SATURATION: 99 % | RESPIRATION RATE: 20 BRPM | SYSTOLIC BLOOD PRESSURE: 117 MMHG | WEIGHT: 266.56 LBS | DIASTOLIC BLOOD PRESSURE: 77 MMHG | HEIGHT: 65 IN

## 2019-07-01 DIAGNOSIS — E11.9 TYPE 2 DIABETES MELLITUS WITHOUT COMPLICATION, WITHOUT LONG-TERM CURRENT USE OF INSULIN: ICD-10-CM

## 2019-07-01 DIAGNOSIS — R44.0 AUDITORY HALLUCINATIONS: ICD-10-CM

## 2019-07-01 DIAGNOSIS — F33.3 SEVERE EPISODE OF RECURRENT MAJOR DEPRESSIVE DISORDER, WITH PSYCHOTIC FEATURES: Primary | ICD-10-CM

## 2019-07-01 PROCEDURE — 99214 OFFICE O/P EST MOD 30 MIN: CPT | Mod: S$PBB,,, | Performed by: FAMILY MEDICINE

## 2019-07-01 PROCEDURE — 99999 PR PBB SHADOW E&M-EST. PATIENT-LVL III: ICD-10-PCS | Mod: PBBFAC,,, | Performed by: FAMILY MEDICINE

## 2019-07-01 PROCEDURE — 99214 PR OFFICE/OUTPT VISIT, EST, LEVL IV, 30-39 MIN: ICD-10-PCS | Mod: S$PBB,,, | Performed by: FAMILY MEDICINE

## 2019-07-01 PROCEDURE — 99999 PR PBB SHADOW E&M-EST. PATIENT-LVL III: CPT | Mod: PBBFAC,,, | Performed by: FAMILY MEDICINE

## 2019-07-01 PROCEDURE — 99213 OFFICE O/P EST LOW 20 MIN: CPT | Mod: PBBFAC,PO | Performed by: FAMILY MEDICINE

## 2019-07-01 RX ORDER — SERTRALINE HYDROCHLORIDE 50 MG/1
50 TABLET, FILM COATED ORAL DAILY
Qty: 30 TABLET | Refills: 0 | Status: SHIPPED | OUTPATIENT
Start: 2019-07-01 | End: 2019-07-09 | Stop reason: ALTCHOICE

## 2019-07-01 RX ORDER — ALPRAZOLAM 0.5 MG/1
0.5 TABLET ORAL 3 TIMES DAILY PRN
Qty: 90 TABLET | Refills: 0 | Status: SHIPPED | OUTPATIENT
Start: 2019-07-01 | End: 2019-07-09 | Stop reason: ALTCHOICE

## 2019-07-01 RX ORDER — ATORVASTATIN CALCIUM 20 MG/1
20 TABLET, FILM COATED ORAL DAILY
Qty: 90 TABLET | Refills: 0 | Status: SHIPPED | OUTPATIENT
Start: 2019-07-01 | End: 2019-10-25 | Stop reason: SDUPTHER

## 2019-07-01 NOTE — PROGRESS NOTES
Subjective:       Patient ID: Yuli Milton is a 43 y.o. female.    Chief Complaint: Anxiety and Depression    Auditory Hallucinations - pt states she feels like her mind     Having SI recently- saw her counselor today. Pt not wanting to harm herself or anyone else at the moment.  Hearing voices - they tell her about Rastafari stuff, they talk to her about her image.  No visual hallucinations    Anxiety   Presents for initial visit. Onset was more than 5 years ago. The problem has been gradually worsening. Symptoms include depressed mood, excessive worry, irritability and nervous/anxious behavior. Patient reports no chest pain, palpitations or shortness of breath. Symptoms occur constantly. The severity of symptoms is causing significant distress. The symptoms are aggravated by family issues and work stress. The quality of sleep is good.     Treatments tried: xanax. Compliance with prior treatments has been good.     Review of Systems   Constitutional: Positive for irritability.   Respiratory: Negative for shortness of breath.    Cardiovascular: Negative for chest pain and palpitations.   Gastrointestinal: Negative for abdominal pain.   Psychiatric/Behavioral: The patient is nervous/anxious.        Objective:      Physical Exam   Constitutional: She appears well-developed and well-nourished. No distress.   HENT:   Head: Normocephalic and atraumatic.   Pulmonary/Chest: Effort normal and breath sounds normal. No respiratory distress. She has no wheezes.   Abdominal: Soft. She exhibits no distension. There is no tenderness. There is no guarding.   Skin: Skin is warm and dry. No rash noted. She is not diaphoretic. No erythema.   Psychiatric: Her mood appears anxious. Her speech is not rapid and/or pressured. She is not agitated and not aggressive. She exhibits a depressed mood. She expresses no homicidal and no suicidal ideation. She expresses no suicidal plans and no homicidal plans.   Nursing note and vitals  reviewed.      Assessment:       1. Severe episode of recurrent major depressive disorder, with psychotic features    2. Type 2 diabetes mellitus without complication, without long-term current use of insulin    3. Auditory hallucinations        Plan:     Problem List Items Addressed This Visit        Psychiatric    Severe episode of recurrent major depressive disorder, with psychotic features - Primary    Relevant Medications    sertraline (ZOLOFT) 50 MG tablet    ALPRAZolam (XANAX) 0.5 MG tablet    Other Relevant Orders    Ambulatory Referral to Psychiatry       Endocrine    Type 2 diabetes mellitus without complication, without long-term current use of insulin    Relevant Medications    atorvastatin (LIPITOR) 20 MG tablet       Other    Auditory hallucinations    Relevant Orders    Ambulatory Referral to Psychiatry

## 2019-07-01 NOTE — TELEPHONE ENCOUNTER
----- Message from Silva Pryor sent at 7/1/2019 10:08 AM CDT -----  Contact: self/[phone]  Would like to consult with nurse regarding medication (Xanax), patient states she is having panic attack and depression and anxiety. Please call back at [phone]. Thanks/ar

## 2019-07-09 ENCOUNTER — OFFICE VISIT (OUTPATIENT)
Dept: INTERNAL MEDICINE | Facility: CLINIC | Age: 44
End: 2019-07-09
Payer: MEDICAID

## 2019-07-09 ENCOUNTER — HOSPITAL ENCOUNTER (EMERGENCY)
Facility: HOSPITAL | Age: 44
Discharge: PSYCHIATRIC OR SUBSTANCE USE FACILITY | End: 2019-07-09
Attending: EMERGENCY MEDICINE
Payer: MEDICAID

## 2019-07-09 VITALS
BODY MASS INDEX: 44.32 KG/M2 | SYSTOLIC BLOOD PRESSURE: 125 MMHG | DIASTOLIC BLOOD PRESSURE: 71 MMHG | HEIGHT: 65 IN | OXYGEN SATURATION: 100 % | RESPIRATION RATE: 20 BRPM | HEART RATE: 60 BPM | WEIGHT: 266 LBS | TEMPERATURE: 98 F

## 2019-07-09 VITALS
DIASTOLIC BLOOD PRESSURE: 67 MMHG | WEIGHT: 267.19 LBS | HEIGHT: 65 IN | BODY MASS INDEX: 44.52 KG/M2 | HEART RATE: 57 BPM | RESPIRATION RATE: 18 BRPM | TEMPERATURE: 97 F | SYSTOLIC BLOOD PRESSURE: 122 MMHG

## 2019-07-09 DIAGNOSIS — R45.851 SUICIDAL IDEATIONS: ICD-10-CM

## 2019-07-09 DIAGNOSIS — F33.3 SEVERE EPISODE OF RECURRENT MAJOR DEPRESSIVE DISORDER, WITH PSYCHOTIC FEATURES: Primary | ICD-10-CM

## 2019-07-09 DIAGNOSIS — R45.851 SUICIDAL IDEATIONS: Primary | ICD-10-CM

## 2019-07-09 DIAGNOSIS — F32.A DEPRESSION, UNSPECIFIED DEPRESSION TYPE: ICD-10-CM

## 2019-07-09 LAB
ALBUMIN SERPL BCP-MCNC: 3.4 G/DL (ref 3.5–5.2)
ALP SERPL-CCNC: 52 U/L (ref 55–135)
ALT SERPL W/O P-5'-P-CCNC: 15 U/L (ref 10–44)
AMPHET+METHAMPHET UR QL: NEGATIVE
ANION GAP SERPL CALC-SCNC: 10 MMOL/L (ref 8–16)
APAP SERPL-MCNC: <3 UG/ML (ref 10–20)
AST SERPL-CCNC: 15 U/L (ref 10–40)
BACTERIA #/AREA URNS AUTO: NORMAL /HPF
BARBITURATES UR QL SCN>200 NG/ML: NEGATIVE
BASOPHILS # BLD AUTO: 0.02 K/UL (ref 0–0.2)
BASOPHILS NFR BLD: 0.4 % (ref 0–1.9)
BENZODIAZ UR QL SCN>200 NG/ML: NORMAL
BILIRUB SERPL-MCNC: 0.4 MG/DL (ref 0.1–1)
BILIRUB UR QL STRIP: NEGATIVE
BUN SERPL-MCNC: 10 MG/DL (ref 6–20)
BZE UR QL SCN: NEGATIVE
CALCIUM SERPL-MCNC: 9 MG/DL (ref 8.7–10.5)
CANNABINOIDS UR QL SCN: NEGATIVE
CHLORIDE SERPL-SCNC: 107 MMOL/L (ref 95–110)
CLARITY UR REFRACT.AUTO: CLEAR
CO2 SERPL-SCNC: 23 MMOL/L (ref 23–29)
COLOR UR AUTO: YELLOW
CREAT SERPL-MCNC: 0.8 MG/DL (ref 0.5–1.4)
CREAT UR-MCNC: 201.1 MG/DL (ref 15–325)
DIFFERENTIAL METHOD: ABNORMAL
EOSINOPHIL # BLD AUTO: 0.1 K/UL (ref 0–0.5)
EOSINOPHIL NFR BLD: 1.9 % (ref 0–8)
ERYTHROCYTE [DISTWIDTH] IN BLOOD BY AUTOMATED COUNT: 14.2 % (ref 11.5–14.5)
EST. GFR  (AFRICAN AMERICAN): >60 ML/MIN/1.73 M^2
EST. GFR  (NON AFRICAN AMERICAN): >60 ML/MIN/1.73 M^2
ETHANOL SERPL-MCNC: <10 MG/DL
GLUCOSE SERPL-MCNC: 137 MG/DL (ref 70–110)
GLUCOSE UR QL STRIP: NEGATIVE
HCT VFR BLD AUTO: 35.4 % (ref 37–48.5)
HGB BLD-MCNC: 11.4 G/DL (ref 12–16)
HGB UR QL STRIP: ABNORMAL
KETONES UR QL STRIP: NEGATIVE
LEUKOCYTE ESTERASE UR QL STRIP: NEGATIVE
LYMPHOCYTES # BLD AUTO: 2.1 K/UL (ref 1–4.8)
LYMPHOCYTES NFR BLD: 44.1 % (ref 18–48)
MCH RBC QN AUTO: 28.6 PG (ref 27–31)
MCHC RBC AUTO-ENTMCNC: 32.2 G/DL (ref 32–36)
MCV RBC AUTO: 89 FL (ref 82–98)
METHADONE UR QL SCN>300 NG/ML: NEGATIVE
MICROSCOPIC COMMENT: NORMAL
MONOCYTES # BLD AUTO: 0.4 K/UL (ref 0.3–1)
MONOCYTES NFR BLD: 8.7 % (ref 4–15)
NEUTROPHILS # BLD AUTO: 2.2 K/UL (ref 1.8–7.7)
NEUTROPHILS NFR BLD: 44.9 % (ref 38–73)
NITRITE UR QL STRIP: NEGATIVE
OPIATES UR QL SCN: NEGATIVE
PCP UR QL SCN>25 NG/ML: NEGATIVE
PH UR STRIP: 6 [PH] (ref 5–8)
PLATELET # BLD AUTO: 317 K/UL (ref 150–350)
PMV BLD AUTO: 10.4 FL (ref 9.2–12.9)
POTASSIUM SERPL-SCNC: 3.9 MMOL/L (ref 3.5–5.1)
PROT SERPL-MCNC: 7.2 G/DL (ref 6–8.4)
PROT UR QL STRIP: NEGATIVE
RBC # BLD AUTO: 3.99 M/UL (ref 4–5.4)
RBC #/AREA URNS AUTO: 3 /HPF (ref 0–4)
SODIUM SERPL-SCNC: 140 MMOL/L (ref 136–145)
SP GR UR STRIP: >=1.03 (ref 1–1.03)
SQUAMOUS #/AREA URNS AUTO: 2 /HPF
T4 FREE SERPL-MCNC: 0.85 NG/DL (ref 0.71–1.51)
TOXICOLOGY INFORMATION: NORMAL
TSH SERPL DL<=0.005 MIU/L-ACNC: 0.39 UIU/ML (ref 0.4–4)
URN SPEC COLLECT METH UR: ABNORMAL
UROBILINOGEN UR STRIP-ACNC: NEGATIVE EU/DL
WBC # BLD AUTO: 4.83 K/UL (ref 3.9–12.7)

## 2019-07-09 PROCEDURE — 80307 DRUG TEST PRSMV CHEM ANLYZR: CPT | Mod: ER

## 2019-07-09 PROCEDURE — 99215 OFFICE O/P EST HI 40 MIN: CPT | Mod: S$PBB,,, | Performed by: FAMILY MEDICINE

## 2019-07-09 PROCEDURE — 80320 DRUG SCREEN QUANTALCOHOLS: CPT | Mod: ER

## 2019-07-09 PROCEDURE — 81000 URINALYSIS NONAUTO W/SCOPE: CPT | Mod: ER

## 2019-07-09 PROCEDURE — 80329 ANALGESICS NON-OPIOID 1 OR 2: CPT | Mod: ER

## 2019-07-09 PROCEDURE — 99215 PR OFFICE/OUTPT VISIT, EST, LEVL V, 40-54 MIN: ICD-10-PCS | Mod: S$PBB,,, | Performed by: FAMILY MEDICINE

## 2019-07-09 PROCEDURE — 99285 EMERGENCY DEPT VISIT HI MDM: CPT | Mod: 27,ER

## 2019-07-09 PROCEDURE — 80053 COMPREHEN METABOLIC PANEL: CPT | Mod: ER

## 2019-07-09 PROCEDURE — 99999 PR PBB SHADOW E&M-EST. PATIENT-LVL III: ICD-10-PCS | Mod: PBBFAC,,, | Performed by: FAMILY MEDICINE

## 2019-07-09 PROCEDURE — 84443 ASSAY THYROID STIM HORMONE: CPT | Mod: ER

## 2019-07-09 PROCEDURE — 99213 OFFICE O/P EST LOW 20 MIN: CPT | Mod: PBBFAC,PO | Performed by: FAMILY MEDICINE

## 2019-07-09 PROCEDURE — 85025 COMPLETE CBC W/AUTO DIFF WBC: CPT | Mod: ER

## 2019-07-09 PROCEDURE — 84439 ASSAY OF FREE THYROXINE: CPT | Mod: ER

## 2019-07-09 PROCEDURE — 99999 PR PBB SHADOW E&M-EST. PATIENT-LVL III: CPT | Mod: PBBFAC,,, | Performed by: FAMILY MEDICINE

## 2019-07-09 RX ORDER — ZIPRASIDONE MESYLATE 20 MG/ML
10 INJECTION, POWDER, LYOPHILIZED, FOR SOLUTION INTRAMUSCULAR
Status: DISCONTINUED | OUTPATIENT
Start: 2019-07-09 | End: 2019-07-09 | Stop reason: HOSPADM

## 2019-07-09 RX ORDER — LORAZEPAM 1 MG/1
1 TABLET ORAL EVERY 8 HOURS PRN
Qty: 90 TABLET | Refills: 0 | Status: SHIPPED | OUTPATIENT
Start: 2019-07-09 | End: 2019-08-27

## 2019-07-09 RX ORDER — SERTRALINE HYDROCHLORIDE 100 MG/1
100 TABLET, FILM COATED ORAL DAILY
Qty: 30 TABLET | Refills: 0 | Status: SHIPPED | OUTPATIENT
Start: 2019-07-09 | End: 2019-10-14

## 2019-07-09 NOTE — PROGRESS NOTES
Subjective:       Patient ID: Yuli Milton is a 43 y.o. female.    Chief Complaint: Follow-up    Suicidal planning:  Plans to take an entire bottle of pills and go to sleep as her plaan  Feels like her body is here, but she is not here.  Pt still hearing voices, and having visual hallucinations that she is dealing with dear daily    Depression   Visit Type: follow-up  Patient presents with the following symptoms: depressed mood, excessive worry, feelings of hopelessness, nervousness/anxiety, suicidal ideas and weight gain.  Patient is not experiencing: palpitations, shortness of breath, suicidal planning and thoughts of death.  Frequency of symptoms: constantly   Severity: interfering with daily activities   Sleep quality: good  Compliance with medications:  %  Side effects:  Auditory    Review of Systems   Constitutional: Positive for weight gain. Negative for fever.   HENT: Negative for congestion.    Eyes: Negative for discharge.   Respiratory: Negative for shortness of breath.    Cardiovascular: Negative for chest pain and palpitations.   Gastrointestinal: Negative for abdominal pain.   Genitourinary: Negative for difficulty urinating.   Musculoskeletal: Negative for joint swelling.   Neurological: Negative for dizziness.   Psychiatric/Behavioral: Positive for depression, hallucinations and suicidal ideas. Negative for agitation. The patient is nervous/anxious.        Objective:      Physical Exam   Constitutional: She appears well-developed and well-nourished. No distress.   HENT:   Head: Normocephalic and atraumatic.   Eyes: Conjunctivae are normal. No scleral icterus.   Cardiovascular: Normal rate and regular rhythm.   Pulmonary/Chest: Effort normal and breath sounds normal. No respiratory distress. She has no wheezes.   Abdominal: Soft. Bowel sounds are normal. There is no tenderness. There is no guarding.   Neurological: She is alert.   Skin: Skin is warm. No rash noted. She is not diaphoretic.  No erythema. No pallor.   Good turgor   Psychiatric: Her speech is normal. Her affect is angry and labile. She is agitated and withdrawn. She exhibits a depressed mood. She expresses suicidal ideation. She expresses suicidal plans. She is inattentive.   Nursing note and vitals reviewed.      Assessment:       1. Severe episode of recurrent major depressive disorder, with psychotic features    2. Suicidal ideations        Plan:     Problem List Items Addressed This Visit        Psychiatric    Severe episode of recurrent major depressive disorder, with psychotic features - Primary    Current Assessment & Plan     Prescribed ativan. Discontinued Ativan from pharmacy to ensure it would not be filled.    MEDICAL DECISION MAKING: Moderate to high complexity.  Overall, the multiple problems listed are of moderate to high severity that may impact quality of life and activities of daily living. Side effects of medications, treatment plan as well as options and alternatives reviewed and discussed with patient. There was counseling of patient concerning these issues.                 Relevant Medications    LORazepam (ATIVAN) 1 MG tablet    sertraline (ZOLOFT) 100 MG tablet    Suicidal ideations    Current Assessment & Plan     Pt having active thoughts with plan of suicide with means with meds at home.  Active hallucinations,   Ref to ED.  Spoke with Dr. Escobar and explained case and accepts transfer to ED.

## 2019-07-09 NOTE — ED NOTES
Pt escorted to [facility] vehicle with two [facility] personnel and security. Pt cooperative.

## 2019-07-09 NOTE — ED NOTES
"Dr Escobar at bedside. Pt reports "I"m not going to [facility], I'm not going anywhere."   "

## 2019-07-09 NOTE — ED NOTES
Pt given food to eat per request, calm and cooperative, resp e/u, nad. PEC protocols remain in place. Doris, RT is at the bedside for direct observation. Will continue to monitor.

## 2019-07-09 NOTE — ED NOTES
Admit packet sent to [facility], [facility], [facility], and [facility].
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
## 2019-07-09 NOTE — ED NOTES
Admit packet sent to , [facility], [facility], [facility], [facility], [place],  [facility], [facility], [facility], [facility], [facility], [facility], [facility],  Waiting for response.

## 2019-07-09 NOTE — ASSESSMENT & PLAN NOTE
Pt having active thoughts with plan of suicide with means with meds at home.  Active hallucinations,   Ref to ED.  Spoke with Dr. Escobar and explained case and accepts transfer to ED.

## 2019-07-09 NOTE — ED NOTES
Sgt. Mackey and DON Lucero speaking bedside with pt. Pt agrees to go with [employer] without being medicated. Reviewed PEC process and suicidal ideations.

## 2019-07-09 NOTE — ED NOTES
Pt awake, calm, resp e/u, skin warm and dry, nad. PEC safety protocols in place. JESSICA Waters is at the bedside for direct observation. Will continue to monitor.

## 2019-07-09 NOTE — ED NOTES
Pt sleeping, resp e/u, skin warm and dry, nad. PEC safety protocols remain in place. Doris, RT is at the bedside for direct observation. Will continue to monitor.

## 2019-07-09 NOTE — ED NOTES
PEC written by Dr. Escobar. Pt changed into gray hospital gown with yellow fall prevention socks. Pt's room is clear per PEC protocol. JESSICA Waters is at bedside for direct observation. Pt's safe word is big bird. Pt's contacts:   Leilani Milton (daughter) [phone]  Jesus Davila (mother) [phone]    Pt's belongings searched and placed in EMS locker #1. Code is 1111.     Pt's belongings include:  Flip flops  White t-shirt  Black pants   purse  cellphone  Wallet $100: (4- 20 dollar bills);(2-$5bills); (10 $1 bills)  Keys  Red bag with variety of lip gloss  Drivers license  SS card  Bank card X2  Various insurance cards  [facility]   Glucometer  Bottle of perfume  Merformin bottle  Tramadol bottle with two unknown pills   Variety of receipts
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
## 2019-07-09 NOTE — ASSESSMENT & PLAN NOTE
Prescribed ativan. Discontinued Ativan from pharmacy to ensure it would not be filled.    MEDICAL DECISION MAKING: Moderate to high complexity.  Overall, the multiple problems listed are of moderate to high severity that may impact quality of life and activities of daily living. Side effects of medications, treatment plan as well as options and alternatives reviewed and discussed with patient. There was counseling of patient concerning these issues.

## 2019-07-09 NOTE — ED PROVIDER NOTES
"Encounter Date: 7/9/2019       History     Chief Complaint   Patient presents with    Psychiatric Evaluation     + SI, auditory and visual hallucinations     The history is provided by the patient, a caregiver and medical records.   Mental Health Problem   The primary symptoms include dysphoric mood, hallucinations (visual) and negative symptoms. The primary symptoms do not include delusions, bizarre behavior, disorganized speech or somatic symptoms. The current episode started several weeks ago. This is a recurrent problem.   The onset of the illness is precipitated by emotional stress. The degree of incapacity that she is experiencing as a consequence of her illness is severe (pt states she, "just wants to go home."). Sequelae of the illness include an inability to work. Additional symptoms of the illness include hypersomnia, fatigue, psychomotor retardation and feelings of worthlessness. Additional symptoms of the illness do not include euphoric mood, increased goal-directed activity, flight of ideas, inflated self-esteem, decreased need for sleep, distractible, poor judgment, visual change, headaches, abdominal pain or seizures. She admits to suicidal ideas. She does not have a plan to commit suicide. She contemplates harming herself. She has not already injured self. She does not contemplate injuring another person. She has not already  injured another person. Risk factors that are present for mental illness include a history of mental illness.     Review of patient's allergies indicates:   Allergen Reactions    Trulicity [dulaglutide] Shortness Of Breath and Other (See Comments)     Headaches    Hibiclens (isopropyl alcohol) Itching     Past Medical History:   Diagnosis Date    Abnormal Pap smear of cervix     treatment??    Abnormal Pap smear of vagina     Anemia     Anxiety     Anxiety and depression     Asthma     Chlamydia     Depression (emotion)     Diabetes mellitus     Dysuria 3/31/2018 "    Gallstones     Gastritis     History of ovarian cyst     History of syphilis     History of trichomoniasis     Mental disorder     PONV (postoperative nausea and vomiting)     Vaginal candidiasis 7/24/2018     Past Surgical History:   Procedure Laterality Date    APPENDECTOMY      CHOLECYSTECTOMY      COLONOSCOPY N/A 7/31/2017    Performed by [facility] Michael [facility] at [facility] ENDO    DILATION AND CURETTAGE OF UTERUS      bleeding    ESOPHAGOGASTRODUODENOSCOPY (EGD) N/A 6/8/2017    Performed by Too Cordova III, MD at [facility] ENDO    HYSTERECTOMY  08/31/2016    RALH/BS for complex hyperplasia without atypia    SALPINGECTOMY-ROBOTIC ASSISTED Bilateral 8/31/2016    [facility] Poonam Johnston [facility] at [facility] OR    XI ROBOTIC ASSISTED LAPAROSCOPIC HYSTERECTOMY N/A 8/31/2016    Performed by [facility]am [facility] at [facility] OR     Family History   Problem Relation Age of Onset    Breast cancer Paternal Grandmother     Diabetes Maternal Grandmother     Hypertension Mother     Breast cancer Maternal Aunt     Cancer Maternal Aunt         colon cancer    Colon cancer Maternal Aunt     Miscarriages / Stillbirths Cousin     Stroke Maternal Aunt     Ovarian cancer Neg Hx     Deep vein thrombosis Neg Hx     Pulmonary embolism Neg Hx      Social History     Tobacco Use    Smoking status: Never Smoker    Smokeless tobacco: Never Used   Substance Use Topics    Alcohol use: No     Alcohol/week: 0.0 oz    Drug use: No     Review of Systems   Constitutional: Positive for fatigue. Negative for fever.   HENT: Negative for sore throat.    Respiratory: Negative for shortness of breath.    Cardiovascular: Negative for chest pain.   Gastrointestinal: Negative for abdominal pain and nausea.   Genitourinary: Negative for dysuria.   Musculoskeletal: Negative for back pain.   Skin: Negative for rash.   Neurological: Negative for seizures, weakness and headaches.   Hematological: Does not bruise/bleed easily.    Psychiatric/Behavioral: Positive for dysphoric mood and hallucinations (visual).   All other systems reviewed and are negative.      Physical Exam     Initial Vitals [07/09/19 1131]   BP Pulse Resp Temp SpO2   (!) 146/79 60 20 97.7 °F (36.5 °C) 98 %      MAP       --         Physical Exam    Nursing note and vitals reviewed.  Constitutional: She appears well-developed and well-nourished.   HENT:   Head: Normocephalic and atraumatic.   Mouth/Throat: No oropharyngeal exudate.   Eyes: Conjunctivae and EOM are normal. Pupils are equal, round, and reactive to light.   Neck: Normal range of motion. Neck supple. No thyromegaly present.   Cardiovascular: Normal rate, regular rhythm, normal heart sounds and intact distal pulses. Exam reveals no gallop and no friction rub.    No murmur heard.  Pulmonary/Chest: Breath sounds normal. No respiratory distress. She has no wheezes. She has no rhonchi. She exhibits no tenderness.   Abdominal: Soft. Bowel sounds are normal. She exhibits no distension. There is no tenderness. There is no rebound and no guarding.   Musculoskeletal: Normal range of motion. She exhibits no edema or tenderness.   Lymphadenopathy:     She has no cervical adenopathy.   Neurological: She is alert and oriented to person, place, and time. She has normal strength. No cranial nerve deficit or sensory deficit.   Skin: Skin is warm and dry. No rash noted.   Psychiatric: Her speech is tangential. She is slowed and withdrawn. Thought content is not delusional. Cognition and memory are impaired. She expresses impulsivity and inappropriate judgment. She exhibits a depressed mood. She expresses suicidal ideation. She expresses no homicidal ideation. She expresses no suicidal plans and no homicidal plans.         ED Course   Procedures  Labs Reviewed   CBC W/ AUTO DIFFERENTIAL - Abnormal; Notable for the following components:       Result Value    RBC 3.99 (*)     Hemoglobin 11.4 (*)     Hematocrit 35.4 (*)     All  "other components within normal limits   COMPREHENSIVE METABOLIC PANEL - Abnormal; Notable for the following components:    Glucose 137 (*)     Albumin 3.4 (*)     Alkaline Phosphatase 52 (*)     All other components within normal limits   TSH - Abnormal; Notable for the following components:    TSH 0.392 (*)     All other components within normal limits   URINALYSIS, REFLEX TO URINE CULTURE - Abnormal; Notable for the following components:    Specific Gravity, UA >=1.030 (*)     Occult Blood UA 1+ (*)     All other components within normal limits    Narrative:     Preferred Collection Type->Urine, Clean Catch   ACETAMINOPHEN LEVEL - Abnormal; Notable for the following components:    Acetaminophen (Tylenol), Serum <3.0 (*)     All other components within normal limits   DRUG SCREEN PANEL, URINE EMERGENCY    Narrative:     Preferred Collection Type->Urine, Clean Catch   ALCOHOL,MEDICAL (ETHANOL)   URINALYSIS MICROSCOPIC    Narrative:     Preferred Collection Type->Urine, Clean Catch   T4, FREE          Imaging Results    None             Vitals:    07/09/19 1131 07/09/19 1330 07/09/19 1738 07/09/19 1743   BP: (!) 146/79 138/72 125/71 125/71   Pulse: 60 (!) 58 60 60   Resp: 20 18 20 20   Temp: 97.7 °F (36.5 °C) 97.6 °F (36.4 °C)  97.6 °F (36.4 °C)   TempSrc: Oral Oral  Oral   SpO2: 98%  100% 100%   Weight: 120.7 kg (266 lb)      Height: 5' 5" (1.651 m)          Results for orders placed or performed during the hospital encounter of 07/09/19   CBC auto differential   Result Value Ref Range    WBC 4.83 3.90 - 12.70 K/uL    RBC 3.99 (L) 4.00 - 5.40 M/uL    Hemoglobin 11.4 (L) 12.0 - 16.0 g/dL    Hematocrit 35.4 (L) 37.0 - 48.5 %    Mean Corpuscular Volume 89 82 - 98 fL    Mean Corpuscular Hemoglobin 28.6 27.0 - 31.0 pg    Mean Corpuscular Hemoglobin Conc 32.2 32.0 - 36.0 g/dL    RDW 14.2 11.5 - 14.5 %    Platelets 317 150 - 350 K/uL    MPV 10.4 9.2 - 12.9 fL    Gran # (ANC) 2.2 1.8 - 7.7 K/uL    Lymph # 2.1 1.0 - 4.8 K/uL "    Mono # 0.4 0.3 - 1.0 K/uL    Eos # 0.1 0.0 - 0.5 K/uL    Baso # 0.02 0.00 - 0.20 K/uL    Gran% 44.9 38.0 - 73.0 %    Lymph% 44.1 18.0 - 48.0 %    Mono% 8.7 4.0 - 15.0 %    Eosinophil% 1.9 0.0 - 8.0 %    Basophil% 0.4 0.0 - 1.9 %    Differential Method Automated    Comprehensive metabolic panel   Result Value Ref Range    Sodium 140 136 - 145 mmol/L    Potassium 3.9 3.5 - 5.1 mmol/L    Chloride 107 95 - 110 mmol/L    CO2 23 23 - 29 mmol/L    Glucose 137 (H) 70 - 110 mg/dL    BUN, Bld 10 6 - 20 mg/dL    Creatinine 0.8 0.5 - 1.4 mg/dL    Calcium 9.0 8.7 - 10.5 mg/dL    Total Protein 7.2 6.0 - 8.4 g/dL    Albumin 3.4 (L) 3.5 - 5.2 g/dL    Total Bilirubin 0.4 0.1 - 1.0 mg/dL    Alkaline Phosphatase 52 (L) 55 - 135 U/L    AST 15 10 - 40 U/L    ALT 15 10 - 44 U/L    Anion Gap 10 8 - 16 mmol/L    eGFR if African American >60.0 >60 mL/min/1.73 m^2    eGFR if non African American >60.0 >60 mL/min/1.73 m^2   TSH   Result Value Ref Range    TSH 0.392 (L) 0.400 - 4.000 uIU/mL   Urinalysis, Reflex to Urine Culture Urine, Clean Catch   Result Value Ref Range    Specimen UA Urine, Clean Catch     Color, UA Yellow Yellow, Straw, Yumiko    Appearance, UA Clear Clear    pH, UA 6.0 5.0 - 8.0    Specific Gravity, UA >=1.030 (A) 1.005 - 1.030    Protein, UA Negative Negative    Glucose, UA Negative Negative    Ketones, UA Negative Negative    Bilirubin (UA) Negative Negative    Occult Blood UA 1+ (A) Negative    Nitrite, UA Negative Negative    Urobilinogen, UA Negative <2.0 EU/dL    Leukocytes, UA Negative Negative   Drug screen panel, emergency   Result Value Ref Range    Benzodiazepines Presumptve Positive     Methadone metabolites Negative     Cocaine (Metab.) Negative     Opiate Scrn, Ur Negative     Barbiturate Screen, Ur Negative     Amphetamine Screen, Ur Negative     THC Negative     Phencyclidine Negative     Creatinine, Random Ur 201.1 15.0 - 325.0 mg/dL    Toxicology Information SEE COMMENT    Ethanol   Result Value Ref  Range    Alcohol, Medical, Serum <10 <10 mg/dL   Acetaminophen level   Result Value Ref Range    Acetaminophen (Tylenol), Serum <3.0 (L) 10.0 - 20.0 ug/mL   Urinalysis Microscopic   Result Value Ref Range    RBC, UA 3 0 - 4 /hpf    Bacteria Rare None-Occ /hpf    Squam Epithel, UA 2 /hpf    Microscopic Comment SEE COMMENT    T4, free   Result Value Ref Range    Free T4 0.85 0.71 - 1.51 ng/dL         Imaging Results    None         Medications - No data to display      11:40 AM PEC. Pt will be PEC'd. Informed patient and family that psychiatric services are not available at this facility. Notified them of the need to transfer to another facility with available services. Pt will need to be medically cleared prior to transfer. They understand and agree with the plan as discussed. Answered any questions at this time.      1:37 PM  - Re-evaluation: The patient is resting comfortably and is in no acute distress. They have been medically cleared and are awaiting placement/transfer to a psychiatric facility for further evaluation. Pt is stable for transfer at this time.      All historical, clinical, and laboratory findings were reviewed with the patient/family in detail along with the indications for transfer to an inpatient psychiatric services as we do not have those services available at this facility.  All remaining questions and concerns were addressed at that time and the patient/family agrees to proceed accordingly.  Similarly all pertinent details of the encounter were discussed with Dr. Mary at [facility] who agrees to accept the patient in transfer based on the needs/patient preferences outlined above.  Patient will be transferred by \[facility]\"" secondary to a need for secure/protective transport given the nature of the patients illness.  Rahul Escobar MD  1:38 PM           Medication List      ASK your doctor about these medications    albuterol 90 mcg/actuation inhaler  Commonly known as:  VENTOLIN HFA  Inhale 2  "puffs into the lungs every 6 (six) hours as needed for Wheezing. Rescue     atorvastatin 20 MG tablet  Commonly known as:  LIPITOR  Take 1 tablet (20 mg total) by mouth once daily.     cycloSPORINE 0.05 % ophthalmic emulsion  Commonly known as:  RESTASIS     ketoconazole 2 % cream  Commonly known as:  NIZORAL     liraglutide 0.6 mg/0.1 mL (18 mg/3 mL) subq PNIJ 0.6 mg/0.1 mL (18 mg/3 mL) Pnij  Commonly known as:  VICTOZA 2-LIDYA  Inject 1.2 mg into the skin once daily for 14 days, THEN 1.8 mg once daily.  Start taking on:  5/23/2019     LORazepam 1 MG tablet  Commonly known as:  ATIVAN  Take 1 tablet (1 mg total) by mouth every 8 (eight) hours as needed for Anxiety.     losartan 25 MG tablet  Commonly known as:  COZAAR  Take 0.5 tablets (12.5 mg total) by mouth once daily.     metFORMIN 1000 MG tablet  Commonly known as:  GLUCOPHAGE  Take 1 tablet (1,000 mg total) by mouth 2 (two) times daily with meals.     methocarbamol 500 MG Tab  Commonly known as:  ROBAXIN  Take 1 tablet (500 mg total) by mouth 2 (two) times daily as needed (muscle spasms).     ondansetron 4 MG tablet  Commonly known as:  ZOFRAN  Take 1 tablet (4 mg total) by mouth every 8 (eight) hours as needed for Nausea.     ONETOUCH DELICA LANCETS 30 gauge Misc  Generic drug:  lancets  USE TO TEST THREE TIMES DAILY     pen needle, diabetic 32 gauge x 5/32" Ndle  Commonly known as:  PEN NEEDLE  1 each by Misc.(Non-Drug; Combo Route) route once daily.     sertraline 100 MG tablet  Commonly known as:  ZOLOFT  Take 1 tablet (100 mg total) by mouth once daily.           Current Discharge Medication List            ED Diagnosis  1. Suicidal ideations    2. Depression, unspecified depression type                             Clinical Impression:       ICD-10-CM ICD-9-CM   1. Suicidal ideations R45.851 V62.84   2. Depression, unspecified depression type F32.9 311         Disposition:   Disposition: Transferred  Condition: Stable                        [facility] " MD Christina  07/10/19 0719

## 2019-07-09 NOTE — ED NOTES
Patient accepted by Al[facility] at [facility] ([address], [place]) for the service of Dr. Mary.  Report to be called to [phone].    Request made to wait 30 minutes to call report.

## 2019-08-20 ENCOUNTER — PATIENT OUTREACH (OUTPATIENT)
Dept: ADMINISTRATIVE | Facility: HOSPITAL | Age: 44
End: 2019-08-20

## 2019-08-20 NOTE — PROGRESS NOTES
Pt commented that she had her Eye exam @ [facility] but doesn't remember the MD's name. She may remember when she goes for her visit. Pt was asked to sign a BALTAZAR, pt agreed

## 2019-08-21 ENCOUNTER — TELEPHONE (OUTPATIENT)
Dept: INTERNAL MEDICINE | Facility: CLINIC | Age: 44
End: 2019-08-21

## 2019-08-21 NOTE — TELEPHONE ENCOUNTER
Patient called in regards to wanting a pain medication called to her pharmacy. Patient states that she went to

## 2019-08-21 NOTE — TELEPHONE ENCOUNTER
----- Message from Tee Bonds sent at 8/21/2019 11:01 AM CDT -----  Contact: [phone]  Pt would like a call from nurse in regards to being in the er on last night and would like to know if  can prescribe pain medication for leg swelling and feet swelling //pain in legs. Please call back at [phone].       Thank You,   Tee Bonds

## 2019-08-27 ENCOUNTER — OFFICE VISIT (OUTPATIENT)
Dept: INTERNAL MEDICINE | Facility: CLINIC | Age: 44
End: 2019-08-27
Payer: MEDICAID

## 2019-08-27 ENCOUNTER — LAB VISIT (OUTPATIENT)
Dept: LAB | Facility: HOSPITAL | Age: 44
End: 2019-08-27
Attending: FAMILY MEDICINE
Payer: MEDICAID

## 2019-08-27 VITALS
HEART RATE: 62 BPM | DIASTOLIC BLOOD PRESSURE: 81 MMHG | BODY MASS INDEX: 46.02 KG/M2 | TEMPERATURE: 99 F | WEIGHT: 276.25 LBS | SYSTOLIC BLOOD PRESSURE: 136 MMHG | OXYGEN SATURATION: 98 % | RESPIRATION RATE: 19 BRPM | HEIGHT: 65 IN

## 2019-08-27 DIAGNOSIS — E11.9 TYPE 2 DIABETES MELLITUS WITHOUT COMPLICATION, WITHOUT LONG-TERM CURRENT USE OF INSULIN: ICD-10-CM

## 2019-08-27 DIAGNOSIS — J45.20 MILD INTERMITTENT ASTHMA WITHOUT COMPLICATION: ICD-10-CM

## 2019-08-27 DIAGNOSIS — G25.81 RESTLESS LEG SYNDROME: ICD-10-CM

## 2019-08-27 DIAGNOSIS — E11.9 TYPE 2 DIABETES MELLITUS WITHOUT COMPLICATION, WITHOUT LONG-TERM CURRENT USE OF INSULIN: Primary | ICD-10-CM

## 2019-08-27 DIAGNOSIS — F33.3 SEVERE EPISODE OF RECURRENT MAJOR DEPRESSIVE DISORDER, WITH PSYCHOTIC FEATURES: ICD-10-CM

## 2019-08-27 LAB
ALBUMIN SERPL BCP-MCNC: 3.3 G/DL (ref 3.5–5.2)
ALP SERPL-CCNC: 52 U/L (ref 55–135)
ALT SERPL W/O P-5'-P-CCNC: 13 U/L (ref 10–44)
ANION GAP SERPL CALC-SCNC: 9 MMOL/L (ref 8–16)
AST SERPL-CCNC: 12 U/L (ref 10–40)
BILIRUB SERPL-MCNC: 0.2 MG/DL (ref 0.1–1)
BUN SERPL-MCNC: 6 MG/DL (ref 6–20)
CALCIUM SERPL-MCNC: 8.4 MG/DL (ref 8.7–10.5)
CHLORIDE SERPL-SCNC: 108 MMOL/L (ref 95–110)
CO2 SERPL-SCNC: 25 MMOL/L (ref 23–29)
CREAT SERPL-MCNC: 0.7 MG/DL (ref 0.5–1.4)
EST. GFR  (AFRICAN AMERICAN): >60 ML/MIN/1.73 M^2
EST. GFR  (NON AFRICAN AMERICAN): >60 ML/MIN/1.73 M^2
ESTIMATED AVG GLUCOSE: 154 MG/DL (ref 68–131)
GLUCOSE SERPL-MCNC: 156 MG/DL (ref 70–110)
HBA1C MFR BLD HPLC: 7 % (ref 4–5.6)
POTASSIUM SERPL-SCNC: 3.9 MMOL/L (ref 3.5–5.1)
PROT SERPL-MCNC: 7 G/DL (ref 6–8.4)
SODIUM SERPL-SCNC: 142 MMOL/L (ref 136–145)

## 2019-08-27 PROCEDURE — 36415 COLL VENOUS BLD VENIPUNCTURE: CPT | Mod: PO

## 2019-08-27 PROCEDURE — 99214 PR OFFICE/OUTPT VISIT, EST, LEVL IV, 30-39 MIN: ICD-10-PCS | Mod: S$PBB,,, | Performed by: FAMILY MEDICINE

## 2019-08-27 PROCEDURE — 99999 PR PBB SHADOW E&M-EST. PATIENT-LVL III: CPT | Mod: PBBFAC,,, | Performed by: FAMILY MEDICINE

## 2019-08-27 PROCEDURE — 83036 HEMOGLOBIN GLYCOSYLATED A1C: CPT

## 2019-08-27 PROCEDURE — 80053 COMPREHEN METABOLIC PANEL: CPT | Mod: PO

## 2019-08-27 PROCEDURE — 99999 PR PBB SHADOW E&M-EST. PATIENT-LVL III: ICD-10-PCS | Mod: PBBFAC,,, | Performed by: FAMILY MEDICINE

## 2019-08-27 PROCEDURE — 99213 OFFICE O/P EST LOW 20 MIN: CPT | Mod: PBBFAC,PO | Performed by: FAMILY MEDICINE

## 2019-08-27 PROCEDURE — 99214 OFFICE O/P EST MOD 30 MIN: CPT | Mod: S$PBB,,, | Performed by: FAMILY MEDICINE

## 2019-08-27 RX ORDER — IBUPROFEN 800 MG/1
TABLET ORAL
Refills: 0 | Status: ON HOLD | COMMUNITY
Start: 2019-08-12 | End: 2019-12-12 | Stop reason: HOSPADM

## 2019-08-27 RX ORDER — ALBUTEROL SULFATE 90 UG/1
2 AEROSOL, METERED RESPIRATORY (INHALATION) EVERY 6 HOURS PRN
Qty: 18 G | Refills: 0 | Status: SHIPPED | OUTPATIENT
Start: 2019-08-27 | End: 2020-02-17 | Stop reason: SDUPTHER

## 2019-08-27 RX ORDER — FUROSEMIDE 20 MG/1
20 TABLET ORAL DAILY
COMMUNITY
Start: 2019-08-20 | End: 2020-05-22 | Stop reason: SDUPTHER

## 2019-08-27 RX ORDER — VALACYCLOVIR HYDROCHLORIDE 1 G/1
TABLET, FILM COATED ORAL
Refills: 0 | COMMUNITY
Start: 2019-07-18 | End: 2020-05-22

## 2019-08-27 RX ORDER — ALPRAZOLAM 0.5 MG/1
0.5 TABLET ORAL 3 TIMES DAILY PRN
Qty: 60 TABLET | Refills: 0 | Status: SHIPPED | OUTPATIENT
Start: 2019-08-27 | End: 2019-10-14

## 2019-08-27 RX ORDER — ALPRAZOLAM 0.5 MG/1
0.5 TABLET ORAL EVERY 4 HOURS PRN
COMMUNITY
End: 2019-08-27 | Stop reason: SDUPTHER

## 2019-08-27 RX ORDER — METHOCARBAMOL 500 MG/1
500 TABLET, FILM COATED ORAL 2 TIMES DAILY PRN
Qty: 60 TABLET | Refills: 0 | Status: SHIPPED | OUTPATIENT
Start: 2019-08-27 | End: 2019-10-14

## 2019-08-27 NOTE — PROGRESS NOTES
Subjective:       Patient ID: Yuli Milton is a 43 y.o. female.    Chief Complaint: Follow-up (3 month) and Leg Pain (both)    Diabetes   She presents for her follow-up diabetic visit. She has type 2 diabetes mellitus. Her disease course has been stable. Pertinent negatives for hypoglycemia include no confusion, dizziness, headaches or hunger. Pertinent negatives for diabetes include no chest pain, no fatigue and no weakness. Pertinent negatives for hypoglycemia complications include no blackouts and no hospitalization. Symptoms are stable. Risk factors for coronary artery disease include diabetes mellitus and hypertension. Current diabetic treatments: victoza. She is compliant with treatment all of the time. An ACE inhibitor/angiotensin II receptor blocker is being taken.     Review of Systems   Constitutional: Negative for fatigue.   Respiratory: Negative for shortness of breath.    Cardiovascular: Negative for chest pain.   Gastrointestinal: Negative for abdominal pain.   Neurological: Negative for dizziness, weakness and headaches.   Psychiatric/Behavioral: Negative for confusion.       Objective:      Physical Exam   Constitutional: She appears well-developed and well-nourished. No distress.   HENT:   Head: Normocephalic and atraumatic.   Pulmonary/Chest: Effort normal and breath sounds normal. No respiratory distress. She has no wheezes.   Musculoskeletal: Normal range of motion. She exhibits no edema, tenderness or deformity.   Skin: Skin is warm and dry. No rash noted. She is not diaphoretic. No erythema.   Nursing note and vitals reviewed.      Assessment:       1. Type 2 diabetes mellitus without complication, without long-term current use of insulin    2. Restless leg syndrome    3. Severe episode of recurrent major depressive disorder, with psychotic features    4. Mild intermittent asthma without complication        Plan:     Problem List Items Addressed This Visit        Neuro    Restless leg  syndrome    Relevant Medications    methocarbamol (ROBAXIN) 500 MG Tab       Psychiatric    Severe episode of recurrent major depressive disorder, with psychotic features    Relevant Medications    ALPRAZolam (XANAX) 0.5 MG tablet       Pulmonary    Asthma    Relevant Medications    albuterol (VENTOLIN HFA) 90 mcg/actuation inhaler       Endocrine    Type 2 diabetes mellitus without complication, without long-term current use of insulin - Primary    Relevant Medications    liraglutide 0.6 mg/0.1 mL, 18 mg/3 mL, subq PNIJ (VICTOZA 2-LIDYA) 0.6 mg/0.1 mL (18 mg/3 mL) PnIj    Other Relevant Orders    Hemoglobin A1c    Comprehensive metabolic panel

## 2019-08-28 NOTE — PROGRESS NOTES
Please call pt with abnormal results. Pt does not need appt at this time, unless they have questions or wish to further discuss.  Worsening a1c,  Pls ask her to ensure that she is taking all her meds, and decrease the amount of carbs and sugars in her diet.

## 2019-10-14 ENCOUNTER — TELEPHONE (OUTPATIENT)
Dept: INTERNAL MEDICINE | Facility: CLINIC | Age: 44
End: 2019-10-14

## 2019-10-14 ENCOUNTER — HOSPITAL ENCOUNTER (OUTPATIENT)
Dept: RADIOLOGY | Facility: HOSPITAL | Age: 44
Discharge: HOME | End: 2019-10-14
Attending: INTERNAL MEDICINE
Payer: MEDICAID

## 2019-10-14 ENCOUNTER — OFFICE VISIT (OUTPATIENT)
Dept: INTERNAL MEDICINE | Facility: CLINIC | Age: 44
End: 2019-10-14
Payer: MEDICAID

## 2019-10-14 ENCOUNTER — OFFICE VISIT (OUTPATIENT)
Dept: CARDIOLOGY | Facility: CLINIC | Age: 44
End: 2019-10-14
Payer: MEDICAID

## 2019-10-14 ENCOUNTER — HOSPITAL ENCOUNTER (OUTPATIENT)
Dept: CARDIOLOGY | Facility: CLINIC | Age: 44
Discharge: HOME | End: 2019-10-14
Payer: MEDICAID

## 2019-10-14 VITALS
TEMPERATURE: 98 F | WEIGHT: 272.5 LBS | HEIGHT: 65 IN | SYSTOLIC BLOOD PRESSURE: 115 MMHG | DIASTOLIC BLOOD PRESSURE: 70 MMHG | BODY MASS INDEX: 45.4 KG/M2 | RESPIRATION RATE: 19 BRPM | OXYGEN SATURATION: 96 % | HEART RATE: 70 BPM

## 2019-10-14 VITALS
WEIGHT: 271.63 LBS | DIASTOLIC BLOOD PRESSURE: 84 MMHG | HEIGHT: 65 IN | HEART RATE: 68 BPM | SYSTOLIC BLOOD PRESSURE: 116 MMHG | BODY MASS INDEX: 45.26 KG/M2

## 2019-10-14 DIAGNOSIS — E66.01 MORBID OBESITY DUE TO EXCESS CALORIES: ICD-10-CM

## 2019-10-14 DIAGNOSIS — I10 ESSENTIAL HYPERTENSION: ICD-10-CM

## 2019-10-14 DIAGNOSIS — R06.09 DOE (DYSPNEA ON EXERTION): ICD-10-CM

## 2019-10-14 DIAGNOSIS — E66.01 CLASS 3 SEVERE OBESITY DUE TO EXCESS CALORIES WITHOUT SERIOUS COMORBIDITY WITH BODY MASS INDEX (BMI) OF 40.0 TO 44.9 IN ADULT: ICD-10-CM

## 2019-10-14 DIAGNOSIS — E11.9 TYPE 2 DIABETES MELLITUS WITHOUT COMPLICATION, WITHOUT LONG-TERM CURRENT USE OF INSULIN: ICD-10-CM

## 2019-10-14 DIAGNOSIS — E78.2 MIXED HYPERLIPIDEMIA: ICD-10-CM

## 2019-10-14 DIAGNOSIS — R60.0 LOCALIZED EDEMA: ICD-10-CM

## 2019-10-14 DIAGNOSIS — R06.09 DOE (DYSPNEA ON EXERTION): Primary | ICD-10-CM

## 2019-10-14 DIAGNOSIS — R00.2 PALPITATIONS: ICD-10-CM

## 2019-10-14 DIAGNOSIS — R07.89 OTHER CHEST PAIN: ICD-10-CM

## 2019-10-14 PROCEDURE — 71046 XR CHEST PA AND LATERAL: ICD-10-PCS | Mod: 26,,, | Performed by: RADIOLOGY

## 2019-10-14 PROCEDURE — 99999 PR PBB SHADOW E&M-EST. PATIENT-LVL III: CPT | Mod: PBBFAC,,, | Performed by: INTERNAL MEDICINE

## 2019-10-14 PROCEDURE — 99999 PR PBB SHADOW E&M-EST. PATIENT-LVL III: ICD-10-PCS | Mod: PBBFAC,,, | Performed by: FAMILY MEDICINE

## 2019-10-14 PROCEDURE — 99214 PR OFFICE/OUTPT VISIT, EST, LEVL IV, 30-39 MIN: ICD-10-PCS | Mod: S$PBB,,, | Performed by: FAMILY MEDICINE

## 2019-10-14 PROCEDURE — 93010 EKG 12-LEAD: ICD-10-PCS | Mod: S$PBB,,, | Performed by: INTERNAL MEDICINE

## 2019-10-14 PROCEDURE — 99214 OFFICE O/P EST MOD 30 MIN: CPT | Mod: S$PBB,,, | Performed by: FAMILY MEDICINE

## 2019-10-14 PROCEDURE — 71046 X-RAY EXAM CHEST 2 VIEWS: CPT | Mod: TC

## 2019-10-14 PROCEDURE — 99213 OFFICE O/P EST LOW 20 MIN: CPT | Mod: PBBFAC,PO | Performed by: FAMILY MEDICINE

## 2019-10-14 PROCEDURE — 99999 PR PBB SHADOW E&M-EST. PATIENT-LVL III: CPT | Mod: PBBFAC,,, | Performed by: FAMILY MEDICINE

## 2019-10-14 PROCEDURE — 99999 PR PBB SHADOW E&M-EST. PATIENT-LVL III: ICD-10-PCS | Mod: PBBFAC,,, | Performed by: INTERNAL MEDICINE

## 2019-10-14 PROCEDURE — 71046 X-RAY EXAM CHEST 2 VIEWS: CPT | Mod: 26,,, | Performed by: RADIOLOGY

## 2019-10-14 PROCEDURE — 99214 OFFICE O/P EST MOD 30 MIN: CPT | Mod: S$PBB,,, | Performed by: INTERNAL MEDICINE

## 2019-10-14 PROCEDURE — 99213 OFFICE O/P EST LOW 20 MIN: CPT | Mod: PBBFAC,27,25 | Performed by: INTERNAL MEDICINE

## 2019-10-14 PROCEDURE — 93010 ELECTROCARDIOGRAM REPORT: CPT | Mod: S$PBB,,, | Performed by: INTERNAL MEDICINE

## 2019-10-14 PROCEDURE — 93005 ELECTROCARDIOGRAM TRACING: CPT | Mod: PBBFAC,PO | Performed by: INTERNAL MEDICINE

## 2019-10-14 PROCEDURE — 99214 PR OFFICE/OUTPT VISIT, EST, LEVL IV, 30-39 MIN: ICD-10-PCS | Mod: S$PBB,,, | Performed by: INTERNAL MEDICINE

## 2019-10-14 RX ORDER — POTASSIUM CHLORIDE 1.5 G/1.58G
20 POWDER, FOR SOLUTION ORAL DAILY
Qty: 30 PACKET | Refills: 0 | Status: SHIPPED | OUTPATIENT
Start: 2019-10-14 | End: 2020-02-17 | Stop reason: SDUPTHER

## 2019-10-14 RX ORDER — FLUOXETINE HYDROCHLORIDE 20 MG/1
CAPSULE ORAL
Refills: 0 | COMMUNITY
Start: 2019-09-25 | End: 2019-11-27

## 2019-10-14 RX ORDER — ALPRAZOLAM 2 MG/1
2 TABLET ORAL NIGHTLY
COMMUNITY
End: 2020-07-13

## 2019-10-14 RX ORDER — ALPRAZOLAM 1 MG/1
2 TABLET ORAL NIGHTLY
COMMUNITY
Start: 2019-10-10 | End: 2021-06-18 | Stop reason: SDUPTHER

## 2019-10-14 RX ORDER — HYDROCHLOROTHIAZIDE 12.5 MG/1
12.5 TABLET ORAL DAILY PRN
Qty: 30 TABLET | Refills: 0 | Status: SHIPPED | OUTPATIENT
Start: 2019-10-14 | End: 2020-02-17 | Stop reason: SDUPTHER

## 2019-10-14 NOTE — LETTER
October 14, 2019      Drew Gutierrez MD  [address] [address] [address]           [facility]  [address] [facility] [address]  Phone: [phone]  Fax: [phone]          Patient: Yuli Milton   MR Number: [id number]   YOB: 1975   Date of Visit: 10/14/2019       Dear Dr. Drew Gutierrez:    Thank you for referring Yuli Milton to me for evaluation. Attached you will find relevant portions of my assessment and plan of care.    If you have questions, please do not hesitate to call me. I look forward to following Yuli Milton along with you.    Sincerely,    Edis Jaffe MD    Enclosure  CC:  No Recipients    If you would like to receive this communication electronically, please contact [email or web address] or [phone] to request more information on [facility] access.    For providers and/or their staff who would like to refer a patient to [facility], please contact us through our one-stop-shop provider referral line, [facility] , at [phone].    If you feel you have received this communication in error or would no longer like to receive these types of communications, please e-mail [email or web address]

## 2019-10-14 NOTE — TELEPHONE ENCOUNTER
Good morning. Dr. Gutierrez would like this pt to be seen to day by Dr. Jaffe today at the [facility]. I was not able to schedule pt due to insurance. This was communicated between both providers. Could you please assist in scheduling this patient?

## 2019-10-14 NOTE — PROGRESS NOTES
Subjective:   Patient ID:  Yuli Milton is a 44 y.o. female who presents for cardiac consult of Shortness of Breath (numbness arms, legs)      HPI  The patient came in today for cardiac consult of Shortness of Breath (numbness arms, legs)    Referring Physician: Drew Gutierrez MD   Reason for consult: SANTOS    10/14/19  Yuli Milton is a 44 y.o. female pt with HTN, HLD, morbid obesity, asthma, DM2 here for initial CV eval of SANTOS.     She has been having SANTOS, which has been getting worse lately. Also has LE edema and pain in legs. Does transit driving but does move around.   Occ palpitations as well, occ day time but also at night. Occ intermittent chest pain but also numbness in arms/legs.   Labwork - BNP, D dimer negative.   She was given lasix today.     Patient feels no PND,  no dizziness, no syncope, no CNS symptoms.    Patient has dec exercise tolerance.    Patient is compliant with medications.    FH - aunt had stroke, cousin - massive MI , in 40s    ECG - sinus otf    Past Medical History:   Diagnosis Date    Abnormal Pap smear of cervix     treatment??    Abnormal Pap smear of vagina     Anemia     Anxiety     Anxiety and depression     Asthma     Chlamydia     Depression (emotion)     Diabetes mellitus     Dysuria 3/31/2018    Gallstones     Gastritis     History of ovarian cyst     History of syphilis     History of trichomoniasis     Hyperlipidemia     Mental disorder     PONV (postoperative nausea and vomiting)     Vaginal candidiasis 2018       Past Surgical History:   Procedure Laterality Date    APPENDECTOMY      CHOLECYSTECTOMY      COLONOSCOPY N/A 2017    Procedure: COLONOSCOPY;  Surgeon: Yuliana Michael MD;  Location: [facility];  Service: Endoscopy;  Laterality: N/A;    DILATION AND CURETTAGE OF UTERUS      bleeding    HYSTERECTOMY  2016    RALH/BS for complex hyperplasia without atypia       Social History     Tobacco Use    Smoking status:  Never Smoker    Smokeless tobacco: Never Used   Substance Use Topics    Alcohol use: No     Alcohol/week: 0.0 standard drinks    Drug use: No       Family History   Problem Relation Age of Onset    Breast cancer Paternal Grandmother     Diabetes Maternal Grandmother     Hypertension Mother     Thyroid disease Mother     Breast cancer Maternal Aunt     Cancer Maternal Aunt         colon cancer    Colon cancer Maternal Aunt     Miscarriages / Stillbirths Cousin     Stroke Maternal Aunt     No Known Problems Father     Thyroid disease Sister     Thyroid disease Brother     Ovarian cancer Neg Hx     Deep vein thrombosis Neg Hx     Pulmonary embolism Neg Hx        Patient's Medications   New Prescriptions    No medications on file   Previous Medications    ALBUTEROL (VENTOLIN HFA) 90 MCG/ACTUATION INHALER    Inhale 2 puffs into the lungs every 6 (six) hours as needed for Wheezing. Rescue    ALPRAZOLAM (XANAX) 1 MG TABLET    Take 2 mg by mouth every evening.     ALPRAZOLAM (XANAX) 2 MG TAB    Take 2 mg by mouth every evening.    ATORVASTATIN (LIPITOR) 20 MG TABLET    Take 1 tablet (20 mg total) by mouth once daily.    BREXPIPRAZOLE (REXULTI) 3 MG TAB    Take 3 mg by mouth every evening.    CYCLOSPORINE (RESTASIS) 0.05 % OPHTHALMIC EMULSION    1 drop 2 (two) times daily.    FLUOXETINE 20 MG CAPSULE    TAKE 1 CAPSULE BY MOUTH EVERY DAY AS DIRECTED    FUROSEMIDE (LASIX) 20 MG TABLET    Take 20 mg by mouth once daily.     HYDROCHLOROTHIAZIDE (HYDRODIURIL) 12.5 MG TAB    Take 1 tablet (12.5 mg total) by mouth daily as needed.    IBUPROFEN (ADVIL,MOTRIN) 800 MG TABLET    TAKE 1 TABLET BY MOUTH THREE TIMES A DAY WITH FOOD    KETOCONAZOLE (NIZORAL) 2 % CREAM    APPLY TO RASH ON THE FACE TWICE DAILY    LIRAGLUTIDE 0.6 MG/0.1 ML, 18 MG/3 ML, SUBQ PNIJ (VICTOZA 2-LIDYA) 0.6 MG/0.1 ML (18 MG/3 ML) PNIJ    Inject 1.8 mg into the skin once daily.    LOSARTAN (COZAAR) 25 MG TABLET    Take 0.5 tablets (12.5 mg total) by  "mouth once daily.    METFORMIN (GLUCOPHAGE) 1000 MG TABLET    Take 1 tablet (1,000 mg total) by mouth 2 (two) times daily with meals.    ONDANSETRON (ZOFRAN) 4 MG TABLET    Take 1 tablet (4 mg total) by mouth every 8 (eight) hours as needed for Nausea.    ONETOUCH DELICA LANCETS 30 GAUGE MISC    USE TO TEST THREE TIMES DAILY    PEN NEEDLE, DIABETIC (PEN NEEDLE) 32 GAUGE X 5/32" NDLE    1 each by Misc.(Non-Drug; Combo Route) route once daily.    POTASSIUM CHLORIDE (KLOR-CON) 20 MEQ PACK    Take 20 mEq by mouth once daily.    VALACYCLOVIR (VALTREX) 1000 MG TABLET    TK 1 T PO  TID FOR 7 DAYS   Modified Medications    No medications on file   Discontinued Medications    No medications on file       Review of Systems   Constitutional: Positive for malaise/fatigue.   HENT: Negative.    Eyes: Negative.    Respiratory: Positive for shortness of breath.    Cardiovascular: Positive for chest pain, palpitations and leg swelling.   Gastrointestinal: Negative.    Genitourinary: Negative.    Musculoskeletal: Negative.    Skin: Negative.    Neurological: Positive for tingling.   Endo/Heme/Allergies: Negative.    Psychiatric/Behavioral: Negative.    All 12 systems otherwise negative.      Wt Readings from Last 3 Encounters:   10/14/19 123.2 kg (271 lb 9.7 oz)   10/14/19 123.6 kg (272 lb 7.8 oz)   08/27/19 125.3 kg (276 lb 3.8 oz)     Temp Readings from Last 3 Encounters:   10/14/19 97.6 °F (36.4 °C) (Tympanic)   08/27/19 98.5 °F (36.9 °C) (Tympanic)   07/09/19 97.6 °F (36.4 °C) (Oral)     BP Readings from Last 3 Encounters:   10/14/19 116/84   10/14/19 115/70   08/27/19 136/81     Pulse Readings from Last 3 Encounters:   10/14/19 68   10/14/19 70   08/27/19 62       /84 (BP Method: Large (Manual))   Pulse 68   Ht 5' 5" (1.651 m)   Wt 123.2 kg (271 lb 9.7 oz)   LMP 08/21/2016   BMI 45.20 kg/m²     Objective:   Physical Exam   Constitutional: She is oriented to person, place, and time. She appears well-developed and " well-nourished. No distress.   HENT:   Head: Normocephalic and atraumatic.   Nose: Nose normal.   Mouth/Throat: Oropharynx is clear and moist.   Eyes: Conjunctivae and EOM are normal. No scleral icterus.   Neck: Normal range of motion. Neck supple. No JVD present. No thyromegaly present.   Cardiovascular: Normal rate, regular rhythm, S1 normal and S2 normal. Exam reveals no gallop, no S3, no S4 and no friction rub.   No murmur heard.  Pulmonary/Chest: Effort normal and breath sounds normal. No stridor. No respiratory distress. She has no wheezes. She has no rales. She exhibits no tenderness.   Abdominal: Soft. Bowel sounds are normal. She exhibits no distension and no mass. There is no tenderness. There is no rebound.   Genitourinary:   Genitourinary Comments: Deferred   Musculoskeletal: Normal range of motion. She exhibits no edema, tenderness or deformity.   Lymphadenopathy:     She has no cervical adenopathy.   Neurological: She is alert and oriented to person, place, and time. She exhibits normal muscle tone. Coordination normal.   Skin: Skin is warm and dry. No rash noted. She is not diaphoretic. No erythema. No pallor.   Psychiatric: She has a normal mood and affect. Her behavior is normal. Judgment and thought content normal.   Nursing note and vitals reviewed.      Lab Results   Component Value Date     10/14/2019    K 4.2 10/14/2019     10/14/2019    CO2 28 10/14/2019    BUN 14 10/14/2019    CREATININE 0.8 10/14/2019     (H) 10/14/2019    HGBA1C 7.0 (H) 08/27/2019    AST 11 10/14/2019    ALT 13 10/14/2019    ALBUMIN 3.6 10/14/2019    PROT 7.8 10/14/2019    BILITOT 0.3 10/14/2019    WBC 5.41 10/14/2019    HGB 12.3 10/14/2019    HCT 38.8 10/14/2019    MCV 89 10/14/2019     (H) 10/14/2019    INR 1.0 09/10/2016    TSH 0.392 (L) 07/09/2019    CHOL 153 05/23/2019    HDL 47 05/23/2019    LDLCALC 87.8 05/23/2019    TRIG 91 05/23/2019    BNP 23 10/14/2019     Assessment:      1. SANTOS  (dyspnea on exertion)    2. Morbid obesity due to excess calories    3. Type 2 diabetes mellitus without complication, without long-term current use of insulin    4. Localized edema    5. Class 3 severe obesity due to excess calories without serious comorbidity with body mass index (BMI) of 40.0 to 44.9 in adult    6. Essential hypertension    7. Mixed hyperlipidemia    8. Palpitations    9. Other chest pain        Plan:   1. SANTOS with CP and palpitations  - ECG stress test with oxygen sats, CXR today  - 2D ECHO  - Holter  - may need pulm eval for SCOUT vs other primary lung disease     2. HTN  - cont meds    3. Edema  - start lasix  - check LE u/s    4. Obesity  - needs weight loss    5. Dm2  - cont meds per PCP    6. HLD  - cont statin    Thank you for allowing me to participate in this patient's care. Please do not hesitate to contact me with any questions or concerns. Consult note has been forwarded to the referral physician.

## 2019-10-14 NOTE — PROGRESS NOTES
Subjective:       Patient ID: Yuli Milton is a 44 y.o. female.    Chief Complaint: Foot Swelling and Shortness of Breath    SOB while walking / doing strenuous activity.  C/o joey Leg pain    Shortness of Breath   This is a new problem. The current episode started in the past 7 days. The problem occurs constantly. The problem has been gradually worsening. Associated symptoms include chest pain, claudication, leg pain and leg swelling. Pertinent negatives include no abdominal pain, fever or PND. The patient has no known risk factors for DVT/PE. The treatment provided no relief.     Review of Systems   Constitutional: Negative for fever.   HENT: Negative for congestion.    Eyes: Negative for discharge.   Respiratory: Positive for shortness of breath.    Cardiovascular: Positive for chest pain, claudication and leg swelling. Negative for PND.   Gastrointestinal: Negative for abdominal pain.   Genitourinary: Negative for difficulty urinating.   Musculoskeletal: Negative for joint swelling.   Neurological: Negative for dizziness.   Psychiatric/Behavioral: Negative for agitation.       Objective:      Physical Exam   Constitutional: She appears well-developed and well-nourished. No distress.   HENT:   Head: Normocephalic and atraumatic.   Cardiovascular: Normal rate, regular rhythm, normal heart sounds and intact distal pulses.   No murmur heard.  Pulmonary/Chest: Effort normal and breath sounds normal. No respiratory distress. She has no wheezes.   Abdominal: Soft. She exhibits no distension. There is no tenderness. There is no guarding.   Musculoskeletal: She exhibits edema.   Skin: Skin is warm and dry. No rash noted. She is not diaphoretic. No erythema.   Nursing note and vitals reviewed.      Assessment:       1. SANTOS (dyspnea on exertion)    2. Type 2 diabetes mellitus without complication, without long-term current use of insulin    3. Localized edema        Plan:     Problem List Items Addressed This Visit         Endocrine    Type 2 diabetes mellitus without complication, without long-term current use of insulin    Relevant Medications    liraglutide 0.6 mg/0.1 mL, 18 mg/3 mL, subq PNIJ (VICTOZA 2-LIDYA) 0.6 mg/0.1 mL (18 mg/3 mL) PnIj       Other    SANTOS (dyspnea on exertion) - Primary    Relevant Orders    Ambulatory referral to Cardiology    CBC auto differential    CK    CK-MB    D dimer, quantitative    EKG 12-lead    Brain natriuretic peptide    Comprehensive metabolic panel    Localized edema    Relevant Medications    potassium chloride (KLOR-CON) 20 mEq Pack    hydroCHLOROthiazide (HYDRODIURIL) 12.5 MG Tab

## 2019-10-14 NOTE — TELEPHONE ENCOUNTER
----- Message from Tee Bonds sent at 10/14/2019  9:07 AM CDT -----  Contact: self- [phone]  .Type:  Same Day Appointment Request    Caller is requesting a same day appointment.  Caller declined first available appointment listed below.    Name of Caller:Yuli Milton  When is the first available appointment?unsure   Symptoms:shortness of breath/ leg pain and leg swelling  Best Call Back Number:.[phone] (home)   Additional Information:   Pt didn't want to speak with a on call nurse , just wants to be seen today.

## 2019-10-18 ENCOUNTER — OUTSIDE PLACE OF SERVICE (OUTPATIENT)
Dept: CARDIOLOGY | Facility: CLINIC | Age: 44
End: 2019-10-18
Payer: MEDICAID

## 2019-10-18 PROCEDURE — 93010 ELECTROCARDIOGRAM REPORT: CPT | Mod: S$GLB,,, | Performed by: STUDENT IN AN ORGANIZED HEALTH CARE EDUCATION/TRAINING PROGRAM

## 2019-10-18 PROCEDURE — 93010 PR ELECTROCARDIOGRAM REPORT: ICD-10-PCS | Mod: S$GLB,,, | Performed by: STUDENT IN AN ORGANIZED HEALTH CARE EDUCATION/TRAINING PROGRAM

## 2019-10-22 ENCOUNTER — OFFICE VISIT (OUTPATIENT)
Dept: URGENT CARE | Facility: CLINIC | Age: 44
End: 2019-10-22
Payer: MEDICAID

## 2019-10-22 VITALS
BODY MASS INDEX: 45.34 KG/M2 | WEIGHT: 272.5 LBS | DIASTOLIC BLOOD PRESSURE: 88 MMHG | TEMPERATURE: 98 F | SYSTOLIC BLOOD PRESSURE: 118 MMHG

## 2019-10-22 DIAGNOSIS — R20.2 NUMBNESS AND TINGLING OF RIGHT ARM: Primary | ICD-10-CM

## 2019-10-22 DIAGNOSIS — M54.12 CERVICAL RADICULOPATHY: ICD-10-CM

## 2019-10-22 DIAGNOSIS — R20.0 NUMBNESS AND TINGLING OF RIGHT ARM: Primary | ICD-10-CM

## 2019-10-22 DIAGNOSIS — M79.2 RADICULAR PAIN IN RIGHT ARM: ICD-10-CM

## 2019-10-22 PROCEDURE — 99999 PR PBB SHADOW E&M-EST. PATIENT-LVL V: ICD-10-PCS | Mod: PBBFAC,,, | Performed by: NURSE PRACTITIONER

## 2019-10-22 PROCEDURE — 99214 OFFICE O/P EST MOD 30 MIN: CPT | Mod: S$PBB,,, | Performed by: NURSE PRACTITIONER

## 2019-10-22 PROCEDURE — 99214 PR OFFICE/OUTPT VISIT, EST, LEVL IV, 30-39 MIN: ICD-10-PCS | Mod: S$PBB,,, | Performed by: NURSE PRACTITIONER

## 2019-10-22 PROCEDURE — 99999 PR PBB SHADOW E&M-EST. PATIENT-LVL V: CPT | Mod: PBBFAC,,, | Performed by: NURSE PRACTITIONER

## 2019-10-22 PROCEDURE — 99215 OFFICE O/P EST HI 40 MIN: CPT | Mod: PBBFAC,PO | Performed by: NURSE PRACTITIONER

## 2019-10-22 RX ORDER — KETOROLAC TROMETHAMINE 30 MG/ML
30 INJECTION, SOLUTION INTRAMUSCULAR; INTRAVENOUS
Status: COMPLETED | OUTPATIENT
Start: 2019-10-22 | End: 2019-10-22

## 2019-10-22 RX ADMIN — KETOROLAC TROMETHAMINE 30 MG: 30 INJECTION, SOLUTION INTRAMUSCULAR; INTRAVENOUS at 07:10

## 2019-10-23 ENCOUNTER — TELEPHONE (OUTPATIENT)
Dept: SPORTS MEDICINE | Facility: CLINIC | Age: 44
End: 2019-10-23

## 2019-10-23 NOTE — PATIENT INSTRUCTIONS
· For neck pain: Use a comfortable pillow that supports the head and keeps the spine in a neutral position. The position of the head should not be tilted forward or backward.  · When in bed, try to find a position of comfort. A firm mattress is best. Try lying flat on your back with pillows under your knees. You can also try lying on your side with your knees bent up towards your chest and a pillow between your knees.  · At first, do not try to stretch out the sore spots. If there is a strain, it is not like the good soreness you get after exercising without an injury. In this case, stretching may make it worse.  · Avoid prolonged sitting, long car rides or travel. This puts more stress on the lower back than standing or walking.  · During the first 24 to 72 hours after an injury, apply an ice pack to the painful area for 20 minutes and then remove it for 20 minutes over a period of 60 to 90 minutes or several times a day.   · You can alternate ice and heat therapies. Talk with your healthcare provider about the best treatment for your back or neck pain. As a safety precaution, do not use a heating pad at bedtime. Sleeping with a heating pad can lead to skin burns or tissue damage.  · Therapeutic massage can help relax the back and neck muscles without stretching them.  · Be aware of safe lifting methods and do not lift anything over 15 pounds until all the pain is gone.    Understanding Cervical Radiculopathy    Cervical radiculopathy is irritation or inflammation of a nerve root in the neck. It causes neck pain and other symptoms that may spread into the chest or down the arm. To understand this condition, it helps to understand the parts of the spine:  · Vertebrae. These are bones that stack to form the spine. The cervical spine contains the 7 vertebrae in the neck.  · Disks. These are soft pads of tissue between the vertebrae. They act as shock absorbers for the spine.  · The spinal canal. This is a tunnel  formed within the stacked vertebrae. The spinal cord runs through this canal.  · Nerves. These branch off the spinal cord. As they leave the spinal canal, nerves pass through openings between the vertebrae. The nerve root is the part of the nerve that is closest to the spinal cord.   With cervical radiculopathy, nerve roots in the neck become irritated. This leads to pain and symptoms that can travel to the nerves that go from the spinal cord down the arms and into the torso.  What causes cervical radiculopathy?  Aging, injury, poor posture, and other issues can lead to problems in the neck. These problems may then irritate nerve roots. These include:  · Damage to a disk in the cervical spine. The damaged disk may then press on nearby nerve roots.  · Degeneration from wear and tear, and aging. This can lead to narrowing (stenosis) of the openings between the vertebrae. The narrowed openings press on nerve roots as they leave the spinal canal.  · An unstable spine. This is when a vertebra slips forward. It can then press on a nerve root.  There are other, less common causes of pressure on nerves in the neck. These include infection, cysts, and tumors.  Symptoms of cervical radiculopathy  These include:  · Neck pain  · Pain, numbness, tingling, or weakness that travels down the arm  · Loss of neck movement  · Muscle spasms  Treatment for cervical radiculopathy  In most cases, your healthcare provider will first try treatments that help relieve symptoms. These may include:  · Prescription or over-the-counter pain medicines. These help relieve pain and swelling.  · Cold packs. These help reduce pain.  · Resting. This involves avoiding positions and activities that increase pain.  · Neck brace (cervical collar). This can help relieve inflammation and pain.  · Physical therapy, including exercises and stretches. This can help decrease pain and increase movement and function.  · Shots of medicinesaround the nerve roots.  This is done to help relieve symptoms for a time.  In some cases, your healthcare provider may advise surgery to fix the underlying problem. This depends on the cause, the symptoms, and how long the pain has lasted.  Possible complications  Over time, an irritated and inflamed nerve may become damaged. This may lead to long-lasting (permanent) numbness or weakness. If symptoms change suddenly or get worse, be sure to let your healthcare provider know.     When to call your healthcare provider  Call your healthcare provider right away if you have any of these:  · New pain or pain that gets worse  · New or increasing weakness, numbness, or tingling in your arm or hand  · Bowel or bladder changes   Date Last Reviewed: 3/10/2016  © 3621-3093 [employer]. [address] [place] [address], [place] [address]. All rights reserved. This information is not intended as a substitute for professional medical care. Always follow your healthcare professional's instructions.

## 2019-10-23 NOTE — PROGRESS NOTES
"Subjective:      Patient ID: Yuli Milton is a 44 y.o. female.    Chief Complaint: Arm Pain    /88 (BP Location: Left arm, Patient Position: Sitting, BP Method: Large (Manual))   Temp 97.7 °F (36.5 °C) (Oral)   Wt 123.6 kg (272 lb 7.8 oz)   LMP 08/21/2016   BMI 45.34 kg/m²     HPI  Pt presenting with c/o right arm pain 10/10 for the past two weeks. States pain is constant and feels like something "squeezing" her arm and can feel it radiating from her neck. Has been taking tylenol and ibuprofen as well as over the counter pain patches with little relief. Pt has been to the ER, has seen PCP and referred to cardiology since the pain began.  Review of patient's allergies indicates:   Allergen Reactions    Trulicity [dulaglutide] Shortness Of Breath and Other (See Comments)     Headaches    Hibiclens (isopropyl alcohol) Itching        ROS   Objective:      Physical Exam   Constitutional: She is oriented to person, place, and time. Vital signs are normal. She appears well-developed and well-nourished. She is cooperative.   HENT:   Head: Normocephalic and atraumatic.   Right Ear: External ear normal.   Left Ear: External ear normal.   Nose: No mucosal edema or rhinorrhea.   Mouth/Throat: Uvula is midline, oropharynx is clear and moist and mucous membranes are normal.   Eyes: Pupils are equal, round, and reactive to light. Conjunctivae, EOM and lids are normal.   Neck: Normal range of motion. Neck supple. Muscular tenderness present. No neck rigidity. Normal range of motion present.   Cardiovascular: Normal rate, regular rhythm and normal heart sounds.   Pulses:       Radial pulses are 1+ on the right side.   Pulmonary/Chest: Effort normal and breath sounds normal.   Abdominal: Soft. Bowel sounds are normal. There is no tenderness.   Musculoskeletal: Normal range of motion.   Tenderness with palpation of right trapezius muscle, tension noted.   Neurological: She is alert and oriented to person, place, and " time.   Skin: Skin is warm and dry. Capillary refill takes less than 2 seconds.   Psychiatric: She has a normal mood and affect. Her behavior is normal.   Vitals reviewed.      Assessment:       1. Numbness and tingling of right arm    2. Cervical radiculopathy    3. Radicular pain in right arm        Plan:     Numbness and tingling of right arm  -     Ambulatory referral/consult to Orthopedics; Future; Expected date: 10/22/2019    Cervical radiculopathy  -     Ambulatory referral/consult to Orthopedics; Future; Expected date: 10/22/2019    Radicular pain in right arm  -     Ambulatory referral/consult to Orthopedics; Future; Expected date: 10/22/2019  -     ketorolac injection 30 mg    Referral to ortho  Take tylenol as needed for the remainder of the night.  Resume oral ibuprofen tomorrow.  Alternate ice/heat to neck and shoulder to help with discomfort.  Follow up with PCP

## 2019-10-24 ENCOUNTER — TELEPHONE (OUTPATIENT)
Dept: SPORTS MEDICINE | Facility: CLINIC | Age: 44
End: 2019-10-24

## 2019-10-25 ENCOUNTER — HOSPITAL ENCOUNTER (OUTPATIENT)
Dept: RADIOLOGY | Facility: HOSPITAL | Age: 44
Discharge: HOME | End: 2019-10-25
Attending: FAMILY MEDICINE
Payer: MEDICAID

## 2019-10-25 ENCOUNTER — CLINICAL SUPPORT (OUTPATIENT)
Dept: CARDIOLOGY | Facility: CLINIC | Age: 44
End: 2019-10-25
Attending: INTERNAL MEDICINE
Payer: MEDICAID

## 2019-10-25 ENCOUNTER — HOSPITAL ENCOUNTER (OUTPATIENT)
Dept: CARDIOLOGY | Facility: CLINIC | Age: 44
Discharge: HOME | End: 2019-10-25
Attending: INTERNAL MEDICINE
Payer: MEDICAID

## 2019-10-25 ENCOUNTER — OFFICE VISIT (OUTPATIENT)
Dept: INTERNAL MEDICINE | Facility: CLINIC | Age: 44
End: 2019-10-25
Payer: MEDICAID

## 2019-10-25 VITALS
BODY MASS INDEX: 45.84 KG/M2 | WEIGHT: 275.13 LBS | DIASTOLIC BLOOD PRESSURE: 80 MMHG | SYSTOLIC BLOOD PRESSURE: 124 MMHG | RESPIRATION RATE: 19 BRPM | OXYGEN SATURATION: 98 % | TEMPERATURE: 98 F | HEIGHT: 65 IN | HEART RATE: 61 BPM

## 2019-10-25 DIAGNOSIS — M25.511 ACUTE PAIN OF RIGHT SHOULDER: Primary | ICD-10-CM

## 2019-10-25 DIAGNOSIS — E11.9 TYPE 2 DIABETES MELLITUS WITHOUT COMPLICATION, WITHOUT LONG-TERM CURRENT USE OF INSULIN: ICD-10-CM

## 2019-10-25 DIAGNOSIS — R06.09 DOE (DYSPNEA ON EXERTION): ICD-10-CM

## 2019-10-25 DIAGNOSIS — R00.2 PALPITATIONS: ICD-10-CM

## 2019-10-25 DIAGNOSIS — R07.89 OTHER CHEST PAIN: ICD-10-CM

## 2019-10-25 DIAGNOSIS — R60.0 LOCALIZED EDEMA: ICD-10-CM

## 2019-10-25 DIAGNOSIS — M25.511 ACUTE PAIN OF RIGHT SHOULDER: ICD-10-CM

## 2019-10-25 LAB
DIASTOLIC DYSFUNCTION: YES
ESTIMATED PA SYSTOLIC PRESSURE: 16.97
RETIRED EF AND QEF - SEE NOTES: 60 (ref 55–65)
TRICUSPID VALVE REGURGITATION: ABNORMAL

## 2019-10-25 PROCEDURE — 99214 OFFICE O/P EST MOD 30 MIN: CPT | Mod: S$PBB,25,, | Performed by: FAMILY MEDICINE

## 2019-10-25 PROCEDURE — 93306 TTE W/DOPPLER COMPLETE: CPT | Mod: PBBFAC,PO | Performed by: INTERNAL MEDICINE

## 2019-10-25 PROCEDURE — 99999 PR PBB SHADOW E&M-EST. PATIENT-LVL III: CPT | Mod: PBBFAC,,, | Performed by: FAMILY MEDICINE

## 2019-10-25 PROCEDURE — 93227 XTRNL ECG REC<48 HR R&I: CPT | Mod: S$PBB,,, | Performed by: INTERNAL MEDICINE

## 2019-10-25 PROCEDURE — 99214 PR OFFICE/OUTPT VISIT, EST, LEVL IV, 30-39 MIN: ICD-10-PCS | Mod: S$PBB,25,, | Performed by: FAMILY MEDICINE

## 2019-10-25 PROCEDURE — 99213 OFFICE O/P EST LOW 20 MIN: CPT | Mod: PBBFAC,PO,25 | Performed by: FAMILY MEDICINE

## 2019-10-25 PROCEDURE — 93970 EXTREMITY STUDY: CPT | Mod: PBBFAC,PO | Performed by: INTERNAL MEDICINE

## 2019-10-25 PROCEDURE — 99999 PR PBB SHADOW E&M-EST. PATIENT-LVL III: ICD-10-PCS | Mod: PBBFAC,,, | Performed by: FAMILY MEDICINE

## 2019-10-25 PROCEDURE — 20610 DRAIN/INJ JOINT/BURSA W/O US: CPT | Mod: PBBFAC,PO | Performed by: FAMILY MEDICINE

## 2019-10-25 PROCEDURE — 93306 2D ECHO WITH COLOR FLOW DOPPLER: ICD-10-PCS | Mod: 26,S$PBB,, | Performed by: INTERNAL MEDICINE

## 2019-10-25 PROCEDURE — 93970 CAR US DOPPLER VENOUS LEGS BILATERAL: ICD-10-PCS | Mod: 26,S$PBB,, | Performed by: INTERNAL MEDICINE

## 2019-10-25 PROCEDURE — 20610 LARGE JOINT ASPIRATION/INJECTION: ICD-10-PCS | Mod: S$PBB,RT,, | Performed by: FAMILY MEDICINE

## 2019-10-25 PROCEDURE — 93016 CV STRESS TEST SUPVJ ONLY: CPT | Mod: S$PBB,,, | Performed by: INTERNAL MEDICINE

## 2019-10-25 PROCEDURE — 93227 HOLTER MONITOR - 48 HOUR: ICD-10-PCS | Mod: S$PBB,,, | Performed by: INTERNAL MEDICINE

## 2019-10-25 PROCEDURE — 93018 CV STRESS TEST I&R ONLY: CPT | Mod: S$PBB,,, | Performed by: INTERNAL MEDICINE

## 2019-10-25 PROCEDURE — 93226 XTRNL ECG REC<48 HR SCAN A/R: CPT | Mod: PBBFAC,PO | Performed by: INTERNAL MEDICINE

## 2019-10-25 PROCEDURE — 93017 CV STRESS TEST TRACING ONLY: CPT | Mod: PBBFAC | Performed by: INTERNAL MEDICINE

## 2019-10-25 PROCEDURE — 93018 CARDIAC TREADMILL STRESS TEST: ICD-10-PCS | Mod: S$PBB,,, | Performed by: INTERNAL MEDICINE

## 2019-10-25 PROCEDURE — 93016 CARDIAC TREADMILL STRESS TEST: ICD-10-PCS | Mod: S$PBB,,, | Performed by: INTERNAL MEDICINE

## 2019-10-25 RX ORDER — TRIAMCINOLONE ACETONIDE 40 MG/ML
40 INJECTION, SUSPENSION INTRA-ARTICULAR; INTRAMUSCULAR
Status: DISCONTINUED | OUTPATIENT
Start: 2019-10-25 | End: 2019-10-25 | Stop reason: HOSPADM

## 2019-10-25 RX ORDER — ATORVASTATIN CALCIUM 20 MG/1
20 TABLET, FILM COATED ORAL DAILY
Qty: 90 TABLET | Refills: 0 | Status: SHIPPED | OUTPATIENT
Start: 2019-10-25 | End: 2020-02-17 | Stop reason: SDUPTHER

## 2019-10-25 RX ADMIN — TRIAMCINOLONE ACETONIDE 40 MG: 40 INJECTION, SUSPENSION INTRA-ARTICULAR; INTRAMUSCULAR at 11:10

## 2019-10-25 NOTE — PROCEDURES
Large Joint Aspiration/Injection  Date/Time: 10/25/2019 11:47 AM  Performed by: Drew Gutierrez MD  Authorized by: Drew Gutierrez MD     Consent Done?:  Yes (Written)  Indications:  Pain  Procedure site marked: Yes    Timeout: Prior to procedure the correct patient, procedure, and site was verified    Anesthesia  Local anesthesia used  Anesthesia: local infiltration  Anesthetic: bupivacaine 0.5% without epinephrine and lidocaine 1% without epinephrine  Anesthetic total: 4mL  Prep: Patient was prepped and draped in usual sterile fashion    Needle size:  25 G  Approach:  Posterior  Medications:  40 mg triamcinolone acetonide 40 mg/mL  Patient tolerance:  Patient tolerated the procedure well with no immediate complications

## 2019-10-25 NOTE — ASSESSMENT & PLAN NOTE
Discussed with pt if they have a lump or swelling in your neck, hoarseness, trouble swallowing, or shortness of breath, that These may be symptoms of thyroid cancer. In studies with rodents, Ozempic® and medicines that work like Ozempic® caused thyroid tumors, including thyroid cancer. It is not known if Ozempic® will cause thyroid tumors or a type of thyroid cancer called medullary thyroid carcinoma (MTC) in people.  Discussed with pt that if they have a known history of MEN2, pancreatitis, history, or retinopathy that they would not be a good candidate. Pt has verbalized that they do not have any previous history as described above and are willing to start this medication.  Pt also understands that this medication may cause Gallbladder disease or hypersensitivity to Ozempic.

## 2019-10-25 NOTE — PROGRESS NOTES
Subjective:       Patient ID: Yuli Milton is a 44 y.o. female.    Chief Complaint: Shoulder Pain    Shoulder Pain    The pain is present in the right shoulder. This is a new problem. The current episode started 1 to 4 weeks ago. There has been no history of extremity trauma. The problem occurs daily. The problem has been waxing and waning. Quality: pressure. The pain is severe. Associated symptoms include joint locking and a limited range of motion. Pertinent negatives include no fever, headaches, inability to bear weight, joint swelling, numbness, stiffness, tingling or visual symptoms. The symptoms are aggravated by activity. She has tried NSAIDS (Pt. given Toradol injection at  this week on Tuesday. ) for the symptoms. The treatment provided significant relief. Her past medical history is significant for diabetes.     Review of Systems   Constitutional: Negative.  Negative for activity change, appetite change, chills and fever.   HENT: Negative.    Respiratory: Negative.  Negative for chest tightness and shortness of breath.    Cardiovascular: Negative.  Negative for chest pain, palpitations and leg swelling.   Gastrointestinal: Negative.  Negative for abdominal distention and abdominal pain.   Genitourinary: Negative.    Musculoskeletal: Positive for arthralgias. Negative for neck pain and stiffness.   Neurological: Negative.  Negative for tingling, numbness and headaches.       Objective:      Physical Exam   Constitutional: She is oriented to person, place, and time. She appears well-developed and well-nourished.   HENT:   Head: Normocephalic and atraumatic.   Neck: Normal range of motion. Neck supple.   Cardiovascular: Normal rate, regular rhythm, normal heart sounds and intact distal pulses.   Pulmonary/Chest: Effort normal.   Abdominal: Soft. Bowel sounds are normal.   Musculoskeletal:        Right shoulder: She exhibits decreased range of motion, tenderness and pain. She exhibits no swelling, no  crepitus and no deformity.        Arms:  Tenderness palpated at posterior ac joint.     Protective Sensation (w/ 10 gram monofilament):  Right: Intact  Left: Intact    Visual Inspection:  Normal -  Bilateral    Pedal Pulses:   Right: Present  Left: Present    Posterior tibialis:   Right:Present  Left: Present     Neurological: She is alert and oriented to person, place, and time.   Skin: Skin is warm and dry.       Assessment:       1. Acute pain of right shoulder    2. Type 2 diabetes mellitus without complication, without long-term current use of insulin        Plan:     Problem List Items Addressed This Visit        Endocrine    Type 2 diabetes mellitus without complication, without long-term current use of insulin    Current Assessment & Plan      Discussed with pt if they have a lump or swelling in your neck, hoarseness, trouble swallowing, or shortness of breath, that These may be symptoms of thyroid cancer. In studies with rodents, Ozempic® and medicines that work like Ozempic® caused thyroid tumors, including thyroid cancer. It is not known if Ozempic® will cause thyroid tumors or a type of thyroid cancer called medullary thyroid carcinoma (MTC) in people.  Discussed with pt that if they have a known history of MEN2, pancreatitis, history, or retinopathy that they would not be a good candidate. Pt has verbalized that they do not have any previous history as described above and are willing to start this medication.  Pt also understands that this medication may cause Gallbladder disease or hypersensitivity to Ozempic.            Relevant Medications    atorvastatin (LIPITOR) 20 MG tablet    semaglutide (OZEMPIC) 0.25 mg or 0.5 mg(2 mg/1.5 mL) PnIj       Orthopedic    Acute pain of right shoulder - Primary    Relevant Medications    triamcinolone acetonide injection 40 mg    Other Relevant Orders    X-ray Shoulder 2 or More Views Right    Large Joint Aspiration/Injection (Completed)

## 2019-10-26 LAB — DIASTOLIC DYSFUNCTION: NO

## 2019-10-30 ENCOUNTER — TELEPHONE (OUTPATIENT)
Dept: CARDIOLOGY | Facility: CLINIC | Age: 44
End: 2019-10-30

## 2019-10-30 ENCOUNTER — TELEPHONE (OUTPATIENT)
Dept: PULMONOLOGY | Facility: CLINIC | Age: 44
End: 2019-10-30

## 2019-10-30 NOTE — TELEPHONE ENCOUNTER
----- Message from Edis Jaffe MD sent at 10/26/2019  9:20 AM CDT -----  Please call the patient regarding her abnormal result. Stress test abnormal due to short duration on treadmill, will need to setup with pulmonary as well for eval.

## 2019-10-30 NOTE — TELEPHONE ENCOUNTER
Spoke with patient regarding stress test was abnormal and referred to pulmonary they will call and set up appt. Patient states understanding.

## 2019-11-04 DIAGNOSIS — M79.671 RIGHT FOOT PAIN: Primary | ICD-10-CM

## 2019-11-05 ENCOUNTER — HOSPITAL ENCOUNTER (OUTPATIENT)
Dept: RADIOLOGY | Facility: HOSPITAL | Age: 44
Discharge: HOME | End: 2019-11-05
Attending: PODIATRIST
Payer: MEDICAID

## 2019-11-05 ENCOUNTER — OFFICE VISIT (OUTPATIENT)
Dept: PODIATRY | Facility: CLINIC | Age: 44
End: 2019-11-05
Payer: COMMERCIAL

## 2019-11-05 VITALS
BODY MASS INDEX: 45.82 KG/M2 | SYSTOLIC BLOOD PRESSURE: 126 MMHG | DIASTOLIC BLOOD PRESSURE: 87 MMHG | WEIGHT: 275 LBS | HEIGHT: 65 IN | HEART RATE: 70 BPM

## 2019-11-05 DIAGNOSIS — M79.671 RIGHT FOOT PAIN: ICD-10-CM

## 2019-11-05 DIAGNOSIS — S92.354A CLOSED NONDISPLACED FRACTURE OF FIFTH METATARSAL BONE OF RIGHT FOOT, INITIAL ENCOUNTER: Primary | ICD-10-CM

## 2019-11-05 PROCEDURE — 73630 XR FOOT COMPLETE 3 VIEW RIGHT: ICD-10-PCS | Mod: 26,RT,, | Performed by: RADIOLOGY

## 2019-11-05 PROCEDURE — 99203 PR OFFICE/OUTPT VISIT, NEW, LEVL III, 30-44 MIN: ICD-10-PCS | Mod: S$PBB,,, | Performed by: PODIATRIST

## 2019-11-05 PROCEDURE — 73630 X-RAY EXAM OF FOOT: CPT | Mod: 26,RT,, | Performed by: RADIOLOGY

## 2019-11-05 PROCEDURE — 99999 PR PBB SHADOW E&M-EST. PATIENT-LVL III: CPT | Mod: PBBFAC,,, | Performed by: PODIATRIST

## 2019-11-05 PROCEDURE — 99213 OFFICE O/P EST LOW 20 MIN: CPT | Mod: PBBFAC,25 | Performed by: PODIATRIST

## 2019-11-05 PROCEDURE — 99203 OFFICE O/P NEW LOW 30 MIN: CPT | Mod: S$PBB,,, | Performed by: PODIATRIST

## 2019-11-05 PROCEDURE — 73630 X-RAY EXAM OF FOOT: CPT | Mod: TC,RT

## 2019-11-05 PROCEDURE — 99999 PR PBB SHADOW E&M-EST. PATIENT-LVL III: ICD-10-PCS | Mod: PBBFAC,,, | Performed by: PODIATRIST

## 2019-11-05 RX ORDER — TRAMADOL HYDROCHLORIDE 50 MG/1
50 TABLET ORAL EVERY 6 HOURS PRN
Qty: 15 TABLET | Refills: 0 | Status: SHIPPED | OUTPATIENT
Start: 2019-11-05 | End: 2019-11-18 | Stop reason: DRUGHIGH

## 2019-11-05 RX ORDER — TRAMADOL HYDROCHLORIDE 50 MG/1
50 TABLET ORAL EVERY 6 HOURS PRN
Qty: 15 TABLET | Refills: 0 | Status: CANCELLED | OUTPATIENT
Start: 2019-11-05

## 2019-11-05 NOTE — PROGRESS NOTES
Subjective:       Patient ID: Yuli Milton is a 44 y.o. female.    Chief Complaint: Foot Injury (Pt presents w/ right foot fracture. Pt reports pain 8-9/10 in the lateral aspect. Diabetic pt. Ambulates with walking boot and crutches. Last seen by Dr Gutierrez on 10/25/19.)      HPI:  Yuli Milton presents to the office this morning, with complaints of discomfort to the right foot pain laterally.   Patient states the pains are approximately 8-9/10. Xrays were taken which demonstrate a nondisplaced closed extra-articular fracture of the 5th metatarsal base.  She has been heel ambulating with a Cam boot and having increased pain.  She states that she was given Norco 5 at the emergency room, but it only takes this at night.  She No prior MD/DO/DPM evaluation. Patient's PMD is Drew Gutierrez MD.  Patient states that she fell after leaving a family members wedding which caused increased pain to the right  foot on 11/2/19.  She subsequently fell again later that evening and is unsure which of these instances cause the fracture. Standing and walking, exacerbates symptoms.  Limiting weight-bearing and ambulation does seem to alleviate the discomfort.    Review of patient's allergies indicates:   Allergen Reactions    Trulicity [dulaglutide] Shortness Of Breath and Other (See Comments)     Headaches    Hibiclens (isopropyl alcohol) Itching       Past Medical History:   Diagnosis Date    Abnormal Pap smear of cervix     treatment??    Abnormal Pap smear of vagina     Anemia     Anxiety     Anxiety and depression     Asthma     Chlamydia     Depression (emotion)     Diabetes mellitus     Dysuria 3/31/2018    Gallstones     Gastritis     History of ovarian cyst     History of syphilis     History of trichomoniasis     Hyperlipidemia     Mental disorder     PONV (postoperative nausea and vomiting)     Vaginal candidiasis 7/24/2018       Family History   Problem Relation Age of Onset    Breast  cancer Paternal Grandmother     Diabetes Maternal Grandmother     Hypertension Mother     Thyroid disease Mother     Breast cancer Maternal Aunt     Cancer Maternal Aunt         colon cancer    Colon cancer Maternal Aunt     Miscarriages / Stillbirths Cousin     Stroke Maternal Aunt     No Known Problems Father     Thyroid disease Sister     Thyroid disease Brother     Ovarian cancer Neg Hx     Deep vein thrombosis Neg Hx     Pulmonary embolism Neg Hx        Social History     Socioeconomic History    Marital status: Single     Spouse name: Not on file    Number of children: 3    Years of education: Not on file    Highest education level: Not on file   Occupational History    Not on file   Social Needs    Financial resource strain: Not on file    Food insecurity:     Worry: Not on file     Inability: Not on file    Transportation needs:     Medical: Not on file     Non-medical: Not on file   Tobacco Use    Smoking status: Never Smoker    Smokeless tobacco: Never Used   Substance and Sexual Activity    Alcohol use: No     Alcohol/week: 0.0 standard drinks    Drug use: No    Sexual activity: Yes     Partners: Male     Birth control/protection: See Surgical Hx, Post-menopausal     Comment: mut monog   Lifestyle    Physical activity:     Days per week: Not on file     Minutes per session: Not on file    Stress: Very much   Relationships    Social connections:     Talks on phone: Not on file     Gets together: Not on file     Attends Baptism service: Not on file     Active member of club or organization: Not on file     Attends meetings of clubs or organizations: Not on file     Relationship status: Not on file   Other Topics Concern    Not on file   Social History Narrative    Full time employed - [employer]       Past Surgical History:   Procedure Laterality Date    APPENDECTOMY      CHOLECYSTECTOMY      COLONOSCOPY N/A 7/31/2017    Procedure: COLONOSCOPY;  Surgeon: Yuilana GUILLEN  "MD Alec;  Location: [facility];  Service: Endoscopy;  Laterality: N/A;    DILATION AND CURETTAGE OF UTERUS      bleeding    HYSTERECTOMY  08/31/2016    RALH/BS 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   HENT: Negative for sinus pain, sore throat and voice change.    Eyes: Negative for pain, redness and visual disturbance.   Respiratory: Negative for cough and shortness of breath.    Cardiovascular: Negative for chest pain and palpitations.   Gastrointestinal: Negative for diarrhea, nausea and vomiting.   Musculoskeletal: Positive for gait problem. Negative for back pain and joint swelling.   Skin: Negative for color change and wound.   Neurological: Negative for dizziness, weakness and numbness.   Psychiatric/Behavioral: The patient is not nervous/anxious.           Objective:   /87   Pulse 70   Ht 5' 5" (1.651 m)   Wt 124.7 kg (275 lb)   LMP 08/21/2016   BMI 45.76 kg/m²     X-Ray Foot Complete Right  Narrative: EXAMINATION:  XR FOOT COMPLETE 3 VIEW RIGHT    CLINICAL HISTORY:  . Pain in right foot    TECHNIQUE:  AP, lateral, and oblique views of the right foot were performed.    COMPARISON:  None    FINDINGS:  Transverse nondisplaced basilar 5th metatarsal fracture present.  Remaining osseous structures intact.  Impression: As above    Electronically signed by: Tamir Bacon MD  Date:    11/05/2019  Time:    11:26       Physical Exam  LOWER EXTREMITY PHYSICAL EXAMINATION    VASCULAR: The right DP is 2/4. The right PT pulse is 2/4. Proximal to distal, warm to warm. No dependent rubor or elevation palor is noted. Capillary refill time is less than 3 seconds. Hair growth is appreciated to the dorsal foot and digits.    NEUROLOGY: Proprioception is intact. Sensation to light touch is intact. Protective sensation is intact via 5.07 [place] monofilament. Negative Tinel's Sign and negative " Valleix sign.    DERMATOLOGY: Skin is supple, dry and intact. No ecchymosis is noted. No hypertrophic skin formation. No erythema or cellulitis is noted.     ORTHOPEDIC:  Pain on palpation at the 5th metatarsal base on the base foot.  MMT with eversion is 3-4/5 compared to the contra-lateral.  Dorsiflexion, plantar flexion, inversion 5/5 compared to control lower extremity.  There is no  tenderness to palpation of the distal lateral malleolus. There is no tenderness to palpation of the distal medial malleolus. There is no tenderness to palpation of the navicular tuberosity. There is severe tenderness to palpation of th 5th metatarsal base.  There is mild to moderate pain along the course of the peroneal tendons. There is no pain along the course of the flexor tendons. There is no pain along the course of the extensor tendons.  Gait pattern is antalgic with tall cam boot and crutches       Assessment:     1. Closed nondisplaced fracture of fifth metatarsal bone of right foot, initial encounter        Plan:     Closed nondisplaced fracture of fifth metatarsal bone of right foot, initial encounter    Other orders  -     traMADol (ULTRAM) 50 mg tablet; Take 1 tablet (50 mg total) by mouth every 6 (six) hours as needed for Pain.  Dispense: 15 tablet; Refill: 0      X-rays reviewed by me today in clinic with the patient. There appears to be a nondisplaced extra-articular closed fracture of the base of the 5th metatarsal of the right foot. She has been ambulating with some discomfort on the heel in a Cam boot with crutches.  She has been having increased pain at night.  We will plan dispense tramadol for 15 pills to help with this at night.  Also applied a compressive sleeve to her foot to help decrease the associated inflammation as well. Patient is currently in a tall Cam boot for ambulation.    We did discussed both conservative and surgical interventions for this pathology.  I discussed that ideally patient would do  better and quicker return to work with surgery however she was hesitant with this.  We did discuss the procedure in detail making a small stab incision to the base of the 5th metatarsal bone and with radiographs to confirm the position, a screw will be placed along the intramedullary canal of the 5th metatarsal bone to compress the fracture fragments.  We also discussed possible conservative treatments as well, however I did discussed that this would take a longer time to heal and would require longer times off work.  Patient is a  for transit and uses her right foot for breaking and gas.  Patient would like to consider both options and will plan to call in approximately 1 week to determine which direction she would like to proceed.  A work note was provided to the patient requesting like to sit-down duty while in the Cam boot.    New patient would like surgery, we would order labs and imaging to have ready for her preoperative clearance with her PCP.        Future Appointments   Date Time Provider Department Center   11/11/2019  2:40 PM Edis Jaffe MD [facility]   11/11/2019  3:20 PM Elizabeth Lejeune, NP [facility]   11/13/2019  2:00 PM Emerson Rincon PA-C [facility]   11/27/2019 10:20 AM Drew Gutierrez MD [facility]   12/5/2019 10:20 AM Lorena Peng DPM [facility]

## 2019-11-05 NOTE — LETTER
November 5, 2019      [facility]  [id number] [place] [id number]  Phone: [phone]  Fax: [phone]       Patient: Yuli Milton   YOB: 1975  Date of Visit: 11/05/2019    To Whom It May Concern:    Dick Milton  was at [facility] on 11/05/2019. Please make accommodations to sit down duty with a CAM boot on her right foot. She sustained a fracture which requires limited to partial weight bearing on the right foot for approximately 4-6 weeks while the bones heal.  If you have any questions or concerns, or if I can be of further assistance, please do not hesitate to contact me.    Sincerely,    Lorena Peng DPM

## 2019-11-13 ENCOUNTER — OFFICE VISIT (OUTPATIENT)
Dept: NEUROSURGERY | Facility: CLINIC | Age: 44
End: 2019-11-13
Payer: COMMERCIAL

## 2019-11-13 VITALS — WEIGHT: 275 LBS | HEIGHT: 65 IN | BODY MASS INDEX: 45.82 KG/M2

## 2019-11-13 DIAGNOSIS — M54.12 CERVICAL RADICULOPATHY: ICD-10-CM

## 2019-11-13 DIAGNOSIS — R20.0 NUMBNESS AND TINGLING OF RIGHT ARM: ICD-10-CM

## 2019-11-13 DIAGNOSIS — M79.2 RADICULAR PAIN IN RIGHT ARM: Primary | ICD-10-CM

## 2019-11-13 DIAGNOSIS — R20.2 NUMBNESS AND TINGLING OF RIGHT ARM: ICD-10-CM

## 2019-11-13 PROCEDURE — 99999 PR PBB SHADOW E&M-EST. PATIENT-LVL IV: ICD-10-PCS | Mod: PBBFAC,,, | Performed by: PHYSICIAN ASSISTANT

## 2019-11-13 PROCEDURE — 99204 OFFICE O/P NEW MOD 45 MIN: CPT | Mod: S$GLB,,, | Performed by: PHYSICIAN ASSISTANT

## 2019-11-13 PROCEDURE — 99999 PR PBB SHADOW E&M-EST. PATIENT-LVL IV: CPT | Mod: PBBFAC,,, | Performed by: PHYSICIAN ASSISTANT

## 2019-11-13 PROCEDURE — 99214 OFFICE O/P EST MOD 30 MIN: CPT | Mod: PBBFAC | Performed by: PHYSICIAN ASSISTANT

## 2019-11-13 PROCEDURE — 99204 PR OFFICE/OUTPT VISIT, NEW, LEVL IV, 45-59 MIN: ICD-10-PCS | Mod: S$GLB,,, | Performed by: PHYSICIAN ASSISTANT

## 2019-11-13 RX ORDER — DIAZEPAM 5 MG/1
TABLET ORAL
Qty: 1 TABLET | Refills: 0 | Status: SHIPPED | OUTPATIENT
Start: 2019-11-13 | End: 2020-02-17

## 2019-11-13 NOTE — PROGRESS NOTES
"Subjective:     Patient ID: Yuli Milton is a 44 y.o. female.    Chief Complaint: Cervical Spine Pain (C-spine)    HPI   The patient is here today for evaluation of cervical radiculopathy. She is referred to us by TUSHAR Barreto NP. Four or Five weeks ago, she started having pain on the R side and shoulder. No trauma. She went to a walk-in clinic and was given an injection for inflammation. She then saw PCP who also gave her an injection in the arm. This last injection seems to be helping her pain. She denies any issues with the left side of her neck or LUE. Patient does have numbness and tingling of RUE at times. She denies weakness of her arms.   Patient denies any previous neck surgery or injections. Patient has not had any recent physical therapy. Patient had therapy in the past for "the right side of her body" and did not notice any difference. Patient did have a fall two Saturdays and sustained a fracture to her fifth metatarsal. Patient is being managed by Dr. Peng for this and he is prescribing her the pain medication.  Patient was prescribed Norco for her foot pain/fracture along with Tramadol. Tramadol hasn't been effective. Norco seems to take the edge off of her pain. Rates the pain on right side of neck a 5/10.   Patient denies bladder or bowel issues. She currently uses crutches with CAM boot.       Past Medical History:   Diagnosis Date    Abnormal Pap smear of cervix     treatment??    Abnormal Pap smear of vagina     Anemia     Anxiety     Anxiety and depression     Asthma     Chlamydia     Depression (emotion)     Diabetes mellitus     Dysuria 3/31/2018    Gallstones     Gastritis     History of ovarian cyst     History of syphilis     History of trichomoniasis     Hyperlipidemia     Mental disorder     PONV (postoperative nausea and vomiting)     Vaginal candidiasis 7/24/2018     Past Surgical History:   Procedure Laterality Date    APPENDECTOMY      CHOLECYSTECTOMY   "    COLONOSCOPY N/A 7/31/2017    Procedure: COLONOSCOPY;  Surgeon: Yuliana Michael MD;  Location: [facility];  Service: Endoscopy;  Laterality: N/A;    DILATION AND CURETTAGE OF UTERUS      bleeding    HYSTERECTOMY  08/31/2016    RALH/BS for complex hyperplasia without atypia     Family History   Problem Relation Age of Onset    Breast cancer Paternal Grandmother     Diabetes Maternal Grandmother     Hypertension Mother     Thyroid disease Mother     Breast cancer Maternal Aunt     Cancer Maternal Aunt         colon cancer    Colon cancer Maternal Aunt     Miscarriages / Stillbirths Cousin     Stroke Maternal Aunt     No Known Problems Father     Thyroid disease Sister     Thyroid disease Brother     Ovarian cancer Neg Hx     Deep vein thrombosis Neg Hx     Pulmonary embolism Neg Hx      Social History     Socioeconomic History    Marital status: Single     Spouse name: Not on file    Number of children: 3    Years of education: Not on file    Highest education level: Not on file   Occupational History    Not on file   Social Needs    Financial resource strain: Not on file    Food insecurity:     Worry: Not on file     Inability: Not on file    Transportation needs:     Medical: Not on file     Non-medical: Not on file   Tobacco Use    Smoking status: Never Smoker    Smokeless tobacco: Never Used   Substance and Sexual Activity    Alcohol use: No     Alcohol/week: 0.0 standard drinks    Drug use: No    Sexual activity: Yes     Partners: Male     Birth control/protection: See Surgical Hx, Post-menopausal     Comment: mut monog   Lifestyle    Physical activity:     Days per week: Not on file     Minutes per session: Not on file    Stress: Very much   Relationships    Social connections:     Talks on phone: Not on file     Gets together: Not on file     Attends Hoahaoism service: Not on file     Active member of club or organization: Not on file     Attends meetings of clubs or  "organizations: Not on file     Relationship status: Not on file   Other Topics Concern    Not on file   Social History Narrative    Full time employed - [employer]     Medication List with Changes/Refills   New Medications    DIAZEPAM (VALIUM) 5 MG TABLET    Take 1 tablet by mouth 30 minutes prior to MRI to help decrease anxiety.   Current Medications    ALBUTEROL (VENTOLIN HFA) 90 MCG/ACTUATION INHALER    Inhale 2 puffs into the lungs every 6 (six) hours as needed for Wheezing. Rescue    ALPRAZOLAM (XANAX) 1 MG TABLET    Take 2 mg by mouth every evening.     ALPRAZOLAM (XANAX) 2 MG TAB    Take 2 mg by mouth every evening.    ATORVASTATIN (LIPITOR) 20 MG TABLET    Take 1 tablet (20 mg total) by mouth once daily.    BREXPIPRAZOLE (REXULTI) 3 MG TAB    Take 3 mg by mouth every evening.    CYCLOSPORINE (RESTASIS) 0.05 % OPHTHALMIC EMULSION    1 drop 2 (two) times daily.    FLUOXETINE 20 MG CAPSULE    TAKE 1 CAPSULE BY MOUTH EVERY DAY AS DIRECTED    FUROSEMIDE (LASIX) 20 MG TABLET    Take 20 mg by mouth once daily.     HYDROCHLOROTHIAZIDE (HYDRODIURIL) 12.5 MG TAB    Take 1 tablet (12.5 mg total) by mouth daily as needed.    IBUPROFEN (ADVIL,MOTRIN) 800 MG TABLET    TAKE 1 TABLET BY MOUTH THREE TIMES A DAY WITH FOOD    KETOCONAZOLE (NIZORAL) 2 % CREAM    APPLY TO RASH ON THE FACE TWICE DAILY    LOSARTAN (COZAAR) 25 MG TABLET    Take 0.5 tablets (12.5 mg total) by mouth once daily.    METFORMIN (GLUCOPHAGE) 1000 MG TABLET    Take 1 tablet (1,000 mg total) by mouth 2 (two) times daily with meals.    ONDANSETRON (ZOFRAN) 4 MG TABLET    Take 1 tablet (4 mg total) by mouth every 8 (eight) hours as needed for Nausea.    ONETOUCH DELICA LANCETS 30 GAUGE MISC    USE TO TEST THREE TIMES DAILY    PEN NEEDLE, DIABETIC (PEN NEEDLE) 32 GAUGE X 5/32" NDLE    1 each by Misc.(Non-Drug; Combo Route) route once daily.    POTASSIUM CHLORIDE (KLOR-CON) 20 MEQ PACK    Take 20 mEq by mouth once daily.    SEMAGLUTIDE (OZEMPIC) 0.25 MG " OR 0.5 MG(2 MG/1.5 ML) PNIJ    Inject 0.5 mg into the skin every 7 days.    TRAMADOL (ULTRAM) 50 MG TABLET    Take 1 tablet (50 mg total) by mouth every 6 (six) hours as needed for Pain.    VALACYCLOVIR (VALTREX) 1000 MG TABLET    TK 1 T PO  TID FOR 7 DAYS     Review of patient's allergies indicates:   Allergen Reactions    Trulicity [dulaglutide] Shortness Of Breath and Other (See Comments)     Headaches    Hibiclens (isopropyl alcohol) Itching     Review of Systems   Constitution: Negative for fever, night sweats, weight gain and weight loss.   HENT: Negative for congestion.    Cardiovascular: Negative for chest pain, near-syncope and syncope.   Respiratory: Negative for shortness of breath.    Musculoskeletal: Positive for joint pain, neck pain and stiffness.   Gastrointestinal: Negative for bowel incontinence.   Genitourinary: Negative for bladder incontinence.   Psychiatric/Behavioral: The patient is not nervous/anxious.        Objective:   Body mass index is 45.76 kg/m².  There were no vitals filed for this visit.         Ortho/SPM Exam   Nursing note and vitals reviewed  Gen:Oriented to person, place, and time.             Appears stated age   Head:Normocephalic and atraumatic.  Nose: Nose normal.    Eyes: EOM are normal. Pupils are equal, round, and reactive to light.   Neck: Neck supple. No masses or lesions palpated  Cardiovascular: Intact distal pulses.    Abdominal: Soft.   Neurological: Alert and oriented to person, place, and time.  No cranial nerve deficit.  Coordination normal. Normal muscle tone  Psychiatric: Normal mood and affect. Behavior is normal.      Neck:   Present, lower c-spine, Trap (R) Paraspinal tenderness    Present, right side Paraspinal muscle spasms   None  Pain with flexion and extention   WNL  Range of motion shoulders   Neg  Spurling's sign     Motor:   Right Right Left Left  Level Group   5 5 5 5 C5 Deltoid   5 5 5 5 C6 Bicep   5 5 5 5  Wrist extension    5 5 5 5 C7 Triceps    5 5 5 5  Wrist flexion   5 5 5 5 C8    5 5 5 5 T1 Interossei      Sensation:  NL Decreased (R/L/BL) Level Sensation    X  C5 Lateral upper arm   X  C6 Thumb and index finger, lat forearm   X  C7 Middle finger   X  C8 Ring and little finger   X  T1 Medial arm      Reflex:  2+  Bicep tendon   2+  Brachioradialis   2+  Triceps tendon   Not present  Brennan's   none  Clonus   neg  Tinel's         Previous Imaging: none    Assessment:     Encounter Diagnoses   Name Primary?    Numbness and tingling of right arm     Radicular pain in right arm Yes    Cervical radiculopathy         Plan:       X-rays and MRI for further evaluation   I recommended therapy referral for core strengthening and ROM exercises today   Patient states she had therapy in the past and it was not beneficial  She will follow-up in a few weeks for imaging review (i'll be out next week for next 5 days)              Disclaimer: This note was prepared using a voice recognition system and is likely to have sound alike errors within the text.

## 2019-11-13 NOTE — LETTER
November 13, 2019      [place]  [address]  [place] [address]           [facility]  [address]  [place] [address]  Phone: [phone]  Fax: [phone]          Patient: Yuli Milton   MR Number: [id number]   YOB: 1975   Date of Visit: 11/13/2019       Dear Skylar Barreto:    Thank you for referring Yuli Milton to me for evaluation. Attached you will find relevant portions of my assessment and plan of care.    If you have questions, please do not hesitate to call me. I look forward to following Yuli Milton along with you.    Sincerely,    Emerson Rincon PA-C    Enclosure  CC:  No Recipients    If you would like to receive this communication electronically, please contact [email or web address] or [phone] to request more information on [facility] Link access.    For providers and/or their staff who would like to refer a patient to [facility], please contact us through our one-stop-shop provider referral line, [facility], at [phone].    If you feel you have received this communication in error or would no longer like to receive these types of communications, please e-mail [email or web address]

## 2019-11-14 ENCOUNTER — TELEPHONE (OUTPATIENT)
Dept: INTERNAL MEDICINE | Facility: CLINIC | Age: 44
End: 2019-11-14

## 2019-11-14 NOTE — TELEPHONE ENCOUNTER
Spoke with pt about medication. Pt stated that her BS is going up to 400's. Spoke with Dr. Gutierrez. Was advised to have patient stated victoza insulin again. Spoke with pt, she will call pharmacy to refill medication

## 2019-11-15 ENCOUNTER — TELEPHONE (OUTPATIENT)
Dept: PODIATRY | Facility: CLINIC | Age: 44
End: 2019-11-15

## 2019-11-15 ENCOUNTER — TELEPHONE (OUTPATIENT)
Dept: CARDIOLOGY | Facility: CLINIC | Age: 44
End: 2019-11-15

## 2019-11-15 NOTE — TELEPHONE ENCOUNTER
----- Message from Edis Jaffe MD sent at 11/15/2019  9:47 AM CST -----  Regarding: Do not reschedule with me  HI do not reschedule with me, she cancelled twice. She has significant disease if she decides to get treatment can be added on my schedule but no scheduling beforehand with me. Thanks    - Dr. Jaffe

## 2019-11-15 NOTE — TELEPHONE ENCOUNTER
Attempted to contact pt. Pt was unavailable, left detailed message with call back number.        ----- Message from Vianey Chery sent at 11/15/2019 11:37 AM CST -----  Contact: pt  Pt request call back needing short term disability paperwork completed ... Call back: [phone] (home)

## 2019-11-18 ENCOUNTER — TELEPHONE (OUTPATIENT)
Dept: INTERNAL MEDICINE | Facility: CLINIC | Age: 44
End: 2019-11-18

## 2019-11-18 ENCOUNTER — OFFICE VISIT (OUTPATIENT)
Dept: INTERNAL MEDICINE | Facility: CLINIC | Age: 44
End: 2019-11-18
Payer: MEDICAID

## 2019-11-18 VITALS
BODY MASS INDEX: 45.55 KG/M2 | HEART RATE: 66 BPM | SYSTOLIC BLOOD PRESSURE: 119 MMHG | WEIGHT: 273.38 LBS | DIASTOLIC BLOOD PRESSURE: 61 MMHG | HEIGHT: 65 IN | TEMPERATURE: 97 F | OXYGEN SATURATION: 98 %

## 2019-11-18 DIAGNOSIS — E11.9 TYPE 2 DIABETES MELLITUS WITHOUT COMPLICATION, WITHOUT LONG-TERM CURRENT USE OF INSULIN: ICD-10-CM

## 2019-11-18 DIAGNOSIS — M79.671 RIGHT FOOT PAIN: ICD-10-CM

## 2019-11-18 DIAGNOSIS — E11.9 TYPE 2 DIABETES MELLITUS WITHOUT COMPLICATION, WITHOUT LONG-TERM CURRENT USE OF INSULIN: Primary | ICD-10-CM

## 2019-11-18 LAB — GLUCOSE SERPL-MCNC: 254 MG/DL (ref 70–110)

## 2019-11-18 PROCEDURE — 99999 PR PBB SHADOW E&M-EST. PATIENT-LVL III: ICD-10-PCS | Mod: PBBFAC,,, | Performed by: FAMILY MEDICINE

## 2019-11-18 PROCEDURE — 99999 PR PBB SHADOW E&M-EST. PATIENT-LVL III: CPT | Mod: PBBFAC,,, | Performed by: FAMILY MEDICINE

## 2019-11-18 PROCEDURE — 99214 OFFICE O/P EST MOD 30 MIN: CPT | Mod: S$PBB,,, | Performed by: FAMILY MEDICINE

## 2019-11-18 PROCEDURE — 99214 PR OFFICE/OUTPT VISIT, EST, LEVL IV, 30-39 MIN: ICD-10-PCS | Mod: S$PBB,,, | Performed by: FAMILY MEDICINE

## 2019-11-18 PROCEDURE — 82962 GLUCOSE BLOOD TEST: CPT | Mod: PBBFAC,PO | Performed by: FAMILY MEDICINE

## 2019-11-18 PROCEDURE — 99213 OFFICE O/P EST LOW 20 MIN: CPT | Mod: PBBFAC,PO | Performed by: FAMILY MEDICINE

## 2019-11-18 RX ORDER — SODIUM CHLORIDE 9 MG/ML
INJECTION, SOLUTION INTRAVENOUS
Status: DISCONTINUED | OUTPATIENT
Start: 2019-11-18 | End: 2020-02-17

## 2019-11-18 RX ORDER — TRAMADOL HYDROCHLORIDE 100 MG/1
100 TABLET, EXTENDED RELEASE ORAL EVERY 8 HOURS PRN
Qty: 20 TABLET | Refills: 0 | Status: SHIPPED | OUTPATIENT
Start: 2019-11-18 | End: 2019-11-18 | Stop reason: SDUPTHER

## 2019-11-18 RX ORDER — INSULIN GLARGINE 100 [IU]/ML
30 INJECTION, SOLUTION SUBCUTANEOUS NIGHTLY
Qty: 15 ML | Refills: 0 | Status: SHIPPED | OUTPATIENT
Start: 2019-11-18 | End: 2019-11-18 | Stop reason: SDUPTHER

## 2019-11-18 NOTE — PROGRESS NOTES
Subjective:       Patient ID: Yuli Milton is a 44 y.o. female.    Chief Complaint: DM Check up and patient wants pain meds for foot pain    Foot Injury    The incident occurred more than 1 week ago. The incident occurred at home. The injury mechanism was a direct blow. Pain location: right foot. The quality of the pain is described as aching. The pain is moderate. Pertinent negatives include no loss of sensation, muscle weakness, numbness or tingling. She reports no foreign bodies present. The symptoms are aggravated by movement, weight bearing and palpation. Treatments tried: tramadol. The treatment provided no relief.     Review of Systems   Cardiovascular: Negative for chest pain.   Gastrointestinal: Negative for abdominal pain.   Musculoskeletal: Positive for gait problem.   Neurological: Negative for tingling and numbness.       Objective:      Physical Exam   Constitutional: She appears well-developed and well-nourished. No distress.   HENT:   Head: Normocephalic and atraumatic.   Pulmonary/Chest: Effort normal and breath sounds normal. No respiratory distress. She has no wheezes.   Abdominal: Soft. She exhibits no distension. There is no tenderness. There is no guarding.   Musculoskeletal: She exhibits no edema.   RLE boot in place.   Skin: Skin is warm and dry. No rash noted. She is not diaphoretic. No erythema.   Nursing note and vitals reviewed.      Assessment:       1. Type 2 diabetes mellitus without complication, without long-term current use of insulin    2. Right foot pain        Plan:     Problem List Items Addressed This Visit        Endocrine    Type 2 diabetes mellitus without complication, without long-term current use of insulin - Primary    Relevant Medications    insulin (BASAGLAR KWIKPEN U-100 INSULIN) glargine 100 units/mL (3mL) SubQ pen    0.9%  NaCl infusion (Start on 11/18/2019  2:45 PM)    Other Relevant Orders    POCT Glucose, Hand-Held Device       Orthopedic    Right foot pain     Relevant Medications    traMADol (ULTRAM-ER) 100 MG Tb24

## 2019-11-18 NOTE — TELEPHONE ENCOUNTER
Called pt about appt. Pt state that BS is still elevated. Scheduled pt appt to come into office today

## 2019-11-18 NOTE — TELEPHONE ENCOUNTER
----- Message from Lux Choi sent at 11/18/2019  3:46 PM CST -----  Contact: pt   Pt states pharmacy told her she needs a pa for tramadol and another medication that was called in.         [phone]        .  [facility] [employer] [address] [place] [address] [place] [address] [address] [facility] [id number] [place] [address]  Phone: [phone] Fax: [phone]

## 2019-11-18 NOTE — TELEPHONE ENCOUNTER
----- Message from Shira Argueta sent at 11/18/2019 10:53 AM CST -----  Contact: Self  Type:  Same Day Appointment Request    Caller is requesting a same day appointment.  Caller declined first available appointment listed below.    Name of Caller:Yuli  When is the first available appointment?11/27/19  Symptoms:glucose reading 479  Best Call Back Number:[phone]  Additional Information:

## 2019-11-19 RX ORDER — INSULIN GLARGINE 100 [IU]/ML
30 INJECTION, SOLUTION SUBCUTANEOUS NIGHTLY
Qty: 15 ML | Refills: 0 | Status: SHIPPED | OUTPATIENT
Start: 2019-11-19 | End: 2020-05-22

## 2019-11-19 RX ORDER — TRAMADOL HYDROCHLORIDE 100 MG/1
100 TABLET, EXTENDED RELEASE ORAL EVERY 8 HOURS PRN
Qty: 20 TABLET | Refills: 0 | Status: SHIPPED | OUTPATIENT
Start: 2019-11-19 | End: 2019-11-27

## 2019-11-20 ENCOUNTER — PATIENT OUTREACH (OUTPATIENT)
Dept: ADMINISTRATIVE | Facility: HOSPITAL | Age: 44
End: 2019-11-20

## 2019-11-20 NOTE — PROGRESS NOTES
Pt commented that she had her DM Eye exam @ [facility](doesn't remember Provider's name) in [place] will sign a BALTAZAR @ visit

## 2019-11-27 ENCOUNTER — OFFICE VISIT (OUTPATIENT)
Dept: INTERNAL MEDICINE | Facility: CLINIC | Age: 44
End: 2019-11-27
Payer: COMMERCIAL

## 2019-11-27 ENCOUNTER — LAB VISIT (OUTPATIENT)
Dept: LAB | Facility: HOSPITAL | Age: 44
End: 2019-11-27
Attending: FAMILY MEDICINE
Payer: COMMERCIAL

## 2019-11-27 VITALS
DIASTOLIC BLOOD PRESSURE: 69 MMHG | TEMPERATURE: 98 F | WEIGHT: 262.56 LBS | RESPIRATION RATE: 19 BRPM | OXYGEN SATURATION: 99 % | HEART RATE: 70 BPM | HEIGHT: 65 IN | SYSTOLIC BLOOD PRESSURE: 104 MMHG | BODY MASS INDEX: 43.75 KG/M2

## 2019-11-27 DIAGNOSIS — E11.9 TYPE 2 DIABETES MELLITUS WITHOUT COMPLICATION, WITHOUT LONG-TERM CURRENT USE OF INSULIN: ICD-10-CM

## 2019-11-27 DIAGNOSIS — E11.9 TYPE 2 DIABETES MELLITUS WITHOUT COMPLICATION, WITHOUT LONG-TERM CURRENT USE OF INSULIN: Primary | ICD-10-CM

## 2019-11-27 DIAGNOSIS — R30.0 DYSURIA: ICD-10-CM

## 2019-11-27 LAB
BACTERIA #/AREA URNS AUTO: NORMAL /HPF
BILIRUB UR QL STRIP: ABNORMAL
CLARITY UR REFRACT.AUTO: CLEAR
COLOR UR AUTO: YELLOW
ESTIMATED AVG GLUCOSE: 235 MG/DL (ref 68–131)
GLUCOSE UR QL STRIP: NEGATIVE
HBA1C MFR BLD HPLC: 9.8 % (ref 4–5.6)
HGB UR QL STRIP: ABNORMAL
KETONES UR QL STRIP: ABNORMAL
LEUKOCYTE ESTERASE UR QL STRIP: NEGATIVE
MICROSCOPIC COMMENT: NORMAL
NITRITE UR QL STRIP: NEGATIVE
PH UR STRIP: 6 [PH] (ref 5–8)
PROT UR QL STRIP: NEGATIVE
RBC #/AREA URNS AUTO: 3 /HPF (ref 0–4)
SP GR UR STRIP: >=1.03 (ref 1–1.03)
SQUAMOUS #/AREA URNS AUTO: 4 /HPF
URN SPEC COLLECT METH UR: ABNORMAL
UROBILINOGEN UR STRIP-ACNC: NEGATIVE EU/DL
WBC #/AREA URNS AUTO: 1 /HPF (ref 0–5)

## 2019-11-27 PROCEDURE — 83036 HEMOGLOBIN GLYCOSYLATED A1C: CPT

## 2019-11-27 PROCEDURE — 99214 PR OFFICE/OUTPT VISIT, EST, LEVL IV, 30-39 MIN: ICD-10-PCS | Mod: S$PBB,,, | Performed by: FAMILY MEDICINE

## 2019-11-27 PROCEDURE — 99999 PR PBB SHADOW E&M-EST. PATIENT-LVL III: CPT | Mod: PBBFAC,,, | Performed by: FAMILY MEDICINE

## 2019-11-27 PROCEDURE — 99214 OFFICE O/P EST MOD 30 MIN: CPT | Mod: S$PBB,,, | Performed by: FAMILY MEDICINE

## 2019-11-27 PROCEDURE — 99213 OFFICE O/P EST LOW 20 MIN: CPT | Mod: PBBFAC,PO | Performed by: FAMILY MEDICINE

## 2019-11-27 PROCEDURE — 36415 COLL VENOUS BLD VENIPUNCTURE: CPT | Mod: PO

## 2019-11-27 PROCEDURE — 87086 URINE CULTURE/COLONY COUNT: CPT

## 2019-11-27 PROCEDURE — 81000 URINALYSIS NONAUTO W/SCOPE: CPT | Mod: PO

## 2019-11-27 PROCEDURE — 99999 PR PBB SHADOW E&M-EST. PATIENT-LVL III: ICD-10-PCS | Mod: PBBFAC,,, | Performed by: FAMILY MEDICINE

## 2019-11-27 RX ORDER — FLUOXETINE HYDROCHLORIDE 40 MG/1
CAPSULE ORAL NIGHTLY
Refills: 0 | COMMUNITY
Start: 2019-11-11 | End: 2021-06-18 | Stop reason: SDUPTHER

## 2019-11-27 RX ORDER — SULFAMETHOXAZOLE AND TRIMETHOPRIM 800; 160 MG/1; MG/1
1 TABLET ORAL 2 TIMES DAILY
Qty: 6 TABLET | Refills: 0 | Status: SHIPPED | OUTPATIENT
Start: 2019-11-27 | End: 2019-12-09

## 2019-11-27 NOTE — PROGRESS NOTES
Subjective:       Patient ID: Yuli Milton is a 44 y.o. female.    Chief Complaint: Diabetes and Urinary Tract Infection    Diabetes   She presents for her follow-up diabetic visit. She has type 2 diabetes mellitus. Her disease course has been stable. Pertinent negatives for hypoglycemia include no confusion, dizziness, headaches or hunger. Pertinent negatives for diabetes include no blurred vision, no chest pain, no fatigue and no foot paresthesias. Pertinent negatives for hypoglycemia complications include no blackouts and no hospitalization. Symptoms are stable. Risk factors for coronary artery disease include diabetes mellitus, hypertension and obesity. She is compliant with treatment all of the time. Her weight is stable. She is following a diabetic diet. She participates in exercise daily. An ACE inhibitor/angiotensin II receptor blocker is being taken.     Review of Systems   Constitutional: Negative for fatigue.   Eyes: Negative for blurred vision.   Cardiovascular: Negative for chest pain.   Genitourinary: Positive for dysuria.   Neurological: Negative for dizziness and headaches.   Psychiatric/Behavioral: Negative for confusion.       Objective:      Physical Exam   Constitutional: She appears well-developed and well-nourished. No distress.   HENT:   Head: Normocephalic and atraumatic.   Pulmonary/Chest: Effort normal and breath sounds normal. No respiratory distress. She has no wheezes.   Abdominal: Soft. She exhibits no distension. There is no tenderness. There is no guarding.   Musculoskeletal:   No CVA TTP joey   Skin: Skin is warm and dry. No rash noted. She is not diaphoretic. No erythema.   Nursing note and vitals reviewed.      Assessment:       1. Type 2 diabetes mellitus without complication, without long-term current use of insulin    2. Dysuria        Plan:     Problem List Items Addressed This Visit        Renal/    Dysuria    Relevant Medications    sulfamethoxazole-trimethoprim  800-160mg (BACTRIM DS) 800-160 mg Tab    Other Relevant Orders    Urine culture    URINALYSIS       Endocrine    Type 2 diabetes mellitus without complication, without long-term current use of insulin - Primary    Relevant Orders    Hemoglobin A1c

## 2019-11-29 ENCOUNTER — HOSPITAL ENCOUNTER (OUTPATIENT)
Dept: RADIOLOGY | Facility: HOSPITAL | Age: 44
Discharge: HOME | End: 2019-11-29
Attending: PHYSICIAN ASSISTANT
Payer: COMMERCIAL

## 2019-11-29 DIAGNOSIS — R20.0 NUMBNESS AND TINGLING OF RIGHT ARM: ICD-10-CM

## 2019-11-29 DIAGNOSIS — M79.2 RADICULAR PAIN IN RIGHT ARM: ICD-10-CM

## 2019-11-29 DIAGNOSIS — R20.2 NUMBNESS AND TINGLING OF RIGHT ARM: ICD-10-CM

## 2019-11-29 DIAGNOSIS — E11.9 TYPE 2 DIABETES MELLITUS WITHOUT COMPLICATION: ICD-10-CM

## 2019-11-29 DIAGNOSIS — M54.12 CERVICAL RADICULOPATHY: ICD-10-CM

## 2019-11-29 LAB — BACTERIA UR CULT: NORMAL

## 2019-11-29 PROCEDURE — 72050 X-RAY EXAM NECK SPINE 4/5VWS: CPT | Mod: 26,,, | Performed by: RADIOLOGY

## 2019-11-29 PROCEDURE — 72050 XR CERVICAL SPINE AP LAT WITH FLEX EXTEN: ICD-10-PCS | Mod: 26,,, | Performed by: RADIOLOGY

## 2019-11-29 PROCEDURE — 72050 X-RAY EXAM NECK SPINE 4/5VWS: CPT | Mod: TC

## 2019-12-02 ENCOUNTER — PATIENT OUTREACH (OUTPATIENT)
Dept: ADMINISTRATIVE | Facility: HOSPITAL | Age: 44
End: 2019-12-02

## 2019-12-02 ENCOUNTER — PATIENT MESSAGE (OUTPATIENT)
Dept: NEUROSURGERY | Facility: CLINIC | Age: 44
End: 2019-12-02

## 2019-12-02 NOTE — PROGRESS NOTES
Most recent labs reviewed. HbA1c now 9.8% (up from 7.0%). Please schedule next available appointment with Dr. Gutierrez to further discuss and titrate meds

## 2019-12-03 ENCOUNTER — TELEPHONE (OUTPATIENT)
Dept: NEUROSURGERY | Facility: CLINIC | Age: 44
End: 2019-12-03

## 2019-12-03 NOTE — TELEPHONE ENCOUNTER
Phoned pt in regards to not completing MRI.    Pt states she panicked in the MRI and wants to do an open MRI.    I informed pt that I will let Emerson know and we will go ahead and place an referral so she can get it    However, pt is aware that if insurance does not approve the MRI in time f/u appt will be canceled and rescheduled.

## 2019-12-04 DIAGNOSIS — M54.12 CERVICAL RADICULOPATHY: Primary | ICD-10-CM

## 2019-12-04 DIAGNOSIS — M79.2 RADICULAR PAIN IN RIGHT ARM: ICD-10-CM

## 2019-12-04 DIAGNOSIS — R20.0 NUMBNESS AND TINGLING OF RIGHT ARM: ICD-10-CM

## 2019-12-04 DIAGNOSIS — R20.2 NUMBNESS AND TINGLING OF RIGHT ARM: ICD-10-CM

## 2019-12-05 ENCOUNTER — HOSPITAL ENCOUNTER (OUTPATIENT)
Dept: RADIOLOGY | Facility: HOSPITAL | Age: 44
Discharge: HOME | End: 2019-12-05
Attending: PODIATRIST
Payer: MEDICAID

## 2019-12-05 ENCOUNTER — OFFICE VISIT (OUTPATIENT)
Dept: PODIATRY | Facility: CLINIC | Age: 44
End: 2019-12-05
Payer: COMMERCIAL

## 2019-12-05 ENCOUNTER — CLINICAL SUPPORT (OUTPATIENT)
Dept: CARDIOLOGY | Facility: CLINIC | Age: 44
End: 2019-12-05
Payer: MEDICAID

## 2019-12-05 VITALS — BODY MASS INDEX: 43.75 KG/M2 | HEIGHT: 65 IN | WEIGHT: 262.56 LBS

## 2019-12-05 DIAGNOSIS — E66.01 MORBID OBESITY DUE TO EXCESS CALORIES: ICD-10-CM

## 2019-12-05 DIAGNOSIS — S92.354A CLOSED NONDISPLACED FRACTURE OF FIFTH METATARSAL BONE OF RIGHT FOOT, INITIAL ENCOUNTER: ICD-10-CM

## 2019-12-05 DIAGNOSIS — E11.9 TYPE 2 DIABETES MELLITUS WITHOUT COMPLICATION, WITHOUT LONG-TERM CURRENT USE OF INSULIN: ICD-10-CM

## 2019-12-05 DIAGNOSIS — E55.9 VITAMIN D DEFICIENCY: ICD-10-CM

## 2019-12-05 DIAGNOSIS — S92.354A CLOSED NONDISPLACED FRACTURE OF FIFTH METATARSAL BONE OF RIGHT FOOT, INITIAL ENCOUNTER: Primary | ICD-10-CM

## 2019-12-05 PROCEDURE — 71046 XR CHEST PA AND LATERAL: ICD-10-PCS | Mod: 26,,, | Performed by: RADIOLOGY

## 2019-12-05 PROCEDURE — 99214 PR OFFICE/OUTPT VISIT, EST, LEVL IV, 30-39 MIN: ICD-10-PCS | Mod: S$PBB,,, | Performed by: PODIATRIST

## 2019-12-05 PROCEDURE — 73630 X-RAY EXAM OF FOOT: CPT | Mod: TC,RT

## 2019-12-05 PROCEDURE — 93010 EKG 12-LEAD: ICD-10-PCS | Mod: S$PBB,,, | Performed by: INTERNAL MEDICINE

## 2019-12-05 PROCEDURE — 93010 ELECTROCARDIOGRAM REPORT: CPT | Mod: S$PBB,,, | Performed by: INTERNAL MEDICINE

## 2019-12-05 PROCEDURE — 73630 XR FOOT COMPLETE 3 VIEW RIGHT: ICD-10-PCS | Mod: 26,RT,, | Performed by: RADIOLOGY

## 2019-12-05 PROCEDURE — 99214 OFFICE O/P EST MOD 30 MIN: CPT | Mod: S$PBB,,, | Performed by: PODIATRIST

## 2019-12-05 PROCEDURE — 99213 OFFICE O/P EST LOW 20 MIN: CPT | Mod: PBBFAC,25 | Performed by: PODIATRIST

## 2019-12-05 PROCEDURE — 71046 X-RAY EXAM CHEST 2 VIEWS: CPT | Mod: TC

## 2019-12-05 PROCEDURE — 71046 X-RAY EXAM CHEST 2 VIEWS: CPT | Mod: 26,,, | Performed by: RADIOLOGY

## 2019-12-05 PROCEDURE — 73630 X-RAY EXAM OF FOOT: CPT | Mod: 26,RT,, | Performed by: RADIOLOGY

## 2019-12-05 PROCEDURE — 93005 ELECTROCARDIOGRAM TRACING: CPT | Mod: PBBFAC | Performed by: INTERNAL MEDICINE

## 2019-12-05 PROCEDURE — 99999 PR PBB SHADOW E&M-EST. PATIENT-LVL III: ICD-10-PCS | Mod: PBBFAC,,, | Performed by: PODIATRIST

## 2019-12-05 PROCEDURE — 99999 PR PBB SHADOW E&M-EST. PATIENT-LVL III: CPT | Mod: PBBFAC,,, | Performed by: PODIATRIST

## 2019-12-05 NOTE — PROGRESS NOTES
Subjective:       Patient ID: Yuli Milton is a 44 y.o. female.    Chief Complaint: Follow-up (Right foot fx f/u. Pt reports occassional pain. Ambulates w/ walking boot and crutches. Last seen by Dr Gutierrez on 11/27/19.)    HPI: Yuli Milton presents to the office today to reassess the right 5th metatarsal base fracture.  The initial injury happened on November 2nd which point she presented to clinic.  X-rays were taken and show a closed nondisplaced 5th metatarsal base fracture.  We did discuss at last appointment conservative versus surgical treatment.  Patient states that she has been ambulating minimally on the right foot with a single crutch.  States that she still has occasional pains to the right foot. She presents today in clinic with her aunt who would like the patient to strongly consider surgical intervention.    Review of patient's allergies indicates:   Allergen Reactions    Trulicity [dulaglutide] Shortness Of Breath and Other (See Comments)     Headaches    Hibiclens (isopropyl alcohol) Itching       Past Medical History:   Diagnosis Date    Abnormal Pap smear of cervix     treatment??    Abnormal Pap smear of vagina     Anemia     Anxiety     Anxiety and depression     Asthma     Chlamydia     Depression (emotion)     Diabetes mellitus     Dysuria 3/31/2018    Gallstones     Gastritis     History of ovarian cyst     History of syphilis     History of trichomoniasis     Hyperlipidemia     Mental disorder     PONV (postoperative nausea and vomiting)     Vaginal candidiasis 7/24/2018       Family History   Problem Relation Age of Onset    Breast cancer Paternal Grandmother     Diabetes Maternal Grandmother     Hypertension Mother     Thyroid disease Mother     Breast cancer Maternal Aunt     Cancer Maternal Aunt         colon cancer    Colon cancer Maternal Aunt     Miscarriages / Stillbirths Cousin     Stroke Maternal Aunt     No Known Problems Father      "Thyroid disease Sister     Thyroid disease Brother     Ovarian cancer Neg Hx     Deep vein thrombosis Neg Hx     Pulmonary embolism Neg Hx        Social History     Socioeconomic History    Marital status: Single     Spouse name: Not on file    Number of children: 3    Years of education: Not on file    Highest education level: Not on file   Occupational History    Not on file   Social Needs    Financial resource strain: Not on file    Food insecurity:     Worry: Not on file     Inability: Not on file    Transportation needs:     Medical: Not on file     Non-medical: Not on file   Tobacco Use    Smoking status: Never Smoker    Smokeless tobacco: Never Used   Substance and Sexual Activity    Alcohol use: No     Alcohol/week: 0.0 standard drinks    Drug use: No    Sexual activity: Yes     Partners: Male     Birth control/protection: See Surgical Hx, Post-menopausal     Comment: mut monog   Lifestyle    Physical activity:     Days per week: Not on file     Minutes per session: Not on file    Stress: Very much   Relationships    Social connections:     Talks on phone: Not on file     Gets together: Not on file     Attends Buddhism service: Not on file     Active member of club or organization: Not on file     Attends meetings of clubs or organizations: Not on file     Relationship status: Not on file   Other Topics Concern    Not on file   Social History Narrative    Full time employed - [employer]       Past Surgical History:   Procedure Laterality Date    APPENDECTOMY      CHOLECYSTECTOMY      COLONOSCOPY N/A 7/31/2017    Procedure: COLONOSCOPY;  Surgeon: Yuliana Michael MD;  Location: [facility];  Service: Endoscopy;  Laterality: N/A;    DILATION AND CURETTAGE OF UTERUS      bleeding    HYSTERECTOMY  08/31/2016    RALH/BS for complex hyperplasia without atypia       Review of Systems       Objective:   Ht 5' 5" (1.651 m)   Wt 119.1 kg (262 lb 9.1 oz)   LMP 08/21/2016   BMI 43.69 kg/m² "     X-Ray Foot Complete Right  Narrative: EXAMINATION:  XR FOOT COMPLETE 3 VIEW RIGHT    CLINICAL HISTORY:  . Nondisplaced fracture of fifth metatarsal bone, right foot, initial encounter for closed fracture    TECHNIQUE:  AP, lateral, and oblique views of the right foot were performed.    COMPARISON:  None    FINDINGS:  A transverse oriented nondisplaced fracture at the base of the 5th metatarsal is seen.  Major fracture fragments are unchanged in position and alignment.  Plantar calcaneal bone spur is visualized.  No new abnormality or detrimental changes seen.  Impression: As above    Electronically signed by: Kendell Mathis MD  Date:    12/05/2019  Time:    10:37       Physical Exam   LOWER EXTREMITY PHYSICAL EXAMINATION    Vascular:  Capillary refill is intact. No signs of pedal edema or lower leg swelling. Dorsalis pedis pulse 2/4.  Posterior tibial pulse 2/4.  Pedal hair growth is present to the dorsum aspect of foot digits.    Neurological:  Protective sensation is intact.  Proprioception is intact.  Sensation light touch is intact..     Musculoskeletal:  Pain on palpation of the 5th metatarsal base of the right foot dorsally.  Manual muscle strength testing is 4/5 with eversion compared to the contralateral extremity.  Dorsiflexion, plantar flexion, inversion is 5/5.  There is no pain on palpation the peroneal tendons.  Patient has been ambulating with a single crutch and tall Cam boot.      Dermatological:  Skin is supple, dry, and intact. There are no ulcerations, calluses, or lesions noted to the dorsal or plantar aspect of the right foot. Hair growth is present.    Imaging:     Results for orders placed during the hospital encounter of 12/05/19   X-Ray Foot Complete Right    Narrative EXAMINATION:  XR FOOT COMPLETE 3 VIEW RIGHT    CLINICAL HISTORY:  . Nondisplaced fracture of fifth metatarsal bone, right foot, initial encounter for closed fracture    TECHNIQUE:  AP, lateral, and oblique views of the  right foot were performed.    COMPARISON:  None    FINDINGS:  A transverse oriented nondisplaced fracture at the base of the 5th metatarsal is seen.  Major fracture fragments are unchanged in position and alignment.  Plantar calcaneal bone spur is visualized.  No new abnormality or detrimental changes seen.      Impression As above      Electronically signed by: Kendell Mathis MD  Date:    12/05/2019  Time:    10:37           Assessment:     1. Closed nondisplaced fracture of fifth metatarsal bone of right foot, initial encounter    2. Type 2 diabetes mellitus without complication, without long-term current use of insulin    3. Vitamin D deficiency    4. Morbid obesity due to excess calories        Plan:     Closed nondisplaced fracture of fifth metatarsal bone of right foot, initial encounter  -     Comprehensive metabolic panel; Future; Expected date: 12/05/2019  -     X-Ray Chest PA And Lateral; Future; Expected date: 12/05/2019  -     X-Ray Chest 1 View Pre-OP; Future; Expected date: 12/05/2019  -     SCHEDULED EKG 12-LEAD (to Muse); Future  -     Vitamin D; Future; Expected date: 12/05/2019  -     Case Request Operating Room: OPEN REDUCTION INTERNAL FIXATION OF RIGHT 5TH METATARSAL BASE FRACTURE VS.  PERCUTANEOUS FIXATION OF RIGHT 5TH METATARSAL FRACTURE   Repeat x-rays were taken today to show no change of the 5th metatarsal base fracture on the right foot.  We then discussed continuing conservative treatment versus surgical intervention of the right foot.  Patient is currently waiting for short-term disability to be approved as she is a  and the injury has affected her driving extremity.  We discussed risks and benefits associated with surgical and conservative treatment. Patient states that she is ready to move on and not be relying on others for help.  She states that she is ready to proceed with surgical intervention at this time.  I discussed with her the importance of being nonweightbearing to  partial heel weight-bearing for short distances following surgery to allow the skin to close.  Then discuss progression to full weight-bearing with crutches and working away at a crutches in a boot over the next week or 2.  With the patient allergy to Hibiclens, we will have patient preop early scrubbed with Betadine.    Surgical consent was reviewed as we discussed risks and benefits associated with the procedure. Will have labs drawn today and have patient see her PCP for surgical clearance.  Tentatively planning surgery for next week if possible.    Type 2 diabetes mellitus without complication, without long-term current use of insulin    Vitamin D deficiency    Morbid obesity due to excess calories  We did discuss vitamin-D deficiency and type 2 diabetes as potential complications of surgical healing.  I will recheck a vitamin-D to determine patient needs further supplementation encourage patient to continue treatment of diabetes per her PCP.  Discussed obesity and encourage good nutrition and healthy eating habits, which may include blood sugar control, to prevent and/or help podiatric foot and ankle complications.    Future Appointments   Date Time Provider Department Center   12/5/2019  1:15 PM [facility] XR1-DR [facility]   12/5/2019  2:40 PM LABORATORY, [facility] ON [facility]   12/11/2019  2:30 PM Emerson Rincon PA-C [facility]   2/27/2020 11:00 AM Drew Gutierrez MD [facility]

## 2019-12-05 NOTE — H&P (VIEW-ONLY)
Subjective:       Patient ID: Yuli Milton is a 44 y.o. female.    Chief Complaint: Follow-up (Right foot fx f/u. Pt reports occassional pain. Ambulates w/ walking boot and crutches. Last seen by Dr Gutierrez on 11/27/19.)    HPI: Yuli Milton presents to the office today to reassess the right 5th metatarsal base fracture.  The initial injury happened on November 2nd which point she presented to clinic.  X-rays were taken and show a closed nondisplaced 5th metatarsal base fracture.  We did discuss at last appointment conservative versus surgical treatment.  Patient states that she has been ambulating minimally on the right foot with a single crutch.  States that she still has occasional pains to the right foot. She presents today in clinic with her aunt who would like the patient to strongly consider surgical intervention.    Review of patient's allergies indicates:   Allergen Reactions    Trulicity [dulaglutide] Shortness Of Breath and Other (See Comments)     Headaches    Hibiclens (isopropyl alcohol) Itching       Past Medical History:   Diagnosis Date    Abnormal Pap smear of cervix     treatment??    Abnormal Pap smear of vagina     Anemia     Anxiety     Anxiety and depression     Asthma     Chlamydia     Depression (emotion)     Diabetes mellitus     Dysuria 3/31/2018    Gallstones     Gastritis     History of ovarian cyst     History of syphilis     History of trichomoniasis     Hyperlipidemia     Mental disorder     PONV (postoperative nausea and vomiting)     Vaginal candidiasis 7/24/2018       Family History   Problem Relation Age of Onset    Breast cancer Paternal Grandmother     Diabetes Maternal Grandmother     Hypertension Mother     Thyroid disease Mother     Breast cancer Maternal Aunt     Cancer Maternal Aunt         colon cancer    Colon cancer Maternal Aunt     Miscarriages / Stillbirths Cousin     Stroke Maternal Aunt     No Known Problems Father      "Thyroid disease Sister     Thyroid disease Brother     Ovarian cancer Neg Hx     Deep vein thrombosis Neg Hx     Pulmonary embolism Neg Hx        Social History     Socioeconomic History    Marital status: Single     Spouse name: Not on file    Number of children: 3    Years of education: Not on file    Highest education level: Not on file   Occupational History    Not on file   Social Needs    Financial resource strain: Not on file    Food insecurity:     Worry: Not on file     Inability: Not on file    Transportation needs:     Medical: Not on file     Non-medical: Not on file   Tobacco Use    Smoking status: Never Smoker    Smokeless tobacco: Never Used   Substance and Sexual Activity    Alcohol use: No     Alcohol/week: 0.0 standard drinks    Drug use: No    Sexual activity: Yes     Partners: Male     Birth control/protection: See Surgical Hx, Post-menopausal     Comment: mut monog   Lifestyle    Physical activity:     Days per week: Not on file     Minutes per session: Not on file    Stress: Very much   Relationships    Social connections:     Talks on phone: Not on file     Gets together: Not on file     Attends Scientology service: Not on file     Active member of club or organization: Not on file     Attends meetings of clubs or organizations: Not on file     Relationship status: Not on file   Other Topics Concern    Not on file   Social History Narrative    Full time employed - [employer]       Past Surgical History:   Procedure Laterality Date    APPENDECTOMY      CHOLECYSTECTOMY      COLONOSCOPY N/A 7/31/2017    Procedure: COLONOSCOPY;  Surgeon: Yuliana Michael MD;  Location: [facility];  Service: Endoscopy;  Laterality: N/A;    DILATION AND CURETTAGE OF UTERUS      bleeding    HYSTERECTOMY  08/31/2016    RALH/BS for complex hyperplasia without atypia       Review of Systems       Objective:   Ht 5' 5" (1.651 m)   Wt 119.1 kg (262 lb 9.1 oz)   LMP 08/21/2016   BMI 43.69 kg/m² "     X-Ray Foot Complete Right  Narrative: EXAMINATION:  XR FOOT COMPLETE 3 VIEW RIGHT    CLINICAL HISTORY:  . Nondisplaced fracture of fifth metatarsal bone, right foot, initial encounter for closed fracture    TECHNIQUE:  AP, lateral, and oblique views of the right foot were performed.    COMPARISON:  None    FINDINGS:  A transverse oriented nondisplaced fracture at the base of the 5th metatarsal is seen.  Major fracture fragments are unchanged in position and alignment.  Plantar calcaneal bone spur is visualized.  No new abnormality or detrimental changes seen.  Impression: As above    Electronically signed by: Kendell Mathis MD  Date:    12/05/2019  Time:    10:37       Physical Exam   LOWER EXTREMITY PHYSICAL EXAMINATION    Vascular:  Capillary refill is intact. No signs of pedal edema or lower leg swelling. Dorsalis pedis pulse 2/4.  Posterior tibial pulse 2/4.  Pedal hair growth is present to the dorsum aspect of foot digits.    Neurological:  Protective sensation is intact.  Proprioception is intact.  Sensation light touch is intact..     Musculoskeletal:  Pain on palpation of the 5th metatarsal base of the right foot dorsally.  Manual muscle strength testing is 4/5 with eversion compared to the contralateral extremity.  Dorsiflexion, plantar flexion, inversion is 5/5.  There is no pain on palpation the peroneal tendons.  Patient has been ambulating with a single crutch and tall Cam boot.      Dermatological:  Skin is supple, dry, and intact. There are no ulcerations, calluses, or lesions noted to the dorsal or plantar aspect of the right foot. Hair growth is present.    Imaging:     Results for orders placed during the hospital encounter of 12/05/19   X-Ray Foot Complete Right    Narrative EXAMINATION:  XR FOOT COMPLETE 3 VIEW RIGHT    CLINICAL HISTORY:  . Nondisplaced fracture of fifth metatarsal bone, right foot, initial encounter for closed fracture    TECHNIQUE:  AP, lateral, and oblique views of the  right foot were performed.    COMPARISON:  None    FINDINGS:  A transverse oriented nondisplaced fracture at the base of the 5th metatarsal is seen.  Major fracture fragments are unchanged in position and alignment.  Plantar calcaneal bone spur is visualized.  No new abnormality or detrimental changes seen.      Impression As above      Electronically signed by: Kendell Mathis MD  Date:    12/05/2019  Time:    10:37           Assessment:     1. Closed nondisplaced fracture of fifth metatarsal bone of right foot, initial encounter    2. Type 2 diabetes mellitus without complication, without long-term current use of insulin    3. Vitamin D deficiency    4. Morbid obesity due to excess calories        Plan:     Closed nondisplaced fracture of fifth metatarsal bone of right foot, initial encounter  -     Comprehensive metabolic panel; Future; Expected date: 12/05/2019  -     X-Ray Chest PA And Lateral; Future; Expected date: 12/05/2019  -     X-Ray Chest 1 View Pre-OP; Future; Expected date: 12/05/2019  -     SCHEDULED EKG 12-LEAD (to Muse); Future  -     Vitamin D; Future; Expected date: 12/05/2019  -     Case Request Operating Room: OPEN REDUCTION INTERNAL FIXATION OF RIGHT 5TH METATARSAL BASE FRACTURE VS.  PERCUTANEOUS FIXATION OF RIGHT 5TH METATARSAL FRACTURE   Repeat x-rays were taken today to show no change of the 5th metatarsal base fracture on the right foot.  We then discussed continuing conservative treatment versus surgical intervention of the right foot.  Patient is currently waiting for short-term disability to be approved as she is a  and the injury has affected her driving extremity.  We discussed risks and benefits associated with surgical and conservative treatment. Patient states that she is ready to move on and not be relying on others for help.  She states that she is ready to proceed with surgical intervention at this time.  I discussed with her the importance of being nonweightbearing to  partial heel weight-bearing for short distances following surgery to allow the skin to close.  Then discuss progression to full weight-bearing with crutches and working away at a crutches in a boot over the next week or 2.  With the patient allergy to Hibiclens, we will have patient preop early scrubbed with Betadine.    Surgical consent was reviewed as we discussed risks and benefits associated with the procedure. Will have labs drawn today and have patient see her PCP for surgical clearance.  Tentatively planning surgery for next week if possible.    Type 2 diabetes mellitus without complication, without long-term current use of insulin    Vitamin D deficiency    Morbid obesity due to excess calories  We did discuss vitamin-D deficiency and type 2 diabetes as potential complications of surgical healing.  I will recheck a vitamin-D to determine patient needs further supplementation encourage patient to continue treatment of diabetes per her PCP.  Discussed obesity and encourage good nutrition and healthy eating habits, which may include blood sugar control, to prevent and/or help podiatric foot and ankle complications.    Future Appointments   Date Time Provider Department Center   12/5/2019  1:15 PM [facility] XR1-DR [facility] XRAY [facility]   12/5/2019  2:40 PM LABORATORY, [facility] ON LAB [facility]   12/11/2019  2:30 PM Emerson Rincon PA-C [facility]   2/27/2020 11:00 AM Drew Gutierrez MD [facility]

## 2019-12-06 ENCOUNTER — TELEPHONE (OUTPATIENT)
Dept: NEUROSURGERY | Facility: CLINIC | Age: 44
End: 2019-12-06

## 2019-12-06 ENCOUNTER — PATIENT MESSAGE (OUTPATIENT)
Dept: INTERNAL MEDICINE | Facility: CLINIC | Age: 44
End: 2019-12-06

## 2019-12-06 ENCOUNTER — TELEPHONE (OUTPATIENT)
Dept: INTERNAL MEDICINE | Facility: CLINIC | Age: 44
End: 2019-12-06

## 2019-12-06 ENCOUNTER — PATIENT MESSAGE (OUTPATIENT)
Dept: PODIATRY | Facility: CLINIC | Age: 44
End: 2019-12-06

## 2019-12-06 ENCOUNTER — PATIENT MESSAGE (OUTPATIENT)
Dept: SURGERY | Facility: HOSPITAL | Age: 44
End: 2019-12-06

## 2019-12-06 NOTE — TELEPHONE ENCOUNTER
Phoned pt to see if she has been called and scheduled to get open MRI at [facility] P/ ([phone])    Pt states she has not and informed me that she has a procedure scheduled for next Thursday and she won't be able to obtain MRI until after she done being on bed rest if not scheduled for MRI before.    I informed pt that I will give [facility] a call and see if they have received my fax/referral and see if we can get her scheduled before 12/11/19. Pt verbalized understanding.    *Pt also informed me to give her a call Monday to touch bases with her. Understanding was verbalized aswell.*        I called [facility] - they confirmed of receiving my referral and stated that they are behind on getting patients scheduled - but can/will give her a call today to get her scheduled for Tuesday (If patient agrees)    Understanding verbalized. I will work and try my best to get patient approved for Open MRI before 12/11/19.

## 2019-12-06 NOTE — TELEPHONE ENCOUNTER
----- Message from Emilie Petty sent at 12/6/2019 12:49 PM CST -----  Contact: pt   Type:  Patient Returning Call    Who Called: pt  Who Left Message for Patient:Dr Gutierrez  Does the patient know what this is regarding?:appointment  Would the patient rather a call back or a response via [employer]? Call back   Best Call Back Number:[phone]  Additional Information:

## 2019-12-09 ENCOUNTER — TELEPHONE (OUTPATIENT)
Dept: NEUROSURGERY | Facility: CLINIC | Age: 44
End: 2019-12-09

## 2019-12-09 ENCOUNTER — OFFICE VISIT (OUTPATIENT)
Dept: INTERNAL MEDICINE | Facility: CLINIC | Age: 44
End: 2019-12-09
Payer: MEDICAID

## 2019-12-09 VITALS
TEMPERATURE: 97 F | WEIGHT: 266.75 LBS | OXYGEN SATURATION: 97 % | RESPIRATION RATE: 19 BRPM | BODY MASS INDEX: 44.44 KG/M2 | HEART RATE: 63 BPM | HEIGHT: 65 IN | SYSTOLIC BLOOD PRESSURE: 111 MMHG | DIASTOLIC BLOOD PRESSURE: 61 MMHG

## 2019-12-09 DIAGNOSIS — Z01.818 PREOP GENERAL PHYSICAL EXAM: ICD-10-CM

## 2019-12-09 DIAGNOSIS — S92.354A CLOSED NONDISPLACED FRACTURE OF FIFTH METATARSAL BONE OF RIGHT FOOT, INITIAL ENCOUNTER: Primary | ICD-10-CM

## 2019-12-09 DIAGNOSIS — I10 ESSENTIAL HYPERTENSION: ICD-10-CM

## 2019-12-09 PROCEDURE — 99214 PR OFFICE/OUTPT VISIT, EST, LEVL IV, 30-39 MIN: ICD-10-PCS | Mod: S$PBB,,, | Performed by: FAMILY MEDICINE

## 2019-12-09 PROCEDURE — 99213 OFFICE O/P EST LOW 20 MIN: CPT | Mod: PBBFAC,PO | Performed by: FAMILY MEDICINE

## 2019-12-09 PROCEDURE — 99214 OFFICE O/P EST MOD 30 MIN: CPT | Mod: S$PBB,,, | Performed by: FAMILY MEDICINE

## 2019-12-09 PROCEDURE — 99999 PR PBB SHADOW E&M-EST. PATIENT-LVL III: CPT | Mod: PBBFAC,,, | Performed by: FAMILY MEDICINE

## 2019-12-09 PROCEDURE — 99999 PR PBB SHADOW E&M-EST. PATIENT-LVL III: ICD-10-PCS | Mod: PBBFAC,,, | Performed by: FAMILY MEDICINE

## 2019-12-09 NOTE — TELEPHONE ENCOUNTER
Phoned pt to see if she has been called at outside facility to obtain Open MRI. Pt states she is scheduled for tomorrow.    Understanding verbalized.

## 2019-12-09 NOTE — ASSESSMENT & PLAN NOTE
Pt has RCRI score of 1, explained the risk, and pt verbalizes understanding.  Cleared for surgery on 5th metatarsal

## 2019-12-09 NOTE — H&P (VIEW-ONLY)
Subjective:       Patient ID: Yuli Milton is a 44 y.o. female.    Chief Complaint: Pre-op Exam    Preop Physical for right 5th metatarsal:  O: Nov 2nd  L: right 5th metatarsal  D: cosntant  C:  Domingo: Boot  Exac: walking      Review of Systems   Respiratory: Negative for shortness of breath.    Cardiovascular: Negative for chest pain.   Gastrointestinal: Negative for abdominal pain.   Musculoskeletal: Positive for gait problem. Negative for arthralgias.       Objective:      Physical Exam   Constitutional: She appears well-developed and well-nourished. No distress.   HENT:   Head: Normocephalic and atraumatic.   Pulmonary/Chest: Effort normal and breath sounds normal. No respiratory distress. She has no wheezes.   Abdominal: Soft. She exhibits no distension. There is no tenderness. There is no guarding.   Musculoskeletal: She exhibits no edema.   RLE boot in place.   Skin: Skin is warm and dry. No rash noted. She is not diaphoretic. No erythema.   Nursing note and vitals reviewed.      Assessment:       1. Closed nondisplaced fracture of fifth metatarsal bone of right foot, initial encounter    2. Preop general physical exam    3. Essential hypertension        Plan:     Problem List Items Addressed This Visit        Cardiac/Vascular    Essential hypertension    Current Assessment & Plan     Controlled, cont BP meds            Orthopedic    Closed nondisplaced fracture of fifth right metatarsal bone - Primary    Current Assessment & Plan     Cont using boot and crutches.            Other    Preop general physical exam    Current Assessment & Plan     Pt has RCRI score of 1, explained the risk, and pt verbalizes understanding.  Cleared for surgery on 5th metatarsal

## 2019-12-11 ENCOUNTER — OFFICE VISIT (OUTPATIENT)
Dept: NEUROSURGERY | Facility: CLINIC | Age: 44
End: 2019-12-11
Payer: COMMERCIAL

## 2019-12-11 ENCOUNTER — LAB VISIT (OUTPATIENT)
Dept: LAB | Facility: HOSPITAL | Age: 44
End: 2019-12-11
Attending: PODIATRIST
Payer: COMMERCIAL

## 2019-12-11 VITALS
SYSTOLIC BLOOD PRESSURE: 113 MMHG | HEART RATE: 74 BPM | DIASTOLIC BLOOD PRESSURE: 79 MMHG | HEIGHT: 65 IN | WEIGHT: 267 LBS | BODY MASS INDEX: 44.48 KG/M2

## 2019-12-11 DIAGNOSIS — S92.354A CLOSED NONDISPLACED FRACTURE OF FIFTH METATARSAL BONE OF RIGHT FOOT, INITIAL ENCOUNTER: Primary | ICD-10-CM

## 2019-12-11 DIAGNOSIS — M50.30 DDD (DEGENERATIVE DISC DISEASE), CERVICAL: ICD-10-CM

## 2019-12-11 DIAGNOSIS — S92.354A CLOSED NONDISPLACED FRACTURE OF FIFTH METATARSAL BONE OF RIGHT FOOT, INITIAL ENCOUNTER: ICD-10-CM

## 2019-12-11 DIAGNOSIS — M54.12 CERVICAL RADICULOPATHY: Primary | ICD-10-CM

## 2019-12-11 LAB
ERYTHROCYTE [DISTWIDTH] IN BLOOD BY AUTOMATED COUNT: 14.1 % (ref 11.5–14.5)
HCT VFR BLD AUTO: 37.3 % (ref 37–48.5)
HGB BLD-MCNC: 11.4 G/DL (ref 12–16)
MCH RBC QN AUTO: 28.6 PG (ref 27–31)
MCHC RBC AUTO-ENTMCNC: 30.6 G/DL (ref 32–36)
MCV RBC AUTO: 94 FL (ref 82–98)
PLATELET # BLD AUTO: 304 K/UL (ref 150–350)
PMV BLD AUTO: 10.8 FL (ref 9.2–12.9)
RBC # BLD AUTO: 3.99 M/UL (ref 4–5.4)
WBC # BLD AUTO: 7.24 K/UL (ref 3.9–12.7)

## 2019-12-11 PROCEDURE — 99214 OFFICE O/P EST MOD 30 MIN: CPT | Mod: PBBFAC | Performed by: PHYSICIAN ASSISTANT

## 2019-12-11 PROCEDURE — 99999 PR PBB SHADOW E&M-EST. PATIENT-LVL IV: ICD-10-PCS | Mod: PBBFAC,,, | Performed by: PHYSICIAN ASSISTANT

## 2019-12-11 PROCEDURE — 85027 COMPLETE CBC AUTOMATED: CPT

## 2019-12-11 PROCEDURE — 99214 PR OFFICE/OUTPT VISIT, EST, LEVL IV, 30-39 MIN: ICD-10-PCS | Mod: S$GLB,,, | Performed by: PHYSICIAN ASSISTANT

## 2019-12-11 PROCEDURE — 36415 COLL VENOUS BLD VENIPUNCTURE: CPT

## 2019-12-11 PROCEDURE — 99999 PR PBB SHADOW E&M-EST. PATIENT-LVL IV: CPT | Mod: PBBFAC,,, | Performed by: PHYSICIAN ASSISTANT

## 2019-12-11 PROCEDURE — 99214 OFFICE O/P EST MOD 30 MIN: CPT | Mod: S$GLB,,, | Performed by: PHYSICIAN ASSISTANT

## 2019-12-11 NOTE — PROGRESS NOTES
"Subjective:      Patient ID: Yuli Milton is a 44 y.o. female.    Chief Complaint: Cervical Spine Pain (C-spine); Follow-up; and Results    HPI   The patient is here today for follow-up regarding cervical spine pain and to discuss recent imaging. She is supposed to have surgery tomorrow on her R foot with Dr. Peng. She rates her neck and R shoulder pain a 6/10.      Several weeks ago, she started having pain on the R side and shoulder. No trauma. She went to a walk-in clinic and was given an injection for inflammation. She then saw PCP who also gave her an injection in the arm. This last injection seems to be helping her pain. She denies any issues with the left side of her neck or LUE. Patient does have numbness and tingling of RUE at times. She denies weakness of her arms. Patient denies any previous neck surgery or injections. Patient has not had any recent physical therapy. Patient had therapy in the past for "the right side of her body" and did not notice any difference.    ROS     Constitution: Negative for fever, night sweats, weight gain and weight loss.   HENT: Negative for congestion.    Cardiovascular: Negative for chest pain, near-syncope and syncope.   Respiratory: Negative for shortness of breath.    Musculoskeletal: Positive for joint pain, neck pain and stiffness.   Gastrointestinal: Negative for bowel incontinence.   Genitourinary: Negative for bladder incontinence.   Psychiatric/Behavioral: The patient is not nervous/anxious.        Objective:            Ortho/SPM Exam    Nursing note and vitals reviewed  Gen:Oriented to person, place, and time.             Appears stated age   Head:Normocephalic and atraumatic.  Nose: Nose normal.    Eyes: EOM are normal. Pupils are equal, round, and reactive to light.   Neck: Neck supple. No masses or lesions palpated  Cardiovascular: Intact distal pulses.    Abdominal: Soft.   Neurological: Alert and oriented to person, place, and time.  No cranial " nerve deficit.  Coordination normal. Normal muscle tone  Psychiatric: Normal mood and affect. Behavior is normal.      Neck:    Present, lower c-spine, Trap (R) Paraspinal tenderness     Present, right side Paraspinal muscle spasms   None   Pain with flexion and extention   WNL   Range of motion shoulders    + R side  Spurling's sign      Motor:   Right Right Left Left  Level Group   5 4+ 5 5 C5 Deltoid   5 5 5 5 C6 Bicep   5 5 5 5   Wrist extension    5 5 5 5 C7 Triceps   5 5 5 5   Wrist flexion   5 5 5 5 C8    5 5 5 5 T1 Interossei       Sensation:  NL Decreased (R/L/BL) Level Sensation    X   C5 Lateral upper arm   X   C6 Thumb and index finger, lat forearm   X   C7 Middle finger   X   C8 Ring and little finger   X   T1 Medial arm       Reflex:  2+   Bicep tendon   2+   Brachioradialis   2+   Triceps tendon   Not present   Brennan's   none   Clonus   neg   Tinel's                 Imaging:  Narrative     EXAMINATION:  XR CERVICAL SPINE AP LAT WITH FLEX EXTEN    CLINICAL HISTORY:  Anesthesia of skin    TECHNIQUE:  Three views of the cervical spine plus flexion and extension views were performed.    COMPARISON:  Cervical spine radiographs from 11/11/2017.    FINDINGS:  Minimal grade 1 retrolisthesis of C4 on C5 suggested on extension which appears to reduce on flexion.  Small anterior osteophytes at C6-C7 are seen.  No significant areas of disc space narrowing.  No fracture.  No listhesis.  Prevertebral soft tissues are maintained.  Odontoid is intact.  Lateral masses are symmetric.  Dental hardware is incidentally noted.  Visualized lung apices are clear.      Impression       As above           Assessment:       Encounter Diagnoses   Name Primary?    Cervical radiculopathy Yes    DDD (degenerative disc disease), cervical           Plan:       Yuli was seen today for cervical spine pain (c-spine), follow-up and results.    Diagnoses and all orders for this visit:    Cervical radiculopathy    DDD  (degenerative disc disease), cervical          Patient is scheduled for surgery tomorrow (MT Fx) and will be on bedrest for 1 week.   I will refer her to Dr. Denton after this month for RL.   Pt will follow-up 2-3 weeks after injection.          Emerson Rincon PA-C

## 2019-12-11 NOTE — PRE-PROCEDURE INSTRUCTIONS
Pre op instructions reviewed with patient per phone:    To confirm, Your surgeon has instructed you:  Surgery is scheduled 12/12/19 at 0700.      Please report to [facility] 1st floor main lobby by 0530.   Pre admit office to call afternoon prior to surgery with final arrival time only if time changes.      INSTRUCTIONS IMPORTANT!!!  ¨ Do not eat, drink, or smoke after 12 midnight-including water. OK to brush teeth, no gum, candy or mints!    ¨ Take only these medicines with a small swallow of water-morning of surgery.  Albuterol inhaler  ____  Do not wear makeup, including mascara.  ____  No powder, lotions or creams to surgical area.  ____  Please remove all jewelry, including piercings and leave at home.  ____  No money or valuables needed. Please leave at home.  ____  Please bring identification and insurance information to hospital.  ____  If going home the same day, arrange for a ride home. You will not be able to   drive if Anesthesia was used.  ____  Children, under 12 years old, must remain in the waiting room with an adult.  They are not allowed in patient areas.  ____  Wear loose fitting clothing. Allow for dressings, bandages.  ____  Stop Aspirin, Ibuprofen, Motrin and Aleve at least 5-7 days before surgery, unless otherwise instructed by your doctor, or the nurse.   You MAY use Tylenol/acetaminophen until day of surgery.  ____  If you take diabetic medication, do not take am of surgery unless instructed by   Doctor.  ____ Stop taking any Fish Oil supplement or any Vitamins that contain Vitamin E at least 5 days prior to surgery.          Bathing Instructions-- The night before surgery and the morning prior to coming to the hospital:   -Do not shave the surgical area.   -Shower and wash your hair and body as usual with anti-bacterial  soap and shampoo.   -Rinse your hair and body completely.   -Use one packet of hibiclens to wash the surgical site (using your hand) gently for 5  minutes.  Do not scrub you skin too hard.   -Do not use hibiclens on your head, face, or genitals.   -Do not wash with anti-bacterial soap after you use the hibiclens.   -Rinse your body thoroughly.   -Dry with clean, soft towel.  Do not use lotion, cream, deodorant, or powders on   the surgical site.    Use antibacterial soap in place of hibiclens if your surgery is on the head, face or genitals.         Surgical Site Infection    Prevention of surgical site infections:     -Keep incisions clean and dry.   -Do not soak/submerge incisions in water until completely healed.   -Do not apply lotions, powders, creams, or deodorants to site.   -Always make sure hands are cleaned with antibacterial soap/ alcohol-based   prior to touching the surgical site.  (This includes doctors, nurses, staff, and yourself.)    Signs and symptoms:   -Redness and pain around the area where you had surgery   -Drainage of cloudy fluid from your surgical wound   -Fever over 100.4  I have read or had read and explained to me, and understand the above information.

## 2019-12-12 ENCOUNTER — ANESTHESIA (OUTPATIENT)
Dept: SURGERY | Facility: HOSPITAL | Age: 44
End: 2019-12-12
Payer: COMMERCIAL

## 2019-12-12 ENCOUNTER — HOSPITAL ENCOUNTER (OUTPATIENT)
Facility: HOSPITAL | Age: 44
Discharge: HOME | End: 2019-12-12
Attending: PODIATRIST | Admitting: PODIATRIST
Payer: COMMERCIAL

## 2019-12-12 ENCOUNTER — ANESTHESIA EVENT (OUTPATIENT)
Dept: SURGERY | Facility: HOSPITAL | Age: 44
End: 2019-12-12
Payer: COMMERCIAL

## 2019-12-12 VITALS
RESPIRATION RATE: 16 BRPM | OXYGEN SATURATION: 100 % | HEART RATE: 72 BPM | WEIGHT: 267.88 LBS | TEMPERATURE: 98 F | DIASTOLIC BLOOD PRESSURE: 71 MMHG | HEIGHT: 65 IN | BODY MASS INDEX: 44.63 KG/M2 | SYSTOLIC BLOOD PRESSURE: 119 MMHG

## 2019-12-12 DIAGNOSIS — S92.354A CLOSED NONDISPLACED FRACTURE OF FIFTH METATARSAL BONE OF RIGHT FOOT, INITIAL ENCOUNTER: Primary | ICD-10-CM

## 2019-12-12 PROBLEM — Z98.890 STATUS POST RIGHT FOOT SURGERY: Status: ACTIVE | Noted: 2019-12-12

## 2019-12-12 LAB — POCT GLUCOSE: 141 MG/DL (ref 70–110)

## 2019-12-12 PROCEDURE — 36000707: Performed by: PODIATRIST

## 2019-12-12 PROCEDURE — 37000009 HC ANESTHESIA EA ADD 15 MINS: Performed by: PODIATRIST

## 2019-12-12 PROCEDURE — 63600175 PHARM REV CODE 636 W HCPCS: Performed by: NURSE ANESTHETIST, CERTIFIED REGISTERED

## 2019-12-12 PROCEDURE — 28476 PR PERCUT RX METATARSAL FX: ICD-10-PCS | Mod: T9,,, | Performed by: PODIATRIST

## 2019-12-12 PROCEDURE — C1769 GUIDE WIRE: HCPCS | Performed by: PODIATRIST

## 2019-12-12 PROCEDURE — 71000016 HC POSTOP RECOV ADDL HR: Performed by: PODIATRIST

## 2019-12-12 PROCEDURE — 28476 PRQ SKEL FIX METAR FX W/MNPJ: CPT | Mod: T9,,, | Performed by: PODIATRIST

## 2019-12-12 PROCEDURE — 71000039 HC RECOVERY, EACH ADD'L HOUR: Performed by: PODIATRIST

## 2019-12-12 PROCEDURE — 63600175 PHARM REV CODE 636 W HCPCS: Performed by: PODIATRIST

## 2019-12-12 PROCEDURE — 01480 ANES OPEN PX LOWER L/A/F NOS: CPT | Performed by: PODIATRIST

## 2019-12-12 PROCEDURE — 71000015 HC POSTOP RECOV 1ST HR: Performed by: PODIATRIST

## 2019-12-12 PROCEDURE — 25000003 PHARM REV CODE 250: Performed by: ANESTHESIOLOGY

## 2019-12-12 PROCEDURE — 25000003 PHARM REV CODE 250: Performed by: PODIATRIST

## 2019-12-12 PROCEDURE — 37000008 HC ANESTHESIA 1ST 15 MINUTES: Performed by: PODIATRIST

## 2019-12-12 PROCEDURE — 63600175 PHARM REV CODE 636 W HCPCS: Performed by: ANESTHESIOLOGY

## 2019-12-12 PROCEDURE — 36000706: Performed by: PODIATRIST

## 2019-12-12 PROCEDURE — C1713 ANCHOR/SCREW BN/BN,TIS/BN: HCPCS | Performed by: PODIATRIST

## 2019-12-12 PROCEDURE — 71000033 HC RECOVERY, INTIAL HOUR: Performed by: PODIATRIST

## 2019-12-12 DEVICE — IMPLANTABLE DEVICE: Type: IMPLANTABLE DEVICE | Site: FOOT | Status: FUNCTIONAL

## 2019-12-12 RX ORDER — LIDOCAINE HCL/PF 100 MG/5ML
SYRINGE (ML) INTRAVENOUS
Status: DISCONTINUED | OUTPATIENT
Start: 2019-12-12 | End: 2019-12-12

## 2019-12-12 RX ORDER — METOCLOPRAMIDE HYDROCHLORIDE 5 MG/ML
10 INJECTION INTRAMUSCULAR; INTRAVENOUS EVERY 10 MIN PRN
Status: DISCONTINUED | OUTPATIENT
Start: 2019-12-12 | End: 2019-12-12 | Stop reason: HOSPADM

## 2019-12-12 RX ORDER — ERGOCALCIFEROL 1.25 MG/1
50000 CAPSULE ORAL
Qty: 10 CAPSULE | Refills: 0 | Status: SHIPPED | OUTPATIENT
Start: 2019-12-12 | End: 2020-02-17 | Stop reason: SDUPTHER

## 2019-12-12 RX ORDER — MIDAZOLAM HYDROCHLORIDE 1 MG/ML
INJECTION, SOLUTION INTRAMUSCULAR; INTRAVENOUS
Status: DISCONTINUED | OUTPATIENT
Start: 2019-12-12 | End: 2019-12-12

## 2019-12-12 RX ORDER — OXYCODONE AND ACETAMINOPHEN 7.5; 325 MG/1; MG/1
1 TABLET ORAL
Qty: 28 TABLET | Refills: 0 | Status: SHIPPED | OUTPATIENT
Start: 2019-12-12 | End: 2019-12-19

## 2019-12-12 RX ORDER — CEFAZOLIN SODIUM 1 G/50ML
1 SOLUTION INTRAVENOUS
Status: COMPLETED | OUTPATIENT
Start: 2019-12-12 | End: 2019-12-12

## 2019-12-12 RX ORDER — ONDANSETRON 2 MG/ML
INJECTION INTRAMUSCULAR; INTRAVENOUS
Status: DISCONTINUED | OUTPATIENT
Start: 2019-12-12 | End: 2019-12-12

## 2019-12-12 RX ORDER — ASPIRIN 325 MG
325 TABLET ORAL DAILY
Refills: 0
Start: 2019-12-12 | End: 2020-02-17 | Stop reason: SDUPTHER

## 2019-12-12 RX ORDER — HYDROCODONE BITARTRATE AND ACETAMINOPHEN 7.5; 325 MG/1; MG/1
1 TABLET ORAL EVERY 6 HOURS PRN
Qty: 28 TABLET | Refills: 0 | Status: SHIPPED | OUTPATIENT
Start: 2019-12-12 | End: 2019-12-12 | Stop reason: HOSPADM

## 2019-12-12 RX ORDER — BUPIVACAINE HYDROCHLORIDE 2.5 MG/ML
INJECTION, SOLUTION EPIDURAL; INFILTRATION; INTRACAUDAL
Status: DISCONTINUED | OUTPATIENT
Start: 2019-12-12 | End: 2019-12-12 | Stop reason: HOSPADM

## 2019-12-12 RX ORDER — OXYCODONE AND ACETAMINOPHEN 5; 325 MG/1; MG/1
1 TABLET ORAL EVERY 4 HOURS PRN
Status: DISCONTINUED | OUTPATIENT
Start: 2019-12-12 | End: 2019-12-12 | Stop reason: HOSPADM

## 2019-12-12 RX ORDER — HYDROMORPHONE HYDROCHLORIDE 2 MG/ML
0.2 INJECTION, SOLUTION INTRAMUSCULAR; INTRAVENOUS; SUBCUTANEOUS EVERY 5 MIN PRN
Status: DISCONTINUED | OUTPATIENT
Start: 2019-12-12 | End: 2019-12-12 | Stop reason: HOSPADM

## 2019-12-12 RX ORDER — SODIUM CHLORIDE 0.9 % (FLUSH) 0.9 %
3 SYRINGE (ML) INJECTION EVERY 8 HOURS
Status: DISCONTINUED | OUTPATIENT
Start: 2019-12-12 | End: 2019-12-12 | Stop reason: HOSPADM

## 2019-12-12 RX ORDER — ONDANSETRON 4 MG/1
4 TABLET, FILM COATED ORAL 2 TIMES DAILY
Qty: 20 TABLET | Refills: 0 | Status: SHIPPED | OUTPATIENT
Start: 2019-12-12 | End: 2019-12-22

## 2019-12-12 RX ORDER — SODIUM CHLORIDE, SODIUM LACTATE, POTASSIUM CHLORIDE, CALCIUM CHLORIDE 600; 310; 30; 20 MG/100ML; MG/100ML; MG/100ML; MG/100ML
INJECTION, SOLUTION INTRAVENOUS CONTINUOUS PRN
Status: DISCONTINUED | OUTPATIENT
Start: 2019-12-12 | End: 2019-12-12

## 2019-12-12 RX ORDER — PROPOFOL 10 MG/ML
INJECTION, EMULSION INTRAVENOUS
Status: DISCONTINUED | OUTPATIENT
Start: 2019-12-12 | End: 2019-12-12

## 2019-12-12 RX ORDER — MEPERIDINE HYDROCHLORIDE 50 MG/ML
12.5 INJECTION INTRAMUSCULAR; INTRAVENOUS; SUBCUTANEOUS ONCE AS NEEDED
Status: DISCONTINUED | OUTPATIENT
Start: 2019-12-12 | End: 2019-12-12 | Stop reason: HOSPADM

## 2019-12-12 RX ORDER — DIPHENHYDRAMINE HYDROCHLORIDE 50 MG/ML
25 INJECTION INTRAMUSCULAR; INTRAVENOUS EVERY 6 HOURS PRN
Status: DISCONTINUED | OUTPATIENT
Start: 2019-12-12 | End: 2019-12-12 | Stop reason: HOSPADM

## 2019-12-12 RX ORDER — FENTANYL CITRATE 50 UG/ML
INJECTION, SOLUTION INTRAMUSCULAR; INTRAVENOUS
Status: DISCONTINUED | OUTPATIENT
Start: 2019-12-12 | End: 2019-12-12

## 2019-12-12 RX ORDER — ONDANSETRON 8 MG/1
8 TABLET, ORALLY DISINTEGRATING ORAL ONCE
Status: COMPLETED | OUTPATIENT
Start: 2019-12-12 | End: 2019-12-12

## 2019-12-12 RX ADMIN — FENTANYL CITRATE 25 MCG: 50 INJECTION, SOLUTION INTRAMUSCULAR; INTRAVENOUS at 07:12

## 2019-12-12 RX ADMIN — MIDAZOLAM 1 MG: 1 INJECTION INTRAMUSCULAR; INTRAVENOUS at 07:12

## 2019-12-12 RX ADMIN — PROPOFOL 50 MG: 10 INJECTION, EMULSION INTRAVENOUS at 07:12

## 2019-12-12 RX ADMIN — HYDROMORPHONE HYDROCHLORIDE 0.2 MG: 2 INJECTION INTRAMUSCULAR; INTRAVENOUS; SUBCUTANEOUS at 08:12

## 2019-12-12 RX ADMIN — SODIUM CHLORIDE, SODIUM LACTATE, POTASSIUM CHLORIDE, AND CALCIUM CHLORIDE: 600; 310; 30; 20 INJECTION, SOLUTION INTRAVENOUS at 07:12

## 2019-12-12 RX ADMIN — ONDANSETRON 8 MG: 8 TABLET, ORALLY DISINTEGRATING ORAL at 08:12

## 2019-12-12 RX ADMIN — LIDOCAINE HYDROCHLORIDE 50 MG: 20 INJECTION, SOLUTION INTRAVENOUS at 07:12

## 2019-12-12 RX ADMIN — ONDANSETRON 4 MG: 2 INJECTION, SOLUTION INTRAMUSCULAR; INTRAVENOUS at 07:12

## 2019-12-12 RX ADMIN — CEFAZOLIN SODIUM 3 G: 1 SOLUTION INTRAVENOUS at 07:12

## 2019-12-12 RX ADMIN — OXYCODONE AND ACETAMINOPHEN 1 TABLET: 5; 325 TABLET ORAL at 08:12

## 2019-12-12 NOTE — TRANSFER OF CARE
"Anesthesia Transfer of Care Note    Patient: Yuli Milton    Procedure(s) Performed: Procedure(s) (LRB):  ORIF, FRACTURE, METATARSAL BONE (Right)    Patient location: PACU    Anesthesia Type: MAC    Transport from OR: Transported from OR on room air with adequate spontaneous ventilation    Post pain: adequate analgesia    Post assessment: no apparent anesthetic complications    Post vital signs: stable    Level of consciousness: responds to stimulation    Nausea/Vomiting: no nausea/vomiting    Complications: none    Transfer of care protocol was followed      Last vitals:   Visit Vitals  /85 (BP Location: Left arm, Patient Position: Sitting)   Pulse 78   Temp 36.6 °C (97.9 °F)   Resp 16   Ht 5' 5" (1.651 m)   Wt 121.5 kg (267 lb 13.7 oz)   LMP 08/21/2016   SpO2 100%   Breastfeeding? No   BMI 44.57 kg/m²     "

## 2019-12-12 NOTE — BRIEF OP NOTE
[facility] -   Brief Operative Note     SUMMARY     Surgery Date: 12/12/2019     Surgeon(s) and Role:     * Lorena Peng DPM - Primary    Assisting Surgeon: None    Pre-op Diagnosis:  Closed nondisplaced fracture of fifth metatarsal bone of right foot, initial encounter [S92.354A]    Post-op Diagnosis:  Post-Op Diagnosis Codes:     * Closed nondisplaced fracture of fifth metatarsal bone of right foot, initial encounter [S92.354A]    Procedure(s) (LRB):  ORIF, FRACTURE, METATARSAL BONE (Right)    Anesthesia: Local MAC    Description of the findings of the procedure: nonhealing fracture of the right 5th metatarsal base    Findings/Key Components: Nonhealing fracture of the right 5th metatarsal base which was percutaneously closed with a 4.0 mm screw.  A single suture was used to close the insertion site.  Radiographs were taken from AP and lateral view to determine the position of the screw which was deemed adequate and out of the calcaneocuboid joint. Patient tolerated procedure well transferred to PACU with vital signs which are stable    Estimated Blood Loss: * No values recorded between 12/12/2019  7:23 AM and 12/12/2019  7:57 AM *         Specimens:   Specimen (12h ago, onward)    None          Discharge Note    SUMMARY     Admit Date: 12/12/2019    Discharge Date and Time:  12/12/2019 7:59 AM    Hospital Course (synopsis of major diagnoses, care, treatment, and services provided during the course of the hospital stay):  Patient underwent a percutaneous fixation of the right 5th metatarsal base secondary to previous injury.  A percutaneous screw was inserted to hold fixation across the fracture fragment.  A single stitch was utilized to close the insertion site.  Patient tolerated this well.  Soft dressing was applied.  Patient will be protected nonweightbearing on the right lower extremity for 2-4 weeks in a Cam boot.  Postoperative pain medicine as well as Zofran was sent to the [facility] to be delivered to the patient's family.  We will also start patient on vitamin-D supplementation as well. I will see patient back in 1 week.  She will keep the leg elevated and leave dressing clean, dry, intact.    Final Diagnosis: Post-Op Diagnosis Codes:     * Closed nondisplaced fracture of fifth metatarsal bone of right foot, initial encounter [S92.354A]    Disposition: Home or Self Care    Follow Up/Patient Instructions:     Medications:  Reconciled Home Medications:      Medication List      START taking these medications    aspirin 325 MG tablet  Take 1 tablet (325 mg total) by mouth once daily. Take while non-weight right prevent DVT formation for 20 days     HYDROcodone-acetaminophen 7.5-325 mg per tablet  Commonly known as:  NORCO  Take 1 tablet by mouth every 6 (six) hours as needed for Pain.     Vitamin D2 50,000 unit Cap  Generic drug:  ergocalciferol  Take 1 capsule (50,000 Units total) by mouth every 7 days. for 8 doses        CHANGE how you take these medications    insulin glargine 100 units/mL (3mL) SubQ pen  Commonly known as:  Basaglar KwikPen U-100 Insulin  Inject 30 Units into the skin every evening.  What changed:    · when to take this  · reasons to take this     ondansetron 4 MG tablet  Commonly known as:  ZOFRAN  Take 1 tablet (4 mg total) by mouth 2 (two) times daily. for 10 days  What changed:    · when to take this  · reasons to take this        CONTINUE taking these medications    albuterol 90 mcg/actuation inhaler  Commonly known as:  Ventolin HFA  Inhale 2 puffs into the lungs every 6 (six) hours as needed for Wheezing. Rescue     * ALPRAZolam 2 MG Tab  Commonly known as:  XANAX  Take 2 mg by mouth every evening.     * ALPRAZolam 1 MG tablet  Commonly known as:  XANAX  Take 2 mg by mouth every evening.     atorvastatin 20 MG tablet  Commonly known as:  LIPITOR  Take 1 tablet (20 mg total) by mouth once daily.     blood sugar diagnostic Strp  Commonly known as:  Blood Glucose  "Test  1 strip by Misc.(Non-Drug; Combo Route) route 3 (three) times daily.     cycloSPORINE 0.05 % ophthalmic emulsion  Commonly known as:  RESTASIS  1 drop 2 (two) times daily.     diazePAM 5 MG tablet  Commonly known as:  VALIUM  Take 1 tablet by mouth 30 minutes prior to MRI to help decrease anxiety.     FLUoxetine 40 MG capsule  nightly.     furosemide 20 MG tablet  Commonly known as:  LASIX  Take 20 mg by mouth once daily.     hydroCHLOROthiazide 12.5 MG Tab  Commonly known as:  HYDRODIURIL  Take 1 tablet (12.5 mg total) by mouth daily as needed.     ketoconazole 2 % cream  Commonly known as:  NIZORAL  APPLY TO RASH ON THE FACE TWICE DAILY     losartan 25 MG tablet  Commonly known as:  COZAAR  Take 0.5 tablets (12.5 mg total) by mouth once daily.     metFORMIN 1000 MG tablet  Commonly known as:  GLUCOPHAGE  Take 1 tablet (1,000 mg total) by mouth 2 (two) times daily with meals.     OneTouch Delica Lancets 30 gauge Misc  Generic drug:  lancets  USE TO TEST THREE TIMES DAILY     pen needle, diabetic 32 gauge x 5/32" Ndle  Commonly known as:  Pen Needle  1 each by Misc.(Non-Drug; Combo Route) route once daily.     potassium chloride 20 mEq Pack  Commonly known as:  KLOR-CON  Take 20 mEq by mouth once daily.     Rexulti 3 mg Tab  Generic drug:  brexpiprazole  Take 3 mg by mouth every evening.     semaglutide 0.25 mg or 0.5 mg(2 mg/1.5 mL) Pnij  Commonly known as:  Ozempic  Inject 0.5 mg into the skin every 7 days.     valACYclovir 1000 MG tablet  Commonly known as:  VALTREX  TK 1 T PO  TID FOR 7 DAYS         * This list has 2 medication(s) that are the same as other medications prescribed for you. Read the directions carefully, and ask your doctor or other care provider to review them with you.            STOP taking these medications    ibuprofen 800 MG tablet  Commonly known as:  BHAVIK HURTADO          Discharge Procedure Orders   Diet diabetic     Ice to affected area     Keep surgical extremity elevated "     Leave dressing on - Keep it clean, dry, and intact until clinic visit     Notify your health care provider if you experience any of the following:  temperature >100.4     Notify your health care provider if you experience any of the following:  persistent nausea and vomiting or diarrhea     Notify your health care provider if you experience any of the following:  severe uncontrolled pain     Notify your health care provider if you experience any of the following:  redness, tenderness, or signs of infection (pain, swelling, redness, odor or green/yellow discharge around incision site)     Weight bearing restrictions (specify):   Order Comments: nonweight bearing on the right     Follow-up Information     Lorena Peng DPM In 1 week.    Specialty:  Podiatry  Contact information:  [address] [place] [address] [address] Elvin GIBBS [id number] [phone]

## 2019-12-12 NOTE — ANESTHESIA POSTPROCEDURE EVALUATION
Anesthesia Post Evaluation    Patient: Yuli Milton    Procedure(s) Performed: Procedure(s) (LRB):  ORIF, FRACTURE, METATARSAL BONE (Right)    Final Anesthesia Type: MAC    Patient location during evaluation: PACU  Patient participation: Yes- Able to Participate  Level of consciousness: awake and alert  Post-procedure vital signs: reviewed and stable  Pain management: adequate  Airway patency: patent    PONV status at discharge: No PONV  Anesthetic complications: no      Cardiovascular status: blood pressure returned to baseline  Respiratory status: unassisted and room air  Hydration status: euvolemic  Follow-up not needed.          Vitals Value Taken Time   /85 12/12/2019  6:00 AM   Temp 36.6 °C (97.9 °F) 12/12/2019  6:00 AM   Pulse 78 12/12/2019  6:00 AM   Resp 16 12/12/2019  6:00 AM   SpO2 100 % 12/12/2019  6:00 AM         No case tracking events are documented in the log.      Pain/Rosamaria Score: No data recorded

## 2019-12-12 NOTE — OP NOTE
[facility]  Podiatric Medicine & Surgery  Operative Report    SUMMARY     Date of Procedure: 12/12/2019    Surgeon(s) and Role: Surgeon(s) and Role:     * Lorena Peng DPM - Primary    Pre-Operative Diagnosis: Pre-Op Diagnosis Codes:     * Closed nondisplaced fracture of fifth metatarsal bone of right foot, initial encounter [S92.354A]    Post-Operative Diagnosis: Post-Op Diagnosis Codes:     * Closed nondisplaced fracture of fifth metatarsal bone of right foot, initial encounter [S92.354A]    Planned Procedure: Procedure(s):  ORIF, FRACTURE, METATARSAL BONE    Technical Procedures Used:   1.  Percutaneous fixation of the right 5th metatarsal base    Anesthesia: Local MAC    Hemostasis: Well padded tourniquet inflated to 250 mmHg applied to the right calf    Estimated Blood Loss (EBL): Minimal    Drains:  None    Specimens: * No specimens in log *    Implants: * No implants in log *    Complications: * No complications entered in OR log *      Description of the Findings of the Procedure: The patient was seen in the Holding Room. The risks, benefits, complications, treatment options, and expected outcomes were discussed with the patient. The risks and potential complications of their problem and purposed treatment include but are not limited to infection, nerve injury, vascular injury, nonunion/malunion/delayed union of the surgical site, persistent pain, potential skin necrosis, deep vein thrombosis, possible pulmonary embolus, complications of the anesthetics and failure of the implant.  The patient concurred with the proposed plan, giving informed consent. The patient is aware that the procedure may be a part of a staged collection of procedures for definitive cure and/or alleviation of symptoms. The site of surgery properly noted/marked. Preoperative intravenous antibiotics are hanging at bedside, and are currently being administered via the heparin lock. The patient was taken to  Operating Suite.    Once in the operative suite, the patient is transferred onto the operative table in the supine position. A TIME-OUT is taken to identify the proper patient, procedure to be performed, and laterality.  The patient is properly positioned on the operating room table for ease of dissection and for any ancillary imaging.  A well-padded tourniquet was applied to the right calf.  Nursing and ancillary OR staff prepared the patient for the procedure. The patient is adequately sedated by the Attending Anesthesiologist and/or covering CRNA.  Following this, a preoperative regional/local block was given to the 20 cc of 0.25% Marcaine plain.  Next, the operative limb was rendered sterile using chlorhexidine paint and scrub. Another TIME-OUT is taken as per protocol to identify the proper patient, correct extremity, and procedure to be performed. Once this coincided with all members of the team, the extremity was scrubbed, prepped, and draped in a sterile aseptic fashion.    The tourniquet is elevated to 250 mmHg after the limb is exsanguinated for approximately 2 min with an Esmarch bandage.    Attention was directed to the lateral aspect of the right foot at which point a free K-wire and sterile marking pen was utilized to map out draw the 5th metatarsal base.  Once insertion site was found, a K-wire was then introduced into the 5th metatarsal base and passed distally into the 5th metatarsal inter medullary canal.  Both AP and lateral views of x-rays were utilized to verify the position of this K-wire.  Once position was deemed adequate using live in off of fluoroscopy, a [place] 36 mm 4.0 mm cannulated screw was placed total permanent fixation of the 5th metatarsal base.  Using the AP and oblique view, the screw was advanced until it was verified to be not interrupting the 5th metatarsal cuboid joint. A single stitch was made with a 4 0 nylon suture. Xeroform was applied to the surgical site.  Fluffs, cast  padding, and a soft dressing were applied to the right lower extremity.    The anesthesia is weaned. There were no complications to this procedure. Any final necessary imaging to be performed in the PACU if it was not performed here in the OR suite.  The patient is transferred to the [facility] bed/stretcher, [facility]. The patient is transferred to the PACU.    Condition: stable    Disposition: PACU - hemodynamically stable.    Attestation: I performed the procedure.

## 2019-12-12 NOTE — PLAN OF CARE
Discharge instructions discussed with mother and patient, mother and patient verbalized understanding.

## 2019-12-12 NOTE — ANESTHESIA RELEASE NOTE
"Anesthesia Release from PACU Note    Patient: Yuli Milton    Procedure(s) Performed: Procedure(s) (LRB):  ORIF, FRACTURE, METATARSAL BONE (Right)    Anesthesia type: MAC    Post pain: Adequate analgesia    Post assessment: no apparent anesthetic complications    Last Vitals:   Visit Vitals  /85 (BP Location: Left arm, Patient Position: Sitting)   Pulse 78   Temp 36.6 °C (97.9 °F)   Resp 16   Ht 5' 5" (1.651 m)   Wt 121.5 kg (267 lb 13.7 oz)   LMP 08/21/2016   SpO2 100%   Breastfeeding? No   BMI 44.57 kg/m²       Post vital signs: stable    Level of consciousness: awake    Nausea/Vomiting: no nausea/no vomiting    Complications: none    Airway Patency: patent    Respiratory: unassisted    Cardiovascular: stable and blood pressure at baseline    Hydration: euvolemic  "

## 2019-12-12 NOTE — ANESTHESIA PREPROCEDURE EVALUATION
12/12/2019  Yuli Milton is a 44 y.o., female.    Pre-op Assessment    I have reviewed the Patient Summary Reports.     I have reviewed the Medications.     Review of Systems  Anesthesia Hx:  No problems with previous Anesthesia Hx of Anesthetic complications (N/V)   Denies Personal Hx of Anesthesia complications.   Social:  Non-Smoker    Hematology/Oncology:  Hematology Normal   Oncology Normal     EENT/Dental:EENT/Dental Normal   Cardiovascular:   Hypertension ECG has been reviewed. 10/25/2019 Stress  EKG Conclusions:    1. The EKG portion of this study is negative for ischemia at a moderate workload, and peak heart rate of 153 bpm (92% of predicted).   2. Exercise capacity is average.   3. Blood pressure response to exercise was normal (Presenting BP: 124/83 Peak BP: 136/84).   4. No significant arrhythmias were present.   5. There were no symptoms of chest discomfort or significant dyspnea throughout the protocol.   6. The Duke treadmill score was 2 suggesting an intermediate probability for future cardiovascular events.   Pulmonary:   Asthma    Renal/:  Renal/ Normal     Hepatic/GI:  Hepatic/GI Normal    Musculoskeletal:  Musculoskeletal Normal    Neurological:  Neurology Normal    Endocrine:   Diabetes, type 2    Dermatological:  Skin Normal    Psych:   Psychiatric History anxiety depression          Physical Exam  General:  Well nourished, Morbid Obesity    Airway/Jaw/Neck:  Airway Findings: Mouth Opening: Normal Tongue: Normal  Mallampati: III      Dental:  Dental Findings: In tact   Chest/Lungs:  Chest/Lungs Clear    Heart/Vascular:  Heart Findings: Normal Heart murmur: negative       Mental Status:  Mental Status Findings:  Cooperative, Alert and Oriented         Anesthesia Plan  Type of Anesthesia, risks & benefits discussed:  Anesthesia Type:  MAC  Patient's Preference:   Intra-op  Monitoring Plan: standard ASA monitors  Intra-op Monitoring Plan Comments:   Post Op Pain Control Plan: multimodal analgesia  Post Op Pain Control Plan Comments:   Induction:   IV  Beta Blocker:  Patient is not currently on a Beta-Blocker (No further documentation required).       Informed Consent: Patient understands risks and agrees with Anesthesia plan.  Questions answered. Anesthesia consent signed with patient.  ASA Score: 2     Day of Surgery Review of History & Physical: I have interviewed and examined the patient. I have reviewed the patient's H&P dated:    H&P update referred to the surgeon.

## 2019-12-19 ENCOUNTER — HOSPITAL ENCOUNTER (OUTPATIENT)
Dept: RADIOLOGY | Facility: HOSPITAL | Age: 44
Discharge: HOME | End: 2019-12-19
Attending: PODIATRIST
Payer: COMMERCIAL

## 2019-12-19 ENCOUNTER — OFFICE VISIT (OUTPATIENT)
Dept: PODIATRY | Facility: CLINIC | Age: 44
End: 2019-12-19
Payer: COMMERCIAL

## 2019-12-19 VITALS
HEART RATE: 70 BPM | DIASTOLIC BLOOD PRESSURE: 80 MMHG | BODY MASS INDEX: 44.63 KG/M2 | SYSTOLIC BLOOD PRESSURE: 118 MMHG | HEIGHT: 65 IN | WEIGHT: 267.88 LBS

## 2019-12-19 DIAGNOSIS — Z98.890 STATUS POST RIGHT FOOT SURGERY: Primary | ICD-10-CM

## 2019-12-19 DIAGNOSIS — S92.354A CLOSED NONDISPLACED FRACTURE OF FIFTH METATARSAL BONE OF RIGHT FOOT, INITIAL ENCOUNTER: ICD-10-CM

## 2019-12-19 PROCEDURE — 73630 XR FOOT COMPLETE 3 VIEW RIGHT: ICD-10-PCS | Mod: 26,RT,, | Performed by: RADIOLOGY

## 2019-12-19 PROCEDURE — 99024 PR POST-OP FOLLOW-UP VISIT: ICD-10-PCS | Mod: ,,, | Performed by: PODIATRIST

## 2019-12-19 PROCEDURE — 73630 X-RAY EXAM OF FOOT: CPT | Mod: 26,RT,, | Performed by: RADIOLOGY

## 2019-12-19 PROCEDURE — 99024 POSTOP FOLLOW-UP VISIT: CPT | Mod: ,,, | Performed by: PODIATRIST

## 2019-12-19 PROCEDURE — 99999 PR PBB SHADOW E&M-EST. PATIENT-LVL III: CPT | Mod: PBBFAC,,, | Performed by: PODIATRIST

## 2019-12-19 PROCEDURE — 73630 X-RAY EXAM OF FOOT: CPT | Mod: TC,RT

## 2019-12-19 PROCEDURE — 99999 PR PBB SHADOW E&M-EST. PATIENT-LVL III: ICD-10-PCS | Mod: PBBFAC,,, | Performed by: PODIATRIST

## 2019-12-19 PROCEDURE — 99213 OFFICE O/P EST LOW 20 MIN: CPT | Mod: PBBFAC,25 | Performed by: PODIATRIST

## 2019-12-19 NOTE — PROGRESS NOTES
Subjective:       Patient ID: Yuli Milton is a 44 y.o. female.    Chief Complaint: Post-op Evaluation (DOS: 12/12/19, Insertion of screw right foot. Pt reports pain rating 7/10. Ambulates in a wheelchair and walking boot.)    HPI: Yuli Milton presents for a 1 week follow-up after a percutaneous fixation of the right 5th metatarsal secondary to closed nondisplaced fracture of the right 5th metatarsal with delayed healing.  She states that she has had still having postoperative pain and rates this as a 7/10.  She states that this is control normal medications.  She states that she has been ambulating in a wheelchair and walking boot.  Does report that she occasionally put weight on the right foot when ambulating to the bathroom.  Denies posterior leg pain.    Review of patient's allergies indicates:   Allergen Reactions    Trulicity [dulaglutide] Shortness Of Breath and Other (See Comments)     Headaches    Hibiclens (isopropyl alcohol) Itching       Past Medical History:   Diagnosis Date    Abnormal Pap smear of cervix     treatment??    Abnormal Pap smear of vagina     Anemia     Anxiety     Anxiety and depression     Asthma     Chlamydia     Depression (emotion)     Diabetes mellitus     Dysuria 3/31/2018    Gallstones     Gastritis     History of ovarian cyst     History of syphilis     History of trichomoniasis     Hyperlipidemia     Mental disorder     PONV (postoperative nausea and vomiting)     Vaginal candidiasis 7/24/2018       Family History   Problem Relation Age of Onset    Breast cancer Paternal Grandmother     Diabetes Maternal Grandmother     Hypertension Mother     Thyroid disease Mother     Breast cancer Maternal Aunt     Cancer Maternal Aunt         colon cancer    Colon cancer Maternal Aunt     Miscarriages / Stillbirths Cousin     Stroke Maternal Aunt     No Known Problems Father     Thyroid disease Sister     Thyroid disease Brother     Ovarian  cancer Neg Hx     Deep vein thrombosis Neg Hx     Pulmonary embolism Neg Hx        Social History     Socioeconomic History    Marital status: Single     Spouse name: Not on file    Number of children: 3    Years of education: Not on file    Highest education level: Not on file   Occupational History    Not on file   Social Needs    Financial resource strain: Not on file    Food insecurity:     Worry: Not on file     Inability: Not on file    Transportation needs:     Medical: Not on file     Non-medical: Not on file   Tobacco Use    Smoking status: Never Smoker    Smokeless tobacco: Never Used   Substance and Sexual Activity    Alcohol use: No     Alcohol/week: 0.0 standard drinks     Comment: rarely; stop 12/11/19 prior to sx    Drug use: No    Sexual activity: Yes     Partners: Male     Birth control/protection: See Surgical Hx, Post-menopausal     Comment: mut monog   Lifestyle    Physical activity:     Days per week: Not on file     Minutes per session: Not on file    Stress: Very much   Relationships    Social connections:     Talks on phone: Not on file     Gets together: Not on file     Attends Congregation service: Not on file     Active member of club or organization: Not on file     Attends meetings of clubs or organizations: Not on file     Relationship status: Not on file   Other Topics Concern    Not on file   Social History Narrative    Full time employed - [employer]       Past Surgical History:   Procedure Laterality Date    APPENDECTOMY      CHOLECYSTECTOMY      COLONOSCOPY N/A 7/31/2017    Procedure: COLONOSCOPY;  Surgeon: Yuliana Michael MD;  Location: [facility];  Service: Endoscopy;  Laterality: N/A;    DILATION AND CURETTAGE OF UTERUS      bleeding    HYSTERECTOMY  08/31/2016    RALH/BS 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   HENT: Negative for sinus pain, sore  "throat and voice change.    Eyes: Negative for pain, redness and visual disturbance.   Respiratory: Negative for cough and shortness of breath.    Cardiovascular: Negative for chest pain and palpitations.   Gastrointestinal: Negative for diarrhea, nausea and vomiting.   Musculoskeletal: Positive for gait problem (Nonweightbearing right lower extremity in Cam boot). Negative for back pain and joint swelling.   Skin: Negative for color change and wound.   Neurological: Negative for dizziness, weakness and numbness.   Psychiatric/Behavioral: The patient is not nervous/anxious.           Objective:   /80   Pulse 70   Ht 5' 5" (1.651 m)   Wt 121.5 kg (267 lb 13.7 oz)   LMP 08/21/2016   BMI 44.57 kg/m²     X-Ray Foot Complete Right  Narrative: EXAMINATION:  XR FOOT COMPLETE 3 VIEW RIGHT    CLINICAL HISTORY:  post-op right foot;. Nondisplaced fracture of fifth metatarsal bone, right foot, initial encounter for closed fracture    TECHNIQUE:  AP, lateral, and oblique views of the right foot were performed.    COMPARISON:  December 12, 2019    FINDINGS:  Stable postsurgical changes ORIF the fracture base 5th metatarsal.  Single screw transfixes fracture fragments which appear stable in position and alignment.  Remainder of the exam appears stable.  Overlying bandaging incidentally noted.  Impression: As above.    Electronically signed by: Guido Huntley MD  Date:    12/19/2019  Time:    11:56       Physical Exam   LOWER EXTREMITY PHYSICAL EXAMINATION    Vascular:  Capillary refill is intact. Dorsalis pedis pulse 2/4 and posterior tibial pulse 2/4 on the right.  Pedal hair growth is present to the dorsal aspect of the foot and digits.  Temperature is warm to warm from proximal distal.    Neurological: Sensation to light touch is intact. Proprioception is intact, left. Sensation to pin prick is intact.  No numbness or tingling identified.    Musculoskeletal:  Slight pain on palpation of the surgical site on the left " foot.  There is slight pain and discomfort on direct palpation of the 5th metatarsal bone dorsally over the foot as well. Patient does not have pain with dorsal to plantar translation of the 5th metatarsal bone distally.  She does not have pain with range of motion of 5th metatarsophalangeal joint on the right as well. No pain on medial lateral squeeze of the posterior muscle groups in the calf.    Dermatological:  Steri-Strips/sutures are intact. There is no signs of increased edema or erythema noted. The skin is well approximated and coapting.  No signs of postoperative infection.  Skin lines are present to the level affected lower extremity as result of decreased edema.    Imaging:     Results for orders placed during the hospital encounter of 12/19/19   X-Ray Foot Complete Right    Narrative EXAMINATION:  XR FOOT COMPLETE 3 VIEW RIGHT    CLINICAL HISTORY:  post-op right foot;. Nondisplaced fracture of fifth metatarsal bone, right foot, initial encounter for closed fracture    TECHNIQUE:  AP, lateral, and oblique views of the right foot were performed.    COMPARISON:  December 12, 2019    FINDINGS:  Stable postsurgical changes ORIF the fracture base 5th metatarsal.  Single screw transfixes fracture fragments which appear stable in position and alignment.  Remainder of the exam appears stable.  Overlying bandaging incidentally noted.      Impression As above.      Electronically signed by: Guido Huntley MD  Date:    12/19/2019  Time:    11:56           Assessment:     1. Status post percutaneous fixation of right 5th metatarsal fracture        Plan:     Status post percutaneous fixation of right 5th metatarsal fracture  -     HME - OTHER      Single suture was intact and skin was noted to be coapted at this location.  After the area around the suture was cleansed with Betadine paint, the suture was removed appropriately.  Following this Mastisol was applied around the incision site and Steri-Strips were placed  accordingly.  There is no signs of erythema, swelling, or acute signs of infection.  I encouraged patient to continue to be without soaking for several more days at which point I will allow her to start showering in the foot weight and approximately 3 more days.  I encouraged her to keep the Steri-Strips in place allow them to fall off in the shower.  I discussed with the patient the importance of remaining nonweightbearing to partial weight-bearing to the heel on the right foot. Patient was frustrated with this and states that crutches were difficult.  She is requesting a knee scooter.  Knee scooter prescription was sent to [facility] today for the patient.    Patient's continue being nonweightbearing to partial weight-bearing to the heel on the right foot until her follow-up appointment.  Ideally if everything goes well, we will allow her to start ambulating in the Cam boot following that appointment.  Until then, I recommend patient continues take it slow and be patient with the healing given the vitamin-D deficiency as well.    Future Appointments   Date Time Provider Department Center   1/2/2020 10:20 AM Lorena Peng DPM [facility]   2/27/2020 11:00 AM Drew Gutierrez MD [facility]

## 2019-12-20 ENCOUNTER — TELEPHONE (OUTPATIENT)
Dept: PAIN MEDICINE | Facility: CLINIC | Age: 44
End: 2019-12-20

## 2019-12-20 DIAGNOSIS — M54.12 CERVICAL RADICULOPATHY: Primary | ICD-10-CM

## 2019-12-20 NOTE — TELEPHONE ENCOUNTER
----- Message from Emerson Rincon PA-C sent at 12/20/2019  8:04 AM CST -----  Regarding: Injection- Cervical  Hi!    Can you please schedule her for injections after this month? She had surgery for her foot recently and is on bedrest for 1 week and wants to wait til at least after next week to this.     RL 3/4, 4/5 (right side)    Thanks,  Emerson

## 2019-12-20 NOTE — TELEPHONE ENCOUNTER
Orders placed by . Pt currently taking  per  s/p Right foot surgery d/t bedrest status at this time. Instructed pt to keep post op visit with  to see if she can come off asa for injection. Pt states she will call back to schedule after. All questions answered.//lp

## 2020-01-02 ENCOUNTER — HOSPITAL ENCOUNTER (OUTPATIENT)
Dept: RADIOLOGY | Facility: HOSPITAL | Age: 45
Discharge: HOME | End: 2020-01-02
Attending: PODIATRIST
Payer: MEDICAID

## 2020-01-02 ENCOUNTER — OFFICE VISIT (OUTPATIENT)
Dept: PODIATRY | Facility: CLINIC | Age: 45
End: 2020-01-02
Payer: MEDICAID

## 2020-01-02 VITALS
BODY MASS INDEX: 44.63 KG/M2 | DIASTOLIC BLOOD PRESSURE: 81 MMHG | HEIGHT: 65 IN | HEART RATE: 78 BPM | SYSTOLIC BLOOD PRESSURE: 122 MMHG | WEIGHT: 267.88 LBS

## 2020-01-02 DIAGNOSIS — Z98.890 STATUS POST RIGHT FOOT SURGERY: ICD-10-CM

## 2020-01-02 DIAGNOSIS — Z98.890 STATUS POST RIGHT FOOT SURGERY: Primary | ICD-10-CM

## 2020-01-02 PROCEDURE — 99999 PR PBB SHADOW E&M-EST. PATIENT-LVL III: CPT | Mod: PBBFAC,,, | Performed by: PODIATRIST

## 2020-01-02 PROCEDURE — 99024 PR POST-OP FOLLOW-UP VISIT: ICD-10-PCS | Mod: ,,, | Performed by: PODIATRIST

## 2020-01-02 PROCEDURE — 73630 X-RAY EXAM OF FOOT: CPT | Mod: TC,RT

## 2020-01-02 PROCEDURE — 73630 XR FOOT COMPLETE 3 VIEW RIGHT: ICD-10-PCS | Mod: 26,RT,, | Performed by: RADIOLOGY

## 2020-01-02 PROCEDURE — 73630 X-RAY EXAM OF FOOT: CPT | Mod: 26,RT,, | Performed by: RADIOLOGY

## 2020-01-02 PROCEDURE — 99999 PR PBB SHADOW E&M-EST. PATIENT-LVL III: ICD-10-PCS | Mod: PBBFAC,,, | Performed by: PODIATRIST

## 2020-01-02 PROCEDURE — 99024 POSTOP FOLLOW-UP VISIT: CPT | Mod: ,,, | Performed by: PODIATRIST

## 2020-01-02 PROCEDURE — 99213 OFFICE O/P EST LOW 20 MIN: CPT | Mod: PBBFAC,25 | Performed by: PODIATRIST

## 2020-01-02 RX ORDER — METAXALONE 400 MG/1
400 TABLET ORAL DAILY PRN
Qty: 7 TABLET | Refills: 0 | Status: SHIPPED | OUTPATIENT
Start: 2020-01-02 | End: 2020-01-09

## 2020-01-02 RX ORDER — OXYCODONE AND ACETAMINOPHEN 5; 325 MG/1; MG/1
1 TABLET ORAL EVERY 6 HOURS PRN
Qty: 7 TABLET | Refills: 0 | Status: SHIPPED | OUTPATIENT
Start: 2020-01-02 | End: 2020-02-17

## 2020-01-02 NOTE — LETTER
January 2, 2020      [facility]  [address]  [place] [address]  Phone: [phone]  Fax: [phone]       Patient: Yuli Milton   YOB: 1975  Date of Visit: 01/02/2020    To Whom It May Concern:    Dick Milton  was at [facility] on 01/02/2020. She is continuing to heal the right foot fracture. She will begin partial to heel weight bearing on the right foot. She will then progress to full weight bearing in the CAM boot. I will see her in the office on January 30, 2020. I feel as if she should be able to resume work following her next appointment if all is going well may return to work February 10, 2020 if there are no complications. If you have any questions or concerns, or if I can be of further assistance, please do not hesitate to contact me.    Sincerely,    Lorena Peng DPM

## 2020-01-02 NOTE — PROGRESS NOTES
Subjective:       Patient ID: Yuli Milton is a 44 y.o. female.    Chief Complaint: Post-op Evaluation (DOS: 12/12/19, Insertion of screw of right foot. Pt reports; occasional pain throughout the day rating 7/10. Pt also states walking on foot. Ambulates with a scooter.)      HPI:  Yuli Milton presents to the office today, 3 weeks status post right 5th metatarsal percutaneous fixation for a delayed healing fracture. DOS 12/12/19.  Patient reports to having occasional pain which she rates as a 7/10 on the operative extremity.  She reports that she has noticed some irritation to the skin along the 5th metatarsal bone while ambulating.  She also states that she has been walking in the Cam boot around the house.  Patient is concerned that she may have an allergy to the hardware due to the irritation that she is feeling.  She reports that she did have appears sings placed several years ago while she was a kid which required removal.  Otherwise she states she is doing quite well and utilizing a knee scooter for mobility.    Hemoglobin A1C   Date Value Ref Range Status   11/27/2019 9.8 (H) 4.0 - 5.6 % Final     Comment:     ADA Screening Guidelines:  5.7-6.4%  Consistent with prediabetes  >or=6.5%  Consistent with diabetes  High levels of fetal hemoglobin interfere with the HbA1C  assay. Heterozygous hemoglobin variants (HbS, HgC, etc)do  not significantly interfere with this assay.   However, presence of multiple variants may affect accuracy.     08/27/2019 7.0 (H) 4.0 - 5.6 % Final     Comment:     ADA Screening Guidelines:  5.7-6.4%  Consistent with prediabetes  >or=6.5%  Consistent with diabetes  High levels of fetal hemoglobin interfere with the HbA1C  assay. Heterozygous hemoglobin variants (HbS, HgC, etc)do  not significantly interfere with this assay.   However, presence of multiple variants may affect accuracy.     05/23/2019 6.5 (H) 4.0 - 5.6 % Final     Comment:     ADA Screening  Guidelines:  5.7-6.4%  Consistent with prediabetes  >or=6.5%  Consistent with diabetes  High levels of fetal hemoglobin interfere with the HbA1C  assay. Heterozygous hemoglobin variants (HbS, HgC, etc)do  not significantly interfere with this assay.   However, presence of multiple variants may affect accuracy.         Review of patient's allergies indicates:   Allergen Reactions    Trulicity [dulaglutide] Shortness Of Breath and Other (See Comments)     Headaches    Hibiclens (isopropyl alcohol) Itching       Past Medical History:   Diagnosis Date    Abnormal Pap smear of cervix     treatment??    Abnormal Pap smear of vagina     Anemia     Anxiety     Anxiety and depression     Asthma     Chlamydia     Depression (emotion)     Diabetes mellitus     Dysuria 3/31/2018    Gallstones     Gastritis     History of ovarian cyst     History of syphilis     History of trichomoniasis     Hyperlipidemia     Mental disorder     PONV (postoperative nausea and vomiting)     Vaginal candidiasis 7/24/2018       Family History   Problem Relation Age of Onset    Breast cancer Paternal Grandmother     Diabetes Maternal Grandmother     Hypertension Mother     Thyroid disease Mother     Breast cancer Maternal Aunt     Cancer Maternal Aunt         colon cancer    Colon cancer Maternal Aunt     Miscarriages / Stillbirths Cousin     Stroke Maternal Aunt     No Known Problems Father     Thyroid disease Sister     Thyroid disease Brother     Ovarian cancer Neg Hx     Deep vein thrombosis Neg Hx     Pulmonary embolism Neg Hx        Social History     Socioeconomic History    Marital status: Single     Spouse name: Not on file    Number of children: 3    Years of education: Not on file    Highest education level: Not on file   Occupational History    Not on file   Social Needs    Financial resource strain: Not on file    Food insecurity:     Worry: Not on file     Inability: Not on file     Transportation needs:     Medical: Not on file     Non-medical: Not on file   Tobacco Use    Smoking status: Never Smoker    Smokeless tobacco: Never Used   Substance and Sexual Activity    Alcohol use: No     Alcohol/week: 0.0 standard drinks     Comment: rarely; stop 12/11/19 prior to sx    Drug use: No    Sexual activity: Yes     Partners: Male     Birth control/protection: See Surgical Hx, Post-menopausal     Comment: mut monog   Lifestyle    Physical activity:     Days per week: Not on file     Minutes per session: Not on file    Stress: Very much   Relationships    Social connections:     Talks on phone: Not on file     Gets together: Not on file     Attends [employer] service: Not on file     Active member of club or organization: Not on file     Attends meetings of clubs or organizations: Not on file     Relationship status: Not on file   Other Topics Concern    Not on file   Social History Narrative    Full time employed - [employer]       Past Surgical History:   Procedure Laterality Date    APPENDECTOMY      CHOLECYSTECTOMY      COLONOSCOPY N/A 7/31/2017    Procedure: COLONOSCOPY;  Surgeon: Yuliana Michael MD;  [facility];  Service: Endoscopy;  Laterality: N/A;    DILATION AND CURETTAGE OF UTERUS      bleeding    HYSTERECTOMY  08/31/2016    RALH/BS 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   HENT: Negative for sinus pain, sore throat and voice change.    Eyes: Negative for pain, redness and visual disturbance.   Respiratory: Negative for cough and shortness of breath.    Cardiovascular: Negative for chest pain and palpitations.   Gastrointestinal: Negative for diarrhea, nausea and vomiting.   Musculoskeletal: Positive for arthralgias and back pain. Negative for joint swelling.   Skin: Negative for color change and wound.   Neurological: Negative for dizziness, weakness and  "numbness.   Psychiatric/Behavioral: The patient is not nervous/anxious.           Objective:   /81   Pulse 78   Ht 5' 5" (1.651 m)   Wt 121.5 kg (267 lb 13.7 oz)   LMP 08/21/2016   BMI 44.57 kg/m²     X-Ray Foot Complete Right  Narrative: EXAMINATION:  XR FOOT COMPLETE 3 VIEW RIGHT    CLINICAL HISTORY:  right foot 5th metatarsal fracture;. Other specified postprocedural states    TECHNIQUE:  AP, lateral, and oblique views of the right foot were performed.    COMPARISON:  12/19/2019    FINDINGS:  Fifth metatarsal orthopedic screw is unchanged.  Basilar fracture line less evident with alignment stable.  No detrimental changes  Impression: As above    Electronically signed by: Tamir Bacon MD  Date:    01/02/2020  Time:    11:03       Physical Exam  LOWER EXTREMITY PHYSICAL EXAMINATION    NEUROLOGY: Proprioception is intact. Sensation to light touch is intact. Does have sensitivity at the incision site    VASCULAR: On the right foot, the dorsalis pedis pulse is 2/4 and the posterior tibial pulse is 2/4. Capillary refill time is less than 3 seconds. Hair growth is present on the dorsum of the foot and at the digits. No rubor is present. Proximal to distal temperature is warm to warm. No ipsilateral calf pain or tenderness is noted with palpation and compression. No palpable cords noted.    DERMATOLOGY:  Stab incision site is well healed without evidence of dehiscence or gapping.  There is no increase in erythema or edema to the right lower extremity.  No calluses, ulcers, or other breaks in the skin or identified.  Temperature is warm to warm to touch.  Skin turgor and texture within normal limits.    ORTHOPEDIC:  Mild pain on palpation of the 5th metatarsal bone distal to the area of previous fracture.  No pain with range of motion of the 5th metatarsophalangeal joint. There is no pain on palpation of the peroneal tendons along the course posterior to the lateral fibula.  5/5 manual muscle strength " testing with dorsiflexion, plantar flexion, inversion, and eversion.  Patient did have some discomfort with eversion of the foot with resistance.    Assessment:     1. Status post percutaneous fixation of right 5th metatarsal fracture          Plan:     Status post percutaneous fixation of right 5th metatarsal fracture  -     X-Ray Foot Complete Right; Future; Expected date: 01/02/2020    Other orders  -     oxyCODONE-acetaminophen (PERCOCET) 5-325 mg per tablet; Take 1 tablet by mouth every 6 (six) hours as needed for Pain.  Dispense: 7 tablet; Refill: 0  -     metaxalone (SKELAXIN) 400 mg tablet; Take 1 tablet (400 mg total) by mouth daily as needed.  Dispense: 7 tablet; Refill: 0     Patient provided with short dose of pain medication as she is having increased difficulties tolerating the pain postoperatively. I did discuss with her that she may be having a reaction to the hardware place, however I would be hesitant to remove this hardware until the bone has healed appropriately.  Patient may also tolerate a muscle relaxer which could help decrease tension being applied to this area.  Patient has been ambulating against medical advice on the right foot with full weight-bearing.  I discussed with her at last appointment that she could apply partial weight-bearing if needed, however I would have preferred her to be completely nonweightbearing.    Patient would be okay to start partial light weight-bearing on the right lower extremity with crutches over the next few weeks.  Also encouraged her to wean off of the pain medication as well. If patient does well, we will plan to have her start working in February.  Patient follow-up January 30th which point x-rays will be taken to verify there is any change and healing continues to occur.  Also recommend patient continue taking her vitamin D supplementation which can help with healing as well.    Did discussed with patient in her aunt, who is present in the room that if  patient is having an allergy to the hardware place, it would be better to take this out once healing has occurred.  We will continue to monitor this with repeat x-rays to verify that there is no lysis breakdown around the hardware place.    Patient to discontinue use of aspirin while mobile on the right foot. She will need to be off aspirin for 7-10 days prior to the injection in to the back her neck region per spine surgery.        Future Appointments   Date Time Provider Department Center   1/30/2020  9:20 AM Lorena Peng DPM [facility]   2/27/2020 11:00 AM Drew Gutierrez MD [facility]

## 2020-01-18 ENCOUNTER — PATIENT MESSAGE (OUTPATIENT)
Dept: INTERNAL MEDICINE | Facility: CLINIC | Age: 45
End: 2020-01-18

## 2020-01-18 DIAGNOSIS — E11.9 TYPE 2 DIABETES MELLITUS WITHOUT COMPLICATION, WITHOUT LONG-TERM CURRENT USE OF INSULIN: ICD-10-CM

## 2020-01-20 ENCOUNTER — PATIENT MESSAGE (OUTPATIENT)
Dept: INTERNAL MEDICINE | Facility: CLINIC | Age: 45
End: 2020-01-20

## 2020-01-20 NOTE — TELEPHONE ENCOUNTER
Dr. Gutierrez approved your medications.    Let me know if I can do anything else for you.     :)    MING Brody

## 2020-01-29 DIAGNOSIS — Z98.890 STATUS POST RIGHT FOOT SURGERY: Primary | ICD-10-CM

## 2020-01-30 ENCOUNTER — OFFICE VISIT (OUTPATIENT)
Dept: PODIATRY | Facility: CLINIC | Age: 45
End: 2020-01-30
Payer: COMMERCIAL

## 2020-01-30 ENCOUNTER — HOSPITAL ENCOUNTER (OUTPATIENT)
Dept: RADIOLOGY | Facility: HOSPITAL | Age: 45
Discharge: HOME | End: 2020-01-30
Attending: PODIATRIST
Payer: MEDICAID

## 2020-01-30 VITALS
HEART RATE: 78 BPM | BODY MASS INDEX: 44.63 KG/M2 | DIASTOLIC BLOOD PRESSURE: 84 MMHG | SYSTOLIC BLOOD PRESSURE: 120 MMHG | WEIGHT: 267.88 LBS | HEIGHT: 65 IN

## 2020-01-30 DIAGNOSIS — M79.671 RIGHT FOOT PAIN: ICD-10-CM

## 2020-01-30 DIAGNOSIS — Z98.890 STATUS POST RIGHT FOOT SURGERY: ICD-10-CM

## 2020-01-30 DIAGNOSIS — E55.9 VITAMIN D DEFICIENCY: ICD-10-CM

## 2020-01-30 DIAGNOSIS — Z98.890 STATUS POST RIGHT FOOT SURGERY: Primary | ICD-10-CM

## 2020-01-30 PROCEDURE — 73630 X-RAY EXAM OF FOOT: CPT | Mod: TC,RT

## 2020-01-30 PROCEDURE — 73630 XR FOOT COMPLETE 3 VIEW RIGHT: ICD-10-PCS | Mod: 26,RT,, | Performed by: RADIOLOGY

## 2020-01-30 PROCEDURE — 99214 OFFICE O/P EST MOD 30 MIN: CPT | Mod: 24,S$PBB,, | Performed by: PODIATRIST

## 2020-01-30 PROCEDURE — 99999 PR PBB SHADOW E&M-EST. PATIENT-LVL III: CPT | Mod: PBBFAC,,, | Performed by: PODIATRIST

## 2020-01-30 PROCEDURE — 99214 PR OFFICE/OUTPT VISIT, EST, LEVL IV, 30-39 MIN: ICD-10-PCS | Mod: 24,S$PBB,, | Performed by: PODIATRIST

## 2020-01-30 PROCEDURE — 99999 PR PBB SHADOW E&M-EST. PATIENT-LVL III: ICD-10-PCS | Mod: PBBFAC,,, | Performed by: PODIATRIST

## 2020-01-30 PROCEDURE — 99213 OFFICE O/P EST LOW 20 MIN: CPT | Mod: PBBFAC,25 | Performed by: PODIATRIST

## 2020-01-30 PROCEDURE — 73630 X-RAY EXAM OF FOOT: CPT | Mod: 26,RT,, | Performed by: RADIOLOGY

## 2020-01-30 NOTE — PROGRESS NOTES
Subjective:       Patient ID: Yuli Milton is a 44 y.o. female.    Chief Complaint: Follow-up (F/U SP Fixation Of R 5th Metatarsal.Rates pain 6/10. Diabetic Pt. Wears boot. PCP DR Gutierrez)      HPI:  Yuli Milton presents to the office today, s/p 7 weeks percutaneous fixation of right 5th metatarsal base fracture.  Patient states her diabetes has been under control, although last A1c was 9.8.  She does report to have some pain when ambulating in slippers/barefoot.  Has been driving well with a postoperative shoe which does continue to help alleviate some of her discomfort.  She rates her current pain as a 6/10.  States she is taking Tylenol occasionally to help with her current pain level.  Reports that she has been ambulating in and out of the boot.  Denies increased swelling or erythema to the right foot. Denies fever, nausea, vomiting, chills, ipsilateral posterior calf pain.    Hemoglobin A1C   Date Value Ref Range Status   11/27/2019 9.8 (H) 4.0 - 5.6 % Final     Comment:     ADA Screening Guidelines:  5.7-6.4%  Consistent with prediabetes  >or=6.5%  Consistent with diabetes  High levels of fetal hemoglobin interfere with the HbA1C  assay. Heterozygous hemoglobin variants (HbS, HgC, etc)do  not significantly interfere with this assay.   However, presence of multiple variants may affect accuracy.     08/27/2019 7.0 (H) 4.0 - 5.6 % Final     Comment:     ADA Screening Guidelines:  5.7-6.4%  Consistent with prediabetes  >or=6.5%  Consistent with diabetes  High levels of fetal hemoglobin interfere with the HbA1C  assay. Heterozygous hemoglobin variants (HbS, HgC, etc)do  not significantly interfere with this assay.   However, presence of multiple variants may affect accuracy.     05/23/2019 6.5 (H) 4.0 - 5.6 % Final     Comment:     ADA Screening Guidelines:  5.7-6.4%  Consistent with prediabetes  >or=6.5%  Consistent with diabetes  High levels of fetal hemoglobin interfere with the HbA1C  assay.  Heterozygous hemoglobin variants (HbS, HgC, etc)do  not significantly interfere with this assay.   However, presence of multiple variants may affect accuracy.         Review of patient's allergies indicates:   Allergen Reactions    Trulicity [dulaglutide] Shortness Of Breath and Other (See Comments)     Headaches    Hibiclens (isopropyl alcohol) Itching       Past Medical History:   Diagnosis Date    Abnormal Pap smear of cervix     treatment??    Abnormal Pap smear of vagina     Anemia     Anxiety     Anxiety and depression     Asthma     Chlamydia     Depression (emotion)     Diabetes mellitus     Dysuria 3/31/2018    Gallstones     Gastritis     History of ovarian cyst     History of syphilis     History of trichomoniasis     Hyperlipidemia     Mental disorder     PONV (postoperative nausea and vomiting)     Vaginal candidiasis 7/24/2018       Family History   Problem Relation Age of Onset    Breast cancer Paternal Grandmother     Diabetes Maternal Grandmother     Hypertension Mother     Thyroid disease Mother     Breast cancer Maternal Aunt     Cancer Maternal Aunt         colon cancer    Colon cancer Maternal Aunt     Miscarriages / Stillbirths Cousin     Stroke Maternal Aunt     No Known Problems Father     Thyroid disease Sister     Thyroid disease Brother     Ovarian cancer Neg Hx     Deep vein thrombosis Neg Hx     Pulmonary embolism Neg Hx        Social History     Socioeconomic History    Marital status: Single     Spouse name: Not on file    Number of children: 3    Years of education: Not on file    Highest education level: Not on file   Occupational History    Not on file   Social Needs    Financial resource strain: Not on file    Food insecurity:     Worry: Not on file     Inability: Not on file    Transportation needs:     Medical: Not on file     Non-medical: Not on file   Tobacco Use    Smoking status: Never Smoker    Smokeless tobacco: Never Used    Substance and Sexual Activity    Alcohol use: No     Alcohol/week: 0.0 standard drinks     Comment: rarely; stop 12/11/19 prior to sx    Drug use: No    Sexual activity: Yes     Partners: Male     Birth control/protection: See Surgical Hx, Post-menopausal     Comment: mut monog   Lifestyle    Physical activity:     Days per week: Not on file     Minutes per session: Not on file    Stress: Very much   Relationships    Social connections:     Talks on phone: Not on file     Gets together: Not on file     Attends [employer] service: Not on file     Active member of club or organization: Not on file     Attends meetings of clubs or organizations: Not on file     Relationship status: Not on file   Other Topics Concern    Not on file   Social History Narrative    Full time employed - [employer]       Past Surgical History:   Procedure Laterality Date    APPENDECTOMY      CHOLECYSTECTOMY      COLONOSCOPY N/A 7/31/2017    Procedure: COLONOSCOPY;  Surgeon: Yuliana Michael MD;  Location: [facility];  Service: Endoscopy;  Laterality: N/A;    DILATION AND CURETTAGE OF UTERUS      bleeding    HYSTERECTOMY  08/31/2016    RALH/BS 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   HENT: Negative for sinus pain, sore throat and voice change.    Eyes: Negative for pain, redness and visual disturbance.   Respiratory: Negative for cough and shortness of breath.    Cardiovascular: Negative for chest pain and palpitations.   Gastrointestinal: Negative for diarrhea, nausea and vomiting.   Musculoskeletal: Positive for back pain and gait problem. Negative for joint swelling.   Skin: Negative for color change and wound.   Neurological: Negative for dizziness, weakness and numbness.   Psychiatric/Behavioral: The patient is not nervous/anxious.           Objective:   /84 (BP Location: Left arm, Patient Position: Sitting, BP  "Method: Large (Automatic))   Pulse 78   Ht 5' 5" (1.651 m)   Wt 121.5 kg (267 lb 13.7 oz)   LMP 08/21/2016   BMI 44.57 kg/m²     X-Ray Foot Complete Right  Narrative: EXAMINATION:  XR FOOT COMPLETE 3 VIEW RIGHT    CLINICAL HISTORY:  . Other specified postprocedural states    TECHNIQUE:  AP, lateral, and oblique views of the foot were performed.    COMPARISON:  01/02/2020    FINDINGS:  A screw seen across the base of the 5th metatarsal fracture.  There is continued interval healing of the fracture when compared to the prior exam.  The alignment of fracture is stable.  A plantar calcaneal enthesophyte is noted.  Impression: As above    Electronically signed by: Ramiro Dee DO  Date:    01/30/2020  Time:    10:02       Physical Exam  LOWER EXTREMITY PHYSICAL EXAMINATION    NEUROLOGY: Proprioception is intact. Sensation to light touch is intact. Lucía-incisional sensation intact. Slight sensitivity to the incision site.  Negative Tinel's sign on palpation of the sural nerve.     VASCULAR: On the right foot, the dorsalis pedis pulse is 2/4 and the posterior tibial pulse is 2/4. Capillary refill time is less than 3 seconds. Hair growth is present on the dorsum of the foot and at the digits. No rubor is present. Proximal to distal temperature is warm to warm. No ipsilateral calf pain or tenderness is noted with palpation and compression. No palpable cords noted.    DERMATOLOGY:  Stab incision site appears to be well healed.  No evidence of gapping or dehiscence.  There is no increase in edema or swelling present to the right foot or lower extremity.  There are no ulcerations or wound breakdown the.  The temperature is warm to warm to touch.  The skin texture is within normal limits.    ORTHOPEDIC:  Mild pain on palpation of the 5th metatarsal bone distal to the area of previous fracture.  This has reduced since last examination.  There is slight pain to the peroneal tendons proximal to its insertion at the 5th " metatarsal base.  Manual muscle strength testing demonstrating 5/5 with dorsiflexion, plantar flexion, inversion, eversion with and without resistance in comparison to the contralateral lower extremity.  Patient did not have increased pain with muscle strength testing with or without resistance.        Assessment:     1. Status post percutaneous fixation of right 5th metatarsal fracture    2. Uncontrolled diabetes mellitus type 2 without complications    3. Vitamin D deficiency    4. Right foot pain          Plan:     Status post percutaneous fixation of right 5th metatarsal fracture    Uncontrolled diabetes mellitus type 2 without complications    Vitamin D deficiency    Right foot pain      X-rays were taken today in clinic and reviewed with the patient present.  There appears to be significant healing changes since x-rays taken on 01/02/2020.  It appears that the distal aspect remains open, however this is also improved since last radiographs.  This screw is in the appropriate position and does not have evidence of lysis around the hardware.  There is no evidence of hardware backing.  It appears that the right 5th metatarsal bone is approximately 85% fused.    I discussed with patient that healing has improved, however slower than anticipated.  I did discussed the importance of keeping her blood sugars, the recent A1c 9.8, under control as this will affect healing.  I also encouraged her to continue taking her vitamin D supplementation as this will also slow down healing as well. We also discussed that if she does not heal the remaining area of the 5th metatarsal fracture, a hardware removal can be perform with revision fixation with the plate.  Patient states that she is not interested in repeat surgical intervention.    In regards to the patient's sensitivity to the surgical stab incision, a prescription for topical pain cream was sent to an outside pharmacy. A compounding medication consisting of 10%  Flurbiprofen, 2% Cylobenzaprine, 6% Gabapentin, 2% Lidocaine, 2% Prilocaine in Lipoderm Active Max [facility]  I instructed the patient that the pharmacist will call to verify that they understand the application and dosage of this medication.  I encouraged the patient to call if there is any complications with obtaining this medication and another option would be provided.    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  A work note was provided to the patient requesting return to work with a postoperative shoe.  If the patient's employer does not allow this, I do recommend using a turf toe plate in her regular athletic shoes as this will provide a stable foot plate and prevent excessive motion across the midfoot and fracture sites while they continue to heal.  Patient reported understanding and agreement with this plan.        Future Appointments   Date Time Provider Department Center   2/27/2020 11:00 AM Drew Gutierrez MD [facility]

## 2020-01-30 NOTE — LETTER
January 30, 2020      [facility]  [id number]  [place] [address]  Phone: [phone]  Fax: [phone]       Patient: Yuli Milton   YOB: 1975  Date of Visit: 01/30/2020    To Whom It May Concern:    Dick Milton  was at [facility] on 01/30/2020. She may return to work with no restrictions on 2/10/2020. Please allow her to drive with a post-operative hard sole shoes for her comfort.  If you have any questions or concerns, or if I can be of further assistance, please do not hesitate to contact me.    Sincerely,        Lorena Peng DPM

## 2020-02-05 ENCOUNTER — PATIENT OUTREACH (OUTPATIENT)
Dept: ADMINISTRATIVE | Facility: HOSPITAL | Age: 45
End: 2020-02-05

## 2020-02-05 NOTE — PROGRESS NOTES
STATIN THERAPY REPORT **KDL**  Pt triggering low dose statin on HM. Prescribed atorvastatin 20mg,  2020.

## 2020-02-06 ENCOUNTER — TELEPHONE (OUTPATIENT)
Dept: NEUROSURGERY | Facility: CLINIC | Age: 45
End: 2020-02-06

## 2020-02-10 ENCOUNTER — TELEPHONE (OUTPATIENT)
Dept: INTERNAL MEDICINE | Facility: CLINIC | Age: 45
End: 2020-02-10

## 2020-02-10 NOTE — TELEPHONE ENCOUNTER
----- Message from Brandee Guadalupe sent at 2/10/2020  1:36 PM CST -----  Contact: pt  Pt is requesting call back in regards to scheduling same day appt.           Pls call back at [phone]

## 2020-02-10 NOTE — TELEPHONE ENCOUNTER
Tried calling patient to schedule - phone line was continually busy. Could not leave message - will try again soon.

## 2020-02-10 NOTE — TELEPHONE ENCOUNTER
----- Message from Donal Wood sent at 2/10/2020  9:21 AM CST -----  Contact: Pt  Please call Yuli to see if she can be seen today [phone] (home).

## 2020-02-14 ENCOUNTER — PATIENT OUTREACH (OUTPATIENT)
Dept: ADMINISTRATIVE | Facility: HOSPITAL | Age: 45
End: 2020-02-14

## 2020-02-14 DIAGNOSIS — E11.9 TYPE 2 DIABETES MELLITUS WITHOUT COMPLICATION, WITHOUT LONG-TERM CURRENT USE OF INSULIN: Primary | ICD-10-CM

## 2020-02-14 NOTE — PROGRESS NOTES
Pt returned phone call.  Scheduled and linked DM labs.  Rescheduled already scheduled pcp visit to sooner appt due to pt's work schedule.  **KDL**

## 2020-02-14 NOTE — PROGRESS NOTES
Patient on noncompliant A1C report. Called to schedule repeat A1C lab.  No answer. Left VM.  **KDL**

## 2020-02-17 ENCOUNTER — OFFICE VISIT (OUTPATIENT)
Dept: INTERNAL MEDICINE | Facility: CLINIC | Age: 45
End: 2020-02-17
Payer: COMMERCIAL

## 2020-02-17 ENCOUNTER — LAB VISIT (OUTPATIENT)
Dept: LAB | Facility: HOSPITAL | Age: 45
End: 2020-02-17
Payer: COMMERCIAL

## 2020-02-17 VITALS
SYSTOLIC BLOOD PRESSURE: 122 MMHG | WEIGHT: 267 LBS | HEIGHT: 65 IN | BODY MASS INDEX: 44.48 KG/M2 | TEMPERATURE: 98 F | DIASTOLIC BLOOD PRESSURE: 84 MMHG | HEART RATE: 76 BPM

## 2020-02-17 DIAGNOSIS — E11.9 TYPE 2 DIABETES MELLITUS WITHOUT COMPLICATION, WITHOUT LONG-TERM CURRENT USE OF INSULIN: Primary | ICD-10-CM

## 2020-02-17 DIAGNOSIS — R60.0 LOCALIZED EDEMA: ICD-10-CM

## 2020-02-17 DIAGNOSIS — E11.9 TYPE 2 DIABETES MELLITUS WITHOUT COMPLICATION, WITHOUT LONG-TERM CURRENT USE OF INSULIN: ICD-10-CM

## 2020-02-17 DIAGNOSIS — J45.20 MILD INTERMITTENT ASTHMA WITHOUT COMPLICATION: ICD-10-CM

## 2020-02-17 DIAGNOSIS — E55.9 VITAMIN D DEFICIENCY: ICD-10-CM

## 2020-02-17 PROCEDURE — 99999 PR PBB SHADOW E&M-EST. PATIENT-LVL III: ICD-10-PCS | Mod: PBBFAC,,, | Performed by: FAMILY MEDICINE

## 2020-02-17 PROCEDURE — 99999 PR PBB SHADOW E&M-EST. PATIENT-LVL III: CPT | Mod: PBBFAC,,, | Performed by: FAMILY MEDICINE

## 2020-02-17 PROCEDURE — 83036 HEMOGLOBIN GLYCOSYLATED A1C: CPT

## 2020-02-17 PROCEDURE — 36415 COLL VENOUS BLD VENIPUNCTURE: CPT | Mod: PO

## 2020-02-17 PROCEDURE — 99214 PR OFFICE/OUTPT VISIT, EST, LEVL IV, 30-39 MIN: ICD-10-PCS | Mod: S$GLB,,, | Performed by: FAMILY MEDICINE

## 2020-02-17 PROCEDURE — 99213 OFFICE O/P EST LOW 20 MIN: CPT | Mod: PBBFAC,PO | Performed by: FAMILY MEDICINE

## 2020-02-17 PROCEDURE — 99214 OFFICE O/P EST MOD 30 MIN: CPT | Mod: S$GLB,,, | Performed by: FAMILY MEDICINE

## 2020-02-17 RX ORDER — FLUTICASONE PROPIONATE 50 MCG
SPRAY, SUSPENSION (ML) NASAL
COMMUNITY
Start: 2020-02-13 | End: 2020-08-28

## 2020-02-17 RX ORDER — ASPIRIN 325 MG
325 TABLET ORAL DAILY
Qty: 360 TABLET | Refills: 1
Start: 2020-02-17 | End: 2020-05-22

## 2020-02-17 RX ORDER — ATORVASTATIN CALCIUM 20 MG/1
20 TABLET, FILM COATED ORAL DAILY
Qty: 90 TABLET | Refills: 1 | Status: SHIPPED | OUTPATIENT
Start: 2020-02-17 | End: 2020-05-22

## 2020-02-17 RX ORDER — METFORMIN HYDROCHLORIDE 1000 MG/1
1000 TABLET ORAL 2 TIMES DAILY WITH MEALS
Qty: 180 TABLET | Refills: 3 | Status: SHIPPED | OUTPATIENT
Start: 2020-02-17 | End: 2021-01-29

## 2020-02-17 RX ORDER — ERGOCALCIFEROL 1.25 MG/1
50000 CAPSULE ORAL
Qty: 12 CAPSULE | Refills: 4 | Status: SHIPPED | OUTPATIENT
Start: 2020-02-17 | End: 2020-02-25

## 2020-02-17 RX ORDER — LOSARTAN POTASSIUM 25 MG/1
12.5 TABLET ORAL DAILY
Qty: 90 TABLET | Refills: 1 | Status: SHIPPED | OUTPATIENT
Start: 2020-02-17 | End: 2020-08-28

## 2020-02-17 RX ORDER — PROMETHAZINE HYDROCHLORIDE 6.25 MG/5ML
SYRUP ORAL
COMMUNITY
Start: 2020-02-13 | End: 2020-07-13

## 2020-02-17 RX ORDER — HYDROCODONE BITARTRATE AND ACETAMINOPHEN 10; 325 MG/1; MG/1
TABLET ORAL
COMMUNITY
Start: 2020-01-23 | End: 2020-07-20

## 2020-02-17 RX ORDER — POTASSIUM CHLORIDE 1.5 G/1.58G
20 POWDER, FOR SOLUTION ORAL DAILY
Qty: 90 PACKET | Refills: 1 | Status: SHIPPED | OUTPATIENT
Start: 2020-02-17 | End: 2020-05-22

## 2020-02-17 RX ORDER — HYDROCHLOROTHIAZIDE 12.5 MG/1
12.5 TABLET ORAL DAILY PRN
Qty: 90 TABLET | Refills: 1 | Status: SHIPPED | OUTPATIENT
Start: 2020-02-17 | End: 2020-05-22

## 2020-02-17 RX ORDER — ALBUTEROL SULFATE 90 UG/1
2 AEROSOL, METERED RESPIRATORY (INHALATION) EVERY 6 HOURS PRN
Qty: 18 G | Refills: 0 | Status: SHIPPED | OUTPATIENT
Start: 2020-02-17 | End: 2020-02-28

## 2020-02-17 RX ORDER — AMOXICILLIN AND CLAVULANATE POTASSIUM 875; 125 MG/1; MG/1
TABLET, FILM COATED ORAL
COMMUNITY
Start: 2020-02-14 | End: 2020-02-17

## 2020-02-17 RX ORDER — HALOPERIDOL 10 MG/1
10 TABLET ORAL DAILY
COMMUNITY
Start: 2020-01-16 | End: 2020-05-22

## 2020-02-17 RX ORDER — MELOXICAM 15 MG/1
TABLET ORAL
COMMUNITY
Start: 2020-02-09 | End: 2020-07-20

## 2020-02-17 NOTE — PROGRESS NOTES
Subjective:       Patient ID: Yuli Milton is a 44 y.o. female.    Chief Complaint: Follow-up (3 mo - DM, wants to discuss changing from Ozempic to Saxenda - would like to change for weight loss)    DM:  O: chronic  L:  D: constant  C:  Domingo: ozempic, metformin  Exac: diet      Anxiety   Presents for follow-up visit. Symptoms include excessive worry and nervous/anxious behavior. Patient reports no chest pain, confusion, decreased concentration, nausea, shortness of breath or suicidal ideas. Symptoms occur constantly. The severity of symptoms is moderate. The quality of sleep is poor.         Review of Systems   Respiratory: Negative for shortness of breath.    Cardiovascular: Negative for chest pain.   Gastrointestinal: Negative for abdominal pain and nausea.   Psychiatric/Behavioral: Negative for confusion, decreased concentration and suicidal ideas. The patient is nervous/anxious.        Objective:      Physical Exam   Constitutional: She appears well-developed and well-nourished. No distress.   HENT:   Head: Normocephalic and atraumatic.   Pulmonary/Chest: Effort normal and breath sounds normal. No respiratory distress. She has no wheezes.   Skin: Skin is warm and dry. No rash noted. She is not diaphoretic. No erythema.   Psychiatric: She has a normal mood and affect. Her behavior is normal. Judgment and thought content normal.   Nursing note and vitals reviewed.      Assessment:       1. Type 2 diabetes mellitus without complication, without long-term current use of insulin    2. Localized edema    3. Vitamin D deficiency    4. Mild intermittent asthma without complication        Plan:     Problem List Items Addressed This Visit        Pulmonary    Asthma    Relevant Medications    albuterol (VENTOLIN HFA) 90 mcg/actuation inhaler       Endocrine    Type 2 diabetes mellitus without complication, without long-term current use of insulin - Primary    Relevant Medications    atorvastatin (LIPITOR) 20 MG tablet     aspirin 325 MG tablet    semaglutide (OZEMPIC) 1 mg/dose (2 mg/1.5 mL) PnIj    metFORMIN (GLUCOPHAGE) 1000 MG tablet    losartan (COZAAR) 25 MG tablet    Other Relevant Orders    Hemoglobin A1c    Vitamin D deficiency    Relevant Medications    ergocalciferol (ERGOCALCIFEROL) 50,000 unit Cap       Other    Localized edema    Relevant Medications    potassium chloride (KLOR-CON) 20 mEq Pack    hydroCHLOROthiazide (HYDRODIURIL) 12.5 MG Tab

## 2020-02-18 ENCOUNTER — PATIENT MESSAGE (OUTPATIENT)
Dept: INTERNAL MEDICINE | Facility: CLINIC | Age: 45
End: 2020-02-18

## 2020-02-18 DIAGNOSIS — E11.9 TYPE 2 DIABETES MELLITUS WITHOUT COMPLICATION, WITHOUT LONG-TERM CURRENT USE OF INSULIN: ICD-10-CM

## 2020-02-18 LAB
ESTIMATED AVG GLUCOSE: 143 MG/DL (ref 68–131)
HBA1C MFR BLD HPLC: 6.6 % (ref 4–5.6)

## 2020-02-18 NOTE — PROGRESS NOTES
Results have been reviewed . All labs are within normal range.   If you have any questions please feel free to contact me.  Sig improvement in a1c.  Keep up the good work.  Tell Yuli that I am very proud of her.

## 2020-02-18 NOTE — TELEPHONE ENCOUNTER
Needed it renewed at [facility] instead of [facility]. Updated pharm in med card.

## 2020-02-26 ENCOUNTER — TELEPHONE (OUTPATIENT)
Dept: INTERNAL MEDICINE | Facility: CLINIC | Age: 45
End: 2020-02-26

## 2020-02-26 NOTE — TELEPHONE ENCOUNTER
----- Message from Laura Rodas MA sent at 2/26/2020  1:10 PM CST -----      ----- Message -----  From: Estelle Cuellar  Sent: 2/26/2020  12:55 PM CST  To: Ronda Portillo    ..Type:  Same Day Appointment Request    Caller is requesting a same day appointment.  Caller declined first available appointment listed below.    Name of Caller:patient   When is the first available appointment?04/16  Symptoms: coughing   Best Call Back Number:.[phone] (home)     Additional Information:

## 2020-02-26 NOTE — TELEPHONE ENCOUNTER
Called pt and let her know that Dr. Gutierrez is not in today can schedule her sometime this week.  Booked for Friday 2-28th at 4:00 pm.

## 2020-02-27 DIAGNOSIS — J45.20 MILD INTERMITTENT ASTHMA WITHOUT COMPLICATION: ICD-10-CM

## 2020-02-28 ENCOUNTER — OFFICE VISIT (OUTPATIENT)
Dept: INTERNAL MEDICINE | Facility: CLINIC | Age: 45
End: 2020-02-28
Payer: COMMERCIAL

## 2020-02-28 VITALS
HEIGHT: 65 IN | TEMPERATURE: 98 F | OXYGEN SATURATION: 97 % | DIASTOLIC BLOOD PRESSURE: 80 MMHG | WEIGHT: 265 LBS | BODY MASS INDEX: 44.15 KG/M2 | HEART RATE: 87 BPM | SYSTOLIC BLOOD PRESSURE: 122 MMHG

## 2020-02-28 DIAGNOSIS — J45.20 MILD INTERMITTENT ASTHMA WITHOUT COMPLICATION: ICD-10-CM

## 2020-02-28 DIAGNOSIS — J40 BRONCHITIS: Primary | ICD-10-CM

## 2020-02-28 DIAGNOSIS — I10 ESSENTIAL HYPERTENSION: ICD-10-CM

## 2020-02-28 PROCEDURE — 99213 OFFICE O/P EST LOW 20 MIN: CPT | Mod: PBBFAC,PO | Performed by: FAMILY MEDICINE

## 2020-02-28 PROCEDURE — 99214 PR OFFICE/OUTPT VISIT, EST, LEVL IV, 30-39 MIN: ICD-10-PCS | Mod: S$GLB,,, | Performed by: FAMILY MEDICINE

## 2020-02-28 PROCEDURE — 99999 PR PBB SHADOW E&M-EST. PATIENT-LVL III: ICD-10-PCS | Mod: PBBFAC,,, | Performed by: FAMILY MEDICINE

## 2020-02-28 PROCEDURE — 99214 OFFICE O/P EST MOD 30 MIN: CPT | Mod: S$GLB,,, | Performed by: FAMILY MEDICINE

## 2020-02-28 PROCEDURE — 99999 PR PBB SHADOW E&M-EST. PATIENT-LVL III: CPT | Mod: PBBFAC,,, | Performed by: FAMILY MEDICINE

## 2020-02-28 RX ORDER — AZITHROMYCIN 250 MG/1
TABLET, FILM COATED ORAL
Qty: 6 TABLET | Refills: 0 | Status: SHIPPED | OUTPATIENT
Start: 2020-02-28 | End: 2020-03-04

## 2020-02-28 RX ORDER — PROMETHAZINE HYDROCHLORIDE AND DEXTROMETHORPHAN HYDROBROMIDE 6.25; 15 MG/5ML; MG/5ML
5 SYRUP ORAL NIGHTLY
Qty: 118 ML | Refills: 0 | Status: SHIPPED | OUTPATIENT
Start: 2020-02-28 | End: 2020-03-10 | Stop reason: SDUPTHER

## 2020-02-28 RX ORDER — CELECOXIB 200 MG/1
CAPSULE ORAL
COMMUNITY
Start: 2020-02-24 | End: 2020-07-20

## 2020-02-28 RX ORDER — ALBUTEROL SULFATE 90 UG/1
AEROSOL, METERED RESPIRATORY (INHALATION)
Qty: 18 G | Refills: 0 | Status: SHIPPED | OUTPATIENT
Start: 2020-02-28 | End: 2020-05-22 | Stop reason: SDUPTHER

## 2020-02-28 RX ORDER — ALBUTEROL SULFATE 90 UG/1
AEROSOL, METERED RESPIRATORY (INHALATION)
Qty: 18 G | Refills: 0 | Status: SHIPPED | OUTPATIENT
Start: 2020-02-28 | End: 2020-02-28 | Stop reason: SDUPTHER

## 2020-02-28 RX ORDER — BENZONATATE 100 MG/1
100 CAPSULE ORAL 3 TIMES DAILY PRN
Qty: 30 CAPSULE | Refills: 0 | Status: SHIPPED | OUTPATIENT
Start: 2020-02-28 | End: 2020-03-09

## 2020-02-28 NOTE — PROGRESS NOTES
Subjective:       Patient ID: Yuli Milton is a 44 y.o. female.    Chief Complaint: URI    URI    This is a new problem. The current episode started 1 to 4 weeks ago. The problem has been gradually worsening. There has been no fever. Associated symptoms include congestion and coughing. Pertinent negatives include no abdominal pain, chest pain, headaches, nausea, rash, vomiting or wheezing. She has tried nothing for the symptoms. The treatment provided no relief.     Review of Systems   HENT: Positive for congestion.    Respiratory: Positive for cough. Negative for wheezing.    Cardiovascular: Negative for chest pain.   Gastrointestinal: Negative for abdominal pain, nausea and vomiting.   Skin: Negative for rash.   Neurological: Negative for headaches.       Objective:      Physical Exam   Constitutional: She appears well-developed and well-nourished. No distress.   HENT:   Head: Normocephalic and atraumatic.   Pulmonary/Chest: Effort normal and breath sounds normal. No respiratory distress. She has no wheezes.   Cough on exam   Abdominal: Soft. She exhibits no distension. There is no tenderness. There is no guarding.   Skin: Skin is warm and dry. No rash noted. She is not diaphoretic. No erythema.   Nursing note and vitals reviewed.      Assessment:       1. Bronchitis    2. Mild intermittent asthma without complication    3. Essential hypertension        Plan:     Problem List Items Addressed This Visit        Pulmonary    Asthma    Relevant Medications    albuterol (PROVENTIL/VENTOLIN HFA) 90 mcg/actuation inhaler    Bronchitis - Primary    Relevant Medications    promethazine-dextromethorphan (PROMETHAZINE-DM) 6.25-15 mg/5 mL Syrp    benzonatate (TESSALON) 100 MG capsule    azithromycin (Z-LIDYA) 250 MG tablet       Cardiac/Vascular    Essential hypertension    Current Assessment & Plan     Controlled, cont meds

## 2020-03-02 ENCOUNTER — PATIENT MESSAGE (OUTPATIENT)
Dept: INTERNAL MEDICINE | Facility: CLINIC | Age: 45
End: 2020-03-02

## 2020-03-05 ENCOUNTER — PATIENT MESSAGE (OUTPATIENT)
Dept: INTERNAL MEDICINE | Facility: CLINIC | Age: 45
End: 2020-03-05

## 2020-03-06 ENCOUNTER — TELEPHONE (OUTPATIENT)
Dept: INTERNAL MEDICINE | Facility: CLINIC | Age: 45
End: 2020-03-06

## 2020-03-10 ENCOUNTER — OFFICE VISIT (OUTPATIENT)
Dept: INTERNAL MEDICINE | Facility: CLINIC | Age: 45
End: 2020-03-10
Payer: COMMERCIAL

## 2020-03-10 VITALS
HEIGHT: 65 IN | BODY MASS INDEX: 43.82 KG/M2 | DIASTOLIC BLOOD PRESSURE: 86 MMHG | OXYGEN SATURATION: 99 % | TEMPERATURE: 98 F | HEART RATE: 75 BPM | WEIGHT: 263 LBS | SYSTOLIC BLOOD PRESSURE: 124 MMHG

## 2020-03-10 DIAGNOSIS — J40 BRONCHITIS: Primary | ICD-10-CM

## 2020-03-10 DIAGNOSIS — M54.12 CERVICAL RADICULOPATHY: ICD-10-CM

## 2020-03-10 PROCEDURE — 99999 PR PBB SHADOW E&M-EST. PATIENT-LVL III: ICD-10-PCS | Mod: PBBFAC,,, | Performed by: FAMILY MEDICINE

## 2020-03-10 PROCEDURE — 99214 OFFICE O/P EST MOD 30 MIN: CPT | Mod: S$GLB,,, | Performed by: FAMILY MEDICINE

## 2020-03-10 PROCEDURE — 99999 PR PBB SHADOW E&M-EST. PATIENT-LVL III: CPT | Mod: PBBFAC,,, | Performed by: FAMILY MEDICINE

## 2020-03-10 PROCEDURE — 99213 OFFICE O/P EST LOW 20 MIN: CPT | Mod: PBBFAC,PO | Performed by: FAMILY MEDICINE

## 2020-03-10 PROCEDURE — 99214 PR OFFICE/OUTPT VISIT, EST, LEVL IV, 30-39 MIN: ICD-10-PCS | Mod: S$GLB,,, | Performed by: FAMILY MEDICINE

## 2020-03-10 RX ORDER — BETAMETHASONE SODIUM PHOSPHATE AND BETAMETHASONE ACETATE 3; 3 MG/ML; MG/ML
6 INJECTION, SUSPENSION INTRA-ARTICULAR; INTRALESIONAL; INTRAMUSCULAR; SOFT TISSUE
Status: COMPLETED | OUTPATIENT
Start: 2020-03-10 | End: 2020-03-10

## 2020-03-10 RX ORDER — PROMETHAZINE HYDROCHLORIDE AND DEXTROMETHORPHAN HYDROBROMIDE 6.25; 15 MG/5ML; MG/5ML
5 SYRUP ORAL NIGHTLY
Qty: 118 ML | Refills: 0 | Status: SHIPPED | OUTPATIENT
Start: 2020-03-10 | End: 2020-04-15

## 2020-03-10 RX ADMIN — BETAMETHASONE ACETATE AND BETAMETHASONE SODIUM PHOSPHATE 6 MG: 3; 3 INJECTION, SUSPENSION INTRA-ARTICULAR; INTRALESIONAL; INTRAMUSCULAR; SOFT TISSUE at 04:03

## 2020-03-10 NOTE — ASSESSMENT & PLAN NOTE
Pt seeing Dr. Dickinson for pain management.  Would like to get pt seen for eval to decrease necessity of pain meds.

## 2020-03-10 NOTE — PROGRESS NOTES
Subjective:       Patient ID: Yuli Milton is a 44 y.o. female.    Chief Complaint: Bronchitis    URI    This is a recurrent problem. The current episode started 1 to 4 weeks ago. The problem has been gradually worsening. There has been no fever. Associated symptoms include congestion, coughing and neck pain. Pertinent negatives include no abdominal pain, rash, rhinorrhea, sinus pain, sneezing or sore throat. She has tried nothing for the symptoms. The treatment provided no relief.     Review of Systems   HENT: Positive for congestion. Negative for rhinorrhea, sinus pain, sneezing and sore throat.    Respiratory: Positive for cough.    Gastrointestinal: Negative for abdominal pain.   Musculoskeletal: Positive for arthralgias and neck pain. Negative for joint swelling.   Skin: Negative for rash.       Objective:      Physical Exam   Constitutional: She appears well-developed and well-nourished. No distress.   HENT:   Head: Normocephalic and atraumatic.   Pulmonary/Chest: Effort normal and breath sounds normal. No respiratory distress. She has no wheezes.   Prolonged Cough on exam   Skin: Skin is warm and dry. No rash noted. She is not diaphoretic. No erythema.   Nursing note and vitals reviewed.      Assessment:       1. Bronchitis    2. Cervical radiculopathy        Plan:     Problem List Items Addressed This Visit        Neuro    Cervical radiculopathy    Current Assessment & Plan     Pt seeing Dr. Dickinson for pain management.  Would like to get pt seen for eval to decrease necessity of pain meds.         Relevant Orders    Ambulatory referral/consult to Physical/Occupational Therapy       Pulmonary    Bronchitis - Primary    Current Assessment & Plan     Cont albuterol         Relevant Medications    promethazine-dextromethorphan (PROMETHAZINE-DM) 6.25-15 mg/5 mL Syrp    betamethasone acetate-betamethasone sodium phosphate injection 6 mg

## 2020-03-17 ENCOUNTER — PATIENT MESSAGE (OUTPATIENT)
Dept: INTERNAL MEDICINE | Facility: CLINIC | Age: 45
End: 2020-03-17

## 2020-03-23 ENCOUNTER — OFFICE VISIT (OUTPATIENT)
Dept: INTERNAL MEDICINE | Facility: CLINIC | Age: 45
End: 2020-03-23
Payer: COMMERCIAL

## 2020-03-23 ENCOUNTER — PATIENT MESSAGE (OUTPATIENT)
Dept: INTERNAL MEDICINE | Facility: CLINIC | Age: 45
End: 2020-03-23

## 2020-03-23 DIAGNOSIS — J40 BRONCHITIS: Primary | ICD-10-CM

## 2020-03-23 PROCEDURE — 99214 OFFICE O/P EST MOD 30 MIN: CPT | Mod: 95,,, | Performed by: FAMILY MEDICINE

## 2020-03-23 PROCEDURE — 99214 PR OFFICE/OUTPT VISIT, EST, LEVL IV, 30-39 MIN: ICD-10-PCS | Mod: 95,,, | Performed by: FAMILY MEDICINE

## 2020-03-23 RX ORDER — DOXYCYCLINE 100 MG/1
100 CAPSULE ORAL EVERY 12 HOURS
Qty: 14 CAPSULE | Refills: 0 | Status: SHIPPED | OUTPATIENT
Start: 2020-03-23 | End: 2020-04-15

## 2020-03-23 RX ORDER — PROMETHAZINE HYDROCHLORIDE AND CODEINE PHOSPHATE 6.25; 1 MG/5ML; MG/5ML
5 SOLUTION ORAL EVERY 4 HOURS PRN
Qty: 118 ML | Refills: 0 | Status: SHIPPED | OUTPATIENT
Start: 2020-03-23 | End: 2020-03-29

## 2020-03-23 NOTE — PROGRESS NOTES
Subjective:       Patient ID: Yuli Milton is a 44 y.o. female.    Chief Complaint: No chief complaint on file.    The patient location is: home  The chief complaint leading to consultation is:cough / uri  Visit type: Virtual visit with synchronous audio and video  Total time spent with patient: 12 min  Each patient to whom he or she provides medical services by telemedicine is:  (1) informed of the relationship between the physician and patient and the respective role of any other health care provider with respect to management of the patient; and (2) notified that he or she may decline to receive medical services by telemedicine and may withdraw from such care at any time.      Cough   This is a recurrent problem. The current episode started 1 to 4 weeks ago. The problem has been unchanged. The problem occurs every few minutes. The cough is productive of sputum. Associated symptoms include chest pain, chills, headaches, myalgias, postnasal drip and shortness of breath. Pertinent negatives include no ear congestion, ear pain, fever, heartburn, hemoptysis, rash, rhinorrhea, sore throat, sweats, weight loss or wheezing. She has tried OTC cough suppressant and a beta-agonist inhaler for the symptoms. The treatment provided no relief. Her past medical history is significant for asthma and bronchitis.     Review of Systems   Constitutional: Positive for chills. Negative for fever and weight loss.   HENT: Positive for postnasal drip. Negative for ear pain, rhinorrhea and sore throat.    Respiratory: Positive for cough and shortness of breath. Negative for hemoptysis and wheezing.    Cardiovascular: Positive for chest pain.   Gastrointestinal: Negative for heartburn.   Musculoskeletal: Positive for myalgias.   Skin: Negative for rash.   Neurological: Positive for headaches.       Objective:      Physical Exam   Constitutional: She appears well-developed and well-nourished. No distress.   HENT:   Head: Normocephalic  and atraumatic.   Pulmonary/Chest: Effort normal. No respiratory distress. She has no wheezes.   Constant cough on exam   Skin: Skin is warm and dry. No rash noted. She is not diaphoretic. No erythema.   Nursing note and vitals reviewed.      Assessment:       1. Bronchitis        Plan:     Problem List Items Addressed This Visit        Pulmonary    Bronchitis - Primary    Relevant Medications    doxycycline (VIBRAMYCIN) 100 MG Cap    promethazine-codeine 6.25-10 mg/5 ml (PHENERGAN WITH CODEINE) 6.25-10 mg/5 mL syrup    Other Relevant Orders    X-Ray Chest PA And Lateral    POCT Influenza A/B Molecular

## 2020-04-15 ENCOUNTER — PATIENT MESSAGE (OUTPATIENT)
Dept: INTERNAL MEDICINE | Facility: CLINIC | Age: 45
End: 2020-04-15

## 2020-04-15 ENCOUNTER — OFFICE VISIT (OUTPATIENT)
Dept: INTERNAL MEDICINE | Facility: CLINIC | Age: 45
End: 2020-04-15
Payer: COMMERCIAL

## 2020-04-15 DIAGNOSIS — N30.00 ACUTE CYSTITIS WITHOUT HEMATURIA: Primary | ICD-10-CM

## 2020-04-15 PROCEDURE — 99213 PR OFFICE/OUTPT VISIT, EST, LEVL III, 20-29 MIN: ICD-10-PCS | Mod: 95,,, | Performed by: INTERNAL MEDICINE

## 2020-04-15 PROCEDURE — 99213 OFFICE O/P EST LOW 20 MIN: CPT | Mod: 95,,, | Performed by: INTERNAL MEDICINE

## 2020-04-15 RX ORDER — SULFAMETHOXAZOLE AND TRIMETHOPRIM 800; 160 MG/1; MG/1
1 TABLET ORAL 2 TIMES DAILY
Qty: 6 TABLET | Refills: 0 | Status: SHIPPED | OUTPATIENT
Start: 2020-04-15 | End: 2020-04-18

## 2020-04-15 RX ORDER — PHENAZOPYRIDINE HYDROCHLORIDE 100 MG/1
100 TABLET, FILM COATED ORAL 3 TIMES DAILY PRN
Qty: 6 TABLET | Refills: 0 | Status: SHIPPED | OUTPATIENT
Start: 2020-04-15 | End: 2020-04-18

## 2020-04-15 NOTE — PROGRESS NOTES
Subjective:       Patient ID: Yuli Milton is a 44 y.o. female.    Chief Complaint: No chief complaint on file.    Dysuria    This is a new problem. The current episode started yesterday. The problem has been unchanged. The quality of the pain is described as burning. The pain is at a severity of 5/10. The pain is mild. There has been no fever. She is sexually active. There is no history of pyelonephritis. Associated symptoms include chills, flank pain and frequency. Pertinent negatives include no behavior changes, discharge, hematuria, hesitancy, nausea, possible pregnancy, sweats, urgency, vomiting, weight loss, constipation, rash or withholding. She has tried increased fluids for the symptoms. The treatment provided no relief. Her past medical history is significant for recurrent UTIs. There is no history of catheterization, diabetes insipidus, diabetes mellitus, genitourinary reflux, hypertension, kidney stones, a single kidney, STD, urinary stasis or a urological procedure.     The patient location is: home  The chief complaint leading to consultation is: dysuria  Visit type: audiovisual  Each patient to whom he or she provides medical services by telemedicine is:  (1) informed of the relationship between the physician and patient and the respective role of any other health care provider with respect to management of the patient; and (2) notified that he or she may decline to receive medical services by telemedicine and may withdraw from such care at any time.    Review of Systems   Constitutional: Positive for chills. Negative for weight loss.   Gastrointestinal: Negative for constipation, nausea and vomiting.   Genitourinary: Positive for dysuria, flank pain and frequency. Negative for hematuria, hesitancy and urgency.   Skin: Negative for rash.       Objective:   LMP 08/21/2016      Physical Exam   Constitutional: She is oriented to person, place, and time. She appears well-developed and  well-nourished. No distress.   HENT:   Head: Normocephalic and atraumatic.   Eyes: EOM are normal.   Neck: Normal range of motion.   Pulmonary/Chest: Effort normal. No respiratory distress.   Neurological: She is alert and oriented to person, place, and time. No cranial nerve deficit.   Skin: No rash noted.   Psychiatric: She has a normal mood and affect. Her behavior is normal. Thought content normal.       No visits with results within 2 Week(s) from this visit.   Latest known visit with results is:   Lab Visit on 02/17/2020   Component Date Value    Hemoglobin A1C 02/17/2020 6.6*    Estimated Avg Glucose 02/17/2020 143*       Assessment:       1. Acute cystitis without hematuria        Plan:   No problem-specific Assessment & Plan notes found for this encounter.    Acute cystitis without hematuria  -     sulfamethoxazole-trimethoprim 800-160mg (BACTRIM DS) 800-160 mg Tab; Take 1 tablet by mouth 2 (two) times daily. for 3 days  Dispense: 6 tablet; Refill: 0    Other orders  -     phenazopyridine (PYRIDIUM) 100 MG tablet; Take 1 tablet (100 mg total) by mouth 3 (three) times daily as needed for Pain.  Dispense: 6 tablet; Refill: 0    Push fluids.      Follow up if symptoms worsen or fail to improve.

## 2020-04-17 ENCOUNTER — PATIENT MESSAGE (OUTPATIENT)
Dept: INTERNAL MEDICINE | Facility: CLINIC | Age: 45
End: 2020-04-17

## 2020-04-29 ENCOUNTER — PATIENT MESSAGE (OUTPATIENT)
Dept: INTERNAL MEDICINE | Facility: CLINIC | Age: 45
End: 2020-04-29

## 2020-04-30 ENCOUNTER — PATIENT MESSAGE (OUTPATIENT)
Dept: INTERNAL MEDICINE | Facility: CLINIC | Age: 45
End: 2020-04-30

## 2020-04-30 ENCOUNTER — OFFICE VISIT (OUTPATIENT)
Dept: INTERNAL MEDICINE | Facility: CLINIC | Age: 45
End: 2020-04-30
Payer: COMMERCIAL

## 2020-04-30 DIAGNOSIS — N76.0 BACTERIAL VAGINOSIS: Primary | ICD-10-CM

## 2020-04-30 DIAGNOSIS — B96.89 BACTERIAL VAGINOSIS: Primary | ICD-10-CM

## 2020-04-30 PROBLEM — R45.851 SUICIDAL IDEATIONS: Status: RESOLVED | Noted: 2019-07-09 | Resolved: 2020-04-30

## 2020-04-30 PROBLEM — R10.30 LOWER ABDOMINAL PAIN: Status: RESOLVED | Noted: 2017-07-31 | Resolved: 2020-04-30

## 2020-04-30 PROBLEM — R05.3 PERSISTENT COUGH: Status: RESOLVED | Noted: 2018-07-27 | Resolved: 2020-04-30

## 2020-04-30 PROBLEM — R06.09 DOE (DYSPNEA ON EXERTION): Status: RESOLVED | Noted: 2019-10-14 | Resolved: 2020-04-30

## 2020-04-30 PROBLEM — R06.02 SHORTNESS OF BREATH: Status: RESOLVED | Noted: 2017-06-26 | Resolved: 2020-04-30

## 2020-04-30 PROBLEM — Z01.818 PREOP GENERAL PHYSICAL EXAM: Status: RESOLVED | Noted: 2019-12-09 | Resolved: 2020-04-30

## 2020-04-30 PROCEDURE — 99214 OFFICE O/P EST MOD 30 MIN: CPT | Mod: 95,,, | Performed by: FAMILY MEDICINE

## 2020-04-30 PROCEDURE — 99214 PR OFFICE/OUTPT VISIT, EST, LEVL IV, 30-39 MIN: ICD-10-PCS | Mod: 95,,, | Performed by: FAMILY MEDICINE

## 2020-04-30 RX ORDER — METRONIDAZOLE 500 MG/1
500 TABLET ORAL EVERY 12 HOURS
Qty: 14 TABLET | Refills: 0 | Status: SHIPPED | OUTPATIENT
Start: 2020-04-30 | End: 2020-05-22

## 2020-04-30 RX ORDER — NITROFURANTOIN 25; 75 MG/1; MG/1
100 CAPSULE ORAL 2 TIMES DAILY
Qty: 10 CAPSULE | Refills: 0 | Status: CANCELLED | OUTPATIENT
Start: 2020-04-30 | End: 2020-05-05

## 2020-04-30 NOTE — PROGRESS NOTES
Subjective:       Patient ID: Yuli Milton is a 44 y.o. female.    Chief Complaint: No chief complaint on file.    The patient location is: home  The chief complaint leading to consultation is: foul smelling urine  Visit type: audiovisual  Total time spent with patient: jeni 7 min.  Each patient to whom he or she provides medical services by telemedicine is:  (1) informed of the relationship between the physician and patient and the respective role of any other health care provider with respect to management of the patient; and (2) notified that he or she may decline to receive medical services by telemedicine and may withdraw from such care at any time.    Concern for vaginal itching with foul odor.      Dysuria    This is a recurrent problem. The current episode started 1 to 4 weeks ago. The problem occurs every urination. The problem has been unchanged. The quality of the pain is described as aching and burning. The pain is at a severity of 7/10. The pain is mild. There has been no fever. She is sexually active. There is no history of pyelonephritis. Associated symptoms include chills, frequency and rash. Pertinent negatives include no behavior changes, discharge, flank pain, hematuria, hesitancy, nausea, possible pregnancy, sweats, urgency, vomiting, weight loss, constipation or withholding. She has tried antibiotics and increased fluids for the symptoms. The treatment provided no relief. Her past medical history is significant for recurrent UTIs and STD. There is no history of catheterization, diabetes insipidus, diabetes mellitus, genitourinary reflux, hypertension, kidney stones, a single kidney, urinary stasis or a urological procedure.     Review of Systems   Constitutional: Positive for chills. Negative for fever and weight loss.   HENT: Negative for congestion.    Eyes: Negative for discharge.   Respiratory: Negative for shortness of breath.    Cardiovascular: Negative for chest pain.    Gastrointestinal: Negative for abdominal pain, constipation, nausea and vomiting.   Genitourinary: Positive for dysuria and frequency. Negative for difficulty urinating, flank pain, hematuria, hesitancy and urgency.   Musculoskeletal: Negative for joint swelling.   Skin: Positive for rash.   Neurological: Negative for dizziness.   Psychiatric/Behavioral: Negative for agitation.       Objective:      Physical Exam   Constitutional: She appears well-developed and well-nourished. No distress.   HENT:   Head: Normocephalic and atraumatic.   Pulmonary/Chest: Effort normal. She has no wheezes.   Skin: No rash noted. She is not diaphoretic. No erythema.   Psychiatric: She has a normal mood and affect. Her behavior is normal. Judgment and thought content normal.       Assessment:       1. Bacterial vaginosis        Plan:     Problem List Items Addressed This Visit        Renal/    Bacterial vaginosis - Primary    Relevant Medications    metroNIDAZOLE (FLAGYL) 500 MG tablet

## 2020-05-12 ENCOUNTER — PATIENT MESSAGE (OUTPATIENT)
Dept: INTERNAL MEDICINE | Facility: CLINIC | Age: 45
End: 2020-05-12

## 2020-05-13 ENCOUNTER — PATIENT MESSAGE (OUTPATIENT)
Dept: INTERNAL MEDICINE | Facility: CLINIC | Age: 45
End: 2020-05-13

## 2020-05-22 ENCOUNTER — PATIENT MESSAGE (OUTPATIENT)
Dept: INTERNAL MEDICINE | Facility: CLINIC | Age: 45
End: 2020-05-22

## 2020-05-22 ENCOUNTER — PATIENT MESSAGE (OUTPATIENT)
Dept: PULMONOLOGY | Facility: CLINIC | Age: 45
End: 2020-05-22

## 2020-05-22 ENCOUNTER — LAB VISIT (OUTPATIENT)
Dept: LAB | Facility: HOSPITAL | Age: 45
End: 2020-05-22
Attending: FAMILY MEDICINE
Payer: COMMERCIAL

## 2020-05-22 ENCOUNTER — OFFICE VISIT (OUTPATIENT)
Dept: INTERNAL MEDICINE | Facility: CLINIC | Age: 45
End: 2020-05-22
Payer: COMMERCIAL

## 2020-05-22 VITALS — BODY MASS INDEX: 43.82 KG/M2 | WEIGHT: 263 LBS | TEMPERATURE: 98 F | HEIGHT: 65 IN

## 2020-05-22 DIAGNOSIS — R60.0 LOCALIZED EDEMA: ICD-10-CM

## 2020-05-22 DIAGNOSIS — Z72.51 HIGH RISK SEXUAL BEHAVIOR, UNSPECIFIED TYPE: ICD-10-CM

## 2020-05-22 DIAGNOSIS — R06.02 SOB (SHORTNESS OF BREATH): ICD-10-CM

## 2020-05-22 DIAGNOSIS — J45.20 MILD INTERMITTENT ASTHMA WITHOUT COMPLICATION: ICD-10-CM

## 2020-05-22 DIAGNOSIS — E11.9 TYPE 2 DIABETES MELLITUS WITHOUT COMPLICATION, WITHOUT LONG-TERM CURRENT USE OF INSULIN: Primary | ICD-10-CM

## 2020-05-22 DIAGNOSIS — R06.83 SNORING: ICD-10-CM

## 2020-05-22 PROBLEM — B96.89 BACTERIAL VAGINOSIS: Status: RESOLVED | Noted: 2018-01-05 | Resolved: 2020-05-22

## 2020-05-22 PROBLEM — N76.0 BACTERIAL VAGINOSIS: Status: RESOLVED | Noted: 2018-01-05 | Resolved: 2020-05-22

## 2020-05-22 PROBLEM — N89.8 VAGINAL ITCHING: Status: RESOLVED | Noted: 2018-01-05 | Resolved: 2020-05-22

## 2020-05-22 PROBLEM — R30.0 DYSURIA: Status: RESOLVED | Noted: 2018-03-31 | Resolved: 2020-05-22

## 2020-05-22 LAB — GLUCOSE SERPL-MCNC: 114 MG/DL (ref 70–110)

## 2020-05-22 PROCEDURE — 99999 PR PBB SHADOW E&M-EST. PATIENT-LVL III: ICD-10-PCS | Mod: PBBFAC,,, | Performed by: FAMILY MEDICINE

## 2020-05-22 PROCEDURE — 99999 PR PBB SHADOW E&M-EST. PATIENT-LVL III: CPT | Mod: PBBFAC,,, | Performed by: FAMILY MEDICINE

## 2020-05-22 PROCEDURE — 86703 HIV-1/HIV-2 1 RESULT ANTBDY: CPT

## 2020-05-22 PROCEDURE — 86592 SYPHILIS TEST NON-TREP QUAL: CPT

## 2020-05-22 PROCEDURE — 99214 OFFICE O/P EST MOD 30 MIN: CPT | Mod: S$GLB,,, | Performed by: FAMILY MEDICINE

## 2020-05-22 PROCEDURE — 86769 SARS-COV-2 COVID-19 ANTIBODY: CPT

## 2020-05-22 PROCEDURE — 36415 COLL VENOUS BLD VENIPUNCTURE: CPT | Mod: PO

## 2020-05-22 PROCEDURE — 99214 PR OFFICE/OUTPT VISIT, EST, LEVL IV, 30-39 MIN: ICD-10-PCS | Mod: S$GLB,,, | Performed by: FAMILY MEDICINE

## 2020-05-22 PROCEDURE — 83880 ASSAY OF NATRIURETIC PEPTIDE: CPT

## 2020-05-22 PROCEDURE — 82962 GLUCOSE BLOOD TEST: CPT | Mod: S$GLB,,, | Performed by: FAMILY MEDICINE

## 2020-05-22 PROCEDURE — 82962 POCT GLUCOSE, HAND-HELD DEVICE: ICD-10-PCS | Mod: S$GLB,,, | Performed by: FAMILY MEDICINE

## 2020-05-22 RX ORDER — FUROSEMIDE 20 MG/1
20 TABLET ORAL DAILY
Qty: 7 TABLET | Refills: 0 | Status: SHIPPED | OUTPATIENT
Start: 2020-05-22 | End: 2020-05-22 | Stop reason: SDUPTHER

## 2020-05-22 RX ORDER — ASPIRIN 325 MG
325 TABLET ORAL DAILY
Qty: 360 TABLET | Refills: 1
Start: 2020-05-22 | End: 2020-08-28 | Stop reason: SDUPTHER

## 2020-05-22 RX ORDER — ALBUTEROL SULFATE 90 UG/1
AEROSOL, METERED RESPIRATORY (INHALATION)
Qty: 18 G | Refills: 0 | Status: SHIPPED | OUTPATIENT
Start: 2020-05-22 | End: 2020-05-22 | Stop reason: SDUPTHER

## 2020-05-22 RX ORDER — FUROSEMIDE 20 MG/1
20 TABLET ORAL DAILY
Qty: 7 TABLET | Refills: 0 | Status: SHIPPED | OUTPATIENT
Start: 2020-05-22 | End: 2020-08-28

## 2020-05-22 RX ORDER — POTASSIUM CHLORIDE 1.5 G/1.58G
20 POWDER, FOR SOLUTION ORAL DAILY
Qty: 30 PACKET | Refills: 0 | Status: SHIPPED | OUTPATIENT
Start: 2020-05-22 | End: 2020-06-26 | Stop reason: SDUPTHER

## 2020-05-22 RX ORDER — ALBUTEROL SULFATE 90 UG/1
AEROSOL, METERED RESPIRATORY (INHALATION)
Qty: 18 G | Refills: 0 | Status: SHIPPED | OUTPATIENT
Start: 2020-05-22 | End: 2020-06-23

## 2020-05-22 RX ORDER — ATORVASTATIN CALCIUM 20 MG/1
20 TABLET, FILM COATED ORAL DAILY
Qty: 90 TABLET | Refills: 1 | Status: SHIPPED | OUTPATIENT
Start: 2020-05-22 | End: 2020-08-28 | Stop reason: SDUPTHER

## 2020-05-22 RX ORDER — OLANZAPINE 10 MG/1
10 TABLET ORAL DAILY
COMMUNITY
Start: 2020-05-12 | End: 2020-11-12

## 2020-05-22 NOTE — PROGRESS NOTES
Subjective:       Patient ID: Yuli Milton is a 44 y.o. female.    Chief Complaint: Shortness of Breath (off and on since march - not sure if it's the same condition. ); Chest Pain (center of chest ); and Foot Swelling (bilateral )    Shortness of Breath   This is a new problem. Episode onset: 3 months. The problem occurs constantly. The problem has been gradually worsening. Associated symptoms include wheezing. Pertinent negatives include no abdominal pain, chest pain, rhinorrhea or sore throat. Nothing aggravates the symptoms. The patient has no known risk factors for DVT/PE.     Review of Systems   HENT: Negative for rhinorrhea and sore throat.    Respiratory: Positive for shortness of breath and wheezing.    Cardiovascular: Positive for palpitations. Negative for chest pain.   Gastrointestinal: Negative for abdominal pain.       Objective:      Physical Exam   Constitutional: She appears well-developed and well-nourished. No distress.   HENT:   Head: Normocephalic and atraumatic.   Pulmonary/Chest: Effort normal and breath sounds normal. No respiratory distress. She has no wheezes.   Musculoskeletal:   Mild edema   Skin: Skin is warm and dry. No rash noted. She is not diaphoretic. No erythema.   Psychiatric: She has a normal mood and affect. Her behavior is normal. Judgment and thought content normal.   Nursing note and vitals reviewed.      Assessment:       1. Type 2 diabetes mellitus without complication, without long-term current use of insulin    2. High risk sexual behavior, unspecified type    3. Localized edema    4. SOB (shortness of breath)    5. Snoring    6. Mild intermittent asthma without complication        Plan:     Problem List Items Addressed This Visit        Psychiatric    High-risk sexual behavior    Relevant Orders    C. trachomatis/N. gonorrhoeae by AMP DNA    HIV 1/2 Ag/Ab (4th Gen)    RPR       Pulmonary    Asthma    Relevant Medications    albuterol (PROVENTIL/VENTOLIN HFA) 90  mcg/actuation inhaler       Endocrine    Type 2 diabetes mellitus without complication, without long-term current use of insulin - Primary    Relevant Medications    atorvastatin (LIPITOR) 20 MG tablet    aspirin 325 MG tablet    semaglutide (OZEMPIC) 1 mg/dose (2 mg/1.5 mL) PnIj    Other Relevant Orders    POCT Glucose, Hand-Held Device (Completed)       Other    SOB (shortness of breath)    Relevant Orders    COVID-19 (SARS CoV-2) IgG Antibody    Localized edema    Relevant Medications    potassium chloride (KLOR-CON) 20 mEq Pack    furosemide (LASIX) 20 MG tablet    Other Relevant Orders    Brain Natriuretic Peptide    Snoring    Relevant Orders    Ambulatory referral/consult to Sleep Disorders

## 2020-05-23 LAB
BNP SERPL-MCNC: 59 PG/ML (ref 0–99)
RPR SER QL: NORMAL
SARS-COV-2 IGG SERPLBLD QL IA.RAPID: NEGATIVE

## 2020-05-25 LAB — HIV 1+2 AB+HIV1 P24 AG SERPL QL IA: NEGATIVE

## 2020-05-26 ENCOUNTER — PATIENT MESSAGE (OUTPATIENT)
Dept: INTERNAL MEDICINE | Facility: CLINIC | Age: 45
End: 2020-05-26

## 2020-05-26 ENCOUNTER — OFFICE VISIT (OUTPATIENT)
Dept: INTERNAL MEDICINE | Facility: CLINIC | Age: 45
End: 2020-05-26
Payer: COMMERCIAL

## 2020-05-26 DIAGNOSIS — G25.81 RESTLESS LEG SYNDROME: ICD-10-CM

## 2020-05-26 DIAGNOSIS — J40 BRONCHITIS: Primary | ICD-10-CM

## 2020-05-26 DIAGNOSIS — R60.0 LOCALIZED EDEMA: ICD-10-CM

## 2020-05-26 PROCEDURE — 99214 OFFICE O/P EST MOD 30 MIN: CPT | Mod: 95,,, | Performed by: FAMILY MEDICINE

## 2020-05-26 PROCEDURE — 99214 PR OFFICE/OUTPT VISIT, EST, LEVL IV, 30-39 MIN: ICD-10-PCS | Mod: 95,,, | Performed by: FAMILY MEDICINE

## 2020-05-26 NOTE — ASSESSMENT & PLAN NOTE
Numbness in legs- I suggested she go to ER, but she refuses.  Difficult to evaluate over telemed. Sx likely 2/2 edema

## 2020-05-26 NOTE — PROGRESS NOTES
Subjective:       Patient ID: Yuli Milton is a 44 y.o. female.    Chief Complaint: No chief complaint on file.    The patient location is: home  The chief complaint leading to consultation is: SOB    Visit type: audiovisual    Face to Face time with patient: jeni 10 min.        Each patient to whom he or she provides medical services by telemedicine is:  (1) informed of the relationship between the physician and patient and the respective role of any other health care provider with respect to management of the patient; and (2) notified that he or she may decline to receive medical services by telemedicine and may withdraw from such care at any time.          Shortness of Breath   This is a recurrent problem. The current episode started more than 1 month ago. The problem occurs constantly. The problem has been unchanged. Associated symptoms include claudication, headaches, leg pain, leg swelling and orthopnea. Pertinent negatives include no abdominal pain, chest pain, coryza, ear pain, fever, hemoptysis, neck pain, PND, rash, rhinorrhea, sore throat, sputum production, swollen glands, syncope, vomiting or wheezing. The symptoms are aggravated by emotional upset and any activity. She has tried beta agonist inhalers for the symptoms. Her past medical history is significant for asthma and bronchiolitis. There is no history of allergies, aspirin allergies, CAD, chronic lung disease, COPD, DVT, PE, pneumonia or a recent surgery.     Review of Systems   Constitutional: Negative for fever.   HENT: Negative for congestion, ear pain, rhinorrhea and sore throat.    Eyes: Negative for discharge.   Respiratory: Positive for shortness of breath. Negative for hemoptysis, sputum production and wheezing.    Cardiovascular: Positive for palpitations, orthopnea, claudication and leg swelling. Negative for chest pain, syncope and PND.   Gastrointestinal: Negative for abdominal pain and vomiting.   Genitourinary: Negative for  difficulty urinating.   Musculoskeletal: Negative for joint swelling and neck pain.   Skin: Negative for rash.   Neurological: Positive for numbness and headaches. Negative for dizziness.   Psychiatric/Behavioral: Negative for agitation.       Objective:      Physical Exam   Constitutional: She appears well-developed and well-nourished.   HENT:   Head: Normocephalic and atraumatic.   Pulmonary/Chest: Effort normal. No respiratory distress.   Skin: Skin is warm and dry.   Psychiatric: Thought content normal. Her mood appears anxious. She is agitated. She expresses impulsivity.       Assessment:       1. Bronchitis    2. Restless leg syndrome    3. Localized edema        Plan:     Problem List Items Addressed This Visit        Neuro    Restless leg syndrome    Relevant Orders    Ambulatory referral/consult to Physiatry       Pulmonary    Bronchitis - Primary    Relevant Orders    Ambulatory referral/consult to Pulmonology       Other    Localized edema    Current Assessment & Plan     Numbness in legs- I suggested she go to ER, but she refuses.  Difficult to evaluate over telemed. Sx likely 2/2 edema

## 2020-05-27 ENCOUNTER — PATIENT MESSAGE (OUTPATIENT)
Dept: PULMONOLOGY | Facility: CLINIC | Age: 45
End: 2020-05-27

## 2020-05-28 ENCOUNTER — HOSPITAL ENCOUNTER (OUTPATIENT)
Dept: RADIOLOGY | Facility: HOSPITAL | Age: 45
Discharge: HOME | End: 2020-05-28
Attending: FAMILY MEDICINE
Payer: COMMERCIAL

## 2020-05-28 DIAGNOSIS — J40 BRONCHITIS: ICD-10-CM

## 2020-05-28 PROCEDURE — 71046 X-RAY EXAM CHEST 2 VIEWS: CPT | Mod: 26,,, | Performed by: RADIOLOGY

## 2020-05-28 PROCEDURE — 71046 XR CHEST PA AND LATERAL: ICD-10-PCS | Mod: 26,,, | Performed by: RADIOLOGY

## 2020-05-28 PROCEDURE — 71046 X-RAY EXAM CHEST 2 VIEWS: CPT | Mod: TC,FY,PO

## 2020-05-29 ENCOUNTER — OFFICE VISIT (OUTPATIENT)
Dept: INTERNAL MEDICINE | Facility: CLINIC | Age: 45
End: 2020-05-29
Payer: COMMERCIAL

## 2020-05-29 DIAGNOSIS — R60.0 LOCALIZED EDEMA: Primary | ICD-10-CM

## 2020-05-29 DIAGNOSIS — G25.81 RESTLESS LEG SYNDROME: ICD-10-CM

## 2020-05-29 DIAGNOSIS — F33.3 SEVERE EPISODE OF RECURRENT MAJOR DEPRESSIVE DISORDER, WITH PSYCHOTIC FEATURES: ICD-10-CM

## 2020-05-29 PROCEDURE — 99214 OFFICE O/P EST MOD 30 MIN: CPT | Mod: 95,,, | Performed by: FAMILY MEDICINE

## 2020-05-29 PROCEDURE — 99214 PR OFFICE/OUTPT VISIT, EST, LEVL IV, 30-39 MIN: ICD-10-PCS | Mod: 95,,, | Performed by: FAMILY MEDICINE

## 2020-05-29 NOTE — ASSESSMENT & PLAN NOTE
BLE edema - saw pt on Friday and she had swelling of LE, but then over the weekend she began having pain.  Seen by her pain management doctor yesterday who then referred her for ble u/s.

## 2020-05-29 NOTE — PROGRESS NOTES
Subjective:       Patient ID: Yuli Milton is a 44 y.o. female.    Chief Complaint: No chief complaint on file.    The patient location is: work  The chief complaint leading to consultation is: worsening pain in leg    Visit type: audiovisual    Face to Face time with patient: jeni 14 min   minutes of total time spent on the encounter, which includes face to face time and non-face to face time preparing to see the patient (eg, review of tests), Obtaining and/or reviewing separately obtained history, Documenting clinical information in the electronic or other health record, Independently interpreting results (not separately reported) and communicating results to the patient/family/caregiver, or Care coordination (not separately reported).         Each patient to whom he or she provides medical services by telemedicine is:  (1) informed of the relationship between the physician and patient and the respective role of any other health care provider with respect to management of the patient; and (2) notified that he or she may decline to receive medical services by telemedicine and may withdraw from such care at any time.    Shortness of Breath   This is a recurrent problem. The current episode started more than 1 month ago. The problem has been unchanged. Associated symptoms include claudication, leg pain and leg swelling. Pertinent negatives include no abdominal pain, chest pain, coryza, ear pain, fever, headaches, hemoptysis, neck pain, PND, rash, rhinorrhea, sore throat, sputum production, swollen glands, syncope, vomiting or wheezing. The treatment provided mild relief. Her past medical history is significant for asthma. There is no history of allergies, aspirin allergies, bronchiolitis, CAD, chronic lung disease, COPD, DVT, a heart failure, PE, pneumonia or a recent surgery.     Review of Systems   Constitutional: Negative for fever.   HENT: Negative for congestion, ear pain, rhinorrhea and sore throat.     Eyes: Negative for discharge.   Respiratory: Positive for shortness of breath. Negative for hemoptysis, sputum production and wheezing.    Cardiovascular: Positive for claudication and leg swelling. Negative for chest pain, syncope and PND.   Gastrointestinal: Negative for abdominal pain and vomiting.   Genitourinary: Negative for difficulty urinating.   Musculoskeletal: Negative for joint swelling and neck pain.   Skin: Negative for rash.   Neurological: Negative for dizziness and headaches.   Psychiatric/Behavioral: Negative for agitation.       Objective:      Physical Exam   Constitutional: She appears well-developed and well-nourished. No distress.   HENT:   Head: Normocephalic and atraumatic.   Pulmonary/Chest: Effort normal. No respiratory distress.   Skin: No rash noted. No erythema.   Psychiatric: She has a normal mood and affect. Her behavior is normal. Judgment and thought content normal.       Assessment:       1. Localized edema    2. Restless leg syndrome    3. Severe episode of recurrent major depressive disorder, with psychotic features        Plan:     Problem List Items Addressed This Visit        Neuro    Restless leg syndrome    Current Assessment & Plan     On gabapentin 300 mg - cont            Psychiatric    Severe episode of recurrent major depressive disorder, with psychotic features    Current Assessment & Plan     On zyprexa, cont  She has sig concerns for weight gain but overall she doing well on meds.              Other    Localized edema - Primary    Current Assessment & Plan     BLE edema - saw pt on Friday and she had swelling of LE, but then over the weekend she began having pain.  Seen by her pain management doctor yesterday who then referred her for ble u/s.

## 2020-06-02 ENCOUNTER — OFFICE VISIT (OUTPATIENT)
Dept: INTERNAL MEDICINE | Facility: CLINIC | Age: 45
End: 2020-06-02
Payer: COMMERCIAL

## 2020-06-02 DIAGNOSIS — M35.01 KERATOCONJUNCTIVITIS SICCA: ICD-10-CM

## 2020-06-02 DIAGNOSIS — R53.82 CHRONIC FATIGUE: ICD-10-CM

## 2020-06-02 DIAGNOSIS — F33.3 SEVERE EPISODE OF RECURRENT MAJOR DEPRESSIVE DISORDER, WITH PSYCHOTIC FEATURES: Primary | ICD-10-CM

## 2020-06-02 DIAGNOSIS — M54.12 CERVICAL RADICULOPATHY: ICD-10-CM

## 2020-06-02 PROCEDURE — 99213 OFFICE O/P EST LOW 20 MIN: CPT | Mod: 95,,, | Performed by: FAMILY MEDICINE

## 2020-06-02 PROCEDURE — 99213 PR OFFICE/OUTPT VISIT, EST, LEVL III, 20-29 MIN: ICD-10-PCS | Mod: 95,,, | Performed by: FAMILY MEDICINE

## 2020-06-02 NOTE — PROGRESS NOTES
Subjective:       Patient ID: Yuli Milton is a 44 y.o. female.    Chief Complaint: No chief complaint on file.    The patient location is: home  The chief complaint leading to consultation is: fatigue    Visit type: audiovisual    Face to Face time with patient: jeni 15 min    Each patient to whom he or she provides medical services by telemedicine is:  (1) informed of the relationship between the physician and patient and the respective role of any other health care provider with respect to management of the patient; and (2) notified that he or she may decline to receive medical services by telemedicine and may withdraw from such care at any time.    Anxiety   Presents for follow-up visit. Symptoms include excessive worry and nervous/anxious behavior. Patient reports no chest pain, confusion, palpitations, shortness of breath or suicidal ideas. Symptoms occur constantly. The severity of symptoms is moderate. The quality of sleep is poor. Nighttime awakenings: several.         Review of Systems   Constitutional: Negative for activity change and unexpected weight change.   HENT: Negative for hearing loss, rhinorrhea and trouble swallowing.    Eyes: Negative for discharge and visual disturbance.   Respiratory: Negative for chest tightness, shortness of breath and wheezing.    Cardiovascular: Negative for chest pain and palpitations.   Gastrointestinal: Negative for blood in stool, constipation, diarrhea and vomiting.   Endocrine: Negative for polydipsia and polyuria.   Genitourinary: Negative for difficulty urinating, dysuria, hematuria and menstrual problem.   Musculoskeletal: Negative for arthralgias, joint swelling and neck pain.   Skin: Negative for rash.   Neurological: Negative for weakness and headaches.   Psychiatric/Behavioral: Positive for dysphoric mood. Negative for confusion and suicidal ideas. The patient is nervous/anxious.        Objective:      Physical Exam   Constitutional: She is oriented to  person, place, and time. She appears well-developed and well-nourished. No distress.   HENT:   Head: Normocephalic and atraumatic.   Pulmonary/Chest: Effort normal. No respiratory distress.   Neurological: She is alert and oriented to person, place, and time.   Skin: Skin is warm and dry. She is not diaphoretic.   Psychiatric: She has a normal mood and affect. Her behavior is normal. Judgment and thought content normal.       Assessment:       1. Severe episode of recurrent major depressive disorder, with psychotic features    2. Cervical radiculopathy    3. Keratoconjunctivitis sicca    4. Chronic fatigue        Plan:     Problem List Items Addressed This Visit        Neuro    Cervical radiculopathy       Psychiatric    Severe episode of recurrent major depressive disorder, with psychotic features - Primary       Ophtho    Keratoconjunctivitis sicca       Other    Chronic fatigue    Relevant Orders    Ambulatory referral/consult to Rheumatology

## 2020-06-03 ENCOUNTER — OFFICE VISIT (OUTPATIENT)
Dept: PULMONOLOGY | Facility: CLINIC | Age: 45
End: 2020-06-03
Payer: COMMERCIAL

## 2020-06-03 DIAGNOSIS — J40 BRONCHITIS: ICD-10-CM

## 2020-06-03 DIAGNOSIS — R29.818 SUSPECTED SLEEP APNEA: Primary | ICD-10-CM

## 2020-06-03 DIAGNOSIS — G47.8 NON-RESTORATIVE SLEEP: ICD-10-CM

## 2020-06-03 DIAGNOSIS — R06.02 SOB (SHORTNESS OF BREATH) ON EXERTION: ICD-10-CM

## 2020-06-03 DIAGNOSIS — G47.19 EXCESSIVE DAYTIME SLEEPINESS: ICD-10-CM

## 2020-06-03 DIAGNOSIS — J45.909 ASTHMA, UNSPECIFIED ASTHMA SEVERITY, UNSPECIFIED WHETHER COMPLICATED, UNSPECIFIED WHETHER PERSISTENT: ICD-10-CM

## 2020-06-03 DIAGNOSIS — R06.83 SNORING: ICD-10-CM

## 2020-06-03 DIAGNOSIS — E66.01 MORBID OBESITY: ICD-10-CM

## 2020-06-03 PROCEDURE — 99203 PR OFFICE/OUTPT VISIT, NEW, LEVL III, 30-44 MIN: ICD-10-PCS | Mod: 95,,, | Performed by: INTERNAL MEDICINE

## 2020-06-03 PROCEDURE — 99203 OFFICE O/P NEW LOW 30 MIN: CPT | Mod: 95,,, | Performed by: INTERNAL MEDICINE

## 2020-06-03 NOTE — PROGRESS NOTES
Initial Outpatient Pulmonary Evaluation       SUBJECTIVE:     Chief Complaint   Patient presents with    Snoring     Video visit performed today patient location at home.      History of Present Illness:    Patient is a 44 y.o. female referred for evaluation of asthma and suspected sleep apnea.    Patient has been on albuterol since childhood, never on maintenance inhaler, she does have 2 children with asthma no siblings with asthma or parents with asthma.    She works as a .    Does not have pets at home.    She does use albuterol about 3 days per week.    Complains of snoring, excessive daytime sleepiness, daytime fatigue and nonrestorative sleep.  [place] Sleepiness Scale score 16.      STOP - BANG Questionnaire:     1. Snoring : Do you snore loudly ?    Yes    2. Tired : Do you often feel tired, fatigued, or sleepy during daytime? Yes    3. Observed: Has anyone observed you stop breathing during your sleep?   No     4. Blood pressure : Do you have or are you being treated for high blood pressure?   No    5. BMI :BMI more than 35 kg/m2?   Yes    6. Age : Age over 50 yr old?   No    7. Neck circumference:   For male, is your shirt collar 17 inches / 43cm or larger?  For female, is your shirt collar 16 inches / 41cm or larger?    No    8. Gender: Gender male?   No    STOP BANG SCORE 3    High risk of SCOUT: Yes 5 - 8  Intermediate risk of SCOUT: Yes 3 - 4  Low risk of SCOUT: Yes 0 - 2      References:   STOP Questionnaire   A Tool to Screen Patients for Obstructive Sleep Apnea: MAME Garza.C.P.C., Sam Shepherd M.B.B.S., Hali Shirley M.D.,Magali Ambriz, Ph.D., SHIRA Trevizo.B.B.S.,_ Hayder Polanco.,_ Hunter De Guzman M.D., Morteza Muhammad F.R.C.P.C.; Anesthesiology 2008; 108:812-21 Copyright © 2008, the American Society of Anesthesiologists, Inc. [employer]    Review of Systems   Constitutional: Negative for fever  and chills.   HENT: Negative for nosebleeds and hearing loss.    Eyes: Negative for redness.   Respiratory: Positive for snoring, cough, shortness of breath, dyspnea on extertion, use of rescue inhaler and somnolence. Negative for choking.    Genitourinary: Negative for hematuria.   Endocrine: Diabetes mellitus Negative for cold intolerance.    Musculoskeletal: Positive for arthralgias, back pain and gait problem.   Skin: Negative for rash.   Gastrointestinal: Negative for vomiting.   Neurological: Negative for syncope.   Hematological: Negative for adenopathy.   Psychiatric/Behavioral: Positive for sleep disturbance. Negative for confusion.       Review of patient's allergies indicates:   Allergen Reactions    Trulicity [dulaglutide] Shortness Of Breath and Other (See Comments)     Headaches    Hibiclens (isopropyl alcohol) Itching       Current Outpatient Medications   Medication Sig Dispense Refill    albuterol (PROVENTIL/VENTOLIN HFA) 90 mcg/actuation inhaler INHALE 2 PUFFS INTO THE LUNGS EVERY 6 (SIX) HOURS AS NEEDED FOR WHEEZING 18 g 0    ALPRAZolam (XANAX) 1 MG tablet Take 2 mg by mouth every evening.       ALPRAZolam (XANAX) 2 MG Tab Take 2 mg by mouth every evening.      aspirin 325 MG tablet Take 1 tablet (325 mg total) by mouth once daily. Take while non-weight right prevent DVT formation 360 tablet 1    atorvastatin (LIPITOR) 20 MG tablet Take 1 tablet (20 mg total) by mouth once daily. 90 tablet 1    blood sugar diagnostic (BLOOD GLUCOSE TEST) Strp 1 strip by Misc.(Non-Drug; Combo Route) route 3 (three) times daily. 200 strip 2    celecoxib (CELEBREX) 200 MG capsule       cycloSPORINE (RESTASIS) 0.05 % ophthalmic emulsion 1 drop 2 (two) times daily.      FLUoxetine 40 MG capsule nightly.   0    fluticasone propionate (FLONASE) 50 mcg/actuation nasal spray       furosemide (LASIX) 20 MG tablet Take 1 tablet (20 mg total) by mouth once daily. 7 tablet 0    HYDROcodone-acetaminophen (NORCO)  " mg per tablet TK 1/2 T PO TID PRN      ketoconazole (NIZORAL) 2 % cream APPLY TO RASH ON THE FACE TWICE DAILY  1    losartan (COZAAR) 25 MG tablet Take 0.5 tablets (12.5 mg total) by mouth once daily. (Patient not taking: Reported on 5/22/2020) 90 tablet 1    meloxicam (MOBIC) 15 MG tablet       metFORMIN (GLUCOPHAGE) 1000 MG tablet Take 1 tablet (1,000 mg total) by mouth 2 (two) times daily with meals. 180 tablet 3    OLANZapine (ZYPREXA) 10 MG tablet Take 10 mg by mouth once daily.      ONETOUCH DELICA LANCETS 30 gauge Misc USE TO TEST THREE TIMES DAILY 100 each 0    pen needle, diabetic (PEN NEEDLE) 32 gauge x 5/32" Ndle 1 each by Misc.(Non-Drug; Combo Route) route once daily. 100 each 0    potassium chloride (KLOR-CON) 20 mEq Pack Take 20 mEq by mouth once daily. 30 packet 0    promethazine (PHENERGAN) 6.25 mg/5 mL syrup       semaglutide (OZEMPIC) 1 mg/dose (2 mg/1.5 mL) PnIj 1 mg qweek subq 4.5 mL 1     No current facility-administered medications for this visit.        Past Medical History:   Diagnosis Date    Abnormal Pap smear of cervix     treatment??    Abnormal Pap smear of vagina     Anemia     Anxiety     Anxiety and depression     Asthma     Bacterial vaginosis 1/5/2018    Chlamydia     Depression (emotion)     Diabetes mellitus     SANTOS (dyspnea on exertion) 10/14/2019    Dysuria 3/31/2018    Dysuria 3/31/2018    Gallstones     Gastritis     History of ovarian cyst     History of syphilis     History of trichomoniasis     Hyperlipidemia     Lower abdominal pain 7/31/2017    Mental disorder     Persistent cough 7/27/2018    PONV (postoperative nausea and vomiting)     Preop general physical exam 12/9/2019    Shortness of breath 6/26/2017    Suicidal ideations 7/9/2019    Vaginal candidiasis 7/24/2018    Vaginal itching 1/5/2018     Past Surgical History:   Procedure Laterality Date    APPENDECTOMY      CHOLECYSTECTOMY      COLONOSCOPY N/A 7/31/2017    " Procedure: COLONOSCOPY;  Surgeon: Yuliana Michael MD;  Location: [facility];  Service: Endoscopy;  Laterality: N/A;    DILATION AND CURETTAGE OF UTERUS      bleeding    HYSTERECTOMY  08/31/2016    RALH/BS for complex hyperplasia without atypia     Family History   Problem Relation Age of Onset    Breast cancer Paternal Grandmother     Diabetes Maternal Grandmother     Hypertension Mother     Thyroid disease Mother     Breast cancer Maternal Aunt     Cancer Maternal Aunt         colon cancer    Colon cancer Maternal Aunt     Miscarriages / Stillbirths Cousin     Stroke Maternal Aunt     No Known Problems Father     Thyroid disease Sister     Thyroid disease Brother     Ovarian cancer Neg Hx     Deep vein thrombosis Neg Hx     Pulmonary embolism Neg Hx      Social History     Tobacco Use    Smoking status: Never Smoker    Smokeless tobacco: Never Used   Substance Use Topics    Alcohol use: No     Alcohol/week: 0.0 standard drinks     Comment: rarely; stop 12/11/19 prior to sx    Drug use: No          OBJECTIVE:     Vital Signs (Most Recent)   ]  Wt Readings from Last 2 Encounters:   05/22/20 119.3 kg (263 lb 0.1 oz)   03/10/20 119.3 kg (263 lb 0.1 oz)         Physical Exam:  Physical Exam   Constitutional: She is oriented to person, place, and time. She appears well-developed. She is obese.   HENT:   Head: Normocephalic.   Mouth/Throat: Mallampati Score: III.   Pulmonary/Chest: Effort normal. No respiratory distress.   Neurological: She is alert and oriented to person, place, and time.   Psychiatric: She has a normal mood and affect. Her behavior is normal. Judgment and thought content normal.       Laboratory  Lab Results   Component Value Date    WBC 7.24 12/11/2019    RBC 3.99 (L) 12/11/2019    HGB 11.4 (L) 12/11/2019    HCT 37.3 12/11/2019    MCV 94 12/11/2019    MCH 28.6 12/11/2019    MCHC 30.6 (L) 12/11/2019    RDW 14.1 12/11/2019     12/11/2019    MPV 10.8 12/11/2019    GRAN 2.7  10/14/2019    GRAN 49.1 10/14/2019    LYMPH 2.2 10/14/2019    LYMPH 41.4 10/14/2019    MONO 0.4 10/14/2019    MONO 6.8 10/14/2019    EOS 0.1 10/14/2019    BASO 0.04 10/14/2019    EOSINOPHIL 1.8 10/14/2019    BASOPHIL 0.7 10/14/2019       BMP  Lab Results   Component Value Date     12/05/2019    K 4.0 12/05/2019     12/05/2019    CO2 25 12/05/2019    BUN 10 12/05/2019    CREATININE 0.8 12/05/2019    CALCIUM 9.0 12/05/2019    ANIONGAP 8 12/05/2019    ESTGFRAFRICA >60.0 12/05/2019    EGFRNONAA >60.0 12/05/2019    AST 12 12/05/2019    ALT 14 12/05/2019    PROT 7.2 12/05/2019       Lab Results   Component Value Date    BNP 59 05/22/2020    BNP 23 10/14/2019    BNP 18 01/21/2019       Lab Results   Component Value Date    TSH 0.392 (L) 07/09/2019       No results found for: SEDRATE    No results found for: CRP    No results found for: IGE    No results found for: ASPERGILLUS  No results found for: AFUMIGATUSCL     No results found for: ACE    Diagnostic Results:    I have personally reviewed today the following studies :      CXR 5/28/2020:  The cardiac and mediastinal silhouettes appear within normal limits.   The lungs are clear bilaterally.  No acute osseous findings demonstrated.       2 D Echo 10/2019:      1 - No wall motion abnormalities.     2 - Normal left ventricular systolic function (EF 60-65%).     3 - Impaired LV relaxation, normal LAP (grade 1 diastolic dysfunction).     4 - Normal right ventricular systolic function .     5 - The estimated PA systolic pressure is greater than 17 mmHg.     6 - Mild tricuspid regurgitation.     PFT 2017:    WNL         ASSESSMENT/PLAN:     Suspected sleep apnea  -     Home Sleep Studies; Future    Snoring  -     Ambulatory referral/consult to Sleep Disorders  -     Home Sleep Studies; Future    Excessive daytime sleepiness  -     Home Sleep Studies; Future    Non-restorative sleep  -     Home Sleep Studies; Future    Bronchitis  -     Ambulatory referral/consult  to Pulmonology  -     Complete PFT with bronchodilator; Future; Expected date: 06/17/2020  -     COVID-19 Routine Screening    Asthma, unspecified asthma severity, unspecified whether complicated, unspecified whether persistent  -     Complete PFT with bronchodilator; Future; Expected date: 06/17/2020    SOB (shortness of breath) on exertion  -     Stress test, pulmonary; Future    Morbid obesity      Continue albuterol p.r.n.      General weight loss/lifestyle modification strategies discussed (elicit support from others; identify saboteurs; non-food rewards).  Diet interventions: low calorie (1000 kCal/d) deficit diet      Follow up in about 2 months (around 8/3/2020).    This note was prepared using voice recognition system and is likely to have sound alike errors that may have been overlooked even after proof reading.  Please call me with any questions    Discussed diagnosis, its evaluation, treatment and usual course. All questions answered.    Thank you for the courtesy of participating in the care of this patient    Socorro Clay MD

## 2020-06-03 NOTE — PATIENT INSTRUCTIONS
No eating / drinking for 3 hours before going to bed.  Elevate head of bed 30 - 45 %     CPAP HABITUATION PROCEDURE     Clark Salinas, Ph.D., [facility] and Baltazar Mosley M.D.  [facility], [facility]     Some people have difficulty adjusting to CPAP/BiPAP/AutoCPAP.  This is not unusual or hard to understand: Breathing with CPAP is different from ordinary breathing, and this difference is aversive to some. The problem can be overcome, however, and the benefits CPAP confers are certainly worth the effort.  Below, you will find a simple and gradual way to get used to CPAP before you try to use it all night, every night.  The essence of this procedure is to relax and let breathing with CPAP become a habit.  It may take about 2 weeks, and involves the followin. CPAP while awake and comfortably seated, during the late evening.     2. CPAP in bed while attempting sleep at night.     3. If your discomfort is too great at any time, discontinue and attempt again later the same night, for the same amount of time.   4. You and your physician may alter the times and pressures if necessary.     5. If you find that it is very easy to get used to CPAP, you may start using it every night when you are comfortable enough to do so.  6. IMPORTANT REMINDER: If you have a cold or sinus congestion it is okay to miss a night or two of CPAP. Consider using antihistamines or decongestants to clear up your sinus congestion prior to sleeping.     DAYS  1-3   Start CPAP while awake and comfortably seated during the late evening, after having prepared for bed.  You may do this while watching television, listening to music or reading. Use for 1 hour, then take off CPAP and go directly to bed to sleep     DAYS  4-6     Start CPAP when you go to bed and use for 1 hour, or until you fall asleep.  If your discomfort is too great at any time, discontinue and attempt again later the same night, for the  same designated amount of time (1 hour).      DAYS  7-9     Increase time with CPAP to 2 hours a night.  If your discomfort is too great at any time, discontinue and attempt again later the same night, for the same designated amount of time (2 hours).      DAYS 10-12    Increase time with CPAP to 3 hours a night. If your discomfort is too great at any time, discontinue and attempt again later the same night, for the same designated amount of time (3 hours).      DAYS 13-15     Sleep the entire night with CPAP.      OPTIONAL: You may use Progressive Muscle Relaxation (PMR) to help put you at ease when using CPAP; do PMR twice each day, once in the morning or afternoon, and once in the evening just before using CPAP. You may do PMR prior to any attempt until you are comfortable with CPAP.        Continuous Positive Air Pressure (CPAP)  Continuous positive air pressure (CPAP) uses gentle air pressure to hold the airway open. CPAP is often the most effective treatment for sleep apnea and severe snoring. It works very well for many people. But keep in mind that it can take several adjustments before the setup is right for you.       How CPAP Works  CPAP is a small portable pump beside the bed. The pump sends air through a hose, which is held over your nose and/or mouth by a mask. Mild air pressure is gently pushed through your airway. The air pressure nudges sagging tissues aside. This widens the airway so you can breathe better. CPAP may be combined with other kinds of therapy for sleep apnea.       Types of Air Pressure Treatments  There are different types of CPAP. Your doctor or CPAP technician will help you decide which type is best for you:  · Basic CPAP keeps the pressure constant all night long.  · A bilevel device (BiPAP) provides more pressure when you breathe in and less when you breathe out. A BiPAP machine also may be set to provide automatic breaths to maintain breathing if you stop breathing while  sleeping.  · An autoCPAP device automatically adjusts pressure throughout the night and in response to changes such as body position, sleep stage, and snoring.  © 8466-1268 [employer]. [address] [place] [address]. All rights reserved. This information is not intended as a substitute for professional medical care. Always follow your healthcare professional's instructions.        Snoring and Sleep Apnea: Notes for a Partner  Snoring and sleep apnea affect your life, as well as your partners. You can help in the treatment of the problem. Be supportive. Encourage your partner both to get treatment and to make the adjustments needed to follow through.       Adjusting to Changes  Your partners treatment may involve making changes to certain life habits. You can help your partner make and stick with these changes. For example:  · Support and even join your partners exercise program.  · Be supportive if your partner gets CPAP (continuous positive airway pressure). He or she may feel self-conscious at first. Remind your partner to expect adjustments to CPAP before it feels just right.  · Consider joining a snoring and sleep apnea support group.  Go Along to See the Health Care Provider  You can give the health care provider the best account of your partners nighttime breathing and snoring patterns. Try to go along to health care providers appointments. If you cant go, write notes for your partner to give to the health care provider. Describe your partners snoring and sleep breathing patterns in detail.  Tips for Sleeping with a Snorer  Until treatment takes care of your partners snoring:  · Try to go to bed first. It may help if youre already asleep when your partner starts to snore.  · Wear earplugs to bed. A fan or other source of background noise may also help drown out snoring.   © 6855-9864 [employer]. [address] [place] [address]. All rights reserved.  This information is not intended as a substitute for professional medical care. Always follow your healthcare professional's instructions.        Continuous Positive Airway Pressure (CPAP)  Your health care provider has prescribed continuous positive airway pressure (CPAP) therapy for you. A CPAP device helps you breathe better at night. The device delivers air through your nose or mouth when you breathe in to keep your air passages open. CPAP is:  · Used most often to treat sleep apnea and some other problems (Sleep apnea is a chronic condition with periods of sleep in which you briefly stop breathing.)  · Safe and very effective, but it takes time to get used to the mask.   Your health care provider, nurse, or medical supplier will give you tips for wearing and caring for your CPAP device.  General guidelines  · It's very important not to give up! It takes time to get used to wearing the mask at night.  · Practice using your CPAP device during the day, especially whenever you take a nap.  · Remember, there are several different types of masks. If you cant get used to your mask, ask your provider or medical supply company about trying another style.  · If you have nasal stuffiness or dryness when using your CPAP device, talk with your provider or medical supply company. There are ways to lessen these problems. For example, your provider may recommend moistening nasal spray or the medical supply company may recommend a device with a humidifier.  · The goal is to use your CPAP all night, every night, during all naps, and even when you travel.  · Keep your mask clean. Wash it with soap and water. Be sure to rinse the mask and tubing well with water to remove any soap. Let them air-dry thoroughly before using.  · Make yourself comfortable when sleeping with CPAP. Try using extra pillows.  Work with your medical supply company so that you know how to correctly use your CPAP. Their representative will be able to help  you:  · Use the CPAP correctly  · Troubleshoot any problems that come up  · Learn to clean and maintain the device  · Adjust to regular use of the CPAP  © 6320-9751 [employer]. [address] [place] [address], [place] [address]. All rights reserved. This information is not intended as a substitute for professional medical care. Always follow your healthcare professional's instructions.

## 2020-06-03 NOTE — LETTER
Suly 3, 2020      Drew Gutierrez MD  [address] [address] [address]           [facility]  [address] [facility] [address]  Phone: [phone]  Fax: [phone]          Patient: Yuli Milton   MR Number: [id number]   YOB: 1975   Date of Visit: 6/3/2020       Dear Dr. Drew Gutierrez:    Thank you for referring Yuli Milton to me for evaluation. Attached you will find relevant portions of my assessment and plan of care.    If you have questions, please do not hesitate to call me. I look forward to following Yuli Milton along with you.    Sincerely,    Socorro Clay MD    Enclosure  CC:  No Recipients    If you would like to receive this communication electronically, please contact [email or web address] or [phone] to request more information on [employer] Link access.    For providers and/or their staff who would like to refer a patient to [facility], please contact us through our one-stop-shop provider referral line, [facility] , at [phone].    If you feel you have received this communication in error or would no longer like to receive these types of communications, please e-mail [email or web address]

## 2020-06-08 ENCOUNTER — TELEPHONE (OUTPATIENT)
Dept: PULMONOLOGY | Facility: CLINIC | Age: 45
End: 2020-06-08

## 2020-06-08 NOTE — TELEPHONE ENCOUNTER
----- Message from Lisa Rojas sent at 6/3/2020  9:56 AM CDT -----  [facility]

## 2020-06-10 ENCOUNTER — OFFICE VISIT (OUTPATIENT)
Dept: RHEUMATOLOGY | Facility: CLINIC | Age: 45
End: 2020-06-10
Payer: COMMERCIAL

## 2020-06-10 ENCOUNTER — LAB VISIT (OUTPATIENT)
Dept: LAB | Facility: HOSPITAL | Age: 45
End: 2020-06-10
Attending: INTERNAL MEDICINE
Payer: COMMERCIAL

## 2020-06-10 ENCOUNTER — PATIENT OUTREACH (OUTPATIENT)
Dept: ADMINISTRATIVE | Facility: OTHER | Age: 45
End: 2020-06-10

## 2020-06-10 VITALS — WEIGHT: 248.25 LBS | BODY MASS INDEX: 41.31 KG/M2

## 2020-06-10 DIAGNOSIS — E66.01 CLASS 3 SEVERE OBESITY DUE TO EXCESS CALORIES WITHOUT SERIOUS COMORBIDITY WITH BODY MASS INDEX (BMI) OF 40.0 TO 44.9 IN ADULT: ICD-10-CM

## 2020-06-10 DIAGNOSIS — M25.50 POLYARTHRALGIA: ICD-10-CM

## 2020-06-10 DIAGNOSIS — E55.9 VITAMIN D DEFICIENCY: ICD-10-CM

## 2020-06-10 DIAGNOSIS — R53.82 CHRONIC FATIGUE: Primary | ICD-10-CM

## 2020-06-10 DIAGNOSIS — Z12.31 ENCOUNTER FOR SCREENING MAMMOGRAM FOR MALIGNANT NEOPLASM OF BREAST: Primary | ICD-10-CM

## 2020-06-10 PROCEDURE — 99999 PR PBB SHADOW E&M-EST. PATIENT-LVL II: CPT | Mod: PBBFAC,,, | Performed by: INTERNAL MEDICINE

## 2020-06-10 PROCEDURE — 86431 RHEUMATOID FACTOR QUANT: CPT

## 2020-06-10 PROCEDURE — 99244 OFF/OP CNSLTJ NEW/EST MOD 40: CPT | Mod: S$GLB,,, | Performed by: INTERNAL MEDICINE

## 2020-06-10 PROCEDURE — 99999 PR PBB SHADOW E&M-EST. PATIENT-LVL II: ICD-10-PCS | Mod: PBBFAC,,, | Performed by: INTERNAL MEDICINE

## 2020-06-10 PROCEDURE — 86200 CCP ANTIBODY: CPT

## 2020-06-10 PROCEDURE — 36415 COLL VENOUS BLD VENIPUNCTURE: CPT

## 2020-06-10 PROCEDURE — 99244 PR OFFICE CONSULTATION,LEVEL IV: ICD-10-PCS | Mod: S$GLB,,, | Performed by: INTERNAL MEDICINE

## 2020-06-10 PROCEDURE — 86235 NUCLEAR ANTIGEN ANTIBODY: CPT | Mod: 59

## 2020-06-10 PROCEDURE — 86235 NUCLEAR ANTIGEN ANTIBODY: CPT

## 2020-06-10 PROCEDURE — 86038 ANTINUCLEAR ANTIBODIES: CPT

## 2020-06-10 RX ORDER — ERGOCALCIFEROL 1.25 MG/1
50000 CAPSULE ORAL
Qty: 12 CAPSULE | Refills: 1 | Status: SHIPPED | OUTPATIENT
Start: 2020-06-10 | End: 2020-08-28 | Stop reason: SDUPTHER

## 2020-06-10 NOTE — PATIENT INSTRUCTIONS
Gabapentin capsules or tablets  What is this medicine?  GABAPENTIN (GA ba pen tin) is used to control partial seizures in adults with epilepsy. It is also used to treat certain types of nerve pain.  How should I use this medicine?  Take this medicine by mouth with a glass of water. Follow the directions on the prescription label. You can take it with or without food. If it upsets your stomach, take it with food.Take your medicine at regular intervals. Do not take it more often than directed. Do not stop taking except on your doctor's advice.  If you are directed to break the 600 or 800 mg tablets in half as part of your dose, the extra half tablet should be used for the next dose. If you have not used the extra half tablet within 28 days, it should be thrown away.  A special MedGuide will be given to you by the pharmacist with each prescription and refill. Be sure to read this information carefully each time.  Talk to your pediatrician regarding the use of this medicine in children. Special care may be needed.  What side effects may I notice from receiving this medicine?  Side effects that you should report to your doctor or health care professional as soon as possible:  · allergic reactions like skin rash, itching or hives, swelling of the face, lips, or tongue  · worsening of mood, thoughts or actions of suicide or dying  Side effects that usually do not require medical attention (report to your doctor or health care professional if they continue or are bothersome):  · constipation  · difficulty walking or controlling muscle movements  · dizziness  · nausea  · slurred speech  · tiredness  · tremors  · weight gain  What may interact with this medicine?  Do not take this medicine with any of the following medications:  · other gabapentin products  This medicine may also interact with the following medications:  · alcohol  · antacids  · antihistamines for allergy, cough and cold  · certain medicines for anxiety or  sleep  · certain medicines for depression or psychotic disturbances  · homatropine; hydrocodone  · naproxen  · narcotic medicines (opiates) for pain  · phenothiazines like chlorpromazine, mesoridazine, prochlorperazine, thioridazine  What if I miss a dose?  If you miss a dose, take it as soon as you can. If it is almost time for your next dose, take only that dose. Do not take double or extra doses.  Where should I keep my medicine?  Keep out of reach of children.  This medicine may cause accidental overdose and death if it taken by other adults, children, or pets. Mix any unused medicine with a substance like cat litter or coffee grounds. Then throw the medicine away in a sealed container like a sealed bag or a coffee can with a lid. Do not use the medicine after the expiration date.  Store at room temperature between 15 and 30 degrees C (59 and 86 degrees F).  What should I tell my health care provider before I take this medicine?  They need to know if you have any of these conditions:  · kidney disease  · suicidal thoughts, plans, or attempt; a previous suicide attempt by you or a family member  · an unusual or allergic reaction to gabapentin, other medicines, foods, dyes, or preservatives  · pregnant or trying to get pregnant  · breast-feeding  What should I watch for while using this medicine?  Visit your doctor or health care professional for regular checks on your progress. You may want to keep a record at home of how you feel your condition is responding to treatment. You may want to share this information with your doctor or health care professional at each visit. You should contact your doctor or health care professional if your seizures get worse or if you have any new types of seizures. Do not stop taking this medicine or any of your seizure medicines unless instructed by your doctor or health care professional. Stopping your medicine suddenly can increase your seizures or their severity.  Wear a medical  identification bracelet or chain if you are taking this medicine for seizures, and carry a card that lists all your medications.  You may get drowsy, dizzy, or have blurred vision. Do not drive, use machinery, or do anything that needs mental alertness until you know how this medicine affects you. To reduce dizzy or fainting spells, do not sit or stand up quickly, especially if you are an older patient. Alcohol can increase drowsiness and dizziness. Avoid alcoholic drinks.  Your mouth may get dry. Chewing sugarless gum or sucking hard candy, and drinking plenty of water will help.  The use of this medicine may increase the chance of suicidal thoughts or actions. Pay special attention to how you are responding while on this medicine. Any worsening of mood, or thoughts of suicide or dying should be reported to your health care professional right away.  Women who become pregnant while using this medicine may enroll in the [place] Antiepileptic Drug Pregnancy Registry by calling 1-427.814.1226. This registry collects information about the safety of antiepileptic drug use during pregnancy.  NOTE:This sheet is a summary. It may not cover all possible information. If you have questions about this medicine, talk to your doctor, pharmacist, or health care provider. Copyright© 2017 [employer]

## 2020-06-10 NOTE — PROGRESS NOTES
CC:  Chief Complaint   Patient presents with    New Patient     full body pain, chronic fatigue     Referred by Drew Gutierrez MD    History of Present Illness:  Yuli Velasquez a 44 y.o.yo female with c/o all over body pain x 2 years   Along with fatigue     She hurts in legs , back , chest , shoulders  +dry eyes, dry mouth   She has chronic pedal edema  No other joint swelling  No malar or other photosensitive rashes    She has history of diabetes and is on gabapentin 300 mg q.h.s.  She cannot take it during the daytime as she is a     Nonsmoker  No family history of autoimmune disease    Review of Systems:  Constitutional: Denies fever, chills. No recent weight changes.   Fatigue: yes   Muscle weakness: no  Headaches: no new headaches  Eyes: No redness .  No recent visual changes.  ENT:  No oral or nasal ulcers.  Card: No chest pain.  Resp: No cough , + sob   Gastro: No nausea or vomiting.  No heartburn.  Constipation: no  Diarrhea: no  Genito:  No dysuria.  No genital ulcers.  Skin: No rash.  Raynauds:no  Neuro: No numbness / tingling in legs   Psych: No depression, anxiety  Endo:  no excess thirst.  Heme: no abnormal bleeding or bruising  Clots:none   Miscarriages : none     Past Medical History:   Diagnosis Date    Abnormal Pap smear of cervix     treatment??    Abnormal Pap smear of vagina     Anemia     Anxiety     Anxiety and depression     Asthma     Bacterial vaginosis 1/5/2018    Chlamydia     Depression (emotion)     Diabetes mellitus     SANTOS (dyspnea on exertion) 10/14/2019    Dysuria 3/31/2018    Dysuria 3/31/2018    Gallstones     Gastritis     History of ovarian cyst     History of syphilis     History of trichomoniasis     Hyperlipidemia     Lower abdominal pain 7/31/2017    Mental disorder     Persistent cough 7/27/2018    PONV (postoperative nausea and vomiting)     Preop general physical exam 12/9/2019    Shortness of breath 6/26/2017    Suicidal  ideations 7/9/2019    Vaginal candidiasis 7/24/2018    Vaginal itching 1/5/2018       Past Surgical History:   Procedure Laterality Date    APPENDECTOMY      CHOLECYSTECTOMY      COLONOSCOPY N/A 7/31/2017    Procedure: COLONOSCOPY;  Surgeon: Yuliana Michael MD;  Location: [facility];  Service: Endoscopy;  Laterality: N/A;    DILATION AND CURETTAGE OF UTERUS      bleeding    HYSTERECTOMY  08/31/2016    RALH/BS for complex hyperplasia without atypia         Social History     Tobacco Use    Smoking status: Never Smoker    Smokeless tobacco: Never Used   Substance Use Topics    Alcohol use: No     Alcohol/week: 0.0 standard drinks     Comment: rarely; stop 12/11/19 prior to sx    Drug use: No       Family History   Problem Relation Age of Onset    Breast cancer Paternal Grandmother     Diabetes Maternal Grandmother     Hypertension Mother     Thyroid disease Mother     Breast cancer Maternal Aunt     Cancer Maternal Aunt         colon cancer    Colon cancer Maternal Aunt     Miscarriages / Stillbirths Cousin     Stroke Maternal Aunt     No Known Problems Father     Thyroid disease Sister     Thyroid disease Brother     Ovarian cancer Neg Hx     Deep vein thrombosis Neg Hx     Pulmonary embolism Neg Hx        Review of patient's allergies indicates:   Allergen Reactions    Trulicity [dulaglutide] Shortness Of Breath and Other (See Comments)     Headaches    Hibiclens (isopropyl alcohol) Itching         OBJECTIVE:     Vital Signs   BP :112/76  ND :65     Physical Exam:  General Appearance:  NAD.   Gait: not favoring.  HEENT: PERRL.  Eyes not dry or injected.  No nasal ulcers.  Mouth  dry, no oral lesions.  Lymph: cervical, supraclavicular or axillary nodes: none abnormal   Cardio: no irregularity of S1 or S2.  No gallops or rubs.   Resp: Normal respiratory motion. Clear to auscultation bilaterally.   No abnormal chest conformation.  Abd: Soft, non-tender, nondistended.  No masses.   Skin: Head and  neck,  and extremities examined. No rashes.   Neuro: Ox3.   Cranial nerves II-XII grossly intact.   Sensation intact  in both distal LE and upper extremities to light touch.  Musculoskeletal Exam:    Right Side Rheumatological Exam     Examination finds the shoulder, elbow, wrist, knee, 1st PIP, 1st MCP, 2nd PIP, 2nd MCP, 3rd PIP, 3rd MCP, 4th PIP, 4th MCP, 5th PIP and 5th MCP no synovitis     Others :  Hip: Normal ROM     Ankle :No synovitis   Foot:No synovitis     Left Side Rheumatological Exam     Examination finds the shoulder, elbow, wrist, knee, 1st PIP, 1st MCP, 2nd PIP, 2nd MCP, 3rd PIP, 3rd MCP, 4th PIP, 4th MCP, 5th PIP and 5th MCP no synovitis     Others :  Hip:Normal ROM    Ankle :No synovitis   Foot:No synovitis       Tender points:+  Muscle strength:Equal and full in all mm groups of the upper and lower ext.    Laboratory: I have reviewed all of the patient's relevant lab work available in the medical record and have utilized this in my evaluation and management recommendations today    Imaging : I have reviewed all of the patient's diagnostic/imaging results available in the medical record and have utilized this in my evaluation and management recommendations today.    Notes reviewed  Other procedures:    ASSESSMENT/PLAN:     Chronic fatigue  -     Ambulatory referral/consult to Rheumatology    Vitamin D deficiency  -     ergocalciferol (ERGOCALCIFEROL) 50,000 unit Cap; Take 1 capsule (50,000 Units total) by mouth every 7 days.  Dispense: 12 capsule; Refill: 1    Polyarthralgia  -     CELY Screen w/Reflex; Standing  -     Rheumatoid factor; Future; Expected date: 06/10/2020  -     Cyclic Citrullinated Peptide Antibody, IgG; Future; Expected date: 06/10/2020    Class 3 severe obesity due to excess calories without serious comorbidity with body mass index (BMI) of 40.0 to 44.9 in adult      Check labs including RF/CCP and CELY to rule out any possible autoimmune etiology  However physical exam most  consistent with fibromyalgia  I have reviewed her old x-rays including C-spine, L-spine and x-ray feet    Start vitamin-D supplementation 49312 units a week    Increase gabapentin 300 mg in evening and 300 mg at night    Will call her back with results of the labs    3 month return    Risks vs Benefits and potential side effects of medication prescribed today were discussed with patient. Medication literature given to patient up discharge  Went over uptodate information /literature on the meds prescribed today    Patient advised to hold DMARD and/or biologic therapy if any for signs of infection or for surgery. If you are unsure what to do please call our office for instruction. [facility] [phone]    Thank you for allowing us to participate in the care of this patient    Disclaimer: This note was prepared using voice recognition system and is likely to have sound alike errors and is not proof read.  Please call me with any questions.

## 2020-06-10 NOTE — PROGRESS NOTES
Patient's chart was reviewed.   Requested updates within Care Everywhere.  Immunizations reconciled.    Health Maintenance was updated.  Mammogram order placed

## 2020-06-10 NOTE — LETTER
Suly 10, 2020      Drew Gutierrez MD  [address] [address] [address]           [facility]  [address] [facility] [address]  Phone: [phone]  Fax: [phone]          Patient: Yuli Milton   MR Number: [id number]   YOB: 1975   Date of Visit: 6/10/2020       Dear Dr. Drew Gutierrez:    Thank you for referring Yuli Milton to me for evaluation. Attached you will find relevant portions of my assessment and plan of care.    If you have questions, please do not hesitate to call me. I look forward to following Yuli Milton along with you.    Sincerely,    Klarissa Foster MD    Enclosure  CC:  No Recipients    If you would like to receive this communication electronically, please contact [email or web address] or [phone] to request more information on [employer] Link access.    For providers and/or their staff who would like to refer a patient to [facility], please contact us through our one-stop-shop provider referral line, [facility] , at [phone].    If you feel you have received this communication in error or would no longer like to receive these types of communications, please e-mail [email or web address]

## 2020-06-11 LAB
ANA SER QL IF: NORMAL
CCP AB SER IA-ACNC: <0.5 U/ML
RHEUMATOID FACT SERPL-ACNC: <10 IU/ML (ref 0–15)

## 2020-06-12 LAB
ANTI-SSA ANTIBODY: 0.16 RATIO (ref 0–0.99)
ANTI-SSA INTERPRETATION: NEGATIVE
ANTI-SSB ANTIBODY: 0.1 RATIO (ref 0–0.99)
ANTI-SSB INTERPRETATION: NEGATIVE

## 2020-06-26 ENCOUNTER — OFFICE VISIT (OUTPATIENT)
Dept: URGENT CARE | Facility: CLINIC | Age: 45
End: 2020-06-26
Payer: COMMERCIAL

## 2020-06-26 VITALS
WEIGHT: 255.94 LBS | HEIGHT: 65 IN | BODY MASS INDEX: 42.64 KG/M2 | TEMPERATURE: 98 F | OXYGEN SATURATION: 98 % | SYSTOLIC BLOOD PRESSURE: 123 MMHG | HEART RATE: 76 BPM | RESPIRATION RATE: 18 BRPM | DIASTOLIC BLOOD PRESSURE: 80 MMHG

## 2020-06-26 DIAGNOSIS — M54.41 RIGHT-SIDED LOW BACK PAIN WITH RIGHT-SIDED SCIATICA, UNSPECIFIED CHRONICITY: Primary | ICD-10-CM

## 2020-06-26 PROCEDURE — 96372 THER/PROPH/DIAG INJ SC/IM: CPT | Mod: S$GLB,,, | Performed by: FAMILY MEDICINE

## 2020-06-26 PROCEDURE — 96372 PR INJECTION,THERAP/PROPH/DIAG2ST, IM OR SUBCUT: ICD-10-PCS | Mod: S$GLB,,, | Performed by: FAMILY MEDICINE

## 2020-06-26 PROCEDURE — 99214 PR OFFICE/OUTPT VISIT, EST, LEVL IV, 30-39 MIN: ICD-10-PCS | Mod: 25,S$GLB,, | Performed by: NURSE PRACTITIONER

## 2020-06-26 PROCEDURE — 99214 OFFICE O/P EST MOD 30 MIN: CPT | Mod: 25,S$GLB,, | Performed by: NURSE PRACTITIONER

## 2020-06-26 RX ORDER — KETOROLAC TROMETHAMINE 30 MG/ML
30 INJECTION, SOLUTION INTRAMUSCULAR; INTRAVENOUS
Status: COMPLETED | OUTPATIENT
Start: 2020-06-26 | End: 2020-06-26

## 2020-06-26 RX ADMIN — KETOROLAC TROMETHAMINE 30 MG: 30 INJECTION, SOLUTION INTRAMUSCULAR; INTRAVENOUS at 04:06

## 2020-06-26 NOTE — PATIENT INSTRUCTIONS
Back Pain (Acute or Chronic)    Back pain is one of the most common problems. The good news is that most people feel better in 1 to 2 weeks, and most of the rest in 1 to 2 months. Most people can remain active.  People experience and describe pain differently; not everyone is the same.  · The pain can be sharp, stabbing, shooting, aching, cramping or burning.  · Movement, standing, bending, lifting, sitting, or walking may worsen pain.  · It can be localized to one spot or area, or it can be more generalized.  · It can spread or radiate upwards, to the front, or go down your arms or legs (sciatica).  · It can cause muscle spasm.  Most of the time, mechanical problems with the muscles or spine cause the pain. Mechanical problems are usually caused by an injury to the muscles or ligaments. While illness can cause back pain, it is usually not caused by a serious illness. Mechanical problems include:   · Physical activity such as sports, exercise, work, or normal activity  · Overexertion, lifting, pushing, pulling incorrectly or too aggressively  · Sudden twisting, bending, or stretching from an accident, or accidental movement  · Poor posture  · Stretching or moving wrong, without noticing pain at the time  · Poor coordination, lack of regular exercise (check with your doctor about this)  · Spinal disc disease or arthritis  · Stress  Pain can also be related to pregnancy, or illness like appendicitis, bladder or kidney infections, pelvic infections, and many other things.  Acute back pain usually gets better in 1 to 2 weeks. Back pain related to disk disease, arthritis in the spinal joints or spinal stenosis (narrowing of the spinal canal) can become chronic and last for months or years.  Unless you had a physical injury (for example, a car accident or fall) X-rays are usually not needed for the initial evaluation of back pain. If pain continues and does not respond to medical treatment, X-rays and other tests may be  needed.  Home care  Try these home care recommendations:  · When in bed, try to find a position of comfort. A firm mattress is best. Try lying flat on your back with pillows under your knees. You can also try lying on your side with your knees bent up towards your chest and a pillow between your knees.  · At first, do not try to stretch out the sore spots. If there is a strain, it is not like the good soreness you get after exercising without an injury. In this case, stretching may make it worse.  · Avoid prolong sitting, long car rides, or travel. This puts more stress on the lower back than standing or walking.  · During the first 24 to 72 hours after an acute injury or flare up of chronic back pain, apply an ice pack to the painful area for 20 minutes and then remove it for 20 minutes. Do this over a period of 60 to 90 minutes or several times a day. This will reduce swelling and pain. Wrap the ice pack in a thin towel or plastic to protect your skin.  · You can start with ice, then switch to heat. Heat (hot shower, hot bath, or heating pad) reduces pain and works well for muscle spasms. Heat can be applied to the painful area for 20 minutes then remove it for 20 minutes. Do this over a period of 60 to 90 minutes or several times a day. Do not sleep on a heating pad. It can lead to skin burns or tissue damage.  · You can alternate ice and heat therapy. Talk with your doctor about the best treatment for your back pain.  · Therapeutic massage can help relax the back muscles without stretching them.  · Be aware of safe lifting methods and do not lift anything without stretching first.  Medicines  Talk to your doctor before using medicine, especially if you have other medical problems or are taking other medicines.  · You may use over-the-counter medicine as directed on the bottle to control pain, unless another pain medicine was prescribed. If you have chronic conditions like diabetes, liver or kidney disease,  stomach ulcers, or gastrointestinal bleeding, or are taking blood thinners, talk to your doctor before taking any medicine.  · Be careful if you are given a prescription medicines, narcotics, or medicine for muscle spasms. They can cause drowsiness, affect your coordination, reflexes, and judgement. Do not drive or operate heavy machinery.  Follow-up care  Follow up with your healthcare provider, or as advised.   A radiologist will review any X-rays that were taken. Your provide will notify you of any new findings that may affect your care.  Call 911  Call emergency services if any of the following occur:  · Trouble breathing  · Confusion  · Very drowsy or trouble awakening  · Fainting or loss of consciousness  · Rapid or very slow heart rate  · Loss of bowel or bladder control  When to seek medical advice  Call your healthcare provider right away if any of these occur:   · Pain becomes worse or spreads to your legs  · Weakness or numbness in one or both legs  · Numbness in the groin or genital area  Date Last Reviewed: 7/1/2016  © 1452-7062 [employer]. [address] [place] [address], [place] [address]. All rights reserved. This information is not intended as a substitute for professional medical care. Always follow your healthcare professional's instructions.

## 2020-06-26 NOTE — PROGRESS NOTES
"Subjective:       Patient ID: Yuli Milton is a 44 y.o. female.    Vitals:  height is 5' 5" (1.651 m) and weight is 116.1 kg (255 lb 15.3 oz). Her temperature is 97.5 °F (36.4 °C). Her blood pressure is 123/80 and her pulse is 76. Her respiration is 18 and oxygen saturation is 98%.     Chief Complaint: Hip Pain    Patient is currently seeing pain management for her sciatica. States she went last week and she was given a steroid injection but it didn't work. Also taking prescription pain meds. Pt states she did try to get an appt with her pain management doctor.    Hip Pain   The incident occurred more than 1 week ago. There was no injury mechanism. The pain is present in the right hip. The quality of the pain is described as aching, shooting and stabbing. The pain is at a severity of 10/10. The pain is severe. The pain has been constant since onset. Associated symptoms include numbness. Pertinent negatives include no inability to bear weight, loss of motion, loss of sensation or muscle weakness. She reports no foreign bodies present. The symptoms are aggravated by movement and weight bearing. Treatments tried: Pain meds, stretches, cold compresses. The treatment provided no relief.       Constitution: Negative for fatigue.   Gastrointestinal: Negative for abdominal pain and bowel incontinence.   Genitourinary: Negative for dysuria, urgency, bladder incontinence and hematuria.   Musculoskeletal: Positive for pain, joint pain and back pain. Negative for muscle cramps and history of spine disorder.   Skin: Negative for rash.   Neurological: Positive for numbness. Negative for coordination disturbances and tingling.       Objective:      Physical Exam   Constitutional: She is oriented to person, place, and time. She appears well-developed. She is cooperative. No distress.   HENT:   Head: Normocephalic and atraumatic.   Nose: Nose normal.   Mouth/Throat: Oropharynx is clear and moist and mucous membranes are " normal.   Eyes: Conjunctivae and lids are normal.   Neck: Trachea normal, normal range of motion, full passive range of motion without pain and phonation normal. Neck supple.   Cardiovascular: Normal rate, regular rhythm, normal heart sounds and normal pulses.   Pulmonary/Chest: Effort normal and breath sounds normal.   Abdominal: Soft. Normal appearance and bowel sounds are normal. She exhibits no abdominal bruit, no pulsatile midline mass and no mass.   Musculoskeletal:         General: No deformity.        Back:    Neurological: She is alert and oriented to person, place, and time. She has normal strength and normal reflexes. No sensory deficit.   Skin: Skin is warm, dry, intact and not diaphoretic.   Psychiatric: Her speech is normal and behavior is normal. Judgment and thought content normal.   Nursing note and vitals reviewed.        Assessment:       1. Right-sided low back pain with right-sided sciatica, unspecified chronicity        Plan:         Right-sided low back pain with right-sided sciatica, unspecified chronicity  -     ketorolac injection 30 mg         Use heat/ice to area for 20 minutes 3-4 times a day for comfort.  No heavy lifting.  Continue to take medication prescribed for pain.  Go to ER if you have fever of 103 or higher, bowel/bladder incontinence, numbness, tingling, or weakness to lower extremities, or if your symptoms worsen in any way.    Follow up with pain management/PCP within 2 weeks if no improvement.

## 2020-06-28 ENCOUNTER — TELEPHONE (OUTPATIENT)
Dept: URGENT CARE | Facility: CLINIC | Age: 45
End: 2020-06-28

## 2020-07-13 ENCOUNTER — OFFICE VISIT (OUTPATIENT)
Dept: INTERNAL MEDICINE | Facility: CLINIC | Age: 45
End: 2020-07-13
Payer: COMMERCIAL

## 2020-07-13 DIAGNOSIS — E11.9 TYPE 2 DIABETES MELLITUS WITHOUT COMPLICATION, WITHOUT LONG-TERM CURRENT USE OF INSULIN: Primary | ICD-10-CM

## 2020-07-13 DIAGNOSIS — R51.9 NONINTRACTABLE HEADACHE, UNSPECIFIED CHRONICITY PATTERN, UNSPECIFIED HEADACHE TYPE: ICD-10-CM

## 2020-07-13 PROCEDURE — 99214 PR OFFICE/OUTPT VISIT, EST, LEVL IV, 30-39 MIN: ICD-10-PCS | Mod: 95,,, | Performed by: FAMILY MEDICINE

## 2020-07-13 PROCEDURE — 99214 OFFICE O/P EST MOD 30 MIN: CPT | Mod: 95,,, | Performed by: FAMILY MEDICINE

## 2020-07-13 RX ORDER — BUTALBITAL, ACETAMINOPHEN AND CAFFEINE 50; 325; 40 MG/1; MG/1; MG/1
1 TABLET ORAL EVERY 6 HOURS PRN
Qty: 6 TABLET | Refills: 0 | Status: SHIPPED | OUTPATIENT
Start: 2020-07-13 | End: 2020-08-28 | Stop reason: SDUPTHER

## 2020-07-13 NOTE — PROGRESS NOTES
Subjective:       Patient ID: Yuli Milton is a 44 y.o. female.    Chief Complaint: No chief complaint on file.    The patient location is: home  The chief complaint leading to consultation is: DM    Visit type: audiovisual    Face to Face time with patient: jeni 8 min      Each patient to whom he or she provides medical services by telemedicine is:  (1) informed of the relationship between the physician and patient and the respective role of any other health care provider with respect to management of the patient; and (2) notified that he or she may decline to receive medical services by telemedicine and may withdraw from such care at any time.        Diabetes  She presents for her follow-up diabetic visit. She has type 2 diabetes mellitus. No MedicAlert identification noted. The initial diagnosis of diabetes was made 17 years ago. Her disease course has been stable. Hypoglycemia symptoms include headaches. Pertinent negatives for hypoglycemia include no confusion, dizziness, hunger, mood changes, nervousness/anxiousness, pallor, seizures, sleepiness, speech difficulty, sweats or tremors. Associated symptoms include blurred vision, chest pain, fatigue, foot paresthesias, polydipsia, polyphagia, polyuria, visual change and weakness. Pertinent negatives for diabetes include no foot ulcerations and no weight loss. Pertinent negatives for hypoglycemia complications include no blackouts, no hospitalization, no nocturnal hypoglycemia, no required assistance and no required glucagon injection. Symptoms are stable. Pertinent negatives for diabetic complications include no autonomic neuropathy, CVA, heart disease, impotence, nephropathy, peripheral neuropathy, PVD or retinopathy. Current diabetic treatment includes oral agent (dual therapy). She is compliant with treatment most of the time. Her weight is increasing steadily. She is following a generally unhealthy diet. When asked about meal planning, she reported none.  She has not had a previous visit with a dietitian. She never participates in exercise. She monitors blood glucose at home 1-2 x per day. Blood glucose monitoring compliance is adequate. Her home blood glucose trend is increasing steadily. She does not see a podiatrist.Eye exam is not current.     Review of Systems   Constitutional: Positive for fatigue. Negative for fever and weight loss.   HENT: Negative for congestion.    Eyes: Positive for blurred vision. Negative for discharge.   Respiratory: Negative for shortness of breath.    Cardiovascular: Positive for chest pain.   Gastrointestinal: Negative for abdominal pain.   Endocrine: Positive for polydipsia, polyphagia and polyuria.   Genitourinary: Negative for difficulty urinating and impotence.   Musculoskeletal: Negative for joint swelling.   Skin: Negative for pallor.   Neurological: Positive for weakness and headaches. Negative for dizziness, tremors, seizures and speech difficulty.   Psychiatric/Behavioral: Negative for agitation and confusion. The patient is not nervous/anxious.        Objective:      Physical Exam  Vitals signs and nursing note reviewed.   Constitutional:       General: She is in acute distress.      Appearance: She is well-developed. She is not diaphoretic.   HENT:      Head: Normocephalic and atraumatic.      Nose: Nose normal.   Pulmonary:      Effort: Pulmonary effort is normal. No respiratory distress.   Skin:     General: Skin is dry.      Findings: No bruising or rash.   Psychiatric:         Mood and Affect: Mood is anxious.         Behavior: Behavior is agitated.         Assessment:       1. Type 2 diabetes mellitus without complication, without long-term current use of insulin    2. Nonintractable headache, unspecified chronicity pattern, unspecified headache type        Plan:     Problem List Items Addressed This Visit        Endocrine    Type 2 diabetes mellitus without complication, without long-term current use of insulin -  Primary    Relevant Orders    Hemoglobin A1C      Other Visit Diagnoses     Nonintractable headache, unspecified chronicity pattern, unspecified headache type        Relevant Medications    butalbital-acetaminophen-caffeine -40 mg (FIORICET, ESGIC) -40 mg per tablet

## 2020-07-20 ENCOUNTER — PATIENT MESSAGE (OUTPATIENT)
Dept: INTERNAL MEDICINE | Facility: CLINIC | Age: 45
End: 2020-07-20

## 2020-07-20 ENCOUNTER — LAB VISIT (OUTPATIENT)
Dept: LAB | Facility: HOSPITAL | Age: 45
End: 2020-07-20
Attending: FAMILY MEDICINE
Payer: COMMERCIAL

## 2020-07-20 ENCOUNTER — OFFICE VISIT (OUTPATIENT)
Dept: INTERNAL MEDICINE | Facility: CLINIC | Age: 45
End: 2020-07-20
Payer: COMMERCIAL

## 2020-07-20 VITALS
SYSTOLIC BLOOD PRESSURE: 124 MMHG | WEIGHT: 245.13 LBS | BODY MASS INDEX: 40.84 KG/M2 | DIASTOLIC BLOOD PRESSURE: 72 MMHG | HEIGHT: 65 IN | TEMPERATURE: 99 F

## 2020-07-20 DIAGNOSIS — G89.29 CHRONIC RIGHT-SIDED LOW BACK PAIN WITH RIGHT-SIDED SCIATICA: Primary | ICD-10-CM

## 2020-07-20 DIAGNOSIS — E55.9 VITAMIN D DEFICIENCY: ICD-10-CM

## 2020-07-20 DIAGNOSIS — M54.41 CHRONIC RIGHT-SIDED LOW BACK PAIN WITH RIGHT-SIDED SCIATICA: Primary | ICD-10-CM

## 2020-07-20 DIAGNOSIS — D50.9 IRON DEFICIENCY ANEMIA, UNSPECIFIED IRON DEFICIENCY ANEMIA TYPE: ICD-10-CM

## 2020-07-20 DIAGNOSIS — E11.9 TYPE 2 DIABETES MELLITUS WITHOUT COMPLICATION, WITHOUT LONG-TERM CURRENT USE OF INSULIN: ICD-10-CM

## 2020-07-20 PROCEDURE — 99999 PR PBB SHADOW E&M-EST. PATIENT-LVL V: CPT | Mod: PBBFAC,,, | Performed by: FAMILY MEDICINE

## 2020-07-20 PROCEDURE — 36415 COLL VENOUS BLD VENIPUNCTURE: CPT | Mod: PO

## 2020-07-20 PROCEDURE — 82306 VITAMIN D 25 HYDROXY: CPT

## 2020-07-20 PROCEDURE — 82728 ASSAY OF FERRITIN: CPT

## 2020-07-20 PROCEDURE — 99214 PR OFFICE/OUTPT VISIT, EST, LEVL IV, 30-39 MIN: ICD-10-PCS | Mod: 25,S$GLB,, | Performed by: FAMILY MEDICINE

## 2020-07-20 PROCEDURE — 99999 PR PBB SHADOW E&M-EST. PATIENT-LVL V: ICD-10-PCS | Mod: PBBFAC,,, | Performed by: FAMILY MEDICINE

## 2020-07-20 PROCEDURE — 83540 ASSAY OF IRON: CPT

## 2020-07-20 PROCEDURE — 80061 LIPID PANEL: CPT

## 2020-07-20 PROCEDURE — 83036 HEMOGLOBIN GLYCOSYLATED A1C: CPT

## 2020-07-20 PROCEDURE — 96372 THER/PROPH/DIAG INJ SC/IM: CPT | Mod: S$GLB,,, | Performed by: FAMILY MEDICINE

## 2020-07-20 PROCEDURE — 85025 COMPLETE CBC W/AUTO DIFF WBC: CPT

## 2020-07-20 PROCEDURE — 99214 OFFICE O/P EST MOD 30 MIN: CPT | Mod: 25,S$GLB,, | Performed by: FAMILY MEDICINE

## 2020-07-20 PROCEDURE — 96372 PR INJECTION,THERAP/PROPH/DIAG2ST, IM OR SUBCUT: ICD-10-PCS | Mod: S$GLB,,, | Performed by: FAMILY MEDICINE

## 2020-07-20 RX ORDER — KETOROLAC TROMETHAMINE 30 MG/ML
60 INJECTION, SOLUTION INTRAMUSCULAR; INTRAVENOUS
Status: COMPLETED | OUTPATIENT
Start: 2020-07-20 | End: 2020-07-20

## 2020-07-20 RX ORDER — OXYCODONE HYDROCHLORIDE AND ACETAMINOPHEN 10; 325 MG/1; MG/1
TABLET ORAL
COMMUNITY
Start: 2020-06-24 | End: 2021-03-22

## 2020-07-20 RX ORDER — GABAPENTIN 600 MG/1
600 TABLET ORAL NIGHTLY
Qty: 30 TABLET | Refills: 0 | Status: SHIPPED | OUTPATIENT
Start: 2020-07-20 | End: 2020-09-28 | Stop reason: SDUPTHER

## 2020-07-20 RX ORDER — GABAPENTIN 300 MG/1
CAPSULE ORAL
COMMUNITY
Start: 2020-06-23 | End: 2020-07-20

## 2020-07-20 RX ADMIN — KETOROLAC TROMETHAMINE 60 MG: 30 INJECTION, SOLUTION INTRAMUSCULAR; INTRAVENOUS at 04:07

## 2020-07-20 NOTE — PROGRESS NOTES
Subjective:       Patient ID: Yuli Milton is a 44 y.o. female.    Chief Complaint: Sciatica (right side of buttock)    Back Pain  This is a chronic problem. The current episode started more than 1 year ago. The problem occurs constantly. The problem is unchanged. The pain is present in the gluteal. The quality of the pain is described as aching. The pain radiates to the right thigh. The pain is at a severity of 10/10. The pain is severe. The pain is the same all the time. The symptoms are aggravated by sitting. Stiffness is present all day. Associated symptoms include dysuria, leg pain and numbness. Pertinent negatives include no abdominal pain, bladder incontinence, bowel incontinence, chest pain, fever, headaches, paresis, paresthesias, pelvic pain, perianal numbness, tingling, weakness or weight loss. The treatment provided mild relief.     Review of Systems   Constitutional: Negative for fever and weight loss.   Cardiovascular: Negative for chest pain.   Gastrointestinal: Negative for abdominal pain and bowel incontinence.   Endocrine:        Cold sweats   Genitourinary: Positive for dysuria. Negative for bladder incontinence, hematuria and pelvic pain.   Musculoskeletal: Positive for back pain and gait problem.   Neurological: Positive for numbness. Negative for tingling, weakness, headaches and paresthesias.       Objective:      Physical Exam  Vitals signs and nursing note reviewed.   Constitutional:       General: She is in acute distress.      Appearance: Normal appearance. She is well-developed. She is not diaphoretic.   HENT:      Head: Normocephalic and atraumatic.      Nose: Nose normal.   Pulmonary:      Effort: Pulmonary effort is normal. No respiratory distress.      Breath sounds: Normal breath sounds. No wheezing.   Musculoskeletal:         General: No swelling.      Right lower leg: No edema.      Left lower leg: No edema.      Comments: RLE SLR +  Palpable TTP to right buttock   Skin:      General: Skin is warm and dry.      Findings: No erythema or rash.   Neurological:      Mental Status: She is alert.         Assessment:       1. Chronic right-sided low back pain with right-sided sciatica    2. Type 2 diabetes mellitus without complication, without long-term current use of insulin    3. Vitamin D deficiency    4. Iron deficiency anemia, unspecified iron deficiency anemia type        Plan:     Problem List Items Addressed This Visit        Oncology    Iron deficiency anemia    Overview     Overview:   Due to menorrhagia  Causing fatigue and shortness of breath with exertion         Relevant Orders    Ferritin    Iron and TIBC    CBC auto differential       Endocrine    Type 2 diabetes mellitus without complication, without long-term current use of insulin    Relevant Orders    Lipid Panel    Vitamin D deficiency    Relevant Orders    Vitamin D       Orthopedic    Chronic right-sided low back pain with right-sided sciatica - Primary    Relevant Medications    ketorolac injection 60 mg (Start on 7/20/2020  4:15 PM)    gabapentin (NEURONTIN) 600 MG tablet    Other Relevant Orders    Ambulatory referral/consult to Physical/Occupational Therapy    Ambulatory referral/consult to Pain Clinic

## 2020-07-20 NOTE — PROGRESS NOTES
Administered Toradol 60mg  IM to pt right ventrogluteal. Pt tolerated well. No distress noted. Advised pt to wait 20 minutes in lobby and to inform  if any adverse reaction occurs. Pt stated understanding.

## 2020-07-21 ENCOUNTER — TELEPHONE (OUTPATIENT)
Dept: PAIN MEDICINE | Facility: CLINIC | Age: 45
End: 2020-07-21

## 2020-07-21 ENCOUNTER — TELEPHONE (OUTPATIENT)
Dept: INTERNAL MEDICINE | Facility: CLINIC | Age: 45
End: 2020-07-21

## 2020-07-21 LAB
25(OH)D3+25(OH)D2 SERPL-MCNC: 16 NG/ML (ref 30–96)
BASOPHILS # BLD AUTO: 0.03 K/UL (ref 0–0.2)
BASOPHILS NFR BLD: 0.7 % (ref 0–1.9)
CHOLEST SERPL-MCNC: 150 MG/DL (ref 120–199)
CHOLEST/HDLC SERPL: 3.7 {RATIO} (ref 2–5)
DIFFERENTIAL METHOD: ABNORMAL
EOSINOPHIL # BLD AUTO: 0 K/UL (ref 0–0.5)
EOSINOPHIL NFR BLD: 0.2 % (ref 0–8)
ERYTHROCYTE [DISTWIDTH] IN BLOOD BY AUTOMATED COUNT: 13.6 % (ref 11.5–14.5)
ESTIMATED AVG GLUCOSE: 174 MG/DL (ref 68–131)
FERRITIN SERPL-MCNC: 84 NG/ML (ref 20–300)
HBA1C MFR BLD HPLC: 7.7 % (ref 4–5.6)
HCT VFR BLD AUTO: 38.9 % (ref 37–48.5)
HDLC SERPL-MCNC: 41 MG/DL (ref 40–75)
HDLC SERPL: 27.3 % (ref 20–50)
HGB BLD-MCNC: 12.4 G/DL (ref 12–16)
IMM GRANULOCYTES # BLD AUTO: 0.02 K/UL (ref 0–0.04)
IMM GRANULOCYTES NFR BLD AUTO: 0.5 % (ref 0–0.5)
IRON SERPL-MCNC: 40 UG/DL (ref 30–160)
LDLC SERPL CALC-MCNC: 94.4 MG/DL (ref 63–159)
LYMPHOCYTES # BLD AUTO: 2.1 K/UL (ref 1–4.8)
LYMPHOCYTES NFR BLD: 51.1 % (ref 18–48)
MCH RBC QN AUTO: 29.2 PG (ref 27–31)
MCHC RBC AUTO-ENTMCNC: 31.9 G/DL (ref 32–36)
MCV RBC AUTO: 92 FL (ref 82–98)
MONOCYTES # BLD AUTO: 0.3 K/UL (ref 0.3–1)
MONOCYTES NFR BLD: 7.6 % (ref 4–15)
NEUTROPHILS # BLD AUTO: 1.7 K/UL (ref 1.8–7.7)
NEUTROPHILS NFR BLD: 39.9 % (ref 38–73)
NONHDLC SERPL-MCNC: 109 MG/DL
NRBC BLD-RTO: 0 /100 WBC
PLATELET # BLD AUTO: 358 K/UL (ref 150–350)
PMV BLD AUTO: 11.4 FL (ref 9.2–12.9)
RBC # BLD AUTO: 4.25 M/UL (ref 4–5.4)
SATURATED IRON: 10 % (ref 20–50)
TOTAL IRON BINDING CAPACITY: 389 UG/DL (ref 250–450)
TRANSFERRIN SERPL-MCNC: 263 MG/DL (ref 200–375)
TRIGL SERPL-MCNC: 73 MG/DL (ref 30–150)
WBC # BLD AUTO: 4.19 K/UL (ref 3.9–12.7)

## 2020-07-21 RX ORDER — CYCLOBENZAPRINE HCL 10 MG
10 TABLET ORAL NIGHTLY
Qty: 30 TABLET | Refills: 0 | Status: SHIPPED | OUTPATIENT
Start: 2020-07-21 | End: 2020-08-20

## 2020-07-21 NOTE — TELEPHONE ENCOUNTER
Spoke with patient states that she has a PT appointment today for 5:45 pm. Pt states that she would like a Rx for muscle relaxer.

## 2020-07-21 NOTE — PROGRESS NOTES
Please call pt with abnormal results. Pt does not need appt at this time, unless they have questions or wish to further discuss.  a1c worsening -  Needs to adhere to a better diet, cont medication.  Vit D improving - needs to cont vit d tho.  Platelets still remain mildly elevated, could be 2/2 meds, I would not worry at this time tho.

## 2020-07-21 NOTE — TELEPHONE ENCOUNTER
Spoke with patient, pt states that sciatic nerve pain is not better w/ injection on yesterday and increased gabapentin dosage. Pt wants to know can a stronger medication be sent to pharmacy?

## 2020-07-25 ENCOUNTER — TELEPHONE (OUTPATIENT)
Dept: INTERNAL MEDICINE | Facility: CLINIC | Age: 45
End: 2020-07-25

## 2020-07-25 RX ORDER — ONDANSETRON 4 MG/1
8 TABLET, ORALLY DISINTEGRATING ORAL EVERY 8 HOURS PRN
Qty: 30 TABLET | Refills: 0 | Status: SHIPPED | OUTPATIENT
Start: 2020-07-25 | End: 2020-08-28

## 2020-07-25 NOTE — TELEPHONE ENCOUNTER
----- Message from Brandee Guadalupe sent at 7/24/2020  1:26 PM CDT -----  Regarding: Erik  Pt is calling in regards to questions about getting medication for nausea.          Pls call back at [phone]

## 2020-07-29 ENCOUNTER — CLINICAL SUPPORT (OUTPATIENT)
Dept: REHABILITATION | Facility: HOSPITAL | Age: 45
End: 2020-07-29
Attending: FAMILY MEDICINE
Payer: COMMERCIAL

## 2020-07-29 DIAGNOSIS — G89.29 CHRONIC RIGHT-SIDED LOW BACK PAIN WITH RIGHT-SIDED SCIATICA: ICD-10-CM

## 2020-07-29 DIAGNOSIS — M54.41 CHRONIC RIGHT-SIDED LOW BACK PAIN WITH RIGHT-SIDED SCIATICA: ICD-10-CM

## 2020-07-29 PROCEDURE — 97162 PT EVAL MOD COMPLEX 30 MIN: CPT

## 2020-07-29 NOTE — PLAN OF CARE
[facility]  Physical Therapy Initial Evaluation    Date: 7/29/2020   Name: Yuli Milton  Clinic Number: [id number]    Therapy Diagnosis:   Encounter Diagnosis   Name Primary?    Chronic right-sided low back pain with right-sided sciatica      Physician: Drew Gutierrez MD    Physician Orders: PT Eval and Treat   Medical Diagnosis from Referral: M54.41,G89.29 (ICD-10-CM) - Chronic right-sided low back pain with right-sided sciatica  Evaluation Date: 7/29/2020  Authorization Period Expiration: 12/31/2020  Plan of Care Expiration: 9/29/2020  Visit # / Visits authorized: 1/12    Time In: 5:00pm  Time Out: 5:45pm  Total Appointment Time (timed & untimed codes): 40 minutes    Precautions: Standard    Surgery: None    Subjective   Date of onset: 2 weeks ago  History of current condition - Yuli reports: She has been sciatic nerve pain on her right side. She states it has been going on for years but it just recently flared up really bad. 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 She states around 2 Sundays ago it really flared up and hasn't got better. She states she has been taking a muscle relaxer and pain medication but it hasn't helped. She states just sitting bothers her, laying on her right side, standing for long periods, and walking long distances all seems to increase her pain. She states the main pain is in her butt and shoots down into her leg.      Medical History:   Past Medical History:   Diagnosis Date    Abnormal Pap smear of cervix     treatment??    Abnormal Pap smear of vagina     Anemia     Anxiety     Anxiety and depression     Asthma     Bacterial vaginosis 1/5/2018    Chlamydia     Depression (emotion)     Diabetes mellitus     SANTOS (dyspnea on exertion) 10/14/2019    Dysuria 3/31/2018    Dysuria 3/31/2018    Gallstones     Gastritis     History of ovarian cyst     History of syphilis     History of  trichomoniasis     Hyperlipidemia     Lower abdominal pain 7/31/2017    Mental disorder     Persistent cough 7/27/2018    PONV (postoperative nausea and vomiting)     Preop general physical exam 12/9/2019    Shortness of breath 6/26/2017    Suicidal ideations 7/9/2019    Vaginal candidiasis 7/24/2018    Vaginal itching 1/5/2018       Surgical History:   Yuli Milton  has a past surgical history that includes Appendectomy; Dilation and curettage of uterus; Colonoscopy (N/A, 7/31/2017); Cholecystectomy; and Hysterectomy (08/31/2016).    Medications:   Yuli has a current medication list which includes the following prescription(s): albuterol, alprazolam, aspirin, atorvastatin, butalbital-acetaminophen-caffeine -40 mg, cyclobenzaprine, ergocalciferol, fluoxetine, fluticasone propionate, furosemide, gabapentin, ketoconazole, losartan, metformin, olanzapine, ondansetron, percocet, potassium chloride, semaglutide, potassium chloride, and potassium chloride.    Allergies:   Review of patient's allergies indicates:   Allergen Reactions    Trulicity [dulaglutide] Shortness Of Breath and Other (See Comments)     Headaches    Hibiclens (isopropyl alcohol) Itching        Imaging, none    Prior Therapy: years ago  Social History:  lives with their son  Occupation:   Prior Level of Function: walking longer distances, driving without issues  Current Level of Function: can't sleep, walking short distances, having to stand when sitting for to long     Pain:  Current 10/10, worst 10/10, best 8/10   Location: right back  and buttocks   Description: Sharp and Shooting  Aggravating Factors: Sitting, Standing, Walking, Lifting and Driving   Easing Factors: nothing    Pts goals: decrease pain    Objective     Sensation:  Sensation is intact to light touch    (WFL: Within Functional Limits)     ROM   Right (degrees) Left (degrees)   Lumbar Flexion  75% 75%   Lumbar Extension 50% 50%   Lumbar  Sidebending 75% 75%   Lumbar Rotation 75% 75%     Hip Flexion (125) WFL pain WFL    Hip Extension (15) WFL  WFL    Hip Internal Rotation (45) WFL  WFL    Hip External Rotation (45) WFL  WFL    Hip Abduction (45) WFL  WFL      Strength   Right    Left   Gluteus Hernan 4/5 4/5   Gluteus Medius 4-/5 4-/5   Hip Adductors 4/5 4/5   Psoas 4/5 4+/5   Quadriceps 4+/5 4+/5   Hamstrings 4+/5 4+/5   Anterior Tibialis 5/5 5/5   Gastroc/Soleus 5/5 5/5   Transverse Abdominis 3/5 3/5     Special Tests:   Test Right Left   SLR Test negative negative   SLUMP  positive negative   ASIS Compression negative negative   ASIS Distraction negative negative   Posterior [place]ar Gap negative negative     Muscle Length: decreased hamstring length on the right    Palpation: severe tenderness at right piriformis      Movement Analysis: unable to stand on right leg due to  Pain in right glute     Gait Analysis: The patient ambulated with the use of none and presents with the following gait abnormalities: decreased stance time on R and decreased roddy    Function:     CMS Impairment/Limitation/Restriction for FOTO Low Back Survey    Therapist reviewed FOTO scores for Yuli Milton on 7/29/2020.   FOTO documents entered into [employer] - see Media section.    Limitation Score: 44%  Category: Other         TREATMENT   Treatment Time In: 5:30pm  Treatment Time Out: 5:45pm  Total Treatment time (time-based codes) separate from Evaluation: 15 minutes    Yuli received therapeutic exercises to develop strength, endurance, ROM, flexibility, posture and core stabilization for 10 minutes including:  Single Knee to Chest [id number]  Piriformis Stretch [id number]  Bridges    Yuli received the following manual therapy techniques: Joint mobilizations, Myofacial release and Soft tissue Mobilization were applied to the: right pirifmormis for 5 minutes, including:  Myofascial Release to right piriformis to decrease tension    Yuli participated in  neuromuscular re-education activities to improve: Balance, Coordination, Sense, Proprioception and Posture for 0 minutes. The following activities were included:    Home Exercises and Patient Education Provided    Education provided:   - single knee to chest, piriformis stretch, and bridges     Written Home Exercises Provided: yes.  Exercises were reviewed and Yuli was able to demonstrate them prior to the end of the session.  Yuli demonstrated good  understanding of the education provided.     See EMR under Patient Instructions for exercises provided 7/29/2020.    Assessment   Yuli is a 44 y.o. female referred to outpatient Physical Therapy with a medical diagnosis of M54.41,G89.29 (ICD-10-CM) - Chronic right-sided low back pain with right-sided sciatica. The patient presents with impairments which include decreased ROM, decreased strength, decreased muscle length, impaired coordination, impaired balance, gait abnormalities and decreased overall function.  These impairments are limiting patient's ability to walk long distances, stand for long periods, and drive for long periods. Pt prognosis is Good due to personal factors and co-morbidities listed below. Pt will benefit from skilled outpatient Physical Therapy to address the deficits stated above and in the chart below, provide pt/family education, and to maximize pt's level of independence.     Plan of care discussed with patient: Yes  Pt's spiritual, cultural and educational needs considered and patient is agreeable to the plan of care and goals as stated below:     Anticipated Barriers for therapy: chronic condition, travel distance for therapy     Medical Necessity is demonstrated by the following  History  Co-morbidities and personal factors that may impact the plan of care Co-morbidities:   depression, diabetes, high BMI and HTN    Personal Factors:   coping style     moderate   Examination  Body Structures and Functions, activity limitations and  participation restrictions that may impact the plan of care Body Regions:   back  lower extremities    Body Systems:    ROM  strength  gross coordinated movement  balance  gait  transfers    Participation Restrictions:   Driving     Activity limitations:   Learning and applying knowledge  no deficits    General Tasks and Commands  no deficits    Communication  no deficits    Mobility  walking  driving (bike, car, motorcycle)    Self care  no deficits    Domestic Life  shopping  doing house work (cleaning house, washing dishes, laundry)    Interactions/Relationships  no deficits    Life Areas  employment    Community and Social Life  community life  recreation and leisure         moderate   Clinical Presentation evolving clinical presentation with changing clinical characteristics moderate   Decision Making/ Complexity Score: moderate     Goals:  Short Term Goals: 4 weeks   1. Recent signs and systems trend is improving in order to progress towards LTG's.  2. Patient will improve lumbar sidebend and rotation to 100% in order to decrease pain in glutes  3. Patient will be independent with HEP in order to further progress and return to maximal function.  4. Pain rating at Worst: 4/10 in order to progress towards increased independence with activity.    Long Term Goals: 8 weeks   1. Patient will improve glute med strength to 4+/5 in order to walk long distances.  2. Patient will improve psoas strength to 4+/5 in order to stand for long periods.  3. 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   4. Patient will demonstrate normal gait mechanics in order to minimize any compensation and return to PLOF.   5. Patient will self report improvement to 28% limitation on the FOTO Low Back Survey.     Plan   Plan of care Certification: 7/29/2020 to 9/29/2020.    Outpatient Physical Therapy 2 times weekly for 8 weeks to include the following interventions: Electrical Stimulation IFC, Gait  Training, Manual Therapy, Moist Heat/ Ice, Neuromuscular Re-ed, Patient Education, Self Care, Therapeutic Activites and Therapeutic Exercise.     Jaxson Hampton, PT, DPT

## 2020-08-22 PROCEDURE — 95800 SLP STDY UNATTENDED: CPT | Mod: 26,52,, | Performed by: INTERNAL MEDICINE

## 2020-08-22 PROCEDURE — 95800 PR SLEEP STUDY, UNATTENDED, RECORD HEART RATE/O2 SAT/RESP ANAL/SLEEP TIME: ICD-10-PCS | Mod: 26,52,, | Performed by: INTERNAL MEDICINE

## 2020-08-24 ENCOUNTER — OFFICE VISIT (OUTPATIENT)
Dept: INTERNAL MEDICINE | Facility: CLINIC | Age: 45
End: 2020-08-24
Payer: COMMERCIAL

## 2020-08-24 DIAGNOSIS — N30.90 CYSTITIS: Primary | ICD-10-CM

## 2020-08-24 PROCEDURE — 99214 OFFICE O/P EST MOD 30 MIN: CPT | Mod: 95,,, | Performed by: FAMILY MEDICINE

## 2020-08-24 PROCEDURE — 99214 PR OFFICE/OUTPT VISIT, EST, LEVL IV, 30-39 MIN: ICD-10-PCS | Mod: 95,,, | Performed by: FAMILY MEDICINE

## 2020-08-24 RX ORDER — SULFAMETHOXAZOLE AND TRIMETHOPRIM 800; 160 MG/1; MG/1
1 TABLET ORAL 2 TIMES DAILY
Qty: 6 TABLET | Refills: 0 | Status: SHIPPED | OUTPATIENT
Start: 2020-08-24 | End: 2020-10-02

## 2020-08-24 NOTE — PROGRESS NOTES
Subjective:       Patient ID: Yuli Milton is a 44 y.o. female.    Chief Complaint: No chief complaint on file.    Abdominal Pain  This is a new problem. The current episode started yesterday. The onset quality is gradual. The problem occurs constantly. The problem has been unchanged. The pain is located in the left flank and right flank. The pain is at a severity of 6/10. The pain is moderate. The quality of the pain is aching, burning and a sensation of fullness. Associated symptoms include arthralgias, dysuria, frequency, headaches and myalgias. Pertinent negatives include no anorexia, belching, constipation, diarrhea, fever, flatus, hematochezia, hematuria, melena, nausea, vomiting or weight loss. Nothing aggravates the pain. The pain is relieved by nothing. She has tried nothing for the symptoms. There is no history of abdominal surgery, colon cancer, Crohn's disease, gallstones, GERD, irritable bowel syndrome, pancreatitis, PUD or ulcerative colitis. Patient's medical history includes UTI. Patient's medical history does not include kidney stones.     Review of Systems   Constitutional: Negative for fever and weight loss.   HENT: Negative for congestion.    Eyes: Negative for discharge.   Respiratory: Negative for shortness of breath.    Cardiovascular: Negative for chest pain.   Gastrointestinal: Positive for abdominal pain. Negative for anorexia, constipation, diarrhea, flatus, hematochezia, melena, nausea and vomiting.   Genitourinary: Positive for dysuria and frequency. Negative for difficulty urinating and hematuria.   Musculoskeletal: Positive for arthralgias and myalgias. Negative for joint swelling.   Neurological: Positive for headaches. Negative for dizziness.   Psychiatric/Behavioral: Negative for agitation.       Objective:      Physical Exam  Vitals signs and nursing note reviewed.   Constitutional:       General: She is not in acute distress.     Appearance: Normal appearance. She is  well-developed. She is not diaphoretic.   HENT:      Head: Normocephalic and atraumatic.   Pulmonary:      Effort: No respiratory distress.      Breath sounds: No wheezing.   Skin:     Findings: No erythema or rash.      Comments: Good turgor   Neurological:      Mental Status: She is alert.   Psychiatric:         Mood and Affect: Mood normal.         Behavior: Behavior normal.         Thought Content: Thought content normal.         Judgment: Judgment normal.         Assessment:       1. Cystitis        Plan:     Problem List Items Addressed This Visit     None      Visit Diagnoses     Cystitis    -  Primary    Relevant Medications    sulfamethoxazole-trimethoprim 800-160mg (BACTRIM DS) 800-160 mg Tab

## 2020-08-25 ENCOUNTER — PROCEDURE VISIT (OUTPATIENT)
Dept: SLEEP MEDICINE | Facility: CLINIC | Age: 45
End: 2020-08-25
Payer: COMMERCIAL

## 2020-08-25 DIAGNOSIS — G47.33 OSA (OBSTRUCTIVE SLEEP APNEA): Primary | ICD-10-CM

## 2020-08-25 DIAGNOSIS — G47.8 NON-RESTORATIVE SLEEP: ICD-10-CM

## 2020-08-25 DIAGNOSIS — R29.818 SUSPECTED SLEEP APNEA: ICD-10-CM

## 2020-08-25 DIAGNOSIS — R06.83 SNORING: ICD-10-CM

## 2020-08-25 DIAGNOSIS — G47.19 EXCESSIVE DAYTIME SLEEPINESS: ICD-10-CM

## 2020-08-25 NOTE — Clinical Note
PHYSICIAN INTERPRETATION AND COMMENTS: Findings are consistent with very mild, non-positional obstructive sleep  apnea (SCOUT). Overall AHI was 5.0/hr with 3.7 hours data. RDI was 13.0/hr. There is insufficient supine study time to assess the  severity of positional obstructive sleep apnea (SCOUT). Moderate snoring. SpO2 curtis 89.6%. Recommend repeat testing with > 5  hours data time or inlab PSG with MSLT to evaluate for hypersomnolence.  CLINICAL HISTORY: 44 year old female presented with: 13.8 inch neck, BMI of 41, an [place] sleepiness score of 4, history  of diabetes, depression and symptoms of nocturnal snoring and waking up choking. Based on the clinical history, the patient has a  high pre-test probability of having mild SCOUT. Works as . [place] on office notes: 16, STOP BANG score 3.

## 2020-08-28 ENCOUNTER — OFFICE VISIT (OUTPATIENT)
Dept: INTERNAL MEDICINE | Facility: CLINIC | Age: 45
End: 2020-08-28
Payer: COMMERCIAL

## 2020-08-28 VITALS
WEIGHT: 246.06 LBS | HEIGHT: 65 IN | TEMPERATURE: 100 F | SYSTOLIC BLOOD PRESSURE: 104 MMHG | BODY MASS INDEX: 41 KG/M2 | DIASTOLIC BLOOD PRESSURE: 76 MMHG | HEART RATE: 68 BPM

## 2020-08-28 DIAGNOSIS — E11.9 TYPE 2 DIABETES MELLITUS WITHOUT COMPLICATION, WITHOUT LONG-TERM CURRENT USE OF INSULIN: ICD-10-CM

## 2020-08-28 DIAGNOSIS — R51.9 NONINTRACTABLE HEADACHE, UNSPECIFIED CHRONICITY PATTERN, UNSPECIFIED HEADACHE TYPE: ICD-10-CM

## 2020-08-28 DIAGNOSIS — E55.9 VITAMIN D DEFICIENCY: ICD-10-CM

## 2020-08-28 DIAGNOSIS — N28.89 OTHER SPECIFIED DISORDERS OF KIDNEY AND URETER: ICD-10-CM

## 2020-08-28 DIAGNOSIS — R68.81 EARLY SATIETY: ICD-10-CM

## 2020-08-28 DIAGNOSIS — R30.0 DYSURIA: ICD-10-CM

## 2020-08-28 PROBLEM — Z72.51 HIGH-RISK SEXUAL BEHAVIOR: Status: RESOLVED | Noted: 2018-01-05 | Resolved: 2020-08-28

## 2020-08-28 LAB
BACTERIA #/AREA URNS AUTO: ABNORMAL /HPF
BILIRUB UR QL STRIP: NEGATIVE
CLARITY UR REFRACT.AUTO: CLEAR
COLOR UR AUTO: YELLOW
GLUCOSE SERPL-MCNC: 81 MG/DL (ref 70–110)
GLUCOSE UR QL STRIP: NEGATIVE
HGB UR QL STRIP: NEGATIVE
HYALINE CASTS UR QL AUTO: 3 /LPF
KETONES UR QL STRIP: NEGATIVE
LEUKOCYTE ESTERASE UR QL STRIP: NEGATIVE
MICROSCOPIC COMMENT: ABNORMAL
NITRITE UR QL STRIP: NEGATIVE
PH UR STRIP: 5 [PH] (ref 5–8)
PROT UR QL STRIP: NEGATIVE
RBC #/AREA URNS AUTO: 1 /HPF (ref 0–4)
SP GR UR STRIP: 1.02 (ref 1–1.03)
SQUAMOUS #/AREA URNS AUTO: 1 /HPF
URN SPEC COLLECT METH UR: NORMAL
WBC #/AREA URNS AUTO: 1 /HPF (ref 0–5)

## 2020-08-28 PROCEDURE — 81001 URINALYSIS AUTO W/SCOPE: CPT

## 2020-08-28 PROCEDURE — 99999 PR PBB SHADOW E&M-EST. PATIENT-LVL V: CPT | Mod: PBBFAC,,, | Performed by: FAMILY MEDICINE

## 2020-08-28 PROCEDURE — 96372 PR INJECTION,THERAP/PROPH/DIAG2ST, IM OR SUBCUT: ICD-10-PCS | Mod: S$GLB,,, | Performed by: FAMILY MEDICINE

## 2020-08-28 PROCEDURE — 99999 PR PBB SHADOW E&M-EST. PATIENT-LVL V: ICD-10-PCS | Mod: PBBFAC,,, | Performed by: FAMILY MEDICINE

## 2020-08-28 PROCEDURE — 99215 OFFICE O/P EST HI 40 MIN: CPT | Mod: 25,S$GLB,, | Performed by: FAMILY MEDICINE

## 2020-08-28 PROCEDURE — 82962 GLUCOSE BLOOD TEST: CPT | Mod: S$GLB,,, | Performed by: FAMILY MEDICINE

## 2020-08-28 PROCEDURE — 82962 POCT GLUCOSE, HAND-HELD DEVICE: ICD-10-PCS | Mod: S$GLB,,, | Performed by: FAMILY MEDICINE

## 2020-08-28 PROCEDURE — 99215 PR OFFICE/OUTPT VISIT, EST, LEVL V, 40-54 MIN: ICD-10-PCS | Mod: 25,S$GLB,, | Performed by: FAMILY MEDICINE

## 2020-08-28 PROCEDURE — 96372 THER/PROPH/DIAG INJ SC/IM: CPT | Mod: S$GLB,,, | Performed by: FAMILY MEDICINE

## 2020-08-28 RX ORDER — ASPIRIN 325 MG
325 TABLET ORAL DAILY
Qty: 360 TABLET | Refills: 1
Start: 2020-08-28 | End: 2021-07-22

## 2020-08-28 RX ORDER — CELECOXIB 200 MG/1
CAPSULE ORAL
COMMUNITY
Start: 2020-07-23 | End: 2022-08-23

## 2020-08-28 RX ORDER — BUTALBITAL, ACETAMINOPHEN AND CAFFEINE 50; 325; 40 MG/1; MG/1; MG/1
1 TABLET ORAL EVERY 6 HOURS PRN
Qty: 6 TABLET | Refills: 0 | Status: SHIPPED | OUTPATIENT
Start: 2020-08-28 | End: 2021-03-22 | Stop reason: ALTCHOICE

## 2020-08-28 RX ORDER — ATORVASTATIN CALCIUM 20 MG/1
20 TABLET, FILM COATED ORAL DAILY
Qty: 90 TABLET | Refills: 1 | Status: SHIPPED | OUTPATIENT
Start: 2020-08-28 | End: 2021-01-22 | Stop reason: SDUPTHER

## 2020-08-28 RX ORDER — NAPROXEN 500 MG/1
500 TABLET ORAL 2 TIMES DAILY
COMMUNITY
Start: 2020-08-12 | End: 2020-08-28

## 2020-08-28 RX ORDER — ERGOCALCIFEROL 1.25 MG/1
50000 CAPSULE ORAL
Qty: 12 CAPSULE | Refills: 1 | Status: SHIPPED | OUTPATIENT
Start: 2020-08-28 | End: 2021-03-02

## 2020-08-28 RX ORDER — FLUOXETINE HYDROCHLORIDE 20 MG/1
CAPSULE ORAL
COMMUNITY
Start: 2020-05-28 | End: 2020-08-28

## 2020-08-28 RX ORDER — TRAMADOL HYDROCHLORIDE 50 MG/1
50 TABLET ORAL 2 TIMES DAILY PRN
COMMUNITY
Start: 2020-07-23 | End: 2020-10-02

## 2020-08-28 RX ORDER — TIZANIDINE 4 MG/1
TABLET ORAL
COMMUNITY
Start: 2020-07-23 | End: 2020-09-28 | Stop reason: SDUPTHER

## 2020-08-28 RX ORDER — CEFTRIAXONE 1 G/1
1 INJECTION, POWDER, FOR SOLUTION INTRAMUSCULAR; INTRAVENOUS
Status: COMPLETED | OUTPATIENT
Start: 2020-08-28 | End: 2020-08-28

## 2020-08-28 RX ADMIN — CEFTRIAXONE 1 G: 1 INJECTION, POWDER, FOR SOLUTION INTRAMUSCULAR; INTRAVENOUS at 03:08

## 2020-08-28 NOTE — PROGRESS NOTES
Subjective:       Patient ID: Yuli Milton is a 44 y.o. female.    Chief Complaint: Anxiety (follow up)    Alternating stools with hard stools and at times it is very soft, but not watery.  Painful BM    Abdominal Pain  This is a new problem. The current episode started 1 to 4 weeks ago. The onset quality is gradual. The problem occurs constantly. The problem has been unchanged. The pain is located in the generalized abdominal region. The pain is at a severity of 6/10. The pain is moderate. The quality of the pain is aching and a sensation of fullness. Associated symptoms include arthralgias, dysuria, frequency, headaches and myalgias. Pertinent negatives include no anorexia, belching, constipation, diarrhea, fever, flatus, hematochezia, hematuria, melena, nausea, vomiting or weight loss. The pain is aggravated by bowel movement. The pain is relieved by nothing. She has tried acetaminophen and antibiotics for the symptoms. The treatment provided no relief. Patient's medical history includes UTI.     Review of Systems   Constitutional: Negative for fever and weight loss.   HENT: Negative for congestion.    Eyes: Positive for visual disturbance.   Respiratory: Negative for shortness of breath.    Cardiovascular: Negative for chest pain and palpitations.   Gastrointestinal: Positive for abdominal pain. Negative for anorexia, constipation, diarrhea, flatus, hematochezia, melena, nausea and vomiting.   Endocrine: Positive for polyuria.   Genitourinary: Positive for dysuria and frequency. Negative for difficulty urinating and hematuria.   Musculoskeletal: Positive for arthralgias and myalgias. Negative for joint swelling.   Skin: Negative for wound.   Neurological: Positive for headaches. Negative for dizziness.   Psychiatric/Behavioral: Negative for agitation.       Objective:      Physical Exam  Vitals signs and nursing note reviewed.   Constitutional:       General: She is in acute distress.      Appearance:  Normal appearance. She is well-developed. She is not diaphoretic.   HENT:      Head: Normocephalic and atraumatic.      Nose: Nose normal.   Eyes:      General: No scleral icterus.     Conjunctiva/sclera: Conjunctivae normal.   Cardiovascular:      Rate and Rhythm: Normal rate and regular rhythm.   Pulmonary:      Effort: Pulmonary effort is normal. No respiratory distress.      Breath sounds: Normal breath sounds. No wheezing.   Abdominal:      General: Bowel sounds are normal. There is no distension.      Palpations: Abdomen is soft.      Tenderness: There is abdominal tenderness in the suprapubic area. There is right CVA tenderness and guarding. There is no left CVA tenderness.   Skin:     General: Skin is warm and dry.      Coloration: Skin is not pale.      Findings: No erythema or rash.      Comments: Good turgor   Neurological:      Mental Status: She is alert.   Psychiatric:         Mood and Affect: Mood normal.         Behavior: Behavior normal.         Thought Content: Thought content normal.         Judgment: Judgment normal.         Assessment:       1. Early satiety    2. Dysuria    3. Type 2 diabetes mellitus without complication, without long-term current use of insulin    4. Vitamin D deficiency    5. Nonintractable headache, unspecified chronicity pattern, unspecified headache type    6. Other specified disorders of kidney and ureter        Plan:     Problem List Items Addressed This Visit        Renal/    Dysuria    Relevant Medications    cefTRIAXone injection 1 g (Start on 8/28/2020  3:30 PM)    Other Relevant Orders    Urinalysis, Reflex to Urine Culture Urine, Clean Catch       Endocrine    Type 2 diabetes mellitus without complication, without long-term current use of insulin    Relevant Medications    aspirin 325 MG tablet    atorvastatin (LIPITOR) 20 MG tablet    Vitamin D deficiency    Relevant Medications    ergocalciferol (ERGOCALCIFEROL) 50,000 unit Cap       Other    Early satiety     Relevant Orders    Ambulatory referral/consult to Gastroenterology    Stool culture    Occult blood x 1, stool    TISSUE TRANSGLUTAMINASE, IGA    H. pylori antigen, stool      Other Visit Diagnoses     Nonintractable headache, unspecified chronicity pattern, unspecified headache type        Relevant Medications    butalbital-acetaminophen-caffeine -40 mg (FIORICET, ESGIC) -40 mg per tablet    Other specified disorders of kidney and ureter        Relevant Orders    CT Abdomen With Without Contrast

## 2020-08-28 NOTE — PROCEDURES
Home Sleep Studies    Date/Time: 8/25/2020 8:00 AM  Performed by: Delta Pelaez MD  Authorized by: Socorro Clay MD       PHYSICIAN INTERPRETATION AND COMMENTS: Findings are consistent with very mild, non-positional obstructive sleep  apnea (SCOUT). Overall AHI was 5.0/hr with 3.7 hours data. RDI was 13.0/hr. There is insufficient supine study time to assess the  severity of positional obstructive sleep apnea (SCOUT). Moderate snoring. SpO2 curtis 89.6%. Recommend repeat testing with > 5  hours data time or inlab PSG with MSLT to evaluate for hypersomnolence.  CLINICAL HISTORY: 44 year old female presented with: 13.8 inch neck, BMI of 41, an [place] sleepiness score of 4, history  of diabetes, depression and symptoms of nocturnal snoring and waking up choking. Based on the clinical history, the patient has a  high pre-test probability of having mild SCOUT. Works as . [place] on office notes: 16, STOP BANG score 3.

## 2020-08-31 ENCOUNTER — LAB VISIT (OUTPATIENT)
Dept: LAB | Facility: HOSPITAL | Age: 45
End: 2020-08-31
Attending: FAMILY MEDICINE
Payer: COMMERCIAL

## 2020-08-31 DIAGNOSIS — R68.81 EARLY SATIETY: ICD-10-CM

## 2020-08-31 PROCEDURE — 87427 SHIGA-LIKE TOXIN AG IA: CPT

## 2020-08-31 PROCEDURE — 87338 HPYLORI STOOL AG IA: CPT

## 2020-08-31 PROCEDURE — 82272 OCCULT BLD FECES 1-3 TESTS: CPT

## 2020-08-31 PROCEDURE — 87045 FECES CULTURE AEROBIC BACT: CPT

## 2020-08-31 PROCEDURE — 87046 STOOL CULTR AEROBIC BACT EA: CPT | Mod: 59

## 2020-08-31 NOTE — PROGRESS NOTES
Please call pt with abnormal results. Pt does not need appt at this time, unless they have questions or wish to further discuss.    Pt has some noted hyaline casts in urine which can be very non specific.  Pending rest of labs

## 2020-09-01 ENCOUNTER — PATIENT MESSAGE (OUTPATIENT)
Dept: INTERNAL MEDICINE | Facility: CLINIC | Age: 45
End: 2020-09-01

## 2020-09-01 ENCOUNTER — OFFICE VISIT (OUTPATIENT)
Dept: INTERNAL MEDICINE | Facility: CLINIC | Age: 45
End: 2020-09-01
Payer: COMMERCIAL

## 2020-09-01 DIAGNOSIS — R39.89 BLADDER PAIN: ICD-10-CM

## 2020-09-01 LAB — OB PNL STL: NEGATIVE

## 2020-09-01 PROCEDURE — 99214 OFFICE O/P EST MOD 30 MIN: CPT | Mod: 95,,, | Performed by: FAMILY MEDICINE

## 2020-09-01 PROCEDURE — 99214 PR OFFICE/OUTPT VISIT, EST, LEVL IV, 30-39 MIN: ICD-10-PCS | Mod: 95,,, | Performed by: FAMILY MEDICINE

## 2020-09-01 RX ORDER — DICYCLOMINE HYDROCHLORIDE 20 MG/1
20 TABLET ORAL EVERY 6 HOURS
Qty: 120 TABLET | Refills: 0 | Status: SHIPPED | OUTPATIENT
Start: 2020-09-01 | End: 2020-10-01

## 2020-09-01 NOTE — PROGRESS NOTES
Subjective:       Patient ID: Yuli Milton is a 45 y.o. female.    Chief Complaint: No chief complaint on file.    The patient location is: home  The chief complaint leading to consultation is: worsening abd pain    Visit type: audiovisual    Face to Face time with patient: jeni 14 min    Each patient to whom he or she provides medical services by telemedicine is:  (1) informed of the relationship between the physician and patient and the respective role of any other health care provider with respect to management of the patient; and (2) notified that he or she may decline to receive medical services by telemedicine and may withdraw from such care at any time.    Abdominal Pain  This is a recurrent problem. The current episode started in the past 7 days. The onset quality is undetermined. The problem occurs constantly. The problem has been gradually worsening. The pain is located in the generalized abdominal region. The pain is at a severity of 10/10. The pain is severe. The quality of the pain is aching and a sensation of fullness. Associated symptoms include arthralgias, dysuria, frequency and myalgias. Pertinent negatives include no fever. The pain is relieved by nothing. She has tried acetaminophen for the symptoms. The treatment provided no relief. Patient's medical history includes UTI.     Review of Systems   Constitutional: Negative for fever.   HENT: Negative for congestion.    Eyes: Negative for discharge.   Respiratory: Negative for shortness of breath.    Cardiovascular: Negative for chest pain.   Gastrointestinal: Positive for abdominal pain.   Genitourinary: Positive for dysuria and frequency. Negative for difficulty urinating.   Musculoskeletal: Positive for arthralgias and myalgias. Negative for joint swelling.   Neurological: Negative for dizziness.   Psychiatric/Behavioral: Negative for agitation.       Objective:      Physical Exam  Constitutional:       Appearance: Normal appearance. She is  well-developed. She is not diaphoretic.   HENT:      Head: Normocephalic and atraumatic.      Nose: Nose normal.   Pulmonary:      Effort: No respiratory distress.      Breath sounds: No wheezing.   Skin:     Findings: No erythema or rash.   Neurological:      Mental Status: She is alert.   Psychiatric:         Mood and Affect: Mood is anxious.         Speech: Speech is rapid and pressured. Speech is not tangential.         Behavior: Behavior is agitated.         Assessment:       1. Bladder pain        Plan:     Problem List Items Addressed This Visit        Renal/    Bladder pain    Current Assessment & Plan     Ordered bentyl for pain.         Relevant Orders    Ambulatory referral/consult to Urology

## 2020-09-03 ENCOUNTER — PATIENT OUTREACH (OUTPATIENT)
Dept: ADMINISTRATIVE | Facility: OTHER | Age: 45
End: 2020-09-03

## 2020-09-04 ENCOUNTER — TELEPHONE (OUTPATIENT)
Dept: RADIOLOGY | Facility: HOSPITAL | Age: 45
End: 2020-09-04

## 2020-09-04 LAB
BACTERIA STL CULT: NORMAL
BACTERIA STL CULT: NORMAL

## 2020-09-06 LAB — H PYLORI AG STL QL IA: NOT DETECTED

## 2020-09-08 ENCOUNTER — HOSPITAL ENCOUNTER (OUTPATIENT)
Dept: RADIOLOGY | Facility: HOSPITAL | Age: 45
Discharge: HOME | End: 2020-09-08
Attending: FAMILY MEDICINE
Payer: COMMERCIAL

## 2020-09-08 ENCOUNTER — OFFICE VISIT (OUTPATIENT)
Dept: GASTROENTEROLOGY | Facility: CLINIC | Age: 45
End: 2020-09-08
Payer: COMMERCIAL

## 2020-09-08 VITALS
WEIGHT: 247.38 LBS | BODY MASS INDEX: 41.22 KG/M2 | SYSTOLIC BLOOD PRESSURE: 120 MMHG | DIASTOLIC BLOOD PRESSURE: 78 MMHG | HEIGHT: 65 IN

## 2020-09-08 DIAGNOSIS — R19.4 ALTERED BOWEL HABITS: Primary | ICD-10-CM

## 2020-09-08 DIAGNOSIS — N28.89 OTHER SPECIFIED DISORDERS OF KIDNEY AND URETER: ICD-10-CM

## 2020-09-08 DIAGNOSIS — R68.81 EARLY SATIETY: ICD-10-CM

## 2020-09-08 DIAGNOSIS — K21.9 GASTROESOPHAGEAL REFLUX DISEASE, ESOPHAGITIS PRESENCE NOT SPECIFIED: ICD-10-CM

## 2020-09-08 DIAGNOSIS — R10.84 GENERALIZED ABDOMINAL PAIN: ICD-10-CM

## 2020-09-08 PROCEDURE — 99214 OFFICE O/P EST MOD 30 MIN: CPT | Mod: S$GLB,,, | Performed by: NURSE PRACTITIONER

## 2020-09-08 PROCEDURE — 74170 CT ABD WO CNTRST FLWD CNTRST: CPT | Mod: 26,,, | Performed by: RADIOLOGY

## 2020-09-08 PROCEDURE — 74170 CT ABD WO CNTRST FLWD CNTRST: CPT | Mod: TC

## 2020-09-08 PROCEDURE — 99999 PR PBB SHADOW E&M-EST. PATIENT-LVL III: CPT | Mod: PBBFAC,,, | Performed by: NURSE PRACTITIONER

## 2020-09-08 PROCEDURE — 99214 PR OFFICE/OUTPT VISIT, EST, LEVL IV, 30-39 MIN: ICD-10-PCS | Mod: S$GLB,,, | Performed by: NURSE PRACTITIONER

## 2020-09-08 PROCEDURE — 25500020 PHARM REV CODE 255: Performed by: FAMILY MEDICINE

## 2020-09-08 PROCEDURE — 99999 PR PBB SHADOW E&M-EST. PATIENT-LVL III: ICD-10-PCS | Mod: PBBFAC,,, | Performed by: NURSE PRACTITIONER

## 2020-09-08 PROCEDURE — 74170 CT ABDOMEN W WO CONTRAST: ICD-10-PCS | Mod: 26,,, | Performed by: RADIOLOGY

## 2020-09-08 RX ORDER — ONDANSETRON 4 MG/1
4 TABLET, ORALLY DISINTEGRATING ORAL EVERY 8 HOURS PRN
COMMUNITY
Start: 2020-09-02 | End: 2020-11-12

## 2020-09-08 RX ORDER — PANTOPRAZOLE SODIUM 40 MG/1
40 TABLET, DELAYED RELEASE ORAL DAILY
Qty: 30 TABLET | Refills: 11 | Status: SHIPPED | OUTPATIENT
Start: 2020-09-08 | End: 2020-11-12

## 2020-09-08 RX ADMIN — IOHEXOL 100 ML: 350 INJECTION, SOLUTION INTRAVENOUS at 09:09

## 2020-09-08 NOTE — PROGRESS NOTES
Clinic Follow Up:  [facility] Follow Up Note    Reason for Follow Up:  The primary encounter diagnosis was Altered bowel habits. Diagnoses of Generalized abdominal pain, Early satiety, and Gastroesophageal reflux disease, esophagitis presence not specified were also pertinent to this visit.    PCP: Drew Gutierrez   [id number] [facility] [address] / [facility] [id number]    HPI:  This is a 45 y.o. female here for follow up of the above. She is known to me from previous encounters. Her last visit was in 2018.   She has early satiety. Other associated symptoms include GERD, altered bowels with diarrhea and constipation.  No hematochezia or melena. Abdominal pain located in her epigastric and lower abdomen regions. When having diarrhea, she reports 3 loose to watery stools per day.   She saw PCP for symptoms, who ordered CT abd with and without contrast. Results pending. Bentyl sent in. Patient to pick it up.     GI History:  EGD: 2017- gastritis   Colonoscopy: 2017- hemorrhoids.   Pertinent imaging: GES 2018- normal   Pelvic US 2017- limited 2/2 overlying bowel   CT abd 2017- fatty liver, tiny umbilical hernia   HIDA 2017- normal S/P vinnie 3/2018  Family history: maternal aunt with colon cancer.        Review of Systems   Constitutional: Negative for activity change and appetite change.        As per interval history above   Respiratory: Negative for cough and shortness of breath.    Cardiovascular: Negative for chest pain.   Gastrointestinal: Positive for abdominal pain, constipation and diarrhea. Negative for anal bleeding, blood in stool, nausea, rectal pain and vomiting.   Skin: Negative for color change and rash.       Medical History:  Past Medical History:   Diagnosis Date    Abnormal Pap smear of cervix     treatment??    Abnormal Pap smear of vagina     Anemia     Anxiety     Anxiety and depression     Asthma     Bacterial vaginosis 1/5/2018    Bronchitis 10/15/2015    Chlamydia     Depression  (emotion)     Diabetes mellitus     SANTOS (dyspnea on exertion) 10/14/2019    Dysuria 3/31/2018    Dysuria 3/31/2018    Gallstones     Gastritis     High-risk sexual behavior 1/5/2018    History of ovarian cyst     History of syphilis     History of trichomoniasis     Hyperlipidemia     Lower abdominal pain 7/31/2017    Mental disorder     Persistent cough 7/27/2018    PONV (postoperative nausea and vomiting)     Preop general physical exam 12/9/2019    Shortness of breath 6/26/2017    Suicidal ideations 7/9/2019    Vaginal candidiasis 7/24/2018    Vaginal itching 1/5/2018       Surgical History:   Past Surgical History:   Procedure Laterality Date    APPENDECTOMY      CHOLECYSTECTOMY      COLONOSCOPY N/A 7/31/2017    Procedure: COLONOSCOPY;  Surgeon: Yuliana Michael MD;  Location: [facility];  Service: Endoscopy;  Laterality: N/A;    DILATION AND CURETTAGE OF UTERUS      bleeding    HYSTERECTOMY  08/31/2016    RALH/BS for complex hyperplasia without atypia       Family History:   Family History   Problem Relation Age of Onset    Breast cancer Paternal Grandmother     Diabetes Maternal Grandmother     Hypertension Mother     Thyroid disease Mother     Breast cancer Maternal Aunt     Cancer Maternal Aunt         colon cancer    Colon cancer Maternal Aunt     Miscarriages / Stillbirths Cousin     Stroke Maternal Aunt     No Known Problems Father     Thyroid disease Sister     Thyroid disease Brother     Ovarian cancer Neg Hx     Deep vein thrombosis Neg Hx     Pulmonary embolism Neg Hx        Social History:   Social History     Tobacco Use    Smoking status: Never Smoker    Smokeless tobacco: Never Used   Substance Use Topics    Alcohol use: No     Alcohol/week: 0.0 standard drinks     Comment: rarely; stop 12/11/19 prior to sx    Drug use: No       Allergies:   Review of patient's allergies indicates:   Allergen Reactions    Trulicity [dulaglutide] Shortness Of Breath and Other  (See Comments)     Headaches    Hibiclens (isopropyl alcohol) Itching       Home Medications:  Current Outpatient Medications on File Prior to Visit   Medication Sig Dispense Refill    albuterol (PROVENTIL/VENTOLIN HFA) 90 mcg/actuation inhaler INHALE 2 PUFFS BY MOUTH INTO THE LUNGS EVERY 6 HOURS AS NEEDED FOR WHEEZING 18 g 0    ALPRAZolam (XANAX) 1 MG tablet Take 2 mg by mouth every evening.       aspirin 325 MG tablet Take 1 tablet (325 mg total) by mouth once daily. Take while non-weight right prevent DVT formation 360 tablet 1    atorvastatin (LIPITOR) 20 MG tablet Take 1 tablet (20 mg total) by mouth once daily. 90 tablet 1    butalbital-acetaminophen-caffeine -40 mg (FIORICET, ESGIC) -40 mg per tablet Take 1 tablet by mouth every 6 (six) hours as needed for Pain. 6 tablet 0    celecoxib (CELEBREX) 200 MG capsule TAKE 1 CAPSULE(S) BY MOUTH 2 TIMES A DAY      dicyclomine (BENTYL) 20 mg tablet Take 1 tablet (20 mg total) by mouth every 6 (six) hours. 120 tablet 0    ergocalciferol (ERGOCALCIFEROL) 50,000 unit Cap Take 1 capsule (50,000 Units total) by mouth every 7 days. 12 capsule 1    FLUoxetine 40 MG capsule nightly.   0    metFORMIN (GLUCOPHAGE) 1000 MG tablet Take 1 tablet (1,000 mg total) by mouth 2 (two) times daily with meals. 180 tablet 3    OLANZapine (ZYPREXA) 10 MG tablet Take 10 mg by mouth once daily.      ondansetron (ZOFRAN-ODT) 4 MG TbDL Take 4 mg by mouth every 8 (eight) hours as needed.      OZEMPIC 1 mg/dose (2 mg/1.5 mL) PnIj INJECT 1 MG SUBCUTANEOUSLY EVERY WEEK 3 Syringe 1    PERCOCET  mg per tablet       sulfamethoxazole-trimethoprim 800-160mg (BACTRIM DS) 800-160 mg Tab Take 1 tablet by mouth 2 (two) times daily. 6 tablet 0    tiZANidine (ZANAFLEX) 4 MG tablet TAKE 1 TABLET(S) BY MOUTH 3 TIMES A DAY AS NEEDED      traMADoL (ULTRAM) 50 mg tablet Take 50 mg by mouth 2 (two) times daily as needed.      gabapentin (NEURONTIN) 600 MG tablet Take 1 tablet  "(600 mg total) by mouth every evening. 30 tablet 0    [DISCONTINUED] potassium chloride (KLOR-CON) 20 mEq Pack Take 20 mEq by mouth once daily. (Patient not taking: Reported on 6/10/2020) 30 packet 0    [DISCONTINUED] potassium chloride (KLOR-CON) 20 mEq Pack MIX AND DRINK 1 PACKET BY MOUTH ONCE DAILY 30 packet 0     Current Facility-Administered Medications on File Prior to Visit   Medication Dose Route Frequency Provider Last Rate Last Dose    [COMPLETED] iohexoL (OMNIPAQUE 350) injection 100 mL  100 mL Intravenous ONCE PRN Drew Gutierrez MD   100 mL at 09/08/20 0923       /78   Ht 5' 5" (1.651 m)   Wt 112.2 kg (247 lb 5.7 oz)   LMP 08/21/2016   BMI 41.16 kg/m²   Body mass index is 41.16 kg/m².  Physical Exam   Constitutional: She is oriented to person, place, and time and well-developed, well-nourished, and in no distress. No distress.   HENT:   Head: Normocephalic.   Eyes: Pupils are equal, round, and reactive to light. Conjunctivae are normal.   Cardiovascular: Normal rate, regular rhythm and normal heart sounds.   Pulmonary/Chest: Effort normal and breath sounds normal. No respiratory distress.   Abdominal: Soft. Bowel sounds are normal. She exhibits no distension. There is generalized abdominal tenderness.   Neurological: She is alert and oriented to person, place, and time. No cranial nerve deficit.   Skin: Skin is warm and dry. No rash noted.   Psychiatric: Mood and affect normal.       Labs: Pertinent labs reviewed.  CRC Screening: up to date     Assessment:   1. Altered bowel habits    2. Generalized abdominal pain    3. Early satiety    4. Gastroesophageal reflux disease, esophagitis presence not specified      Symptoms likely secondary to IBS.     Recommendations:   - start Bentyl  - start miralax on days without diarrhea.  - start Metamucil daily.   - start PPI for reflux symptoms.     Altered bowel habits    Generalized abdominal pain    Early satiety  -     Ambulatory referral/consult " to Gastroenterology    Gastroesophageal reflux disease, esophagitis presence not specified    Other orders  -     pantoprazole (PROTONIX) 40 MG tablet; Take 1 tablet (40 mg total) by mouth once daily.  Dispense: 30 tablet; Refill: 11      Return to Clinic:  Follow up if symptoms worsen or fail to improve.    Thank you for the opportunity to participate in the care of this patient.  SHAGUFTA Hernandez

## 2020-09-08 NOTE — PROGRESS NOTES
Results have been reviewed . All labs are within normal range.   If you have any questions please feel free to contact me.       1. Stable 15 mm left adrenal adenoma.  No other findings on CT.    I see that they gave her bentyl and protonix at her GI visit.  Is she still having pain in her abdomen?

## 2020-09-08 NOTE — PATIENT INSTRUCTIONS
Start Metamucil daily  Start Miralax on days without diarrhea. Hold on days with diarrhea. Miralax dosing is 1 capful once a day. Can increase to twice a day if needed.   Continue water intake (goal at 64 oz).

## 2020-09-16 ENCOUNTER — OFFICE VISIT (OUTPATIENT)
Dept: PULMONOLOGY | Facility: CLINIC | Age: 45
End: 2020-09-16
Payer: COMMERCIAL

## 2020-09-16 VITALS — WEIGHT: 247 LBS | BODY MASS INDEX: 41.15 KG/M2 | HEIGHT: 65 IN

## 2020-09-16 DIAGNOSIS — J45.909 ASTHMA, UNSPECIFIED ASTHMA SEVERITY, UNSPECIFIED WHETHER COMPLICATED, UNSPECIFIED WHETHER PERSISTENT: ICD-10-CM

## 2020-09-16 DIAGNOSIS — G47.33 OSA (OBSTRUCTIVE SLEEP APNEA): Primary | ICD-10-CM

## 2020-09-16 DIAGNOSIS — E78.2 MIXED HYPERLIPIDEMIA: ICD-10-CM

## 2020-09-16 DIAGNOSIS — E66.01 CLASS 3 SEVERE OBESITY DUE TO EXCESS CALORIES WITHOUT SERIOUS COMORBIDITY WITH BODY MASS INDEX (BMI) OF 40.0 TO 44.9 IN ADULT: ICD-10-CM

## 2020-09-16 DIAGNOSIS — E11.9 TYPE 2 DIABETES MELLITUS WITHOUT COMPLICATION, WITHOUT LONG-TERM CURRENT USE OF INSULIN: ICD-10-CM

## 2020-09-16 DIAGNOSIS — E66.01 MORBID OBESITY DUE TO EXCESS CALORIES: ICD-10-CM

## 2020-09-16 DIAGNOSIS — I10 ESSENTIAL HYPERTENSION: ICD-10-CM

## 2020-09-16 DIAGNOSIS — R06.02 SOB (SHORTNESS OF BREATH): ICD-10-CM

## 2020-09-16 DIAGNOSIS — G25.81 RESTLESS LEG SYNDROME: ICD-10-CM

## 2020-09-16 PROCEDURE — 99214 PR OFFICE/OUTPT VISIT, EST, LEVL IV, 30-39 MIN: ICD-10-PCS | Mod: 95,,, | Performed by: INTERNAL MEDICINE

## 2020-09-16 PROCEDURE — 99214 OFFICE O/P EST MOD 30 MIN: CPT | Mod: 95,,, | Performed by: INTERNAL MEDICINE

## 2020-09-16 NOTE — PATIENT INSTRUCTIONS
"Your provider has scheduled you for a sleep study.   You should be receiving a phone call from the sleep lab shortly after your study has been approved by your insurance. Please make sure you have your current phone numbers in the [facility] system. If you do not hear from anyone in the next 10 business days, please call the sleep lab at [phone] to schedule your sleep study. The sleep studies are performed at [facility] seven nights a week.  When you are scheduling your sleep study,     they will also make you a follow up appointment with your provider. This follow up appointment will be 10-14 days after your sleep study to review the results. If it is noted that you do have sleep apnea on your initial sleep study, you may receive a call back for a second night study with the CPAP before you come back to the office.     Home Sleep Tests (HSTs) have burst onto the scene as an alternative to traditional sleep studies where the patient spends the night in a lab with a sleep tech conducting the study, but not everyone will fit the criteria for a HST.  At the [facility], our Sleep Specialists will look at your medical history, sleep habits and sleep issues to decide if a HST is best for you.    Additionally, some insurance companies require a HST before they will consider an "in lab" study and some companies will not approve HSTs under any circumstances and we can help you navigate those louis as well.    Home Sleep Study Pros:  On average, a Home Sleep Test is about one-quarter the cost of an in-lab study.      You get to be in your own home and sleep in your own bed.    They are a good diagnostic tool for generally healthy people with uncomplicated Obstructive Sleep Apnea (SCOUT).    Home Sleep Study Cons:  If a sensor falls off, you may not know it, whereas the  would be right in to reattach it.  If a sensor is off for a good portion of the night, the study may need to be " repeated.    HSTs are limited to sleep disordered breathing and are not appropriate for patients with other conditions such as, Chronic Obstructive Pulmonary Disease (COPD), Congestive Heart Failure (CHF), Restless Leg Syndrome (RLS) and others that may necessitate more advanced monitoring.    HSTs tend to under diagnose. Because they don't use EEG electrodes to positively determine when the patient is asleep, the entire study will be scored from Start to Stop, versus an in-lab study where sleep is accurately recorded and only the sleep portion is scored.    If the Sleep Specialist determines that a HST is right for you, you will be scheduled for an appointment to  the device and be given a demonstration and instructions on how to attach and use it.    HST Devices and What Gets Recorded:  There are several brands of HST devices available and while what parameters they record may vary, all will at least include: a small nasal cannula to measure airflow, a belt around the upper chest to measure the movement of breathing and a finger clip to measure the oxygen saturation of the blood (oximeter).    Other parameters may include: a small wire (thermistor) in front of the mouth to measure oral airflow, a second belt to measure breathing movement at the waist (diaphragm) and a position sensor to record when you are on your back, right or left side or stomach.       The recording device itself is battery operated and attaches to the chest belt and is worn in front, so belly sleepers may have to adjust their position for the night.  Most are approximately the size of a smart phone and their thickness may be up to a couple inches, but all weigh less than a pound.

## 2020-09-16 NOTE — PROGRESS NOTES
The patient location is:  [place] [id number]    The chief complaint leading to consultation is: sleep problem    Visit type: audiovisual    Face to Face time with patient: *15  18 minutes of total time spent on the encounter, which includes face to face time and non-face to face time preparing to see the patient (eg, review of tests), Obtaining and/or reviewing separately obtained history, Documenting clinical information in the electronic or other health record, Independently interpreting results (not separately reported) and communicating results to the patient/family/caregiver, or Care coordination (not separately reported).         Each patient to whom he or she provides medical services by telemedicine is:  (1) informed of the relationship between the physician and patient and the respective role of any other health care provider with respect to management of the patient; and (2) notified that he or she may decline to receive medical services by telemedicine and may withdraw from such care at any time.    Notes:                                               Pulmonary Outpatient   Visit     Subjective:       Patient ID: Yuli Milton is a 45 y.o. female.  Patient Active Problem List   Diagnosis    Adjustment disorder with depressed mood    Adrenal mass    Asthma    Iron deficiency anemia    Morbid obesity due to excess calories    Type 2 diabetes mellitus without complication, without long-term current use of insulin    Status post laparoscopic hysterectomy    Auditory hallucinations    Vitamin D deficiency    Herpesvirus 2    Chronic right-sided low back pain with right-sided sciatica    SOB (shortness of breath)    Keratoconjunctivitis sicca    Class 3 severe obesity due to excess calories without serious comorbidity with body mass index (BMI) of 40.0 to 44.9 in adult    Chronic fatigue    Restless leg syndrome    Severe episode of recurrent major depressive disorder, with  psychotic features    Dysuria    Screening mammogram, encounter for    Delayed immunizations    Localized edema    Essential hypertension    Mixed hyperlipidemia    Acute pain of right shoulder    Right foot pain    Status post percutaneous fixation of right 5th metatarsal fracture    Cervical radiculopathy    Snoring    Early satiety    Bladder pain      Immunization History   Administered Date(s) Administered    DTP 1973, 01/08/1974, 03/12/1974, 05/01/1975, 03/28/1978    Hepatitis B, Adult 03/09/2009    Hepatitis B, Pediatric/Adolescent 10/17/2000    MMR 10/01/1974, 03/04/1999    OPV 1973, 01/08/1974, 03/12/1974, 06/01/1975, 03/28/1978    Pneumococcal Polysaccharide - 23 Valent 06/05/2017    Td (ADULT) 01/03/1989, 03/04/1999, 03/09/2009    Tdap 01/03/1989, 03/04/1999, 03/09/2009, 05/23/2019      Social History     Tobacco Use   Smoking Status Never Smoker   Smokeless Tobacco Never Used        Asthma Control Test  In the past 4  weeks, how much of the time did your asthma keep you from getting as much done at work, school or at home?: Some of the time  During the past 4 weeks, how often have you had shortness of breath?: More than once a day  During the past 4 weeks, how often did your asthma symptoms (wheezing, couging, shortness of breath, chest tightness or pain) wake you up at night or earlier than usual in the morning?: Not at all  During the past 4 weeks, how often have you used your rescue inhaler or nebulizer medication (such as albuterol)?: 2 or 3 times a week  How would you rate your asthma control during the past 4 weeks?: Well controlled  If your score is 19 or less, your asthma may not be under control: 16      EPWORTH SLEEPINESS SCALE 9/16/2020   Sitting and reading 0   Watching TV 2   Sitting, inactive in a public place (e.g. a theatre or a meeting) 0   As a passenger in a car for an hour without a break 0   Lying down to rest in the afternoon when circumstances  "permit 0   Sitting and talking to someone 0   Sitting quietly after a lunch without alcohol 0   In a car, while stopped for a few minutes in traffic 0   Total score 2         Chief Complaint: Sleeping Problem      Yuli Milton is 45 y.o.  Follow up to review sleep study  Seen Dr Clay 06/03/2020  Patient was driving her car during interview  Has  license  Complains of fragmented sleep.  Home study was non diagnostic due to short data time collection  [place] was 2.   Agreed to repeat  Also has episodes of SOB with exercise  States her asthma is stable well controlled and rarely symptomatic however Asthma control test was 16  In person visit to correlate physical exam would be helpful;  Prior PFT reviewed Normal  Will check dimer  Echo" EF 60% diastolic dysfunction, BNP was 84              Review of Systems   Respiratory: Positive for apnea, snoring and shortness of breath.    Psychiatric/Behavioral: Positive for sleep disturbance.   All other systems reviewed and are negative.      Outpatient Encounter Medications as of 9/16/2020   Medication Sig Dispense Refill    albuterol (PROVENTIL/VENTOLIN HFA) 90 mcg/actuation inhaler INHALE 2 PUFFS BY MOUTH INTO THE LUNGS EVERY 6 HOURS AS NEEDED FOR WHEEZING 18 g 0    ALPRAZolam (XANAX) 1 MG tablet Take 2 mg by mouth every evening.       aspirin 325 MG tablet Take 1 tablet (325 mg total) by mouth once daily. Take while non-weight right prevent DVT formation 360 tablet 1    atorvastatin (LIPITOR) 20 MG tablet Take 1 tablet (20 mg total) by mouth once daily. 90 tablet 1    butalbital-acetaminophen-caffeine -40 mg (FIORICET, ESGIC) -40 mg per tablet Take 1 tablet by mouth every 6 (six) hours as needed for Pain. 6 tablet 0    celecoxib (CELEBREX) 200 MG capsule TAKE 1 CAPSULE(S) BY MOUTH 2 TIMES A DAY      dicyclomine (BENTYL) 20 mg tablet Take 1 tablet (20 mg total) by mouth every 6 (six) hours. 120 tablet 0    ergocalciferol " "(ERGOCALCIFEROL) 50,000 unit Cap Take 1 capsule (50,000 Units total) by mouth every 7 days. 12 capsule 1    FLUoxetine 40 MG capsule nightly.   0    gabapentin (NEURONTIN) 600 MG tablet Take 1 tablet (600 mg total) by mouth every evening. 30 tablet 0    metFORMIN (GLUCOPHAGE) 1000 MG tablet Take 1 tablet (1,000 mg total) by mouth 2 (two) times daily with meals. 180 tablet 3    OLANZapine (ZYPREXA) 10 MG tablet Take 10 mg by mouth once daily.      ondansetron (ZOFRAN-ODT) 4 MG TbDL Take 4 mg by mouth every 8 (eight) hours as needed.      OZEMPIC 1 mg/dose (2 mg/1.5 mL) PnIj INJECT 1 MG SUBCUTANEOUSLY EVERY WEEK 3 Syringe 1    pantoprazole (PROTONIX) 40 MG tablet Take 1 tablet (40 mg total) by mouth once daily. 30 tablet 11    PERCOCET  mg per tablet       sulfamethoxazole-trimethoprim 800-160mg (BACTRIM DS) 800-160 mg Tab Take 1 tablet by mouth 2 (two) times daily. 6 tablet 0    tiZANidine (ZANAFLEX) 4 MG tablet TAKE 1 TABLET(S) BY MOUTH 3 TIMES A DAY AS NEEDED      traMADoL (ULTRAM) 50 mg tablet Take 50 mg by mouth 2 (two) times daily as needed.      [DISCONTINUED] potassium chloride (KLOR-CON) 20 mEq Pack Take 20 mEq by mouth once daily. (Patient not taking: Reported on 6/10/2020) 30 packet 0    [DISCONTINUED] potassium chloride (KLOR-CON) 20 mEq Pack MIX AND DRINK 1 PACKET BY MOUTH ONCE DAILY 30 packet 0     No facility-administered encounter medications on file as of 9/16/2020.        Objective:     Vital Signs (Most Recent)  Height and Weight  Height: 5' 5" (165.1 cm)  Weight: 112 kg (247 lb)  BSA (Calculated - sq m): 2.27 sq meters  BMI (Calculated): 41.1  Weight in (lb) to have BMI = 25: 149.9]  Wt Readings from Last 2 Encounters:   09/16/20 112 kg (247 lb)   09/08/20 112.2 kg (247 lb 5.7 oz)       Physical Exam   Constitutional: She appears well-developed and well-nourished.   HENT:   Head: Normocephalic.   Pulmonary/Chest: Effort normal.   Neurological: She is alert.   Nursing note and " vitals reviewed.      Laboratory  Lab Results   Component Value Date    WBC 4.19 07/20/2020    RBC 4.25 07/20/2020    HGB 12.4 07/20/2020    HCT 38.9 07/20/2020    MCV 92 07/20/2020    MCH 29.2 07/20/2020    MCHC 31.9 (L) 07/20/2020    RDW 13.6 07/20/2020     (H) 07/20/2020    MPV 11.4 07/20/2020    GRAN 1.7 (L) 07/20/2020    GRAN 39.9 07/20/2020    LYMPH 2.1 07/20/2020    LYMPH 51.1 (H) 07/20/2020    MONO 0.3 07/20/2020    MONO 7.6 07/20/2020    EOS 0.0 07/20/2020    BASO 0.03 07/20/2020    EOSINOPHIL 0.2 07/20/2020    BASOPHIL 0.7 07/20/2020       BMP  Lab Results   Component Value Date     12/05/2019    K 4.0 12/05/2019     12/05/2019    CO2 25 12/05/2019    BUN 10 12/05/2019    CREATININE 0.8 12/05/2019    CALCIUM 9.0 12/05/2019    ANIONGAP 8 12/05/2019    ESTGFRAFRICA >60.0 12/05/2019    EGFRNONAA >60.0 12/05/2019    AST 12 12/05/2019    ALT 14 12/05/2019    PROT 7.2 12/05/2019       Lab Results   Component Value Date    BNP 59 05/22/2020    BNP 23 10/14/2019    BNP 18 01/21/2019       Lab Results   Component Value Date    TSH 0.392 (L) 07/09/2019       No results found for: SEDRATE    No results found for: CRP  No results found for: IGE     No results found for: ASPERGILLUS  No results found for: AFUMIGATUSCL     No results found for: ACE     Diagnostic Results:  I have personally reviewed today the following studies:    PHYSICIAN INTERPRETATION AND COMMENTS: Findings are consistent with very mild, non-positional obstructive sleep  apnea (SCOUT). Overall AHI was 5.0/hr with 3.7 hours data. RDI was 13.0/hr. There is insufficient supine study time to assess the  severity of positional obstructive sleep apnea (SCOUT). Moderate snoring. SpO2 curtis 89.6%. Recommend repeat testing with > 5  hours data time or inlab PSG with MSLT to evaluate for hypersomnolence.  CLINICAL HISTORY: 44 year old female presented with: 13.8 inch neck, BMI of 41, an Henderson sleepiness score of 4, history  of diabetes,  depression and symptoms of nocturnal snoring and waking up choking. Based on the clinical history, the patient has a  high pre-test probability of having mild SCOUT. Works as . [place] on office notes: 16, STOP BANG score 3.  SLEEP STUDY FINDINGS: Patient underwent a one night Home Sleep Test and by behavioral criteria, slept for approximately  3.7 hours, with a sleep latency of 7 minutes and a sleep efficiency of 88.9%. The patient slept supine 0.0% of the night based on  valid recording time of 3.72 hours. When considering more subtle measures of sleep disordered breathing, the overall respiratory  disturbance index is mild (RDI=13) based on a 1% hypopnea desaturation criteria with confirmation by surrogate arousal  indicators. The apneas/hypopneas are accompanied by minimal oxygen desaturation (percent time below 90% SpO2: 0.0%, Min  SpO2: 89.6%). The average desaturation across all sleep disordered breathing events is 2.4%. Snoring occurs for 29.8% (30 dB)  of the study, 15.3% is very loud. The mean pulse rate is 75 BPM, with infrequent pulse rate variability (32 events with >= 6 BPM  increase/decrease per hour).  TREATMENT CONSIDERATIONS: For a patient with mild asymptomatic SCOUT, nasal continuous positive airway pressure  (CPAP/AutoPAP) therapy or alternative therapies for treatment should be considered, i.e., Mandibular advancement splint (MAS),  referral to an ENT for surgical modifications to the airway, and/or weight loss or behavioral therapy to reduce the potential risk of  daytime somnolence, hypertension, cardiovascular disease, stroke and diabetes.  DISEASE MANAGEMENT CONSIDERATIONS: Narcotic pain medication increases the severity of untreated SCOUT and  contributes to respiratory control instability. Morning headaches are symptoms that can be associated with untreated SCOUT.  Assessment/Plan:     Problem List Items Addressed This Visit     Class 3 severe obesity due to excess calories without serious  comorbidity with body mass index (BMI) of 40.0 to 44.9 in adult    SOB (shortness of breath)    Relevant Orders    D dimer, quantitative    Asthma     PRN albuterol  Check PFT  IGE         Relevant Orders    IgE    Morbid obesity due to excess calories     General weight loss/lifestyle modification strategies discussed (elicit support from others; identify saboteurs; non-food rewards).  Diet interventions: low calorie (1000 kCal/d) deficit diet          Type 2 diabetes mellitus without complication, without long-term current use of insulin     STABLE on METFORMIN AND OZEMPIC         Restless leg syndrome     On Gabapentin  Will get RLS score  May cause sleepiness         Relevant Orders    FERRITIN    Essential hypertension     STABLE         Mixed hyperlipidemia     STABLE ON LIPITOR           Other Visit Diagnoses     SCOUT (obstructive sleep apnea)    -  Primary    Relevant Orders    Home Sleep Studies            Follow up in about 4 weeks (around 10/14/2020), or repeat HSAT, dimer, PFT as previously ordered. followup visit inperson for physical exam please,, for Restless Legs  questionaire, ferritin, IGE.    This note was prepared using voice recognition system and is likely to have sound alike errors that may have been overlooked even after proof reading.  Please call me with any questions    Discussed diagnosis, its evaluation, treatment and usual course. All questions answered.      Delta Pelaez MD

## 2020-09-17 ENCOUNTER — TELEPHONE (OUTPATIENT)
Dept: PULMONOLOGY | Facility: CLINIC | Age: 45
End: 2020-09-17

## 2020-09-18 ENCOUNTER — OFFICE VISIT (OUTPATIENT)
Dept: INTERNAL MEDICINE | Facility: CLINIC | Age: 45
End: 2020-09-18
Payer: COMMERCIAL

## 2020-09-18 ENCOUNTER — PATIENT MESSAGE (OUTPATIENT)
Dept: INTERNAL MEDICINE | Facility: CLINIC | Age: 45
End: 2020-09-18

## 2020-09-18 ENCOUNTER — PATIENT MESSAGE (OUTPATIENT)
Dept: PULMONOLOGY | Facility: CLINIC | Age: 45
End: 2020-09-18

## 2020-09-18 ENCOUNTER — CLINICAL SUPPORT (OUTPATIENT)
Dept: INTERNAL MEDICINE | Facility: CLINIC | Age: 45
End: 2020-09-18
Payer: COMMERCIAL

## 2020-09-18 ENCOUNTER — HOSPITAL ENCOUNTER (OUTPATIENT)
Dept: RADIOLOGY | Facility: HOSPITAL | Age: 45
Discharge: HOME | End: 2020-09-18
Attending: FAMILY MEDICINE
Payer: COMMERCIAL

## 2020-09-18 DIAGNOSIS — J40 BRONCHITIS: ICD-10-CM

## 2020-09-18 DIAGNOSIS — J40 BRONCHITIS: Primary | ICD-10-CM

## 2020-09-18 DIAGNOSIS — R06.02 SOB (SHORTNESS OF BREATH): ICD-10-CM

## 2020-09-18 DIAGNOSIS — J45.30 MILD PERSISTENT ASTHMA WITHOUT COMPLICATION: ICD-10-CM

## 2020-09-18 PROCEDURE — 71046 XR CHEST PA AND LATERAL: ICD-10-PCS | Mod: 26,,, | Performed by: RADIOLOGY

## 2020-09-18 PROCEDURE — 71046 X-RAY EXAM CHEST 2 VIEWS: CPT | Mod: TC,PO

## 2020-09-18 PROCEDURE — 99214 PR OFFICE/OUTPT VISIT, EST, LEVL IV, 30-39 MIN: ICD-10-PCS | Mod: 95,,, | Performed by: FAMILY MEDICINE

## 2020-09-18 PROCEDURE — 99214 OFFICE O/P EST MOD 30 MIN: CPT | Mod: 95,,, | Performed by: FAMILY MEDICINE

## 2020-09-18 PROCEDURE — 71046 X-RAY EXAM CHEST 2 VIEWS: CPT | Mod: 26,,, | Performed by: RADIOLOGY

## 2020-09-18 PROCEDURE — U0003 INFECTIOUS AGENT DETECTION BY NUCLEIC ACID (DNA OR RNA); SEVERE ACUTE RESPIRATORY SYNDROME CORONAVIRUS 2 (SARS-COV-2) (CORONAVIRUS DISEASE [COVID-19]), AMPLIFIED PROBE TECHNIQUE, MAKING USE OF HIGH THROUGHPUT TECHNOLOGIES AS DESCRIBED BY CMS-2020-01-R: HCPCS

## 2020-09-18 RX ORDER — PROMETHAZINE HYDROCHLORIDE AND DEXTROMETHORPHAN HYDROBROMIDE 6.25; 15 MG/5ML; MG/5ML
5 SYRUP ORAL NIGHTLY
Qty: 118 ML | Refills: 0 | Status: SHIPPED | OUTPATIENT
Start: 2020-09-18 | End: 2020-11-12

## 2020-09-18 RX ORDER — METHYLPREDNISOLONE 4 MG/1
TABLET ORAL
Qty: 1 PACKAGE | Refills: 0 | Status: SHIPPED | OUTPATIENT
Start: 2020-09-18 | End: 2020-10-09

## 2020-09-18 NOTE — PROGRESS NOTES
Subjective:       Patient ID: Yuli Milton is a 45 y.o. female.    Chief Complaint: No chief complaint on file.    The patient location is: home  The chief complaint leading to consultation is: work    Visit type: audiovisual    Face to Face time with patient: jeni 9 min    Each patient to whom he or she provides medical services by telemedicine is:  (1) informed of the relationship between the physician and patient and the respective role of any other health care provider with respect to management of the patient; and (2) notified that he or she may decline to receive medical services by telemedicine and may withdraw from such care at any time.      Shortness of Breath  This is a recurrent problem. The current episode started 1 to 4 weeks ago. The problem occurs daily. The problem has been unchanged. Associated symptoms include abdominal pain, coryza, headaches, a sore throat and sputum production. Pertinent negatives include no chest pain, claudication, ear pain, fever, hemoptysis, leg pain, leg swelling, neck pain, orthopnea, PND, rash, rhinorrhea, swollen glands, syncope, vomiting or wheezing. Exacerbated by: nigh time air. The patient has no known risk factors for DVT/PE. The treatment provided no relief. Her past medical history is significant for asthma and bronchiolitis. There is no history of allergies, aspirin allergies, CAD, chronic lung disease, COPD, DVT, a heart failure, PE, pneumonia or a recent surgery.     Review of Systems   Constitutional: Negative for fever.   HENT: Positive for sore throat. Negative for congestion, ear pain and rhinorrhea.    Eyes: Negative for discharge.   Respiratory: Positive for cough, sputum production and shortness of breath. Negative for hemoptysis and wheezing.    Cardiovascular: Negative for chest pain, orthopnea, claudication, leg swelling, syncope and PND.   Gastrointestinal: Positive for abdominal pain. Negative for vomiting.   Genitourinary: Negative for  difficulty urinating.   Musculoskeletal: Negative for joint swelling and neck pain.   Skin: Negative for rash.   Neurological: Positive for headaches. Negative for dizziness.   Psychiatric/Behavioral: Negative for agitation.       Objective:      Physical Exam  Vitals signs and nursing note reviewed.   Constitutional:       General: She is not in acute distress.     Appearance: Normal appearance. She is well-developed.   HENT:      Head: Normocephalic and atraumatic.      Nose: Nose normal.   Pulmonary:      Effort: No respiratory distress.      Breath sounds: No wheezing.   Skin:     Findings: No erythema or rash.   Neurological:      Mental Status: She is alert.   Psychiatric:         Mood and Affect: Mood normal.         Behavior: Behavior normal.         Thought Content: Thought content normal.         Judgment: Judgment normal.         Assessment:       1. Bronchitis    2. Mild persistent asthma without complication    3. SOB (shortness of breath)        Plan:     Problem List Items Addressed This Visit        Pulmonary    Mild persistent asthma without complication    Bronchitis - Primary    Relevant Medications    promethazine-dextromethorphan (PROMETHAZINE-DM) 6.25-15 mg/5 mL Syrp    methylPREDNISolone (MEDROL DOSEPACK) 4 mg tablet    Other Relevant Orders    X-Ray Chest PA And Lateral    COVID-19 Routine Screening       Other    SOB (shortness of breath)    Relevant Orders    X-Ray Chest PA And Lateral    COVID-19 Routine Screening

## 2020-09-19 LAB — SARS-COV-2 RNA RESP QL NAA+PROBE: NOT DETECTED

## 2020-09-21 ENCOUNTER — PATIENT MESSAGE (OUTPATIENT)
Dept: INTERNAL MEDICINE | Facility: CLINIC | Age: 45
End: 2020-09-21

## 2020-09-21 ENCOUNTER — CLINICAL SUPPORT (OUTPATIENT)
Dept: PULMONOLOGY | Facility: CLINIC | Age: 45
End: 2020-09-21
Payer: COMMERCIAL

## 2020-09-21 DIAGNOSIS — J40 BRONCHITIS: ICD-10-CM

## 2020-09-21 DIAGNOSIS — J45.909 ASTHMA, UNSPECIFIED ASTHMA SEVERITY, UNSPECIFIED WHETHER COMPLICATED, UNSPECIFIED WHETHER PERSISTENT: ICD-10-CM

## 2020-09-21 LAB
BRPFT: ABNORMAL
DLCO ADJ PRE: 20 ML/(MIN*MMHG) (ref 19.51–30.98)
DLCO SINGLE BREATH LLN: 19.51
DLCO SINGLE BREATH PRE REF: 73.9 %
DLCO SINGLE BREATH REF: 25.24
DLCOC SBVA LLN: 3.47
DLCOC SBVA PRE REF: 101.7 %
DLCOC SBVA REF: 4.95
DLCOC SINGLE BREATH LLN: 19.51
DLCOC SINGLE BREATH PRE REF: 79.2 %
DLCOC SINGLE BREATH REF: 25.24
DLCOVA LLN: 3.47
DLCOVA PRE REF: 94.9 %
DLCOVA PRE: 4.7 ML/(MIN*MMHG*L) (ref 3.47–6.43)
DLCOVA REF: 4.95
DLVAADJ PRE: 5.04 ML/(MIN*MMHG*L) (ref 3.47–6.43)
ERV LLN: -16449
ERV PRE REF: 38.1 %
ERV REF: 1
FEF 25 75 LLN: 1.3
FEF 25 75 PRE REF: 82.1 %
FEF 25 75 REF: 2.59
FEV1 FVC LLN: 71
FEV1 FVC PRE REF: 96.2 %
FEV1 FVC REF: 81
FEV1 LLN: 1.93
FEV1 PRE REF: 94.5 %
FEV1 REF: 2.53
FRCPLETH LLN: 1.92
FRCPLETH PREREF: 50.1 %
FRCPLETH REF: 2.74
FVC LLN: 2.41
FVC PRE REF: 97.8 %
FVC REF: 3.12
IVC PRE: 2.65 L (ref 2.41–3.84)
IVC SINGLE BREATH LLN: 2.41
IVC SINGLE BREATH PRE REF: 84.8 %
IVC SINGLE BREATH REF: 3.12
MVV LLN: 93
MVV PRE REF: 75.7 %
MVV REF: 110
PEF LLN: 4.42
PEF PRE REF: 93.1 %
PEF REF: 6.49
PRE DLCO: 18.65 ML/(MIN*MMHG) (ref 19.51–30.98)
PRE ERV: 0.38 L (ref -16449–16451)
PRE FEF 25 75: 2.12 L/S (ref 1.3–3.87)
PRE FET 100: 8.31 SEC
PRE FEV1 FVC: 78.23 % (ref 70.59–91.97)
PRE FEV1: 2.39 L (ref 1.93–3.13)
PRE FRC PL: 1.38 L
PRE FVC: 3.05 L (ref 2.41–3.84)
PRE MVV: 83 L/MIN (ref 93.25–126.16)
PRE PEF: 6.04 L/S (ref 4.42–8.56)
PRE RV: 0.95 L (ref 1.16–2.31)
PRE TLC: 4 L (ref 4.11–6.09)
RAW LLN: 3.06
RAW PRE REF: 152 %
RAW PRE: 4.65 CMH2O*S/L (ref 3.06–3.06)
RAW REF: 3.06
RV LLN: 1.16
RV PRE REF: 54.5 %
RV REF: 1.74
RVTLC LLN: 25
RVTLC PRE REF: 67.7 %
RVTLC PRE: 23.65 % (ref 25.35–44.53)
RVTLC REF: 35
TLC LLN: 4.11
TLC PRE REF: 78.5 %
TLC REF: 5.1
VA PRE: 3.97 L (ref 4.95–4.95)
VA SINGLE BREATH LLN: 4.95
VA SINGLE BREATH PRE REF: 80.3 %
VA SINGLE BREATH REF: 4.95
VC LLN: 2.41
VC PRE REF: 97.9 %
VC PRE: 3.06 L (ref 2.41–3.84)
VC REF: 3.12
VTGRAWPRE: 2.67 L

## 2020-09-21 PROCEDURE — 94010 BREATHING CAPACITY TEST: CPT | Mod: S$GLB,,, | Performed by: INTERNAL MEDICINE

## 2020-09-21 PROCEDURE — 94726 PULM FUNCT TST PLETHYSMOGRAP: ICD-10-PCS | Mod: S$GLB,,, | Performed by: INTERNAL MEDICINE

## 2020-09-21 PROCEDURE — 94729 PR C02/MEMBANE DIFFUSE CAPACITY: ICD-10-PCS | Mod: S$GLB,,, | Performed by: INTERNAL MEDICINE

## 2020-09-21 PROCEDURE — 94729 DIFFUSING CAPACITY: CPT | Mod: S$GLB,,, | Performed by: INTERNAL MEDICINE

## 2020-09-21 PROCEDURE — 94726 PLETHYSMOGRAPHY LUNG VOLUMES: CPT | Mod: S$GLB,,, | Performed by: INTERNAL MEDICINE

## 2020-09-21 PROCEDURE — 94010 BREATHING CAPACITY TEST: ICD-10-PCS | Mod: S$GLB,,, | Performed by: INTERNAL MEDICINE

## 2020-09-23 ENCOUNTER — PATIENT MESSAGE (OUTPATIENT)
Dept: PULMONOLOGY | Facility: CLINIC | Age: 45
End: 2020-09-23

## 2020-09-24 DIAGNOSIS — J45.909 ASTHMA, UNSPECIFIED ASTHMA SEVERITY, UNSPECIFIED WHETHER COMPLICATED, UNSPECIFIED WHETHER PERSISTENT: Primary | ICD-10-CM

## 2020-09-24 RX ORDER — FLUTICASONE FUROATE AND VILANTEROL TRIFENATATE 100; 25 UG/1; UG/1
1 POWDER RESPIRATORY (INHALATION) DAILY
Qty: 60 EACH | Refills: 0 | Status: SHIPPED | OUTPATIENT
Start: 2020-09-24 | End: 2021-03-09

## 2020-09-28 ENCOUNTER — OFFICE VISIT (OUTPATIENT)
Dept: RHEUMATOLOGY | Facility: CLINIC | Age: 45
End: 2020-09-28
Payer: COMMERCIAL

## 2020-09-28 ENCOUNTER — LAB VISIT (OUTPATIENT)
Dept: LAB | Facility: HOSPITAL | Age: 45
End: 2020-09-28
Attending: INTERNAL MEDICINE
Payer: COMMERCIAL

## 2020-09-28 VITALS
SYSTOLIC BLOOD PRESSURE: 123 MMHG | HEART RATE: 67 BPM | DIASTOLIC BLOOD PRESSURE: 83 MMHG | WEIGHT: 253.31 LBS | BODY MASS INDEX: 42.15 KG/M2

## 2020-09-28 DIAGNOSIS — G25.81 RESTLESS LEG SYNDROME: ICD-10-CM

## 2020-09-28 DIAGNOSIS — J45.909 ASTHMA, UNSPECIFIED ASTHMA SEVERITY, UNSPECIFIED WHETHER COMPLICATED, UNSPECIFIED WHETHER PERSISTENT: ICD-10-CM

## 2020-09-28 DIAGNOSIS — G89.29 CHRONIC RIGHT-SIDED LOW BACK PAIN WITH RIGHT-SIDED SCIATICA: ICD-10-CM

## 2020-09-28 DIAGNOSIS — E55.9 VITAMIN D DEFICIENCY: ICD-10-CM

## 2020-09-28 DIAGNOSIS — M54.41 CHRONIC RIGHT-SIDED LOW BACK PAIN WITH RIGHT-SIDED SCIATICA: ICD-10-CM

## 2020-09-28 DIAGNOSIS — M25.50 POLYARTHRALGIA: Primary | ICD-10-CM

## 2020-09-28 DIAGNOSIS — R06.02 SOB (SHORTNESS OF BREATH): ICD-10-CM

## 2020-09-28 LAB — D DIMER PPP IA.FEU-MCNC: 0.39 MG/L FEU

## 2020-09-28 PROCEDURE — 36415 COLL VENOUS BLD VENIPUNCTURE: CPT

## 2020-09-28 PROCEDURE — 99214 PR OFFICE/OUTPT VISIT, EST, LEVL IV, 30-39 MIN: ICD-10-PCS | Mod: S$GLB,,, | Performed by: INTERNAL MEDICINE

## 2020-09-28 PROCEDURE — 99999 PR PBB SHADOW E&M-EST. PATIENT-LVL IV: CPT | Mod: PBBFAC,,, | Performed by: INTERNAL MEDICINE

## 2020-09-28 PROCEDURE — 99999 PR PBB SHADOW E&M-EST. PATIENT-LVL IV: ICD-10-PCS | Mod: PBBFAC,,, | Performed by: INTERNAL MEDICINE

## 2020-09-28 PROCEDURE — 82728 ASSAY OF FERRITIN: CPT

## 2020-09-28 PROCEDURE — 82785 ASSAY OF IGE: CPT

## 2020-09-28 PROCEDURE — 85379 FIBRIN DEGRADATION QUANT: CPT

## 2020-09-28 PROCEDURE — 99214 OFFICE O/P EST MOD 30 MIN: CPT | Mod: S$GLB,,, | Performed by: INTERNAL MEDICINE

## 2020-09-28 RX ORDER — TIZANIDINE 4 MG/1
4 TABLET ORAL NIGHTLY PRN
Qty: 30 TABLET | Refills: 2 | Status: SHIPPED | OUTPATIENT
Start: 2020-09-28 | End: 2021-03-09

## 2020-09-28 RX ORDER — GABAPENTIN 600 MG/1
600 TABLET ORAL NIGHTLY
Qty: 30 TABLET | Refills: 5 | Status: SHIPPED | OUTPATIENT
Start: 2020-09-28 | End: 2020-12-22 | Stop reason: SDUPTHER

## 2020-09-28 NOTE — PROGRESS NOTES
CC:  Routine follow-up    Referred by Drew Gutierrez MD    History of Present Illness:  Yuli Velasquez a 45 y.o.yo female with c/o all over body pain x 2 years , history of fibromyalgia  Along with fatigue , with vit d def , on supp ,vit d 50,000 units/ week     She hurts in legs , back , chest , shoulders  +dry eyes, dry mouth   She has chronic pedal edema  No other joint swelling  No malar or other photosensitive rashes    She has history of diabetes and is on gabapentin 600 mg q.h.s.  She cannot take it during the daytime as she is a   She has history of chronic low back pain with right-sided sciatica and gabapentin seems to be helping,  denies any incontinence or loss of sensations    She had negative RF/CCP/CELY, negative SSA and SSB    Nonsmoker  No family history of autoimmune disease      Her other concern seems to be chronic cough and shortness of breath with normal chest x-ray  She is currently being worked by Pulmonary    Review of Systems:  Constitutional: Denies fever, chills. No recent weight changes.   Fatigue: yes   Muscle weakness: no  Headaches: no new headaches  Eyes: No redness .  No recent visual changes.  ENT:  No oral or nasal ulcers.  Card: No chest pain.  Resp:+ cough, SOB  Gastro: No nausea or vomiting.  No heartburn.  Constipation: no  Diarrhea: no  Genito:  No dysuria.  No genital ulcers.  Skin: No rash.  Raynauds:no  Neuro: No numbness / tingling in legs   Psych: No depression, anxiety  Endo:  no excess thirst.  Heme: no abnormal bleeding or bruising  Clots:none   Miscarriages : none     Past Medical History:   Diagnosis Date    Abnormal Pap smear of cervix     treatment??    Abnormal Pap smear of vagina     Anemia     Anxiety     Anxiety and depression     Asthma     Bacterial vaginosis 1/5/2018    Bronchitis 10/15/2015    Chlamydia     Depression (emotion)     Diabetes mellitus     SANTOS (dyspnea on exertion) 10/14/2019    Dysuria 3/31/2018    Dysuria  3/31/2018    Gallstones     Gastritis     High-risk sexual behavior 1/5/2018    History of ovarian cyst     History of syphilis     History of trichomoniasis     Hyperlipidemia     Lower abdominal pain 7/31/2017    Mental disorder     Persistent cough 7/27/2018    PONV (postoperative nausea and vomiting)     Preop general physical exam 12/9/2019    Shortness of breath 6/26/2017    Suicidal ideations 7/9/2019    Vaginal candidiasis 7/24/2018    Vaginal itching 1/5/2018       Past Surgical History:   Procedure Laterality Date    APPENDECTOMY      CHOLECYSTECTOMY      COLONOSCOPY N/A 7/31/2017    Procedure: COLONOSCOPY;  Surgeon: Yuliana Michael MD;  [facility] END[facility];  Service: Endoscopy;  Laterality: N/A;    DILATION AND CURETTAGE OF UTERUS      bleeding    HYSTERECTOMY  08/31/2016    RALH/BS for complex hyperplasia without atypia         Social History     Tobacco Use    Smoking status: Never Smoker    Smokeless tobacco: Never Used   Substance Use Topics    Alcohol use: No     Alcohol/week: 0.0 standard drinks     Comment: rarely; stop 12/11/19 prior to sx    Drug use: No       Family History   Problem Relation Age of Onset    Breast cancer Paternal Grandmother     Diabetes Maternal Grandmother     Hypertension Mother     Thyroid disease Mother     Breast cancer Maternal Aunt     Cancer Maternal Aunt         colon cancer    Colon cancer Maternal Aunt     Miscarriages / Stillbirths Cousin     Stroke Maternal Aunt     No Known Problems Father     Thyroid disease Sister     Thyroid disease Brother     Ovarian cancer Neg Hx     Deep vein thrombosis Neg Hx     Pulmonary embolism Neg Hx        Review of patient's allergies indicates:   Allergen Reactions    Trulicity [dulaglutide] Shortness Of Breath and Other (See Comments)     Headaches    Hibiclens (isopropyl alcohol) Itching         OBJECTIVE:     Vital Signs   BP :112/76  VT :65     Physical Exam:  General Appearance:  NAD.    Gait: not favoring.  HEENT: PERRL.  Eyes not dry or injected.  No nasal ulcers.  Mouth  dry, no oral lesions.  Lymph: cervical, supraclavicular or axillary nodes: none abnormal   Cardio: no irregularity of S1 or S2.  No gallops or rubs.   Resp: Normal respiratory motion. Clear to auscultation bilaterally.   No abnormal chest conformation.  Abd: Soft, non-tender, nondistended.  No masses.   Skin: Head and neck,  and extremities examined. No rashes.   Neuro: Ox3.   Cranial nerves II-XII grossly intact.   Sensation intact  in both distal LE and upper extremities to light touch.  Musculoskeletal Exam:  No synovitis  Tender points:+  Lower lumbar facet tenderness  Muscle strength:Equal and full in all mm groups of the upper and lower ext.    Laboratory: I have reviewed all of the patient's relevant lab work available in the medical record and have utilized this in my evaluation and management recommendations today    No results found for: SEDRATE  No results found for: CRP  Lab Results   Component Value Date    RF <10.0 06/10/2020     CCP negative  CELY negative    Imaging : I have reviewed all of the patient's diagnostic/imaging results available in the medical record and have utilized this in my evaluation and management recommendations today.    Notes reviewed  Other procedures:    ASSESSMENT/PLAN:     Polyarthralgia  -     gabapentin (NEURONTIN) 600 MG tablet; Take 1 tablet (600 mg total) by mouth every evening.  Dispense: 30 tablet; Refill: 5  -     tiZANidine (ZANAFLEX) 4 MG tablet; Take 1 tablet (4 mg total) by mouth nightly as needed.  Dispense: 30 tablet; Refill: 2    Vitamin D deficiency    Chronic right-sided low back pain with right-sided sciatica  -     gabapentin (NEURONTIN) 600 MG tablet; Take 1 tablet (600 mg total) by mouth every evening.  Dispense: 30 tablet; Refill: 5     RF/CCP/CELY negative  However physical exam most consistent with fibromyalgia  I have reviewed her old x-rays including C-spine,  L-spine and x-ray feet  zanaflex prn     C/w  vitamin-D supplementation 12824 units a week  C/w gabapentin 600 mg qhs       6  month return    Risks vs Benefits and potential side effects of medication prescribed today were discussed with patient. Medication literature given to patient up discharge  Went over uptodate information /literature on the meds prescribed today    Patient advised to hold DMARD and/or biologic therapy if any for signs of infection or for surgery. If you are unsure what to do please call our office for instruction. [facility] [phone]      Disclaimer: This note was prepared using voice recognition system and is likely to have sound alike errors and is not proof read.  Please call me with any questions.

## 2020-09-28 NOTE — LETTER
September 28, 2020      [facility]  [address]  [place] [address]  Phone: [phone]  Fax: [phone]       Patient: Yuli Milton   YOB: 1975  Date of Visit: 09/28/2020    To Whom It May Concern:    Dick Milton  was at [facility] on 09/28/2020. Hence please be excused from work . If you have any questions or concerns, or if I can be of further assistance, please do not hesitate to contact me.    Sincerely,    Klarissa Foster MD

## 2020-09-29 LAB
FERRITIN SERPL-MCNC: 45 NG/ML (ref 20–300)
IGE SERPL-ACNC: <35 IU/ML (ref 0–100)

## 2020-10-02 ENCOUNTER — HOSPITAL ENCOUNTER (OUTPATIENT)
Dept: RADIOLOGY | Facility: HOSPITAL | Age: 45
Discharge: HOME | End: 2020-10-02
Attending: FAMILY MEDICINE
Payer: COMMERCIAL

## 2020-10-02 ENCOUNTER — OFFICE VISIT (OUTPATIENT)
Dept: INTERNAL MEDICINE | Facility: CLINIC | Age: 45
End: 2020-10-02
Payer: COMMERCIAL

## 2020-10-02 ENCOUNTER — PATIENT MESSAGE (OUTPATIENT)
Dept: INTERNAL MEDICINE | Facility: CLINIC | Age: 45
End: 2020-10-02

## 2020-10-02 DIAGNOSIS — Z12.31 ENCOUNTER FOR SCREENING MAMMOGRAM FOR MALIGNANT NEOPLASM OF BREAST: ICD-10-CM

## 2020-10-02 DIAGNOSIS — R30.0 DYSURIA: Primary | ICD-10-CM

## 2020-10-02 LAB
BILIRUB UR QL STRIP: NEGATIVE
CLARITY UR REFRACT.AUTO: ABNORMAL
COLOR UR AUTO: YELLOW
GLUCOSE UR QL STRIP: NEGATIVE
HGB UR QL STRIP: NEGATIVE
KETONES UR QL STRIP: NEGATIVE
LEUKOCYTE ESTERASE UR QL STRIP: NEGATIVE
NITRITE UR QL STRIP: NEGATIVE
PH UR STRIP: 5 [PH] (ref 5–8)
PROT UR QL STRIP: NEGATIVE
SP GR UR STRIP: 1.02 (ref 1–1.03)
URN SPEC COLLECT METH UR: ABNORMAL

## 2020-10-02 PROCEDURE — 77067 SCR MAMMO BI INCL CAD: CPT | Mod: TC

## 2020-10-02 PROCEDURE — 77067 MAMMO DIGITAL SCREENING BILAT: ICD-10-PCS | Mod: 26,,, | Performed by: RADIOLOGY

## 2020-10-02 PROCEDURE — 96372 PR INJECTION,THERAP/PROPH/DIAG2ST, IM OR SUBCUT: ICD-10-PCS | Mod: ,,, | Performed by: FAMILY MEDICINE

## 2020-10-02 PROCEDURE — 96372 THER/PROPH/DIAG INJ SC/IM: CPT | Mod: ,,, | Performed by: FAMILY MEDICINE

## 2020-10-02 PROCEDURE — 77067 SCR MAMMO BI INCL CAD: CPT | Mod: 26,,, | Performed by: RADIOLOGY

## 2020-10-02 PROCEDURE — 81003 URINALYSIS AUTO W/O SCOPE: CPT

## 2020-10-02 RX ORDER — PHENAZOPYRIDINE HYDROCHLORIDE 100 MG/1
100 TABLET, FILM COATED ORAL
Qty: 30 TABLET | Refills: 0 | Status: SHIPPED | OUTPATIENT
Start: 2020-10-02 | End: 2020-10-12

## 2020-10-02 RX ORDER — CEFTRIAXONE 1 G/1
1 INJECTION, POWDER, FOR SOLUTION INTRAMUSCULAR; INTRAVENOUS
Status: COMPLETED | OUTPATIENT
Start: 2020-10-02 | End: 2020-10-02

## 2020-10-02 RX ADMIN — CEFTRIAXONE 1 G: 1 INJECTION, POWDER, FOR SOLUTION INTRAMUSCULAR; INTRAVENOUS at 04:10

## 2020-10-02 NOTE — PROGRESS NOTES
Administered Rocephin  IM to pt left ventrogluteal. Pt tolerated well. No distress noted. Advised pt to wait 20 minutes in lobby and to inform  if any adverse reaction occurs. Pt stated understanding.

## 2020-10-02 NOTE — PROGRESS NOTES
Subjective:       Patient ID: Yuli Milton is a 45 y.o. female.    Chief Complaint: No chief complaint on file.    The patient location is: home  The chief complaint leading to consultation is: dysuria    Visit type: audiovisual    Face to Face time with patient: jeni 8 min    Each patient to whom he or she provides medical services by telemedicine is:  (1) informed of the relationship between the physician and patient and the respective role of any other health care provider with respect to management of the patient; and (2) notified that he or she may decline to receive medical services by telemedicine and may withdraw from such care at any time.      Dysuria   This is a recurrent problem. The current episode started acute onset. The problem occurs every urination. The problem has been gradually worsening. The quality of the pain is described as aching. The pain is moderate. There has been no fever. There is a history of pyelonephritis. Associated symptoms include frequency and urgency. Pertinent negatives include no behavior changes, chills, discharge, flank pain, hematuria, hesitancy, nausea, possible pregnancy, sweats, vomiting, weight loss, constipation, rash or withholding. She has tried nothing for the symptoms. The treatment provided no relief.     Review of Systems   Constitutional: Negative for chills and weight loss.   Gastrointestinal: Negative for constipation, nausea and vomiting.   Genitourinary: Positive for dysuria, frequency and urgency. Negative for difficulty urinating, flank pain, hematuria and hesitancy.   Skin: Negative for rash.       Objective:      Physical Exam  Constitutional:       General: She is not in acute distress.     Appearance: Normal appearance. She is well-developed. She is not diaphoretic.   HENT:      Head: Normocephalic and atraumatic.   Pulmonary:      Effort: No respiratory distress.      Breath sounds: No wheezing.   Skin:     Findings: No erythema or rash.    Neurological:      Mental Status: She is alert.         Assessment:       1. Dysuria        Plan:     Problem List Items Addressed This Visit        Renal/    Dysuria - Primary    Relevant Medications    cefTRIAXone injection 1 g (Completed)    phenazopyridine (PYRIDIUM) 100 MG tablet    Other Relevant Orders    Urinalysis, Reflex to Urine Culture Urine, Clean Catch

## 2020-10-05 ENCOUNTER — TELEPHONE (OUTPATIENT)
Dept: PEDIATRICS | Facility: CLINIC | Age: 45
End: 2020-10-05

## 2020-10-05 NOTE — TELEPHONE ENCOUNTER
----- Message from Drew Gutierrez MD sent at 10/5/2020  6:59 AM CDT -----  Results have been reviewed . All labs are within normal range.   If you have any questions please feel free to contact me.

## 2020-10-08 ENCOUNTER — PATIENT MESSAGE (OUTPATIENT)
Dept: INTERNAL MEDICINE | Facility: CLINIC | Age: 45
End: 2020-10-08

## 2020-10-09 ENCOUNTER — PATIENT MESSAGE (OUTPATIENT)
Dept: PULMONOLOGY | Facility: CLINIC | Age: 45
End: 2020-10-09

## 2020-10-09 DIAGNOSIS — R05.8 PRODUCTIVE COUGH: ICD-10-CM

## 2020-10-09 DIAGNOSIS — J45.901 EXACERBATION OF ASTHMA, UNSPECIFIED ASTHMA SEVERITY, UNSPECIFIED WHETHER PERSISTENT: Primary | ICD-10-CM

## 2020-10-09 RX ORDER — PREDNISONE 10 MG/1
TABLET ORAL
Qty: 21 TABLET | Refills: 0 | Status: SHIPPED | OUTPATIENT
Start: 2020-10-09 | End: 2020-11-12

## 2020-10-19 ENCOUNTER — PROCEDURE VISIT (OUTPATIENT)
Dept: SLEEP MEDICINE | Facility: CLINIC | Age: 45
End: 2020-10-19
Payer: COMMERCIAL

## 2020-10-19 ENCOUNTER — PATIENT MESSAGE (OUTPATIENT)
Dept: PULMONOLOGY | Facility: CLINIC | Age: 45
End: 2020-10-19

## 2020-10-19 DIAGNOSIS — G47.33 OSA (OBSTRUCTIVE SLEEP APNEA): Primary | ICD-10-CM

## 2020-10-19 PROCEDURE — 95800 PR SLEEP STUDY, UNATTENDED, RECORD HEART RATE/O2 SAT/RESP ANAL/SLEEP TIME: ICD-10-PCS | Mod: 26,,, | Performed by: INTERNAL MEDICINE

## 2020-10-19 PROCEDURE — 95800 SLP STDY UNATTENDED: CPT | Mod: 26,,, | Performed by: INTERNAL MEDICINE

## 2020-10-19 PROCEDURE — 95800 SLP STDY UNATTENDED: CPT

## 2020-10-19 NOTE — Clinical Note
PHYSICIAN INTERPRETATION AND COMMENTS: Findings are consistent with mild, positional obstructive sleep apnea  (SCOUT).Best study was night 1. AHI 9.0 events per hour with 5.1 hr of sleep. Treatment intervention with CPAP indicated. Auto  PAP 5-20 cm water pressure with mask of choice close follow-up.  CLINICAL HISTORY: 45 year old female presented with: 14 inch neck, BMI of 42, an [place] sleepiness score of 4, history of  diabetes, depression and symptoms of nocturnal snoring. Based on the clinical history, the patient has a high pre-test probability of  having mild SCOUT.

## 2020-10-19 NOTE — PROCEDURES
Home Sleep Studies    Date/Time: 10/19/2020 8:45 AM  Performed by: Delta Pelaez MD  Authorized by: Delta Pelaez MD       PHYSICIAN INTERPRETATION AND COMMENTS: Findings are consistent with mild, positional obstructive sleep apnea  (SCOUT).Best study was night 1. AHI 9.0 events per hour with 5.1 hr of sleep. Treatment intervention with CPAP indicated. Auto  PAP 5-20 cm water pressure with mask of choice close follow-up.  CLINICAL HISTORY: 45 year old female presented with: 14 inch neck, BMI of 42, an [place] sleepiness score of 4, history of  diabetes, depression and symptoms of nocturnal snoring. Based on the clinical history, the patient has a high pre-test probability of  having mild SCOUT.

## 2020-10-20 ENCOUNTER — OFFICE VISIT (OUTPATIENT)
Dept: UROLOGY | Facility: CLINIC | Age: 45
End: 2020-10-20
Payer: COMMERCIAL

## 2020-10-20 VITALS — WEIGHT: 253 LBS | BODY MASS INDEX: 42.1 KG/M2

## 2020-10-20 DIAGNOSIS — R39.15 URGENCY OF MICTURITION: Primary | ICD-10-CM

## 2020-10-20 PROCEDURE — 99999 PR PBB SHADOW E&M-EST. PATIENT-LVL IV: ICD-10-PCS | Mod: PBBFAC,,, | Performed by: UROLOGY

## 2020-10-20 PROCEDURE — 99203 OFFICE O/P NEW LOW 30 MIN: CPT | Mod: S$GLB,,, | Performed by: UROLOGY

## 2020-10-20 PROCEDURE — 99999 PR PBB SHADOW E&M-EST. PATIENT-LVL IV: CPT | Mod: PBBFAC,,, | Performed by: UROLOGY

## 2020-10-20 PROCEDURE — 99203 PR OFFICE/OUTPT VISIT, NEW, LEVL III, 30-44 MIN: ICD-10-PCS | Mod: S$GLB,,, | Performed by: UROLOGY

## 2020-10-20 RX ORDER — SOLIFENACIN SUCCINATE 10 MG/1
10 TABLET, FILM COATED ORAL DAILY
Qty: 30 TABLET | Refills: 11 | Status: SHIPPED | OUTPATIENT
Start: 2020-10-20 | End: 2020-11-12

## 2020-10-20 NOTE — PROGRESS NOTES
Chief Complaint:   Encounter Diagnosis   Name Primary?    Urgency of micturition Yes       HPI:  45-year-old female who comes in with bladder discomfort.  Patient states that she has noted a recent pain and pressure in her suprapubic region.  She states that a urinalysis has been normal.  This been going on for the last week, nothing really consistent with this before.  Appears to be constant, with no change.  No correlation to voiding.  Possible dysuria, but not consistent with a UTI.  She gets up 1-2 times per evening, but does have increased frequency and urgency during the daytime.  No evidence of leakage.  No gross hematuria, no microscopic hematuria, she has never been a smoker.  She did take antibiotics, with no assistance.  She does have frequent UTIs, but again this does not appear to be UTI, nor did the antibiotics assist.  No previous urological history, she has had a total abdominal hysterectomy with unilateral salpingo-oophorectomy.  She has had no slings, as stated no incontinence, 3 natural deliveries without issue.  No family history of urological cancers or stones.  She states she has been treating constipation recently.    Allergies:  Trulicity [dulaglutide] and Hibiclens (isopropyl alcohol)    Medications:  has a current medication list which includes the following prescription(s): albuterol, alprazolam, aspirin, atorvastatin, butalbital-acetaminophen-caffeine -40 mg, celecoxib, ergocalciferol, fluoxetine, breo ellipta, gabapentin, metformin, olanzapine, ondansetron, ozempic, pantoprazole, percocet, prednisone, promethazine-dextromethorphan, tizanidine, solifenacin, potassium chloride, and potassium chloride.    Review of Systems:  General: No fever, chills, fatigability, or weight loss.  Skin: No rashes, itching, or changes in color or texture of skin.  Chest: Denies SANTOS, cyanosis, wheezing, cough, and sputum production.  Abdomen: Appetite fine. No weight loss. Denies diarrhea, abdominal  pain, hematemesis, or blood in stool.  Musculoskeletal: No joint stiffness or swelling. Denies back pain.  : As above.  All other review of systems negative.    PMH:   has a past medical history of Abnormal Pap smear of cervix, Abnormal Pap smear of vagina, Anemia, Anxiety, Anxiety and depression, Asthma, Bacterial vaginosis (1/5/2018), Bronchitis (10/15/2015), Chlamydia, Depression (emotion), Diabetes mellitus, SANTOS (dyspnea on exertion) (10/14/2019), Dysuria (3/31/2018), Dysuria (3/31/2018), Gallstones, Gastritis, High-risk sexual behavior (1/5/2018), History of ovarian cyst, History of syphilis, History of trichomoniasis, Hyperlipidemia, Lower abdominal pain (7/31/2017), Mental disorder, Persistent cough (7/27/2018), PONV (postoperative nausea and vomiting), Preop general physical exam (12/9/2019), Shortness of breath (6/26/2017), Suicidal ideations (7/9/2019), Vaginal candidiasis (7/24/2018), and Vaginal itching (1/5/2018).    PSH:   has a past surgical history that includes Appendectomy; Dilation and curettage of uterus; Colonoscopy (N/A, 7/31/2017); Cholecystectomy; and Hysterectomy (08/31/2016).    FamHx: family history includes Breast cancer in her maternal aunt and paternal grandmother; Cancer in her maternal aunt; Colon cancer in her maternal aunt; Diabetes in her maternal grandmother; Hypertension in her mother; Miscarriages / Stillbirths in her cousin; No Known Problems in her father; Stroke in her maternal aunt; Thyroid disease in her brother, mother, and sister.    SocHx:  reports that she has never smoked. She has never used smokeless tobacco. She reports that she does not drink alcohol or use drugs.      Physical Exam:  There were no vitals filed for this visit.  General: A&Ox3, no apparent distress, no deformities  Neck: No masses, normal ROM  Lungs: normal inspiration, no use of accessory muscles  Heart: normal pulse, no arrhythmias  Abdomen: Soft, NT, ND, no masses, no hernias, no  hepatosplenomegaly  Skin: The skin is warm and dry. No jaundice.  Ext: No c/c/e.    Labs/Studies:   CT urine adrenal nodule 9/20    Impression/Plan:     Urgency- this does not appear to be a UTI, more likely urgency, with no incontinence.  Therefore will attempt a VESIcare 10 trial.  She knows to monitor for dry mouth, dry eyes, constipation and urinary retention.  She will contact me with any complaints.  Otherwise I will see her in 1 month with a postvoid residual.  If this fails further therapeutic and diagnostic options will be warranted.

## 2020-10-20 NOTE — LETTER
October 20, 2020      Drew Gutierrez MD  [address] [place] [address]           [facility]  [address] [place] [id number]  Phone: [phone]  Fax: [phone]          Patient: Yuli Milton   MR Number: [id number]   YOB: 1975   Date of Visit: 10/20/2020       Dear Dr. Drew Gutierrez:    Thank you for referring Yuli Milton to me for evaluation. Attached you will find relevant portions of my assessment and plan of care.    If you have questions, please do not hesitate to call me. I look forward to following Yuli Milton along with you.    Sincerely,    Scottie Herrera MD    Enclosure  CC:  No Recipients    If you would like to receive this communication electronically, please contact [email or web address] or [phone] to request more information on [facility] [employer] access.    For providers and/or their staff who would like to refer a patient to [facility], please contact us through our one-stop-shop provider referral line, [facility], at [phone].    If you feel you have received this communication in error or would no longer like to receive these types of communications, please e-mail [email or web address]

## 2020-10-22 ENCOUNTER — PATIENT MESSAGE (OUTPATIENT)
Dept: INTERNAL MEDICINE | Facility: CLINIC | Age: 45
End: 2020-10-22

## 2020-10-23 ENCOUNTER — OFFICE VISIT (OUTPATIENT)
Dept: INTERNAL MEDICINE | Facility: CLINIC | Age: 45
End: 2020-10-23
Payer: COMMERCIAL

## 2020-10-23 DIAGNOSIS — R05.9 COUGH: ICD-10-CM

## 2020-10-23 DIAGNOSIS — J45.909 ASTHMA, UNSPECIFIED ASTHMA SEVERITY, UNSPECIFIED WHETHER COMPLICATED, UNSPECIFIED WHETHER PERSISTENT: Primary | ICD-10-CM

## 2020-10-23 DIAGNOSIS — J45.20 MILD INTERMITTENT ASTHMA WITHOUT COMPLICATION: ICD-10-CM

## 2020-10-23 DIAGNOSIS — R06.02 SOB (SHORTNESS OF BREATH): ICD-10-CM

## 2020-10-23 PROCEDURE — 99213 OFFICE O/P EST LOW 20 MIN: CPT | Mod: 95,,, | Performed by: NURSE PRACTITIONER

## 2020-10-23 PROCEDURE — 99213 PR OFFICE/OUTPT VISIT, EST, LEVL III, 20-29 MIN: ICD-10-PCS | Mod: 95,,, | Performed by: NURSE PRACTITIONER

## 2020-10-23 RX ORDER — ALBUTEROL SULFATE 90 UG/1
AEROSOL, METERED RESPIRATORY (INHALATION)
Qty: 18 G | Refills: 0 | Status: SHIPPED | OUTPATIENT
Start: 2020-10-23 | End: 2020-11-18

## 2020-10-23 NOTE — PROGRESS NOTES
Subjective:       Patient ID: Yuli Milton is a 45 y.o. female.    Chief Complaint: Referral    The patient location is: parked car  The chief complaint leading to consultation is: referral    Visit type: audiovisual    Face to Face time with patient: 10 minutes  20 minutes of total time spent on the encounter, which includes face to face time and non-face to face time preparing to see the patient (eg, review of tests), Obtaining and/or reviewing separately obtained history, Documenting clinical information in the electronic or other health record, Independently interpreting results (not separately reported) and communicating results to the patient/family/caregiver, or Care coordination (not separately reported).         Each patient to whom he or she provides medical services by telemedicine is:  (1) informed of the relationship between the physician and patient and the respective role of any other health care provider with respect to management of the patient; and (2) notified that he or she may decline to receive medical services by telemedicine and may withdraw from such care at any time.    Notes:   Patient doing virtual visit requesting referral to pul  States she has had issues with coughing and breathing for 1 year  She denies wheezing  Has a hx of asthma   Has cough, shortness of breath. Has worsening cough at night as well as chest tightness at night when she lies down  Wants to see Jeffery Lee [facility]    Shortness of Breath  This is a recurrent problem. The current episode started more than 1 month ago. The problem occurs constantly. The problem has been unchanged. Associated symptoms include orthopnea, a sore throat and sputum production. Pertinent negatives include no abdominal pain, chest pain, claudication, coryza, ear pain, fever, headaches, hemoptysis, leg pain, leg swelling, neck pain, PND, rash, rhinorrhea, swollen glands, syncope, vomiting or wheezing.       LMP 08/21/2016      Review of Systems   Constitutional: Positive for activity change. Negative for appetite change, chills, diaphoresis, fatigue and fever.   HENT: Positive for sore throat. Negative for ear pain and rhinorrhea.    Eyes: Negative for visual disturbance.   Respiratory: Positive for cough, sputum production, chest tightness and shortness of breath. Negative for apnea, hemoptysis, choking and wheezing.    Cardiovascular: Positive for orthopnea. Negative for chest pain, palpitations, claudication, leg swelling, syncope and PND.   Gastrointestinal: Negative for abdominal distention, abdominal pain, blood in stool, constipation, diarrhea, nausea and vomiting.   Genitourinary: Negative for decreased urine volume, difficulty urinating, dysuria, frequency, hematuria and urgency.   Musculoskeletal: Negative for neck pain.   Skin: Negative for rash.   Neurological: Negative.  Negative for dizziness, syncope, speech difficulty, light-headedness and headaches.   Psychiatric/Behavioral: Negative for agitation, confusion and hallucinations. The patient is not nervous/anxious.        Objective:      Physical Exam  Constitutional:       General: She is not in acute distress.     Appearance: She is well-developed. She is not diaphoretic.   HENT:      Head: Normocephalic and atraumatic.      Right Ear: External ear normal.      Left Ear: External ear normal.   Eyes:      General: No scleral icterus.        Right eye: No discharge.         Left eye: No discharge.      Conjunctiva/sclera: Conjunctivae normal.   Neck:      Musculoskeletal: Normal range of motion and neck supple.   Pulmonary:      Effort: Pulmonary effort is normal. No tachypnea, accessory muscle usage or respiratory distress.      Breath sounds: No stridor.   Skin:     Findings: No rash.   Neurological:      Mental Status: She is alert. She is not disoriented.   Psychiatric:         Attention and Perception: She is attentive.         Mood and Affect: Mood is not anxious  or depressed. Affect is not labile, blunt, angry or inappropriate.         Speech: Speech normal.         Behavior: Behavior normal.         Thought Content: Thought content normal.         Judgment: Judgment normal.         Assessment:       1. Asthma, unspecified asthma severity, unspecified whether complicated, unspecified whether persistent    2. Cough    3. SOB (shortness of breath)    4. Mild intermittent asthma without complication        Plan:       Yuli was seen today for referral.    Diagnoses and all orders for this visit:    Asthma, unspecified asthma severity, unspecified whether complicated, unspecified whether persistent  -     Ambulatory referral/consult to Pulmonology; Future    Cough  -     Ambulatory referral/consult to Pulmonology; Future    SOB (shortness of breath)  -     Ambulatory referral/consult to Pulmonology; Future    Mild intermittent asthma without complication  -     albuterol (PROVENTIL/VENTOLIN HFA) 90 mcg/actuation inhaler; INHALE 2 PUFFS BY MOUTH INTO THE LUNGS EVERY 6 HOURS AS NEEDED FOR WHEEZING    referral given per request  Albuterol refilled per request  Follow up with Dr. Matt pastrana
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
## 2020-11-06 ENCOUNTER — PATIENT MESSAGE (OUTPATIENT)
Dept: INTERNAL MEDICINE | Facility: CLINIC | Age: 45
End: 2020-11-06

## 2020-11-06 ENCOUNTER — OFFICE VISIT (OUTPATIENT)
Dept: INTERNAL MEDICINE | Facility: CLINIC | Age: 45
End: 2020-11-06
Payer: COMMERCIAL

## 2020-11-06 VITALS
HEART RATE: 76 BPM | TEMPERATURE: 98 F | WEIGHT: 255.5 LBS | SYSTOLIC BLOOD PRESSURE: 130 MMHG | BODY MASS INDEX: 42.57 KG/M2 | DIASTOLIC BLOOD PRESSURE: 78 MMHG | HEIGHT: 65 IN

## 2020-11-06 DIAGNOSIS — E11.9 TYPE 2 DIABETES MELLITUS WITHOUT COMPLICATION, WITHOUT LONG-TERM CURRENT USE OF INSULIN: ICD-10-CM

## 2020-11-06 DIAGNOSIS — E11.9 TYPE 2 DIABETES MELLITUS WITHOUT COMPLICATION, WITHOUT LONG-TERM CURRENT USE OF INSULIN: Primary | ICD-10-CM

## 2020-11-06 LAB
BILIRUB SERPL-MCNC: ABNORMAL MG/DL
BLOOD URINE, POC: ABNORMAL
CLARITY, POC UA: CLEAR
COLOR, POC UA: YELLOW
GLUCOSE UR QL STRIP: ABNORMAL
KETONES UR QL STRIP: ABNORMAL
LEUKOCYTE ESTERASE URINE, POC: ABNORMAL
NITRITE, POC UA: ABNORMAL
PH, POC UA: 5
PROTEIN, POC: ABNORMAL
SPECIFIC GRAVITY, POC UA: 1.02
UROBILINOGEN, POC UA: NORMAL

## 2020-11-06 PROCEDURE — 99214 PR OFFICE/OUTPT VISIT, EST, LEVL IV, 30-39 MIN: ICD-10-PCS | Mod: 25,S$GLB,, | Performed by: NURSE PRACTITIONER

## 2020-11-06 PROCEDURE — 87086 URINE CULTURE/COLONY COUNT: CPT

## 2020-11-06 PROCEDURE — 81002 URINALYSIS NONAUTO W/O SCOPE: CPT | Mod: S$GLB,,, | Performed by: NURSE PRACTITIONER

## 2020-11-06 PROCEDURE — 99999 PR PBB SHADOW E&M-EST. PATIENT-LVL V: CPT | Mod: PBBFAC,,, | Performed by: NURSE PRACTITIONER

## 2020-11-06 PROCEDURE — 81002 POCT URINE DIPSTICK WITHOUT MICROSCOPE: ICD-10-PCS | Mod: S$GLB,,, | Performed by: NURSE PRACTITIONER

## 2020-11-06 PROCEDURE — 99214 OFFICE O/P EST MOD 30 MIN: CPT | Mod: 25,S$GLB,, | Performed by: NURSE PRACTITIONER

## 2020-11-06 PROCEDURE — 99999 PR PBB SHADOW E&M-EST. PATIENT-LVL V: ICD-10-PCS | Mod: PBBFAC,,, | Performed by: NURSE PRACTITIONER

## 2020-11-06 RX ORDER — LANCETS
EACH MISCELLANEOUS
Qty: 200 EACH | Refills: 11 | Status: SHIPPED | OUTPATIENT
Start: 2020-11-06 | End: 2021-01-29

## 2020-11-06 RX ORDER — INSULIN PUMP SYRINGE, 3 ML
EACH MISCELLANEOUS
Qty: 1 EACH | Refills: 0 | Status: SHIPPED | OUTPATIENT
Start: 2020-11-06 | End: 2021-03-22

## 2020-11-06 RX ORDER — SEMAGLUTIDE 1.34 MG/ML
INJECTION, SOLUTION SUBCUTANEOUS
Qty: 3 SYRINGE | Refills: 1 | Status: SHIPPED | OUTPATIENT
Start: 2020-11-06 | End: 2020-12-16

## 2020-11-06 RX ORDER — LANCETS
EACH MISCELLANEOUS
Qty: 200 EACH | Refills: 11 | Status: SHIPPED | OUTPATIENT
Start: 2020-11-06 | End: 2020-11-06 | Stop reason: SDUPTHER

## 2020-11-06 RX ORDER — OLANZAPINE 15 MG/1
15 TABLET ORAL DAILY
COMMUNITY
Start: 2020-10-09 | End: 2022-02-24 | Stop reason: ALTCHOICE

## 2020-11-06 RX ORDER — DAPAGLIFLOZIN 10 MG/1
10 TABLET, FILM COATED ORAL DAILY
Qty: 30 TABLET | Refills: 3 | Status: SHIPPED | OUTPATIENT
Start: 2020-11-06 | End: 2021-01-29

## 2020-11-06 RX ORDER — INSULIN PUMP SYRINGE, 3 ML
EACH MISCELLANEOUS
Qty: 1 EACH | Refills: 0 | Status: SHIPPED | OUTPATIENT
Start: 2020-11-06 | End: 2020-11-06 | Stop reason: SDUPTHER

## 2020-11-06 NOTE — PROGRESS NOTES
"Subjective:       Patient ID: Yuli Milton is a 45 y.o. female.    Chief Complaint: Hyperglycemia    Patient here today for elevated glucose  Feels that she has had increased urination, vision changes, increased food eating and cravings  Saw urology for oab  She has pelvic pain and pressure, no dysuria, also has back pain  298 yesterday  318 here today    Patient took high dose prednisone taper last month prescribed by pulm      /78   Pulse 76   Temp 98.4 °F (36.9 °C) (Tympanic)   Ht 5' 5" (1.651 m)   Wt 115.9 kg (255 lb 8.2 oz)   LMP 08/21/2016   BMI 42.52 kg/m²     Review of Systems   Constitutional: Positive for activity change. Negative for appetite change, chills, diaphoresis, fatigue and fever.   HENT: Negative.    Eyes: Positive for visual disturbance.   Respiratory: Negative for cough, shortness of breath and wheezing.    Cardiovascular: Negative for chest pain, palpitations and leg swelling.   Gastrointestinal: Negative for abdominal distention, abdominal pain, blood in stool, constipation, diarrhea, nausea and vomiting.   Endocrine: Positive for polydipsia, polyphagia and polyuria.   Genitourinary: Negative for decreased urine volume, difficulty urinating, dysuria, frequency, hematuria and urgency.   Neurological: Negative.  Negative for dizziness, syncope, speech difficulty, light-headedness and headaches.   Psychiatric/Behavioral: Negative for agitation, confusion and hallucinations. The patient is not nervous/anxious.        Objective:      Physical Exam  Vitals signs and nursing note reviewed.   Constitutional:       General: She is not in acute distress.     Appearance: She is well-developed. She is not diaphoretic.   HENT:      Head: Normocephalic and atraumatic.   Eyes:      General:         Right eye: No discharge.         Left eye: No discharge.      Conjunctiva/sclera: Conjunctivae normal.   Cardiovascular:      Rate and Rhythm: Normal rate and regular rhythm.      Pulses:       "     Dorsalis pedis pulses are 2+ on the right side and 2+ on the left side.        Posterior tibial pulses are 2+ on the right side and 2+ on the left side.      Heart sounds: Normal heart sounds. No murmur.   Pulmonary:      Effort: Pulmonary effort is normal. No respiratory distress.      Breath sounds: Normal breath sounds. No wheezing or rales.   Chest:      Chest wall: No tenderness.   Abdominal:      General: There is no distension.      Palpations: Abdomen is soft.   Musculoskeletal: Normal range of motion.      Right foot: Normal range of motion. No deformity.      Left foot: Normal range of motion. No deformity.   Feet:      Right foot:      Protective Sensation: 10 sites tested. 10 sites sensed.      Skin integrity: No ulcer, blister, skin breakdown, erythema, callus or dry skin.      Left foot:      Protective Sensation: 10 sites tested. 10 sites sensed.      Skin integrity: No ulcer, blister, skin breakdown, erythema, warmth or dry skin.   Skin:     General: Skin is warm and dry.      Findings: No rash.   Neurological:      Mental Status: She is alert and oriented to person, place, and time.   Psychiatric:         Behavior: Behavior normal. Behavior is cooperative.         Thought Content: Thought content normal.         Judgment: Judgment normal.         Assessment:       1. Type 2 diabetes mellitus without complication, without long-term current use of insulin        Plan:       Yuli was seen today for hyperglycemia.    Diagnoses and all orders for this visit:    Type 2 diabetes mellitus without complication, without long-term current use of insulin  -     POCT urine dipstick without microscope  -     dapagliflozin (FARXIGA) 10 mg tablet; Take 1 tablet (10 mg total) by mouth once daily.  -     blood-glucose meter kit; To check BG 2 times daily, to use with insurance preferred meter  -     lancets Misc; To check BG 2 times daily, to use with insurance preferred meter  -     blood sugar diagnostic  Strp; To check BG 2 times daily, to use with insurance preferred meter  -     Ambulatory referral/consult to Diabetes Education; Future  -     Urine culture      Patient Instructions   Check sugars FASTING in the AM  And before bed  Bring diary into your visit with Dr. Gutierrez in 2 weeks    AM: farxiga and metformin  PM: metformin    Weekly: ozempic  urine with blood today, will follow culture. Recommend follow up with urology for blood in urine  follow up Dr. MURPHY 2 weeks

## 2020-11-06 NOTE — PATIENT INSTRUCTIONS
Check sugars FASTING in the AM  And before bed  Bring diary into your visit with Dr. Gutierrez in 2 weeks    AM: farxiga and metformin  PM: metformin    Weekly: ozempic

## 2020-11-08 LAB — BACTERIA UR CULT: NO GROWTH

## 2020-11-11 ENCOUNTER — PATIENT MESSAGE (OUTPATIENT)
Dept: INTERNAL MEDICINE | Facility: CLINIC | Age: 45
End: 2020-11-11

## 2020-11-12 ENCOUNTER — LAB VISIT (OUTPATIENT)
Dept: LAB | Facility: HOSPITAL | Age: 45
End: 2020-11-12
Attending: NURSE PRACTITIONER
Payer: COMMERCIAL

## 2020-11-12 ENCOUNTER — NURSE TRIAGE (OUTPATIENT)
Dept: ADMINISTRATIVE | Facility: CLINIC | Age: 45
End: 2020-11-12

## 2020-11-12 ENCOUNTER — OFFICE VISIT (OUTPATIENT)
Dept: INTERNAL MEDICINE | Facility: CLINIC | Age: 45
End: 2020-11-12
Payer: COMMERCIAL

## 2020-11-12 VITALS
HEART RATE: 76 BPM | WEIGHT: 251.75 LBS | SYSTOLIC BLOOD PRESSURE: 114 MMHG | BODY MASS INDEX: 41.94 KG/M2 | DIASTOLIC BLOOD PRESSURE: 78 MMHG | OXYGEN SATURATION: 98 % | HEIGHT: 65 IN | TEMPERATURE: 96 F

## 2020-11-12 DIAGNOSIS — E11.65 TYPE 2 DIABETES MELLITUS WITH HYPERGLYCEMIA, WITHOUT LONG-TERM CURRENT USE OF INSULIN: ICD-10-CM

## 2020-11-12 DIAGNOSIS — R42 DIZZINESS: Primary | ICD-10-CM

## 2020-11-12 DIAGNOSIS — R42 DIZZINESS: ICD-10-CM

## 2020-11-12 DIAGNOSIS — Z11.4 ENCOUNTER FOR SCREENING FOR HIV: ICD-10-CM

## 2020-11-12 LAB
ALBUMIN SERPL BCP-MCNC: 3.7 G/DL (ref 3.5–5.2)
ALP SERPL-CCNC: 64 U/L (ref 55–135)
ALT SERPL W/O P-5'-P-CCNC: 17 U/L (ref 10–44)
ANION GAP SERPL CALC-SCNC: 8 MMOL/L (ref 8–16)
AST SERPL-CCNC: 12 U/L (ref 10–40)
BASOPHILS # BLD AUTO: 0.05 K/UL (ref 0–0.2)
BASOPHILS NFR BLD: 1 % (ref 0–1.9)
BILIRUB SERPL-MCNC: 0.6 MG/DL (ref 0.1–1)
BUN SERPL-MCNC: 7 MG/DL (ref 6–20)
CALCIUM SERPL-MCNC: 8.9 MG/DL (ref 8.7–10.5)
CHLORIDE SERPL-SCNC: 104 MMOL/L (ref 95–110)
CO2 SERPL-SCNC: 26 MMOL/L (ref 23–29)
CREAT SERPL-MCNC: 0.8 MG/DL (ref 0.5–1.4)
DIFFERENTIAL METHOD: NORMAL
EOSINOPHIL # BLD AUTO: 0.1 K/UL (ref 0–0.5)
EOSINOPHIL NFR BLD: 1 % (ref 0–8)
ERYTHROCYTE [DISTWIDTH] IN BLOOD BY AUTOMATED COUNT: 13.2 % (ref 11.5–14.5)
EST. GFR  (AFRICAN AMERICAN): >60 ML/MIN/1.73 M^2
EST. GFR  (NON AFRICAN AMERICAN): >60 ML/MIN/1.73 M^2
ESTIMATED AVG GLUCOSE: 206 MG/DL (ref 68–131)
GLUCOSE SERPL-MCNC: 191 MG/DL (ref 70–110)
GLUCOSE SERPL-MCNC: 192 MG/DL (ref 70–110)
HBA1C MFR BLD HPLC: 8.8 % (ref 4–5.6)
HCT VFR BLD AUTO: 40.3 % (ref 37–48.5)
HGB BLD-MCNC: 12.9 G/DL (ref 12–16)
IMM GRANULOCYTES # BLD AUTO: 0.01 K/UL (ref 0–0.04)
IMM GRANULOCYTES NFR BLD AUTO: 0.2 % (ref 0–0.5)
LYMPHOCYTES # BLD AUTO: 1.9 K/UL (ref 1–4.8)
LYMPHOCYTES NFR BLD: 38.6 % (ref 18–48)
MCH RBC QN AUTO: 29 PG (ref 27–31)
MCHC RBC AUTO-ENTMCNC: 32 G/DL (ref 32–36)
MCV RBC AUTO: 91 FL (ref 82–98)
MONOCYTES # BLD AUTO: 0.3 K/UL (ref 0.3–1)
MONOCYTES NFR BLD: 6.1 % (ref 4–15)
NEUTROPHILS # BLD AUTO: 2.6 K/UL (ref 1.8–7.7)
NEUTROPHILS NFR BLD: 53.1 % (ref 38–73)
NRBC BLD-RTO: 0 /100 WBC
PLATELET # BLD AUTO: 349 K/UL (ref 150–350)
PMV BLD AUTO: 9.9 FL (ref 9.2–12.9)
POTASSIUM SERPL-SCNC: 3.8 MMOL/L (ref 3.5–5.1)
PROT SERPL-MCNC: 7.4 G/DL (ref 6–8.4)
RBC # BLD AUTO: 4.45 M/UL (ref 4–5.4)
SODIUM SERPL-SCNC: 138 MMOL/L (ref 136–145)
WBC # BLD AUTO: 4.92 K/UL (ref 3.9–12.7)

## 2020-11-12 PROCEDURE — 99999 PR PBB SHADOW E&M-EST. PATIENT-LVL V: CPT | Mod: PBBFAC,,, | Performed by: NURSE PRACTITIONER

## 2020-11-12 PROCEDURE — 99214 OFFICE O/P EST MOD 30 MIN: CPT | Mod: S$GLB,,, | Performed by: NURSE PRACTITIONER

## 2020-11-12 PROCEDURE — 80053 COMPREHEN METABOLIC PANEL: CPT

## 2020-11-12 PROCEDURE — 82962 POCT GLUCOSE, HAND-HELD DEVICE: ICD-10-PCS | Mod: S$GLB,,, | Performed by: NURSE PRACTITIONER

## 2020-11-12 PROCEDURE — 86703 HIV-1/HIV-2 1 RESULT ANTBDY: CPT

## 2020-11-12 PROCEDURE — 82962 GLUCOSE BLOOD TEST: CPT | Mod: S$GLB,,, | Performed by: NURSE PRACTITIONER

## 2020-11-12 PROCEDURE — 99214 PR OFFICE/OUTPT VISIT, EST, LEVL IV, 30-39 MIN: ICD-10-PCS | Mod: S$GLB,,, | Performed by: NURSE PRACTITIONER

## 2020-11-12 PROCEDURE — 85025 COMPLETE CBC W/AUTO DIFF WBC: CPT

## 2020-11-12 PROCEDURE — 99999 PR PBB SHADOW E&M-EST. PATIENT-LVL V: ICD-10-PCS | Mod: PBBFAC,,, | Performed by: NURSE PRACTITIONER

## 2020-11-12 PROCEDURE — 83036 HEMOGLOBIN GLYCOSYLATED A1C: CPT

## 2020-11-12 RX ORDER — LANCETS 33 GAUGE
EACH MISCELLANEOUS
COMMUNITY
Start: 2020-11-08 | End: 2021-01-29

## 2020-11-12 RX ORDER — TOBRAMYCIN AND DEXAMETHASONE 3; 1 MG/ML; MG/ML
SUSPENSION/ DROPS OPHTHALMIC
COMMUNITY
Start: 2020-11-03 | End: 2021-03-22 | Stop reason: ALTCHOICE

## 2020-11-12 RX ORDER — INSULIN GLARGINE 100 [IU]/ML
15 INJECTION, SOLUTION SUBCUTANEOUS NIGHTLY
Qty: 1 BOX | Refills: 0 | Status: SHIPPED | OUTPATIENT
Start: 2020-11-12 | End: 2021-03-09 | Stop reason: SDUPTHER

## 2020-11-12 NOTE — PROGRESS NOTES
Subjective:       Patient ID: Yuli Milton is a 45 y.o. female.    Chief Complaint: Dizziness, Blood Sugar Problem (elevated), and Diabetes    Patient presents with dizziness, blurred vision, and elevated blood glucose.  Had some prednisone about one month ago from pulmonary.  Reports symptoms started then.  Was started on Farxiga 10 mg last week.  Reports she does not feel well while when taking this medication.  Reports compliance with metformin and Ozempic.    Has been on Basaglar before when her readings were elevated in the past.      Review of Systems   Constitutional: Positive for fatigue. Negative for chills.   Eyes: Positive for visual disturbance.   Respiratory: Negative for cough and shortness of breath.    Cardiovascular: Negative for chest pain.   Neurological: Positive for dizziness and headaches.   Psychiatric/Behavioral: Negative for agitation and confusion.         Objective:      Physical Exam  Vitals signs reviewed.   Constitutional:       Appearance: Normal appearance.   HENT:      Head: Normocephalic.      Right Ear: Tympanic membrane and ear canal normal.      Left Ear: Tympanic membrane and ear canal normal.   Eyes:      Extraocular Movements: Extraocular movements intact.      Pupils: Pupils are equal, round, and reactive to light.   Cardiovascular:      Rate and Rhythm: Normal rate and regular rhythm.   Pulmonary:      Effort: Pulmonary effort is normal.      Breath sounds: Normal breath sounds.   Neurological:      General: No focal deficit present.      Mental Status: She is alert.      Cranial Nerves: No cranial nerve deficit.   Psychiatric:         Mood and Affect: Mood normal.         Behavior: Behavior normal. Behavior is cooperative.         Assessment:       1. Dizziness    2. Type 2 diabetes mellitus with hyperglycemia, without long-term current use of insulin    3. Encounter for screening for HIV        Plan:         Dizziness  -     POCT Glucose, Hand-Held Device  -      "Urinalysis; Future; Expected date: 11/12/2020  -     CBC Auto Differential; Future; Expected date: 11/12/2020  -     Comprehensive Metabolic Panel; Future; Expected date: 11/12/2020    Type 2 diabetes mellitus with hyperglycemia, without long-term current use of insulin  -     insulin (BASAGLAR KWIKPEN U-100 INSULIN) glargine 100 units/mL (3mL) SubQ pen; Inject 15 Units into the skin every evening.  Dispense: 1 Box; Refill: 0  -     pen needle, diabetic 33 gauge x 5/16" Ndle; 1 each by Misc.(Non-Drug; Combo Route) route nightly.  Dispense: 100 each; Refill: 0  -     Hemoglobin A1C; Future; Expected date: 11/12/2020    Encounter for screening for HIV  -     HIV 1/2 Ag/Ab (4th Gen); Future; Expected date: 11/12/2020        Labs today.     mg/dl in clinic today.   Discussed diabetic diet.   Will add Basaglar 15 units at bedtime if readings are still elevated.   Will schedule a one week follow up with PCP or sooner if needed.   "

## 2020-11-12 NOTE — LETTER
November 12, 2020    Yuli Milton  [address]  [facility] [address]             [facility] Medicine  Internal Medicine  [address] [facility] [address]  Phone: [phone]  Fax: [phone]   November 12, 2020     Patient: Yuli Milton   YOB: 1975   Date of Visit: 11/12/2020       To Whom it May Concern:    Yuli Milton was seen in my clinic on 11/12/2020. She may return to work on 11/16/2020.    Please excuse her from any work missed.    If you have any questions or concerns, please don't hesitate to call.    Sincerely,         Milagro Hastings NP

## 2020-11-12 NOTE — TELEPHONE ENCOUNTER
today.This morning her head hurt andshe felt a little dizzy and lightheaded for 2 days. Tired. Appt desk wanted her to speak with us. Appt set for today. Per triage protocol, pt advised be seen in office today. Pt will attend her appt today. No further questions.    Reason for Disposition   Patient wants to be seen    Additional Information   Negative: Unconscious or difficult to awaken   Negative: Acting confused (e.g., disoriented, slurred speech)   Negative: Very weak (can't stand)   Negative: Sounds like a life-threatening emergency to the triager   Negative: Vomiting and signs of dehydration (e.g., very dry mouth, lightheaded, dark urine)   Negative: Blood glucose > 240 mg/dL (13.3 mmol/L) and rapid breathing   Negative: Blood glucose > 500 mg/dL (27.8 mmol/L)   Negative: Blood glucose > 240 mg/dL (13.3 mmol/L) AND urine ketones moderate-large (or more than 1+)   Negative: Blood glucose > 240 mg/dL (13.3 mmol/L) and blood ketones > 1.4 mmol/L   Negative: Blood glucose > 240 mg/dL (13.3 mmol/L) AND vomiting AND unable to check for ketones (in blood or urine)   Negative: Vomiting lasting > 4 hours   Negative: Patient sounds very sick or weak to the triager   Negative: Fever > 100.4 F (38.0 C)   Negative: Caller has URGENT medication or insulin pump question and triager unable to answer question   Negative: Blood glucose > 400 mg/dL (22.2 mmol/L)   Negative: Blood glucose > 300 mg/dL (16.7 mmol/L) AND two or more times in a row   Negative: Urine ketones moderate - large (or blood ketones > 1.4 mmol/L)   Negative: New-onset diabetes suspected (e.g., frequent urination, weak, weight loss)   Negative: Symptoms of high blood sugar (e.g., frequent urination, weak, weight loss) and not able to test blood glucose    Protocols used: DIABETES - HIGH BLOOD SUGAR-A-OH

## 2020-11-13 ENCOUNTER — PATIENT OUTREACH (OUTPATIENT)
Dept: ADMINISTRATIVE | Facility: HOSPITAL | Age: 45
End: 2020-11-13

## 2020-11-13 LAB — HIV 1+2 AB+HIV1 P24 AG SERPL QL IA: NEGATIVE

## 2020-11-16 ENCOUNTER — TELEPHONE (OUTPATIENT)
Dept: INTERNAL MEDICINE | Facility: CLINIC | Age: 45
End: 2020-11-16

## 2020-11-16 ENCOUNTER — OFFICE VISIT (OUTPATIENT)
Dept: INTERNAL MEDICINE | Facility: CLINIC | Age: 45
End: 2020-11-16
Payer: COMMERCIAL

## 2020-11-16 DIAGNOSIS — B34.9 VIRAL SYNDROME: ICD-10-CM

## 2020-11-16 DIAGNOSIS — E11.9 TYPE 2 DIABETES MELLITUS WITHOUT COMPLICATION, WITHOUT LONG-TERM CURRENT USE OF INSULIN: Primary | ICD-10-CM

## 2020-11-16 PROBLEM — R30.0 DYSURIA: Status: RESOLVED | Noted: 2018-03-31 | Resolved: 2020-11-16

## 2020-11-16 PROCEDURE — 99214 PR OFFICE/OUTPT VISIT, EST, LEVL IV, 30-39 MIN: ICD-10-PCS | Mod: 95,,, | Performed by: NURSE PRACTITIONER

## 2020-11-16 PROCEDURE — 99214 OFFICE O/P EST MOD 30 MIN: CPT | Mod: 95,,, | Performed by: NURSE PRACTITIONER

## 2020-11-16 NOTE — TELEPHONE ENCOUNTER
----- Message from Madie Lopez sent at 11/16/2020  9:45 AM CST -----  Regarding: Appt  Contact: [phone]  Would like to consult with the nurse, patient would like to  schedule a Virtual Appt , please call back thanks sj

## 2020-11-16 NOTE — PROGRESS NOTES
Subjective:       Patient ID: Yuli Milton is a 45 y.o. female.    Chief Complaint: No chief complaint on file.    The patient location is: [place]  The chief complaint leading to consultation is: DM follow up    Visit type: audiovisual    Face to Face time with patient: 12 minutes   20 minutes of total time spent on the encounter, which includes face to face time and non-face to face time preparing to see the patient (eg, review of tests), Obtaining and/or reviewing separately obtained history, Documenting clinical information in the electronic or other health record, Independently interpreting results (not separately reported) and communicating results to the patient/family/caregiver, or Care coordination (not separately reported).     Each patient to whom he or she provides medical services by telemedicine is:  (1) informed of the relationship between the physician and patient and the respective role of any other health care provider with respect to management of the patient; and (2) notified that he or she may decline to receive medical services by telemedicine and may withdraw from such care at any time.    Notes:   Mrs. Milton presents to clinic for ER follow up from visit from [facility] Talbot last night. Records to be obtained from ER visit. Supposed to have DM follow up with PCP, umm on Friday, will have to cancel appt due to other appt. Complaint in ER last night was fatigue, body aches, headache, and chills. 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 Negative flu/COVID test in ER. Labs reviewed from hospital visit. Chest xray normal. Normal EKG. Dxd with viral syndrome.       Diabetes  She presents for her follow-up diabetic visit. She has type 2 diabetes mellitus. Her disease course has been improving. Hypoglycemia symptoms include headaches. Pertinent negatives for hypoglycemia include no confusion, dizziness, nervousness/anxiousness, sweats or tremors. Associated  symptoms include fatigue and foot paresthesias. Pertinent negatives for diabetes include no blurred vision, no chest pain, no polydipsia, no polyphagia, no polyuria, no visual change and no weakness. There are no hypoglycemic complications. Symptoms are improving. There are no diabetic complications. Pertinent negatives for diabetic complications include no autonomic neuropathy, CVA, heart disease, impotence, nephropathy, peripheral neuropathy, PVD or retinopathy. Risk factors for coronary artery disease include diabetes mellitus, hypertension, obesity and sedentary lifestyle. Current diabetic treatment includes insulin injections and oral agent (monotherapy). She is compliant with treatment all of the time. She is following a diabetic diet. When asked about meal planning, she reported none. She rarely participates in exercise. Her home blood glucose trend is decreasing steadily. Her breakfast blood glucose range is generally 110-130 mg/dl. An ACE inhibitor/angiotensin II receptor blocker is not being taken. Eye exam is current.       Patient Active Problem List   Diagnosis    Adjustment disorder with depressed mood    Adrenal mass    Asthma    Iron deficiency anemia    Morbid obesity due to excess calories    Type 2 diabetes mellitus without complication, without long-term current use of insulin    Status post laparoscopic hysterectomy    Auditory hallucinations    Vitamin D deficiency    Herpesvirus 2    Chronic right-sided low back pain with right-sided sciatica    SOB (shortness of breath)    Keratoconjunctivitis sicca    Class 3 severe obesity due to excess calories without serious comorbidity with body mass index (BMI) of 40.0 to 44.9 in adult    Chronic fatigue    Restless leg syndrome    Severe episode of recurrent major depressive disorder, with psychotic features    Screening mammogram, encounter for    Delayed immunizations    Localized edema    Essential hypertension    Mixed  hyperlipidemia    Acute pain of right shoulder    Right foot pain    Status post percutaneous fixation of right 5th metatarsal fracture    Cervical radiculopathy    Snoring    Early satiety    Bladder pain    Mild persistent asthma without complication    Bronchitis    Urgency of micturition    Viral syndrome       Family History   Problem Relation Age of Onset    Breast cancer Paternal Grandmother     Diabetes Maternal Grandmother     Hypertension Mother     Thyroid disease Mother     Breast cancer Maternal Aunt     Cancer Maternal Aunt         colon cancer    Colon cancer Maternal Aunt     Miscarriages / Stillbirths Cousin     Stroke Maternal Aunt     No Known Problems Father     Thyroid disease Sister     Thyroid disease Brother     Ovarian cancer Neg Hx     Deep vein thrombosis Neg Hx     Pulmonary embolism Neg Hx      Past Surgical History:   Procedure Laterality Date    APPENDECTOMY      CHOLECYSTECTOMY      COLONOSCOPY N/A 7/31/2017    Procedure: COLONOSCOPY;  Surgeon: Yuliana Michael MD;  Location[facility];  Service: Endoscopy;  Laterality: N/A;    DILATION AND CURETTAGE OF UTERUS      bleeding    HYSTERECTOMY  08/31/2016    RALH/BS for complex hyperplasia without atypia         Current Outpatient Medications:     albuterol (PROVENTIL/VENTOLIN HFA) 90 mcg/actuation inhaler, INHALE 2 PUFFS BY MOUTH INTO THE LUNGS EVERY 6 HOURS AS NEEDED FOR WHEEZING, Disp: 18 g, Rfl: 6    ALPRAZolam (XANAX) 1 MG tablet, Take 2 mg by mouth every evening. , Disp: , Rfl:     aspirin 325 MG tablet, Take 1 tablet (325 mg total) by mouth once daily. Take while non-weight right prevent DVT formation, Disp: 360 tablet, Rfl: 1    atorvastatin (LIPITOR) 20 MG tablet, Take 1 tablet (20 mg total) by mouth once daily. (Patient not taking: Reported on 11/20/2020), Disp: 90 tablet, Rfl: 1    blood sugar diagnostic Strp, To check BG 2 times daily, to use with insurance preferred meter, Disp: 200 each, Rfl:  "11    blood-glucose meter kit, To check BG 2 times daily, to use with insurance preferred meter, Disp: 1 each, Rfl: 0    butalbital-acetaminophen-caffeine -40 mg (FIORICET, ESGIC) -40 mg per tablet, Take 1 tablet by mouth every 6 (six) hours as needed for Pain. (Patient taking differently: Take 1 tablet by mouth every 6 (six) hours as needed for Pain. ), Disp: 6 tablet, Rfl: 0    celecoxib (CELEBREX) 200 MG capsule, TAKE 1 CAPSULE(S) BY MOUTH 2 TIMES A DAY, Disp: , Rfl:     dapagliflozin (FARXIGA) 10 mg tablet, Take 1 tablet (10 mg total) by mouth once daily., Disp: 30 tablet, Rfl: 3    ergocalciferol (ERGOCALCIFEROL) 50,000 unit Cap, Take 1 capsule (50,000 Units total) by mouth every 7 days., Disp: 12 capsule, Rfl: 1    FLUoxetine 40 MG capsule, nightly. , Disp: , Rfl: 0    fluticasone furoate-vilanteroL (BREO ELLIPTA) 100-25 mcg/dose diskus inhaler, Inhale 1 puff into the lungs once daily. Controller, Disp: 60 each, Rfl: 0    gabapentin (NEURONTIN) 600 MG tablet, Take 1 tablet (600 mg total) by mouth every evening., Disp: 30 tablet, Rfl: 5    insulin (BASAGLAR KWIKPEN U-100 INSULIN) glargine 100 units/mL (3mL) SubQ pen, Inject 15 Units into the skin every evening. (Patient taking differently: Inject 15 Units into the skin as needed. ), Disp: 1 Box, Rfl: 0    lancets Misc, To check BG 2 times daily, to use with insurance preferred meter, Disp: 200 each, Rfl: 11    metFORMIN (GLUCOPHAGE) 1000 MG tablet, Take 1 tablet (1,000 mg total) by mouth 2 (two) times daily with meals., Disp: 180 tablet, Rfl: 3    methen-m.blue-s.phos-phsal-hyo (URIBEL) 118-10-40.8-36 mg Cap, Take 1 capsule by mouth 3 (three) times daily as needed., Disp: 30 capsule, Rfl: 5    OLANZapine (ZYPREXA) 15 MG Tab, Take 15 mg by mouth once daily., Disp: , Rfl:     ONETOUCH DELICA PLUS LANCET 30 gauge Misc, , Disp: , Rfl:     pen needle, diabetic 33 gauge x 5/16" Ndle, 1 each by Misc.(Non-Drug; Combo Route) route nightly., " Disp: 100 each, Rfl: 0    PERCOCET  mg per tablet, , Disp: , Rfl:     semaglutide (OZEMPIC) 1 mg/dose (2 mg/1.5 mL) PnIj, INJECT 1 MG SUBCUTANEOUSLY EVERY WEEK, Disp: 3 Syringe, Rfl: 1    tiZANidine (ZANAFLEX) 4 MG tablet, Take 1 tablet (4 mg total) by mouth nightly as needed., Disp: 30 tablet, Rfl: 2    tobramycin-dexamethasone 0.3-0.1% (TOBRADEX) 0.3-0.1 % DrpS, INSTILL 1 DROP IN THE LEFT EYE THREE TIMES DAILY FOR 1 WEEK THEN AS NEEDED, Disp: , Rfl:     Review of Systems   Constitutional: Positive for chills and fatigue. Negative for activity change, fever and unexpected weight change.   HENT: Negative for congestion, hearing loss, rhinorrhea and trouble swallowing.    Eyes: Negative for blurred vision, discharge and visual disturbance.   Respiratory: Negative for cough, chest tightness, shortness of breath and wheezing.    Cardiovascular: Negative for chest pain and palpitations.   Gastrointestinal: Negative for blood in stool, constipation, diarrhea and vomiting.   Endocrine: Negative for polydipsia, polyphagia and polyuria.   Genitourinary: Negative for difficulty urinating, dysuria, hematuria, impotence and menstrual problem.   Musculoskeletal: Positive for arthralgias, myalgias and neck pain. Negative for gait problem and joint swelling.   Neurological: Positive for light-headedness and headaches. Negative for dizziness, tremors, syncope and weakness.   Psychiatric/Behavioral: Negative for confusion and dysphoric mood. The patient is not nervous/anxious.        Objective:   LMP 08/21/2016      Physical Exam  Constitutional:       General: She is not in acute distress.     Appearance: Normal appearance. She is not ill-appearing.   Pulmonary:      Effort: Pulmonary effort is normal. No respiratory distress.   Neurological:      General: No focal deficit present.      Mental Status: She is alert and oriented to person, place, and time.         Assessment & Plan     Problem List Items Addressed This  "Visit        ID    Viral syndrome    Current Assessment & Plan     Negative COVID and flu in ED. Labs and imaging unremarkable. Continue symptomatic tx.   Tylenol/ibuprofen for pain and fever.   Hand hygiene.   Maintain adequate hydration.   Antihistamine and flonase for nasal congestion and upper respiratory symptoms.   Rest.             Endocrine    Type 2 diabetes mellitus without complication, without long-term current use of insulin - Primary    Current Assessment & Plan     Continue current medications. Recommended rescheduling appt with PCP for management of DM. Will address ED follow up today. Most recent A1C was elevated at 8.8. Started on insulin at last appt with NP.                 Follow up if symptoms worsen or fail to improve.        Portions of this note may have been created with voice recognition software. Occasional "wrong-word" or "sound-a-like" substitutions may have occurred due to the inherent limitations of voice recognition software. Please, read the note carefully and recognize, using context, where substitutions have occurred.       "

## 2020-11-17 ENCOUNTER — OFFICE VISIT (OUTPATIENT)
Dept: PULMONOLOGY | Facility: CLINIC | Age: 45
End: 2020-11-17
Payer: COMMERCIAL

## 2020-11-17 ENCOUNTER — PATIENT OUTREACH (OUTPATIENT)
Dept: ADMINISTRATIVE | Facility: OTHER | Age: 45
End: 2020-11-17

## 2020-11-17 VITALS
OXYGEN SATURATION: 99 % | BODY MASS INDEX: 42.64 KG/M2 | SYSTOLIC BLOOD PRESSURE: 122 MMHG | WEIGHT: 255.94 LBS | DIASTOLIC BLOOD PRESSURE: 76 MMHG | TEMPERATURE: 98 F | HEART RATE: 73 BPM | HEIGHT: 65 IN | RESPIRATION RATE: 18 BRPM

## 2020-11-17 DIAGNOSIS — G47.34 NOCTURNAL HYPOXEMIA: ICD-10-CM

## 2020-11-17 DIAGNOSIS — E66.01 CLASS 3 SEVERE OBESITY DUE TO EXCESS CALORIES WITHOUT SERIOUS COMORBIDITY WITH BODY MASS INDEX (BMI) OF 40.0 TO 44.9 IN ADULT: Primary | ICD-10-CM

## 2020-11-17 DIAGNOSIS — R94.2 RESTRICTIVE PATTERN PRESENT ON PULMONARY FUNCTION TESTING: ICD-10-CM

## 2020-11-17 DIAGNOSIS — J45.901 EXACERBATION OF ASTHMA, UNSPECIFIED ASTHMA SEVERITY, UNSPECIFIED WHETHER PERSISTENT: Primary | ICD-10-CM

## 2020-11-17 DIAGNOSIS — G47.33 MILD OBSTRUCTIVE SLEEP APNEA: ICD-10-CM

## 2020-11-17 DIAGNOSIS — J45.909 ASTHMA, UNSPECIFIED ASTHMA SEVERITY, UNSPECIFIED WHETHER COMPLICATED, UNSPECIFIED WHETHER PERSISTENT: ICD-10-CM

## 2020-11-17 PROCEDURE — 99999 PR PBB SHADOW E&M-EST. PATIENT-LVL V: ICD-10-PCS | Mod: PBBFAC,,, | Performed by: INTERNAL MEDICINE

## 2020-11-17 PROCEDURE — 99999 PR PBB SHADOW E&M-EST. PATIENT-LVL V: CPT | Mod: PBBFAC,,, | Performed by: INTERNAL MEDICINE

## 2020-11-17 PROCEDURE — 99214 PR OFFICE/OUTPT VISIT, EST, LEVL IV, 30-39 MIN: ICD-10-PCS | Mod: S$GLB,,, | Performed by: INTERNAL MEDICINE

## 2020-11-17 PROCEDURE — 99214 OFFICE O/P EST MOD 30 MIN: CPT | Mod: S$GLB,,, | Performed by: INTERNAL MEDICINE

## 2020-11-17 NOTE — PROGRESS NOTES
Health Maintenance Due   Topic Date Due    Influenza Vaccine (1) 08/01/2020    Eye Exam  11/18/2020     Updates were requested from care everywhere.  Chart was reviewed for overdue Proactive [facility] Encounters (STANISLAW) topics (CRS, Breast Cancer Screening, Eye exam)  Health Maintenance has been updated.  LINKS immunization registry triggered.  Immunizations were reconciled.

## 2020-11-17 NOTE — PROGRESS NOTES
Pulmonary Outpatient Follow Up Visit     Subjective:       Patient ID: Yuli Milton is a 45 y.o. female.    Chief Complaint: Sleep Apnea and Asthma      HPI          45-year-old female patient presenting for 5 months follow-up.    Initially evaluated June 2020 for asthma suspected sleep apnea.      Patient has been on albuterol since childhood, never on maintenance inhaler, she does have 2 children with asthma no siblings with asthma or parents with asthma.     She works as a .     Does not have pets at home.     She does use albuterol about 3 days per week.  Asthma control test questionnaire 20.     Complains of snoring, excessive daytime sleepiness, daytime fatigue and nonrestorative sleep.  Home sleep study showed mild internal hypoxemia and mild obstructive sleep apnea.  Willing to try CPAP therapy       Review of Systems   Constitutional: Negative for fever and chills.   HENT: Negative for nosebleeds and hearing loss.    Eyes: Negative for redness.   Respiratory: Positive for snoring, cough, shortness of breath, dyspnea on extertion, use of rescue inhaler and somnolence. Negative for choking.    Genitourinary: Negative for hematuria.   Endocrine: Diabetes mellitus Negative for cold intolerance.    Musculoskeletal: Positive for arthralgias, back pain and gait problem.   Skin: Negative for rash.   Gastrointestinal: Negative for vomiting.   Neurological: Negative for syncope.   Hematological: Negative for adenopathy.   Psychiatric/Behavioral: Positive for sleep disturbance. Negative for confusion.       Outpatient Encounter Medications as of 11/17/2020   Medication Sig Dispense Refill    albuterol (PROVENTIL/VENTOLIN HFA) 90 mcg/actuation inhaler INHALE 2 PUFFS BY MOUTH INTO THE LUNGS EVERY 6 HOURS AS NEEDED FOR WHEEZING 18 g 0    ALPRAZolam (XANAX) 1 MG tablet Take 2 mg by mouth every evening.       aspirin 325 MG tablet Take 1 tablet (325 mg total) by  "mouth once daily. Take while non-weight right prevent DVT formation 360 tablet 1    atorvastatin (LIPITOR) 20 MG tablet Take 1 tablet (20 mg total) by mouth once daily. 90 tablet 1    blood sugar diagnostic Strp To check BG 2 times daily, to use with insurance preferred meter 200 each 11    blood-glucose meter kit To check BG 2 times daily, to use with insurance preferred meter 1 each 0    butalbital-acetaminophen-caffeine -40 mg (FIORICET, ESGIC) -40 mg per tablet Take 1 tablet by mouth every 6 (six) hours as needed for Pain. (Patient taking differently: Take 1 tablet by mouth every 6 (six) hours as needed for Pain. ) 6 tablet 0    celecoxib (CELEBREX) 200 MG capsule TAKE 1 CAPSULE(S) BY MOUTH 2 TIMES A DAY      dapagliflozin (FARXIGA) 10 mg tablet Take 1 tablet (10 mg total) by mouth once daily. 30 tablet 3    ergocalciferol (ERGOCALCIFEROL) 50,000 unit Cap Take 1 capsule (50,000 Units total) by mouth every 7 days. 12 capsule 1    FLUoxetine 40 MG capsule nightly.   0    fluticasone furoate-vilanteroL (BREO ELLIPTA) 100-25 mcg/dose diskus inhaler Inhale 1 puff into the lungs once daily. Controller 60 each 0    insulin (BASAGLAR KWIKPEN U-100 INSULIN) glargine 100 units/mL (3mL) SubQ pen Inject 15 Units into the skin every evening. 1 Box 0    lancets Misc To check BG 2 times daily, to use with insurance preferred meter 200 each 11    metFORMIN (GLUCOPHAGE) 1000 MG tablet Take 1 tablet (1,000 mg total) by mouth 2 (two) times daily with meals. 180 tablet 3    OLANZapine (ZYPREXA) 15 MG Tab Take 15 mg by mouth once daily.      ONETOUCH DELICA PLUS LANCET 30 gauge Misc       pen needle, diabetic 33 gauge x 5/16" Ndle 1 each by Misc.(Non-Drug; Combo Route) route nightly. 100 each 0    PERCOCET  mg per tablet       semaglutide (OZEMPIC) 1 mg/dose (2 mg/1.5 mL) PnIj INJECT 1 MG SUBCUTANEOUSLY EVERY WEEK 3 Syringe 1    tiZANidine (ZANAFLEX) 4 MG tablet Take 1 tablet (4 mg total) by mouth " "nightly as needed. 30 tablet 2    tobramycin-dexamethasone 0.3-0.1% (TOBRADEX) 0.3-0.1 % DrpS INSTILL 1 DROP IN THE LEFT EYE THREE TIMES DAILY FOR 1 WEEK THEN AS NEEDED      gabapentin (NEURONTIN) 600 MG tablet Take 1 tablet (600 mg total) by mouth every evening. 30 tablet 5    [DISCONTINUED] potassium chloride (KLOR-CON) 20 mEq Pack Take 20 mEq by mouth once daily. (Patient not taking: Reported on 6/10/2020) 30 packet 0    [DISCONTINUED] potassium chloride (KLOR-CON) 20 mEq Pack MIX AND DRINK 1 PACKET BY MOUTH ONCE DAILY 30 packet 0     No facility-administered encounter medications on file as of 11/17/2020.        Objective:     Vital Signs (Most Recent)  Vital Signs  Temp: 97.9 °F (36.6 °C)  Temp src: Temporal  Pulse: 73  Resp: 18  SpO2: 99 %  BP: 122/76  Height and Weight  Height: 5' 5" (165.1 cm)  Weight: 116.1 kg (255 lb 15.3 oz)  BSA (Calculated - sq m): 2.31 sq meters  BMI (Calculated): 42.6  Weight in (lb) to have BMI = 25: 149.9]  Wt Readings from Last 2 Encounters:   11/17/20 116.1 kg (255 lb 15.3 oz)   11/12/20 114.2 kg (251 lb 12.3 oz)       Physical Exam   Constitutional: She is oriented to person, place, and time. She appears well-developed. She is obese.   HENT:   Head: Normocephalic.   Mouth/Throat: Mallampati Score: III.   Pulmonary/Chest: Effort normal. No respiratory distress.   Neurological: She is alert and oriented to person, place, and time.   Psychiatric: She has a normal mood and affect. Her behavior is normal. Judgment and thought content normal.       Laboratory  Lab Results   Component Value Date    WBC 4.92 11/12/2020    RBC 4.45 11/12/2020    HGB 12.9 11/12/2020    HCT 40.3 11/12/2020    MCV 91 11/12/2020    MCH 29.0 11/12/2020    MCHC 32.0 11/12/2020    RDW 13.2 11/12/2020     11/12/2020    MPV 9.9 11/12/2020    GRAN 2.6 11/12/2020    GRAN 53.1 11/12/2020    LYMPH 1.9 11/12/2020    LYMPH 38.6 11/12/2020    MONO 0.3 11/12/2020    MONO 6.1 11/12/2020    EOS 0.1 11/12/2020    " BASO 0.05 11/12/2020    EOSINOPHIL 1.0 11/12/2020    BASOPHIL 1.0 11/12/2020       BMP  Lab Results   Component Value Date     11/12/2020    K 3.8 11/12/2020     11/12/2020    CO2 26 11/12/2020    BUN 7 11/12/2020    CREATININE 0.8 11/12/2020    CALCIUM 8.9 11/12/2020    ANIONGAP 8 11/12/2020    ESTGFRAFRICA >60 11/12/2020    EGFRNONAA >60 11/12/2020    AST 12 11/12/2020    ALT 17 11/12/2020    PROT 7.4 11/12/2020       Lab Results   Component Value Date    BNP 59 05/22/2020    BNP 23 10/14/2019    BNP 18 01/21/2019       Lab Results   Component Value Date    TSH 0.392 (L) 07/09/2019       No results found for: SEDRATE    No results found for: CRP  Lab Results   Component Value Date    IGE <35 09/28/2020        No results found for: ASPERGILLUS  No results found for: AFUMIGATUSCL     No results found for: ACE     Diagnostic Results:    I have personally reviewed today the following studies:    CXR 9/2020:    The lungs are clear and free of infiltrate.  No pleural effusion or pneumothorax. The heart is not enlarged. Surgical clips noted in the right upper quadrant of the abdomen.    2 D Echo 10/2019:       1 - No wall motion abnormalities.     2 - Normal left ventricular systolic function (EF 60-65%).     3 - Impaired LV relaxation, normal LAP (grade 1 diastolic dysfunction).     4 - Normal right ventricular systolic function .     5 - The estimated PA systolic pressure is greater than 17 mmHg.     6 - Mild tricuspid regurgitation.      PFT 2017:     WNL     PFT September 2020    Grade A-D -acceptable quality of tracingsNormal spirometry. (FEV1/VC greater than or equal to LLN and FVC greater than or equal to LLN)Mild restriction. (TLC< LLN and > or equal to 70% of predicted).Mild reduction in diffusion capacity -unadjusted for   hemoglobin (DLCO > or equal to 60% predicted and < LLN) . Mild decrease in maximal voluntary ventilation. (MVV % predicted is >65% predicted and less than LLN)Flow volume loops  demonstrate a restrictive defect.Overall there is no significant ventilatory   impairment. (FEV1 >LLN)Pattern of restriction and decreased diffusion suggest interstitial pulmonary fibrosis.     Home sleep study October 2020    PHYSICIAN INTERPRETATION AND COMMENTS: Findings are consistent with mild, positional obstructive sleep apnea  (SCOUT).Best study was night 1. AHI 9.0 events per hour with 5.1 hr of sleep. Treatment intervention with CPAP indicated. Auto  PAP 5-20 cm water pressure with mask of choice close follow-up.  CLINICAL HISTORY: 45 year old female presented with: 14 inch neck, BMI of 42, an Hooper sleepiness score of 4, history of  diabetes, depression and symptoms of nocturnal snoring     SLEEP STUDY FINDINGS: Patient underwent a three night Home Sleep Test and by behavioral criteria, slept for approximately  15.8 hours, with a sleep latency of 37 minutes and a sleep efficiency of 75.9%. Mild sleep disordered breathing (AHI=6) is noted  based on a 4% hypopnea desaturation criteria, predominantly in the supine position (17 events/hour). The patient slept supine 4.6%  of the night based on valid recording time of 15.6 hours and is 2.8 times as likely to have apneas/hypopneas when supine. When  considering more subtle measures of sleep disordered breathing, the overall respiratory disturbance index is also mild (RDI=10)  based on a 1% hypopnea desaturation criteria with confirmation by surrogate arousal indicators. The apneas/hypopneas are  accompanied by minimal oxygen desaturation (percent time below 90% SpO2: 2.0%, Min SpO2: 76.3%). The average desaturation  across all sleep disordered breathing events is 3.3%. Snoring occurs for 16.5% (30 dB) of the study, 14.4% is very loud. The mean  pulse rate is 60 BPM, with infrequent pulse rate variability (23 events with >= 6 BPM increase/decrease per hour).    Assessment/Plan:   Class 3 severe obesity due to excess calories without serious comorbidity with body  mass index (BMI) of 40.0 to 44.9 in adult  -     Ambulatory referral/consult to Weight Management Program; Future; Expected date: 11/24/2020    Mild obstructive sleep apnea  -     CPAP FOR HOME USE    Nocturnal hypoxemia  -     CPAP FOR HOME USE    Restrictive pattern present on pulmonary function testing    Asthma, unspecified asthma severity, unspecified whether complicated, unspecified whether persistent     Continue albuterol p.r.n.    Proceed with auto PAP therapy.      General weight loss/lifestyle modification strategies discussed (elicit support from others; identify saboteurs; non-food rewards).  Diet interventions: low calorie (1000 kCal/d) deficit diet    Restriction in the absence of abnormal chest x-ray findings history of autoimmune disorders is likely related to obesity/atelectasis    Declined influenza vaccination   Follow up in about 10 weeks (around 1/26/2021).    This note was prepared using voice recognition system and is likely to have sound alike errors that may have been overlooked even after proof reading.  Please call me with any questions    Discussed diagnosis, its evaluation, treatment and usual course. All questions answered.      Socorro Clay MD

## 2020-11-17 NOTE — LETTER
November 17, 2020      [facility]  [address]  [place] [address]  Phone: [phone]  Fax: [phone]       Patient: Yuli Milton   YOB: 1975  Date of Visit: 11/17/2020    To Whom It May Concern:    Dick Milton  was at [facility] on 11/17/2020. She may return to work/school on 11/18/2020  with no restrictions. If you have any questions or concerns, or if I can be of further assistance, please do not hesitate to contact me.    Sincerely,    Amy Ca LPN

## 2020-11-17 NOTE — PATIENT INSTRUCTIONS
No eating / drinking for 3 hours before going to bed.  Elevate head of bed 30 - 45 %     CPAP HABITUATION PROCEDURE     Clark Salinas, Ph.D., [facility] and Baltazar Mosley M.D.  [facility], [facility]     Some people have difficulty adjusting to CPAP/BiPAP/AutoCPAP.  This is not unusual or hard to understand: Breathing with CPAP is different from ordinary breathing, and this difference is aversive to some. The problem can be overcome, however, and the benefits CPAP confers are certainly worth the effort.  Below, you will find a simple and gradual way to get used to CPAP before you try to use it all night, every night.  The essence of this procedure is to relax and let breathing with CPAP become a habit.  It may take about 2 weeks, and involves the followin. CPAP while awake and comfortably seated, during the late evening.     2. CPAP in bed while attempting sleep at night.     3. If your discomfort is too great at any time, discontinue and attempt again later the same night, for the same amount of time.   4. You and your physician may alter the times and pressures if necessary.     5. If you find that it is very easy to get used to CPAP, you may start using it every night when you are comfortable enough to do so.  6. IMPORTANT REMINDER: If you have a cold or sinus congestion it is okay to miss a night or two of CPAP. Consider using antihistamines or decongestants to clear up your sinus congestion prior to sleeping.     DAYS  1-3   Start CPAP while awake and comfortably seated during the late evening, after having prepared for bed.  You may do this while watching television, listening to music or reading. Use for 1 hour, then take off CPAP and go directly to bed to sleep     DAYS  4-6     Start CPAP when you go to bed and use for 1 hour, or until you fall asleep.  If your discomfort is too great at any time, discontinue and attempt again later the same night, for the  same designated amount of time (1 hour).      DAYS  7-9     Increase time with CPAP to 2 hours a night.  If your discomfort is too great at any time, discontinue and attempt again later the same night, for the same designated amount of time (2 hours).      DAYS 10-12    Increase time with CPAP to 3 hours a night. If your discomfort is too great at any time, discontinue and attempt again later the same night, for the same designated amount of time (3 hours).      DAYS 13-15     Sleep the entire night with CPAP.      OPTIONAL: You may use Progressive Muscle Relaxation (PMR) to help put you at ease when using CPAP; do PMR twice each day, once in the morning or afternoon, and once in the evening just before using CPAP. You may do PMR prior to any attempt until you are comfortable with CPAP.        Continuous Positive Air Pressure (CPAP)  Continuous positive air pressure (CPAP) uses gentle air pressure to hold the airway open. CPAP is often the most effective treatment for sleep apnea and severe snoring. It works very well for many people. But keep in mind that it can take several adjustments before the setup is right for you.       How CPAP Works  CPAP is a small portable pump beside the bed. The pump sends air through a hose, which is held over your nose and/or mouth by a mask. Mild air pressure is gently pushed through your airway. The air pressure nudges sagging tissues aside. This widens the airway so you can breathe better. CPAP may be combined with other kinds of therapy for sleep apnea.       Types of Air Pressure Treatments  There are different types of CPAP. Your doctor or CPAP technician will help you decide which type is best for you:  · Basic CPAP keeps the pressure constant all night long.  · A bilevel device (BiPAP) provides more pressure when you breathe in and less when you breathe out. A BiPAP machine also may be set to provide automatic breaths to maintain breathing if you stop breathing while  sleeping.  · An autoCPAP device automatically adjusts pressure throughout the night and in response to changes such as body position, sleep stage, and snoring.  © 8959-2244 [employer]. [address] [place] [address]. All rights reserved. This information is not intended as a substitute for professional medical care. Always follow your healthcare professional's instructions.        Snoring and Sleep Apnea: Notes for a Partner  Snoring and sleep apnea affect your life, as well as your partners. You can help in the treatment of the problem. Be supportive. Encourage your partner both to get treatment and to make the adjustments needed to follow through.       Adjusting to Changes  Your partners treatment may involve making changes to certain life habits. You can help your partner make and stick with these changes. For example:  · Support and even join your partners exercise program.  · Be supportive if your partner gets CPAP (continuous positive airway pressure). He or she may feel self-conscious at first. Remind your partner to expect adjustments to CPAP before it feels just right.  · Consider joining a snoring and sleep apnea support group.  Go Along to See the Health Care Provider  You can give the health care provider the best account of your partners nighttime breathing and snoring patterns. Try to go along to health care providers appointments. If you cant go, write notes for your partner to give to the health care provider. Describe your partners snoring and sleep breathing patterns in detail.  Tips for Sleeping with a Snorer  Until treatment takes care of your partners snoring:  · Try to go to bed first. It may help if youre already asleep when your partner starts to snore.  · Wear earplugs to bed. A fan or other source of background noise may also help drown out snoring.   © 6638-8511 [employer]. [address] [place] [address]. All rights reserved.  This information is not intended as a substitute for professional medical care. Always follow your healthcare professional's instructions.        Continuous Positive Airway Pressure (CPAP)  Your health care provider has prescribed continuous positive airway pressure (CPAP) therapy for you. A CPAP device helps you breathe better at night. The device delivers air through your nose or mouth when you breathe in to keep your air passages open. CPAP is:  · Used most often to treat sleep apnea and some other problems (Sleep apnea is a chronic condition with periods of sleep in which you briefly stop breathing.)  · Safe and very effective, but it takes time to get used to the mask.   Your health care provider, nurse, or medical supplier will give you tips for wearing and caring for your CPAP device.  General guidelines  · It's very important not to give up! It takes time to get used to wearing the mask at night.  · Practice using your CPAP device during the day, especially whenever you take a nap.  · Remember, there are several different types of masks. If you cant get used to your mask, ask your provider or medical supply company about trying another style.  · If you have nasal stuffiness or dryness when using your CPAP device, talk with your provider or medical supply company. There are ways to lessen these problems. For example, your provider may recommend moistening nasal spray or the medical supply company may recommend a device with a humidifier.  · The goal is to use your CPAP all night, every night, during all naps, and even when you travel.  · Keep your mask clean. Wash it with soap and water. Be sure to rinse the mask and tubing well with water to remove any soap. Let them air-dry thoroughly before using.  · Make yourself comfortable when sleeping with CPAP. Try using extra pillows.  Work with your medical supply company so that you know how to correctly use your CPAP. Their representative will be able to help  you:  · Use the CPAP correctly  · Troubleshoot any problems that come up  · Learn to clean and maintain the device  · Adjust to regular use of the CPAP  © 5958-9647 [employer]. [address] [place] [address], [place] [address]. All rights reserved. This information is not intended as a substitute for professional medical care. Always follow your healthcare professional's instructions.      Improving sleep hygiene was discussed with the patient  Advise to Sleep as long as necessary to feel rested (usually seven to eight hours for adults) and then get out of bed  Maintain a regular sleep schedule, particularly a regular wake-up time in the morning  Try not to force sleep  Avoid caffeinated beverages after lunch, alcohol near bedtime , smoking or other nicotine intake, particularly during the evening  Adjust the bedroom environment as needed to decrease stimuli (reduce ambient light, turn off the television or radio)  Avoid prolonged use of light-emitting screens (laptops, tablets, smartphones, ebooks) before bedtime   Resolve concerns or worries before bedtime  Exercise regularly for at least 20 minutes, preferably more than four to five hours prior to bedtime   Avoid daytime naps, especially if they are longer than 20 to 30 minutes or occur late in the day

## 2020-11-20 ENCOUNTER — CLINICAL SUPPORT (OUTPATIENT)
Dept: PULMONOLOGY | Facility: CLINIC | Age: 45
End: 2020-11-20
Payer: COMMERCIAL

## 2020-11-20 ENCOUNTER — OFFICE VISIT (OUTPATIENT)
Dept: UROLOGY | Facility: CLINIC | Age: 45
End: 2020-11-20
Payer: COMMERCIAL

## 2020-11-20 VITALS
WEIGHT: 253.31 LBS | BODY MASS INDEX: 42.15 KG/M2 | TEMPERATURE: 98 F | DIASTOLIC BLOOD PRESSURE: 86 MMHG | SYSTOLIC BLOOD PRESSURE: 124 MMHG

## 2020-11-20 VITALS — WEIGHT: 253.31 LBS | HEIGHT: 65 IN | BODY MASS INDEX: 42.2 KG/M2

## 2020-11-20 DIAGNOSIS — R39.15 URGENCY OF MICTURITION: Primary | ICD-10-CM

## 2020-11-20 DIAGNOSIS — R06.02 SOB (SHORTNESS OF BREATH) ON EXERTION: ICD-10-CM

## 2020-11-20 PROCEDURE — 99999 PR PBB SHADOW E&M-EST. PATIENT-LVL V: CPT | Mod: PBBFAC,,, | Performed by: UROLOGY

## 2020-11-20 PROCEDURE — 94618 PULMONARY STRESS TESTING: ICD-10-PCS | Mod: S$GLB,,, | Performed by: INTERNAL MEDICINE

## 2020-11-20 PROCEDURE — 99999 PR PBB SHADOW E&M-EST. PATIENT-LVL V: ICD-10-PCS | Mod: PBBFAC,,, | Performed by: UROLOGY

## 2020-11-20 PROCEDURE — 99999 PR PBB SHADOW E&M-EST. PATIENT-LVL I: CPT | Mod: PBBFAC,,,

## 2020-11-20 PROCEDURE — 99214 OFFICE O/P EST MOD 30 MIN: CPT | Mod: S$GLB,,, | Performed by: UROLOGY

## 2020-11-20 PROCEDURE — 99214 PR OFFICE/OUTPT VISIT, EST, LEVL IV, 30-39 MIN: ICD-10-PCS | Mod: S$GLB,,, | Performed by: UROLOGY

## 2020-11-20 PROCEDURE — 99999 PR PBB SHADOW E&M-EST. PATIENT-LVL I: ICD-10-PCS | Mod: PBBFAC,,,

## 2020-11-20 PROCEDURE — 94618 PULMONARY STRESS TESTING: CPT | Mod: S$GLB,,, | Performed by: INTERNAL MEDICINE

## 2020-11-20 RX ORDER — CEFDINIR 300 MG/1
300 CAPSULE ORAL 2 TIMES DAILY
Qty: 20 CAPSULE | Refills: 0 | Status: SHIPPED | OUTPATIENT
Start: 2020-11-20 | End: 2020-11-30

## 2020-11-20 NOTE — PROCEDURES
"[facility]  Six Minute Walk     SUMMARY     Ordering Provider: Dr. Clay   Interpreting Provider: Dr. Clay  Performing nurse/tech/RT: JAMES Rodriguez RRT  Diagnosis: Exertional Dyspnea  Height: 5' 5" (165.1 cm)  Weight: 114.9 kg (253 lb 4.9 oz)  BMI (Calculated): 42.2   Patient Race:             Phase Oxygen Assessment Supplemental O2 Heart   Rate Blood Pressure Ramon Dyspnea Scale Rating   Resting 98 % Room Air 73 bpm 116/77 3   Exercise        Minute        1 100 % Room Air 108 bpm     2 100 % Room Air 117 bpm     3 94 % Room Air 116 bpm     4 93 % Room Air 113 bpm     5 99 % Room Air 130 bpm     6  95 % Room Air 123 bpm 94/54 4   Recovery        Minute        1 100 % Room Air 86 bpm     2 100 % Room Air 77 bpm     3 100 % Room Air 74 bpm     4 99 % Room Air 77 bpm 117/75 2     Six Minute Walk Summary  6MWT Status: completed without stopping  Patient Reported: Dyspnea, Other (Comment)(leg numbness)     Interpretation:  Did the patient stop or pause?: No                                         Total Time Walked (Calculated): 360 seconds  Final Partial Lap Distance (feet): 150 feet  Total Distance Meters (Calculated): 411.48 meters  Predicted Distance Meters (Calculated): 480.58 meters  Percentage of Predicted (Calculated): 85.62  Peak VO2 (Calculated): 16.32  Mets: 4.66  Has The Patient Had a Previous Six Minute Walk Test?: No       Previous 6MWT Results  Has The Patient Had a Previous Six Minute Walk Test?: No    "

## 2020-11-20 NOTE — PROGRESS NOTES
Chief Complaint:   Encounter Diagnosis   Name Primary?    Urgency of micturition Yes       HPI:    11/20/20- patient still is having lower abdominal pain, midline suprapubic, no significant dysuria or urgency, no incontinence.  VESIcare really did not assist, this is still a persistent over the last 3 months.  Previously Bactrim did not assist and cultures were negative.  45-year-old female who comes in with bladder discomfort.  Patient states that she has noted a recent pain and pressure in her suprapubic region.  She states that a urinalysis has been normal.  This been going on for the last week, nothing really consistent with this before.  Appears to be constant, with no change.  No correlation to voiding.  Possible dysuria, but not consistent with a UTI.  She gets up 1-2 times per evening, but does have increased frequency and urgency during the daytime.  No evidence of leakage.  No gross hematuria, no microscopic hematuria, she has never been a smoker.  She did take antibiotics, with no assistance.  She does have frequent UTIs, but again this does not appear to be UTI, nor did the antibiotics assist.  No previous urological history, she has had a total abdominal hysterectomy with unilateral salpingo-oophorectomy.  She has had no slings, as stated no incontinence, 3 natural deliveries without issue.  No family history of urological cancers or stones.  She states she has been treating constipation recently.    Allergies:  Trulicity [dulaglutide] and Hibiclens (isopropyl alcohol)    Medications:  has a current medication list which includes the following prescription(s): albuterol, alprazolam, aspirin, atorvastatin, blood sugar diagnostic, blood-glucose meter, butalbital-acetaminophen-caffeine -40 mg, celecoxib, dapagliflozin, ergocalciferol, fluoxetine, breo ellipta, gabapentin, basaglar kwikpen u-100 insulin, lancets, metformin, olanzapine, onetouch delica plus lancet, pen needle, diabetic, percocet,  ozempic, tizanidine, tobramycin-dexamethasone 0.3-0.1%, potassium chloride, and potassium chloride.    Review of Systems:  General: No fever, chills, fatigability, or weight loss.  Skin: No rashes, itching, or changes in color or texture of skin.  Chest: Denies SANTOS, cyanosis, wheezing, cough, and sputum production.  Abdomen: Appetite fine. No weight loss. Denies diarrhea, abdominal pain, hematemesis, or blood in stool.  Musculoskeletal: No joint stiffness or swelling. Denies back pain.  : As above.  All other review of systems negative.    PMH:   has a past medical history of Abnormal Pap smear of cervix, Abnormal Pap smear of vagina, Anemia, Anxiety, Anxiety and depression, Asthma, Bacterial vaginosis (1/5/2018), Bronchitis (10/15/2015), Chlamydia, Depression (emotion), Diabetes mellitus, SANTOS (dyspnea on exertion) (10/14/2019), Dysuria (3/31/2018), Dysuria (3/31/2018), Gallstones, Gastritis, High-risk sexual behavior (1/5/2018), History of ovarian cyst, History of syphilis, History of trichomoniasis, Hyperlipidemia, Lower abdominal pain (7/31/2017), Mental disorder, Persistent cough (7/27/2018), PONV (postoperative nausea and vomiting), Preop general physical exam (12/9/2019), Shortness of breath (6/26/2017), Suicidal ideations (7/9/2019), Vaginal candidiasis (7/24/2018), and Vaginal itching (1/5/2018).    PSH:   has a past surgical history that includes Appendectomy; Dilation and curettage of uterus; Colonoscopy (N/A, 7/31/2017); Cholecystectomy; and Hysterectomy (08/31/2016).    FamHx: family history includes Breast cancer in her maternal aunt and paternal grandmother; Cancer in her maternal aunt; Colon cancer in her maternal aunt; Diabetes in her maternal grandmother; Hypertension in her mother; Miscarriages / Stillbirths in her cousin; No Known Problems in her father; Stroke in her maternal aunt; Thyroid disease in her brother, mother, and sister.    SocHx:  reports that she has never smoked. She has never  used smokeless tobacco. She reports that she does not drink alcohol or use drugs.      Physical Exam:  There were no vitals filed for this visit.  General: A&Ox3, no apparent distress, no deformities  Neck: No masses, normal ROM  Lungs: normal inspiration, no use of accessory muscles  Heart: normal pulse, no arrhythmias  Abdomen: Soft, NT, ND, no masses, no hernias, no hepatosplenomegaly  Skin: The skin is warm and dry. No jaundice.  Ext: No c/c/e.    Labs/Studies:   CT urine adrenal nodule 9/20  Urine culture negative 11/20    Impression/Plan:     Urgency- VESIcare really has not assisted, neither did Bactrim before.  Previous culture were negative, CT scan was unremarkable except for the adrenal nodule.  Patient is having no gross hematuria or dysuria, given the fact that the anticholinergic did not assist, this may be an infectious etiology.  Therefore I will give her an antibiotic with a broader spectrum, cefdinir b.i.d. times 10 days.  I will have her return in 2-3 weeks for cystoscopy to rule out intraluminal bladder pathology.  In addition we will also attempt Uribel, for symptom relief.  Of note pyridium did not assist in the past.  No evidence of constipation, if no other abnormalities can be found, this could be interstitial cystitis.

## 2020-12-01 ENCOUNTER — DOCUMENTATION ONLY (OUTPATIENT)
Dept: BARIATRICS | Facility: CLINIC | Age: 45
End: 2020-12-01

## 2020-12-01 ENCOUNTER — PATIENT MESSAGE (OUTPATIENT)
Dept: BARIATRICS | Facility: CLINIC | Age: 45
End: 2020-12-01

## 2020-12-01 PROBLEM — B34.9 VIRAL SYNDROME: Status: ACTIVE | Noted: 2020-12-01

## 2020-12-01 NOTE — ASSESSMENT & PLAN NOTE
Negative COVID and flu in ED. Labs and imaging unremarkable. Continue symptomatic tx.   Tylenol/ibuprofen for pain and fever.   Hand hygiene.   Maintain adequate hydration.   Antihistamine and flonase for nasal congestion and upper respiratory symptoms.   Rest.

## 2020-12-01 NOTE — PROGRESS NOTES
Bariatric Surgery Online Course Form Submission  Someone has submitted a Bariatric Surgery Online Course Form Submission on this page.  Date: 11- 23:20:48  Patient's Name: Yuli Milton  YOB: 1975 Email: [email or web address]  Phone: [phone]   Patient Address: [address] [address]  Preferred Surgical Location: [facility] [place]   I certify that I am 18 years of age or older: Yes   Confirmation Code: [id number]  Verification of Bariatric Seminar: yes  Insurance Information  Insurance or Self Pay? Insurance - Fill out fields below  Insurance Company Name: [employer]   Type of Coverage/Coverage Plan (i.e. PPO, HMO): PPO   Group Name:    Subscriber Name: [facility]   Member Name (patient's name): Yuli milton   Member ID/Policy #:[id number]   Plan Effective Date: 11/01/2019  Card Issuance date: 11/01/2019

## 2020-12-08 ENCOUNTER — OFFICE VISIT (OUTPATIENT)
Dept: UROLOGY | Facility: CLINIC | Age: 45
End: 2020-12-08
Payer: COMMERCIAL

## 2020-12-08 VITALS — HEIGHT: 65 IN | WEIGHT: 257.5 LBS | BODY MASS INDEX: 42.9 KG/M2

## 2020-12-08 DIAGNOSIS — N30.10 INTERSTITIAL CYSTITIS: ICD-10-CM

## 2020-12-08 DIAGNOSIS — R39.15 URGENCY OF MICTURITION: Primary | ICD-10-CM

## 2020-12-08 PROCEDURE — 52000 CYSTOURETHROSCOPY: CPT | Mod: S$PBB,,, | Performed by: UROLOGY

## 2020-12-08 PROCEDURE — 52000 CYSTOURETHROSCOPY: CPT | Mod: PBBFAC | Performed by: UROLOGY

## 2020-12-08 PROCEDURE — 52000 PR CYSTOURETHROSCOPY: ICD-10-PCS | Mod: S$PBB,,, | Performed by: UROLOGY

## 2020-12-08 PROCEDURE — 99499 NO LOS: ICD-10-PCS | Mod: S$PBB,,, | Performed by: UROLOGY

## 2020-12-08 PROCEDURE — 99999 PR PBB SHADOW E&M-EST. PATIENT-LVL IV: CPT | Mod: PBBFAC,,, | Performed by: UROLOGY

## 2020-12-08 PROCEDURE — 99214 OFFICE O/P EST MOD 30 MIN: CPT | Mod: PBBFAC,25 | Performed by: UROLOGY

## 2020-12-08 PROCEDURE — 99999 PR PBB SHADOW E&M-EST. PATIENT-LVL IV: ICD-10-PCS | Mod: PBBFAC,,, | Performed by: UROLOGY

## 2020-12-08 PROCEDURE — 99499 UNLISTED E&M SERVICE: CPT | Mod: S$PBB,,, | Performed by: UROLOGY

## 2020-12-08 RX ORDER — CIPROFLOXACIN 500 MG/1
500 TABLET ORAL
Status: COMPLETED | OUTPATIENT
Start: 2020-12-08 | End: 2020-12-08

## 2020-12-08 RX ADMIN — CIPROFLOXACIN 500 MG: 500 TABLET ORAL at 02:12

## 2020-12-08 NOTE — PROGRESS NOTES
Chief Complaint:   Encounter Diagnosis   Name Primary?    Urgency of micturition Yes       HPI:   12/8/20- patient is here for cystoscopy.  Patient states the antibiotics only moderately assisted.  The Uribel seems to be doing well though.  She is only having to take it in the evening.    11/20/20- patient still is having lower abdominal pain, midline suprapubic, no significant dysuria or urgency, no incontinence.  VESIcare really did not assist, this is still a persistent over the last 3 months.  Previously Bactrim did not assist and cultures were negative.  45-year-old female who comes in with bladder discomfort.  Patient states that she has noted a recent pain and pressure in her suprapubic region.  She states that a urinalysis has been normal.  This been going on for the last week, nothing really consistent with this before.  Appears to be constant, with no change.  No correlation to voiding.  Possible dysuria, but not consistent with a UTI.  She gets up 1-2 times per evening, but does have increased frequency and urgency during the daytime.  No evidence of leakage.  No gross hematuria, no microscopic hematuria, she has never been a smoker.  She did take antibiotics, with no assistance.  She does have frequent UTIs, but again this does not appear to be UTI, nor did the antibiotics assist.  No previous urological history, she has had a total abdominal hysterectomy with unilateral salpingo-oophorectomy.  She has had no slings, as stated no incontinence, 3 natural deliveries without issue.  No family history of urological cancers or stones.  She states she has been treating constipation recently.    Allergies:  Trulicity [dulaglutide] and Hibiclens (isopropyl alcohol)    Medications:  has a current medication list which includes the following prescription(s): albuterol, alprazolam, aspirin, atorvastatin, blood sugar diagnostic, blood-glucose meter, butalbital-acetaminophen-caffeine -40 mg, celecoxib,  dapagliflozin, ergocalciferol, fluoxetine, breo ellipta, gabapentin, basaglar kwikpen u-100 insulin, lancets, metformin, methen-m.blue-s.phos-phsal-hyo, olanzapine, onetouch delica plus lancet, pen needle, diabetic, percocet, ozempic, tizanidine, tobramycin-dexamethasone 0.3-0.1%, potassium chloride, and potassium chloride.    Review of Systems:  General: No fever, chills, fatigability, or weight loss.  Skin: No rashes, itching, or changes in color or texture of skin.  Chest: Denies SANTOS, cyanosis, wheezing, cough, and sputum production.  Abdomen: Appetite fine. No weight loss. Denies diarrhea, abdominal pain, hematemesis, or blood in stool.  Musculoskeletal: No joint stiffness or swelling. Denies back pain.  : As above.  All other review of systems negative.    PMH:   has a past medical history of Abnormal Pap smear of cervix, Abnormal Pap smear of vagina, Anemia, Anxiety, Anxiety and depression, Asthma, Bacterial vaginosis (1/5/2018), Bronchitis (10/15/2015), Chlamydia, Depression (emotion), Diabetes mellitus, SANTOS (dyspnea on exertion) (10/14/2019), Dysuria (3/31/2018), Dysuria (3/31/2018), Gallstones, Gastritis, High-risk sexual behavior (1/5/2018), History of ovarian cyst, History of syphilis, History of trichomoniasis, Hyperlipidemia, Lower abdominal pain (7/31/2017), Mental disorder, Persistent cough (7/27/2018), PONV (postoperative nausea and vomiting), Preop general physical exam (12/9/2019), Shortness of breath (6/26/2017), Suicidal ideations (7/9/2019), Vaginal candidiasis (7/24/2018), and Vaginal itching (1/5/2018).    PSH:   has a past surgical history that includes Appendectomy; Dilation and curettage of uterus; Colonoscopy (N/A, 7/31/2017); Cholecystectomy; and Hysterectomy (08/31/2016).    FamHx: family history includes Breast cancer in her maternal aunt and paternal grandmother; Cancer in her maternal aunt; Colon cancer in her maternal aunt; Diabetes in her maternal grandmother; Hypertension in her  mother; Miscarriages / Stillbirths in her cousin; No Known Problems in her father; Stroke in her maternal aunt; Thyroid disease in her brother, mother, and sister.    SocHx:  reports that she has never smoked. She has never used smokeless tobacco. She reports that she does not drink alcohol or use drugs.      Physical Exam:  There were no vitals filed for this visit.  General: A&Ox3, no apparent distress, no deformities  Neck: No masses, normal ROM  Lungs: normal inspiration, no use of accessory muscles  Heart: normal pulse, no arrhythmias  Abdomen: Soft, NT, ND, no masses, no hernias, no hepatosplenomegaly  Skin: The skin is warm and dry. No jaundice.  Ext: No c/c/e.    Labs/Studies:   Cystoscopy negative 12/20  CT urine adrenal nodule 9/20  Urine culture negative 11/20    Procedure: Diagnostic Cystoscopy    Procedure in Detail: After proper consents were obtained, the patient was prepped and draped in normal sterile fashion for diagnostic cystoscopy. The flexible cystoscope was then introduced into the urethra, and advanced into the bladder under direct vision. The urethral mucosa appeared normal, and no strictures were noted. The sphincter appeared to be normal.  The bladder neck was normal. Inspection of the interior of the bladder was then carried out. The trigone was unremarkable, with no mucosal lesions. The ureteral orifices were normal in position and configuration. Systematic inspection of the mucosa of the bladder was then carried out, rotating the cystoscope so that all areas of the left and right lateral walls, the dome of the bladder, and the posterior wall were all visualized. The cystoscope was then advanced further into the bladder, and maximum deflection of the scope was performed so that the bladder neck could be inspected. No mucosal lesions were noted there. The cystoscope was then removed, and the procedure terminated.     Findings:  Negative cystoscope      Impression/Plan:     Urgency-  anticholinergics, Pyridium and Bactrim have all failed.  She had moderate improvement after taking the cefdinir.  She has noted improvement by taking the Uribel in the evenings only though.  I discussed possibly using Elmiron with the patient today, she is interested, but reluctant due to the side effects.  Therefore she wants to hold off on this for now.  Instead she will use the Uribel on a p.r.n. basis.  I will see her in 6 months, but she will contact us in the meantime if she has any issues.    2.  Interstitial cystitis- please see above.

## 2020-12-10 DIAGNOSIS — G47.33 MILD OBSTRUCTIVE SLEEP APNEA: Primary | ICD-10-CM

## 2020-12-11 ENCOUNTER — PATIENT MESSAGE (OUTPATIENT)
Dept: OTHER | Facility: OTHER | Age: 45
End: 2020-12-11

## 2020-12-22 ENCOUNTER — LAB VISIT (OUTPATIENT)
Dept: LAB | Facility: HOSPITAL | Age: 45
End: 2020-12-22
Attending: FAMILY MEDICINE
Payer: COMMERCIAL

## 2020-12-22 ENCOUNTER — DOCUMENTATION ONLY (OUTPATIENT)
Dept: REHABILITATION | Facility: HOSPITAL | Age: 45
End: 2020-12-22

## 2020-12-22 ENCOUNTER — OFFICE VISIT (OUTPATIENT)
Dept: INTERNAL MEDICINE | Facility: CLINIC | Age: 45
End: 2020-12-22
Payer: COMMERCIAL

## 2020-12-22 VITALS
DIASTOLIC BLOOD PRESSURE: 76 MMHG | SYSTOLIC BLOOD PRESSURE: 112 MMHG | BODY MASS INDEX: 42.16 KG/M2 | HEIGHT: 65 IN | WEIGHT: 253.06 LBS | HEART RATE: 80 BPM | TEMPERATURE: 97 F

## 2020-12-22 DIAGNOSIS — M54.41 CHRONIC RIGHT-SIDED LOW BACK PAIN WITH RIGHT-SIDED SCIATICA: Primary | ICD-10-CM

## 2020-12-22 DIAGNOSIS — M25.50 POLYARTHRALGIA: ICD-10-CM

## 2020-12-22 DIAGNOSIS — G89.29 CHRONIC RIGHT-SIDED LOW BACK PAIN WITH RIGHT-SIDED SCIATICA: Primary | ICD-10-CM

## 2020-12-22 DIAGNOSIS — G89.29 CHRONIC RIGHT-SIDED LOW BACK PAIN WITH RIGHT-SIDED SCIATICA: ICD-10-CM

## 2020-12-22 DIAGNOSIS — E11.9 TYPE 2 DIABETES MELLITUS WITHOUT COMPLICATION, WITHOUT LONG-TERM CURRENT USE OF INSULIN: ICD-10-CM

## 2020-12-22 DIAGNOSIS — M54.41 CHRONIC RIGHT-SIDED LOW BACK PAIN WITH RIGHT-SIDED SCIATICA: ICD-10-CM

## 2020-12-22 DIAGNOSIS — E11.9 TYPE 2 DIABETES MELLITUS WITHOUT COMPLICATION, WITHOUT LONG-TERM CURRENT USE OF INSULIN: Primary | ICD-10-CM

## 2020-12-22 PROBLEM — B34.9 VIRAL SYNDROME: Status: RESOLVED | Noted: 2020-12-01 | Resolved: 2020-12-22

## 2020-12-22 PROBLEM — R06.02 SOB (SHORTNESS OF BREATH): Status: RESOLVED | Noted: 2017-06-26 | Resolved: 2020-12-22

## 2020-12-22 PROBLEM — R68.81 EARLY SATIETY: Status: RESOLVED | Noted: 2020-08-28 | Resolved: 2020-12-22

## 2020-12-22 PROBLEM — R39.89 BLADDER PAIN: Status: RESOLVED | Noted: 2020-09-01 | Resolved: 2020-12-22

## 2020-12-22 PROBLEM — M25.511 ACUTE PAIN OF RIGHT SHOULDER: Status: RESOLVED | Noted: 2019-10-25 | Resolved: 2020-12-22

## 2020-12-22 PROBLEM — R39.15 URGENCY OF MICTURITION: Status: RESOLVED | Noted: 2020-11-20 | Resolved: 2020-12-22

## 2020-12-22 PROBLEM — N30.10 INTERSTITIAL CYSTITIS: Status: RESOLVED | Noted: 2020-12-08 | Resolved: 2020-12-22

## 2020-12-22 PROBLEM — J40 BRONCHITIS: Status: RESOLVED | Noted: 2020-09-18 | Resolved: 2020-12-22

## 2020-12-22 PROCEDURE — 99214 OFFICE O/P EST MOD 30 MIN: CPT | Mod: S$PBB,,, | Performed by: FAMILY MEDICINE

## 2020-12-22 PROCEDURE — 99214 PR OFFICE/OUTPT VISIT, EST, LEVL IV, 30-39 MIN: ICD-10-PCS | Mod: S$PBB,,, | Performed by: FAMILY MEDICINE

## 2020-12-22 PROCEDURE — 83036 HEMOGLOBIN GLYCOSYLATED A1C: CPT

## 2020-12-22 PROCEDURE — 99999 PR PBB SHADOW E&M-EST. PATIENT-LVL V: CPT | Mod: PBBFAC,,, | Performed by: FAMILY MEDICINE

## 2020-12-22 PROCEDURE — 99999 PR PBB SHADOW E&M-EST. PATIENT-LVL V: ICD-10-PCS | Mod: PBBFAC,,, | Performed by: FAMILY MEDICINE

## 2020-12-22 PROCEDURE — 36415 COLL VENOUS BLD VENIPUNCTURE: CPT | Mod: PO

## 2020-12-22 PROCEDURE — 99215 OFFICE O/P EST HI 40 MIN: CPT | Mod: PBBFAC,PO | Performed by: FAMILY MEDICINE

## 2020-12-22 RX ORDER — SEMAGLUTIDE 1.34 MG/ML
0.5 INJECTION, SOLUTION SUBCUTANEOUS
Qty: 6 SYRINGE | Refills: 0 | Status: SHIPPED | OUTPATIENT
Start: 2020-12-22 | End: 2021-01-06 | Stop reason: SDUPTHER

## 2020-12-22 RX ORDER — GABAPENTIN 600 MG/1
600 TABLET ORAL NIGHTLY
Qty: 90 TABLET | Refills: 1 | Status: SHIPPED | OUTPATIENT
Start: 2020-12-22 | End: 2021-09-14

## 2020-12-22 NOTE — PROGRESS NOTES
Subjective:       Patient ID: Yuli Milton is a 45 y.o. female.    Chief Complaint: Diabetes    Diabetes  She presents for her follow-up diabetic visit. She has type 2 diabetes mellitus. Her disease course has been stable. Pertinent negatives for hypoglycemia include no confusion, dizziness, headaches or hunger. Pertinent negatives for diabetes include no blurred vision, no chest pain, no fatigue and no foot paresthesias. Pertinent negatives for hypoglycemia complications include no blackouts and no hospitalization. Symptoms are stable. Risk factors for coronary artery disease include diabetes mellitus, hypertension and obesity. She is compliant with treatment most of the time. She is following a diabetic diet. She has had a previous visit with a dietitian. She participates in exercise daily.     Review of Systems   Constitutional: Negative for fatigue.   Eyes: Negative for blurred vision.   Respiratory: Negative for shortness of breath.    Cardiovascular: Negative for chest pain.   Gastrointestinal: Negative for abdominal pain.   Neurological: Negative for dizziness and headaches.   Psychiatric/Behavioral: Negative for confusion.       Objective:      Physical Exam  Vitals signs and nursing note reviewed.   Constitutional:       General: She is not in acute distress.     Appearance: Normal appearance. She is well-developed. She is not diaphoretic.   HENT:      Head: Normocephalic and atraumatic.   Pulmonary:      Effort: Pulmonary effort is normal. No respiratory distress.      Breath sounds: Normal breath sounds. No wheezing.   Abdominal:      General: There is no distension.      Palpations: Abdomen is soft.      Tenderness: There is no abdominal tenderness. There is no guarding.   Skin:     General: Skin is warm and dry.      Findings: No erythema or rash.   Neurological:      Mental Status: She is alert.   Psychiatric:         Mood and Affect: Mood normal.         Behavior: Behavior normal.          Thought Content: Thought content normal.         Judgment: Judgment normal.         Assessment:       1. Type 2 diabetes mellitus without complication, without long-term current use of insulin    2. Chronic right-sided low back pain with right-sided sciatica    3. Polyarthralgia        Plan:     Problem List Items Addressed This Visit        Endocrine    Type 2 diabetes mellitus without complication, without long-term current use of insulin - Primary    Relevant Medications    semaglutide (OZEMPIC) 1 mg/dose (2 mg/1.5 mL) PnIj    Other Relevant Orders    Ambulatory referral/consult to Optometry    Hemoglobin A1C       Orthopedic    Chronic right-sided low back pain with right-sided sciatica    Relevant Medications    gabapentin (NEURONTIN) 600 MG tablet      Other Visit Diagnoses     Polyarthralgia        Relevant Medications    gabapentin (NEURONTIN) 600 MG tablet    Other Relevant Orders    Ambulatory referral/consult to Rheumatology

## 2020-12-22 NOTE — PROGRESS NOTES
Outpatient Therapy Discharge Summary     Name: Yuli Milton  [facility] Number: [id number]    Therapy Diagnosis:   Encounter Diagnosis   Name Primary?    Chronic right-sided low back pain with right-sided sciatica Yes     Physician: No ref. provider found    Physician Orders: PT Eval and Treat   Medical Diagnosis from Referral: M54.41,G89.29 (ICD-10-CM) - Chronic right-sided low back pain with right-sided sciatica  Evaluation Date: 7/29/2020    Date of Last visit: 7/29/2020  Total Visits Received: 1  Cancelled Visits: 6  No Show Visits: 1    Assessment    Goals:  Short Term Goals: 4 weeks NOT MET  1. Recent signs and systems trend is improving in order to progress towards LTG's.  2. Patient will improve lumbar sidebend and rotation to 100% in order to decrease pain in glutes  3. Patient will be independent with HEP in order to further progress and return to maximal function.  4. Pain rating at Worst: 4/10 in order to progress towards increased independence with activity.     Long Term Goals: 8 weeks NOT MET  1. Patient will improve glute med strength to 4+/5 in order to walk long distances.  2. Patient will improve psoas strength to 4+/5 in order to stand for long periods.  3. 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   4. Patient will demonstrate normal gait mechanics in order to minimize any compensation and return to PLOF.   5. Patient will self report improvement to 28% limitation on the FOTO Low Back Survey.    Discharge reason: Patient has not attended therapy since 7/29/2020. Patient did not attend therapy after her evaluation.     Plan   This patient is discharged from Physical Therapy

## 2020-12-23 LAB
ESTIMATED AVG GLUCOSE: 157 MG/DL (ref 68–131)
HBA1C MFR BLD HPLC: 7.1 % (ref 4–5.6)

## 2020-12-23 NOTE — PROGRESS NOTES
A1c has had some improvement.  Keep up the good work  I am very proud of her, as its taken a while to get to a good level.

## 2021-01-06 ENCOUNTER — PATIENT MESSAGE (OUTPATIENT)
Dept: INTERNAL MEDICINE | Facility: CLINIC | Age: 46
End: 2021-01-06

## 2021-01-06 DIAGNOSIS — E11.9 TYPE 2 DIABETES MELLITUS WITHOUT COMPLICATION, WITHOUT LONG-TERM CURRENT USE OF INSULIN: ICD-10-CM

## 2021-01-06 RX ORDER — SEMAGLUTIDE 1.34 MG/ML
0.5 INJECTION, SOLUTION SUBCUTANEOUS
Qty: 6 SYRINGE | Refills: 0 | Status: SHIPPED | OUTPATIENT
Start: 2021-01-06 | End: 2021-03-22

## 2021-01-08 ENCOUNTER — TELEPHONE (OUTPATIENT)
Dept: INTERNAL MEDICINE | Facility: CLINIC | Age: 46
End: 2021-01-08

## 2021-01-08 ENCOUNTER — PATIENT MESSAGE (OUTPATIENT)
Dept: INTERNAL MEDICINE | Facility: CLINIC | Age: 46
End: 2021-01-08

## 2021-01-11 ENCOUNTER — TELEPHONE (OUTPATIENT)
Dept: RHEUMATOLOGY | Facility: CLINIC | Age: 46
End: 2021-01-11

## 2021-01-12 ENCOUNTER — OFFICE VISIT (OUTPATIENT)
Dept: OPHTHALMOLOGY | Facility: CLINIC | Age: 46
End: 2021-01-12
Payer: COMMERCIAL

## 2021-01-12 ENCOUNTER — OFFICE VISIT (OUTPATIENT)
Dept: RHEUMATOLOGY | Facility: CLINIC | Age: 46
End: 2021-01-12
Payer: COMMERCIAL

## 2021-01-12 VITALS
HEIGHT: 65 IN | DIASTOLIC BLOOD PRESSURE: 72 MMHG | WEIGHT: 259.69 LBS | SYSTOLIC BLOOD PRESSURE: 104 MMHG | BODY MASS INDEX: 43.27 KG/M2

## 2021-01-12 DIAGNOSIS — E11.9 TYPE 2 DIABETES MELLITUS WITHOUT COMPLICATION, WITH LONG-TERM CURRENT USE OF INSULIN: Primary | ICD-10-CM

## 2021-01-12 DIAGNOSIS — M35.01 KERATOCONJUNCTIVITIS SICCA: ICD-10-CM

## 2021-01-12 DIAGNOSIS — M54.41 CHRONIC RIGHT-SIDED LOW BACK PAIN WITH RIGHT-SIDED SCIATICA: Primary | ICD-10-CM

## 2021-01-12 DIAGNOSIS — Z71.89 COUNSELING ON HEALTH PROMOTION AND DISEASE PREVENTION: ICD-10-CM

## 2021-01-12 DIAGNOSIS — G89.29 CHRONIC RIGHT-SIDED LOW BACK PAIN WITH RIGHT-SIDED SCIATICA: Primary | ICD-10-CM

## 2021-01-12 DIAGNOSIS — M25.50 POLYARTHRALGIA: ICD-10-CM

## 2021-01-12 DIAGNOSIS — Z79.4 TYPE 2 DIABETES MELLITUS WITHOUT COMPLICATION, WITH LONG-TERM CURRENT USE OF INSULIN: Primary | ICD-10-CM

## 2021-01-12 DIAGNOSIS — H52.7 REFRACTIVE ERROR: ICD-10-CM

## 2021-01-12 PROCEDURE — 92004 PR EYE EXAM, NEW PATIENT,COMPREHESV: ICD-10-PCS | Mod: S$GLB,,, | Performed by: STUDENT IN AN ORGANIZED HEALTH CARE EDUCATION/TRAINING PROGRAM

## 2021-01-12 PROCEDURE — 99214 OFFICE O/P EST MOD 30 MIN: CPT | Mod: PBBFAC,PO,25 | Performed by: STUDENT IN AN ORGANIZED HEALTH CARE EDUCATION/TRAINING PROGRAM

## 2021-01-12 PROCEDURE — 99999 PR PBB SHADOW E&M-EST. PATIENT-LVL IV: CPT | Mod: PBBFAC,,, | Performed by: STUDENT IN AN ORGANIZED HEALTH CARE EDUCATION/TRAINING PROGRAM

## 2021-01-12 PROCEDURE — 92004 COMPRE OPH EXAM NEW PT 1/>: CPT | Mod: S$GLB,,, | Performed by: STUDENT IN AN ORGANIZED HEALTH CARE EDUCATION/TRAINING PROGRAM

## 2021-01-12 PROCEDURE — 99999 PR PBB SHADOW E&M-EST. PATIENT-LVL V: CPT | Mod: PBBFAC,,, | Performed by: INTERNAL MEDICINE

## 2021-01-12 PROCEDURE — 99999 PR PBB SHADOW E&M-EST. PATIENT-LVL IV: ICD-10-PCS | Mod: PBBFAC,,, | Performed by: STUDENT IN AN ORGANIZED HEALTH CARE EDUCATION/TRAINING PROGRAM

## 2021-01-12 PROCEDURE — 99214 PR OFFICE/OUTPT VISIT, EST, LEVL IV, 30-39 MIN: ICD-10-PCS | Mod: S$GLB,,, | Performed by: INTERNAL MEDICINE

## 2021-01-12 PROCEDURE — 99215 OFFICE O/P EST HI 40 MIN: CPT | Mod: PBBFAC,27,PO | Performed by: INTERNAL MEDICINE

## 2021-01-12 PROCEDURE — 99214 OFFICE O/P EST MOD 30 MIN: CPT | Mod: S$GLB,,, | Performed by: INTERNAL MEDICINE

## 2021-01-12 PROCEDURE — 99999 PR PBB SHADOW E&M-EST. PATIENT-LVL V: ICD-10-PCS | Mod: PBBFAC,,, | Performed by: INTERNAL MEDICINE

## 2021-01-12 RX ORDER — GABAPENTIN 100 MG/1
100 CAPSULE ORAL 3 TIMES DAILY
Qty: 90 CAPSULE | Refills: 3 | Status: SHIPPED | OUTPATIENT
Start: 2021-01-12 | End: 2021-03-09

## 2021-01-20 ENCOUNTER — OFFICE VISIT (OUTPATIENT)
Dept: URGENT CARE | Facility: CLINIC | Age: 46
End: 2021-01-20
Payer: COMMERCIAL

## 2021-01-20 ENCOUNTER — PATIENT MESSAGE (OUTPATIENT)
Dept: ADMINISTRATIVE | Facility: OTHER | Age: 46
End: 2021-01-20

## 2021-01-20 ENCOUNTER — TELEPHONE (OUTPATIENT)
Dept: ADMINISTRATIVE | Facility: CLINIC | Age: 46
End: 2021-01-20

## 2021-01-20 VITALS
RESPIRATION RATE: 12 BRPM | HEIGHT: 65 IN | SYSTOLIC BLOOD PRESSURE: 120 MMHG | OXYGEN SATURATION: 100 % | TEMPERATURE: 98 F | BODY MASS INDEX: 43.15 KG/M2 | WEIGHT: 259 LBS | HEART RATE: 83 BPM | DIASTOLIC BLOOD PRESSURE: 68 MMHG

## 2021-01-20 DIAGNOSIS — Z20.822 CLOSE EXPOSURE TO COVID-19 VIRUS: Primary | ICD-10-CM

## 2021-01-20 DIAGNOSIS — U07.1 COVID-19: ICD-10-CM

## 2021-01-20 DIAGNOSIS — R05.9 COUGH: ICD-10-CM

## 2021-01-20 DIAGNOSIS — J45.20 MILD INTERMITTENT ASTHMA WITHOUT COMPLICATION: ICD-10-CM

## 2021-01-20 LAB
CTP QC/QA: YES
SARS-COV-2 RDRP RESP QL NAA+PROBE: POSITIVE

## 2021-01-20 PROCEDURE — 99214 OFFICE O/P EST MOD 30 MIN: CPT | Mod: S$GLB,,, | Performed by: PHYSICIAN ASSISTANT

## 2021-01-20 PROCEDURE — 99214 PR OFFICE/OUTPT VISIT, EST, LEVL IV, 30-39 MIN: ICD-10-PCS | Mod: S$GLB,,, | Performed by: PHYSICIAN ASSISTANT

## 2021-01-20 PROCEDURE — U0002: ICD-10-PCS | Mod: QW,S$GLB,, | Performed by: PHYSICIAN ASSISTANT

## 2021-01-20 PROCEDURE — U0002 COVID-19 LAB TEST NON-CDC: HCPCS | Mod: QW,S$GLB,, | Performed by: PHYSICIAN ASSISTANT

## 2021-01-20 RX ORDER — ALBUTEROL SULFATE 0.83 MG/ML
2.5 SOLUTION RESPIRATORY (INHALATION) EVERY 4 HOURS PRN
Qty: 1 BOX | Refills: 0 | Status: SHIPPED | OUTPATIENT
Start: 2021-01-20 | End: 2021-09-14 | Stop reason: SDUPTHER

## 2021-01-20 RX ORDER — PREDNISONE 20 MG/1
20 TABLET ORAL 2 TIMES DAILY
Qty: 10 TABLET | Refills: 0 | Status: SHIPPED | OUTPATIENT
Start: 2021-01-20 | End: 2021-01-25

## 2021-01-20 RX ORDER — CODEINE PHOSPHATE AND GUAIFENESIN 10; 100 MG/5ML; MG/5ML
10 SOLUTION ORAL EVERY 6 HOURS PRN
Qty: 120 ML | Refills: 0 | Status: SHIPPED | OUTPATIENT
Start: 2021-01-20 | End: 2021-01-30

## 2021-01-20 RX ORDER — ALBUTEROL SULFATE 90 UG/1
2 AEROSOL, METERED RESPIRATORY (INHALATION) EVERY 6 HOURS PRN
Qty: 18 G | Refills: 6 | Status: SHIPPED | OUTPATIENT
Start: 2021-01-20 | End: 2021-03-02 | Stop reason: SDUPTHER

## 2021-01-21 ENCOUNTER — TELEPHONE (OUTPATIENT)
Dept: URGENT CARE | Facility: CLINIC | Age: 46
End: 2021-01-21

## 2021-01-21 ENCOUNTER — PATIENT MESSAGE (OUTPATIENT)
Dept: ADMINISTRATIVE | Facility: OTHER | Age: 46
End: 2021-01-21

## 2021-01-22 ENCOUNTER — PATIENT MESSAGE (OUTPATIENT)
Dept: ADMINISTRATIVE | Facility: OTHER | Age: 46
End: 2021-01-22

## 2021-01-22 ENCOUNTER — OFFICE VISIT (OUTPATIENT)
Dept: INTERNAL MEDICINE | Facility: CLINIC | Age: 46
End: 2021-01-22
Payer: COMMERCIAL

## 2021-01-22 ENCOUNTER — PATIENT MESSAGE (OUTPATIENT)
Dept: INTERNAL MEDICINE | Facility: CLINIC | Age: 46
End: 2021-01-22

## 2021-01-22 DIAGNOSIS — G44.89 OTHER HEADACHE SYNDROME: ICD-10-CM

## 2021-01-22 DIAGNOSIS — U07.1 COVID-19: ICD-10-CM

## 2021-01-22 DIAGNOSIS — F33.1 MODERATE EPISODE OF RECURRENT MAJOR DEPRESSIVE DISORDER: ICD-10-CM

## 2021-01-22 DIAGNOSIS — F43.21 ADJUSTMENT DISORDER WITH DEPRESSED MOOD: Primary | ICD-10-CM

## 2021-01-22 DIAGNOSIS — E11.9 TYPE 2 DIABETES MELLITUS WITHOUT COMPLICATION, WITHOUT LONG-TERM CURRENT USE OF INSULIN: ICD-10-CM

## 2021-01-22 PROBLEM — R51.9 HEADACHE: Status: ACTIVE | Noted: 2021-01-22

## 2021-01-22 PROBLEM — F33.2 SEVERE EPISODE OF RECURRENT MAJOR DEPRESSIVE DISORDER, WITHOUT PSYCHOTIC FEATURES: Status: ACTIVE | Noted: 2018-03-15

## 2021-01-22 PROBLEM — M54.12 CERVICAL RADICULOPATHY: Status: RESOLVED | Noted: 2020-03-10 | Resolved: 2021-01-22

## 2021-01-22 PROBLEM — R44.0 AUDITORY HALLUCINATIONS: Status: RESOLVED | Noted: 2017-02-17 | Resolved: 2021-01-22

## 2021-01-22 PROCEDURE — 99214 OFFICE O/P EST MOD 30 MIN: CPT | Mod: 95,,, | Performed by: FAMILY MEDICINE

## 2021-01-22 PROCEDURE — 99214 PR OFFICE/OUTPT VISIT, EST, LEVL IV, 30-39 MIN: ICD-10-PCS | Mod: 95,,, | Performed by: FAMILY MEDICINE

## 2021-01-22 RX ORDER — ATORVASTATIN CALCIUM 20 MG/1
20 TABLET, FILM COATED ORAL DAILY
Qty: 90 TABLET | Refills: 1 | Status: SHIPPED | OUTPATIENT
Start: 2021-01-22 | End: 2021-03-09 | Stop reason: SDUPTHER

## 2021-01-23 ENCOUNTER — PATIENT MESSAGE (OUTPATIENT)
Dept: ADMINISTRATIVE | Facility: OTHER | Age: 46
End: 2021-01-23

## 2021-01-24 ENCOUNTER — NURSE TRIAGE (OUTPATIENT)
Dept: ADMINISTRATIVE | Facility: CLINIC | Age: 46
End: 2021-01-24

## 2021-01-24 ENCOUNTER — PATIENT MESSAGE (OUTPATIENT)
Dept: ADMINISTRATIVE | Facility: OTHER | Age: 46
End: 2021-01-24

## 2021-01-24 ENCOUNTER — PATIENT MESSAGE (OUTPATIENT)
Dept: ADMINISTRATIVE | Facility: CLINIC | Age: 46
End: 2021-01-24

## 2021-01-25 ENCOUNTER — PATIENT MESSAGE (OUTPATIENT)
Dept: ADMINISTRATIVE | Facility: OTHER | Age: 46
End: 2021-01-25

## 2021-01-25 ENCOUNTER — NURSE TRIAGE (OUTPATIENT)
Dept: ADMINISTRATIVE | Facility: CLINIC | Age: 46
End: 2021-01-25

## 2021-01-25 ENCOUNTER — HOSPITAL ENCOUNTER (EMERGENCY)
Facility: HOSPITAL | Age: 46
Discharge: HOME | End: 2021-01-25
Attending: EMERGENCY MEDICINE
Payer: COMMERCIAL

## 2021-01-25 ENCOUNTER — PATIENT MESSAGE (OUTPATIENT)
Dept: ADMINISTRATIVE | Facility: CLINIC | Age: 46
End: 2021-01-25

## 2021-01-25 VITALS
RESPIRATION RATE: 18 BRPM | HEART RATE: 64 BPM | DIASTOLIC BLOOD PRESSURE: 72 MMHG | HEIGHT: 65 IN | OXYGEN SATURATION: 100 % | BODY MASS INDEX: 42.44 KG/M2 | TEMPERATURE: 98 F | SYSTOLIC BLOOD PRESSURE: 126 MMHG | WEIGHT: 254.75 LBS

## 2021-01-25 DIAGNOSIS — U07.1 COVID-19: Primary | ICD-10-CM

## 2021-01-25 PROCEDURE — 99282 EMERGENCY DEPT VISIT SF MDM: CPT | Mod: ER

## 2021-01-26 ENCOUNTER — PATIENT MESSAGE (OUTPATIENT)
Dept: ADMINISTRATIVE | Facility: OTHER | Age: 46
End: 2021-01-26

## 2021-01-27 ENCOUNTER — PATIENT MESSAGE (OUTPATIENT)
Dept: ADMINISTRATIVE | Facility: OTHER | Age: 46
End: 2021-01-27

## 2021-01-27 ENCOUNTER — PATIENT OUTREACH (OUTPATIENT)
Dept: ADMINISTRATIVE | Facility: OTHER | Age: 46
End: 2021-01-27

## 2021-01-28 ENCOUNTER — PATIENT MESSAGE (OUTPATIENT)
Dept: ADMINISTRATIVE | Facility: OTHER | Age: 46
End: 2021-01-28

## 2021-01-28 ENCOUNTER — PATIENT MESSAGE (OUTPATIENT)
Dept: ADMINISTRATIVE | Facility: CLINIC | Age: 46
End: 2021-01-28

## 2021-01-28 ENCOUNTER — PATIENT MESSAGE (OUTPATIENT)
Dept: DIABETES | Facility: CLINIC | Age: 46
End: 2021-01-28

## 2021-01-29 ENCOUNTER — PATIENT MESSAGE (OUTPATIENT)
Dept: ADMINISTRATIVE | Facility: CLINIC | Age: 46
End: 2021-01-29

## 2021-01-29 ENCOUNTER — PATIENT MESSAGE (OUTPATIENT)
Dept: ADMINISTRATIVE | Facility: OTHER | Age: 46
End: 2021-01-29

## 2021-01-29 ENCOUNTER — OFFICE VISIT (OUTPATIENT)
Dept: DIABETES | Facility: CLINIC | Age: 46
End: 2021-01-29
Payer: COMMERCIAL

## 2021-01-29 ENCOUNTER — TELEPHONE (OUTPATIENT)
Dept: INTERNAL MEDICINE | Facility: CLINIC | Age: 46
End: 2021-01-29

## 2021-01-29 DIAGNOSIS — E11.9 TYPE 2 DIABETES MELLITUS WITHOUT COMPLICATION, WITHOUT LONG-TERM CURRENT USE OF INSULIN: Primary | ICD-10-CM

## 2021-01-29 DIAGNOSIS — U07.1 COVID-19: ICD-10-CM

## 2021-01-29 DIAGNOSIS — E78.2 MIXED HYPERLIPIDEMIA: ICD-10-CM

## 2021-01-29 DIAGNOSIS — E55.9 VITAMIN D DEFICIENCY: ICD-10-CM

## 2021-01-29 DIAGNOSIS — E27.8 ADRENAL MASS: ICD-10-CM

## 2021-01-29 DIAGNOSIS — I10 ESSENTIAL HYPERTENSION: ICD-10-CM

## 2021-01-29 DIAGNOSIS — E66.01 MORBID OBESITY DUE TO EXCESS CALORIES: ICD-10-CM

## 2021-01-29 PROCEDURE — 99205 OFFICE O/P NEW HI 60 MIN: CPT | Mod: 95,,, | Performed by: PHYSICIAN ASSISTANT

## 2021-01-29 PROCEDURE — 99205 PR OFFICE/OUTPT VISIT, NEW, LEVL V, 60-74 MIN: ICD-10-PCS | Mod: 95,,, | Performed by: PHYSICIAN ASSISTANT

## 2021-01-29 RX ORDER — FLASH GLUCOSE SENSOR
1 KIT MISCELLANEOUS
Qty: 2 KIT | Refills: 11 | Status: SHIPPED | OUTPATIENT
Start: 2021-01-29 | End: 2021-03-09

## 2021-01-29 RX ORDER — DAPAGLIFLOZIN 5 MG/1
5 TABLET, FILM COATED ORAL DAILY
Qty: 30 TABLET | Refills: 11 | Status: SHIPPED | OUTPATIENT
Start: 2021-01-29 | End: 2021-03-09

## 2021-01-29 RX ORDER — INSULIN PUMP SYRINGE, 3 ML
EACH MISCELLANEOUS
Qty: 1 EACH | Refills: 0 | Status: SHIPPED | OUTPATIENT
Start: 2021-01-29 | End: 2021-03-22

## 2021-01-29 RX ORDER — LANCETS
1 EACH MISCELLANEOUS 4 TIMES DAILY
Qty: 200 EACH | Refills: 11 | Status: SHIPPED | OUTPATIENT
Start: 2021-01-29 | End: 2023-02-06

## 2021-01-29 RX ORDER — METFORMIN HYDROCHLORIDE 500 MG/1
1000 TABLET, EXTENDED RELEASE ORAL 2 TIMES DAILY WITH MEALS
Qty: 120 TABLET | Refills: 11 | Status: SHIPPED | OUTPATIENT
Start: 2021-01-29 | End: 2021-09-14

## 2021-01-30 ENCOUNTER — PATIENT MESSAGE (OUTPATIENT)
Dept: ADMINISTRATIVE | Facility: OTHER | Age: 46
End: 2021-01-30

## 2021-01-31 ENCOUNTER — PATIENT MESSAGE (OUTPATIENT)
Dept: ADMINISTRATIVE | Facility: OTHER | Age: 46
End: 2021-01-31

## 2021-02-01 ENCOUNTER — TELEPHONE (OUTPATIENT)
Dept: ADMINISTRATIVE | Facility: CLINIC | Age: 46
End: 2021-02-01

## 2021-02-01 ENCOUNTER — PATIENT MESSAGE (OUTPATIENT)
Dept: ADMINISTRATIVE | Facility: OTHER | Age: 46
End: 2021-02-01

## 2021-02-02 ENCOUNTER — PATIENT MESSAGE (OUTPATIENT)
Dept: ADMINISTRATIVE | Facility: OTHER | Age: 46
End: 2021-02-02

## 2021-02-02 ENCOUNTER — PATIENT MESSAGE (OUTPATIENT)
Dept: INTERNAL MEDICINE | Facility: CLINIC | Age: 46
End: 2021-02-02

## 2021-02-03 ENCOUNTER — PATIENT MESSAGE (OUTPATIENT)
Dept: ADMINISTRATIVE | Facility: OTHER | Age: 46
End: 2021-02-03

## 2021-02-04 ENCOUNTER — PATIENT MESSAGE (OUTPATIENT)
Dept: INTERNAL MEDICINE | Facility: CLINIC | Age: 46
End: 2021-02-04

## 2021-02-25 ENCOUNTER — OFFICE VISIT (OUTPATIENT)
Dept: BARIATRICS | Facility: CLINIC | Age: 46
End: 2021-02-25
Payer: COMMERCIAL

## 2021-02-25 ENCOUNTER — HOSPITAL ENCOUNTER (OUTPATIENT)
Dept: CARDIOLOGY | Facility: CLINIC | Age: 46
Discharge: HOME | End: 2021-02-25
Payer: COMMERCIAL

## 2021-02-25 ENCOUNTER — CLINICAL SUPPORT (OUTPATIENT)
Dept: BARIATRICS | Facility: CLINIC | Age: 46
End: 2021-02-25
Payer: COMMERCIAL

## 2021-02-25 VITALS
DIASTOLIC BLOOD PRESSURE: 84 MMHG | WEIGHT: 265.19 LBS | SYSTOLIC BLOOD PRESSURE: 124 MMHG | HEIGHT: 65 IN | BODY MASS INDEX: 44.18 KG/M2 | OXYGEN SATURATION: 98 % | HEART RATE: 79 BPM

## 2021-02-25 DIAGNOSIS — E66.01 CLASS 3 SEVERE OBESITY DUE TO EXCESS CALORIES WITHOUT SERIOUS COMORBIDITY WITH BODY MASS INDEX (BMI) OF 40.0 TO 44.9 IN ADULT: ICD-10-CM

## 2021-02-25 DIAGNOSIS — E78.2 MIXED HYPERLIPIDEMIA: Primary | ICD-10-CM

## 2021-02-25 DIAGNOSIS — I10 ESSENTIAL HYPERTENSION: ICD-10-CM

## 2021-02-25 DIAGNOSIS — E78.2 MIXED HYPERLIPIDEMIA: ICD-10-CM

## 2021-02-25 DIAGNOSIS — E11.9 TYPE 2 DIABETES MELLITUS WITHOUT COMPLICATION, WITHOUT LONG-TERM CURRENT USE OF INSULIN: ICD-10-CM

## 2021-02-25 PROCEDURE — 99499 UNLISTED E&M SERVICE: CPT | Mod: S$GLB,,, | Performed by: DIETITIAN, REGISTERED

## 2021-02-25 PROCEDURE — 93005 EKG 12-LEAD: ICD-10-PCS | Mod: S$GLB,,, | Performed by: PHYSICIAN ASSISTANT

## 2021-02-25 PROCEDURE — 93005 ELECTROCARDIOGRAM TRACING: CPT | Mod: S$GLB,,, | Performed by: PHYSICIAN ASSISTANT

## 2021-02-25 PROCEDURE — 99999 PR PBB SHADOW E&M-EST. PATIENT-LVL II: ICD-10-PCS | Mod: PBBFAC,,, | Performed by: DIETITIAN, REGISTERED

## 2021-02-25 PROCEDURE — 99999 PR PBB SHADOW E&M-EST. PATIENT-LVL V: ICD-10-PCS | Mod: PBBFAC,,, | Performed by: PHYSICIAN ASSISTANT

## 2021-02-25 PROCEDURE — 93010 ELECTROCARDIOGRAM REPORT: CPT | Mod: S$GLB,,, | Performed by: INTERNAL MEDICINE

## 2021-02-25 PROCEDURE — 99999 PR PBB SHADOW E&M-EST. PATIENT-LVL II: CPT | Mod: PBBFAC,,, | Performed by: DIETITIAN, REGISTERED

## 2021-02-25 PROCEDURE — 99499 NO LOS: ICD-10-PCS | Mod: S$GLB,,, | Performed by: DIETITIAN, REGISTERED

## 2021-02-25 PROCEDURE — 99215 OFFICE O/P EST HI 40 MIN: CPT | Mod: PBBFAC | Performed by: PHYSICIAN ASSISTANT

## 2021-02-25 PROCEDURE — 99205 PR OFFICE/OUTPT VISIT, NEW, LEVL V, 60-74 MIN: ICD-10-PCS | Mod: S$GLB,,, | Performed by: PHYSICIAN ASSISTANT

## 2021-02-25 PROCEDURE — 99212 OFFICE O/P EST SF 10 MIN: CPT | Mod: PBBFAC,27 | Performed by: DIETITIAN, REGISTERED

## 2021-02-25 PROCEDURE — 99999 PR PBB SHADOW E&M-EST. PATIENT-LVL V: CPT | Mod: PBBFAC,,, | Performed by: PHYSICIAN ASSISTANT

## 2021-02-25 PROCEDURE — 93010 EKG 12-LEAD: ICD-10-PCS | Mod: S$GLB,,, | Performed by: INTERNAL MEDICINE

## 2021-02-25 PROCEDURE — 99205 OFFICE O/P NEW HI 60 MIN: CPT | Mod: S$GLB,,, | Performed by: PHYSICIAN ASSISTANT

## 2021-03-01 ENCOUNTER — LAB VISIT (OUTPATIENT)
Dept: LAB | Facility: HOSPITAL | Age: 46
End: 2021-03-01
Attending: PHYSICIAN ASSISTANT
Payer: COMMERCIAL

## 2021-03-01 DIAGNOSIS — E11.9 TYPE 2 DIABETES MELLITUS WITHOUT COMPLICATION, WITHOUT LONG-TERM CURRENT USE OF INSULIN: ICD-10-CM

## 2021-03-01 DIAGNOSIS — I10 ESSENTIAL HYPERTENSION: ICD-10-CM

## 2021-03-01 DIAGNOSIS — E66.01 CLASS 3 SEVERE OBESITY DUE TO EXCESS CALORIES WITHOUT SERIOUS COMORBIDITY WITH BODY MASS INDEX (BMI) OF 40.0 TO 44.9 IN ADULT: ICD-10-CM

## 2021-03-01 DIAGNOSIS — E78.2 MIXED HYPERLIPIDEMIA: ICD-10-CM

## 2021-03-01 LAB
ALBUMIN SERPL BCP-MCNC: 3.2 G/DL (ref 3.5–5.2)
ALP SERPL-CCNC: 65 U/L (ref 55–135)
ALT SERPL W/O P-5'-P-CCNC: 16 U/L (ref 10–44)
ANION GAP SERPL CALC-SCNC: 8 MMOL/L (ref 8–16)
AST SERPL-CCNC: 13 U/L (ref 10–40)
BASOPHILS # BLD AUTO: 0.06 K/UL (ref 0–0.2)
BASOPHILS NFR BLD: 0.8 % (ref 0–1.9)
BILIRUB DIRECT SERPL-MCNC: 0.2 MG/DL (ref 0.1–0.3)
BILIRUB SERPL-MCNC: 0.4 MG/DL (ref 0.1–1)
BUN SERPL-MCNC: 12 MG/DL (ref 6–20)
CALCIUM SERPL-MCNC: 9.5 MG/DL (ref 8.7–10.5)
CHLORIDE SERPL-SCNC: 102 MMOL/L (ref 95–110)
CHOLEST SERPL-MCNC: 173 MG/DL (ref 120–199)
CHOLEST/HDLC SERPL: 3.1 {RATIO} (ref 2–5)
CO2 SERPL-SCNC: 28 MMOL/L (ref 23–29)
CREAT SERPL-MCNC: 0.8 MG/DL (ref 0.5–1.4)
DIFFERENTIAL METHOD: ABNORMAL
EOSINOPHIL # BLD AUTO: 0.1 K/UL (ref 0–0.5)
EOSINOPHIL NFR BLD: 1.1 % (ref 0–8)
ERYTHROCYTE [DISTWIDTH] IN BLOOD BY AUTOMATED COUNT: 13.8 % (ref 11.5–14.5)
EST. GFR  (AFRICAN AMERICAN): >60 ML/MIN/1.73 M^2
EST. GFR  (NON AFRICAN AMERICAN): >60 ML/MIN/1.73 M^2
ESTIMATED AVG GLUCOSE: 154 MG/DL (ref 68–131)
GLUCOSE SERPL-MCNC: 259 MG/DL (ref 70–110)
HBA1C MFR BLD: 7 % (ref 4–5.6)
HCT VFR BLD AUTO: 35.5 % (ref 37–48.5)
HDLC SERPL-MCNC: 56 MG/DL (ref 40–75)
HDLC SERPL: 32.4 % (ref 20–50)
HGB BLD-MCNC: 11.3 G/DL (ref 12–16)
IMM GRANULOCYTES # BLD AUTO: 0.01 K/UL (ref 0–0.04)
IMM GRANULOCYTES NFR BLD AUTO: 0.1 % (ref 0–0.5)
IRON SERPL-MCNC: 62 UG/DL (ref 30–160)
LDLC SERPL CALC-MCNC: 100.2 MG/DL (ref 63–159)
LYMPHOCYTES # BLD AUTO: 2.5 K/UL (ref 1–4.8)
LYMPHOCYTES NFR BLD: 34.6 % (ref 18–48)
MAGNESIUM SERPL-MCNC: 1.5 MG/DL (ref 1.6–2.6)
MCH RBC QN AUTO: 28.7 PG (ref 27–31)
MCHC RBC AUTO-ENTMCNC: 31.8 G/DL (ref 32–36)
MCV RBC AUTO: 90 FL (ref 82–98)
MONOCYTES # BLD AUTO: 0.5 K/UL (ref 0.3–1)
MONOCYTES NFR BLD: 6.3 % (ref 4–15)
NEUTROPHILS # BLD AUTO: 4.1 K/UL (ref 1.8–7.7)
NEUTROPHILS NFR BLD: 57.1 % (ref 38–73)
NONHDLC SERPL-MCNC: 117 MG/DL
NRBC BLD-RTO: 0 /100 WBC
PHOSPHATE SERPL-MCNC: 3.1 MG/DL (ref 2.7–4.5)
PLATELET # BLD AUTO: 384 K/UL (ref 150–350)
PMV BLD AUTO: 10.9 FL (ref 9.2–12.9)
POTASSIUM SERPL-SCNC: 4 MMOL/L (ref 3.5–5.1)
PROT SERPL-MCNC: 7.2 G/DL (ref 6–8.4)
RBC # BLD AUTO: 3.94 M/UL (ref 4–5.4)
SATURATED IRON: 16 % (ref 20–50)
SODIUM SERPL-SCNC: 138 MMOL/L (ref 136–145)
T4 FREE SERPL-MCNC: 0.96 NG/DL (ref 0.71–1.51)
TOTAL IRON BINDING CAPACITY: 376 UG/DL (ref 250–450)
TRANSFERRIN SERPL-MCNC: 254 MG/DL (ref 200–375)
TRIGL SERPL-MCNC: 84 MG/DL (ref 30–150)
TSH SERPL DL<=0.005 MIU/L-ACNC: 0.86 UIU/ML (ref 0.4–4)
WBC # BLD AUTO: 7.11 K/UL (ref 3.9–12.7)

## 2021-03-01 PROCEDURE — 36415 COLL VENOUS BLD VENIPUNCTURE: CPT | Mod: PO

## 2021-03-01 PROCEDURE — 85025 COMPLETE CBC W/AUTO DIFF WBC: CPT

## 2021-03-01 PROCEDURE — 84100 ASSAY OF PHOSPHORUS: CPT

## 2021-03-01 PROCEDURE — 84439 ASSAY OF FREE THYROXINE: CPT

## 2021-03-01 PROCEDURE — 86677 HELICOBACTER PYLORI ANTIBODY: CPT

## 2021-03-01 PROCEDURE — 82607 VITAMIN B-12: CPT

## 2021-03-01 PROCEDURE — 83540 ASSAY OF IRON: CPT

## 2021-03-01 PROCEDURE — 80061 LIPID PANEL: CPT

## 2021-03-01 PROCEDURE — 84425 ASSAY OF VITAMIN B-1: CPT

## 2021-03-01 PROCEDURE — 84480 ASSAY TRIIODOTHYRONINE (T3): CPT

## 2021-03-01 PROCEDURE — 80076 HEPATIC FUNCTION PANEL: CPT

## 2021-03-01 PROCEDURE — 83735 ASSAY OF MAGNESIUM: CPT

## 2021-03-01 PROCEDURE — 83036 HEMOGLOBIN GLYCOSYLATED A1C: CPT

## 2021-03-01 PROCEDURE — 84436 ASSAY OF TOTAL THYROXINE: CPT

## 2021-03-01 PROCEDURE — 82746 ASSAY OF FOLIC ACID SERUM: CPT

## 2021-03-01 PROCEDURE — 84443 ASSAY THYROID STIM HORMONE: CPT

## 2021-03-01 PROCEDURE — 80048 BASIC METABOLIC PNL TOTAL CA: CPT

## 2021-03-01 PROCEDURE — 82306 VITAMIN D 25 HYDROXY: CPT

## 2021-03-02 ENCOUNTER — TELEPHONE (OUTPATIENT)
Dept: UROLOGY | Facility: CLINIC | Age: 46
End: 2021-03-02

## 2021-03-02 ENCOUNTER — PATIENT MESSAGE (OUTPATIENT)
Dept: UROLOGY | Facility: CLINIC | Age: 46
End: 2021-03-02

## 2021-03-02 ENCOUNTER — PATIENT MESSAGE (OUTPATIENT)
Dept: BARIATRICS | Facility: CLINIC | Age: 46
End: 2021-03-02

## 2021-03-02 ENCOUNTER — PATIENT MESSAGE (OUTPATIENT)
Dept: INTERNAL MEDICINE | Facility: CLINIC | Age: 46
End: 2021-03-02

## 2021-03-02 ENCOUNTER — TELEPHONE (OUTPATIENT)
Dept: BARIATRICS | Facility: CLINIC | Age: 46
End: 2021-03-02

## 2021-03-02 DIAGNOSIS — R79.89 LOW VITAMIN D LEVEL: Primary | ICD-10-CM

## 2021-03-02 DIAGNOSIS — J45.20 MILD INTERMITTENT ASTHMA WITHOUT COMPLICATION: ICD-10-CM

## 2021-03-02 DIAGNOSIS — R39.15 URGENCY OF MICTURITION: ICD-10-CM

## 2021-03-02 LAB
25(OH)D3+25(OH)D2 SERPL-MCNC: 14 NG/ML (ref 30–96)
FOLATE SERPL-MCNC: 7.5 NG/ML (ref 4–24)
VIT B12 SERPL-MCNC: 301 PG/ML (ref 210–950)

## 2021-03-02 RX ORDER — ERGOCALCIFEROL 1.25 MG/1
50000 CAPSULE ORAL
Qty: 24 CAPSULE | Refills: 0 | Status: SHIPPED | OUTPATIENT
Start: 2021-03-04 | End: 2021-03-22 | Stop reason: SDUPTHER

## 2021-03-02 RX ORDER — ALBUTEROL SULFATE 90 UG/1
2 AEROSOL, METERED RESPIRATORY (INHALATION) EVERY 6 HOURS PRN
Qty: 18 G | Refills: 6 | Status: SHIPPED | OUTPATIENT
Start: 2021-03-02 | End: 2021-07-30 | Stop reason: SDUPTHER

## 2021-03-02 RX ORDER — ERGOCALCIFEROL 1.25 MG/1
50000 CAPSULE ORAL
Qty: 24 CAPSULE | Refills: 0 | Status: SHIPPED | OUTPATIENT
Start: 2021-03-04 | End: 2021-03-02

## 2021-03-03 LAB
H PYLORI IGG SERPL QL IA: NEGATIVE
T3 SERPL-MCNC: 92 NG/DL (ref 60–180)
T4 SERPL-MCNC: 9 UG/DL (ref 4.5–11.5)

## 2021-03-05 LAB — VIT B1 BLD-MCNC: 32 UG/L (ref 38–122)

## 2021-03-07 ENCOUNTER — NURSE TRIAGE (OUTPATIENT)
Dept: ADMINISTRATIVE | Facility: CLINIC | Age: 46
End: 2021-03-07

## 2021-03-08 ENCOUNTER — PATIENT MESSAGE (OUTPATIENT)
Dept: INTERNAL MEDICINE | Facility: CLINIC | Age: 46
End: 2021-03-08

## 2021-03-08 ENCOUNTER — TELEPHONE (OUTPATIENT)
Dept: BARIATRICS | Facility: CLINIC | Age: 46
End: 2021-03-08

## 2021-03-08 ENCOUNTER — PATIENT MESSAGE (OUTPATIENT)
Dept: BARIATRICS | Facility: CLINIC | Age: 46
End: 2021-03-08

## 2021-03-09 ENCOUNTER — PATIENT MESSAGE (OUTPATIENT)
Dept: ADMINISTRATIVE | Facility: OTHER | Age: 46
End: 2021-03-09

## 2021-03-09 ENCOUNTER — OFFICE VISIT (OUTPATIENT)
Dept: PULMONOLOGY | Facility: CLINIC | Age: 46
End: 2021-03-09
Payer: COMMERCIAL

## 2021-03-09 ENCOUNTER — OFFICE VISIT (OUTPATIENT)
Dept: INTERNAL MEDICINE | Facility: CLINIC | Age: 46
End: 2021-03-09
Payer: COMMERCIAL

## 2021-03-09 VITALS
HEIGHT: 65 IN | OXYGEN SATURATION: 98 % | RESPIRATION RATE: 16 BRPM | DIASTOLIC BLOOD PRESSURE: 70 MMHG | WEIGHT: 262.56 LBS | SYSTOLIC BLOOD PRESSURE: 120 MMHG | HEART RATE: 99 BPM | BODY MASS INDEX: 43.75 KG/M2

## 2021-03-09 DIAGNOSIS — G47.33 MILD OBSTRUCTIVE SLEEP APNEA: ICD-10-CM

## 2021-03-09 DIAGNOSIS — E11.9 TYPE 2 DIABETES MELLITUS WITHOUT COMPLICATION, WITHOUT LONG-TERM CURRENT USE OF INSULIN: ICD-10-CM

## 2021-03-09 DIAGNOSIS — Z86.16 HISTORY OF COVID-19: ICD-10-CM

## 2021-03-09 DIAGNOSIS — E78.2 MIXED HYPERLIPIDEMIA: ICD-10-CM

## 2021-03-09 DIAGNOSIS — E11.65 TYPE 2 DIABETES MELLITUS WITH HYPERGLYCEMIA, WITHOUT LONG-TERM CURRENT USE OF INSULIN: Primary | ICD-10-CM

## 2021-03-09 DIAGNOSIS — J45.30 NOT WELL CONTROLLED MILD PERSISTENT ASTHMA: Primary | ICD-10-CM

## 2021-03-09 DIAGNOSIS — G47.34 NOCTURNAL HYPOXEMIA: ICD-10-CM

## 2021-03-09 PROCEDURE — 99999 PR PBB SHADOW E&M-EST. PATIENT-LVL III: ICD-10-PCS | Mod: PBBFAC,,, | Performed by: INTERNAL MEDICINE

## 2021-03-09 PROCEDURE — 99214 OFFICE O/P EST MOD 30 MIN: CPT | Mod: 95,,, | Performed by: FAMILY MEDICINE

## 2021-03-09 PROCEDURE — 99999 PR PBB SHADOW E&M-EST. PATIENT-LVL III: CPT | Mod: PBBFAC,,, | Performed by: INTERNAL MEDICINE

## 2021-03-09 PROCEDURE — 99214 PR OFFICE/OUTPT VISIT, EST, LEVL IV, 30-39 MIN: ICD-10-PCS | Mod: S$GLB,,, | Performed by: INTERNAL MEDICINE

## 2021-03-09 PROCEDURE — 99214 PR OFFICE/OUTPT VISIT, EST, LEVL IV, 30-39 MIN: ICD-10-PCS | Mod: 95,,, | Performed by: FAMILY MEDICINE

## 2021-03-09 PROCEDURE — 99214 OFFICE O/P EST MOD 30 MIN: CPT | Mod: S$GLB,,, | Performed by: INTERNAL MEDICINE

## 2021-03-09 PROCEDURE — 99213 OFFICE O/P EST LOW 20 MIN: CPT | Mod: PBBFAC | Performed by: INTERNAL MEDICINE

## 2021-03-09 RX ORDER — INSULIN GLARGINE 100 [IU]/ML
20 INJECTION, SOLUTION SUBCUTANEOUS NIGHTLY
Qty: 9 ML | Refills: 1 | Status: SHIPPED | OUTPATIENT
Start: 2021-03-09 | End: 2021-12-16 | Stop reason: SDUPTHER

## 2021-03-09 RX ORDER — FLUTICASONE FUROATE AND VILANTEROL TRIFENATATE 100; 25 UG/1; UG/1
1 POWDER RESPIRATORY (INHALATION) DAILY
Qty: 60 EACH | Refills: 3 | Status: SHIPPED | OUTPATIENT
Start: 2021-03-09 | End: 2021-09-14 | Stop reason: SDUPTHER

## 2021-03-09 RX ORDER — ATORVASTATIN CALCIUM 20 MG/1
20 TABLET, FILM COATED ORAL DAILY
Qty: 90 TABLET | Refills: 1 | Status: SHIPPED | OUTPATIENT
Start: 2021-03-09 | End: 2021-03-22

## 2021-03-12 ENCOUNTER — CLINICAL SUPPORT (OUTPATIENT)
Dept: DIABETES | Facility: CLINIC | Age: 46
End: 2021-03-12
Payer: COMMERCIAL

## 2021-03-12 DIAGNOSIS — E11.9 TYPE 2 DIABETES MELLITUS WITHOUT COMPLICATION, WITHOUT LONG-TERM CURRENT USE OF INSULIN: Primary | ICD-10-CM

## 2021-03-12 PROCEDURE — G0108 PR DIAB MANAGE TRN  PER INDIV: ICD-10-PCS | Mod: 95,,, | Performed by: DIETITIAN, REGISTERED

## 2021-03-12 PROCEDURE — G0108 DIAB MANAGE TRN  PER INDIV: HCPCS | Mod: 95,,, | Performed by: DIETITIAN, REGISTERED

## 2021-03-15 LAB
LEFT EYE DM RETINOPATHY: NEGATIVE
RIGHT EYE DM RETINOPATHY: NEGATIVE

## 2021-03-22 ENCOUNTER — OFFICE VISIT (OUTPATIENT)
Dept: INTERNAL MEDICINE | Facility: CLINIC | Age: 46
End: 2021-03-22
Payer: COMMERCIAL

## 2021-03-22 ENCOUNTER — OFFICE VISIT (OUTPATIENT)
Dept: CARDIOLOGY | Facility: CLINIC | Age: 46
End: 2021-03-22
Payer: COMMERCIAL

## 2021-03-22 ENCOUNTER — LAB VISIT (OUTPATIENT)
Dept: LAB | Facility: HOSPITAL | Age: 46
End: 2021-03-22
Attending: INTERNAL MEDICINE
Payer: COMMERCIAL

## 2021-03-22 VITALS
SYSTOLIC BLOOD PRESSURE: 108 MMHG | OXYGEN SATURATION: 96 % | DIASTOLIC BLOOD PRESSURE: 78 MMHG | WEIGHT: 262.81 LBS | RESPIRATION RATE: 16 BRPM | HEIGHT: 65 IN | HEART RATE: 56 BPM | BODY MASS INDEX: 43.79 KG/M2

## 2021-03-22 VITALS
SYSTOLIC BLOOD PRESSURE: 126 MMHG | HEIGHT: 65 IN | HEART RATE: 76 BPM | DIASTOLIC BLOOD PRESSURE: 72 MMHG | TEMPERATURE: 98 F | BODY MASS INDEX: 43.79 KG/M2 | WEIGHT: 262.81 LBS

## 2021-03-22 DIAGNOSIS — Z12.11 ENCOUNTER FOR SCREENING COLONOSCOPY: ICD-10-CM

## 2021-03-22 DIAGNOSIS — R06.00 DYSPNEA, UNSPECIFIED TYPE: ICD-10-CM

## 2021-03-22 DIAGNOSIS — E55.9 VITAMIN D DEFICIENCY: ICD-10-CM

## 2021-03-22 DIAGNOSIS — I50.32 CHRONIC HEART FAILURE WITH PRESERVED EJECTION FRACTION: ICD-10-CM

## 2021-03-22 DIAGNOSIS — I10 ESSENTIAL HYPERTENSION: ICD-10-CM

## 2021-03-22 DIAGNOSIS — J45.30 MILD PERSISTENT ASTHMA WITHOUT COMPLICATION: ICD-10-CM

## 2021-03-22 DIAGNOSIS — D50.9 IRON DEFICIENCY ANEMIA, UNSPECIFIED IRON DEFICIENCY ANEMIA TYPE: ICD-10-CM

## 2021-03-22 DIAGNOSIS — Z86.16 HISTORY OF COVID-19: ICD-10-CM

## 2021-03-22 DIAGNOSIS — R07.89 OTHER CHEST PAIN: ICD-10-CM

## 2021-03-22 DIAGNOSIS — R79.89 LOW VITAMIN D LEVEL: ICD-10-CM

## 2021-03-22 DIAGNOSIS — R00.2 PALPITATIONS: ICD-10-CM

## 2021-03-22 DIAGNOSIS — E11.65 TYPE 2 DIABETES MELLITUS WITH HYPERGLYCEMIA, WITHOUT LONG-TERM CURRENT USE OF INSULIN: Primary | ICD-10-CM

## 2021-03-22 DIAGNOSIS — E78.2 MIXED HYPERLIPIDEMIA: ICD-10-CM

## 2021-03-22 DIAGNOSIS — R00.2 PALPITATIONS: Primary | ICD-10-CM

## 2021-03-22 DIAGNOSIS — E66.01 MORBID OBESITY DUE TO EXCESS CALORIES: ICD-10-CM

## 2021-03-22 DIAGNOSIS — E83.42 HYPOMAGNESEMIA: ICD-10-CM

## 2021-03-22 PROBLEM — R60.0 LOCALIZED EDEMA: Status: RESOLVED | Noted: 2019-10-14 | Resolved: 2021-03-22

## 2021-03-22 PROCEDURE — 99214 PR OFFICE/OUTPT VISIT, EST, LEVL IV, 30-39 MIN: ICD-10-PCS | Mod: S$GLB,,, | Performed by: FAMILY MEDICINE

## 2021-03-22 PROCEDURE — 99214 PR OFFICE/OUTPT VISIT, EST, LEVL IV, 30-39 MIN: ICD-10-PCS | Mod: S$GLB,,, | Performed by: INTERNAL MEDICINE

## 2021-03-22 PROCEDURE — 36415 COLL VENOUS BLD VENIPUNCTURE: CPT | Mod: PO | Performed by: INTERNAL MEDICINE

## 2021-03-22 PROCEDURE — 99999 PR PBB SHADOW E&M-EST. PATIENT-LVL V: CPT | Mod: PBBFAC,,, | Performed by: INTERNAL MEDICINE

## 2021-03-22 PROCEDURE — 99214 OFFICE O/P EST MOD 30 MIN: CPT | Mod: S$GLB,,, | Performed by: FAMILY MEDICINE

## 2021-03-22 PROCEDURE — 99999 PR PBB SHADOW E&M-EST. PATIENT-LVL V: ICD-10-PCS | Mod: PBBFAC,,, | Performed by: INTERNAL MEDICINE

## 2021-03-22 PROCEDURE — 99999 PR PBB SHADOW E&M-EST. PATIENT-LVL V: ICD-10-PCS | Mod: PBBFAC,,, | Performed by: FAMILY MEDICINE

## 2021-03-22 PROCEDURE — 83880 ASSAY OF NATRIURETIC PEPTIDE: CPT | Performed by: INTERNAL MEDICINE

## 2021-03-22 PROCEDURE — 99999 PR PBB SHADOW E&M-EST. PATIENT-LVL V: CPT | Mod: PBBFAC,,, | Performed by: FAMILY MEDICINE

## 2021-03-22 PROCEDURE — 99215 OFFICE O/P EST HI 40 MIN: CPT | Mod: PBBFAC,PO | Performed by: FAMILY MEDICINE

## 2021-03-22 PROCEDURE — 99214 OFFICE O/P EST MOD 30 MIN: CPT | Mod: S$GLB,,, | Performed by: INTERNAL MEDICINE

## 2021-03-22 RX ORDER — ERGOCALCIFEROL 1.25 MG/1
50000 CAPSULE ORAL
Qty: 24 CAPSULE | Refills: 0 | Status: SHIPPED | OUTPATIENT
Start: 2021-03-22 | End: 2023-08-29

## 2021-03-22 RX ORDER — LANOLIN ALCOHOL/MO/W.PET/CERES
100 CREAM (GRAM) TOPICAL DAILY
COMMUNITY

## 2021-03-22 RX ORDER — SEMAGLUTIDE 1.34 MG/ML
INJECTION, SOLUTION SUBCUTANEOUS
Qty: 4.5 ML | Refills: 0 | Status: SHIPPED | OUTPATIENT
Start: 2021-03-22 | End: 2021-05-11 | Stop reason: SDUPTHER

## 2021-03-22 RX ORDER — FLUCONAZOLE 150 MG/1
150 TABLET ORAL DAILY
Qty: 1 TABLET | Refills: 0 | Status: SHIPPED | OUTPATIENT
Start: 2021-03-22 | End: 2021-03-23

## 2021-03-22 RX ORDER — LANOLIN ALCOHOL/MO/W.PET/CERES
400 CREAM (GRAM) TOPICAL DAILY
Qty: 90 TABLET | Refills: 1 | Status: SHIPPED | OUTPATIENT
Start: 2021-03-22 | End: 2021-03-22 | Stop reason: SDUPTHER

## 2021-03-22 RX ORDER — LANOLIN ALCOHOL/MO/W.PET/CERES
400 CREAM (GRAM) TOPICAL DAILY
Qty: 90 TABLET | Refills: 1 | Status: SHIPPED | OUTPATIENT
Start: 2021-03-22 | End: 2023-03-03 | Stop reason: ALTCHOICE

## 2021-03-23 DIAGNOSIS — Z12.11 COLON CANCER SCREENING: Primary | ICD-10-CM

## 2021-03-23 DIAGNOSIS — D50.9 IRON DEFICIENCY ANEMIA, UNSPECIFIED IRON DEFICIENCY ANEMIA TYPE: Primary | ICD-10-CM

## 2021-03-23 LAB — BNP SERPL-MCNC: 54 PG/ML (ref 0–99)

## 2021-03-23 RX ORDER — SODIUM, POTASSIUM,MAG SULFATES 17.5-3.13G
1 SOLUTION, RECONSTITUTED, ORAL ORAL DAILY
Qty: 1 KIT | Refills: 0 | Status: SHIPPED | OUTPATIENT
Start: 2021-03-23 | End: 2021-03-25

## 2021-03-24 ENCOUNTER — TELEPHONE (OUTPATIENT)
Dept: BARIATRICS | Facility: CLINIC | Age: 46
End: 2021-03-24

## 2021-03-25 ENCOUNTER — CLINICAL SUPPORT (OUTPATIENT)
Dept: BARIATRICS | Facility: CLINIC | Age: 46
End: 2021-03-25
Payer: COMMERCIAL

## 2021-03-25 VITALS — BODY MASS INDEX: 43.77 KG/M2 | WEIGHT: 263 LBS

## 2021-03-25 DIAGNOSIS — I10 ESSENTIAL HYPERTENSION: ICD-10-CM

## 2021-03-25 DIAGNOSIS — E66.01 MORBID OBESITY WITH BODY MASS INDEX (BMI) OF 40.0 TO 49.9: ICD-10-CM

## 2021-03-25 DIAGNOSIS — Z71.3 DIETARY COUNSELING AND SURVEILLANCE: ICD-10-CM

## 2021-03-25 DIAGNOSIS — E11.65 TYPE 2 DIABETES MELLITUS WITH HYPERGLYCEMIA, WITHOUT LONG-TERM CURRENT USE OF INSULIN: ICD-10-CM

## 2021-03-25 PROCEDURE — 99499 UNLISTED E&M SERVICE: CPT | Mod: 95,,, | Performed by: DIETITIAN, REGISTERED

## 2021-03-25 PROCEDURE — 97803 MED NUTRITION INDIV SUBSEQ: CPT | Mod: 95,,, | Performed by: DIETITIAN, REGISTERED

## 2021-03-25 PROCEDURE — 97803 PR MED NUTR THER, SUBSQ, INDIV, EA 15 MIN: ICD-10-PCS | Mod: 95,,, | Performed by: DIETITIAN, REGISTERED

## 2021-03-25 PROCEDURE — 99499 NO LOS: ICD-10-PCS | Mod: 95,,, | Performed by: DIETITIAN, REGISTERED

## 2021-03-29 ENCOUNTER — OFFICE VISIT (OUTPATIENT)
Dept: RHEUMATOLOGY | Facility: CLINIC | Age: 46
End: 2021-03-29
Payer: COMMERCIAL

## 2021-03-29 DIAGNOSIS — E55.9 VITAMIN D DEFICIENCY: ICD-10-CM

## 2021-03-29 DIAGNOSIS — E66.01 CLASS 3 SEVERE OBESITY DUE TO EXCESS CALORIES WITHOUT SERIOUS COMORBIDITY WITH BODY MASS INDEX (BMI) OF 40.0 TO 44.9 IN ADULT: ICD-10-CM

## 2021-03-29 DIAGNOSIS — M79.7 FIBROMYALGIA: Primary | ICD-10-CM

## 2021-03-29 PROCEDURE — 99214 PR OFFICE/OUTPT VISIT, EST, LEVL IV, 30-39 MIN: ICD-10-PCS | Mod: 95,,, | Performed by: INTERNAL MEDICINE

## 2021-03-29 PROCEDURE — 99214 OFFICE O/P EST MOD 30 MIN: CPT | Mod: 95,,, | Performed by: INTERNAL MEDICINE

## 2021-03-29 RX ORDER — TIZANIDINE 4 MG/1
4 TABLET ORAL EVERY 6 HOURS PRN
Qty: 60 TABLET | Refills: 1 | Status: SHIPPED | OUTPATIENT
Start: 2021-03-29 | End: 2022-04-25 | Stop reason: SDUPTHER

## 2021-03-30 ENCOUNTER — TELEPHONE (OUTPATIENT)
Dept: ENDOSCOPY | Facility: HOSPITAL | Age: 46
End: 2021-03-30

## 2021-03-31 ENCOUNTER — TELEPHONE (OUTPATIENT)
Dept: BARIATRICS | Facility: CLINIC | Age: 46
End: 2021-03-31

## 2021-04-02 ENCOUNTER — PATIENT MESSAGE (OUTPATIENT)
Dept: INTERNAL MEDICINE | Facility: CLINIC | Age: 46
End: 2021-04-02

## 2021-04-05 RX ORDER — SODIUM, POTASSIUM,MAG SULFATES 17.5-3.13G
1 SOLUTION, RECONSTITUTED, ORAL ORAL DAILY
Qty: 1 KIT | Refills: 0 | Status: SHIPPED | OUTPATIENT
Start: 2021-04-05 | End: 2021-04-07

## 2021-04-06 ENCOUNTER — HOSPITAL ENCOUNTER (OUTPATIENT)
Dept: CARDIOLOGY | Facility: HOSPITAL | Age: 46
Discharge: HOME | End: 2021-04-06
Attending: INTERNAL MEDICINE
Payer: COMMERCIAL

## 2021-04-06 DIAGNOSIS — Z86.16 HISTORY OF COVID-19: ICD-10-CM

## 2021-04-06 DIAGNOSIS — E78.2 MIXED HYPERLIPIDEMIA: ICD-10-CM

## 2021-04-06 DIAGNOSIS — R07.89 OTHER CHEST PAIN: ICD-10-CM

## 2021-04-06 DIAGNOSIS — E66.01 MORBID OBESITY DUE TO EXCESS CALORIES: ICD-10-CM

## 2021-04-06 DIAGNOSIS — R00.2 PALPITATIONS: ICD-10-CM

## 2021-04-06 LAB
AORTIC ROOT ANNULUS: 2.5 CM
AV INDEX (PROSTH): 0.77
AV MEAN GRADIENT: 5 MMHG
AV PEAK GRADIENT: 9 MMHG
AV VALVE AREA: 2.36 CM2
AV VELOCITY RATIO: 0.77
CV ECHO LV RWT: 0.52 CM
DOP CALC AO PEAK VEL: 1.5 M/S
DOP CALC AO VTI: 35.64 CM
DOP CALC LVOT AREA: 3.1 CM2
DOP CALC LVOT DIAMETER: 1.98 CM
DOP CALC LVOT PEAK VEL: 1.16 M/S
DOP CALC LVOT STROKE VOLUME: 84.23 CM3
DOP CALC RVOT PEAK VEL: 0.69 M/S
DOP CALC RVOT VTI: 16.68 CM
DOP CALCLVOT PEAK VEL VTI: 27.37 CM
E WAVE DECELERATION TIME: 437.31 MSEC
E/A RATIO: 1.05
E/E' RATIO: 6.2 M/S
ECHO LV POSTERIOR WALL: 1.02 CM (ref 0.6–1.1)
EJECTION FRACTION: 50 %
FRACTIONAL SHORTENING: 27 % (ref 28–44)
INTERVENTRICULAR SEPTUM: 1.1 CM (ref 0.6–1.1)
IVRT: 82.78 MSEC
LA MAJOR: 5 CM
LA MINOR: 4.43 CM
LA WIDTH: 2.77 CM
LEFT ATRIUM SIZE: 2.73 CM
LEFT ATRIUM VOLUME: 30.2 CM3
LEFT INTERNAL DIMENSION IN SYSTOLE: 2.89 CM (ref 2.1–4)
LEFT VENTRICLE DIASTOLIC VOLUME: 67.8 ML
LEFT VENTRICLE SYSTOLIC VOLUME: 31.88 ML
LEFT VENTRICULAR INTERNAL DIMENSION IN DIASTOLE: 3.95 CM (ref 3.5–6)
LEFT VENTRICULAR MASS: 135.4 G
LV LATERAL E/E' RATIO: 6.2 M/S
LV SEPTAL E/E' RATIO: 6.2 M/S
MV PEAK A VEL: 0.59 M/S
MV PEAK E VEL: 0.62 M/S
PISA TR MAX VEL: 2.72 M/S
PV MEAN GRADIENT: 1 MMHG
PV PEAK VELOCITY: 0.9 CM/S
RA MAJOR: 4.33 CM
RA PRESSURE: 8 MMHG
RA WIDTH: 2.61 CM
RIGHT VENTRICULAR END-DIASTOLIC DIMENSION: 2.56 CM
SINUS: 2.17 CM
STJ: 2.4 CM
TDI LATERAL: 0.1 M/S
TDI SEPTAL: 0.1 M/S
TDI: 0.1 M/S
TR MAX PG: 30 MMHG
TRICUSPID ANNULAR PLANE SYSTOLIC EXCURSION: 1.9 CM
TV REST PULMONARY ARTERY PRESSURE: 38 MMHG

## 2021-04-06 PROCEDURE — 93225 XTRNL ECG REC<48 HRS REC: CPT | Mod: PO

## 2021-04-06 PROCEDURE — 93227 HOLTER MONITOR - 48 HOUR (CUPID ONLY): ICD-10-PCS | Mod: ,,, | Performed by: INTERNAL MEDICINE

## 2021-04-06 PROCEDURE — 93227 XTRNL ECG REC<48 HR R&I: CPT | Mod: ,,, | Performed by: INTERNAL MEDICINE

## 2021-04-06 PROCEDURE — 93306 TTE W/DOPPLER COMPLETE: CPT | Mod: PO

## 2021-04-06 PROCEDURE — 93306 TTE W/DOPPLER COMPLETE: CPT | Mod: 26,,, | Performed by: INTERNAL MEDICINE

## 2021-04-06 PROCEDURE — 93306 ECHO (CUPID ONLY): ICD-10-PCS | Mod: 26,,, | Performed by: INTERNAL MEDICINE

## 2021-04-07 ENCOUNTER — OFFICE VISIT (OUTPATIENT)
Dept: PSYCHIATRY | Facility: CLINIC | Age: 46
End: 2021-04-07
Payer: MEDICAID

## 2021-04-07 DIAGNOSIS — R44.3 HALLUCINATIONS: ICD-10-CM

## 2021-04-07 DIAGNOSIS — F33.1 MODERATE EPISODE OF RECURRENT MAJOR DEPRESSIVE DISORDER: ICD-10-CM

## 2021-04-07 DIAGNOSIS — Z01.818 PREOPERATIVE EVALUATION TO RULE OUT SURGICAL CONTRAINDICATION: Primary | ICD-10-CM

## 2021-04-07 DIAGNOSIS — E66.01 MORBID OBESITY: ICD-10-CM

## 2021-04-07 DIAGNOSIS — E78.5 HYPERLIPIDEMIA, UNSPECIFIED HYPERLIPIDEMIA TYPE: ICD-10-CM

## 2021-04-07 DIAGNOSIS — I10 ESSENTIAL HYPERTENSION: ICD-10-CM

## 2021-04-07 DIAGNOSIS — E11.9 TYPE 2 DIABETES MELLITUS WITHOUT COMPLICATION, UNSPECIFIED WHETHER LONG TERM INSULIN USE: ICD-10-CM

## 2021-04-07 PROCEDURE — 96138 PSYCL/NRPSYC TECH 1ST: CPT | Mod: 95,,, | Performed by: PSYCHOLOGIST

## 2021-04-07 PROCEDURE — 90791 PR PSYCHIATRIC DIAGNOSTIC EVALUATION: ICD-10-PCS | Mod: 95,,, | Performed by: PSYCHOLOGIST

## 2021-04-07 PROCEDURE — 96131 PSYCL TST EVAL PHYS/QHP EA: CPT | Mod: 95,,, | Performed by: PSYCHOLOGIST

## 2021-04-07 PROCEDURE — 96130 PSYCL TST EVAL PHYS/QHP 1ST: CPT | Mod: 95,,, | Performed by: PSYCHOLOGIST

## 2021-04-07 PROCEDURE — 96138 PR PSYCH/NEUROPSYCH TEST ADMIN/SCORING, BY TECH, 2+ TESTS, 1ST 30 MIN: ICD-10-PCS | Mod: 95,,, | Performed by: PSYCHOLOGIST

## 2021-04-07 PROCEDURE — 96139 PSYCL/NRPSYC TST TECH EA: CPT | Mod: 95,,, | Performed by: PSYCHOLOGIST

## 2021-04-07 PROCEDURE — 96139 PR PSYCH/NEUROPSYCH TEST ADMIN/SCORING, BY TECH, 2+ TESTS, EA ADDTL 30 MIN: ICD-10-PCS | Mod: 95,,, | Performed by: PSYCHOLOGIST

## 2021-04-07 PROCEDURE — 96130 PR PSYCHOLOGIC TEST EVAL SVCS, 1ST HR: ICD-10-PCS | Mod: 95,,, | Performed by: PSYCHOLOGIST

## 2021-04-07 PROCEDURE — 90791 PSYCH DIAGNOSTIC EVALUATION: CPT | Mod: 95,,, | Performed by: PSYCHOLOGIST

## 2021-04-07 PROCEDURE — 96131 PR PSYCHOLOGIC TEST EVAL SVCS, EA ADDTL HR: ICD-10-PCS | Mod: 95,,, | Performed by: PSYCHOLOGIST

## 2021-04-08 ENCOUNTER — PATIENT OUTREACH (OUTPATIENT)
Dept: ADMINISTRATIVE | Facility: HOSPITAL | Age: 46
End: 2021-04-08

## 2021-04-12 ENCOUNTER — PATIENT MESSAGE (OUTPATIENT)
Dept: INTERNAL MEDICINE | Facility: CLINIC | Age: 46
End: 2021-04-12

## 2021-04-12 LAB
OHS CV EVENT MONITOR DAY: 2
OHS CV HOLTER LENGTH DECIMAL HOURS: 96
OHS CV HOLTER LENGTH HOURS: 48
OHS CV HOLTER LENGTH MINUTES: 0

## 2021-04-12 RX ORDER — METOPROLOL SUCCINATE 25 MG/1
25 TABLET, EXTENDED RELEASE ORAL DAILY
Qty: 30 TABLET | Refills: 3 | Status: SHIPPED | OUTPATIENT
Start: 2021-04-12 | End: 2021-07-22 | Stop reason: SDUPTHER

## 2021-04-13 ENCOUNTER — TELEPHONE (OUTPATIENT)
Dept: CARDIOLOGY | Facility: CLINIC | Age: 46
End: 2021-04-13

## 2021-04-19 ENCOUNTER — OFFICE VISIT (OUTPATIENT)
Dept: CARDIOLOGY | Facility: CLINIC | Age: 46
End: 2021-04-19
Payer: COMMERCIAL

## 2021-04-19 VITALS
WEIGHT: 261.25 LBS | HEART RATE: 72 BPM | OXYGEN SATURATION: 98 % | HEIGHT: 65 IN | RESPIRATION RATE: 16 BRPM | BODY MASS INDEX: 43.53 KG/M2 | DIASTOLIC BLOOD PRESSURE: 80 MMHG | SYSTOLIC BLOOD PRESSURE: 114 MMHG

## 2021-04-19 DIAGNOSIS — I10 ESSENTIAL HYPERTENSION: ICD-10-CM

## 2021-04-19 DIAGNOSIS — I49.3 PVC'S (PREMATURE VENTRICULAR CONTRACTIONS): Primary | ICD-10-CM

## 2021-04-19 DIAGNOSIS — E11.65 TYPE 2 DIABETES MELLITUS WITH HYPERGLYCEMIA, WITHOUT LONG-TERM CURRENT USE OF INSULIN: ICD-10-CM

## 2021-04-19 DIAGNOSIS — G47.33 OSA (OBSTRUCTIVE SLEEP APNEA): ICD-10-CM

## 2021-04-19 DIAGNOSIS — E78.2 MIXED HYPERLIPIDEMIA: ICD-10-CM

## 2021-04-19 DIAGNOSIS — R00.2 PALPITATIONS: ICD-10-CM

## 2021-04-19 DIAGNOSIS — E66.01 MORBID OBESITY DUE TO EXCESS CALORIES: ICD-10-CM

## 2021-04-19 DIAGNOSIS — Z86.16 HISTORY OF COVID-19: ICD-10-CM

## 2021-04-19 DIAGNOSIS — J45.30 MILD PERSISTENT ASTHMA WITHOUT COMPLICATION: ICD-10-CM

## 2021-04-19 PROCEDURE — 99214 PR OFFICE/OUTPT VISIT, EST, LEVL IV, 30-39 MIN: ICD-10-PCS | Mod: S$GLB,,, | Performed by: INTERNAL MEDICINE

## 2021-04-19 PROCEDURE — 99999 PR PBB SHADOW E&M-EST. PATIENT-LVL V: CPT | Mod: PBBFAC,,, | Performed by: INTERNAL MEDICINE

## 2021-04-19 PROCEDURE — 99999 PR PBB SHADOW E&M-EST. PATIENT-LVL V: ICD-10-PCS | Mod: PBBFAC,,, | Performed by: INTERNAL MEDICINE

## 2021-04-19 PROCEDURE — 99214 OFFICE O/P EST MOD 30 MIN: CPT | Mod: S$GLB,,, | Performed by: INTERNAL MEDICINE

## 2021-04-19 RX ORDER — HYDROCHLOROTHIAZIDE 12.5 MG/1
12.5 TABLET ORAL DAILY PRN
Qty: 30 TABLET | Refills: 3 | Status: SHIPPED | OUTPATIENT
Start: 2021-04-19 | End: 2021-12-16 | Stop reason: SDUPTHER

## 2021-04-21 ENCOUNTER — PATIENT MESSAGE (OUTPATIENT)
Dept: INTERNAL MEDICINE | Facility: CLINIC | Age: 46
End: 2021-04-21

## 2021-04-22 ENCOUNTER — PATIENT MESSAGE (OUTPATIENT)
Dept: UROLOGY | Facility: CLINIC | Age: 46
End: 2021-04-22

## 2021-04-22 RX ORDER — LANCETS 30 GAUGE
EACH MISCELLANEOUS
COMMUNITY
Start: 2021-01-29 | End: 2023-02-06

## 2021-04-22 RX ORDER — OXYCODONE AND ACETAMINOPHEN 10; 325 MG/1; MG/1
TABLET ORAL
COMMUNITY
Start: 2021-04-15 | End: 2023-03-03

## 2021-04-22 RX ORDER — TRAMADOL HYDROCHLORIDE 50 MG/1
TABLET ORAL
COMMUNITY
Start: 2021-04-15 | End: 2023-03-03

## 2021-04-22 RX ORDER — CYCLOSPORINE 0 G/ML
1 SOLUTION/ DROPS OPHTHALMIC; TOPICAL 2 TIMES DAILY
COMMUNITY
Start: 2021-03-19 | End: 2023-10-10 | Stop reason: ALTCHOICE

## 2021-04-23 ENCOUNTER — OFFICE VISIT (OUTPATIENT)
Dept: UROLOGY | Facility: CLINIC | Age: 46
End: 2021-04-23
Payer: COMMERCIAL

## 2021-04-23 VITALS — SYSTOLIC BLOOD PRESSURE: 126 MMHG | WEIGHT: 261 LBS | BODY MASS INDEX: 43.44 KG/M2 | DIASTOLIC BLOOD PRESSURE: 72 MMHG

## 2021-04-23 DIAGNOSIS — M62.89 PELVIC FLOOR DYSFUNCTION: Primary | ICD-10-CM

## 2021-04-23 PROCEDURE — 99213 PR OFFICE/OUTPT VISIT, EST, LEVL III, 20-29 MIN: ICD-10-PCS | Mod: S$GLB,,, | Performed by: UROLOGY

## 2021-04-23 PROCEDURE — 99999 PR PBB SHADOW E&M-EST. PATIENT-LVL III: CPT | Mod: PBBFAC,,, | Performed by: UROLOGY

## 2021-04-23 PROCEDURE — 99999 PR PBB SHADOW E&M-EST. PATIENT-LVL III: ICD-10-PCS | Mod: PBBFAC,,, | Performed by: UROLOGY

## 2021-04-23 PROCEDURE — 99213 OFFICE O/P EST LOW 20 MIN: CPT | Mod: S$GLB,,, | Performed by: UROLOGY

## 2021-04-23 RX ORDER — METHOCARBAMOL 500 MG/1
500 TABLET, FILM COATED ORAL 2 TIMES DAILY
Qty: 60 TABLET | Refills: 3 | Status: SHIPPED | OUTPATIENT
Start: 2021-04-23 | End: 2021-05-23

## 2021-04-26 ENCOUNTER — TELEPHONE (OUTPATIENT)
Dept: BARIATRICS | Facility: CLINIC | Age: 46
End: 2021-04-26

## 2021-04-26 VITALS — WEIGHT: 265.19 LBS | HEIGHT: 65 IN | BODY MASS INDEX: 44.18 KG/M2

## 2021-04-29 ENCOUNTER — TELEPHONE (OUTPATIENT)
Dept: GASTROENTEROLOGY | Facility: CLINIC | Age: 46
End: 2021-04-29

## 2021-04-29 ENCOUNTER — PATIENT MESSAGE (OUTPATIENT)
Dept: ENDOSCOPY | Facility: HOSPITAL | Age: 46
End: 2021-04-29

## 2021-04-29 DIAGNOSIS — Z01.818 PRE-OP TESTING: ICD-10-CM

## 2021-04-30 ENCOUNTER — OFFICE VISIT (OUTPATIENT)
Dept: DIABETES | Facility: CLINIC | Age: 46
End: 2021-04-30
Payer: COMMERCIAL

## 2021-04-30 DIAGNOSIS — E78.2 MIXED HYPERLIPIDEMIA: ICD-10-CM

## 2021-04-30 DIAGNOSIS — E55.9 VITAMIN D DEFICIENCY: ICD-10-CM

## 2021-04-30 DIAGNOSIS — E11.9 TYPE 2 DIABETES MELLITUS WITHOUT COMPLICATION, WITHOUT LONG-TERM CURRENT USE OF INSULIN: Primary | ICD-10-CM

## 2021-04-30 DIAGNOSIS — I10 ESSENTIAL HYPERTENSION: ICD-10-CM

## 2021-04-30 DIAGNOSIS — E27.8 ADRENAL MASS: ICD-10-CM

## 2021-04-30 DIAGNOSIS — E66.01 MORBID OBESITY DUE TO EXCESS CALORIES: ICD-10-CM

## 2021-04-30 PROCEDURE — 99214 OFFICE O/P EST MOD 30 MIN: CPT | Mod: 95,,, | Performed by: PHYSICIAN ASSISTANT

## 2021-04-30 PROCEDURE — 99214 PR OFFICE/OUTPT VISIT, EST, LEVL IV, 30-39 MIN: ICD-10-PCS | Mod: 95,,, | Performed by: PHYSICIAN ASSISTANT

## 2021-05-04 ENCOUNTER — PATIENT MESSAGE (OUTPATIENT)
Dept: INTERNAL MEDICINE | Facility: CLINIC | Age: 46
End: 2021-05-04

## 2021-05-04 ENCOUNTER — OFFICE VISIT (OUTPATIENT)
Dept: INTERNAL MEDICINE | Facility: CLINIC | Age: 46
End: 2021-05-04
Payer: COMMERCIAL

## 2021-05-04 DIAGNOSIS — J06.9 UPPER RESPIRATORY TRACT INFECTION, UNSPECIFIED TYPE: Primary | ICD-10-CM

## 2021-05-04 DIAGNOSIS — J45.30 MILD PERSISTENT ASTHMA WITHOUT COMPLICATION: ICD-10-CM

## 2021-05-04 DIAGNOSIS — F33.1 MODERATE EPISODE OF RECURRENT MAJOR DEPRESSIVE DISORDER: ICD-10-CM

## 2021-05-04 PROCEDURE — 99214 OFFICE O/P EST MOD 30 MIN: CPT | Mod: 95,,, | Performed by: FAMILY MEDICINE

## 2021-05-04 PROCEDURE — 99214 PR OFFICE/OUTPT VISIT, EST, LEVL IV, 30-39 MIN: ICD-10-PCS | Mod: 95,,, | Performed by: FAMILY MEDICINE

## 2021-05-04 RX ORDER — NYSTATIN 100000 [USP'U]/ML
4 SUSPENSION ORAL
Qty: 160 ML | Refills: 0 | Status: SHIPPED | OUTPATIENT
Start: 2021-05-04 | End: 2021-05-14

## 2021-05-04 RX ORDER — AMOXICILLIN AND CLAVULANATE POTASSIUM 875; 125 MG/1; MG/1
1 TABLET, FILM COATED ORAL 2 TIMES DAILY
Qty: 14 TABLET | Refills: 0 | Status: SHIPPED | OUTPATIENT
Start: 2021-05-04 | End: 2021-06-01

## 2021-05-10 ENCOUNTER — PATIENT MESSAGE (OUTPATIENT)
Dept: INTERNAL MEDICINE | Facility: CLINIC | Age: 46
End: 2021-05-10

## 2021-05-11 DIAGNOSIS — E11.65 TYPE 2 DIABETES MELLITUS WITH HYPERGLYCEMIA, WITHOUT LONG-TERM CURRENT USE OF INSULIN: ICD-10-CM

## 2021-05-11 RX ORDER — SEMAGLUTIDE 1.34 MG/ML
0.5 INJECTION, SOLUTION SUBCUTANEOUS
Qty: 3 PEN | Refills: 1 | Status: SHIPPED | OUTPATIENT
Start: 2021-05-11 | End: 2021-06-18

## 2021-05-14 ENCOUNTER — CLINICAL SUPPORT (OUTPATIENT)
Dept: DIABETES | Facility: CLINIC | Age: 46
End: 2021-05-14
Payer: COMMERCIAL

## 2021-05-14 DIAGNOSIS — E11.9 TYPE 2 DIABETES MELLITUS WITHOUT COMPLICATION, WITHOUT LONG-TERM CURRENT USE OF INSULIN: Primary | ICD-10-CM

## 2021-05-15 NOTE — PROGRESS NOTES
I agree with the Carlin Berg Subjective:       Patient ID: Yuli Milton is a 42 y.o. female.    Chief Complaint: Hospital Follow Up (Emergency gallbladder surgery 3/31/18)    HPI  Came in today to follow-up from recent laparoscopic cholecystectomy that happened while she was in [place].  This occurred 3 days ago.  Since then she has not yet had a bowel movement.  She is having some abdominal pain and therefore went to the emergency room last night.  Was given injections and a prescription for Tylenol No. 3 which she has yet to fill.  Said that Norco was not helping her with the pain.  She has staples in place over the laparoscopic port sites.  No fever no chills.  No vomiting.    In regards to her mood disorder and depression, she continues to take the Zoloft and Zyprexa.  She still hears voices.  She is interested in trying something different while waiting for appointment with psychiatry.    Family History   Problem Relation Age of Onset    Breast cancer Paternal Grandmother     Diabetes Maternal Grandmother     Hypertension Mother     Breast cancer Maternal Aunt     Cancer Maternal Aunt      colon cancer    Colon cancer Maternal Aunt     Miscarriages / Stillbirths Cousin     Stroke Maternal Aunt     Ovarian cancer Neg Hx     Deep vein thrombosis Neg Hx     Pulmonary embolism Neg Hx        Current Outpatient Prescriptions:     ALBUTEROL INHL, Inhale 2 puffs into the lungs continuous prn., Disp: , Rfl:     blood sugar diagnostic Strp, 1 each by Other route 3 (three) times daily. For One Touch Meter, Disp: 100 each, Rfl: 11    blood-glucose meter Misc, Dispense 1, Disp: 1 each, Rfl: 0    CARAFATE 100 mg/mL suspension, SHAKE LIQUID AND TAKE 10 ML PO QID, Disp: , Rfl: 0    cetirizine (ZYRTEC) 10 MG tablet, Take 1 tablet (10 mg total) by mouth once daily., Disp: , Rfl:     cycloSPORINE (RESTASIS) 0.05 % ophthalmic emulsion, 1 drop 2 (two) times daily., Disp: , Rfl:     docusate sodium (COLACE) 100 MG capsule, Take 100  "mg by mouth 2 (two) times daily., Disp: , Rfl:     hydrocodone-acetaminophen 10-325mg (NORCO)  mg Tab, TK 1 T PO Q 6 H PRN P FOR UP TO 4 DAYS, Disp: , Rfl: 0    lancets Misc, 1 each by Other route 3 (three) times daily. For One Touch Meter, Disp: 100 each, Rfl: 0    levalbuterol (XOPENEX) 0.63 mg/3 mL nebulizer solution, Take 1 ampule by nebulization every 4 (four) hours as needed for Wheezing., Disp: , Rfl:     metFORMIN (GLUCOPHAGE) 500 MG tablet, Take 1 tablet (500 mg total) by mouth 2 (two) times daily with meals., Disp: 180 tablet, Rfl: 3    pen needle, diabetic (COMFORT EZ PEN NEEDLES) 33 gauge x 1/4" Ndle, 1 Units by Misc.(Non-Drug; Combo Route) route every evening., Disp: 100 each, Rfl: 5    pramipexole (MIRAPEX) 0.125 MG tablet, Take 1 tablet (0.125 mg total) by mouth every evening. For restless legs, Disp: 30 tablet, Rfl: 11    pravastatin (PRAVACHOL) 10 MG tablet, Take 1 tablet (10 mg total) by mouth once daily., Disp: 90 tablet, Rfl: 3    sertraline (ZOLOFT) 100 MG tablet, TK 1 T PO  QD, Disp: , Rfl: 11    sucralfate (CARAFATE) 1 gram tablet, Take 1 tablet (1 g total) by mouth 4 (four) times daily before meals and nightly., Disp: 120 tablet, Rfl: 1    SYSTANE, PROPYLENE GLYCOL, 0.4-0.3 % Drop, INSTILL 1 GTT INTO OU BID, Disp: , Rfl: 6    TRULICITY 0.75 mg/0.5 mL PnIj, Place 0.5 mLs (0.75 mg total) onto the skin every 7 days., Disp: 0.5 mL, Rfl: 3    acetaminophen-codeine 300-30mg (TYLENOL #3) 300-30 mg Tab, Take by mouth every 6 (six) hours., Disp: , Rfl:     ondansetron (ZOFRAN) 4 MG tablet, Take 1 tablet (4 mg total) by mouth every 8 (eight) hours as needed for Nausea., Disp: 30 tablet, Rfl: 8    QUEtiapine (SEROQUEL) 100 MG Tab, Start with 100 mg at night on first day then increase to 200 on the next night followed by 300 mg thereafter., Disp: 90 tablet, Rfl: 0    Review of Systems   Constitutional: Negative for chills and fever.   Respiratory: Negative for cough and shortness of " "breath.    Cardiovascular: Negative for chest pain.   Gastrointestinal: Positive for abdominal pain.   Skin: Positive for wound. Negative for rash.   Neurological: Negative for dizziness.   Psychiatric/Behavioral: Positive for decreased concentration and hallucinations.       Objective:   /64 (BP Location: Right arm, Patient Position: Sitting, BP Method: X-Large (Manual))   Pulse (!) 58   Temp 97 °F (36.1 °C) (Tympanic)   Resp 18   Ht 5' 5" (1.651 m)   Wt 119.6 kg (263 lb 10.7 oz)   LMP 08/21/2016   SpO2 99%   BMI 43.88 kg/m²      Physical Exam   Constitutional: She is oriented to person, place, and time. She appears well-developed and well-nourished.   HENT:   Head: Normocephalic and atraumatic.   Eyes: Conjunctivae are normal.   Cardiovascular: Normal rate.    Pulmonary/Chest: Effort normal. No respiratory distress.   Abdominal: She exhibits no distension. There is tenderness (Does have some mild tenderness around the surgical sites.  No signs of infection.  No drainage.  Staples in place although one was loose on the dressing.). There is no rebound and no guarding.   Musculoskeletal: She exhibits no edema.   Neurological: She is alert and oriented to person, place, and time. Coordination normal.   Skin: Skin is warm and dry. No rash noted.   Psychiatric: She has a normal mood and affect. Her behavior is normal.   Vitals reviewed.      Assessment & Plan     Problem List Items Addressed This Visit        Neuro    Post-op pain - Primary    Current Assessment & Plan     She is continuing to have ileus but no concern for bowel obstruction.  I encouraged her to ambulate and be active.  Warned if she develops any fever or vomiting or worsening abdominal pain she needs to be seen in the ER.  Will have her follow-up on this Thursday for staple removal.  No Signs of wound infection today.            Psychiatric    Severe episode of recurrent major depressive disorder, with psychotic features    Current " Assessment & Plan     We'll attempt to switch from Zyprexa in the evening to Seroquel to see if this helps with the depression and hearing of voices.                 No Follow-up on file.

## 2021-05-19 ENCOUNTER — PATIENT MESSAGE (OUTPATIENT)
Dept: INTERNAL MEDICINE | Facility: CLINIC | Age: 46
End: 2021-05-19

## 2021-05-20 ENCOUNTER — PATIENT MESSAGE (OUTPATIENT)
Dept: INTERNAL MEDICINE | Facility: CLINIC | Age: 46
End: 2021-05-20

## 2021-05-21 ENCOUNTER — TELEPHONE (OUTPATIENT)
Dept: UROLOGY | Facility: CLINIC | Age: 46
End: 2021-05-21

## 2021-05-21 PROBLEM — Z00.00 ROUTINE GENERAL MEDICAL EXAMINATION AT A HEALTH CARE FACILITY: Status: ACTIVE | Noted: 2021-05-21

## 2021-06-01 ENCOUNTER — OFFICE VISIT (OUTPATIENT)
Dept: INTERNAL MEDICINE | Facility: CLINIC | Age: 46
End: 2021-06-01
Payer: COMMERCIAL

## 2021-06-01 ENCOUNTER — PATIENT MESSAGE (OUTPATIENT)
Dept: INTERNAL MEDICINE | Facility: CLINIC | Age: 46
End: 2021-06-01

## 2021-06-01 VITALS
HEART RATE: 74 BPM | TEMPERATURE: 96 F | DIASTOLIC BLOOD PRESSURE: 78 MMHG | RESPIRATION RATE: 18 BRPM | HEIGHT: 65 IN | SYSTOLIC BLOOD PRESSURE: 118 MMHG | BODY MASS INDEX: 43.64 KG/M2 | WEIGHT: 261.94 LBS

## 2021-06-01 DIAGNOSIS — I10 ESSENTIAL HYPERTENSION: ICD-10-CM

## 2021-06-01 DIAGNOSIS — E78.2 MIXED HYPERLIPIDEMIA: ICD-10-CM

## 2021-06-01 DIAGNOSIS — R30.0 DYSURIA: Primary | ICD-10-CM

## 2021-06-01 DIAGNOSIS — E11.65 TYPE 2 DIABETES MELLITUS WITH HYPERGLYCEMIA, WITHOUT LONG-TERM CURRENT USE OF INSULIN: ICD-10-CM

## 2021-06-01 LAB
BILIRUB SERPL-MCNC: NEGATIVE MG/DL
BLOOD URINE, POC: NORMAL
CLARITY, POC UA: CLEAR
COLOR, POC UA: NORMAL
GLUCOSE UR QL STRIP: NORMAL
KETONES UR QL STRIP: NEGATIVE
LEUKOCYTE ESTERASE URINE, POC: NEGATIVE
NITRITE, POC UA: NEGATIVE
PH, POC UA: 5
PROTEIN, POC: NORMAL
SPECIFIC GRAVITY, POC UA: 1.01
UROBILINOGEN, POC UA: NORMAL

## 2021-06-01 PROCEDURE — 87086 URINE CULTURE/COLONY COUNT: CPT | Performed by: NURSE PRACTITIONER

## 2021-06-01 PROCEDURE — 99999 PR PBB SHADOW E&M-EST. PATIENT-LVL III: CPT | Mod: PBBFAC,,, | Performed by: NURSE PRACTITIONER

## 2021-06-01 PROCEDURE — 99214 PR OFFICE/OUTPT VISIT, EST, LEVL IV, 30-39 MIN: ICD-10-PCS | Mod: 25,S$GLB,, | Performed by: NURSE PRACTITIONER

## 2021-06-01 PROCEDURE — 81002 POCT URINE DIPSTICK WITHOUT MICROSCOPE: ICD-10-PCS | Mod: S$GLB,,, | Performed by: NURSE PRACTITIONER

## 2021-06-01 PROCEDURE — 99214 OFFICE O/P EST MOD 30 MIN: CPT | Mod: 25,S$GLB,, | Performed by: NURSE PRACTITIONER

## 2021-06-01 PROCEDURE — 99999 PR PBB SHADOW E&M-EST. PATIENT-LVL III: ICD-10-PCS | Mod: PBBFAC,,, | Performed by: NURSE PRACTITIONER

## 2021-06-01 PROCEDURE — 81002 URINALYSIS NONAUTO W/O SCOPE: CPT | Mod: S$GLB,,, | Performed by: NURSE PRACTITIONER

## 2021-06-01 RX ORDER — NITROFURANTOIN 25; 75 MG/1; MG/1
100 CAPSULE ORAL 2 TIMES DAILY
COMMUNITY
Start: 2021-05-31 | End: 2021-07-20

## 2021-06-02 ENCOUNTER — PATIENT MESSAGE (OUTPATIENT)
Dept: UROLOGY | Facility: CLINIC | Age: 46
End: 2021-06-02

## 2021-06-03 LAB — BACTERIA UR CULT: NORMAL

## 2021-06-08 ENCOUNTER — OFFICE VISIT (OUTPATIENT)
Dept: UROLOGY | Facility: CLINIC | Age: 46
End: 2021-06-08
Payer: COMMERCIAL

## 2021-06-08 VITALS
HEIGHT: 65 IN | BODY MASS INDEX: 43.64 KG/M2 | WEIGHT: 261.94 LBS | HEART RATE: 76 BPM | SYSTOLIC BLOOD PRESSURE: 110 MMHG | DIASTOLIC BLOOD PRESSURE: 74 MMHG

## 2021-06-08 DIAGNOSIS — N30.10 INTERSTITIAL CYSTITIS: ICD-10-CM

## 2021-06-08 DIAGNOSIS — R39.15 URGENCY OF MICTURITION: ICD-10-CM

## 2021-06-08 DIAGNOSIS — M62.89 PELVIC FLOOR DYSFUNCTION: Primary | ICD-10-CM

## 2021-06-08 PROCEDURE — 99214 PR OFFICE/OUTPT VISIT, EST, LEVL IV, 30-39 MIN: ICD-10-PCS | Mod: S$GLB,,, | Performed by: UROLOGY

## 2021-06-08 PROCEDURE — 99214 OFFICE O/P EST MOD 30 MIN: CPT | Mod: S$GLB,,, | Performed by: UROLOGY

## 2021-06-08 PROCEDURE — 99999 PR PBB SHADOW E&M-EST. PATIENT-LVL V: CPT | Mod: PBBFAC,,, | Performed by: UROLOGY

## 2021-06-08 PROCEDURE — 99999 PR PBB SHADOW E&M-EST. PATIENT-LVL V: ICD-10-PCS | Mod: PBBFAC,,, | Performed by: UROLOGY

## 2021-06-08 RX ORDER — MIRABEGRON 50 MG/1
50 TABLET, FILM COATED, EXTENDED RELEASE ORAL DAILY
Qty: 30 TABLET | Refills: 11 | Status: SHIPPED | OUTPATIENT
Start: 2021-06-08 | End: 2022-09-02

## 2021-06-11 ENCOUNTER — TELEPHONE (OUTPATIENT)
Dept: RADIOLOGY | Facility: HOSPITAL | Age: 46
End: 2021-06-11

## 2021-06-14 ENCOUNTER — HOSPITAL ENCOUNTER (OUTPATIENT)
Dept: RADIOLOGY | Facility: HOSPITAL | Age: 46
Discharge: HOME | End: 2021-06-14
Attending: UROLOGY
Payer: COMMERCIAL

## 2021-06-14 DIAGNOSIS — M62.89 PELVIC FLOOR DYSFUNCTION: ICD-10-CM

## 2021-06-14 DIAGNOSIS — N30.10 INTERSTITIAL CYSTITIS: ICD-10-CM

## 2021-06-14 PROCEDURE — 25500020 PHARM REV CODE 255: Performed by: UROLOGY

## 2021-06-14 PROCEDURE — 74177 CT ABD & PELVIS W/CONTRAST: CPT | Mod: TC

## 2021-06-14 PROCEDURE — 74177 CT ABDOMEN PELVIS WITH CONTRAST: ICD-10-PCS | Mod: 26,,, | Performed by: RADIOLOGY

## 2021-06-14 PROCEDURE — 74177 CT ABD & PELVIS W/CONTRAST: CPT | Mod: 26,,, | Performed by: RADIOLOGY

## 2021-06-14 RX ADMIN — IOHEXOL 100 ML: 350 INJECTION, SOLUTION INTRAVENOUS at 04:06

## 2021-06-18 ENCOUNTER — PATIENT MESSAGE (OUTPATIENT)
Dept: ADMINISTRATIVE | Facility: OTHER | Age: 46
End: 2021-06-18

## 2021-06-18 ENCOUNTER — OFFICE VISIT (OUTPATIENT)
Dept: INTERNAL MEDICINE | Facility: CLINIC | Age: 46
End: 2021-06-18
Payer: COMMERCIAL

## 2021-06-18 ENCOUNTER — LAB VISIT (OUTPATIENT)
Dept: LAB | Facility: HOSPITAL | Age: 46
End: 2021-06-18
Attending: FAMILY MEDICINE
Payer: COMMERCIAL

## 2021-06-18 VITALS
DIASTOLIC BLOOD PRESSURE: 74 MMHG | WEIGHT: 264.56 LBS | HEART RATE: 76 BPM | TEMPERATURE: 98 F | BODY MASS INDEX: 44.08 KG/M2 | HEIGHT: 65 IN | SYSTOLIC BLOOD PRESSURE: 118 MMHG

## 2021-06-18 DIAGNOSIS — I10 ESSENTIAL HYPERTENSION: ICD-10-CM

## 2021-06-18 DIAGNOSIS — Z72.51 HIGH RISK HETEROSEXUAL BEHAVIOR: ICD-10-CM

## 2021-06-18 DIAGNOSIS — E11.65 TYPE 2 DIABETES MELLITUS WITH HYPERGLYCEMIA, WITHOUT LONG-TERM CURRENT USE OF INSULIN: ICD-10-CM

## 2021-06-18 DIAGNOSIS — R53.83 FATIGUE, UNSPECIFIED TYPE: ICD-10-CM

## 2021-06-18 DIAGNOSIS — F33.1 MODERATE EPISODE OF RECURRENT MAJOR DEPRESSIVE DISORDER: Primary | ICD-10-CM

## 2021-06-18 LAB
ALBUMIN SERPL BCP-MCNC: 3.3 G/DL (ref 3.5–5.2)
ALP SERPL-CCNC: 48 U/L (ref 55–135)
ALT SERPL W/O P-5'-P-CCNC: 9 U/L (ref 10–44)
ANION GAP SERPL CALC-SCNC: 7 MMOL/L (ref 8–16)
AST SERPL-CCNC: 11 U/L (ref 10–40)
BASOPHILS # BLD AUTO: 0.03 K/UL (ref 0–0.2)
BASOPHILS NFR BLD: 0.5 % (ref 0–1.9)
BILIRUB SERPL-MCNC: 0.4 MG/DL (ref 0.1–1)
BUN SERPL-MCNC: 8 MG/DL (ref 6–20)
CALCIUM SERPL-MCNC: 9.5 MG/DL (ref 8.7–10.5)
CHLORIDE SERPL-SCNC: 108 MMOL/L (ref 95–110)
CO2 SERPL-SCNC: 27 MMOL/L (ref 23–29)
CREAT SERPL-MCNC: 0.7 MG/DL (ref 0.5–1.4)
DIFFERENTIAL METHOD: ABNORMAL
EOSINOPHIL # BLD AUTO: 0.1 K/UL (ref 0–0.5)
EOSINOPHIL NFR BLD: 2.2 % (ref 0–8)
ERYTHROCYTE [DISTWIDTH] IN BLOOD BY AUTOMATED COUNT: 14 % (ref 11.5–14.5)
EST. GFR  (AFRICAN AMERICAN): >60 ML/MIN/1.73 M^2
EST. GFR  (NON AFRICAN AMERICAN): >60 ML/MIN/1.73 M^2
FERRITIN SERPL-MCNC: 49 NG/ML (ref 20–300)
GLUCOSE SERPL-MCNC: 85 MG/DL (ref 70–110)
HCT VFR BLD AUTO: 34.1 % (ref 37–48.5)
HGB BLD-MCNC: 11 G/DL (ref 12–16)
IMM GRANULOCYTES # BLD AUTO: 0.01 K/UL (ref 0–0.04)
IMM GRANULOCYTES NFR BLD AUTO: 0.2 % (ref 0–0.5)
IRON SERPL-MCNC: 56 UG/DL (ref 30–160)
LYMPHOCYTES # BLD AUTO: 2.2 K/UL (ref 1–4.8)
LYMPHOCYTES NFR BLD: 34.1 % (ref 18–48)
MCH RBC QN AUTO: 29.1 PG (ref 27–31)
MCHC RBC AUTO-ENTMCNC: 32.3 G/DL (ref 32–36)
MCV RBC AUTO: 90 FL (ref 82–98)
MONOCYTES # BLD AUTO: 0.5 K/UL (ref 0.3–1)
MONOCYTES NFR BLD: 8.3 % (ref 4–15)
NEUTROPHILS # BLD AUTO: 3.5 K/UL (ref 1.8–7.7)
NEUTROPHILS NFR BLD: 54.7 % (ref 38–73)
NRBC BLD-RTO: 0 /100 WBC
PLATELET # BLD AUTO: 341 K/UL (ref 150–450)
PMV BLD AUTO: 10.2 FL (ref 9.2–12.9)
POTASSIUM SERPL-SCNC: 3.5 MMOL/L (ref 3.5–5.1)
PROT SERPL-MCNC: 7.1 G/DL (ref 6–8.4)
RBC # BLD AUTO: 3.78 M/UL (ref 4–5.4)
SATURATED IRON: 14 % (ref 20–50)
SODIUM SERPL-SCNC: 142 MMOL/L (ref 136–145)
T4 FREE SERPL-MCNC: 0.8 NG/DL (ref 0.71–1.51)
TOTAL IRON BINDING CAPACITY: 391 UG/DL (ref 250–450)
TRANSFERRIN SERPL-MCNC: 264 MG/DL (ref 200–375)
TSH SERPL DL<=0.005 MIU/L-ACNC: 1.25 UIU/ML (ref 0.4–4)
WBC # BLD AUTO: 6.36 K/UL (ref 3.9–12.7)

## 2021-06-18 PROCEDURE — 86694 HERPES SIMPLEX NES ANTBDY: CPT | Performed by: FAMILY MEDICINE

## 2021-06-18 PROCEDURE — 83540 ASSAY OF IRON: CPT | Performed by: FAMILY MEDICINE

## 2021-06-18 PROCEDURE — 84439 ASSAY OF FREE THYROXINE: CPT | Performed by: FAMILY MEDICINE

## 2021-06-18 PROCEDURE — 99214 OFFICE O/P EST MOD 30 MIN: CPT | Mod: S$GLB,,, | Performed by: FAMILY MEDICINE

## 2021-06-18 PROCEDURE — 84443 ASSAY THYROID STIM HORMONE: CPT | Performed by: FAMILY MEDICINE

## 2021-06-18 PROCEDURE — 99999 PR PBB SHADOW E&M-EST. PATIENT-LVL III: ICD-10-PCS | Mod: PBBFAC,,, | Performed by: FAMILY MEDICINE

## 2021-06-18 PROCEDURE — 82728 ASSAY OF FERRITIN: CPT | Performed by: FAMILY MEDICINE

## 2021-06-18 PROCEDURE — 85025 COMPLETE CBC W/AUTO DIFF WBC: CPT | Performed by: FAMILY MEDICINE

## 2021-06-18 PROCEDURE — 99999 PR PBB SHADOW E&M-EST. PATIENT-LVL III: CPT | Mod: PBBFAC,,, | Performed by: FAMILY MEDICINE

## 2021-06-18 PROCEDURE — 80053 COMPREHEN METABOLIC PANEL: CPT | Performed by: FAMILY MEDICINE

## 2021-06-18 PROCEDURE — 36415 COLL VENOUS BLD VENIPUNCTURE: CPT | Mod: PO | Performed by: FAMILY MEDICINE

## 2021-06-18 PROCEDURE — 99214 PR OFFICE/OUTPT VISIT, EST, LEVL IV, 30-39 MIN: ICD-10-PCS | Mod: S$GLB,,, | Performed by: FAMILY MEDICINE

## 2021-06-18 PROCEDURE — 86592 SYPHILIS TEST NON-TREP QUAL: CPT | Performed by: FAMILY MEDICINE

## 2021-06-18 PROCEDURE — 86703 HIV-1/HIV-2 1 RESULT ANTBDY: CPT | Performed by: FAMILY MEDICINE

## 2021-06-18 RX ORDER — FLUOXETINE HYDROCHLORIDE 40 MG/1
40 CAPSULE ORAL NIGHTLY
Qty: 90 CAPSULE | Refills: 0 | Status: SHIPPED | OUTPATIENT
Start: 2021-06-18 | End: 2022-02-24

## 2021-06-18 RX ORDER — SEMAGLUTIDE 1.34 MG/ML
1 INJECTION, SOLUTION SUBCUTANEOUS
Qty: 6 PEN | Refills: 0 | Status: SHIPPED | OUTPATIENT
Start: 2021-06-18 | End: 2021-08-20

## 2021-06-18 RX ORDER — ALPRAZOLAM 1 MG/1
2 TABLET ORAL NIGHTLY
Qty: 60 TABLET | Refills: 0 | Status: SHIPPED | OUTPATIENT
Start: 2021-06-18 | End: 2022-02-24

## 2021-06-19 LAB — RPR SER QL: NORMAL

## 2021-06-21 LAB — HIV 1+2 AB+HIV1 P24 AG SERPL QL IA: NEGATIVE

## 2021-06-23 LAB — HSV AB, IGM BY EIA: 0.28 INDEX

## 2021-07-13 ENCOUNTER — PATIENT MESSAGE (OUTPATIENT)
Dept: RHEUMATOLOGY | Facility: CLINIC | Age: 46
End: 2021-07-13

## 2021-07-16 ENCOUNTER — OFFICE VISIT (OUTPATIENT)
Dept: URGENT CARE | Facility: CLINIC | Age: 46
End: 2021-07-16
Payer: COMMERCIAL

## 2021-07-16 ENCOUNTER — TELEPHONE (OUTPATIENT)
Dept: INTERNAL MEDICINE | Facility: CLINIC | Age: 46
End: 2021-07-16

## 2021-07-16 VITALS
RESPIRATION RATE: 18 BRPM | TEMPERATURE: 97 F | WEIGHT: 260 LBS | BODY MASS INDEX: 43.32 KG/M2 | HEART RATE: 68 BPM | SYSTOLIC BLOOD PRESSURE: 121 MMHG | DIASTOLIC BLOOD PRESSURE: 59 MMHG | OXYGEN SATURATION: 100 % | HEIGHT: 65 IN

## 2021-07-16 DIAGNOSIS — N76.0 ACUTE VAGINITIS: ICD-10-CM

## 2021-07-16 DIAGNOSIS — R10.2 PELVIC PAIN: ICD-10-CM

## 2021-07-16 DIAGNOSIS — R30.0 BURNING WITH URINATION: Primary | ICD-10-CM

## 2021-07-16 DIAGNOSIS — N83.201 RIGHT OVARIAN CYST: ICD-10-CM

## 2021-07-16 LAB
BILIRUB UR QL STRIP: NEGATIVE
GLUCOSE UR QL STRIP: NEGATIVE
KETONES UR QL STRIP: NEGATIVE
LEUKOCYTE ESTERASE UR QL STRIP: NEGATIVE
PH, POC UA: 5.5
POC BLOOD, URINE: POSITIVE
POC NITRATES, URINE: NEGATIVE
PROT UR QL STRIP: NEGATIVE
SP GR UR STRIP: 1.02 (ref 1–1.03)
UROBILINOGEN UR STRIP-ACNC: NORMAL (ref 0.1–1.1)

## 2021-07-16 PROCEDURE — 81003 POCT URINALYSIS, DIPSTICK, AUTOMATED, W/O SCOPE: ICD-10-PCS | Mod: QW,S$GLB,, | Performed by: PHYSICIAN ASSISTANT

## 2021-07-16 PROCEDURE — 87086 URINE CULTURE/COLONY COUNT: CPT | Performed by: PHYSICIAN ASSISTANT

## 2021-07-16 PROCEDURE — 87491 CHLMYD TRACH DNA AMP PROBE: CPT | Performed by: PHYSICIAN ASSISTANT

## 2021-07-16 PROCEDURE — 99214 PR OFFICE/OUTPT VISIT, EST, LEVL IV, 30-39 MIN: ICD-10-PCS | Mod: 25,S$GLB,, | Performed by: PHYSICIAN ASSISTANT

## 2021-07-16 PROCEDURE — 99214 OFFICE O/P EST MOD 30 MIN: CPT | Mod: 25,S$GLB,, | Performed by: PHYSICIAN ASSISTANT

## 2021-07-16 PROCEDURE — 87591 N.GONORRHOEAE DNA AMP PROB: CPT | Performed by: PHYSICIAN ASSISTANT

## 2021-07-16 PROCEDURE — 81003 URINALYSIS AUTO W/O SCOPE: CPT | Mod: QW,S$GLB,, | Performed by: PHYSICIAN ASSISTANT

## 2021-07-16 PROCEDURE — 87481 CANDIDA DNA AMP PROBE: CPT | Mod: 59 | Performed by: PHYSICIAN ASSISTANT

## 2021-07-16 PROCEDURE — 87661 TRICHOMONAS VAGINALIS AMPLIF: CPT | Performed by: PHYSICIAN ASSISTANT

## 2021-07-16 RX ORDER — METRONIDAZOLE 500 MG/1
500 TABLET ORAL EVERY 12 HOURS
Qty: 14 TABLET | Refills: 0 | Status: SHIPPED | OUTPATIENT
Start: 2021-07-16 | End: 2021-07-23

## 2021-07-18 LAB — BACTERIA UR CULT: NORMAL

## 2021-07-20 ENCOUNTER — OFFICE VISIT (OUTPATIENT)
Dept: OBSTETRICS AND GYNECOLOGY | Facility: CLINIC | Age: 46
End: 2021-07-20
Payer: COMMERCIAL

## 2021-07-20 VITALS
DIASTOLIC BLOOD PRESSURE: 64 MMHG | WEIGHT: 258.63 LBS | SYSTOLIC BLOOD PRESSURE: 120 MMHG | BODY MASS INDEX: 43.03 KG/M2

## 2021-07-20 DIAGNOSIS — N89.8 VAGINAL ITCHING: ICD-10-CM

## 2021-07-20 DIAGNOSIS — R10.2 PELVIC PAIN: Primary | ICD-10-CM

## 2021-07-20 DIAGNOSIS — R30.0 DYSURIA: ICD-10-CM

## 2021-07-20 DIAGNOSIS — N83.201 RIGHT OVARIAN CYST: ICD-10-CM

## 2021-07-20 DIAGNOSIS — B37.31 VULVOVAGINAL CANDIDIASIS: ICD-10-CM

## 2021-07-20 PROCEDURE — 99214 OFFICE O/P EST MOD 30 MIN: CPT | Mod: 25,S$GLB,, | Performed by: NURSE PRACTITIONER

## 2021-07-20 PROCEDURE — 81002 URINALYSIS NONAUTO W/O SCOPE: CPT | Mod: S$GLB,,, | Performed by: NURSE PRACTITIONER

## 2021-07-20 PROCEDURE — 99999 PR PBB SHADOW E&M-EST. PATIENT-LVL IV: ICD-10-PCS | Mod: PBBFAC,,, | Performed by: NURSE PRACTITIONER

## 2021-07-20 PROCEDURE — 99214 PR OFFICE/OUTPT VISIT, EST, LEVL IV, 30-39 MIN: ICD-10-PCS | Mod: 25,S$GLB,, | Performed by: NURSE PRACTITIONER

## 2021-07-20 PROCEDURE — 81002 PR URINALYSIS NONAUTO W/O SCOPE: ICD-10-PCS | Mod: S$GLB,,, | Performed by: NURSE PRACTITIONER

## 2021-07-20 PROCEDURE — 99999 PR PBB SHADOW E&M-EST. PATIENT-LVL IV: CPT | Mod: PBBFAC,,, | Performed by: NURSE PRACTITIONER

## 2021-07-20 RX ORDER — FLUCONAZOLE 150 MG/1
150 TABLET ORAL
Qty: 2 TABLET | Refills: 0 | Status: SHIPPED | OUTPATIENT
Start: 2021-07-20 | End: 2021-07-24

## 2021-07-21 ENCOUNTER — TELEPHONE (OUTPATIENT)
Dept: RADIOLOGY | Facility: HOSPITAL | Age: 46
End: 2021-07-21

## 2021-07-22 ENCOUNTER — OFFICE VISIT (OUTPATIENT)
Dept: UROLOGY | Facility: CLINIC | Age: 46
End: 2021-07-22
Payer: COMMERCIAL

## 2021-07-22 ENCOUNTER — OFFICE VISIT (OUTPATIENT)
Dept: CARDIOLOGY | Facility: CLINIC | Age: 46
End: 2021-07-22
Payer: COMMERCIAL

## 2021-07-22 VITALS — SYSTOLIC BLOOD PRESSURE: 118 MMHG | DIASTOLIC BLOOD PRESSURE: 70 MMHG | BODY MASS INDEX: 43 KG/M2 | WEIGHT: 258.38 LBS

## 2021-07-22 VITALS
WEIGHT: 258.63 LBS | BODY MASS INDEX: 43.09 KG/M2 | HEART RATE: 80 BPM | SYSTOLIC BLOOD PRESSURE: 118 MMHG | HEIGHT: 65 IN | DIASTOLIC BLOOD PRESSURE: 70 MMHG

## 2021-07-22 DIAGNOSIS — R00.2 PALPITATIONS: ICD-10-CM

## 2021-07-22 DIAGNOSIS — Z86.16 HISTORY OF COVID-19: ICD-10-CM

## 2021-07-22 DIAGNOSIS — E78.2 MIXED HYPERLIPIDEMIA: ICD-10-CM

## 2021-07-22 DIAGNOSIS — G47.33 OSA (OBSTRUCTIVE SLEEP APNEA): ICD-10-CM

## 2021-07-22 DIAGNOSIS — E66.01 CLASS 3 SEVERE OBESITY DUE TO EXCESS CALORIES WITHOUT SERIOUS COMORBIDITY WITH BODY MASS INDEX (BMI) OF 40.0 TO 44.9 IN ADULT: ICD-10-CM

## 2021-07-22 DIAGNOSIS — J45.30 MILD PERSISTENT ASTHMA WITHOUT COMPLICATION: ICD-10-CM

## 2021-07-22 DIAGNOSIS — I49.3 PVC'S (PREMATURE VENTRICULAR CONTRACTIONS): Primary | ICD-10-CM

## 2021-07-22 DIAGNOSIS — R39.15 URGENCY OF MICTURITION: Primary | ICD-10-CM

## 2021-07-22 DIAGNOSIS — M62.89 PELVIC FLOOR DYSFUNCTION: ICD-10-CM

## 2021-07-22 DIAGNOSIS — N30.10 INTERSTITIAL CYSTITIS: ICD-10-CM

## 2021-07-22 LAB
BACTERIAL VAGINOSIS DNA: NEGATIVE
C TRACH DNA SPEC QL NAA+PROBE: NOT DETECTED
CANDIDA GLABRATA DNA: NEGATIVE
CANDIDA KRUSEI DNA: NEGATIVE
CANDIDA RRNA VAG QL PROBE: NEGATIVE
N GONORRHOEA DNA SPEC QL NAA+PROBE: NOT DETECTED
T VAGINALIS RRNA GENITAL QL PROBE: NEGATIVE

## 2021-07-22 PROCEDURE — 99214 OFFICE O/P EST MOD 30 MIN: CPT | Mod: S$GLB,,, | Performed by: INTERNAL MEDICINE

## 2021-07-22 PROCEDURE — 99214 PR OFFICE/OUTPT VISIT, EST, LEVL IV, 30-39 MIN: ICD-10-PCS | Mod: S$GLB,,, | Performed by: INTERNAL MEDICINE

## 2021-07-22 PROCEDURE — 99214 PR OFFICE/OUTPT VISIT, EST, LEVL IV, 30-39 MIN: ICD-10-PCS | Mod: S$GLB,,, | Performed by: UROLOGY

## 2021-07-22 PROCEDURE — 99214 OFFICE O/P EST MOD 30 MIN: CPT | Mod: S$GLB,,, | Performed by: UROLOGY

## 2021-07-22 PROCEDURE — 99999 PR PBB SHADOW E&M-EST. PATIENT-LVL V: ICD-10-PCS | Mod: PBBFAC,,, | Performed by: INTERNAL MEDICINE

## 2021-07-22 PROCEDURE — 99999 PR PBB SHADOW E&M-EST. PATIENT-LVL IV: CPT | Mod: PBBFAC,,, | Performed by: UROLOGY

## 2021-07-22 PROCEDURE — 99999 PR PBB SHADOW E&M-EST. PATIENT-LVL IV: ICD-10-PCS | Mod: PBBFAC,,, | Performed by: UROLOGY

## 2021-07-22 PROCEDURE — 99999 PR PBB SHADOW E&M-EST. PATIENT-LVL V: CPT | Mod: PBBFAC,,, | Performed by: INTERNAL MEDICINE

## 2021-07-22 RX ORDER — METOPROLOL SUCCINATE 25 MG/1
25 TABLET, EXTENDED RELEASE ORAL NIGHTLY
Qty: 90 TABLET | Refills: 1 | Status: SHIPPED | OUTPATIENT
Start: 2021-07-22 | End: 2023-03-03

## 2021-07-22 RX ORDER — DOXYCYCLINE HYCLATE 100 MG
100 TABLET ORAL 2 TIMES DAILY
Qty: 20 TABLET | Refills: 0 | Status: SHIPPED | OUTPATIENT
Start: 2021-07-22 | End: 2021-09-14

## 2021-07-23 ENCOUNTER — HOSPITAL ENCOUNTER (OUTPATIENT)
Dept: RADIOLOGY | Facility: HOSPITAL | Age: 46
Discharge: HOME | End: 2021-07-23
Attending: NURSE PRACTITIONER
Payer: COMMERCIAL

## 2021-07-23 ENCOUNTER — TELEPHONE (OUTPATIENT)
Dept: URGENT CARE | Facility: CLINIC | Age: 46
End: 2021-07-23

## 2021-07-23 DIAGNOSIS — N83.201 RIGHT OVARIAN CYST: ICD-10-CM

## 2021-07-23 PROCEDURE — 76856 US EXAM PELVIC COMPLETE: CPT | Mod: TC

## 2021-07-23 PROCEDURE — 76830 TRANSVAGINAL US NON-OB: CPT | Mod: 26,,, | Performed by: RADIOLOGY

## 2021-07-23 PROCEDURE — 76830 US PELVIS COMP WITH TRANSVAG NON-OB (XPD): ICD-10-PCS | Mod: 26,,, | Performed by: RADIOLOGY

## 2021-07-23 PROCEDURE — 76856 US EXAM PELVIC COMPLETE: CPT | Mod: 26,,, | Performed by: RADIOLOGY

## 2021-07-23 PROCEDURE — 76856 US PELVIS COMP WITH TRANSVAG NON-OB (XPD): ICD-10-PCS | Mod: 26,,, | Performed by: RADIOLOGY

## 2021-07-28 ENCOUNTER — PATIENT MESSAGE (OUTPATIENT)
Dept: INTERNAL MEDICINE | Facility: CLINIC | Age: 46
End: 2021-07-28

## 2021-07-30 ENCOUNTER — OFFICE VISIT (OUTPATIENT)
Dept: URGENT CARE | Facility: CLINIC | Age: 46
End: 2021-07-30
Payer: COMMERCIAL

## 2021-07-30 VITALS
HEART RATE: 88 BPM | WEIGHT: 258 LBS | SYSTOLIC BLOOD PRESSURE: 116 MMHG | RESPIRATION RATE: 16 BRPM | DIASTOLIC BLOOD PRESSURE: 56 MMHG | HEIGHT: 65 IN | BODY MASS INDEX: 42.99 KG/M2 | OXYGEN SATURATION: 99 % | TEMPERATURE: 98 F

## 2021-07-30 DIAGNOSIS — J20.9 ACUTE BRONCHITIS, UNSPECIFIED ORGANISM: Primary | ICD-10-CM

## 2021-07-30 DIAGNOSIS — J45.20 MILD INTERMITTENT ASTHMA WITHOUT COMPLICATION: ICD-10-CM

## 2021-07-30 LAB
CTP QC/QA: YES
SARS-COV-2 RDRP RESP QL NAA+PROBE: NEGATIVE

## 2021-07-30 PROCEDURE — 99214 OFFICE O/P EST MOD 30 MIN: CPT | Mod: S$GLB,,, | Performed by: PHYSICIAN ASSISTANT

## 2021-07-30 PROCEDURE — 99214 PR OFFICE/OUTPT VISIT, EST, LEVL IV, 30-39 MIN: ICD-10-PCS | Mod: S$GLB,,, | Performed by: PHYSICIAN ASSISTANT

## 2021-07-30 PROCEDURE — U0002: ICD-10-PCS | Mod: QW,S$GLB,, | Performed by: PHYSICIAN ASSISTANT

## 2021-07-30 PROCEDURE — U0002 COVID-19 LAB TEST NON-CDC: HCPCS | Mod: QW,S$GLB,, | Performed by: PHYSICIAN ASSISTANT

## 2021-07-30 RX ORDER — ALBUTEROL SULFATE 90 UG/1
2 AEROSOL, METERED RESPIRATORY (INHALATION) EVERY 6 HOURS PRN
Qty: 18 G | Refills: 6 | Status: SHIPPED | OUTPATIENT
Start: 2021-07-30 | End: 2021-09-14

## 2021-07-30 RX ORDER — PROMETHAZINE HYDROCHLORIDE AND DEXTROMETHORPHAN HYDROBROMIDE 6.25; 15 MG/5ML; MG/5ML
5 SYRUP ORAL EVERY 4 HOURS PRN
Qty: 120 ML | Refills: 0 | Status: SHIPPED | OUTPATIENT
Start: 2021-07-30 | End: 2021-08-06

## 2021-07-30 RX ORDER — METHYLPREDNISOLONE 4 MG/1
TABLET ORAL
Qty: 1 PACKAGE | Refills: 0 | Status: SHIPPED | OUTPATIENT
Start: 2021-07-30 | End: 2021-09-14

## 2021-08-03 ENCOUNTER — OFFICE VISIT (OUTPATIENT)
Dept: INTERNAL MEDICINE | Facility: CLINIC | Age: 46
End: 2021-08-03
Payer: COMMERCIAL

## 2021-08-03 DIAGNOSIS — E78.2 MIXED HYPERLIPIDEMIA: ICD-10-CM

## 2021-08-03 DIAGNOSIS — J45.30 MILD PERSISTENT ASTHMA WITHOUT COMPLICATION: ICD-10-CM

## 2021-08-03 DIAGNOSIS — J40 BRONCHITIS: Primary | ICD-10-CM

## 2021-08-03 DIAGNOSIS — I10 ESSENTIAL HYPERTENSION: ICD-10-CM

## 2021-08-03 PROCEDURE — 99214 OFFICE O/P EST MOD 30 MIN: CPT | Mod: 95,,, | Performed by: FAMILY MEDICINE

## 2021-08-03 PROCEDURE — 99214 PR OFFICE/OUTPT VISIT, EST, LEVL IV, 30-39 MIN: ICD-10-PCS | Mod: 95,,, | Performed by: FAMILY MEDICINE

## 2021-08-03 RX ORDER — BENZONATATE 100 MG/1
100 CAPSULE ORAL 3 TIMES DAILY PRN
Qty: 30 CAPSULE | Refills: 0 | Status: SHIPPED | OUTPATIENT
Start: 2021-08-03 | End: 2021-08-13

## 2021-08-03 RX ORDER — AZITHROMYCIN 250 MG/1
250 TABLET, FILM COATED ORAL DAILY
Qty: 6 TABLET | Refills: 0 | Status: SHIPPED | OUTPATIENT
Start: 2021-08-03 | End: 2021-09-14

## 2021-08-04 ENCOUNTER — HOSPITAL ENCOUNTER (OUTPATIENT)
Dept: RADIOLOGY | Facility: HOSPITAL | Age: 46
Discharge: HOME | End: 2021-08-04
Attending: FAMILY MEDICINE
Payer: COMMERCIAL

## 2021-08-04 ENCOUNTER — PATIENT MESSAGE (OUTPATIENT)
Dept: INTERNAL MEDICINE | Facility: CLINIC | Age: 46
End: 2021-08-04

## 2021-08-04 DIAGNOSIS — J40 BRONCHITIS: ICD-10-CM

## 2021-08-04 PROCEDURE — 71046 X-RAY EXAM CHEST 2 VIEWS: CPT | Mod: TC,FY,PO

## 2021-08-04 PROCEDURE — 71046 X-RAY EXAM CHEST 2 VIEWS: CPT | Mod: 26,,, | Performed by: RADIOLOGY

## 2021-08-04 PROCEDURE — 71046 XR CHEST PA AND LATERAL: ICD-10-PCS | Mod: 26,,, | Performed by: RADIOLOGY

## 2021-08-20 ENCOUNTER — OFFICE VISIT (OUTPATIENT)
Dept: PODIATRY | Facility: CLINIC | Age: 46
End: 2021-08-20
Payer: COMMERCIAL

## 2021-08-20 ENCOUNTER — PATIENT MESSAGE (OUTPATIENT)
Dept: DIABETES | Facility: CLINIC | Age: 46
End: 2021-08-20

## 2021-08-20 ENCOUNTER — OFFICE VISIT (OUTPATIENT)
Dept: DIABETES | Facility: CLINIC | Age: 46
End: 2021-08-20
Payer: COMMERCIAL

## 2021-08-20 DIAGNOSIS — E11.65 TYPE 2 DIABETES MELLITUS WITH HYPERGLYCEMIA, WITHOUT LONG-TERM CURRENT USE OF INSULIN: ICD-10-CM

## 2021-08-20 DIAGNOSIS — E11.9 TYPE 2 DIABETES MELLITUS WITHOUT COMPLICATION, WITHOUT LONG-TERM CURRENT USE OF INSULIN: Primary | ICD-10-CM

## 2021-08-20 DIAGNOSIS — I10 ESSENTIAL HYPERTENSION: ICD-10-CM

## 2021-08-20 DIAGNOSIS — E66.01 MORBID OBESITY DUE TO EXCESS CALORIES: ICD-10-CM

## 2021-08-20 DIAGNOSIS — L60.3 ONYCHODYSTROPHY: Primary | ICD-10-CM

## 2021-08-20 DIAGNOSIS — E78.2 MIXED HYPERLIPIDEMIA: ICD-10-CM

## 2021-08-20 DIAGNOSIS — E27.8 ADRENAL MASS: ICD-10-CM

## 2021-08-20 DIAGNOSIS — E55.9 VITAMIN D DEFICIENCY: ICD-10-CM

## 2021-08-20 PROCEDURE — 99214 OFFICE O/P EST MOD 30 MIN: CPT | Mod: 95,,, | Performed by: PHYSICIAN ASSISTANT

## 2021-08-20 PROCEDURE — 11730 AVULSION NAIL PLATE SIMPLE 1: CPT | Mod: TA,S$GLB,, | Performed by: PODIATRIST

## 2021-08-20 PROCEDURE — 99214 PR OFFICE/OUTPT VISIT, EST, LEVL IV, 30-39 MIN: ICD-10-PCS | Mod: 95,,, | Performed by: PHYSICIAN ASSISTANT

## 2021-08-20 PROCEDURE — 99999 PR PBB SHADOW E&M-EST. PATIENT-LVL IV: CPT | Mod: PBBFAC,,, | Performed by: PODIATRIST

## 2021-08-20 PROCEDURE — 11730 NAIL REMOVAL: ICD-10-PCS | Mod: TA,S$GLB,, | Performed by: PODIATRIST

## 2021-08-20 PROCEDURE — 99213 OFFICE O/P EST LOW 20 MIN: CPT | Mod: 25,S$GLB,, | Performed by: PODIATRIST

## 2021-08-20 PROCEDURE — 99213 PR OFFICE/OUTPT VISIT, EST, LEVL III, 20-29 MIN: ICD-10-PCS | Mod: 25,S$GLB,, | Performed by: PODIATRIST

## 2021-08-20 PROCEDURE — 99999 PR PBB SHADOW E&M-EST. PATIENT-LVL IV: ICD-10-PCS | Mod: PBBFAC,,, | Performed by: PODIATRIST

## 2021-08-20 RX ORDER — SEMAGLUTIDE 1.7 MG/.75ML
1.7 INJECTION, SOLUTION SUBCUTANEOUS
Qty: 3 ML | Refills: 11 | Status: SHIPPED | OUTPATIENT
Start: 2021-08-20 | End: 2021-12-16

## 2021-08-20 RX ORDER — SEMAGLUTIDE 1.7 MG/.75ML
1.7 INJECTION, SOLUTION SUBCUTANEOUS
Qty: 3 ML | Refills: 11 | Status: SHIPPED | OUTPATIENT
Start: 2021-08-20 | End: 2021-08-20

## 2021-08-21 ENCOUNTER — PATIENT MESSAGE (OUTPATIENT)
Dept: BARIATRICS | Facility: CLINIC | Age: 46
End: 2021-08-21

## 2021-08-23 PROBLEM — Z00.00 ROUTINE GENERAL MEDICAL EXAMINATION AT A HEALTH CARE FACILITY: Status: RESOLVED | Noted: 2021-05-21 | Resolved: 2021-08-23

## 2021-08-26 ENCOUNTER — OFFICE VISIT (OUTPATIENT)
Dept: UROLOGY | Facility: CLINIC | Age: 46
End: 2021-08-26
Payer: COMMERCIAL

## 2021-08-26 ENCOUNTER — TELEPHONE (OUTPATIENT)
Dept: INTERNAL MEDICINE | Facility: CLINIC | Age: 46
End: 2021-08-26

## 2021-08-26 ENCOUNTER — TELEPHONE (OUTPATIENT)
Dept: BARIATRICS | Facility: CLINIC | Age: 46
End: 2021-08-26

## 2021-08-26 VITALS
SYSTOLIC BLOOD PRESSURE: 96 MMHG | BODY MASS INDEX: 43.56 KG/M2 | HEART RATE: 76 BPM | WEIGHT: 261.44 LBS | TEMPERATURE: 97 F | HEIGHT: 65 IN | DIASTOLIC BLOOD PRESSURE: 68 MMHG

## 2021-08-26 DIAGNOSIS — N30.10 INTERSTITIAL CYSTITIS: ICD-10-CM

## 2021-08-26 DIAGNOSIS — M62.89 PELVIC FLOOR DYSFUNCTION: ICD-10-CM

## 2021-08-26 DIAGNOSIS — R39.15 URGENCY OF MICTURITION: Primary | ICD-10-CM

## 2021-08-26 PROCEDURE — 99214 OFFICE O/P EST MOD 30 MIN: CPT | Mod: S$GLB,,, | Performed by: UROLOGY

## 2021-08-26 PROCEDURE — 99999 PR PBB SHADOW E&M-EST. PATIENT-LVL V: CPT | Mod: PBBFAC,,, | Performed by: UROLOGY

## 2021-08-26 PROCEDURE — 99999 PR PBB SHADOW E&M-EST. PATIENT-LVL V: ICD-10-PCS | Mod: PBBFAC,,, | Performed by: UROLOGY

## 2021-08-26 PROCEDURE — 99214 PR OFFICE/OUTPT VISIT, EST, LEVL IV, 30-39 MIN: ICD-10-PCS | Mod: S$GLB,,, | Performed by: UROLOGY

## 2021-08-31 ENCOUNTER — TELEPHONE (OUTPATIENT)
Dept: PRIMARY CARE CLINIC | Facility: CLINIC | Age: 46
End: 2021-08-31

## 2021-09-08 ENCOUNTER — TELEPHONE (OUTPATIENT)
Dept: PULMONOLOGY | Facility: CLINIC | Age: 46
End: 2021-09-08

## 2021-09-14 ENCOUNTER — OFFICE VISIT (OUTPATIENT)
Dept: PULMONOLOGY | Facility: CLINIC | Age: 46
End: 2021-09-14
Payer: COMMERCIAL

## 2021-09-14 VITALS
WEIGHT: 261.38 LBS | HEART RATE: 82 BPM | SYSTOLIC BLOOD PRESSURE: 120 MMHG | RESPIRATION RATE: 16 BRPM | DIASTOLIC BLOOD PRESSURE: 80 MMHG | BODY MASS INDEX: 43.55 KG/M2 | OXYGEN SATURATION: 99 % | HEIGHT: 65 IN

## 2021-09-14 DIAGNOSIS — J45.30 MILD PERSISTENT ASTHMA WITHOUT COMPLICATION: Primary | ICD-10-CM

## 2021-09-14 DIAGNOSIS — R06.00 DYSPNEA, UNSPECIFIED TYPE: ICD-10-CM

## 2021-09-14 DIAGNOSIS — Z86.69 HISTORY OF OBSTRUCTIVE SLEEP APNEA: ICD-10-CM

## 2021-09-14 DIAGNOSIS — G47.30 SLEEP-DISORDERED BREATHING: ICD-10-CM

## 2021-09-14 DIAGNOSIS — E66.01 CLASS 3 SEVERE OBESITY DUE TO EXCESS CALORIES WITHOUT SERIOUS COMORBIDITY WITH BODY MASS INDEX (BMI) OF 40.0 TO 44.9 IN ADULT: ICD-10-CM

## 2021-09-14 DIAGNOSIS — R06.83 SNORING: ICD-10-CM

## 2021-09-14 PROCEDURE — 99999 PR PBB SHADOW E&M-EST. PATIENT-LVL V: CPT | Mod: PBBFAC,,, | Performed by: NURSE PRACTITIONER

## 2021-09-14 PROCEDURE — 99215 PR OFFICE/OUTPT VISIT, EST, LEVL V, 40-54 MIN: ICD-10-PCS | Mod: S$GLB,,, | Performed by: NURSE PRACTITIONER

## 2021-09-14 PROCEDURE — 99215 OFFICE O/P EST HI 40 MIN: CPT | Mod: S$GLB,,, | Performed by: NURSE PRACTITIONER

## 2021-09-14 PROCEDURE — 99417 PROLNG OP E/M EACH 15 MIN: CPT | Mod: S$GLB,,, | Performed by: NURSE PRACTITIONER

## 2021-09-14 PROCEDURE — 99417 PR PROLONGED SVC, OUTPT, W/WO DIRECT PT CONTACT,  EA ADDTL 15 MIN: ICD-10-PCS | Mod: S$GLB,,, | Performed by: NURSE PRACTITIONER

## 2021-09-14 PROCEDURE — 99999 PR PBB SHADOW E&M-EST. PATIENT-LVL V: ICD-10-PCS | Mod: PBBFAC,,, | Performed by: NURSE PRACTITIONER

## 2021-09-14 RX ORDER — ALBUTEROL SULFATE 0.83 MG/ML
2.5 SOLUTION RESPIRATORY (INHALATION) EVERY 4 HOURS PRN
Qty: 150 ML | Refills: 11 | Status: SHIPPED | OUTPATIENT
Start: 2021-09-14 | End: 2022-08-23 | Stop reason: SDUPTHER

## 2021-09-14 RX ORDER — ALBUTEROL SULFATE 90 UG/1
2 AEROSOL, METERED RESPIRATORY (INHALATION) EVERY 4 HOURS PRN
Qty: 18 G | Refills: 11 | Status: SHIPPED | OUTPATIENT
Start: 2021-09-14 | End: 2022-08-23 | Stop reason: SDUPTHER

## 2021-09-14 RX ORDER — FLUTICASONE FUROATE AND VILANTEROL TRIFENATATE 100; 25 UG/1; UG/1
1 POWDER RESPIRATORY (INHALATION) DAILY
Qty: 60 EACH | Refills: 11 | Status: SHIPPED | OUTPATIENT
Start: 2021-09-14 | End: 2022-08-23 | Stop reason: CLARIF

## 2021-09-14 RX ORDER — GABAPENTIN 300 MG/1
CAPSULE ORAL
COMMUNITY
Start: 2021-07-15 | End: 2023-08-29

## 2021-09-21 ENCOUNTER — TELEPHONE (OUTPATIENT)
Dept: PULMONOLOGY | Facility: HOSPITAL | Age: 46
End: 2021-09-21

## 2021-09-23 ENCOUNTER — TELEPHONE (OUTPATIENT)
Dept: INTERNAL MEDICINE | Facility: CLINIC | Age: 46
End: 2021-09-23

## 2021-09-23 ENCOUNTER — OFFICE VISIT (OUTPATIENT)
Dept: INTERNAL MEDICINE | Facility: CLINIC | Age: 46
End: 2021-09-23
Payer: COMMERCIAL

## 2021-09-23 ENCOUNTER — HOSPITAL ENCOUNTER (OUTPATIENT)
Dept: CARDIOLOGY | Facility: HOSPITAL | Age: 46
Discharge: HOME | End: 2021-09-23
Attending: INTERNAL MEDICINE
Payer: COMMERCIAL

## 2021-09-23 VITALS
DIASTOLIC BLOOD PRESSURE: 76 MMHG | TEMPERATURE: 98 F | HEIGHT: 65 IN | BODY MASS INDEX: 42.42 KG/M2 | HEART RATE: 72 BPM | WEIGHT: 254.63 LBS | SYSTOLIC BLOOD PRESSURE: 118 MMHG

## 2021-09-23 DIAGNOSIS — E66.01 CLASS 3 SEVERE OBESITY DUE TO EXCESS CALORIES WITHOUT SERIOUS COMORBIDITY WITH BODY MASS INDEX (BMI) OF 40.0 TO 44.9 IN ADULT: ICD-10-CM

## 2021-09-23 DIAGNOSIS — R00.2 PALPITATIONS: ICD-10-CM

## 2021-09-23 DIAGNOSIS — R06.83 SNORING: ICD-10-CM

## 2021-09-23 DIAGNOSIS — E78.2 MIXED HYPERLIPIDEMIA: ICD-10-CM

## 2021-09-23 DIAGNOSIS — E66.01 MORBID OBESITY DUE TO EXCESS CALORIES: ICD-10-CM

## 2021-09-23 DIAGNOSIS — D50.9 IRON DEFICIENCY ANEMIA, UNSPECIFIED IRON DEFICIENCY ANEMIA TYPE: ICD-10-CM

## 2021-09-23 DIAGNOSIS — I49.3 PVC'S (PREMATURE VENTRICULAR CONTRACTIONS): ICD-10-CM

## 2021-09-23 DIAGNOSIS — Z01.818 PREOP GENERAL PHYSICAL EXAM: ICD-10-CM

## 2021-09-23 DIAGNOSIS — Z00.00 ROUTINE GENERAL MEDICAL EXAMINATION AT A HEALTH CARE FACILITY: Primary | ICD-10-CM

## 2021-09-23 DIAGNOSIS — E11.9 TYPE 2 DIABETES MELLITUS WITHOUT COMPLICATION, WITHOUT LONG-TERM CURRENT USE OF INSULIN: ICD-10-CM

## 2021-09-23 DIAGNOSIS — Z12.31 SCREENING MAMMOGRAM, ENCOUNTER FOR: ICD-10-CM

## 2021-09-23 DIAGNOSIS — R00.2 PALPITATIONS: Primary | ICD-10-CM

## 2021-09-23 DIAGNOSIS — R53.83 FATIGUE, UNSPECIFIED TYPE: ICD-10-CM

## 2021-09-23 DIAGNOSIS — I10 ESSENTIAL HYPERTENSION: ICD-10-CM

## 2021-09-23 DIAGNOSIS — Z12.11 ENCOUNTER FOR SCREENING COLONOSCOPY: ICD-10-CM

## 2021-09-23 LAB
BACTERIA #/AREA URNS AUTO: ABNORMAL /HPF
BILIRUB UR QL STRIP: NEGATIVE
CLARITY UR REFRACT.AUTO: ABNORMAL
COLOR UR AUTO: YELLOW
GLUCOSE UR QL STRIP: NEGATIVE
HGB UR QL STRIP: NEGATIVE
KETONES UR QL STRIP: NEGATIVE
LEUKOCYTE ESTERASE UR QL STRIP: NEGATIVE
MICROSCOPIC COMMENT: ABNORMAL
NITRITE UR QL STRIP: NEGATIVE
PH UR STRIP: 6 [PH] (ref 5–8)
PROT UR QL STRIP: NEGATIVE
RBC #/AREA URNS AUTO: 2 /HPF (ref 0–4)
SP GR UR STRIP: 1.03 (ref 1–1.03)
SQUAMOUS #/AREA URNS AUTO: 2 /HPF
URN SPEC COLLECT METH UR: ABNORMAL
WBC #/AREA URNS AUTO: 0 /HPF (ref 0–5)

## 2021-09-23 PROCEDURE — 99999 PR PBB SHADOW E&M-EST. PATIENT-LVL V: CPT | Mod: PBBFAC,,, | Performed by: FAMILY MEDICINE

## 2021-09-23 PROCEDURE — 99396 PREV VISIT EST AGE 40-64: CPT | Mod: S$GLB,,, | Performed by: FAMILY MEDICINE

## 2021-09-23 PROCEDURE — 93010 ELECTROCARDIOGRAM REPORT: CPT | Mod: ,,, | Performed by: INTERNAL MEDICINE

## 2021-09-23 PROCEDURE — 99999 PR PBB SHADOW E&M-EST. PATIENT-LVL V: ICD-10-PCS | Mod: PBBFAC,,, | Performed by: FAMILY MEDICINE

## 2021-09-23 PROCEDURE — 99396 PR PREVENTIVE VISIT,EST,40-64: ICD-10-PCS | Mod: S$GLB,,, | Performed by: FAMILY MEDICINE

## 2021-09-23 PROCEDURE — 93010 EKG 12-LEAD: ICD-10-PCS | Mod: ,,, | Performed by: INTERNAL MEDICINE

## 2021-09-23 PROCEDURE — 93005 ELECTROCARDIOGRAM TRACING: CPT

## 2021-09-23 PROCEDURE — 81001 URINALYSIS AUTO W/SCOPE: CPT | Performed by: FAMILY MEDICINE

## 2021-09-23 PROCEDURE — 82570 ASSAY OF URINE CREATININE: CPT | Performed by: FAMILY MEDICINE

## 2021-09-23 RX ORDER — HALOPERIDOL 5 MG/1
TABLET ORAL
COMMUNITY
Start: 2021-09-15 | End: 2022-02-24 | Stop reason: SDUPTHER

## 2021-09-23 RX ORDER — PREGABALIN 100 MG/1
100 CAPSULE ORAL 2 TIMES DAILY
COMMUNITY
Start: 2021-09-22 | End: 2023-03-03

## 2021-09-24 ENCOUNTER — PATIENT MESSAGE (OUTPATIENT)
Dept: UROLOGY | Facility: CLINIC | Age: 46
End: 2021-09-24

## 2021-09-24 ENCOUNTER — LAB VISIT (OUTPATIENT)
Dept: LAB | Facility: HOSPITAL | Age: 46
End: 2021-09-24
Attending: INTERNAL MEDICINE
Payer: COMMERCIAL

## 2021-09-24 ENCOUNTER — OFFICE VISIT (OUTPATIENT)
Dept: CARDIOLOGY | Facility: CLINIC | Age: 46
End: 2021-09-24
Payer: COMMERCIAL

## 2021-09-24 ENCOUNTER — TELEPHONE (OUTPATIENT)
Dept: CARDIOLOGY | Facility: CLINIC | Age: 46
End: 2021-09-24

## 2021-09-24 VITALS
BODY MASS INDEX: 43.05 KG/M2 | HEART RATE: 74 BPM | SYSTOLIC BLOOD PRESSURE: 108 MMHG | DIASTOLIC BLOOD PRESSURE: 72 MMHG | RESPIRATION RATE: 16 BRPM | HEIGHT: 65 IN | WEIGHT: 258.38 LBS | OXYGEN SATURATION: 99 %

## 2021-09-24 DIAGNOSIS — E66.01 CLASS 3 SEVERE OBESITY DUE TO EXCESS CALORIES WITHOUT SERIOUS COMORBIDITY WITH BODY MASS INDEX (BMI) OF 40.0 TO 44.9 IN ADULT: ICD-10-CM

## 2021-09-24 DIAGNOSIS — E66.01 MORBID OBESITY DUE TO EXCESS CALORIES: ICD-10-CM

## 2021-09-24 DIAGNOSIS — I10 ESSENTIAL HYPERTENSION: ICD-10-CM

## 2021-09-24 DIAGNOSIS — E78.2 MIXED HYPERLIPIDEMIA: ICD-10-CM

## 2021-09-24 DIAGNOSIS — Z86.16 HISTORY OF COVID-19: ICD-10-CM

## 2021-09-24 DIAGNOSIS — Z01.810 PREOP CARDIOVASCULAR EXAM: ICD-10-CM

## 2021-09-24 DIAGNOSIS — N30.10 INTERSTITIAL CYSTITIS: Primary | ICD-10-CM

## 2021-09-24 DIAGNOSIS — R00.2 PALPITATIONS: ICD-10-CM

## 2021-09-24 DIAGNOSIS — R79.82 ELEVATED C-REACTIVE PROTEIN (CRP): ICD-10-CM

## 2021-09-24 DIAGNOSIS — R07.9 CHEST PAIN, UNSPECIFIED TYPE: ICD-10-CM

## 2021-09-24 DIAGNOSIS — R07.9 CHEST PAIN, UNSPECIFIED TYPE: Primary | ICD-10-CM

## 2021-09-24 DIAGNOSIS — I49.3 PVC'S (PREMATURE VENTRICULAR CONTRACTIONS): ICD-10-CM

## 2021-09-24 DIAGNOSIS — R70.0 ELEVATED SED RATE: ICD-10-CM

## 2021-09-24 DIAGNOSIS — J45.30 MILD PERSISTENT ASTHMA WITHOUT COMPLICATION: ICD-10-CM

## 2021-09-24 DIAGNOSIS — M19.90 INFLAMMATION OF JOINT: ICD-10-CM

## 2021-09-24 DIAGNOSIS — R39.15 URGENCY OF MICTURITION: ICD-10-CM

## 2021-09-24 LAB
ALBUMIN/CREAT UR: 1.8 UG/MG (ref 0–30)
CREAT UR-MCNC: 335 MG/DL (ref 15–325)
CRP SERPL-MCNC: 21.3 MG/L (ref 0–8.2)
MICROALBUMIN UR DL<=1MG/L-MCNC: 6 UG/ML

## 2021-09-24 PROCEDURE — 85652 RBC SED RATE AUTOMATED: CPT | Performed by: INTERNAL MEDICINE

## 2021-09-24 PROCEDURE — 99213 OFFICE O/P EST LOW 20 MIN: CPT | Mod: S$GLB,,, | Performed by: INTERNAL MEDICINE

## 2021-09-24 PROCEDURE — 99999 PR PBB SHADOW E&M-EST. PATIENT-LVL V: CPT | Mod: PBBFAC,,, | Performed by: INTERNAL MEDICINE

## 2021-09-24 PROCEDURE — 83880 ASSAY OF NATRIURETIC PEPTIDE: CPT | Performed by: INTERNAL MEDICINE

## 2021-09-24 PROCEDURE — 99213 PR OFFICE/OUTPT VISIT, EST, LEVL III, 20-29 MIN: ICD-10-PCS | Mod: S$GLB,,, | Performed by: INTERNAL MEDICINE

## 2021-09-24 PROCEDURE — 86140 C-REACTIVE PROTEIN: CPT | Performed by: INTERNAL MEDICINE

## 2021-09-24 PROCEDURE — 36415 COLL VENOUS BLD VENIPUNCTURE: CPT | Performed by: INTERNAL MEDICINE

## 2021-09-24 PROCEDURE — 99999 PR PBB SHADOW E&M-EST. PATIENT-LVL V: ICD-10-PCS | Mod: PBBFAC,,, | Performed by: INTERNAL MEDICINE

## 2021-09-24 RX ORDER — CEFDINIR 300 MG/1
300 CAPSULE ORAL 2 TIMES DAILY
Qty: 20 CAPSULE | Refills: 0 | Status: SHIPPED | OUTPATIENT
Start: 2021-09-24 | End: 2021-10-04

## 2021-09-24 RX ORDER — PHENAZOPYRIDINE HYDROCHLORIDE 200 MG/1
200 TABLET, FILM COATED ORAL 3 TIMES DAILY PRN
Qty: 15 TABLET | Refills: 0 | Status: SHIPPED | OUTPATIENT
Start: 2021-09-24 | End: 2021-10-04

## 2021-09-24 RX ORDER — IBUPROFEN 600 MG/1
600 TABLET ORAL 3 TIMES DAILY
Qty: 21 TABLET | Refills: 0 | Status: SHIPPED | OUTPATIENT
Start: 2021-09-24 | End: 2021-10-01

## 2021-09-25 LAB
BNP SERPL-MCNC: 28 PG/ML (ref 0–99)
ERYTHROCYTE [SEDIMENTATION RATE] IN BLOOD BY WESTERGREN METHOD: 28 MM/HR (ref 0–36)

## 2021-09-28 ENCOUNTER — PATIENT OUTREACH (OUTPATIENT)
Dept: ADMINISTRATIVE | Facility: OTHER | Age: 46
End: 2021-09-28

## 2021-09-29 ENCOUNTER — OFFICE VISIT (OUTPATIENT)
Dept: PODIATRY | Facility: CLINIC | Age: 46
End: 2021-09-29
Payer: COMMERCIAL

## 2021-09-29 VITALS — WEIGHT: 258.38 LBS | BODY MASS INDEX: 43.05 KG/M2 | HEIGHT: 65 IN

## 2021-09-29 DIAGNOSIS — E11.9 TYPE 2 DIABETES MELLITUS WITHOUT COMPLICATION, WITHOUT LONG-TERM CURRENT USE OF INSULIN: ICD-10-CM

## 2021-09-29 DIAGNOSIS — E11.9 COMPREHENSIVE DIABETIC FOOT EXAMINATION, TYPE 2 DM, ENCOUNTER FOR: Primary | ICD-10-CM

## 2021-09-29 PROCEDURE — 99999 PR PBB SHADOW E&M-EST. PATIENT-LVL IV: ICD-10-PCS | Mod: PBBFAC,,, | Performed by: PODIATRIST

## 2021-09-29 PROCEDURE — 99999 PR PBB SHADOW E&M-EST. PATIENT-LVL IV: CPT | Mod: PBBFAC,,, | Performed by: PODIATRIST

## 2021-09-29 PROCEDURE — 99213 PR OFFICE/OUTPT VISIT, EST, LEVL III, 20-29 MIN: ICD-10-PCS | Mod: S$GLB,,, | Performed by: PODIATRIST

## 2021-09-29 PROCEDURE — 99213 OFFICE O/P EST LOW 20 MIN: CPT | Mod: S$GLB,,, | Performed by: PODIATRIST

## 2021-10-04 ENCOUNTER — PROCEDURE VISIT (OUTPATIENT)
Dept: SLEEP MEDICINE | Facility: CLINIC | Age: 46
End: 2021-10-04
Payer: COMMERCIAL

## 2021-10-04 DIAGNOSIS — G47.30 SLEEP-DISORDERED BREATHING: ICD-10-CM

## 2021-10-04 DIAGNOSIS — Z86.69 HISTORY OF OBSTRUCTIVE SLEEP APNEA: ICD-10-CM

## 2021-10-04 DIAGNOSIS — G47.33 OSA (OBSTRUCTIVE SLEEP APNEA): Primary | ICD-10-CM

## 2021-10-04 DIAGNOSIS — R06.83 SNORING: ICD-10-CM

## 2021-10-04 DIAGNOSIS — E66.01 CLASS 3 SEVERE OBESITY DUE TO EXCESS CALORIES WITHOUT SERIOUS COMORBIDITY WITH BODY MASS INDEX (BMI) OF 40.0 TO 44.9 IN ADULT: ICD-10-CM

## 2021-10-04 PROCEDURE — 95806 PR SLEEP STUDY, UNATTENDED, SIMUL RECORD HR/O2 SAT/RESP FLOW/RESP EFFT: ICD-10-PCS | Mod: 26,S$GLB,, | Performed by: INTERNAL MEDICINE

## 2021-10-04 PROCEDURE — 95806 SLEEP STUDY UNATT&RESP EFFT: CPT | Mod: 26,S$GLB,, | Performed by: INTERNAL MEDICINE

## 2021-10-05 ENCOUNTER — PATIENT MESSAGE (OUTPATIENT)
Dept: PULMONOLOGY | Facility: CLINIC | Age: 46
End: 2021-10-05

## 2021-10-05 DIAGNOSIS — G47.33 OBSTRUCTIVE SLEEP APNEA: Primary | ICD-10-CM

## 2021-10-15 ENCOUNTER — TELEPHONE (OUTPATIENT)
Dept: CARDIOLOGY | Facility: HOSPITAL | Age: 46
End: 2021-10-15

## 2021-10-18 ENCOUNTER — TELEPHONE (OUTPATIENT)
Dept: CARDIOLOGY | Facility: CLINIC | Age: 46
End: 2021-10-18

## 2021-10-19 ENCOUNTER — OFFICE VISIT (OUTPATIENT)
Dept: CARDIOLOGY | Facility: CLINIC | Age: 46
End: 2021-10-19
Payer: COMMERCIAL

## 2021-10-19 DIAGNOSIS — M19.90 INFLAMMATION OF JOINT: ICD-10-CM

## 2021-10-19 DIAGNOSIS — R00.2 PALPITATIONS: ICD-10-CM

## 2021-10-19 DIAGNOSIS — I49.3 PVC'S (PREMATURE VENTRICULAR CONTRACTIONS): ICD-10-CM

## 2021-10-19 DIAGNOSIS — E78.2 MIXED HYPERLIPIDEMIA: ICD-10-CM

## 2021-10-19 DIAGNOSIS — J45.30 MILD PERSISTENT ASTHMA WITHOUT COMPLICATION: ICD-10-CM

## 2021-10-19 DIAGNOSIS — I10 ESSENTIAL HYPERTENSION: Primary | ICD-10-CM

## 2021-10-19 DIAGNOSIS — E66.01 CLASS 3 SEVERE OBESITY DUE TO EXCESS CALORIES WITHOUT SERIOUS COMORBIDITY WITH BODY MASS INDEX (BMI) OF 40.0 TO 44.9 IN ADULT: ICD-10-CM

## 2021-10-19 DIAGNOSIS — Z86.16 HISTORY OF COVID-19: ICD-10-CM

## 2021-10-19 DIAGNOSIS — G47.33 OSA (OBSTRUCTIVE SLEEP APNEA): ICD-10-CM

## 2021-10-19 PROCEDURE — 99214 OFFICE O/P EST MOD 30 MIN: CPT | Mod: 95,,, | Performed by: INTERNAL MEDICINE

## 2021-10-19 PROCEDURE — 99214 PR OFFICE/OUTPT VISIT, EST, LEVL IV, 30-39 MIN: ICD-10-PCS | Mod: 95,,, | Performed by: INTERNAL MEDICINE

## 2021-10-20 ENCOUNTER — TELEPHONE (OUTPATIENT)
Dept: CARDIOLOGY | Facility: CLINIC | Age: 46
End: 2021-10-20

## 2021-10-29 ENCOUNTER — PATIENT MESSAGE (OUTPATIENT)
Dept: UROLOGY | Facility: CLINIC | Age: 46
End: 2021-10-29
Payer: COMMERCIAL

## 2021-10-29 DIAGNOSIS — R82.90 ABNORMAL URINE: Primary | ICD-10-CM

## 2021-10-29 DIAGNOSIS — N30.10 INTERSTITIAL CYSTITIS: ICD-10-CM

## 2021-11-02 ENCOUNTER — TELEPHONE (OUTPATIENT)
Dept: INTERNAL MEDICINE | Facility: CLINIC | Age: 46
End: 2021-11-02
Payer: COMMERCIAL

## 2021-11-02 ENCOUNTER — OFFICE VISIT (OUTPATIENT)
Dept: URGENT CARE | Facility: CLINIC | Age: 46
End: 2021-11-02
Payer: COMMERCIAL

## 2021-11-02 VITALS
DIASTOLIC BLOOD PRESSURE: 66 MMHG | OXYGEN SATURATION: 100 % | RESPIRATION RATE: 16 BRPM | HEIGHT: 65 IN | BODY MASS INDEX: 42.99 KG/M2 | SYSTOLIC BLOOD PRESSURE: 113 MMHG | HEART RATE: 65 BPM | TEMPERATURE: 98 F | WEIGHT: 258 LBS

## 2021-11-02 DIAGNOSIS — R31.9 HEMATURIA, UNSPECIFIED TYPE: ICD-10-CM

## 2021-11-02 DIAGNOSIS — R30.0 DYSURIA: Primary | ICD-10-CM

## 2021-11-02 LAB
BILIRUB UR QL STRIP: NEGATIVE
GLUCOSE UR QL STRIP: NEGATIVE
KETONES UR QL STRIP: NEGATIVE
LEUKOCYTE ESTERASE UR QL STRIP: NEGATIVE
PH, POC UA: 5
POC BLOOD, URINE: POSITIVE
POC NITRATES, URINE: NEGATIVE
PROT UR QL STRIP: NEGATIVE
SP GR UR STRIP: 1.02 (ref 1–1.03)
UROBILINOGEN UR STRIP-ACNC: ABNORMAL (ref 0.1–1.1)

## 2021-11-02 PROCEDURE — 81003 URINALYSIS AUTO W/O SCOPE: CPT | Mod: QW,S$GLB,, | Performed by: NURSE PRACTITIONER

## 2021-11-02 PROCEDURE — 99213 PR OFFICE/OUTPT VISIT, EST, LEVL III, 20-29 MIN: ICD-10-PCS | Mod: S$GLB,,, | Performed by: NURSE PRACTITIONER

## 2021-11-02 PROCEDURE — 99213 OFFICE O/P EST LOW 20 MIN: CPT | Mod: S$GLB,,, | Performed by: NURSE PRACTITIONER

## 2021-11-02 PROCEDURE — 81003 POCT URINALYSIS, DIPSTICK, AUTOMATED, W/O SCOPE: ICD-10-PCS | Mod: QW,S$GLB,, | Performed by: NURSE PRACTITIONER

## 2021-11-02 PROCEDURE — 87086 URINE CULTURE/COLONY COUNT: CPT | Performed by: NURSE PRACTITIONER

## 2021-11-02 RX ORDER — NITROFURANTOIN 25; 75 MG/1; MG/1
100 CAPSULE ORAL 2 TIMES DAILY
Qty: 10 CAPSULE | Refills: 0 | Status: SHIPPED | OUTPATIENT
Start: 2021-11-02 | End: 2021-11-07

## 2021-11-03 ENCOUNTER — PATIENT MESSAGE (OUTPATIENT)
Dept: UROLOGY | Facility: CLINIC | Age: 46
End: 2021-11-03
Payer: COMMERCIAL

## 2021-11-04 LAB — BACTERIA UR CULT: NORMAL

## 2021-11-05 ENCOUNTER — HOSPITAL ENCOUNTER (OUTPATIENT)
Dept: RADIOLOGY | Facility: HOSPITAL | Age: 46
Discharge: HOME | End: 2021-11-05
Attending: FAMILY MEDICINE
Payer: COMMERCIAL

## 2021-11-05 ENCOUNTER — TELEPHONE (OUTPATIENT)
Dept: URGENT CARE | Facility: CLINIC | Age: 46
End: 2021-11-05
Payer: COMMERCIAL

## 2021-11-05 DIAGNOSIS — E11.9 TYPE 2 DIABETES MELLITUS WITHOUT COMPLICATION, WITHOUT LONG-TERM CURRENT USE OF INSULIN: ICD-10-CM

## 2021-11-05 PROCEDURE — 77067 MAMMO DIGITAL SCREENING BILAT WITH TOMO: ICD-10-PCS | Mod: 26,,, | Performed by: RADIOLOGY

## 2021-11-05 PROCEDURE — 77063 BREAST TOMOSYNTHESIS BI: CPT | Mod: 26,,, | Performed by: RADIOLOGY

## 2021-11-05 PROCEDURE — 77067 SCR MAMMO BI INCL CAD: CPT | Mod: 26,,, | Performed by: RADIOLOGY

## 2021-11-05 PROCEDURE — 77067 SCR MAMMO BI INCL CAD: CPT | Mod: TC

## 2021-11-05 PROCEDURE — 77063 MAMMO DIGITAL SCREENING BILAT WITH TOMO: ICD-10-PCS | Mod: 26,,, | Performed by: RADIOLOGY

## 2021-11-09 ENCOUNTER — TELEPHONE (OUTPATIENT)
Dept: ADMINISTRATIVE | Facility: HOSPITAL | Age: 46
End: 2021-11-09
Payer: COMMERCIAL

## 2021-11-09 ENCOUNTER — OFFICE VISIT (OUTPATIENT)
Dept: INTERNAL MEDICINE | Facility: CLINIC | Age: 46
End: 2021-11-09
Payer: COMMERCIAL

## 2021-11-09 VITALS
HEIGHT: 65 IN | BODY MASS INDEX: 43.02 KG/M2 | OXYGEN SATURATION: 97 % | WEIGHT: 258.19 LBS | TEMPERATURE: 98 F | HEART RATE: 84 BPM | SYSTOLIC BLOOD PRESSURE: 118 MMHG | DIASTOLIC BLOOD PRESSURE: 72 MMHG

## 2021-11-09 DIAGNOSIS — R10.2 PELVIC PAIN: Primary | ICD-10-CM

## 2021-11-09 DIAGNOSIS — E78.2 MIXED HYPERLIPIDEMIA: ICD-10-CM

## 2021-11-09 DIAGNOSIS — E11.65 TYPE 2 DIABETES MELLITUS WITH HYPERGLYCEMIA, WITHOUT LONG-TERM CURRENT USE OF INSULIN: ICD-10-CM

## 2021-11-09 DIAGNOSIS — K59.09 CHRONIC CONSTIPATION: ICD-10-CM

## 2021-11-09 DIAGNOSIS — I10 ESSENTIAL HYPERTENSION: ICD-10-CM

## 2021-11-09 LAB
BILIRUB UR QL STRIP: NEGATIVE
CLARITY UR REFRACT.AUTO: CLEAR
COLOR UR AUTO: YELLOW
GLUCOSE UR QL STRIP: NEGATIVE
HGB UR QL STRIP: ABNORMAL
KETONES UR QL STRIP: NEGATIVE
LEUKOCYTE ESTERASE UR QL STRIP: NEGATIVE
MICROSCOPIC COMMENT: NORMAL
NITRITE UR QL STRIP: NEGATIVE
PH UR STRIP: 5 [PH] (ref 5–8)
PROT UR QL STRIP: NEGATIVE
RBC #/AREA URNS AUTO: 3 /HPF (ref 0–4)
SP GR UR STRIP: 1.02 (ref 1–1.03)
SQUAMOUS #/AREA URNS AUTO: 1 /HPF
URN SPEC COLLECT METH UR: ABNORMAL
WBC #/AREA URNS AUTO: 1 /HPF (ref 0–5)

## 2021-11-09 PROCEDURE — 99999 PR PBB SHADOW E&M-EST. PATIENT-LVL V: ICD-10-PCS | Mod: PBBFAC,,, | Performed by: NURSE PRACTITIONER

## 2021-11-09 PROCEDURE — 87086 URINE CULTURE/COLONY COUNT: CPT | Performed by: NURSE PRACTITIONER

## 2021-11-09 PROCEDURE — 81001 URINALYSIS AUTO W/SCOPE: CPT | Performed by: NURSE PRACTITIONER

## 2021-11-09 PROCEDURE — 99214 OFFICE O/P EST MOD 30 MIN: CPT | Mod: S$GLB,,, | Performed by: NURSE PRACTITIONER

## 2021-11-09 PROCEDURE — 99999 PR PBB SHADOW E&M-EST. PATIENT-LVL V: CPT | Mod: PBBFAC,,, | Performed by: NURSE PRACTITIONER

## 2021-11-09 PROCEDURE — 99214 PR OFFICE/OUTPT VISIT, EST, LEVL IV, 30-39 MIN: ICD-10-PCS | Mod: S$GLB,,, | Performed by: NURSE PRACTITIONER

## 2021-11-09 RX ORDER — OXYBUTYNIN CHLORIDE 5 MG/1
5 TABLET ORAL 3 TIMES DAILY
COMMUNITY
Start: 2021-11-06 | End: 2022-09-02

## 2021-11-10 ENCOUNTER — PATIENT MESSAGE (OUTPATIENT)
Dept: INTERNAL MEDICINE | Facility: CLINIC | Age: 46
End: 2021-11-10
Payer: COMMERCIAL

## 2021-11-10 ENCOUNTER — PATIENT MESSAGE (OUTPATIENT)
Dept: UROLOGY | Facility: CLINIC | Age: 46
End: 2021-11-10
Payer: COMMERCIAL

## 2021-11-11 ENCOUNTER — OFFICE VISIT (OUTPATIENT)
Dept: UROLOGY | Facility: CLINIC | Age: 46
End: 2021-11-11
Payer: COMMERCIAL

## 2021-11-11 VITALS
SYSTOLIC BLOOD PRESSURE: 113 MMHG | DIASTOLIC BLOOD PRESSURE: 74 MMHG | BODY MASS INDEX: 43.02 KG/M2 | HEIGHT: 65 IN | WEIGHT: 258.19 LBS | HEART RATE: 70 BPM

## 2021-11-11 DIAGNOSIS — R39.15 URGENCY OF MICTURITION: ICD-10-CM

## 2021-11-11 DIAGNOSIS — R39.82 CHRONIC BLADDER PAIN: Primary | ICD-10-CM

## 2021-11-11 DIAGNOSIS — M62.89 PELVIC FLOOR DYSFUNCTION: ICD-10-CM

## 2021-11-11 LAB — BACTERIA UR CULT: NO GROWTH

## 2021-11-11 PROCEDURE — 99214 PR OFFICE/OUTPT VISIT, EST, LEVL IV, 30-39 MIN: ICD-10-PCS | Mod: S$GLB,,, | Performed by: NURSE PRACTITIONER

## 2021-11-11 PROCEDURE — 99999 PR PBB SHADOW E&M-EST. PATIENT-LVL V: CPT | Mod: PBBFAC,,, | Performed by: NURSE PRACTITIONER

## 2021-11-11 PROCEDURE — 99214 OFFICE O/P EST MOD 30 MIN: CPT | Mod: S$GLB,,, | Performed by: NURSE PRACTITIONER

## 2021-11-11 PROCEDURE — 99999 PR PBB SHADOW E&M-EST. PATIENT-LVL V: ICD-10-PCS | Mod: PBBFAC,,, | Performed by: NURSE PRACTITIONER

## 2021-11-15 ENCOUNTER — HOSPITAL ENCOUNTER (OUTPATIENT)
Dept: RADIOLOGY | Facility: HOSPITAL | Age: 46
Discharge: HOME | End: 2021-11-15
Attending: NURSE PRACTITIONER
Payer: COMMERCIAL

## 2021-11-15 DIAGNOSIS — R39.82 CHRONIC BLADDER PAIN: ICD-10-CM

## 2021-11-15 PROCEDURE — 76770 US EXAM ABDO BACK WALL COMP: CPT | Mod: TC

## 2021-11-15 PROCEDURE — 76770 US EXAM ABDO BACK WALL COMP: CPT | Mod: 26,,, | Performed by: RADIOLOGY

## 2021-11-15 PROCEDURE — 76770 US RETROPERITONEAL COMPLETE: ICD-10-PCS | Mod: 26,,, | Performed by: RADIOLOGY

## 2021-11-24 ENCOUNTER — PATIENT OUTREACH (OUTPATIENT)
Dept: ADMINISTRATIVE | Facility: OTHER | Age: 46
End: 2021-11-24
Payer: COMMERCIAL

## 2021-11-24 LAB — NONINV COLON CA DNA+OCC BLD SCRN STL QL: NEGATIVE

## 2021-11-26 ENCOUNTER — OFFICE VISIT (OUTPATIENT)
Dept: DERMATOLOGY | Facility: CLINIC | Age: 46
End: 2021-11-26
Payer: COMMERCIAL

## 2021-11-26 DIAGNOSIS — L81.9 DYSCHROMIA: ICD-10-CM

## 2021-11-26 DIAGNOSIS — L70.0 ACNE VULGARIS: Primary | ICD-10-CM

## 2021-11-26 DIAGNOSIS — L71.9 ROSACEA: ICD-10-CM

## 2021-11-26 PROCEDURE — 99202 OFFICE O/P NEW SF 15 MIN: CPT | Mod: 95,,, | Performed by: DERMATOLOGY

## 2021-11-26 PROCEDURE — 99202 PR OFFICE/OUTPT VISIT, NEW, LEVL II, 15-29 MIN: ICD-10-PCS | Mod: 95,,, | Performed by: DERMATOLOGY

## 2021-11-26 RX ORDER — AZELAIC ACID 0.15 G/G
GEL TOPICAL
Qty: 50 G | Refills: 3 | Status: SHIPPED | OUTPATIENT
Start: 2021-11-26 | End: 2021-12-16 | Stop reason: SDUPTHER

## 2021-11-26 RX ORDER — CLINDAMYCIN PHOSPHATE 10 MG/ML
SOLUTION TOPICAL 2 TIMES DAILY
Qty: 60 EACH | Refills: 5 | Status: SHIPPED | OUTPATIENT
Start: 2021-11-26 | End: 2021-11-30

## 2021-11-30 ENCOUNTER — TELEPHONE (OUTPATIENT)
Dept: DERMATOLOGY | Facility: CLINIC | Age: 46
End: 2021-11-30
Payer: COMMERCIAL

## 2021-11-30 DIAGNOSIS — L70.0 ACNE VULGARIS: Primary | ICD-10-CM

## 2021-11-30 RX ORDER — CLINDAMYCIN PHOSPHATE 10 UG/ML
LOTION TOPICAL
Qty: 60 ML | Refills: 3 | Status: SHIPPED | OUTPATIENT
Start: 2021-11-30 | End: 2023-03-03

## 2021-12-02 ENCOUNTER — PATIENT MESSAGE (OUTPATIENT)
Dept: ENDOSCOPY | Facility: HOSPITAL | Age: 46
End: 2021-12-02
Payer: COMMERCIAL

## 2021-12-13 ENCOUNTER — PATIENT MESSAGE (OUTPATIENT)
Dept: INTERNAL MEDICINE | Facility: CLINIC | Age: 46
End: 2021-12-13
Payer: COMMERCIAL

## 2021-12-16 ENCOUNTER — OFFICE VISIT (OUTPATIENT)
Dept: INTERNAL MEDICINE | Facility: CLINIC | Age: 46
End: 2021-12-16
Payer: COMMERCIAL

## 2021-12-16 ENCOUNTER — LAB VISIT (OUTPATIENT)
Dept: LAB | Facility: HOSPITAL | Age: 46
End: 2021-12-16
Attending: FAMILY MEDICINE
Payer: COMMERCIAL

## 2021-12-16 ENCOUNTER — TELEPHONE (OUTPATIENT)
Dept: INTERNAL MEDICINE | Facility: CLINIC | Age: 46
End: 2021-12-16

## 2021-12-16 VITALS
SYSTOLIC BLOOD PRESSURE: 118 MMHG | BODY MASS INDEX: 42.96 KG/M2 | HEART RATE: 72 BPM | TEMPERATURE: 98 F | WEIGHT: 258.19 LBS | DIASTOLIC BLOOD PRESSURE: 80 MMHG

## 2021-12-16 DIAGNOSIS — I10 ESSENTIAL HYPERTENSION: Primary | ICD-10-CM

## 2021-12-16 DIAGNOSIS — D50.9 IRON DEFICIENCY ANEMIA, UNSPECIFIED IRON DEFICIENCY ANEMIA TYPE: ICD-10-CM

## 2021-12-16 DIAGNOSIS — F33.1 MODERATE EPISODE OF RECURRENT MAJOR DEPRESSIVE DISORDER: ICD-10-CM

## 2021-12-16 DIAGNOSIS — R53.83 FATIGUE, UNSPECIFIED TYPE: ICD-10-CM

## 2021-12-16 DIAGNOSIS — L81.9 DYSCHROMIA: ICD-10-CM

## 2021-12-16 DIAGNOSIS — E55.9 VITAMIN D DEFICIENCY: ICD-10-CM

## 2021-12-16 DIAGNOSIS — E78.2 MIXED HYPERLIPIDEMIA: ICD-10-CM

## 2021-12-16 DIAGNOSIS — L71.9 ROSACEA: ICD-10-CM

## 2021-12-16 DIAGNOSIS — E11.65 TYPE 2 DIABETES MELLITUS WITH HYPERGLYCEMIA, WITHOUT LONG-TERM CURRENT USE OF INSULIN: ICD-10-CM

## 2021-12-16 DIAGNOSIS — L70.0 ACNE VULGARIS: ICD-10-CM

## 2021-12-16 PROBLEM — M79.671 RIGHT FOOT PAIN: Status: RESOLVED | Noted: 2019-11-18 | Resolved: 2021-12-16

## 2021-12-16 PROBLEM — Z00.00 ROUTINE GENERAL MEDICAL EXAMINATION AT A HEALTH CARE FACILITY: Status: RESOLVED | Noted: 2021-05-21 | Resolved: 2021-12-16

## 2021-12-16 PROBLEM — R39.15 URGENCY OF MICTURITION: Status: RESOLVED | Noted: 2020-11-20 | Resolved: 2021-12-16

## 2021-12-16 LAB
ALBUMIN SERPL BCP-MCNC: 3.3 G/DL (ref 3.5–5.2)
ALP SERPL-CCNC: 53 U/L (ref 55–135)
ALT SERPL W/O P-5'-P-CCNC: 10 U/L (ref 10–44)
ANION GAP SERPL CALC-SCNC: 12 MMOL/L (ref 8–16)
AST SERPL-CCNC: 12 U/L (ref 10–40)
BACTERIA #/AREA URNS AUTO: ABNORMAL /HPF
BASOPHILS # BLD AUTO: 0.04 K/UL (ref 0–0.2)
BASOPHILS NFR BLD: 0.6 % (ref 0–1.9)
BILIRUB SERPL-MCNC: 0.3 MG/DL (ref 0.1–1)
BILIRUB UR QL STRIP: NEGATIVE
BUN SERPL-MCNC: 8 MG/DL (ref 6–20)
CALCIUM SERPL-MCNC: 9.2 MG/DL (ref 8.7–10.5)
CHLORIDE SERPL-SCNC: 106 MMOL/L (ref 95–110)
CLARITY UR REFRACT.AUTO: CLEAR
CO2 SERPL-SCNC: 23 MMOL/L (ref 23–29)
COLOR UR AUTO: YELLOW
CREAT SERPL-MCNC: 0.8 MG/DL (ref 0.5–1.4)
DIFFERENTIAL METHOD: ABNORMAL
EOSINOPHIL # BLD AUTO: 0.1 K/UL (ref 0–0.5)
EOSINOPHIL NFR BLD: 1.3 % (ref 0–8)
ERYTHROCYTE [DISTWIDTH] IN BLOOD BY AUTOMATED COUNT: 13.6 % (ref 11.5–14.5)
ERYTHROCYTE [SEDIMENTATION RATE] IN BLOOD BY WESTERGREN METHOD: 68 MM/HR (ref 0–20)
EST. GFR  (AFRICAN AMERICAN): >60 ML/MIN/1.73 M^2
EST. GFR  (NON AFRICAN AMERICAN): >60 ML/MIN/1.73 M^2
ESTIMATED AVG GLUCOSE: 123 MG/DL (ref 68–131)
FERRITIN SERPL-MCNC: 65 NG/ML (ref 20–300)
GLUCOSE SERPL-MCNC: 87 MG/DL (ref 70–110)
GLUCOSE UR QL STRIP: NEGATIVE
HBA1C MFR BLD: 5.9 % (ref 4–5.6)
HCT VFR BLD AUTO: 36.8 % (ref 37–48.5)
HGB BLD-MCNC: 11.5 G/DL (ref 12–16)
HGB UR QL STRIP: NEGATIVE
IMM GRANULOCYTES # BLD AUTO: 0.02 K/UL (ref 0–0.04)
IMM GRANULOCYTES NFR BLD AUTO: 0.3 % (ref 0–0.5)
IRON SERPL-MCNC: 49 UG/DL (ref 30–160)
KETONES UR QL STRIP: NEGATIVE
LEUKOCYTE ESTERASE UR QL STRIP: NEGATIVE
LYMPHOCYTES # BLD AUTO: 2.7 K/UL (ref 1–4.8)
LYMPHOCYTES NFR BLD: 39.9 % (ref 18–48)
MCH RBC QN AUTO: 28.9 PG (ref 27–31)
MCHC RBC AUTO-ENTMCNC: 31.3 G/DL (ref 32–36)
MCV RBC AUTO: 93 FL (ref 82–98)
MICROSCOPIC COMMENT: ABNORMAL
MONOCYTES # BLD AUTO: 0.5 K/UL (ref 0.3–1)
MONOCYTES NFR BLD: 7.6 % (ref 4–15)
NEUTROPHILS # BLD AUTO: 3.4 K/UL (ref 1.8–7.7)
NEUTROPHILS NFR BLD: 50.3 % (ref 38–73)
NITRITE UR QL STRIP: NEGATIVE
NRBC BLD-RTO: 0 /100 WBC
PH UR STRIP: 6 [PH] (ref 5–8)
PLATELET # BLD AUTO: 376 K/UL (ref 150–450)
PMV BLD AUTO: 10.4 FL (ref 9.2–12.9)
POTASSIUM SERPL-SCNC: 3.8 MMOL/L (ref 3.5–5.1)
PROT SERPL-MCNC: 7.3 G/DL (ref 6–8.4)
PROT UR QL STRIP: NEGATIVE
RBC # BLD AUTO: 3.98 M/UL (ref 4–5.4)
RBC #/AREA URNS AUTO: 7 /HPF (ref 0–4)
SATURATED IRON: 14 % (ref 20–50)
SODIUM SERPL-SCNC: 141 MMOL/L (ref 136–145)
SP GR UR STRIP: 1.03 (ref 1–1.03)
SQUAMOUS #/AREA URNS AUTO: 1 /HPF
TOTAL IRON BINDING CAPACITY: 357 UG/DL (ref 250–450)
TRANSFERRIN SERPL-MCNC: 241 MG/DL (ref 200–375)
TSH SERPL DL<=0.005 MIU/L-ACNC: 0.57 UIU/ML (ref 0.4–4)
URN SPEC COLLECT METH UR: NORMAL
WBC # BLD AUTO: 6.84 K/UL (ref 3.9–12.7)
WBC #/AREA URNS AUTO: 0 /HPF (ref 0–5)

## 2021-12-16 PROCEDURE — 82728 ASSAY OF FERRITIN: CPT | Performed by: FAMILY MEDICINE

## 2021-12-16 PROCEDURE — 99999 PR PBB SHADOW E&M-EST. PATIENT-LVL III: CPT | Mod: PBBFAC,,, | Performed by: FAMILY MEDICINE

## 2021-12-16 PROCEDURE — 99214 PR OFFICE/OUTPT VISIT, EST, LEVL IV, 30-39 MIN: ICD-10-PCS | Mod: S$GLB,,, | Performed by: FAMILY MEDICINE

## 2021-12-16 PROCEDURE — 84443 ASSAY THYROID STIM HORMONE: CPT | Performed by: FAMILY MEDICINE

## 2021-12-16 PROCEDURE — 3061F PR NEG MICROALBUMINURIA RESULT DOCUMENTED/REVIEW: ICD-10-PCS | Mod: CPTII,S$GLB,, | Performed by: FAMILY MEDICINE

## 2021-12-16 PROCEDURE — 99214 OFFICE O/P EST MOD 30 MIN: CPT | Mod: S$GLB,,, | Performed by: FAMILY MEDICINE

## 2021-12-16 PROCEDURE — 3066F NEPHROPATHY DOC TX: CPT | Mod: CPTII,S$GLB,, | Performed by: FAMILY MEDICINE

## 2021-12-16 PROCEDURE — 83036 HEMOGLOBIN GLYCOSYLATED A1C: CPT | Performed by: FAMILY MEDICINE

## 2021-12-16 PROCEDURE — 85025 COMPLETE CBC W/AUTO DIFF WBC: CPT | Performed by: FAMILY MEDICINE

## 2021-12-16 PROCEDURE — 36415 COLL VENOUS BLD VENIPUNCTURE: CPT | Mod: PO | Performed by: FAMILY MEDICINE

## 2021-12-16 PROCEDURE — 99999 PR PBB SHADOW E&M-EST. PATIENT-LVL III: ICD-10-PCS | Mod: PBBFAC,,, | Performed by: FAMILY MEDICINE

## 2021-12-16 PROCEDURE — 84466 ASSAY OF TRANSFERRIN: CPT | Performed by: FAMILY MEDICINE

## 2021-12-16 PROCEDURE — 85651 RBC SED RATE NONAUTOMATED: CPT | Performed by: FAMILY MEDICINE

## 2021-12-16 PROCEDURE — 81001 URINALYSIS AUTO W/SCOPE: CPT | Performed by: FAMILY MEDICINE

## 2021-12-16 PROCEDURE — 83615 LACTATE (LD) (LDH) ENZYME: CPT | Performed by: FAMILY MEDICINE

## 2021-12-16 PROCEDURE — 3061F NEG MICROALBUMINURIA REV: CPT | Mod: CPTII,S$GLB,, | Performed by: FAMILY MEDICINE

## 2021-12-16 PROCEDURE — 80053 COMPREHEN METABOLIC PANEL: CPT | Performed by: FAMILY MEDICINE

## 2021-12-16 PROCEDURE — 3066F PR DOCUMENTATION OF TREATMENT FOR NEPHROPATHY: ICD-10-PCS | Mod: CPTII,S$GLB,, | Performed by: FAMILY MEDICINE

## 2021-12-16 RX ORDER — GABAPENTIN 300 MG/1
CAPSULE ORAL
Status: CANCELLED | OUTPATIENT
Start: 2021-12-16

## 2021-12-16 RX ORDER — INSULIN GLARGINE 100 [IU]/ML
20 INJECTION, SOLUTION SUBCUTANEOUS NIGHTLY
Qty: 9 ML | Refills: 1 | Status: SHIPPED | OUTPATIENT
Start: 2021-12-16 | End: 2022-01-10

## 2021-12-16 RX ORDER — AZELAIC ACID 0.15 G/G
GEL TOPICAL
Qty: 50 G | Refills: 3 | Status: SHIPPED | OUTPATIENT
Start: 2021-12-16

## 2021-12-16 RX ORDER — PREGABALIN 100 MG/1
100 CAPSULE ORAL 2 TIMES DAILY
Status: CANCELLED | OUTPATIENT
Start: 2021-12-16

## 2021-12-16 RX ORDER — ALPRAZOLAM 1 MG/1
2 TABLET ORAL NIGHTLY
Qty: 60 TABLET | Refills: 0 | Status: CANCELLED | OUTPATIENT
Start: 2021-12-16

## 2021-12-16 RX ORDER — METFORMIN HYDROCHLORIDE 1000 MG/1
1000 TABLET ORAL 2 TIMES DAILY WITH MEALS
Qty: 180 TABLET | Refills: 1 | Status: SHIPPED | OUTPATIENT
Start: 2021-12-16 | End: 2023-06-12 | Stop reason: SINTOL

## 2021-12-16 RX ORDER — OLANZAPINE 15 MG/1
15 TABLET ORAL DAILY
Status: CANCELLED | OUTPATIENT
Start: 2021-12-16

## 2021-12-16 RX ORDER — HYDROCHLOROTHIAZIDE 12.5 MG/1
12.5 TABLET ORAL DAILY PRN
Qty: 90 TABLET | Refills: 1 | Status: SHIPPED | OUTPATIENT
Start: 2021-12-16 | End: 2023-10-24

## 2021-12-16 RX ORDER — FLUOXETINE HYDROCHLORIDE 40 MG/1
40 CAPSULE ORAL NIGHTLY
Qty: 90 CAPSULE | Refills: 0 | Status: CANCELLED | OUTPATIENT
Start: 2021-12-16

## 2021-12-17 LAB — LDH SERPL L TO P-CCNC: 192 U/L (ref 110–260)

## 2021-12-27 ENCOUNTER — PATIENT MESSAGE (OUTPATIENT)
Dept: RHEUMATOLOGY | Facility: CLINIC | Age: 46
End: 2021-12-27
Payer: COMMERCIAL

## 2021-12-28 ENCOUNTER — TELEPHONE (OUTPATIENT)
Dept: INTERNAL MEDICINE | Facility: CLINIC | Age: 46
End: 2021-12-28
Payer: COMMERCIAL

## 2021-12-28 ENCOUNTER — TELEPHONE (OUTPATIENT)
Dept: RHEUMATOLOGY | Facility: CLINIC | Age: 46
End: 2021-12-28
Payer: COMMERCIAL

## 2021-12-31 ENCOUNTER — PATIENT MESSAGE (OUTPATIENT)
Dept: INTERNAL MEDICINE | Facility: CLINIC | Age: 46
End: 2021-12-31
Payer: COMMERCIAL

## 2022-01-07 ENCOUNTER — TELEPHONE (OUTPATIENT)
Dept: INTERNAL MEDICINE | Facility: CLINIC | Age: 47
End: 2022-01-07
Payer: COMMERCIAL

## 2022-01-07 NOTE — TELEPHONE ENCOUNTER
----- Message from Kati Hawkins sent at 1/7/2022 11:33 AM CST -----  Contact: BUSTER POPE [[id number]]  .Type:  Same Day Appointment Request    Caller is requesting a same day appointment.  Caller declined first available appointment listed below.    Name of Caller:  BUSTER POPE [[id number]]  When is the first available appointment? 01/13/2022  Symptoms:  coughing  and problems with  pt breathing  from  covid and stomach  probelms  Best Call Back Number:  [phone] (home)   Additional Information:

## 2022-01-10 ENCOUNTER — OFFICE VISIT (OUTPATIENT)
Dept: INTERNAL MEDICINE | Facility: CLINIC | Age: 47
End: 2022-01-10
Payer: COMMERCIAL

## 2022-01-10 ENCOUNTER — OFFICE VISIT (OUTPATIENT)
Dept: DIABETES | Facility: CLINIC | Age: 47
End: 2022-01-10
Payer: COMMERCIAL

## 2022-01-10 DIAGNOSIS — I10 ESSENTIAL HYPERTENSION: ICD-10-CM

## 2022-01-10 DIAGNOSIS — J06.9 UPPER RESPIRATORY TRACT INFECTION, UNSPECIFIED TYPE: Primary | ICD-10-CM

## 2022-01-10 DIAGNOSIS — F33.1 MODERATE EPISODE OF RECURRENT MAJOR DEPRESSIVE DISORDER: ICD-10-CM

## 2022-01-10 DIAGNOSIS — E78.2 MIXED HYPERLIPIDEMIA: ICD-10-CM

## 2022-01-10 DIAGNOSIS — E11.65 TYPE 2 DIABETES MELLITUS WITH HYPERGLYCEMIA, WITHOUT LONG-TERM CURRENT USE OF INSULIN: ICD-10-CM

## 2022-01-10 DIAGNOSIS — E11.9 TYPE 2 DIABETES MELLITUS WITHOUT COMPLICATION, WITHOUT LONG-TERM CURRENT USE OF INSULIN: Primary | ICD-10-CM

## 2022-01-10 DIAGNOSIS — E27.8 ADRENAL MASS: ICD-10-CM

## 2022-01-10 DIAGNOSIS — E66.01 MORBID OBESITY DUE TO EXCESS CALORIES: ICD-10-CM

## 2022-01-10 DIAGNOSIS — E55.9 VITAMIN D DEFICIENCY: ICD-10-CM

## 2022-01-10 PROCEDURE — 3072F PR LOW RISK FOR RETINOPATHY: ICD-10-PCS | Mod: CPTII,95,, | Performed by: PHYSICIAN ASSISTANT

## 2022-01-10 PROCEDURE — 99214 OFFICE O/P EST MOD 30 MIN: CPT | Mod: 95,,, | Performed by: FAMILY MEDICINE

## 2022-01-10 PROCEDURE — 1159F MED LIST DOCD IN RCRD: CPT | Mod: CPTII,95,, | Performed by: FAMILY MEDICINE

## 2022-01-10 PROCEDURE — 1160F PR REVIEW ALL MEDS BY PRESCRIBER/CLIN PHARMACIST DOCUMENTED: ICD-10-PCS | Mod: CPTII,95,, | Performed by: FAMILY MEDICINE

## 2022-01-10 PROCEDURE — 99213 PR OFFICE/OUTPT VISIT, EST, LEVL III, 20-29 MIN: ICD-10-PCS | Mod: 95,,, | Performed by: PHYSICIAN ASSISTANT

## 2022-01-10 PROCEDURE — 3072F LOW RISK FOR RETINOPATHY: CPT | Mod: CPTII,95,, | Performed by: PHYSICIAN ASSISTANT

## 2022-01-10 PROCEDURE — 3072F PR LOW RISK FOR RETINOPATHY: ICD-10-PCS | Mod: CPTII,95,, | Performed by: FAMILY MEDICINE

## 2022-01-10 PROCEDURE — 1160F RVW MEDS BY RX/DR IN RCRD: CPT | Mod: CPTII,95,, | Performed by: FAMILY MEDICINE

## 2022-01-10 PROCEDURE — 1159F PR MEDICATION LIST DOCUMENTED IN MEDICAL RECORD: ICD-10-PCS | Mod: CPTII,95,, | Performed by: FAMILY MEDICINE

## 2022-01-10 PROCEDURE — 99214 PR OFFICE/OUTPT VISIT, EST, LEVL IV, 30-39 MIN: ICD-10-PCS | Mod: 95,,, | Performed by: FAMILY MEDICINE

## 2022-01-10 PROCEDURE — 3072F LOW RISK FOR RETINOPATHY: CPT | Mod: CPTII,95,, | Performed by: FAMILY MEDICINE

## 2022-01-10 PROCEDURE — 99213 OFFICE O/P EST LOW 20 MIN: CPT | Mod: 95,,, | Performed by: PHYSICIAN ASSISTANT

## 2022-01-10 RX ORDER — PROMETHAZINE HYDROCHLORIDE AND DEXTROMETHORPHAN HYDROBROMIDE 6.25; 15 MG/5ML; MG/5ML
5 SYRUP ORAL NIGHTLY
Qty: 118 ML | Refills: 0 | Status: SHIPPED | OUTPATIENT
Start: 2022-01-10 | End: 2023-08-29

## 2022-01-10 RX ORDER — BENZONATATE 100 MG/1
100 CAPSULE ORAL 3 TIMES DAILY PRN
Qty: 30 CAPSULE | Refills: 0 | Status: SHIPPED | OUTPATIENT
Start: 2022-01-10 | End: 2022-01-20

## 2022-01-10 NOTE — PROGRESS NOTES
PCP: Drew Gutierrez MD    Subjective:     Chief Complaint: Diabetes     TELEMEDICINE VISIT:     The patient location is: Home  The chief complaint leading to consultation is: Diabetes Follow up  Visit type: Virtual visit with synchronous audio and video  Total time spent with patient: 15  Each patient to whom he or she provides medical services by telemedicine is:  (1) informed of the relationship between the physician and patient and the respective role of any other health care provider with respect to management of the patient; and (2) notified that he or she may decline to receive medical services by telemedicine and may withdraw from such care at any time.    Notes: N/A      HISTORY OF PRESENT ILLNESS: 46 y.o.   female presenting for diabetes management visit.   The patient's last visit with me was on 8/20/2021.  Patient has had Type II diabetes since 27 years ago.  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     Patient's most recent A1c of 5.9% was completed 1 months ago.   Patient states since Her last A1c Her blood glucose levels have been unknown since not checking regularly.   Patient monitors blood glucose 2 times per day with meter : Fasting and Before Bed.   Patient blood glucose monitoring device will not be uploaded into Media [facility] today secondary to patient not checking BG regularly.   Patient endorses the following diabetes related symptoms: None.   Patient is due today for the following diabetes-related health maintenance standards: Influenza Vaccine.   She denies recent hospital admissions or emergency room visits.   She denies having hypoglycemia.   Patient's concerns today include glycemic control. Patient does admit her diet is an  obstacle in achieving glycemic control.  Of note, patient was considering bariatric surgery but states she felt discouraged after psychology team recommended she hold off for 3 months to deal with emotional distress which patient states is ongoing. This happened over 3 months ago. Patient has not followed up with Bariatrics but plans to.   Patient medication regimen is as below.     CURRENT DM MEDICATIONS:   · Ozempic 1 mg weekly - taken 1 dose; previous Rx for 1 mg but patient states she was always taking 0.5 mg    Metformin XR 1000 mg BID     Patient has failed the following Diabetes medications:    Trulicity - HA, nausea, SOB   Farxiga - nausea, feeling unwell   Basaglar - no longer using since no longer taking steroids      Labs Reviewed.       No results found for: CPEPTIDE  No results found for: GLUTAMICACID       //   , There is no height or weight on file to calculate BMI.  Her blood sugar in clinic today is:    No components found for: POCGLUC      Review of Systems   Constitutional: Negative for activity change, appetite change, chills and fever.   HENT: Negative for dental problem, mouth sores, nosebleeds, sore throat and trouble swallowing.    Eyes: Negative for pain and discharge.   Respiratory: Negative for shortness of breath, wheezing and stridor.    Cardiovascular: Negative for chest pain, palpitations and leg swelling.   Gastrointestinal: Negative for abdominal pain, diarrhea, nausea and vomiting.   Endocrine: Negative for polydipsia, polyphagia and polyuria.   Genitourinary: Negative for dysuria, frequency and urgency.   Musculoskeletal: Negative for joint swelling and myalgias.   Skin: Negative for rash and wound.   Neurological: Negative for dizziness, syncope, weakness and headaches.   Psychiatric/Behavioral: Negative for behavioral problems and dysphoric mood.         Diabetes Management Status  Statin: Taking  ACE/ARB: Not taking    Screening or Prevention Patient's value Goal  Complete/Controlled?   HgA1C Testing and Control   Lab Results   Component Value Date    HGBA1C 5.9 (H) 12/16/2021      Annually/Less than 8% Yes   Lipid profile : 09/23/2021 Annually Yes   LDL control Lab Results   Component Value Date    LDLCALC 78.0 09/23/2021    Annually/Less than 100 mg/dl  Yes   Nephropathy screening Lab Results   Component Value Date    MICALBCREAT 1.8 09/23/2021     Lab Results   Component Value Date    PROTEINUA Negative 12/16/2021    Annually Yes   Blood pressure BP Readings from Last 1 Encounters:   12/16/21 118/80    Less than 140/90 Yes   Dilated retinal exam : 03/15/2021 Annually Yes    Foot exam   : 11/06/2020 Annually Yes     Social History     Socioeconomic History    Marital status: Single    Number of children: 3   Tobacco Use    Smoking status: Never Smoker    Smokeless tobacco: Never Used   Substance and Sexual Activity    Alcohol use: No     Alcohol/week: 0.0 standard drinks     Comment: rarely; stop 12/11/19 prior to sx    Drug use: No    Sexual activity: Yes     Partners: Male     Birth control/protection: See Surgical Hx, Post-menopausal     Comment: mut monog   Social History Narrative    Full time employed - [employer]     Social Determinants of Health     Financial Resource Strain: Low Risk     Difficulty of Paying Living Expenses: Not hard at all   Food Insecurity: No Food Insecurity    Worried About Running Out of Food in the Last Year: Never true    Ran Out of Food in the Last Year: Never true   Transportation Needs: No Transportation Needs    Lack of Transportation (Medical): No    Lack of Transportation (Non-Medical): No   Physical Activity: Inactive    Days of Exercise per Week: 0 days    Minutes of Exercise per Session: 0 min   Stress: Stress Concern Present    Feeling of Stress : Very much   Social Connections: Unknown    Frequency of Communication with Friends and Family: Three times a week    Frequency of Social Gatherings with Friends  and Family: Once a week    Active Member of Clubs or Organizations: No    Attends Club or Organization Meetings: Never    Marital Status:    Housing Stability: Unknown    Unable to Pay for Housing in the Last Year: No    Unstable Housing in the Last Year: No     Past Medical History:   Diagnosis Date    Abnormal Pap smear of cervix     treatment??    Abnormal Pap smear of vagina     Acute pain of right shoulder 10/25/2019    Adjustment disorder with depressed mood 11/15/2014    Overview:  Multiple recent medical problems     Allergy     Anemia     Anxiety     Anxiety and depression     Asthma     Asthma 4/14/2016    Auditory hallucinations 2/17/2017    Bacterial vaginosis 1/5/2018    Bladder pain 9/1/2020    Bronchitis 10/15/2015    Bronchitis 9/18/2020    Bronchitis 10/15/2015    Cervical radiculopathy 3/10/2020    Chlamydia     Depression (emotion)     Diabetes mellitus     SANTOS (dyspnea on exertion) 10/14/2019    Dysuria 3/31/2018    Dysuria 3/31/2018    Early satiety 8/28/2020    Gallstones     Gastritis     High-risk sexual behavior 1/5/2018    History of ovarian cyst     History of syphilis     History of trichomoniasis     Hyperlipidemia     Hypertension     Interstitial cystitis 12/8/2020    Localized edema 10/14/2019    Lower abdominal pain 7/31/2017    Mental disorder     Obstructive sleep apnea 10/5/2021    Persistent cough 7/27/2018    PONV (postoperative nausea and vomiting)     Preop general physical exam 12/9/2019    Right foot pain 11/18/2019    Routine general medical examination at a health care facility 5/21/2021    Shortness of breath 6/26/2017    SOB (shortness of breath) 6/26/2017    Suicidal ideations 7/9/2019    Type 2 diabetes mellitus without complication, without long-term current use of insulin 5/25/2014    Urgency of micturition 11/20/2020    Vaginal candidiasis 7/24/2018    Vaginal itching 1/5/2018    Viral syndrome 12/1/2020        Objective:      Physical Exam  Constitutional:       General: She is not in acute distress.     Appearance: She is well-developed. She is not diaphoretic.   HENT:      Head: Normocephalic and atraumatic.      Right Ear: External ear normal.      Left Ear: External ear normal.      Nose: Nose normal.   Eyes:      General:         Right eye: No discharge.         Left eye: No discharge.   Pulmonary:      Effort: Pulmonary effort is normal. No respiratory distress.      Breath sounds: No stridor.   Musculoskeletal:         General: Normal range of motion.      Cervical back: Normal range of motion.   Skin:     Coloration: Skin is not pale.   Neurological:      Mental Status: She is alert and oriented to person, place, and time.      Motor: No abnormal muscle tone.      Coordination: Coordination normal.   Psychiatric:         Behavior: Behavior normal.         Thought Content: Thought content normal.         Judgment: Judgment normal.         Physical Exam limited secondary to Telemedicine visit    Assessment / Plan:     Type 2 diabetes mellitus without complication, without long-term current use of insulin    Essential hypertension    Mixed hyperlipidemia    Vitamin D deficiency    Adrenal mass    Morbid obesity due to excess calories      Additional Plan Details:    - POCT Glucose  - Encouraged continuation of lifestyle changes including regular exercise and limiting carbohydrates to 30-45 grams per meal threes times daily and 15 grams per snack with a limit of two daily.   - Encouraged continued monitoring of blood glucose with maintenance of 2 times daily at Fasting and Before Bed.   - Current DM Medication Regimen: Continue Metformin XR 1000 mg BID. Continue Ozempic 1 mg weekly. Will increase when able to.   - Follow up with Bariatrics  - Health Maintenance standards addressed today: None - up to date.   - Nursing Visit: Patient is below goal range for nursing visit for age group and will not need nursing  visit at this time .   - Follow up with me in 6 months with A1c prior.       Blakeney McKnight, PA-C [facility]

## 2022-01-10 NOTE — PROGRESS NOTES
Subjective:       Patient ID: Yuli Milton is a 46 y.o. female.    Chief Complaint: No chief complaint on file.    The patient location is: home  The chief complaint leading to consultation is: URI    Visit type: audiovisual    Face to Face time with patient: jeni 10 min      Each patient to whom he or she provides medical services by telemedicine is:  (1) informed of the relationship between the physician and patient and the respective role of any other health care provider with respect to management of the patient; and (2) notified that he or she may decline to receive medical services by telemedicine and may withdraw from such care at any time.        Shortness of Breath  This is a recurrent problem. The current episode started 1 to 4 weeks ago. The problem occurs intermittently. The problem has been unchanged. Associated symptoms include abdominal pain, chest pain, claudication, leg pain and neck pain. Pertinent negatives include no coryza, ear pain, fever, headaches, hemoptysis, leg swelling, orthopnea, PND, rash, rhinorrhea, sore throat, sputum production, swollen glands, syncope, vomiting or wheezing. The patient has no known risk factors for DVT/PE. She has tried OTC cough suppressants and beta agonist inhalers for the symptoms. The treatment provided mild relief. Her past medical history is significant for asthma and bronchiolitis. There is no history of allergies, aspirin allergies, CAD, chronic lung disease, COPD, DVT, a heart failure, PE, pneumonia or a recent surgery.     Review of Systems   Constitutional: Negative for fever.   HENT: Negative for ear pain, rhinorrhea and sore throat.    Respiratory: Positive for shortness of breath. Negative for hemoptysis, sputum production and wheezing.    Cardiovascular: Positive for chest pain and claudication. Negative for orthopnea, leg swelling, syncope and PND.   Gastrointestinal: Positive for abdominal pain. Negative for vomiting.   Musculoskeletal:  Positive for neck pain.   Skin: Negative for rash.   Neurological: Negative for headaches.       Objective:      Physical Exam  Constitutional:       General: She is not in acute distress.     Appearance: Normal appearance. She is well-developed. She is not diaphoretic.   HENT:      Head: Normocephalic and atraumatic.   Pulmonary:      Effort: Pulmonary effort is normal. No respiratory distress.      Breath sounds: Normal breath sounds. No wheezing.   Neurological:      Mental Status: She is alert.   Psychiatric:         Mood and Affect: Mood normal.         Behavior: Behavior normal.         Thought Content: Thought content normal.         Judgment: Judgment normal.         Assessment:       1. Upper respiratory tract infection, unspecified type    2. Moderate episode of recurrent major depressive disorder    3. Type 2 diabetes mellitus with hyperglycemia, without long-term current use of insulin    4. Essential hypertension    5. Mixed hyperlipidemia        Plan:     Problem List Items Addressed This Visit        Psychiatric    Moderate episode of recurrent major depressive disorder       ENT    Upper respiratory tract infection - Primary    Relevant Medications    benzonatate (TESSALON) 100 MG capsule    promethazine-dextromethorphan (PROMETHAZINE-DM) 6.25-15 mg/5 mL Syrp       Cardiac/Vascular    Essential hypertension    Overview     Chronic, Stable, cont hydrochlorothiazide         Mixed hyperlipidemia    Overview     Chronic, Stable, cont statin            Endocrine    Type 2 diabetes mellitus with hyperglycemia, without long-term current use of insulin    Overview     Chronic, Stable, cont ozempic

## 2022-01-11 ENCOUNTER — OFFICE VISIT (OUTPATIENT)
Dept: OPHTHALMOLOGY | Facility: CLINIC | Age: 47
End: 2022-01-11
Payer: COMMERCIAL

## 2022-01-11 DIAGNOSIS — H52.4 PRESBYOPIA OF BOTH EYES: ICD-10-CM

## 2022-01-11 DIAGNOSIS — M35.01 KERATOCONJUNCTIVITIS SICCA: ICD-10-CM

## 2022-01-11 DIAGNOSIS — E11.9 DIABETES MELLITUS WITHOUT COMPLICATION: Primary | ICD-10-CM

## 2022-01-11 PROCEDURE — 92004 COMPRE OPH EXAM NEW PT 1/>: CPT | Mod: S$GLB,,, | Performed by: OPTOMETRIST

## 2022-01-11 PROCEDURE — 2023F PR DILATED RETINAL EXAM W/O EVID OF RETINOPATHY: ICD-10-PCS | Mod: CPTII,S$GLB,, | Performed by: OPTOMETRIST

## 2022-01-11 PROCEDURE — 1160F PR REVIEW ALL MEDS BY PRESCRIBER/CLIN PHARMACIST DOCUMENTED: ICD-10-PCS | Mod: CPTII,S$GLB,, | Performed by: OPTOMETRIST

## 2022-01-11 PROCEDURE — 1159F PR MEDICATION LIST DOCUMENTED IN MEDICAL RECORD: ICD-10-PCS | Mod: CPTII,S$GLB,, | Performed by: OPTOMETRIST

## 2022-01-11 PROCEDURE — 2023F DILAT RTA XM W/O RTNOPTHY: CPT | Mod: CPTII,S$GLB,, | Performed by: OPTOMETRIST

## 2022-01-11 PROCEDURE — 92015 DETERMINE REFRACTIVE STATE: CPT | Mod: S$GLB,,, | Performed by: OPTOMETRIST

## 2022-01-11 PROCEDURE — 92004 PR EYE EXAM, NEW PATIENT,COMPREHESV: ICD-10-PCS | Mod: S$GLB,,, | Performed by: OPTOMETRIST

## 2022-01-11 PROCEDURE — 1160F RVW MEDS BY RX/DR IN RCRD: CPT | Mod: CPTII,S$GLB,, | Performed by: OPTOMETRIST

## 2022-01-11 PROCEDURE — 99999 PR PBB SHADOW E&M-EST. PATIENT-LVL III: ICD-10-PCS | Mod: PBBFAC,,, | Performed by: OPTOMETRIST

## 2022-01-11 PROCEDURE — 99999 PR PBB SHADOW E&M-EST. PATIENT-LVL III: CPT | Mod: PBBFAC,,, | Performed by: OPTOMETRIST

## 2022-01-11 PROCEDURE — 92015 PR REFRACTION: ICD-10-PCS | Mod: S$GLB,,, | Performed by: OPTOMETRIST

## 2022-01-11 PROCEDURE — 1159F MED LIST DOCD IN RCRD: CPT | Mod: CPTII,S$GLB,, | Performed by: OPTOMETRIST

## 2022-01-11 NOTE — PROGRESS NOTES
HPI     NP to DKT  Patient here today for yearly DM eye exam  Diagnosed with diabetes in 2002  Lab Results       Component                Value               Date                       HGBA1C                   5.9 (H)             12/16/2021            Vision changes since last eye exam?: Yes     Any eye pain today: No    Other ocular symptoms: Yes    Interested in contact lens fitting today? No      1. DM  2. Keratoconjunctivitis      Pt states been having dry eye in OU for a while. Pt has been using   systane.  Pt states va has been blurry in OU for a week now.  Pt was under the care   of another doctor. Pt was prescribed Cequa 0.09 BID OU.      Last edited by Reza Reeves, OD on 1/11/2022  1:53 PM. (History)            Assessment /Plan     For exam results, see Encounter Report.    Diabetes mellitus without complication  10 years last A1c 5.9 Stressed importance of DM control to preserve vision. No diabetic retinopathy was seen in either eye today. Continue strict blood glucose control.  Reviewed importance of yearly dilated eye exams. Continue close care with PCP regarding diabetes.    Keratoconjunctivitis sicca  Doing well on Cequa BID, continue    Presbyopia of both eyes  Eyeglass Final Rx     Eyeglass Final Rx       Sphere Cylinder Dist VA Add    Right +1.50 DS 20/20 +1.75    Left +1.50 DS 20/20 +1.75    Type: Single vision or PAL                   RTC 1 year for CEE with DFE sooner if any changes to vision or worsening symptoms.

## 2022-01-18 ENCOUNTER — OFFICE VISIT (OUTPATIENT)
Dept: PULMONOLOGY | Facility: CLINIC | Age: 47
End: 2022-01-18
Payer: COMMERCIAL

## 2022-01-18 VITALS
HEIGHT: 65 IN | OXYGEN SATURATION: 100 % | WEIGHT: 256.75 LBS | DIASTOLIC BLOOD PRESSURE: 72 MMHG | HEART RATE: 77 BPM | SYSTOLIC BLOOD PRESSURE: 126 MMHG | RESPIRATION RATE: 18 BRPM | BODY MASS INDEX: 42.78 KG/M2

## 2022-01-18 DIAGNOSIS — G25.81 RESTLESS LEG SYNDROME: ICD-10-CM

## 2022-01-18 DIAGNOSIS — E66.01 MORBID OBESITY DUE TO EXCESS CALORIES: ICD-10-CM

## 2022-01-18 DIAGNOSIS — J45.30 MILD PERSISTENT ASTHMA WITHOUT COMPLICATION: ICD-10-CM

## 2022-01-18 DIAGNOSIS — E11.65 TYPE 2 DIABETES MELLITUS WITH HYPERGLYCEMIA, WITHOUT LONG-TERM CURRENT USE OF INSULIN: ICD-10-CM

## 2022-01-18 DIAGNOSIS — G47.33 OSA ON CPAP: ICD-10-CM

## 2022-01-18 PROCEDURE — 1159F PR MEDICATION LIST DOCUMENTED IN MEDICAL RECORD: ICD-10-PCS | Mod: CPTII,S$GLB,, | Performed by: NURSE PRACTITIONER

## 2022-01-18 PROCEDURE — 3072F LOW RISK FOR RETINOPATHY: CPT | Mod: CPTII,S$GLB,, | Performed by: NURSE PRACTITIONER

## 2022-01-18 PROCEDURE — 99214 OFFICE O/P EST MOD 30 MIN: CPT | Mod: S$GLB,,, | Performed by: NURSE PRACTITIONER

## 2022-01-18 PROCEDURE — 99999 PR PBB SHADOW E&M-EST. PATIENT-LVL V: ICD-10-PCS | Mod: PBBFAC,,, | Performed by: NURSE PRACTITIONER

## 2022-01-18 PROCEDURE — 3008F PR BODY MASS INDEX (BMI) DOCUMENTED: ICD-10-PCS | Mod: CPTII,S$GLB,, | Performed by: NURSE PRACTITIONER

## 2022-01-18 PROCEDURE — 99999 PR PBB SHADOW E&M-EST. PATIENT-LVL V: CPT | Mod: PBBFAC,,, | Performed by: NURSE PRACTITIONER

## 2022-01-18 PROCEDURE — 3074F SYST BP LT 130 MM HG: CPT | Mod: CPTII,S$GLB,, | Performed by: NURSE PRACTITIONER

## 2022-01-18 PROCEDURE — 3074F PR MOST RECENT SYSTOLIC BLOOD PRESSURE < 130 MM HG: ICD-10-PCS | Mod: CPTII,S$GLB,, | Performed by: NURSE PRACTITIONER

## 2022-01-18 PROCEDURE — 3008F BODY MASS INDEX DOCD: CPT | Mod: CPTII,S$GLB,, | Performed by: NURSE PRACTITIONER

## 2022-01-18 PROCEDURE — 3078F PR MOST RECENT DIASTOLIC BLOOD PRESSURE < 80 MM HG: ICD-10-PCS | Mod: CPTII,S$GLB,, | Performed by: NURSE PRACTITIONER

## 2022-01-18 PROCEDURE — 1160F PR REVIEW ALL MEDS BY PRESCRIBER/CLIN PHARMACIST DOCUMENTED: ICD-10-PCS | Mod: CPTII,S$GLB,, | Performed by: NURSE PRACTITIONER

## 2022-01-18 PROCEDURE — 3078F DIAST BP <80 MM HG: CPT | Mod: CPTII,S$GLB,, | Performed by: NURSE PRACTITIONER

## 2022-01-18 PROCEDURE — 99214 PR OFFICE/OUTPT VISIT, EST, LEVL IV, 30-39 MIN: ICD-10-PCS | Mod: S$GLB,,, | Performed by: NURSE PRACTITIONER

## 2022-01-18 PROCEDURE — 1160F RVW MEDS BY RX/DR IN RCRD: CPT | Mod: CPTII,S$GLB,, | Performed by: NURSE PRACTITIONER

## 2022-01-18 PROCEDURE — 3072F PR LOW RISK FOR RETINOPATHY: ICD-10-PCS | Mod: CPTII,S$GLB,, | Performed by: NURSE PRACTITIONER

## 2022-01-18 PROCEDURE — 1159F MED LIST DOCD IN RCRD: CPT | Mod: CPTII,S$GLB,, | Performed by: NURSE PRACTITIONER

## 2022-01-18 NOTE — ASSESSMENT & PLAN NOTE
Encouraged calorie reduction and 30 minutes of exercise daily. Discussed impact of obesity on general health.  Thinking about gastric sleeve surgery with [facility]   Wt Readings from Last 9 Encounters:   01/18/22 116.4 kg (256 lb 11.6 oz)   12/16/21 117.1 kg (258 lb 2.5 oz)   11/11/21 117.1 kg (258 lb 2.5 oz)   11/09/21 117.1 kg (258 lb 2.5 oz)   11/02/21 117 kg (258 lb)   09/29/21 117.2 kg (258 lb 6.1 oz)   09/24/21 117.2 kg (258 lb 6.1 oz)   09/23/21 115.5 kg (254 lb 10.1 oz)   09/14/21 118.6 kg (261 lb 5.7 oz)   Body mass index is 42.72 kg/m².

## 2022-01-18 NOTE — ASSESSMENT & PLAN NOTE
On auto CPAP 4-20 cm   AHI: 2.9   Full face mask  HME: [facility]  Malfunction of new ResMed machine, not blowing air, needs appointment with [facility] to check machine. Provided Roopa's contact to make appointment.     Adherence once malfunctioned cpap machine is exchanged or fixed.     Follow up 3 months     (KAT) - [facility]  Reviewed therapeutic goals for positive airway pressure therapy Auto CPAP  Ideal is usage 100% of nights for 6 - 8 hours per night. Minimum usage is 70% of night for at least 4 hours per night used.

## 2022-01-18 NOTE — ASSESSMENT & PLAN NOTE
Managed by primary care provider  Hemoglobin A1C   Date Value Ref Range Status   12/16/2021 5.9 (H) 4.0 - 5.6 % Final     Comment:     ADA Screening Guidelines:  5.7-6.4%  Consistent with prediabetes  >or=6.5%  Consistent with diabetes    High levels of fetal hemoglobin interfere with the HbA1C  assay. Heterozygous hemoglobin variants (HbS, HgC, etc)do  not significantly interfere with this assay.   However, presence of multiple variants may affect accuracy.     09/23/2021 5.8 (H) 4.0 - 5.6 % Final     Comment:     ADA Screening Guidelines:  5.7-6.4%  Consistent with prediabetes  >or=6.5%  Consistent with diabetes    High levels of fetal hemoglobin interfere with the HbA1C  assay. Heterozygous hemoglobin variants (HbS, HgC, etc)do  not significantly interfere with this assay.   However, presence of multiple variants may affect accuracy.     03/01/2021 7.0 (H) 4.0 - 5.6 % Final     Comment:     ADA Screening Guidelines:  5.7-6.4%  Consistent with prediabetes  >or=6.5%  Consistent with diabetes    High levels of fetal hemoglobin interfere with the HbA1C  assay. Heterozygous hemoglobin variants (HbS, HgC, etc)do  not significantly interfere with this assay.   However, presence of multiple variants may affect accuracy.

## 2022-01-18 NOTE — PROGRESS NOTES
Subjective:      Patient ID: Yuli Milton is a 46 y.o. female.    Chief Complaint: Sleep Apnea and Asthma    HPI: Yuli Milton presents to clinic for follow up for SCOUT with initial CPAP complaince assessment.  She is on Auto CPAP of 4-20 cmH2O pressure which is optimally controlling sleep apnea with apneic index (AHI) 2.9 events an hour.   Complaince download today reveals 30% of days with greater than 4 hours of device use.   Malfunction of current machine over past week, will not blow air.   Will make appointment with [facility] to have ResMed machine checked.  Patient reports benefit from CPAP use. Compliance affected by falling asleep without machine in use. Then over past week malfunction.   Patient reports no complaints, except malfunction. Full face mask is tolerated.   Childs 5    Obtained ResMed CPAP machine in about November 2021  ResMed CPAP machine stopped blowing 1 week ago.  Machine will turn on, but not blow air. Working fine up until 1 week ago.     9/24/2021, 9/27/2021 Home Sleep Study    2 night study  MILD OBSTRUCTIVE SLEEP APNEA with overall AHI 13.2/hr ( 52 events)  Oxygen desaturation: 80%. SpO2 between 85% to 89% for < 1 min.  Patient snored 78% time above 50 .  Heart rate range: 60 bpm -110 bpm     Usage 12/08/2021 - 01/06/2022  Usage days 19/30 days (63%)  >= 4 hours 9 days (30%)  < 4 hours 10 days (33%)  Usage hours 68 hours 12 minutes  Average usage (total days) 2 hours 16 minutes  Average usage (days used) 3 hours 35 minutes  Median usage (days used) 3 hours 56 minutes  Total used hours (value since last reset - 01/06/2022) 202 hours  AirSense 11 AutoSet  Serial number [id number]  Mode AutoSet  Min Pressure 4 cmH2O  Max Pressure 20 cmH2O  EPR Fulltime  EPR level 3  Response Standard  Therapy  Pressure - cmH2O Median: 5.8 95th percentile: 7.7 Maximum: 8.5  Leaks - L/min Median: 13.7 95th percentile: 22.5 Maximum: 26.8  Events per hour AI: 2.5 HI: 0.4 AHI: 2.9  Apnea Index  Central: 0.5 Obstructive: 1.9 Unknown: 0.1  RERA Index 0.2  Cheyne-Hernandez respiration (average duration per night) 0 minutes (0%)      She has not yet follow up up for her evaluate for gastric sleeve surgery with [facility] in [place], she is pending cardiac stress test.      The patient has a history of asthma.  Age when diagnosed with Asthma childhood.      She has 2 children with asthma. No siblings or parents with asthma.      The patient is not currently have symptoms or exacerbation.      Had slight asthma flare with covid 19 in January 2021, recovered at home.      She feels her asthma is well controlled on current treatment Breo 100 mcg. Albuterol inhaler.      Asthma Control Test  In the past 4  weeks, how much of the time did your asthma keep you from getting as much done at work, school or at home?: A little of the time  During the past 4 weeks, how often have you had shortness of breath?: Once or twice a week  During the past 4 weeks, how often did your asthma symptoms (wheezing, couging, shortness of breath, chest tightness or pain) wake you up at night or earlier than usual in the morning?: Not at all  During the past 4 weeks, how often have you used your rescue inhaler or nebulizer medication (such as albuterol)?: 2 or 3 times a week  How would you rate your asthma control during the past 4 weeks?: Somewhat controlled  If your score is 19 or less, your asthma may not be under control: 19              Current Disease Severity  Yuli has monthly daytime asthma symptoms. She has no nighttime asthma symptoms. The patient is using short-acting beta agonists for symptom control about twice a month or less. She has exacerbations requiring oral systemic corticosteroids 2 times per year. Current limitations in activity from asthma: none. Number of days of school or work missed in the last month: 0. Number of urgent/emergent visit in last year: 1 with Covid 19 in Jan 2021.      Dyspnea  Characteristics:   Exertional Dyspnea   Dyspnea Duration:  Chronic  Dyspnea Severity:  RAMON 2  Dyspnea Timing:  exertional  Dyspnea Contributing Factors:  Morbid obesity. asthma   Dyspnea Associated Symptoms:  none       11/20/2020 6mwd No desaturations requiring supplemental oxygen at rest or exertion. Oxygen desaturation did not meet criteria for supplemental oxygen prescription. Exercise capacity is normal.      Phase Oxygen Assessment Supplemental O2 Heart   Rate Blood Pressure Ramon Dyspnea Scale Rating   Resting 98 % Room Air 73 bpm 116/77 3   Exercise         Minute         1 100 % Room Air 108 bpm     2 100 % Room Air 117 bpm     3 94 % Room Air 116 bpm     4 93 % Room Air 113 bpm     5 99 % Room Air 130 bpm     6  95 % Room Air 123 bpm 94/54 4   Recovery         Minute         1 100 % Room Air 86 bpm     2 100 % Room Air 77 bpm     3 100 % Room Air 74 bpm     4 99 % Room Air 77 bpm 117/75 2     Six Minute Walk Summary   6MWT Status: completed without stopping   Patient Reported: Dyspnea, Other (Comment)(leg numbness     Employed full time as a [employer].        Previous Report Reviewed: lab reports and office notes     Past Medical History: The following portions of the patient's history were reviewed and updated as appropriate:   She  has a past surgical history that includes Appendectomy; Dilation and curettage of uterus; Colonoscopy (N/A, 7/31/2017); Cholecystectomy; Hysterectomy (08/31/2016); Fracture surgery; and Oophorectomy.  Her family history includes Breast cancer in her maternal aunt and paternal grandmother; Cancer in her maternal aunt; Colon cancer in her maternal aunt; Diabetes in her maternal grandmother; Hypertension in her mother; Miscarriages / Stillbirths in her cousin; No Known Problems in her daughter, father, and son; Stroke in her maternal aunt; Thyroid disease in her brother, mother, and sister.  She  reports that she has never smoked. She has  never used smokeless tobacco. She reports that she does not drink alcohol and does not use drugs.  She has a current medication list which includes the following prescription(s): albuterol, albuterol, alprazolam, azelaic acid, benzonatate, blood sugar diagnostic, celecoxib, cequa, clindamycin, ergocalciferol, fluoxetine, breo ellipta, gabapentin, haloperidol, hydrochlorothiazide, lancets, magnesium oxide, metformin, methen-m.blue-s.phos-phsal-hyo, metoprolol succinate, myrbetriq, nebulizer accessories, olanzapine, onetouch verio flex meter, oxybutynin, oxycodone-acetaminophen, pregabalin, promethazine-dextromethorphan, semaglutide, thiamine, tizanidine, and tramadol.  She is allergic to trulicity [dulaglutide] and hibiclens (isopropyl alcohol)..    The following portions of the patient's history were reviewed and updated as appropriate: allergies, current medications, past family history, past medical history, past social history, past surgical history and problem list.    Review of Systems   Constitutional: Negative for fever, chills, weight loss, weight gain, activity change, appetite change, fatigue and night sweats.   HENT: Negative for postnasal drip, rhinorrhea, sinus pressure, voice change and congestion.    Eyes: Negative for redness and itching.   Respiratory: Negative for snoring, cough, sputum production, chest tightness, shortness of breath, wheezing, orthopnea, asthma nighttime symptoms, dyspnea on extertion, use of rescue inhaler and somnolence.    Cardiovascular: Negative.  Negative for chest pain, palpitations and leg swelling.   Genitourinary: Negative for difficulty urinating and hematuria.   Endocrine: Negative for cold intolerance and heat intolerance.    Musculoskeletal: Negative for arthralgias, gait problem, joint swelling and myalgias.   Skin: Negative.    Gastrointestinal: Negative for nausea, vomiting, abdominal pain and acid reflux.   Neurological: Negative for dizziness, weakness,  "light-headedness and headaches.   Hematological: Negative for adenopathy. No excessive bruising.   All other systems reviewed and are negative.     Objective:   /72   Pulse 77   Resp 18   Ht 5' 5" (1.651 m)   Wt 116.4 kg (256 lb 11.6 oz)   LMP 08/21/2016   SpO2 100%   BMI 42.72 kg/m²   Physical Exam  Vitals and nursing note reviewed.   Constitutional:       General: She is active. She is not in acute distress.     Appearance: She is well-developed and well-nourished. She is not ill-appearing or toxic-appearing.   HENT:      Head: Normocephalic.      Right Ear: External ear normal.      Left Ear: External ear normal.      Nose: Nose normal.      Mouth/Throat:      Mouth: Oropharynx is clear and moist.      Pharynx: No oropharyngeal exudate.   Eyes:      Conjunctiva/sclera: Conjunctivae normal.   Cardiovascular:      Rate and Rhythm: Normal rate and regular rhythm.      Pulses: Intact distal pulses.      Heart sounds: Normal heart sounds.   Pulmonary:      Effort: Pulmonary effort is normal.      Breath sounds: Normal breath sounds. No stridor.   Abdominal:      Palpations: Abdomen is soft.   Musculoskeletal:         General: Normal range of motion.      Cervical back: Normal range of motion and neck supple.   Lymphadenopathy:      Cervical: No cervical adenopathy.   Skin:     General: Skin is warm and dry.   Neurological:      Mental Status: She is alert and oriented to person, place, and time.   Psychiatric:         Mood and Affect: Mood and affect normal.         Behavior: Behavior normal. Behavior is cooperative.         Thought Content: Thought content normal.         Judgment: Judgment normal.         Personal Diagnostic Review  CPAP download    Assessment:     1. 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 Restless leg syndrome    3. Type 2 diabetes mellitus with hyperglycemia, without long-term current use of insulin    4. Morbid obesity due to excess calories    5. Mild persistent asthma without complication        No " orders of the defined types were placed in this encounter.    Plan:     Problem List Items Addressed This Visit     Type 2 diabetes mellitus with hyperglycemia, without long-term current use of insulin     Managed by primary care provider  Hemoglobin A1C   Date Value Ref Range Status   12/16/2021 5.9 (H) 4.0 - 5.6 % Final     Comment:     ADA Screening Guidelines:  5.7-6.4%  Consistent with prediabetes  >or=6.5%  Consistent with diabetes    High levels of fetal hemoglobin interfere with the HbA1C  assay. Heterozygous hemoglobin variants (HbS, HgC, etc)do  not significantly interfere with this assay.   However, presence of multiple variants may affect accuracy.     09/23/2021 5.8 (H) 4.0 - 5.6 % Final     Comment:     ADA Screening Guidelines:  5.7-6.4%  Consistent with prediabetes  >or=6.5%  Consistent with diabetes    High levels of fetal hemoglobin interfere with the HbA1C  assay. Heterozygous hemoglobin variants (HbS, HgC, etc)do  not significantly interfere with this assay.   However, presence of multiple variants may affect accuracy.     03/01/2021 7.0 (H) 4.0 - 5.6 % Final     Comment:     ADA Screening Guidelines:  5.7-6.4%  Consistent with prediabetes  >or=6.5%  Consistent with diabetes    High levels of fetal hemoglobin interfere with the HbA1C  assay. Heterozygous hemoglobin variants (HbS, HgC, etc)do  not significantly interfere with this assay.   However, presence of multiple variants may affect accuracy.                Restless leg syndrome     Controlled on Lyrica          SCOUT on CPAP     On auto CPAP 4-20 cm   AHI: 2.9   Full face mask  HME: [facility]  Malfunction of new ResMed machine, not blowing air, needs appointment with [facility] to check machine. Provided Roopa's contact to make appointment.     Adherence once malfunctioned cpap machine is exchanged or fixed.     Follow up 3 months     (DME) - [facility]  Reviewed therapeutic goals for positive airway pressure therapy Auto CPAP  Ideal is usage 100%  of nights for 6 - 8 hours per night. Minimum usage is 70% of night for at least 4 hours per night used.         Morbid obesity due to excess calories     Encouraged calorie reduction and 30 minutes of exercise daily. Discussed impact of obesity on general health.  Thinking about gastric sleeve surgery with [facility]   Wt Readings from Last 9 Encounters:   01/18/22 116.4 kg (256 lb 11.6 oz)   12/16/21 117.1 kg (258 lb 2.5 oz)   11/11/21 117.1 kg (258 lb 2.5 oz)   11/09/21 117.1 kg (258 lb 2.5 oz)   11/02/21 117 kg (258 lb)   09/29/21 117.2 kg (258 lb 6.1 oz)   09/24/21 117.2 kg (258 lb 6.1 oz)   09/23/21 115.5 kg (254 lb 10.1 oz)   09/14/21 118.6 kg (261 lb 5.7 oz)   Body mass index is 42.72 kg/m².             Mild persistent asthma without complication     Controlled on Breo 100 mcg. Albuterol inhaler. Albuterol nebs.                Follow up in about 3 months (around 4/18/2022) for CPAP compliance download not compliant at initial.

## 2022-01-28 ENCOUNTER — PATIENT MESSAGE (OUTPATIENT)
Dept: DIABETES | Facility: CLINIC | Age: 47
End: 2022-01-28
Payer: COMMERCIAL

## 2022-01-28 DIAGNOSIS — E11.65 TYPE 2 DIABETES MELLITUS WITH HYPERGLYCEMIA, WITHOUT LONG-TERM CURRENT USE OF INSULIN: ICD-10-CM

## 2022-02-04 ENCOUNTER — PATIENT MESSAGE (OUTPATIENT)
Dept: INTERNAL MEDICINE | Facility: CLINIC | Age: 47
End: 2022-02-04
Payer: COMMERCIAL

## 2022-02-16 ENCOUNTER — PATIENT MESSAGE (OUTPATIENT)
Dept: INTERNAL MEDICINE | Facility: CLINIC | Age: 47
End: 2022-02-16
Payer: COMMERCIAL

## 2022-02-24 ENCOUNTER — TELEPHONE (OUTPATIENT)
Dept: INTERNAL MEDICINE | Facility: CLINIC | Age: 47
End: 2022-02-24
Payer: COMMERCIAL

## 2022-02-24 ENCOUNTER — OFFICE VISIT (OUTPATIENT)
Dept: INTERNAL MEDICINE | Facility: CLINIC | Age: 47
End: 2022-02-24
Payer: COMMERCIAL

## 2022-02-24 DIAGNOSIS — B15.9 VIRAL HEPATITIS A WITHOUT HEPATIC COMA: ICD-10-CM

## 2022-02-24 DIAGNOSIS — I10 ESSENTIAL HYPERTENSION: ICD-10-CM

## 2022-02-24 DIAGNOSIS — F33.1 MODERATE EPISODE OF RECURRENT MAJOR DEPRESSIVE DISORDER: Primary | ICD-10-CM

## 2022-02-24 DIAGNOSIS — E78.2 MIXED HYPERLIPIDEMIA: ICD-10-CM

## 2022-02-24 PROBLEM — J06.9 UPPER RESPIRATORY TRACT INFECTION: Status: RESOLVED | Noted: 2018-01-05 | Resolved: 2022-02-24

## 2022-02-24 PROCEDURE — 3072F PR LOW RISK FOR RETINOPATHY: ICD-10-PCS | Mod: CPTII,95,, | Performed by: FAMILY MEDICINE

## 2022-02-24 PROCEDURE — 1160F PR REVIEW ALL MEDS BY PRESCRIBER/CLIN PHARMACIST DOCUMENTED: ICD-10-PCS | Mod: CPTII,95,, | Performed by: FAMILY MEDICINE

## 2022-02-24 PROCEDURE — 99214 OFFICE O/P EST MOD 30 MIN: CPT | Mod: 95,,, | Performed by: FAMILY MEDICINE

## 2022-02-24 PROCEDURE — 1159F PR MEDICATION LIST DOCUMENTED IN MEDICAL RECORD: ICD-10-PCS | Mod: CPTII,95,, | Performed by: FAMILY MEDICINE

## 2022-02-24 PROCEDURE — 1159F MED LIST DOCD IN RCRD: CPT | Mod: CPTII,95,, | Performed by: FAMILY MEDICINE

## 2022-02-24 PROCEDURE — 3072F LOW RISK FOR RETINOPATHY: CPT | Mod: CPTII,95,, | Performed by: FAMILY MEDICINE

## 2022-02-24 PROCEDURE — 1160F RVW MEDS BY RX/DR IN RCRD: CPT | Mod: CPTII,95,, | Performed by: FAMILY MEDICINE

## 2022-02-24 PROCEDURE — 99214 PR OFFICE/OUTPT VISIT, EST, LEVL IV, 30-39 MIN: ICD-10-PCS | Mod: 95,,, | Performed by: FAMILY MEDICINE

## 2022-02-24 RX ORDER — FLUOXETINE HYDROCHLORIDE 20 MG/1
20 CAPSULE ORAL DAILY
Qty: 30 CAPSULE | Refills: 0 | Status: SHIPPED | OUTPATIENT
Start: 2022-02-24

## 2022-02-24 RX ORDER — OLANZAPINE 5 MG/1
5 TABLET ORAL NIGHTLY
Qty: 30 TABLET | Refills: 0 | Status: SHIPPED | OUTPATIENT
Start: 2022-02-24 | End: 2023-03-03

## 2022-02-24 RX ORDER — HALOPERIDOL 5 MG/1
5 TABLET ORAL NIGHTLY
Qty: 30 TABLET | Refills: 0 | Status: SHIPPED | OUTPATIENT
Start: 2022-02-24 | End: 2023-03-03

## 2022-02-24 NOTE — TELEPHONE ENCOUNTER
Pt had questions for labs and dealing with depression and anxiety. Scheduled virtual appt for today. Please be advised.

## 2022-02-24 NOTE — TELEPHONE ENCOUNTER
----- Message from Dia Elliott sent at 2/24/2022  7:25 AM CST -----  Regarding: appt  Contact: pt  Type:  Same Day Appointment Request    Caller is requesting a same day appointment.  Caller declined first available appointment listed below.    Name of Caller: pt  When is the first available appointment? 03/01  Symptoms: not feeling well  Best Call Back Number:[phone]  Additional Information: n/a

## 2022-02-24 NOTE — PROGRESS NOTES
Subjective:       Patient ID: Yuli Milton is a 46 y.o. female.    Chief Complaint: No chief complaint on file.    The patient location is: home  The chief complaint leading to consultation is: anxiety    Visit type: audiovisual    Face to Face time with patient: jeni 12 min      Each patient to whom he or she provides medical services by telemedicine is:  (1) informed of the relationship between the physician and patient and the respective role of any other health care provider with respect to management of the patient; and (2) notified that he or she may decline to receive medical services by telemedicine and may withdraw from such care at any time.      Anxiety  Presents for follow-up visit. Symptoms include confusion. Patient reports no chest pain or palpitations. Symptoms occur constantly. The severity of symptoms is moderate. The quality of sleep is fair.         Review of Systems   Constitutional: Positive for activity change. Negative for unexpected weight change.   HENT: Negative for hearing loss, rhinorrhea and trouble swallowing.    Eyes: Negative for discharge and visual disturbance.   Respiratory: Negative for chest tightness and wheezing.    Cardiovascular: Negative for chest pain and palpitations.   Gastrointestinal: Negative for blood in stool, constipation, diarrhea and vomiting.   Endocrine: Negative for polydipsia and polyuria.   Genitourinary: Negative for difficulty urinating, dysuria, hematuria and menstrual problem.   Musculoskeletal: Negative for arthralgias, joint swelling and neck pain.   Neurological: Negative for headaches.   Psychiatric/Behavioral: Positive for confusion and dysphoric mood.       Objective:      Physical Exam  Constitutional:       General: She is in acute distress.      Appearance: Normal appearance. She is well-developed.   HENT:      Head: Normocephalic and atraumatic.   Pulmonary:      Effort: Pulmonary effort is normal. No respiratory distress.   Skin:      Findings: No erythema or rash.   Neurological:      Mental Status: She is alert.   Psychiatric:         Mood and Affect: Mood is depressed.         Assessment:       1. Moderate episode of recurrent major depressive disorder    2. Viral hepatitis A without hepatic coma    3. Essential hypertension    4. Mixed hyperlipidemia        Plan:     Problem List Items Addressed This Visit        Psychiatric    Moderate episode of recurrent major depressive disorder - Primary    Relevant Medications    OLANZapine (ZYPREXA) 5 MG tablet    haloperidoL (HALDOL) 5 MG tablet    FLUoxetine 20 MG capsule    Other Relevant Orders    Ambulatory referral/consult to Psychiatry       Cardiac/Vascular    Essential hypertension    Overview     Chronic, Stable, cont hydrochlorothiazide           Mixed hyperlipidemia    Overview     Chronic, Stable, cont statin              GI    Viral hepatitis A without hepatic coma

## 2022-03-07 ENCOUNTER — PATIENT MESSAGE (OUTPATIENT)
Dept: BARIATRICS | Facility: CLINIC | Age: 47
End: 2022-03-07
Payer: COMMERCIAL

## 2022-03-21 ENCOUNTER — TELEPHONE (OUTPATIENT)
Dept: BARIATRICS | Facility: CLINIC | Age: 47
End: 2022-03-21
Payer: COMMERCIAL

## 2022-03-21 NOTE — TELEPHONE ENCOUNTER
Attempted to reach pt regarding workup discussed in 8/26/2021 telephone note and to schedule a 2022 FC phone appointment if she would like to proceed. Unable to LVM, mailbox full.

## 2022-03-22 ENCOUNTER — PATIENT MESSAGE (OUTPATIENT)
Dept: INTERNAL MEDICINE | Facility: CLINIC | Age: 47
End: 2022-03-22
Payer: COMMERCIAL

## 2022-04-07 ENCOUNTER — PATIENT MESSAGE (OUTPATIENT)
Dept: BARIATRICS | Facility: CLINIC | Age: 47
End: 2022-04-07
Payer: COMMERCIAL

## 2022-04-12 ENCOUNTER — PATIENT MESSAGE (OUTPATIENT)
Dept: BARIATRICS | Facility: CLINIC | Age: 47
End: 2022-04-12
Payer: COMMERCIAL

## 2022-04-13 ENCOUNTER — PATIENT MESSAGE (OUTPATIENT)
Dept: INTERNAL MEDICINE | Facility: CLINIC | Age: 47
End: 2022-04-13
Payer: COMMERCIAL

## 2022-04-14 ENCOUNTER — PATIENT MESSAGE (OUTPATIENT)
Dept: INTERNAL MEDICINE | Facility: CLINIC | Age: 47
End: 2022-04-14
Payer: COMMERCIAL

## 2022-04-14 ENCOUNTER — PATIENT MESSAGE (OUTPATIENT)
Dept: OTHER | Facility: OTHER | Age: 47
End: 2022-04-14
Payer: COMMERCIAL

## 2022-04-18 ENCOUNTER — OFFICE VISIT (OUTPATIENT)
Dept: PULMONOLOGY | Facility: CLINIC | Age: 47
End: 2022-04-18
Payer: COMMERCIAL

## 2022-04-18 VITALS
BODY MASS INDEX: 42.79 KG/M2 | HEIGHT: 65 IN | RESPIRATION RATE: 16 BRPM | DIASTOLIC BLOOD PRESSURE: 76 MMHG | SYSTOLIC BLOOD PRESSURE: 114 MMHG | WEIGHT: 256.81 LBS

## 2022-04-18 DIAGNOSIS — J45.30 MILD PERSISTENT ASTHMA WITHOUT COMPLICATION: ICD-10-CM

## 2022-04-18 DIAGNOSIS — E11.65 TYPE 2 DIABETES MELLITUS WITH HYPERGLYCEMIA, WITHOUT LONG-TERM CURRENT USE OF INSULIN: ICD-10-CM

## 2022-04-18 DIAGNOSIS — G47.33 OSA ON CPAP: Primary | ICD-10-CM

## 2022-04-18 DIAGNOSIS — E66.01 CLASS 3 SEVERE OBESITY DUE TO EXCESS CALORIES WITHOUT SERIOUS COMORBIDITY WITH BODY MASS INDEX (BMI) OF 40.0 TO 44.9 IN ADULT: ICD-10-CM

## 2022-04-18 PROCEDURE — 1160F RVW MEDS BY RX/DR IN RCRD: CPT | Mod: CPTII,S$GLB,, | Performed by: NURSE PRACTITIONER

## 2022-04-18 PROCEDURE — 3072F LOW RISK FOR RETINOPATHY: CPT | Mod: CPTII,S$GLB,, | Performed by: NURSE PRACTITIONER

## 2022-04-18 PROCEDURE — 99999 PR PBB SHADOW E&M-EST. PATIENT-LVL V: CPT | Mod: PBBFAC,,, | Performed by: NURSE PRACTITIONER

## 2022-04-18 PROCEDURE — 3008F PR BODY MASS INDEX (BMI) DOCUMENTED: ICD-10-PCS | Mod: CPTII,S$GLB,, | Performed by: NURSE PRACTITIONER

## 2022-04-18 PROCEDURE — 99999 PR PBB SHADOW E&M-EST. PATIENT-LVL V: ICD-10-PCS | Mod: PBBFAC,,, | Performed by: NURSE PRACTITIONER

## 2022-04-18 PROCEDURE — 3008F BODY MASS INDEX DOCD: CPT | Mod: CPTII,S$GLB,, | Performed by: NURSE PRACTITIONER

## 2022-04-18 PROCEDURE — 3072F PR LOW RISK FOR RETINOPATHY: ICD-10-PCS | Mod: CPTII,S$GLB,, | Performed by: NURSE PRACTITIONER

## 2022-04-18 PROCEDURE — 3074F PR MOST RECENT SYSTOLIC BLOOD PRESSURE < 130 MM HG: ICD-10-PCS | Mod: CPTII,S$GLB,, | Performed by: NURSE PRACTITIONER

## 2022-04-18 PROCEDURE — 3078F DIAST BP <80 MM HG: CPT | Mod: CPTII,S$GLB,, | Performed by: NURSE PRACTITIONER

## 2022-04-18 PROCEDURE — 99214 OFFICE O/P EST MOD 30 MIN: CPT | Mod: S$GLB,,, | Performed by: NURSE PRACTITIONER

## 2022-04-18 PROCEDURE — 3078F PR MOST RECENT DIASTOLIC BLOOD PRESSURE < 80 MM HG: ICD-10-PCS | Mod: CPTII,S$GLB,, | Performed by: NURSE PRACTITIONER

## 2022-04-18 PROCEDURE — 3074F SYST BP LT 130 MM HG: CPT | Mod: CPTII,S$GLB,, | Performed by: NURSE PRACTITIONER

## 2022-04-18 PROCEDURE — 1160F PR REVIEW ALL MEDS BY PRESCRIBER/CLIN PHARMACIST DOCUMENTED: ICD-10-PCS | Mod: CPTII,S$GLB,, | Performed by: NURSE PRACTITIONER

## 2022-04-18 PROCEDURE — 1159F PR MEDICATION LIST DOCUMENTED IN MEDICAL RECORD: ICD-10-PCS | Mod: CPTII,S$GLB,, | Performed by: NURSE PRACTITIONER

## 2022-04-18 PROCEDURE — 1159F MED LIST DOCD IN RCRD: CPT | Mod: CPTII,S$GLB,, | Performed by: NURSE PRACTITIONER

## 2022-04-18 PROCEDURE — 99214 PR OFFICE/OUTPT VISIT, EST, LEVL IV, 30-39 MIN: ICD-10-PCS | Mod: S$GLB,,, | Performed by: NURSE PRACTITIONER

## 2022-04-18 RX ORDER — VORTIOXETINE 5 MG/1
1 TABLET, FILM COATED ORAL DAILY
COMMUNITY
Start: 2022-03-14 | End: 2023-03-03

## 2022-04-18 NOTE — ASSESSMENT & PLAN NOTE
On auto CPAP 4-20 cm   AHI: 2.9   Full face mask  HME: [facility]  Malfunction of new ResMed machine, not blowing air, needs appointment with [facility] to check machine. Provided Roopa's contact to make appointment on 1/18/2022 and again 4/18/2022  Patient spoke with Roopa 4/18/2022 to make appointment to have machine checked     Adherence     (DME) - [facility]  Reviewed therapeutic goals for positive airway pressure therapy Auto CPAP  Ideal is usage 100% of nights for 6 - 8 hours per night. Minimum usage is 70% of night for at least 4 hours per night used.

## 2022-04-18 NOTE — PROGRESS NOTES
Subjective:      Patient ID: Yuli Milton is a 46 y.o. female.    Chief Complaint: Asthma and Sleep Apnea    HPI: Yuli Milton presents to clinic for follow up for SCOUT with 3 month CPAP complaince assessment.  She is on Auto CPAP of 4-20 cmH2O pressure which is optimally controlling sleep apnea with apneic index (AHI) 2.9 events an hour.   Patient reports benefit from CPAP use. Compliance affected by falling asleep without machine in use. Then jan 2022 she had malfunction., has not had checked by [facility]. Has not resumed CPAP use.  She called [facility] and was told they will call her back, she states did not get return call.  She called [facility] today and arranged appointment to have her machine checked out.   Patient reports no complaints, except malfunction. Full face mask was tolerated.     Obtained ResMed CPAP machine in about November 2021  ResMed CPAP machine stopped blowing in Jan 2022.  Machine will turn on, but not blow air. Working fine up until about 2nd week in Jan 2022 9/24/2021, 9/27/2021 Home Sleep Study    2 night study  MILD OBSTRUCTIVE SLEEP APNEA with overall AHI 13.2/hr ( 52 events)  Oxygen desaturation: 80%. SpO2 between 85% to 89% for < 1 min.  Patient snored 78% time above 50 .  Heart rate range: 60 bpm -110 bpm     Compliance  Payor Standard  Usage 12/08/2021 - 01/06/2022  Usage days 19/30 days (63%)  >= 4 hours 9 days (30%)  < 4 hours 10 days (33%)  Usage hours 68 hours 12 minutes  Average usage (total days) 2 hours 16 minutes  Average usage (days used) 3 hours 35 minutes  Median usage (days used) 3 hours 56 minutes  Total used hours (value since last reset - 01/06/2022) 202 hours  AirSense 11 AutoSet  Serial number [id number]  Mode AutoSet  Min Pressure 4 cmH2O  Max Pressure 20 cmH2O  EPR Fulltime  EPR level 3  Response Standard  Therapy  Pressure - cmH2O Median: 5.8 95th percentile: 7.7 Maximum: 8.5  Leaks - L/min Median: 13.7 95th percentile: 22.5 Maximum:  26.8  Events per hour AI: 2.5 HI: 0.4 AHI: 2.9  Apnea Index Central: 0.5 Obstructive: 1.9 Unknown: 0.1  RERA Index 0.2  Cheyne-Hernandez respiration (average duration per night) 0 minutes (0%)  Usage - hours    She has not yet follow up up for her evaluate for gastric sleeve surgery with [facility] in [place], she is still pending cardiac stress test.          ASTHMA  The patient has a history of asthma.  Age when diagnosed with Asthma childhood.      She has 2 children with asthma. No siblings or parents with asthma.       The patient is not currently have symptoms or exacerbation.      Had slight asthma flare with covid 19 in January 2021, recovered at home.      She feels her asthma is well controlled on current treatment Breo 100 mcg. Albuterol inhaler.      Asthma Control Test  In the past 4  weeks, how much of the time did your asthma keep you from getting as much done at work, school or at home?: None of the time  During the past 4 weeks, how often have you had shortness of breath?: Not at all  During the past 4 weeks, how often did your asthma symptoms (wheezing, couging, shortness of breath, chest tightness or pain) wake you up at night or earlier than usual in the morning?: Not at all  During the past 4 weeks, how often have you used your rescue inhaler or nebulizer medication (such as albuterol)?: Once a week or less  How would you rate your asthma control during the past 4 weeks?: Completely controlled  If your score is 19 or less, your asthma may not be under control: 24              Current Disease Severity  Yuli has monthly daytime asthma symptoms. She has no nighttime asthma symptoms. The patient is using short-acting beta agonists for symptom control about twice a month or less. She has exacerbations requiring oral systemic corticosteroids 2 times per year. Current limitations in activity from asthma: none. Number of days of school or work missed in the last month: 0. Number of  urgent/emergent visit in last year: 1 with Covid 19 in Jan 2021.      Dyspnea Characteristics:   Exertional Dyspnea   Dyspnea Duration:  Chronic  Dyspnea Severity:  RAMON 2  Dyspnea Timing:  exertional  Dyspnea Contributing Factors:  Morbid obesity. asthma   Dyspnea Associated Symptoms:  none       11/20/2020 6mwd No desaturations requiring supplemental oxygen at rest or exertion. Oxygen desaturation did not meet criteria for supplemental oxygen prescription. Exercise capacity is normal.      Phase Oxygen Assessment Supplemental O2 Heart   Rate Blood Pressure Ramon Dyspnea Scale Rating   Resting 98 % Room Air 73 bpm 116/77 3   Exercise         Minute         1 100 % Room Air 108 bpm     2 100 % Room Air 117 bpm     3 94 % Room Air 116 bpm     4 93 % Room Air 113 bpm     5 99 % Room Air 130 bpm     6  95 % Room Air 123 bpm 94/54 4   Recovery         Minute         1 100 % Room Air 86 bpm     2 100 % Room Air 77 bpm     3 100 % Room Air 74 bpm     4 99 % Room Air 77 bpm 117/75 2     Six Minute Walk Summary   6MWT Status: completed without stopping   Patient Reported: Dyspnea, Other (Comment)(leg numbness     Employed full time as a [employer] , [employer].      Previous Report Reviewed: lab reports and office notes     Past Medical History: The following portions of the patient's history were reviewed and updated as appropriate:   She  has a past surgical history that includes Appendectomy; Dilation and curettage of uterus; Colonoscopy (N/A, 7/31/2017); Cholecystectomy; Hysterectomy (08/31/2016); Fracture surgery; and Oophorectomy.  Her family history includes Breast cancer in her maternal aunt and paternal grandmother; Cancer in her maternal aunt; Colon cancer in her maternal aunt; Diabetes in her maternal grandmother; Hypertension in her mother; Miscarriages / Stillbirths in her cousin; No Known Problems in her daughter, father, and son; Stroke in her maternal aunt; Thyroid disease in her  brother, mother, and sister.  She  reports that she has never smoked. She has never used smokeless tobacco. She reports that she does not drink alcohol and does not use drugs.  She has a current medication list which includes the following prescription(s): albuterol, albuterol, azelaic acid, blood sugar diagnostic, celecoxib, cequa, clindamycin, ergocalciferol, breo ellipta, gabapentin, haloperidol, hydrochlorothiazide, lancets, magnesium oxide, metformin, methen-m.blue-s.phos-phsal-hyo, metoprolol succinate, myrbetriq, nebulizer accessories, onetouch verio flex meter, oxybutynin, oxycodone-acetaminophen, pregabalin, promethazine-dextromethorphan, semaglutide, thiamine, tizanidine, tramadol, trintellix, fluoxetine, and olanzapine.  She is allergic to trulicity [dulaglutide] and hibiclens (isopropyl alcohol)..    The following portions of the patient's history were reviewed and updated as appropriate: allergies, current medications, past family history, past medical history, past social history, past surgical history and problem list.    Review of Systems   Constitutional: Negative for fever, chills, weight loss, weight gain, activity change, appetite change, fatigue and night sweats.   HENT: Negative for postnasal drip, rhinorrhea, sinus pressure, voice change and congestion.    Eyes: Negative for redness and itching.   Respiratory: Negative for snoring, cough, sputum production, chest tightness, shortness of breath, wheezing, orthopnea, asthma nighttime symptoms, dyspnea on extertion, use of rescue inhaler and somnolence.    Cardiovascular: Negative.  Negative for chest pain, palpitations and leg swelling.   Genitourinary: Negative for difficulty urinating and hematuria.   Endocrine: Negative for cold intolerance and heat intolerance.    Musculoskeletal: Negative for arthralgias, gait problem, joint swelling and myalgias.   Skin: Negative.    Gastrointestinal: Negative for nausea, vomiting, abdominal pain and acid  "reflux.   Neurological: Negative for dizziness, weakness, light-headedness and headaches.   Hematological: Negative for adenopathy. No excessive bruising.   All other systems reviewed and are negative.     Objective:   /76   Resp 16   Ht 5' 5" (1.651 m)   Wt 116.5 kg (256 lb 13.4 oz)   LMP 08/21/2016   BMI 42.74 kg/m²   Physical Exam  Vitals and nursing note reviewed.   Constitutional:       General: She is not in acute distress.     Appearance: She is well-developed. She is not ill-appearing or toxic-appearing.   HENT:      Head: Normocephalic.      Right Ear: External ear normal.      Left Ear: External ear normal.      Nose: Nose normal.      Mouth/Throat:      Pharynx: No oropharyngeal exudate.   Eyes:      Conjunctiva/sclera: Conjunctivae normal.   Cardiovascular:      Rate and Rhythm: Normal rate and regular rhythm.      Heart sounds: Normal heart sounds.   Pulmonary:      Effort: Pulmonary effort is normal.      Breath sounds: Normal breath sounds. No stridor.   Abdominal:      Palpations: Abdomen is soft.   Musculoskeletal:         General: Normal range of motion.      Cervical back: Normal range of motion and neck supple.   Lymphadenopathy:      Cervical: No cervical adenopathy.   Skin:     General: Skin is warm and dry.   Neurological:      Mental Status: She is alert and oriented to person, place, and time.   Psychiatric:         Behavior: Behavior normal. Behavior is cooperative.         Thought Content: Thought content normal.         Judgment: Judgment normal.         Personal Diagnostic Review  CPAP download    Assessment:     1. 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 Class 3 severe obesity due to excess calories without serious comorbidity with body mass index (BMI) of 40.0 to 44.9 in adult    3. Mild persistent asthma without complication    4. Type 2 diabetes mellitus with hyperglycemia, without long-term current use of insulin        No orders of the defined types were placed in this encounter.    Plan: "     Problem List Items Addressed This Visit     Type 2 diabetes mellitus with hyperglycemia, without long-term current use of insulin     Managed by primary care provider  Hemoglobin A1C   Date Value Ref Range Status   12/16/2021 5.9 (H) 4.0 - 5.6 % Final     Comment:     ADA Screening Guidelines:  5.7-6.4%  Consistent with prediabetes  >or=6.5%  Consistent with diabetes    High levels of fetal hemoglobin interfere with the HbA1C  assay. Heterozygous hemoglobin variants (HbS, HgC, etc)do  not significantly interfere with this assay.   However, presence of multiple variants may affect accuracy.     09/23/2021 5.8 (H) 4.0 - 5.6 % Final     Comment:     ADA Screening Guidelines:  5.7-6.4%  Consistent with prediabetes  >or=6.5%  Consistent with diabetes    High levels of fetal hemoglobin interfere with the HbA1C  assay. Heterozygous hemoglobin variants (HbS, HgC, etc)do  not significantly interfere with this assay.   However, presence of multiple variants may affect accuracy.     03/01/2021 7.0 (H) 4.0 - 5.6 % Final     Comment:     ADA Screening Guidelines:  5.7-6.4%  Consistent with prediabetes  >or=6.5%  Consistent with diabetes    High levels of fetal hemoglobin interfere with the HbA1C  assay. Heterozygous hemoglobin variants (HbS, HgC, etc)do  not significantly interfere with this assay.   However, presence of multiple variants may affect accuracy.                  SCOUT on CPAP - Primary     On auto CPAP 4-20 cm   AHI: 2.9   Full face mask  HME: [facility]  Malfunction of new ResMed machine, not blowing air, needs appointment with [facility] to check machine. Provided [facility]'s contact to make appointment on 1/18/2022 and again 4/18/2022  Patient spoke with [facility] 4/18/2022 to make appointment to have machine checked     Adherence     (DME) - [facility]  Reviewed therapeutic goals for positive airway pressure therapy Auto CPAP  Ideal is usage 100% of nights for 6 - 8 hours per night. Minimum usage is 70% of night for at  least 4 hours per night used.           Mild persistent asthma without complication     Controlled  Continue Breo 100 mcg. Albuterol inhaler. Albuterol nebs.              Class 3 severe obesity due to excess calories without serious comorbidity with body mass index (BMI) of 40.0 to 44.9 in adult     Encouraged calorie reduction and 30 minutes of exercise daily. Discussed impact of obesity on general health.  Wt Readings from Last 9 Encounters:   04/18/22 116.5 kg (256 lb 13.4 oz)   01/18/22 116.4 kg (256 lb 11.6 oz)   12/16/21 117.1 kg (258 lb 2.5 oz)   11/11/21 117.1 kg (258 lb 2.5 oz)   11/09/21 117.1 kg (258 lb 2.5 oz)   11/02/21 117 kg (258 lb)   09/29/21 117.2 kg (258 lb 6.1 oz)   09/24/21 117.2 kg (258 lb 6.1 oz)   09/23/21 115.5 kg (254 lb 10.1 oz)   Body mass index is 42.74 kg/m².                 Follow up in about 3 months (around 7/18/2022) for CPAP compliance download not compliant at initial.

## 2022-04-18 NOTE — ASSESSMENT & PLAN NOTE
Encouraged calorie reduction and 30 minutes of exercise daily. Discussed impact of obesity on general health.  Wt Readings from Last 9 Encounters:   04/18/22 116.5 kg (256 lb 13.4 oz)   01/18/22 116.4 kg (256 lb 11.6 oz)   12/16/21 117.1 kg (258 lb 2.5 oz)   11/11/21 117.1 kg (258 lb 2.5 oz)   11/09/21 117.1 kg (258 lb 2.5 oz)   11/02/21 117 kg (258 lb)   09/29/21 117.2 kg (258 lb 6.1 oz)   09/24/21 117.2 kg (258 lb 6.1 oz)   09/23/21 115.5 kg (254 lb 10.1 oz)   Body mass index is 42.74 kg/m².

## 2022-04-19 ENCOUNTER — PATIENT MESSAGE (OUTPATIENT)
Dept: INTERNAL MEDICINE | Facility: CLINIC | Age: 47
End: 2022-04-19
Payer: COMMERCIAL

## 2022-04-24 NOTE — PROGRESS NOTES
Subjective:       Patient ID: Yuli Milton is a 46 y.o. female.    Chief Complaint: Follow-up    Headache   This is a new problem. The current episode started 1 to 4 weeks ago. The problem occurs constantly. The problem has been gradually worsening. The pain does not radiate. The quality of the pain is described as aching, shooting and throbbing. The pain is moderate. Pertinent negatives include no abdominal pain.     Review of Systems   Respiratory: Negative for shortness of breath.    Cardiovascular: Negative for chest pain.   Gastrointestinal: Negative for abdominal pain.   Neurological: Positive for headaches.       Objective:      Physical Exam  Vitals and nursing note reviewed.   Constitutional:       General: She is not in acute distress.     Appearance: Normal appearance. She is well-developed. She is not diaphoretic.   HENT:      Head: Normocephalic and atraumatic.   Pulmonary:      Effort: Pulmonary effort is normal. No respiratory distress.      Breath sounds: Normal breath sounds. No wheezing.   Musculoskeletal:      Comments: donis flexion of neck   Skin:     General: Skin is warm and dry.      Findings: No erythema or rash.   Neurological:      Mental Status: She is alert.         Assessment:       1. Strain of neck muscle, initial encounter    2. Essential hypertension    3. Mixed hyperlipidemia    4. Moderate episode of recurrent major depressive disorder    5. Nonintractable headache, unspecified chronicity pattern, unspecified headache type    6. Fibromyalgia    7. Fatigue, unspecified type    8. Type 2 diabetes mellitus with hyperglycemia, without long-term current use of insulin        Plan:     Problem List Items Addressed This Visit        Neuro    Headache       Psychiatric    Moderate episode of recurrent major depressive disorder       Cardiac/Vascular    Essential hypertension    Overview     Chronic, Stable, cont hydrochlorothiazide           Mixed hyperlipidemia    Overview      Chronic, Stable, cont statin           Relevant Medications    atorvastatin (LIPITOR) 20 MG tablet       Endocrine    Type 2 diabetes mellitus with hyperglycemia, without long-term current use of insulin    Overview     Chronic, Stable, cont ozempic           Relevant Medications    semaglutide (OZEMPIC) 1 mg/dose (4 mg/3 mL)       Orthopedic    Cervical strain - Primary    Current Assessment & Plan     Toradol injection. Do not take aspirin or nsaids until 48 hrs after injection.  Norflex injection. Do not take flexeril until 48 hrs after injection.             Relevant Medications    ketorolac injection 60 mg    orphenadrine injection 60 mg    tiZANidine (ZANAFLEX) 4 MG tablet    Other Relevant Orders    Ambulatory referral/consult to Physical/Occupational Therapy       Other    Fatigue    Relevant Orders    Urinalysis, Reflex to Urine Culture Urine, Clean Catch      Other Visit Diagnoses     Fibromyalgia        Relevant Medications    tiZANidine (ZANAFLEX) 4 MG tablet

## 2022-04-25 ENCOUNTER — LAB VISIT (OUTPATIENT)
Dept: LAB | Facility: HOSPITAL | Age: 47
End: 2022-04-25
Attending: FAMILY MEDICINE
Payer: COMMERCIAL

## 2022-04-25 ENCOUNTER — OFFICE VISIT (OUTPATIENT)
Dept: INTERNAL MEDICINE | Facility: CLINIC | Age: 47
End: 2022-04-25
Payer: COMMERCIAL

## 2022-04-25 VITALS
BODY MASS INDEX: 42.61 KG/M2 | SYSTOLIC BLOOD PRESSURE: 118 MMHG | DIASTOLIC BLOOD PRESSURE: 84 MMHG | HEIGHT: 65 IN | TEMPERATURE: 98 F | WEIGHT: 255.75 LBS | HEART RATE: 74 BPM

## 2022-04-25 DIAGNOSIS — E78.2 MIXED HYPERLIPIDEMIA: ICD-10-CM

## 2022-04-25 DIAGNOSIS — F33.1 MODERATE EPISODE OF RECURRENT MAJOR DEPRESSIVE DISORDER: ICD-10-CM

## 2022-04-25 DIAGNOSIS — M79.7 FIBROMYALGIA: ICD-10-CM

## 2022-04-25 DIAGNOSIS — I10 ESSENTIAL HYPERTENSION: ICD-10-CM

## 2022-04-25 DIAGNOSIS — E11.65 TYPE 2 DIABETES MELLITUS WITH HYPERGLYCEMIA, WITHOUT LONG-TERM CURRENT USE OF INSULIN: ICD-10-CM

## 2022-04-25 DIAGNOSIS — S16.1XXA STRAIN OF NECK MUSCLE, INITIAL ENCOUNTER: Primary | ICD-10-CM

## 2022-04-25 DIAGNOSIS — R51.9 NONINTRACTABLE HEADACHE, UNSPECIFIED CHRONICITY PATTERN, UNSPECIFIED HEADACHE TYPE: ICD-10-CM

## 2022-04-25 DIAGNOSIS — R53.83 FATIGUE, UNSPECIFIED TYPE: ICD-10-CM

## 2022-04-25 PROCEDURE — 3079F DIAST BP 80-89 MM HG: CPT | Mod: CPTII,S$GLB,, | Performed by: FAMILY MEDICINE

## 2022-04-25 PROCEDURE — 99999 PR PBB SHADOW E&M-EST. PATIENT-LVL III: CPT | Mod: PBBFAC,,, | Performed by: FAMILY MEDICINE

## 2022-04-25 PROCEDURE — 3008F BODY MASS INDEX DOCD: CPT | Mod: CPTII,S$GLB,, | Performed by: FAMILY MEDICINE

## 2022-04-25 PROCEDURE — 3074F PR MOST RECENT SYSTOLIC BLOOD PRESSURE < 130 MM HG: ICD-10-PCS | Mod: CPTII,S$GLB,, | Performed by: FAMILY MEDICINE

## 2022-04-25 PROCEDURE — 36415 COLL VENOUS BLD VENIPUNCTURE: CPT | Mod: PO | Performed by: FAMILY MEDICINE

## 2022-04-25 PROCEDURE — 96372 THER/PROPH/DIAG INJ SC/IM: CPT | Mod: S$GLB,,, | Performed by: FAMILY MEDICINE

## 2022-04-25 PROCEDURE — 1159F MED LIST DOCD IN RCRD: CPT | Mod: CPTII,S$GLB,, | Performed by: FAMILY MEDICINE

## 2022-04-25 PROCEDURE — 83036 HEMOGLOBIN GLYCOSYLATED A1C: CPT | Performed by: FAMILY MEDICINE

## 2022-04-25 PROCEDURE — 3074F SYST BP LT 130 MM HG: CPT | Mod: CPTII,S$GLB,, | Performed by: FAMILY MEDICINE

## 2022-04-25 PROCEDURE — 3079F PR MOST RECENT DIASTOLIC BLOOD PRESSURE 80-89 MM HG: ICD-10-PCS | Mod: CPTII,S$GLB,, | Performed by: FAMILY MEDICINE

## 2022-04-25 PROCEDURE — 3072F PR LOW RISK FOR RETINOPATHY: ICD-10-PCS | Mod: CPTII,S$GLB,, | Performed by: FAMILY MEDICINE

## 2022-04-25 PROCEDURE — 1159F PR MEDICATION LIST DOCUMENTED IN MEDICAL RECORD: ICD-10-PCS | Mod: CPTII,S$GLB,, | Performed by: FAMILY MEDICINE

## 2022-04-25 PROCEDURE — 99214 PR OFFICE/OUTPT VISIT, EST, LEVL IV, 30-39 MIN: ICD-10-PCS | Mod: 25,S$GLB,, | Performed by: FAMILY MEDICINE

## 2022-04-25 PROCEDURE — 99214 OFFICE O/P EST MOD 30 MIN: CPT | Mod: 25,S$GLB,, | Performed by: FAMILY MEDICINE

## 2022-04-25 PROCEDURE — 3008F PR BODY MASS INDEX (BMI) DOCUMENTED: ICD-10-PCS | Mod: CPTII,S$GLB,, | Performed by: FAMILY MEDICINE

## 2022-04-25 PROCEDURE — 1160F RVW MEDS BY RX/DR IN RCRD: CPT | Mod: CPTII,S$GLB,, | Performed by: FAMILY MEDICINE

## 2022-04-25 PROCEDURE — 81001 URINALYSIS AUTO W/SCOPE: CPT | Performed by: FAMILY MEDICINE

## 2022-04-25 PROCEDURE — 96372 PR INJECTION,THERAP/PROPH/DIAG2ST, IM OR SUBCUT: ICD-10-PCS | Mod: S$GLB,,, | Performed by: FAMILY MEDICINE

## 2022-04-25 PROCEDURE — 1160F PR REVIEW ALL MEDS BY PRESCRIBER/CLIN PHARMACIST DOCUMENTED: ICD-10-PCS | Mod: CPTII,S$GLB,, | Performed by: FAMILY MEDICINE

## 2022-04-25 PROCEDURE — 99999 PR PBB SHADOW E&M-EST. PATIENT-LVL III: ICD-10-PCS | Mod: PBBFAC,,, | Performed by: FAMILY MEDICINE

## 2022-04-25 PROCEDURE — 3072F LOW RISK FOR RETINOPATHY: CPT | Mod: CPTII,S$GLB,, | Performed by: FAMILY MEDICINE

## 2022-04-25 RX ORDER — TIZANIDINE 4 MG/1
4 TABLET ORAL EVERY 6 HOURS PRN
Qty: 60 TABLET | Refills: 1 | Status: SHIPPED | OUTPATIENT
Start: 2022-04-25 | End: 2022-11-16

## 2022-04-25 RX ORDER — KETOROLAC TROMETHAMINE 30 MG/ML
60 INJECTION, SOLUTION INTRAMUSCULAR; INTRAVENOUS
Status: COMPLETED | OUTPATIENT
Start: 2022-04-25 | End: 2022-04-25

## 2022-04-25 RX ORDER — ATORVASTATIN CALCIUM 20 MG/1
20 TABLET, FILM COATED ORAL DAILY
Qty: 90 TABLET | Refills: 0 | Status: SHIPPED | OUTPATIENT
Start: 2022-04-25 | End: 2023-08-29

## 2022-04-25 RX ORDER — ORPHENADRINE CITRATE 30 MG/ML
60 INJECTION INTRAMUSCULAR; INTRAVENOUS
Status: COMPLETED | OUTPATIENT
Start: 2022-04-25 | End: 2022-04-25

## 2022-04-25 RX ADMIN — KETOROLAC TROMETHAMINE 60 MG: 30 INJECTION, SOLUTION INTRAMUSCULAR; INTRAVENOUS at 11:04

## 2022-04-25 RX ADMIN — ORPHENADRINE CITRATE 60 MG: 30 INJECTION INTRAMUSCULAR; INTRAVENOUS at 11:04

## 2022-04-25 NOTE — ASSESSMENT & PLAN NOTE
Toradol injection. Do not take aspirin or nsaids until 48 hrs after injection.  Norflex injection. Do not take flexeril until 48 hrs after injection.

## 2022-04-26 ENCOUNTER — PATIENT MESSAGE (OUTPATIENT)
Dept: UROLOGY | Facility: CLINIC | Age: 47
End: 2022-04-26
Payer: COMMERCIAL

## 2022-04-26 LAB
AMORPH CRY UR QL COMP ASSIST: ABNORMAL
BILIRUB UR QL STRIP: NEGATIVE
CLARITY UR REFRACT.AUTO: ABNORMAL
COLOR UR AUTO: YELLOW
ESTIMATED AVG GLUCOSE: 126 MG/DL (ref 68–131)
GLUCOSE UR QL STRIP: NEGATIVE
HBA1C MFR BLD: 6 % (ref 4–5.6)
HGB UR QL STRIP: NEGATIVE
KETONES UR QL STRIP: NEGATIVE
LEUKOCYTE ESTERASE UR QL STRIP: NEGATIVE
MICROSCOPIC COMMENT: ABNORMAL
NITRITE UR QL STRIP: NEGATIVE
PH UR STRIP: 5 [PH] (ref 5–8)
PROT UR QL STRIP: NEGATIVE
SP GR UR STRIP: 1.03 (ref 1–1.03)
URN SPEC COLLECT METH UR: ABNORMAL

## 2022-04-27 RX ORDER — CIPROFLOXACIN 500 MG/1
500 TABLET ORAL 2 TIMES DAILY
Qty: 14 TABLET | Refills: 0 | Status: SHIPPED | OUTPATIENT
Start: 2022-04-27 | End: 2022-05-04

## 2022-04-29 LAB
LEFT EYE DM RETINOPATHY: NEGATIVE
RIGHT EYE DM RETINOPATHY: NEGATIVE

## 2022-05-05 ENCOUNTER — PATIENT MESSAGE (OUTPATIENT)
Dept: BARIATRICS | Facility: CLINIC | Age: 47
End: 2022-05-05
Payer: COMMERCIAL

## 2022-05-10 ENCOUNTER — PATIENT MESSAGE (OUTPATIENT)
Dept: BARIATRICS | Facility: CLINIC | Age: 47
End: 2022-05-10
Payer: COMMERCIAL

## 2022-05-23 ENCOUNTER — PATIENT OUTREACH (OUTPATIENT)
Dept: ADMINISTRATIVE | Facility: HOSPITAL | Age: 47
End: 2022-05-23
Payer: COMMERCIAL

## 2022-06-03 ENCOUNTER — OFFICE VISIT (OUTPATIENT)
Dept: INTERNAL MEDICINE | Facility: CLINIC | Age: 47
End: 2022-06-03
Payer: COMMERCIAL

## 2022-06-03 ENCOUNTER — LAB VISIT (OUTPATIENT)
Dept: LAB | Facility: HOSPITAL | Age: 47
End: 2022-06-03
Attending: NURSE PRACTITIONER
Payer: COMMERCIAL

## 2022-06-03 VITALS
BODY MASS INDEX: 44.18 KG/M2 | WEIGHT: 265.19 LBS | SYSTOLIC BLOOD PRESSURE: 130 MMHG | HEIGHT: 65 IN | HEART RATE: 86 BPM | OXYGEN SATURATION: 95 % | DIASTOLIC BLOOD PRESSURE: 90 MMHG | TEMPERATURE: 98 F

## 2022-06-03 DIAGNOSIS — I10 ESSENTIAL HYPERTENSION: ICD-10-CM

## 2022-06-03 DIAGNOSIS — Z11.3 SCREENING EXAMINATION FOR SEXUALLY TRANSMITTED DISEASE: ICD-10-CM

## 2022-06-03 DIAGNOSIS — N39.0 URINARY TRACT INFECTION WITHOUT HEMATURIA, SITE UNSPECIFIED: Primary | ICD-10-CM

## 2022-06-03 DIAGNOSIS — E11.65 TYPE 2 DIABETES MELLITUS WITH HYPERGLYCEMIA, WITHOUT LONG-TERM CURRENT USE OF INSULIN: ICD-10-CM

## 2022-06-03 LAB
BILIRUB SERPL-MCNC: NEGATIVE MG/DL
BLOOD URINE, POC: NORMAL
CLARITY, POC UA: CLEAR
COLOR, POC UA: NORMAL
GLUCOSE UR QL STRIP: NORMAL
KETONES UR QL STRIP: NEGATIVE
LEUKOCYTE ESTERASE URINE, POC: NEGATIVE
NITRITE, POC UA: POSITIVE
PH, POC UA: 6
PROTEIN, POC: NORMAL
SPECIFIC GRAVITY, POC UA: 1.01
UROBILINOGEN, POC UA: NORMAL

## 2022-06-03 PROCEDURE — 81002 URINALYSIS NONAUTO W/O SCOPE: CPT | Mod: S$GLB,,, | Performed by: NURSE PRACTITIONER

## 2022-06-03 PROCEDURE — 3044F HG A1C LEVEL LT 7.0%: CPT | Mod: CPTII,S$GLB,, | Performed by: NURSE PRACTITIONER

## 2022-06-03 PROCEDURE — 81002 POCT URINE DIPSTICK WITHOUT MICROSCOPE: ICD-10-PCS | Mod: S$GLB,,, | Performed by: NURSE PRACTITIONER

## 2022-06-03 PROCEDURE — 87389 HIV-1 AG W/HIV-1&-2 AB AG IA: CPT | Performed by: NURSE PRACTITIONER

## 2022-06-03 PROCEDURE — 3080F DIAST BP >= 90 MM HG: CPT | Mod: CPTII,S$GLB,, | Performed by: NURSE PRACTITIONER

## 2022-06-03 PROCEDURE — 86592 SYPHILIS TEST NON-TREP QUAL: CPT | Performed by: NURSE PRACTITIONER

## 2022-06-03 PROCEDURE — 87086 URINE CULTURE/COLONY COUNT: CPT | Performed by: NURSE PRACTITIONER

## 2022-06-03 PROCEDURE — 99999 PR PBB SHADOW E&M-EST. PATIENT-LVL V: CPT | Mod: PBBFAC,,, | Performed by: NURSE PRACTITIONER

## 2022-06-03 PROCEDURE — 99999 PR PBB SHADOW E&M-EST. PATIENT-LVL V: ICD-10-PCS | Mod: PBBFAC,,, | Performed by: NURSE PRACTITIONER

## 2022-06-03 PROCEDURE — 99214 PR OFFICE/OUTPT VISIT, EST, LEVL IV, 30-39 MIN: ICD-10-PCS | Mod: S$GLB,,, | Performed by: NURSE PRACTITIONER

## 2022-06-03 PROCEDURE — 1160F PR REVIEW ALL MEDS BY PRESCRIBER/CLIN PHARMACIST DOCUMENTED: ICD-10-PCS | Mod: CPTII,S$GLB,, | Performed by: NURSE PRACTITIONER

## 2022-06-03 PROCEDURE — 1159F PR MEDICATION LIST DOCUMENTED IN MEDICAL RECORD: ICD-10-PCS | Mod: CPTII,S$GLB,, | Performed by: NURSE PRACTITIONER

## 2022-06-03 PROCEDURE — 1159F MED LIST DOCD IN RCRD: CPT | Mod: CPTII,S$GLB,, | Performed by: NURSE PRACTITIONER

## 2022-06-03 PROCEDURE — 3072F LOW RISK FOR RETINOPATHY: CPT | Mod: CPTII,S$GLB,, | Performed by: NURSE PRACTITIONER

## 2022-06-03 PROCEDURE — 36415 COLL VENOUS BLD VENIPUNCTURE: CPT | Mod: PO | Performed by: NURSE PRACTITIONER

## 2022-06-03 PROCEDURE — 87491 CHLMYD TRACH DNA AMP PROBE: CPT | Performed by: NURSE PRACTITIONER

## 2022-06-03 PROCEDURE — 3008F PR BODY MASS INDEX (BMI) DOCUMENTED: ICD-10-PCS | Mod: CPTII,S$GLB,, | Performed by: NURSE PRACTITIONER

## 2022-06-03 PROCEDURE — 99214 OFFICE O/P EST MOD 30 MIN: CPT | Mod: S$GLB,,, | Performed by: NURSE PRACTITIONER

## 2022-06-03 PROCEDURE — 1160F RVW MEDS BY RX/DR IN RCRD: CPT | Mod: CPTII,S$GLB,, | Performed by: NURSE PRACTITIONER

## 2022-06-03 PROCEDURE — 3072F PR LOW RISK FOR RETINOPATHY: ICD-10-PCS | Mod: CPTII,S$GLB,, | Performed by: NURSE PRACTITIONER

## 2022-06-03 PROCEDURE — 3075F SYST BP GE 130 - 139MM HG: CPT | Mod: CPTII,S$GLB,, | Performed by: NURSE PRACTITIONER

## 2022-06-03 PROCEDURE — 3075F PR MOST RECENT SYSTOLIC BLOOD PRESS GE 130-139MM HG: ICD-10-PCS | Mod: CPTII,S$GLB,, | Performed by: NURSE PRACTITIONER

## 2022-06-03 PROCEDURE — 3008F BODY MASS INDEX DOCD: CPT | Mod: CPTII,S$GLB,, | Performed by: NURSE PRACTITIONER

## 2022-06-03 PROCEDURE — 87591 N.GONORRHOEAE DNA AMP PROB: CPT | Performed by: NURSE PRACTITIONER

## 2022-06-03 PROCEDURE — 3080F PR MOST RECENT DIASTOLIC BLOOD PRESSURE >= 90 MM HG: ICD-10-PCS | Mod: CPTII,S$GLB,, | Performed by: NURSE PRACTITIONER

## 2022-06-03 PROCEDURE — 3044F PR MOST RECENT HEMOGLOBIN A1C LEVEL <7.0%: ICD-10-PCS | Mod: CPTII,S$GLB,, | Performed by: NURSE PRACTITIONER

## 2022-06-03 RX ORDER — NITROFURANTOIN 25; 75 MG/1; MG/1
100 CAPSULE ORAL 2 TIMES DAILY
Qty: 14 CAPSULE | Refills: 0 | Status: SHIPPED | OUTPATIENT
Start: 2022-06-03 | End: 2022-06-10

## 2022-06-03 NOTE — PROGRESS NOTES
"Subjective:       Patient ID: Yuli Milton is a 46 y.o. female.    Chief Complaint: Low-back Pain (Pt stated bladder related)    Pt here today for back pain that started 1 week ago. Pt reports she initially thought it was due to a recent fall but then urinary frequency and dysuria began a few days ago. Pt denies fever. Reports cloudy, foul smelling urine. Pt reports new sex partner and desires STD testing.     Pt reports compliance with her diabetic meds(Metformin, Ozemic). Last A1C 04/25/2022 6.0. Pt reports she is having trouble loosing weight. Reports Ozempic is not helping to decrease her appetite. Reports fast food on a regular basis. No formal exercise but walks a lot with her job. Diet and lifestyle changes discussed.     Of note, pt was seen in ER on 5/25/2022 at [facility] due to a fall. Pt reports she slipped between buses and scraped her leg. Xray of ankle/foot neg. CT of head neg. Abrasions healing to left leg. Pt reports she is feeling better from the fall except for her back.      BP (!) 130/90   Pulse 86   Temp 97.6 °F (36.4 °C) (Temporal)   Ht 5' 5" (1.651 m)   Wt 120.3 kg (265 lb 3.4 oz)   LMP 08/21/2016   SpO2 95%   BMI 44.13 kg/m²     Review of Systems   Constitutional: Negative for activity change, appetite change, chills, diaphoresis, fatigue, fever and unexpected weight change.   HENT: Negative.    Respiratory: Negative for cough and shortness of breath.    Cardiovascular: Negative for chest pain, palpitations and leg swelling.   Gastrointestinal: Negative.  Negative for nausea.   Genitourinary: Positive for dysuria and frequency. Negative for vaginal discharge.   Musculoskeletal: Positive for back pain.   Skin: Negative for color change, pallor, rash and wound.   Allergic/Immunologic: Negative for immunocompromised state.   Neurological: Negative.  Negative for dizziness and facial asymmetry.   Hematological: Negative for adenopathy. Does not bruise/bleed easily. "   Psychiatric/Behavioral: Negative for agitation, behavioral problems and confusion.       Objective:      Physical Exam  Vitals and nursing note reviewed.   Constitutional:       General: She is not in acute distress.     Appearance: Normal appearance. She is well-developed. She is not diaphoretic.   HENT:      Head: Normocephalic and atraumatic.      Right Ear: External ear normal.      Left Ear: External ear normal.      Nose: Nose normal.   Eyes:      General:         Right eye: No discharge.         Left eye: No discharge.      Conjunctiva/sclera: Conjunctivae normal.   Cardiovascular:      Rate and Rhythm: Normal rate and regular rhythm.      Heart sounds: Normal heart sounds. No murmur heard.  Pulmonary:      Effort: Pulmonary effort is normal. No respiratory distress.      Breath sounds: Normal breath sounds. No wheezing or rales.   Chest:      Chest wall: No tenderness.   Abdominal:      General: There is no distension.      Palpations: Abdomen is soft.      Tenderness: There is no abdominal tenderness. There is no right CVA tenderness or left CVA tenderness.      Hernia: No hernia is present.   Musculoskeletal:         General: No tenderness. Normal range of motion.      Cervical back: Normal range of motion.   Skin:     General: Skin is warm and dry.      Findings: Abrasion (left leg) present. No erythema or rash.   Neurological:      Mental Status: She is alert and oriented to person, place, and time.   Psychiatric:         Behavior: Behavior normal.         Thought Content: Thought content normal.         Judgment: Judgment normal.         Assessment:       1. Urinary tract infection without hematuria, site unspecified    2. Essential hypertension    3. Type 2 diabetes mellitus with hyperglycemia, without long-term current use of insulin    4. Screening examination for sexually transmitted disease        Plan:       Yuli was seen today for low-back pain.    Diagnoses and all orders for this  visit:    Urinary tract infection without hematuria, site unspecified  Comments:  complete antibioics. Drink lots of water. Void often.   Orders:  -     POCT URINE DIPSTICK WITHOUT MICROSCOPE  -     nitrofurantoin, macrocrystal-monohydrate, (MACROBID) 100 MG capsule; Take 1 capsule (100 mg total) by mouth 2 (two) times daily. for 7 days  -     Urine culture    Essential hypertension  Comments:  stable, continue current meds    Type 2 diabetes mellitus with hyperglycemia, without long-term current use of insulin  Comments:  discussed diet and lifestyle changes to help weight loss. Continue meds as prescribed    Screening examination for sexually transmitted disease  -     HIV 1/2 Ag/Ab (4th Gen); Future  -     C. trachomatis/N. gonorrhoeae by AMP DNA  -     RPR; Future      There are no Patient Instructions on file for this visit.

## 2022-06-04 LAB — RPR SER QL: NORMAL

## 2022-06-05 LAB — BACTERIA UR CULT: NORMAL

## 2022-06-06 LAB
C TRACH DNA SPEC QL NAA+PROBE: NOT DETECTED
HIV 1+2 AB+HIV1 P24 AG SERPL QL IA: NEGATIVE
N GONORRHOEA DNA SPEC QL NAA+PROBE: NOT DETECTED

## 2022-06-10 ENCOUNTER — PATIENT MESSAGE (OUTPATIENT)
Dept: BARIATRICS | Facility: CLINIC | Age: 47
End: 2022-06-10
Payer: COMMERCIAL

## 2022-06-14 ENCOUNTER — PATIENT MESSAGE (OUTPATIENT)
Dept: BARIATRICS | Facility: CLINIC | Age: 47
End: 2022-06-14
Payer: COMMERCIAL

## 2022-06-20 ENCOUNTER — PATIENT MESSAGE (OUTPATIENT)
Dept: GASTROENTEROLOGY | Facility: CLINIC | Age: 47
End: 2022-06-20

## 2022-06-20 ENCOUNTER — OFFICE VISIT (OUTPATIENT)
Dept: GASTROENTEROLOGY | Facility: CLINIC | Age: 47
End: 2022-06-20
Payer: COMMERCIAL

## 2022-06-20 DIAGNOSIS — R93.5 ABNORMAL CT SCAN, PELVIS: ICD-10-CM

## 2022-06-20 DIAGNOSIS — N83.8 ENLARGED OVARY: ICD-10-CM

## 2022-06-20 DIAGNOSIS — R10.84 GENERALIZED ABDOMINAL PAIN: Primary | ICD-10-CM

## 2022-06-20 PROCEDURE — 1160F RVW MEDS BY RX/DR IN RCRD: CPT | Mod: CPTII,95,, | Performed by: NURSE PRACTITIONER

## 2022-06-20 PROCEDURE — 1159F PR MEDICATION LIST DOCUMENTED IN MEDICAL RECORD: ICD-10-PCS | Mod: CPTII,95,, | Performed by: NURSE PRACTITIONER

## 2022-06-20 PROCEDURE — 3072F LOW RISK FOR RETINOPATHY: CPT | Mod: CPTII,95,, | Performed by: NURSE PRACTITIONER

## 2022-06-20 PROCEDURE — 3044F HG A1C LEVEL LT 7.0%: CPT | Mod: CPTII,95,, | Performed by: NURSE PRACTITIONER

## 2022-06-20 PROCEDURE — 3072F PR LOW RISK FOR RETINOPATHY: ICD-10-PCS | Mod: CPTII,95,, | Performed by: NURSE PRACTITIONER

## 2022-06-20 PROCEDURE — 1159F MED LIST DOCD IN RCRD: CPT | Mod: CPTII,95,, | Performed by: NURSE PRACTITIONER

## 2022-06-20 PROCEDURE — 99214 OFFICE O/P EST MOD 30 MIN: CPT | Mod: 95,,, | Performed by: NURSE PRACTITIONER

## 2022-06-20 PROCEDURE — 99214 PR OFFICE/OUTPT VISIT, EST, LEVL IV, 30-39 MIN: ICD-10-PCS | Mod: 95,,, | Performed by: NURSE PRACTITIONER

## 2022-06-20 PROCEDURE — 3044F PR MOST RECENT HEMOGLOBIN A1C LEVEL <7.0%: ICD-10-PCS | Mod: CPTII,95,, | Performed by: NURSE PRACTITIONER

## 2022-06-20 PROCEDURE — 1160F PR REVIEW ALL MEDS BY PRESCRIBER/CLIN PHARMACIST DOCUMENTED: ICD-10-PCS | Mod: CPTII,95,, | Performed by: NURSE PRACTITIONER

## 2022-06-20 RX ORDER — OMEPRAZOLE 20 MG/1
20 CAPSULE, DELAYED RELEASE ORAL DAILY
Qty: 30 CAPSULE | Refills: 11 | Status: SHIPPED | OUTPATIENT
Start: 2022-06-20 | End: 2023-03-03

## 2022-06-20 NOTE — PROGRESS NOTES
Clinic Follow Up:  [facility] Follow Up Note    Reason for Follow Up:  The primary encounter diagnosis was Generalized abdominal pain. A diagnosis of Abnormal CT scan, pelvis was also pertinent to this visit.    PCP: Drew Gutierrez       The patient location is: [place]   The chief complaint leading to consultation is: abdominal pain     Visit type: audiovisual    Face to Face time with patient: 15 minutes   20 minutes of total time spent on the encounter, which includes face to face time and non-face to face time preparing to see the patient (eg, review of tests), Obtaining and/or reviewing separately obtained history, Documenting clinical information in the electronic or other health record, Independently interpreting results (not separately reported) and communicating results to the patient/family/caregiver, or Care coordination (not separately reported).         Each patient to whom he or she provides medical services by telemedicine is:  (1) informed of the relationship between the physician and patient and the respective role of any other health care provider with respect to management of the patient; and (2) notified that he or she may decline to receive medical services by telemedicine and may withdraw from such care at any time.    Notes:       HPI:  This is a 46 y.o. female here for follow up of abdominal pain.   She is known to me from previous encounters.  Her last visit was in 2020. She reports doing well since her last visit until recently when she started with generalized abdominal pain and left back pain.  She was seen in the ER at an outside facility yesterday and had a CT scan abdomen pelvis without contrast.  This was reviewed in Care everywhere.  Findings of (1) asymmetric enlargement of the right over which is new since the CT scan performed earlier this month, raising the possibility of a developing ovarian mass.  There is no obvious free fluid in the pelvis.  (2) Stable  relative prominence of the right renal collecting system versus the possibility of a parapelvic cyst. There is still no evidence of any renal or ureteral calculus or hydronephrosis. (3)Probable minimal diverticulosis noted junction of the descending colon and sigmoid colon, with no diverticulitis. (4)Remote cholecystectomy, appendectomy, and hysterectomy.  She denies any constipation or diarrhea, hematochezia or melena.  No nausea or vomiting.    Review of Systems   Constitutional: Negative for activity change and appetite change.        As per interval history above   Respiratory: Negative for cough and shortness of breath.    Cardiovascular: Negative for chest pain.   Gastrointestinal: Positive for abdominal pain. Negative for anal bleeding, blood in stool, constipation, diarrhea, nausea, rectal pain and vomiting.   Musculoskeletal: Positive for back pain.   Skin: Negative for color change and rash.       Medical History:  Past Medical History:   Diagnosis Date    Abnormal Pap smear of cervix     treatment??    Abnormal Pap smear of vagina     Acute pain of right shoulder 10/25/2019    Adjustment disorder with depressed mood 11/15/2014    Overview:  Multiple recent medical problems     Allergy     Anemia     Anemia 12/14/2014 6:26:13 PM    [facility]: Anemia-No Additional Notes    Anxiety     Anxiety and depression     Asthma     Asthma 4/14/2016    Asthma 6/18/2014 1:31:21 PM    [facility] - Respiratory: Asthma-No Additional Notes    Auditory hallucinations 2/17/2017    Bacterial vaginosis 1/5/2018    Bladder pain 9/1/2020    Bronchitis 10/15/2015    Bronchitis 9/18/2020    Bronchitis 10/15/2015    Cervical radiculopathy 3/10/2020    Chlamydia     Depression (emotion)     Diabetes mellitus     SANTOS (dyspnea on exertion) 10/14/2019    Dysuria 3/31/2018    Dysuria 3/31/2018    Early satiety 8/28/2020    Gallstones     Gastritis     High-risk sexual  "behavior 1/5/2018    History of ovarian cyst     History of syphilis     History of trichomoniasis     Hyperlipidemia     Hypertension     Interstitial cystitis 12/8/2020    Localized edema 10/14/2019    Lower abdominal pain 7/31/2017    Mental disorder     Obesity 6/18/2014 1:51:54 PM    [facility]: Obesity, Unspecified-No Additional Notes    Obstructive sleep apnea 10/5/2021    Persistent cough 7/27/2018    PONV (postoperative nausea and vomiting)     Preop general physical exam 12/9/2019    Right foot pain 11/18/2019    Routine general medical examination at a health care facility 5/21/2021    Shortness of breath 6/26/2017    SOB (shortness of breath) 6/26/2017    Suicidal ideations 7/9/2019    Syphilis 6/24/2014 7:17:31 PM    [facility] - Gynecologic: Syphilis-No Additional Notes    Type 2 diabetes mellitus without complication, without long-term current use of insulin 5/25/2014    Type 2 or unspecified type diabetes mellitus 6/24/2014 7:24:47 PM    [facility]: Diabetes Mellitus Without Complication, Type II-"borderline"    Upper respiratory tract infection 1/5/2018    Urgency of micturition 11/20/2020    Vaginal candidiasis 7/24/2018    Vaginal itching 1/5/2018    Viral syndrome 12/1/2020       Surgical History:   Past Surgical History:   Procedure Laterality Date    APPENDECTOMY      CHOLECYSTECTOMY      COLONOSCOPY N/A 7/31/2017    Procedure: COLONOSCOPY;  Surgeon: Yuliana Michael MD;  [facility];  Service: Endoscopy;  Laterality: N/A;    DILATION AND CURETTAGE OF UTERUS      bleeding    FRACTURE SURGERY      right foot    HYSTERECTOMY  08/31/2016    RALH/BS for complex hyperplasia without atypia    OOPHORECTOMY      1 ovary removed       Family History:   Family History   Problem Relation Age of Onset    Breast cancer Paternal Grandmother     Diabetes Maternal Grandmother     Hypertension Mother     Thyroid " disease Mother     Breast cancer Maternal Aunt     Cancer Maternal Aunt         colon cancer    Colon cancer Maternal Aunt     Miscarriages / Stillbirths Cousin     Stroke Maternal Aunt     No Known Problems Father     Thyroid disease Sister     Thyroid disease Brother     No Known Problems Daughter     No Known Problems Son     Ovarian cancer Neg Hx     Deep vein thrombosis Neg Hx     Pulmonary embolism Neg Hx        Social History:   Social History     Tobacco Use    Smoking status: Never Smoker    Smokeless tobacco: Never Used   Substance Use Topics    Alcohol use: No     Alcohol/week: 0.0 standard drinks     Comment: rarely; stop 12/11/19 prior to sx    Drug use: No       Allergies:   Review of patient's allergies indicates:   Allergen Reactions    Trulicity [dulaglutide] Shortness Of Breath and Other (See Comments)     Headaches    Hibiclens (isopropyl alcohol) Itching       Home Medications:  Current Outpatient Medications on File Prior to Visit   Medication Sig Dispense Refill    albuterol (PROVENTIL) 2.5 mg /3 mL (0.083 %) nebulizer solution Take 3 mLs (2.5 mg total) by nebulization every 4 (four) hours as needed for Wheezing. Rescue 150 mL 11    albuterol (PROVENTIL/VENTOLIN HFA) 90 mcg/actuation inhaler Inhale 2 puffs into the lungs every 4 (four) hours as needed for Wheezing or Shortness of Breath. Rescue. Use with chamber. 18 g 11    atorvastatin (LIPITOR) 20 MG tablet Take 1 tablet (20 mg total) by mouth once daily. 90 tablet 0    azelaic acid (FINACEA) 15 % gel AAA bid (Patient not taking: Reported on 6/3/2022) 50 g 3    blood sugar diagnostic Strp 1 each by Misc.(Non-Drug; Combo Route) route 4 (four) times daily. 100 each 11    celecoxib (CELEBREX) 200 MG capsule TAKE 1 CAPSULE(S) BY MOUTH 2 TIMES A DAY      CEQUA 0.09 % Dpet Place 1 drop into both eyes 2 (two) times daily.      clindamycin (CLEOCIN T) 1 % lotion AAA bid (Patient not taking: Reported on 6/3/2022) 60 mL 3     ergocalciferol (ERGOCALCIFEROL) 50,000 unit Cap Take 1 capsule (50,000 Units total) by mouth twice a week. (Patient not taking: Reported on 6/3/2022) 24 capsule 0    FLUoxetine 20 MG capsule Take 1 capsule (20 mg total) by mouth once daily. 30 capsule 0    fluticasone furoate-vilanteroL (BREO ELLIPTA) 100-25 mcg/dose diskus inhaler Inhale 1 puff into the lungs once daily. Controller 60 each 11    gabapentin (NEURONTIN) 300 MG capsule Take by mouth.      haloperidoL (HALDOL) 5 MG tablet Take 1 tablet (5 mg total) by mouth every evening. (Patient not taking: Reported on 6/3/2022) 30 tablet 0    hydroCHLOROthiazide (HYDRODIURIL) 12.5 MG Tab Take 1 tablet (12.5 mg total) by mouth daily as needed (leg swelling). (Patient not taking: Reported on 6/3/2022) 90 tablet 1    lancets Misc 1 each by Misc.(Non-Drug; Combo Route) route 4 (four) times daily. (Patient not taking: Reported on 6/3/2022) 200 each 11    magnesium oxide (MAG-OX) 400 mg (241.3 mg magnesium) tablet Take 1 tablet (400 mg total) by mouth once daily. (Patient not taking: Reported on 6/3/2022) 90 tablet 1    metFORMIN (GLUCOPHAGE) 1000 MG tablet Take 1 tablet (1,000 mg total) by mouth 2 (two) times daily with meals. 180 tablet 1    methen-m.blue-s.phos-phsal-hyo (URIBEL) 118-10-40.8-36 mg Cap Take 1 capsule by mouth 3 (three) times daily as needed (dysuria). (Patient not taking: Reported on 6/3/2022) 30 capsule 5    metoprolol succinate (TOPROL-XL) 25 MG 24 hr tablet Take 1 tablet (25 mg total) by mouth every evening. (Patient not taking: Reported on 6/3/2022) 90 tablet 1    mirabegron (MYRBETRIQ) 50 mg Tb24 Take 1 tablet (50 mg total) by mouth once daily. (Patient not taking: Reported on 6/3/2022) 30 tablet 11    nebulizer accessories Misc 1 each - needs replacement tubing and mask (Patient not taking: Reported on 6/3/2022)  0    OLANZapine (ZYPREXA) 5 MG tablet Take 1 tablet (5 mg total) by mouth every evening. 30 tablet 0    ONETOUCH VERIO  FLEX METER Misc       oxybutynin (DITROPAN) 5 MG Tab Take 5 mg by mouth 3 (three) times daily.      oxyCODONE-acetaminophen (PERCOCET)  mg per tablet       pregabalin (LYRICA) 100 MG capsule Take 100 mg by mouth 2 (two) times daily.      promethazine-dextromethorphan (PROMETHAZINE-DM) 6.25-15 mg/5 mL Syrp Take 5 mLs by mouth every evening. (Patient not taking: Reported on 6/3/2022) 118 mL 0    semaglutide (OZEMPIC) 1 mg/dose (4 mg/3 mL) Inject 1 mg into the skin every 7 days. 3 pen 1    thiamine 100 MG tablet Take 100 mg by mouth once daily.      tiZANidine (ZANAFLEX) 4 MG tablet Take 1 tablet (4 mg total) by mouth every 6 (six) hours as needed (pain/ spasms). (Patient not taking: Reported on 6/3/2022) 60 tablet 1    traMADoL (ULTRAM) 50 mg tablet       TRINTELLIX 5 mg Tab Take 1 tablet by mouth once daily.       No current facility-administered medications on file prior to visit.       LMP 08/21/2016   There is no height or weight on file to calculate BMI.  Physical Exam  Constitutional:       General: She is not in acute distress.  HENT:      Head: Normocephalic.   Neurological:      General: No focal deficit present.      Mental Status: She is alert.   Psychiatric:         Mood and Affect: Mood normal.         Judgment: Judgment normal.         Labs: Pertinent labs reviewed.  CRC Screening:     Assessment:   1. Generalized abdominal pain - new onset   2. Abnormal CT scan, pelvis - right ovarian abnormality-- asymmetric enlargement.     3. Enlarged ovary- seen on non-contrasted CT scan.        Recommendations:   - transvaginal ultrasound  - follow up with primary care regarding CT scan findings  - omeprazole 20 mg daily   - xray abdomen to visualize the bowel gas pattern.     Generalized abdominal pain  -     X-Ray Abdomen Flat And Erect; Future; Expected date: 06/20/2022  -     US Transvaginal Non OB; Future; Expected date: 06/20/2022  -     omeprazole (PRILOSEC) 20 MG capsule; Take 1 capsule (20  mg total) by mouth once daily.  Dispense: 30 capsule; Refill: 11    Abnormal CT scan, pelvis  -     US Transvaginal Non OB; Future; Expected date: 06/20/2022      Return to Clinic:  Follow up to be determined after results/ procedure(s).    Thank you for the opportunity to participate in the care of this patient.  SHAGUFTA Hernandez

## 2022-06-21 ENCOUNTER — PATIENT MESSAGE (OUTPATIENT)
Dept: INTERNAL MEDICINE | Facility: CLINIC | Age: 47
End: 2022-06-21
Payer: COMMERCIAL

## 2022-06-27 ENCOUNTER — HOSPITAL ENCOUNTER (OUTPATIENT)
Dept: RADIOLOGY | Facility: HOSPITAL | Age: 47
Discharge: HOME | End: 2022-06-27
Attending: NURSE PRACTITIONER
Payer: COMMERCIAL

## 2022-06-27 DIAGNOSIS — N83.8 ENLARGED OVARY: ICD-10-CM

## 2022-06-27 DIAGNOSIS — R10.84 GENERALIZED ABDOMINAL PAIN: ICD-10-CM

## 2022-06-27 DIAGNOSIS — R93.5 ABNORMAL CT SCAN, PELVIS: ICD-10-CM

## 2022-06-27 PROCEDURE — 74019 RADEX ABDOMEN 2 VIEWS: CPT | Mod: TC

## 2022-06-27 PROCEDURE — 76856 US PELVIS COMP WITH TRANSVAG NON-OB (XPD): ICD-10-PCS | Mod: 26,,, | Performed by: RADIOLOGY

## 2022-06-27 PROCEDURE — 74019 RADEX ABDOMEN 2 VIEWS: CPT | Mod: 26,,, | Performed by: RADIOLOGY

## 2022-06-27 PROCEDURE — 74019 XR ABDOMEN FLAT AND ERECT: ICD-10-PCS | Mod: 26,,, | Performed by: RADIOLOGY

## 2022-06-27 PROCEDURE — 76830 TRANSVAGINAL US NON-OB: CPT | Mod: 26,,, | Performed by: RADIOLOGY

## 2022-06-27 PROCEDURE — 76830 TRANSVAGINAL US NON-OB: CPT | Mod: TC

## 2022-06-27 PROCEDURE — 76830 US PELVIS COMP WITH TRANSVAG NON-OB (XPD): ICD-10-PCS | Mod: 26,,, | Performed by: RADIOLOGY

## 2022-06-27 PROCEDURE — 76856 US EXAM PELVIC COMPLETE: CPT | Mod: 26,,, | Performed by: RADIOLOGY

## 2022-06-28 DIAGNOSIS — N94.9 ADNEXAL CYST: ICD-10-CM

## 2022-06-28 DIAGNOSIS — N83.209 CYST OF OVARY, UNSPECIFIED LATERALITY: Primary | ICD-10-CM

## 2022-06-30 ENCOUNTER — TELEPHONE (OUTPATIENT)
Dept: GASTROENTEROLOGY | Facility: CLINIC | Age: 47
End: 2022-06-30
Payer: COMMERCIAL

## 2022-06-30 ENCOUNTER — PATIENT MESSAGE (OUTPATIENT)
Dept: GASTROENTEROLOGY | Facility: CLINIC | Age: 47
End: 2022-06-30
Payer: COMMERCIAL

## 2022-07-05 ENCOUNTER — PATIENT MESSAGE (OUTPATIENT)
Dept: BARIATRICS | Facility: CLINIC | Age: 47
End: 2022-07-05
Payer: COMMERCIAL

## 2022-07-14 ENCOUNTER — PATIENT MESSAGE (OUTPATIENT)
Dept: PULMONOLOGY | Facility: CLINIC | Age: 47
End: 2022-07-14
Payer: COMMERCIAL

## 2022-07-19 ENCOUNTER — OFFICE VISIT (OUTPATIENT)
Dept: OBSTETRICS AND GYNECOLOGY | Facility: CLINIC | Age: 47
End: 2022-07-19
Payer: COMMERCIAL

## 2022-07-19 VITALS
WEIGHT: 264.56 LBS | BODY MASS INDEX: 44.02 KG/M2 | DIASTOLIC BLOOD PRESSURE: 80 MMHG | SYSTOLIC BLOOD PRESSURE: 129 MMHG

## 2022-07-19 DIAGNOSIS — N89.8 VAGINAL DISCHARGE: Primary | ICD-10-CM

## 2022-07-19 DIAGNOSIS — N94.9 ADNEXAL CYST: ICD-10-CM

## 2022-07-19 DIAGNOSIS — N83.209 CYST OF OVARY, UNSPECIFIED LATERALITY: ICD-10-CM

## 2022-07-19 PROCEDURE — 87481 CANDIDA DNA AMP PROBE: CPT | Mod: 59 | Performed by: NURSE PRACTITIONER

## 2022-07-19 PROCEDURE — 3074F PR MOST RECENT SYSTOLIC BLOOD PRESSURE < 130 MM HG: ICD-10-PCS | Mod: CPTII,S$GLB,, | Performed by: NURSE PRACTITIONER

## 2022-07-19 PROCEDURE — 3072F LOW RISK FOR RETINOPATHY: CPT | Mod: CPTII,S$GLB,, | Performed by: NURSE PRACTITIONER

## 2022-07-19 PROCEDURE — 99999 PR PBB SHADOW E&M-EST. PATIENT-LVL IV: ICD-10-PCS | Mod: PBBFAC,,, | Performed by: NURSE PRACTITIONER

## 2022-07-19 PROCEDURE — 1160F RVW MEDS BY RX/DR IN RCRD: CPT | Mod: CPTII,S$GLB,, | Performed by: NURSE PRACTITIONER

## 2022-07-19 PROCEDURE — 1160F PR REVIEW ALL MEDS BY PRESCRIBER/CLIN PHARMACIST DOCUMENTED: ICD-10-PCS | Mod: CPTII,S$GLB,, | Performed by: NURSE PRACTITIONER

## 2022-07-19 PROCEDURE — 3079F DIAST BP 80-89 MM HG: CPT | Mod: CPTII,S$GLB,, | Performed by: NURSE PRACTITIONER

## 2022-07-19 PROCEDURE — 3008F BODY MASS INDEX DOCD: CPT | Mod: CPTII,S$GLB,, | Performed by: NURSE PRACTITIONER

## 2022-07-19 PROCEDURE — 99999 PR PBB SHADOW E&M-EST. PATIENT-LVL IV: CPT | Mod: PBBFAC,,, | Performed by: NURSE PRACTITIONER

## 2022-07-19 PROCEDURE — 99213 OFFICE O/P EST LOW 20 MIN: CPT | Mod: S$GLB,,, | Performed by: NURSE PRACTITIONER

## 2022-07-19 PROCEDURE — 87801 DETECT AGNT MULT DNA AMPLI: CPT | Performed by: NURSE PRACTITIONER

## 2022-07-19 PROCEDURE — 1159F MED LIST DOCD IN RCRD: CPT | Mod: CPTII,S$GLB,, | Performed by: NURSE PRACTITIONER

## 2022-07-19 PROCEDURE — 3008F PR BODY MASS INDEX (BMI) DOCUMENTED: ICD-10-PCS | Mod: CPTII,S$GLB,, | Performed by: NURSE PRACTITIONER

## 2022-07-19 PROCEDURE — 99213 PR OFFICE/OUTPT VISIT, EST, LEVL III, 20-29 MIN: ICD-10-PCS | Mod: S$GLB,,, | Performed by: NURSE PRACTITIONER

## 2022-07-19 PROCEDURE — 1159F PR MEDICATION LIST DOCUMENTED IN MEDICAL RECORD: ICD-10-PCS | Mod: CPTII,S$GLB,, | Performed by: NURSE PRACTITIONER

## 2022-07-19 PROCEDURE — 3044F HG A1C LEVEL LT 7.0%: CPT | Mod: CPTII,S$GLB,, | Performed by: NURSE PRACTITIONER

## 2022-07-19 PROCEDURE — 3079F PR MOST RECENT DIASTOLIC BLOOD PRESSURE 80-89 MM HG: ICD-10-PCS | Mod: CPTII,S$GLB,, | Performed by: NURSE PRACTITIONER

## 2022-07-19 PROCEDURE — 3072F PR LOW RISK FOR RETINOPATHY: ICD-10-PCS | Mod: CPTII,S$GLB,, | Performed by: NURSE PRACTITIONER

## 2022-07-19 PROCEDURE — 3044F PR MOST RECENT HEMOGLOBIN A1C LEVEL <7.0%: ICD-10-PCS | Mod: CPTII,S$GLB,, | Performed by: NURSE PRACTITIONER

## 2022-07-19 PROCEDURE — 3074F SYST BP LT 130 MM HG: CPT | Mod: CPTII,S$GLB,, | Performed by: NURSE PRACTITIONER

## 2022-07-19 NOTE — LETTER
July 19, 2022      [facility] - OB GYN 2nd Fl  [address] [facility]  [place] [address]  Phone: [phone]  Fax: [phone]       Patient: Yuli Milton   YOB: 1975  Date of Visit: 07/19/2022    To Whom It May Concern:    Yuli Milton  was at [facility] on 07/19/2022. The patient may return to work on 7/20/2022 with no restrictions. If you have any questions or concerns, or if I can be of further assistance, please do not hesitate to contact me.    Sincerely,    Tosha Hoff NP

## 2022-07-19 NOTE — PROGRESS NOTES
"Subjective:       Patient ID: Yuli Milton is a 46 y.o. female.    Chief Complaint:  No chief complaint on file.    Patient's last menstrual period was 2016.  History of Present Illness  Patient presents to clinic with complaints of increased vaginal discharge with associated vaginal odor for 1 week.  Reports mild vaginal itching.   Patient also advised by GI to follow-up with gyn regarding ultrasound findings. Patient reports intermittent pelvic pain. Denies significant discomfort.     Pelvic ultrasound on 22 with the following results:  "EXAMINATION:  US PELVIS COMP WITH TRANSVAG NON-OB (XPD)     CLINICAL HISTORY:  Generalized abdominal pain     TECHNIQUE:  Transabdominal sonography of the pelvis was performed, followed by transvaginal sonography to better evaluate the uterus and ovaries.     COMPARISON:  Ultrasound from 2021.     FINDINGS:  Uterus:     Hysterectomy.     Right ovary:     Size: 3 x 2.7 x 2.3 cm     Appearance: Single complex cyst containing thin septations versus adjacent ovarian follicles is seen with the area measuring up to 2.9 cm maximally.  Attention on follow-up could be performed.     Vascular flow: Normal.     Left ovary:     Size: 3.5 x 2.2 x 2.5 cm     Appearance: 2.6 cm cyst is seen.  On prior exam 3.4 cm cyst was noted.     Vascular Flow: Normal.     Free Fluid:     None.     Impression:     Status post hysterectomy     Complex cyst with thin septations versus adjacent follicles as above, measuring up to 2.9 cm as above.     Simple left adnexal cyst measuring 2.6 cm.  On the prior exam a simple cyst was seen measuring 3.4 cm."    OB History    Para Term  AB Living   4 4 3 1   3   SAB IAB Ectopic Multiple Live Births         1 4      # Outcome Date GA Lbr Christopher/2nd Weight Sex Delivery Anes PTL Lv   4 Term            3 Term      Vag-Spont EPI  MAX   2 Term      Vag-Spont EPI  MAX   1A      M Vag-Spont EPI  MAX   1B      F Vag-Spont EPI  ND "       Review of Systems  Review of Systems   Constitutional: Negative for appetite change, fatigue and fever.   Gastrointestinal: Negative for abdominal pain, bloating, constipation, diarrhea, nausea and vomiting.   Genitourinary: Positive for pelvic pain (intermittent), vaginal discharge and vaginal odor. Negative for bladder incontinence, dysmenorrhea, dyspareunia, dysuria, flank pain, frequency, genital sores, menorrhagia, menstrual problem, urgency, vaginal bleeding, vaginal pain, postcoital bleeding and vaginal dryness.        Vaginal itching --mild   All other systems reviewed and are negative.           Objective:      Physical Exam:   Constitutional: She is oriented to person, place, and time. She appears well-developed and well-nourished.    HENT:   Head: Normocephalic and atraumatic.   Nose: Nose normal.    Eyes: Pupils are equal, round, and reactive to light. Conjunctivae and EOM are normal.     Cardiovascular: Normal rate and regular rhythm.     Pulmonary/Chest: Effort normal.        Abdominal: Soft. She exhibits no distension. There is no abdominal tenderness. Hernia confirmed negative in the right inguinal area and confirmed negative in the left inguinal area.     Genitourinary:    Inguinal canal, right adnexa, left adnexa and rectum normal.      Pelvic exam was performed with patient supine.   The external female genitalia was normal.   Genitalia hair distrobution normal .   Labial bartholins normal.There is no rash, tenderness, lesion or injury on the right labia. There is no rash, tenderness, lesion or injury on the left labia. Right adnexum displays no mass, no tenderness and no fullness. Left adnexum displays no mass, no tenderness and no fullness. Vaginal cuff normal.  There is vaginal discharge (white, thin) in the vagina. No erythema, rectocele or cystocele in the vagina. Cervix is absent.Uterus is absent.           Musculoskeletal: Normal range of motion and moves all extremeties.       Lymphadenopathy: No inguinal adenopathy noted on the right or left side.    Neurological: She is alert and oriented to person, place, and time.    Skin: Skin is warm and dry. She is not diaphoretic.    Psychiatric: She has a normal mood and affect. Her behavior is normal. Judgment and thought content normal.            Assessment:     1. Vaginal discharge    2. Cyst of ovary, unspecified laterality    3. Adnexal cyst              Plan:   Diagnoses and all orders for this visit:    Vaginal discharge  -     Vaginosis Screen by DNA Probe    Cyst of ovary, unspecified laterality  -     Ambulatory referral/consult to Obstetrics / Gynecology    Adnexal cyst  -     Ambulatory referral/consult to Obstetrics / Gynecology      Will follow vaginosis screen and treat as indicated.  Recommend expectant management of ovarian cyst at this time.  Cysts have decreased in size since previous ultrasound and will likely continue to resolve on their own.   ER precautions discussed. Discussed findings that would indicate need to RTC.

## 2022-07-20 ENCOUNTER — OFFICE VISIT (OUTPATIENT)
Dept: PULMONOLOGY | Facility: CLINIC | Age: 47
End: 2022-07-20
Payer: COMMERCIAL

## 2022-07-20 VITALS
SYSTOLIC BLOOD PRESSURE: 120 MMHG | HEIGHT: 65 IN | RESPIRATION RATE: 18 BRPM | BODY MASS INDEX: 44.03 KG/M2 | HEART RATE: 70 BPM | OXYGEN SATURATION: 98 % | DIASTOLIC BLOOD PRESSURE: 60 MMHG | WEIGHT: 264.25 LBS

## 2022-07-20 DIAGNOSIS — J45.30 MILD PERSISTENT ASTHMA WITHOUT COMPLICATION: ICD-10-CM

## 2022-07-20 DIAGNOSIS — E66.01 CLASS 3 SEVERE OBESITY DUE TO EXCESS CALORIES WITHOUT SERIOUS COMORBIDITY WITH BODY MASS INDEX (BMI) OF 40.0 TO 44.9 IN ADULT: ICD-10-CM

## 2022-07-20 DIAGNOSIS — I10 ESSENTIAL HYPERTENSION: ICD-10-CM

## 2022-07-20 DIAGNOSIS — G47.33 OSA ON CPAP: Primary | ICD-10-CM

## 2022-07-20 LAB
BACTERIAL VAGINOSIS DNA: NEGATIVE
CANDIDA GLABRATA DNA: NEGATIVE
CANDIDA KRUSEI DNA: NEGATIVE
CANDIDA RRNA VAG QL PROBE: NEGATIVE
T VAGINALIS RRNA GENITAL QL PROBE: NEGATIVE

## 2022-07-20 PROCEDURE — 99999 PR PBB SHADOW E&M-EST. PATIENT-LVL V: ICD-10-PCS | Mod: PBBFAC,,, | Performed by: NURSE PRACTITIONER

## 2022-07-20 PROCEDURE — 3078F PR MOST RECENT DIASTOLIC BLOOD PRESSURE < 80 MM HG: ICD-10-PCS | Mod: CPTII,S$GLB,, | Performed by: NURSE PRACTITIONER

## 2022-07-20 PROCEDURE — 3072F LOW RISK FOR RETINOPATHY: CPT | Mod: CPTII,S$GLB,, | Performed by: NURSE PRACTITIONER

## 2022-07-20 PROCEDURE — 3072F PR LOW RISK FOR RETINOPATHY: ICD-10-PCS | Mod: CPTII,S$GLB,, | Performed by: NURSE PRACTITIONER

## 2022-07-20 PROCEDURE — 1159F MED LIST DOCD IN RCRD: CPT | Mod: CPTII,S$GLB,, | Performed by: NURSE PRACTITIONER

## 2022-07-20 PROCEDURE — 1159F PR MEDICATION LIST DOCUMENTED IN MEDICAL RECORD: ICD-10-PCS | Mod: CPTII,S$GLB,, | Performed by: NURSE PRACTITIONER

## 2022-07-20 PROCEDURE — 99999 PR PBB SHADOW E&M-EST. PATIENT-LVL V: CPT | Mod: PBBFAC,,, | Performed by: NURSE PRACTITIONER

## 2022-07-20 PROCEDURE — 99214 PR OFFICE/OUTPT VISIT, EST, LEVL IV, 30-39 MIN: ICD-10-PCS | Mod: S$GLB,,, | Performed by: NURSE PRACTITIONER

## 2022-07-20 PROCEDURE — 1160F RVW MEDS BY RX/DR IN RCRD: CPT | Mod: CPTII,S$GLB,, | Performed by: NURSE PRACTITIONER

## 2022-07-20 PROCEDURE — 1160F PR REVIEW ALL MEDS BY PRESCRIBER/CLIN PHARMACIST DOCUMENTED: ICD-10-PCS | Mod: CPTII,S$GLB,, | Performed by: NURSE PRACTITIONER

## 2022-07-20 PROCEDURE — 3074F PR MOST RECENT SYSTOLIC BLOOD PRESSURE < 130 MM HG: ICD-10-PCS | Mod: CPTII,S$GLB,, | Performed by: NURSE PRACTITIONER

## 2022-07-20 PROCEDURE — 3008F BODY MASS INDEX DOCD: CPT | Mod: CPTII,S$GLB,, | Performed by: NURSE PRACTITIONER

## 2022-07-20 PROCEDURE — 3074F SYST BP LT 130 MM HG: CPT | Mod: CPTII,S$GLB,, | Performed by: NURSE PRACTITIONER

## 2022-07-20 PROCEDURE — 3044F PR MOST RECENT HEMOGLOBIN A1C LEVEL <7.0%: ICD-10-PCS | Mod: CPTII,S$GLB,, | Performed by: NURSE PRACTITIONER

## 2022-07-20 PROCEDURE — 3008F PR BODY MASS INDEX (BMI) DOCUMENTED: ICD-10-PCS | Mod: CPTII,S$GLB,, | Performed by: NURSE PRACTITIONER

## 2022-07-20 PROCEDURE — 99214 OFFICE O/P EST MOD 30 MIN: CPT | Mod: S$GLB,,, | Performed by: NURSE PRACTITIONER

## 2022-07-20 PROCEDURE — 3078F DIAST BP <80 MM HG: CPT | Mod: CPTII,S$GLB,, | Performed by: NURSE PRACTITIONER

## 2022-07-20 PROCEDURE — 3044F HG A1C LEVEL LT 7.0%: CPT | Mod: CPTII,S$GLB,, | Performed by: NURSE PRACTITIONER

## 2022-07-20 RX ORDER — METHOCARBAMOL 500 MG/1
500 TABLET, FILM COATED ORAL 2 TIMES DAILY
COMMUNITY
Start: 2022-06-20 | End: 2022-11-16

## 2022-07-20 RX ORDER — LIDOCAINE 50 MG/G
1 PATCH TOPICAL DAILY
COMMUNITY
Start: 2022-07-03

## 2022-07-20 RX ORDER — NAPROXEN 500 MG/1
500 TABLET ORAL 2 TIMES DAILY
COMMUNITY
Start: 2022-06-20 | End: 2022-11-16

## 2022-07-20 NOTE — ASSESSMENT & PLAN NOTE
Prior diagnosis 2020, had to return CPAP inadequate use.  Restudied 9/24/2021, 9/27/2021 Home Sleep Study    2 night study  MILD OBSTRUCTIVE SLEEP APNEA with overall AHI 13.2/hr ( 52 events)  Oxygen desaturation: 80%. SpO2 between 85% to 89% for < 1 min.  Patient snored 78% time above 50 .  Heart rate range: 60 bpm -110 bpm  On Auto CPAP 4-20 cm, stopped using Jan 2022, machine malfunction, meeting with [facility] today. [facility]   Full face mask  Improve adherence to use  Annual follow up

## 2022-07-20 NOTE — ASSESSMENT & PLAN NOTE
Encouraged calorie reduction and 30 minutes of exercise daily. Discussed impact of obesity on general health.  Wt Readings from Last 9 Encounters:   07/20/22 119.8 kg (264 lb 3.5 oz)   07/19/22 120 kg (264 lb 8.8 oz)   06/03/22 120.3 kg (265 lb 3.4 oz)   04/25/22 116 kg (255 lb 11.7 oz)   04/18/22 116.5 kg (256 lb 13.4 oz)   01/18/22 116.4 kg (256 lb 11.6 oz)   12/16/21 117.1 kg (258 lb 2.5 oz)   11/11/21 117.1 kg (258 lb 2.5 oz)   11/09/21 117.1 kg (258 lb 2.5 oz)   Body mass index is 43.97 kg/m².

## 2022-07-20 NOTE — PROGRESS NOTES
Subjective:      Patient ID: Yuli Milton is a 46 y.o. female.    Chief Complaint: sleep    HPI: Yuli Milton presents to clinic for follow up for SCOUT with CPAP complaince assessment.  She is on Auto CPAP of 4-20 cmH2O pressure which is optimally controlling sleep apnea with apneic index (AHI) 2.9 events an hour.   Patient reports benefit from CPAP use. Compliance affected by falling asleep without machine in use. Then jan 2022 she had malfunction would not blowing air, has not had checked by [facility]. Has not resumed CPAP use. Arranged appointment for patient after this visit with Chuyita.   Patient reports no complaints, except malfunction. Full face mask was tolerated.     Obtained ResMed CPAP machine in about November 2021  ResMed CPAP machine stopped blowing air in Jan 2022.  Machine will turn on, but not blow air. Working fine up until about 2nd week in Jan 2022 9/24/2021, 9/27/2021 Home Sleep Study    2 night study  MILD OBSTRUCTIVE SLEEP APNEA with overall AHI 13.2/hr ( 52 events)  Oxygen desaturation: 80%. SpO2 between 85% to 89% for < 1 min.  Patient snored 78% time above 50 .  Heart rate range: 60 bpm -110 bpm     Compliance  Payor Standard  Usage 12/08/2021 - 01/06/2022  Usage days 19/30 days (63%)  >= 4 hours 9 days (30%)  < 4 hours 10 days (33%)  Usage hours 68 hours 12 minutes  Average usage (total days) 2 hours 16 minutes  Average usage (days used) 3 hours 35 minutes  Median usage (days used) 3 hours 56 minutes  Total used hours (value since last reset - 01/06/2022) 202 hours  AirSense 11 AutoSet  Serial number [id number]  Mode AutoSet  Min Pressure 4 cmH2O  Max Pressure 20 cmH2O  EPR Fulltime  EPR level 3  Response Standard  Therapy  Pressure - cmH2O Median: 5.8 95th percentile: 7.7 Maximum: 8.5  Leaks - L/min Median: 13.7 95th percentile: 22.5 Maximum: 26.8  Events per hour AI: 2.5 HI: 0.4 AHI: 2.9  Apnea Index Central: 0.5 Obstructive: 1.9 Unknown: 0.1  RERA Index  0.2  Cheyne-Hernandez respiration (average duration per night) 0 minutes (0%)  Usage - hours    She has not yet follow up up for her evaluate for gastric sleeve surgery with [facility] in [place], she is still pending cardiac stress test. She is still thinking about bariatric surgery.      ASTHMA  The patient has a history of asthma.  Age when diagnosed with Asthma childhood.      She has 2 children with asthma. No siblings or parents with asthma.       The patient is not currently have symptoms or exacerbation.      Had slight asthma flare with covid 19 in January 2021, recovered at home.      She feels her asthma is well controlled on current treatment Breo 100 mcg. Albuterol inhaler.      Asthma Control Test  In the past 4  weeks, how much of the time did your asthma keep you from getting as much done at work, school or at home?: None of the time  During the past 4 weeks, how often have you had shortness of breath?: Not at all  During the past 4 weeks, how often did your asthma symptoms (wheezing, couging, shortness of breath, chest tightness or pain) wake you up at night or earlier than usual in the morning?: Not at all  During the past 4 weeks, how often have you used your rescue inhaler or nebulizer medication (such as albuterol)?: Not at all  How would you rate your asthma control during the past 4 weeks?: Completely controlled  If your score is 19 or less, your asthma may not be under control: 25                Current Disease Severity  Yuli has monthly daytime asthma symptoms. She has no nighttime asthma symptoms. The patient is using short-acting beta agonists for symptom control about less than once a month. She has exacerbations requiring oral systemic corticosteroids 2 times per year. Current limitations in activity from asthma: none. Number of days of school or work missed in the last month: 0. Number of urgent/emergent visit in last year: 1 with Covid 19 in Jan 2021.      Dyspnea  Characteristics:   Exertional Dyspnea   Dyspnea Duration:  Chronic  Dyspnea Severity:  RAMON 2  Dyspnea Timing:  exertional  Dyspnea Contributing Factors:  Morbid obesity. asthma   Dyspnea Associated Symptoms:  none       11/20/2020 6mwd No desaturations requiring supplemental oxygen at rest or exertion. Oxygen desaturation did not meet criteria for supplemental oxygen prescription. Exercise capacity is normal.      Phase Oxygen Assessment Supplemental O2 Heart   Rate Blood Pressure Ramon Dyspnea Scale Rating   Resting 98 % Room Air 73 bpm 116/77 3   Exercise         Minute         1 100 % Room Air 108 bpm     2 100 % Room Air 117 bpm     3 94 % Room Air 116 bpm     4 93 % Room Air 113 bpm     5 99 % Room Air 130 bpm     6  95 % Room Air 123 bpm 94/54 4   Recovery         Minute         1 100 % Room Air 86 bpm     2 100 % Room Air 77 bpm     3 100 % Room Air 74 bpm     4 99 % Room Air 77 bpm 117/75 2     Six Minute Walk Summary   6MWT Status: completed without stopping   Patient Reported: Dyspnea, Other (Comment)(leg numbness     Employed full time as a [employer] , [employer].      Previous Report Reviewed: lab reports and office notes     Past Medical History: The following portions of the patient's history were reviewed and updated as appropriate:   She  has a past surgical history that includes Appendectomy; Dilation and curettage of uterus; Colonoscopy (N/A, 7/31/2017); Cholecystectomy; Hysterectomy (08/31/2016); Fracture surgery; and Oophorectomy.  Her family history includes Breast cancer in her maternal aunt and paternal grandmother; Cancer in her maternal aunt; Colon cancer in her maternal aunt; Diabetes in her maternal grandmother; Hypertension in her mother; Miscarriages / Stillbirths in her cousin; No Known Problems in her daughter, father, and son; Stroke in her maternal aunt; Thyroid disease in her brother, mother, and sister.  She  reports that she has never smoked. She has  never used smokeless tobacco. She reports that she does not drink alcohol and does not use drugs.  She has a current medication list which includes the following prescription(s): albuterol, albuterol, atorvastatin, azelaic acid, blood sugar diagnostic, celecoxib, cequa, clindamycin, ergocalciferol, fluoxetine, breo ellipta, gabapentin, haloperidol, hydrochlorothiazide, lancets, lidocaine, magnesium oxide, metformin, methen-m.blue-s.phos-phsal-hyo, methocarbamol, metoprolol succinate, myrbetriq, naproxen, nebulizer accessories, olanzapine, omeprazole, onetouch verio flex meter, oxybutynin, oxycodone-acetaminophen, pregabalin, promethazine-dextromethorphan, semaglutide, thiamine, tizanidine, tramadol, and trintellix.  She is allergic to trulicity [dulaglutide], aspirin, and hibiclens (isopropyl alcohol)..    The following portions of the patient's history were reviewed and updated as appropriate: allergies, current medications, past family history, past medical history, past social history, past surgical history and problem list.    Review of Systems   Constitutional: Negative for fever, chills, weight loss, weight gain, activity change, appetite change, fatigue and night sweats.   HENT: Negative for postnasal drip, rhinorrhea, sinus pressure, voice change and congestion.    Eyes: Negative for redness and itching.   Respiratory: Negative for snoring, cough, sputum production, chest tightness, shortness of breath, wheezing, orthopnea, asthma nighttime symptoms, dyspnea on extertion, use of rescue inhaler and somnolence.    Cardiovascular: Negative.  Negative for chest pain, palpitations and leg swelling.   Genitourinary: Negative for difficulty urinating and hematuria.   Endocrine: Negative for cold intolerance and heat intolerance.    Musculoskeletal: Negative for arthralgias, gait problem, joint swelling and myalgias.   Skin: Negative.    Gastrointestinal: Negative for nausea, vomiting, abdominal pain and acid  "reflux.   Neurological: Negative for dizziness, weakness, light-headedness and headaches.   Hematological: Negative for adenopathy. No excessive bruising.   All other systems reviewed and are negative.     Objective:   /60   Pulse 70   Resp 18   Ht 5' 5" (1.651 m)   Wt 119.8 kg (264 lb 3.5 oz)   LMP 08/21/2016   SpO2 98%   BMI 43.97 kg/m²   Physical Exam  Vitals and nursing note reviewed.   Constitutional:       General: She is not in acute distress.     Appearance: She is well-developed. She is not ill-appearing or toxic-appearing.   HENT:      Head: Normocephalic.      Right Ear: External ear normal.      Left Ear: External ear normal.      Nose: Nose normal.      Mouth/Throat:      Pharynx: No oropharyngeal exudate.   Eyes:      Conjunctiva/sclera: Conjunctivae normal.   Cardiovascular:      Rate and Rhythm: Normal rate and regular rhythm.      Heart sounds: Normal heart sounds.   Pulmonary:      Effort: Pulmonary effort is normal.      Breath sounds: Normal breath sounds. No stridor.   Abdominal:      Palpations: Abdomen is soft.   Musculoskeletal:         General: Normal range of motion.      Cervical back: Normal range of motion and neck supple.   Lymphadenopathy:      Cervical: No cervical adenopathy.   Skin:     General: Skin is warm and dry.   Neurological:      Mental Status: She is alert and oriented to person, place, and time.   Psychiatric:         Behavior: Behavior normal. Behavior is cooperative.         Thought Content: Thought content normal.         Judgment: Judgment normal.         Personal Diagnostic Review  CPAP download    Assessment:     1. 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 Class 3 severe obesity due to excess calories without serious comorbidity with body mass index (BMI) of 40.0 to 44.9 in adult    3. Essential hypertension    4. Mild persistent asthma without complication        Orders Placed This Encounter   Procedures    CPAP/BIPAP SUPPLIES     Benefits and compliant  90 day supply. 4 " refills  [facility]: [facility]     Order Specific Question:   Length of need (1-99 months):     Answer:   99     Order Specific Question:   Choose ONE mask type and its corresponding cushions and/or pillows:     Answer:    Full Face Mask, 1 per 90 days:  Full Face Cushion, (3 per 90 days)     Order Specific Question:   Choose EITHER Heated or Non-Heated Tubjing     Answer:    Non-Heated Tubing, 1 per 90 days     Order Specific Question:   Number of Days Needed:     Answer:   99     Order Specific Question:   All other supplies as needed as listed below:     Answer:    Headgear, 1 per 180 days     Order Specific Question:   All other supplies as needed as listed below:     Answer:    Chin Strap, 1 per 180 days     Order Specific Question:   All other supplies as needed as listed below:     Answer:    Disposable Filter, 6 per 90 days     Order Specific Question:   All other supplies as needed as listed below:     Answer:    Humidifier Chamber, 1 per 180 days     Order Specific Question:   All other supplies as needed as listed below:     Answer:    Non-Disposable Filter, 1 per 180 days    X-Ray Chest PA And Lateral     Standing Status:   Future     Standing Expiration Date:   7/20/2023     Order Specific Question:   May the Radiologist modify the order per protocol to meet the clinical needs of the patient?     Answer:   Yes    Spirometry with/without bronchodilator     Standing Status:   Future     Standing Expiration Date:   7/20/2023     Order Specific Question:   Release to patient     Answer:   Immediate     Plan:     Problem List Items Addressed This Visit     SCOUT on CPAP - Primary     Prior diagnosis 2020, had to return CPAP inadequate use.  Restudied 9/24/2021, 9/27/2021 Home Sleep Study    2 night study  MILD OBSTRUCTIVE SLEEP APNEA with overall AHI 13.2/hr ( 52 events)  Oxygen desaturation: 80%. SpO2 between 85% to 89% for < 1 min.  Patient snored 78% time above 50  .  Heart rate range: 60 bpm -110 bpm  On Auto CPAP 4-20 cm, stopped using Jan 2022, machine malfunction, meeting with Chuyita today. [facility]   Full face mask  Improve adherence to use  Annual follow up                    Relevant Orders    CPAP/BIPAP SUPPLIES    Mild persistent asthma without complication     Controlled   Continue Breo 100 mcg. Albuterol inhaler. Albuterol nebs.              Relevant Orders    X-Ray Chest PA And Lateral    Spirometry with/without bronchodilator    Essential hypertension     Stable and controlled. Continue current treatment plan as previously prescribed with your PCP.                Class 3 severe obesity due to excess calories without serious comorbidity with body mass index (BMI) of 40.0 to 44.9 in adult     Encouraged calorie reduction and 30 minutes of exercise daily. Discussed impact of obesity on general health.  Wt Readings from Last 9 Encounters:   07/20/22 119.8 kg (264 lb 3.5 oz)   07/19/22 120 kg (264 lb 8.8 oz)   06/03/22 120.3 kg (265 lb 3.4 oz)   04/25/22 116 kg (255 lb 11.7 oz)   04/18/22 116.5 kg (256 lb 13.4 oz)   01/18/22 116.4 kg (256 lb 11.6 oz)   12/16/21 117.1 kg (258 lb 2.5 oz)   11/11/21 117.1 kg (258 lb 2.5 oz)   11/09/21 117.1 kg (258 lb 2.5 oz)   Body mass index is 43.97 kg/m².                 Follow up in about 1 year (around 7/20/2023) for CPAP 1 year compliance download, Asthma, w/review cindy/cxr.

## 2022-07-28 ENCOUNTER — PATIENT MESSAGE (OUTPATIENT)
Dept: INTERNAL MEDICINE | Facility: CLINIC | Age: 47
End: 2022-07-28
Payer: COMMERCIAL

## 2022-08-02 ENCOUNTER — PATIENT MESSAGE (OUTPATIENT)
Dept: BARIATRICS | Facility: CLINIC | Age: 47
End: 2022-08-02
Payer: COMMERCIAL

## 2022-08-04 ENCOUNTER — PATIENT MESSAGE (OUTPATIENT)
Dept: BARIATRICS | Facility: CLINIC | Age: 47
End: 2022-08-04
Payer: COMMERCIAL

## 2022-08-10 ENCOUNTER — TELEPHONE (OUTPATIENT)
Dept: PULMONOLOGY | Facility: CLINIC | Age: 47
End: 2022-08-10
Payer: COMMERCIAL

## 2022-08-10 NOTE — TELEPHONE ENCOUNTER
----- Message from Anthony Handy sent at 8/10/2022 10:26 AM CDT -----  Contact: Yuli Roldan would like a call back at [phone], in regards to missing a call about a virtual appointment.  Thanks

## 2022-08-10 NOTE — TELEPHONE ENCOUNTER
----- Message from Silva Pryor sent at 8/10/2022 10:48 AM CDT -----  Contact: selt/[phone]  Type:  Patient Returning Call    Who Called:Patient  Who Left Message for Patient:Alva   Does the patient know what this is regarding?:Appt  Would the patient rather a call back or a response via [facility]? Call back  Best Call Back Number:[phone]  Additional Information: Patient is asking to come in around 4:30pm or tomorrow morning. She states you can leave a message.

## 2022-08-12 ENCOUNTER — HOSPITAL ENCOUNTER (OUTPATIENT)
Dept: RADIOLOGY | Facility: HOSPITAL | Age: 47
Discharge: HOME | End: 2022-08-12
Attending: NURSE PRACTITIONER
Payer: COMMERCIAL

## 2022-08-12 ENCOUNTER — PATIENT MESSAGE (OUTPATIENT)
Dept: PULMONOLOGY | Facility: CLINIC | Age: 47
End: 2022-08-12
Payer: COMMERCIAL

## 2022-08-12 DIAGNOSIS — J45.30 MILD PERSISTENT ASTHMA WITHOUT COMPLICATION: ICD-10-CM

## 2022-08-12 PROCEDURE — 71046 X-RAY EXAM CHEST 2 VIEWS: CPT | Mod: TC

## 2022-08-12 PROCEDURE — 71046 XR CHEST PA AND LATERAL: ICD-10-PCS | Mod: 26,,, | Performed by: RADIOLOGY

## 2022-08-12 PROCEDURE — 71046 X-RAY EXAM CHEST 2 VIEWS: CPT | Mod: 26,,, | Performed by: RADIOLOGY

## 2022-08-22 DIAGNOSIS — M25.571 ACUTE RIGHT ANKLE PAIN: Primary | ICD-10-CM

## 2022-08-23 ENCOUNTER — OFFICE VISIT (OUTPATIENT)
Dept: PODIATRY | Facility: CLINIC | Age: 47
End: 2022-08-23
Payer: COMMERCIAL

## 2022-08-23 ENCOUNTER — HOSPITAL ENCOUNTER (OUTPATIENT)
Dept: RADIOLOGY | Facility: HOSPITAL | Age: 47
Discharge: HOME | End: 2022-08-23
Attending: PODIATRIST
Payer: COMMERCIAL

## 2022-08-23 ENCOUNTER — CLINICAL SUPPORT (OUTPATIENT)
Dept: PULMONOLOGY | Facility: CLINIC | Age: 47
End: 2022-08-23
Payer: COMMERCIAL

## 2022-08-23 ENCOUNTER — OFFICE VISIT (OUTPATIENT)
Dept: PULMONOLOGY | Facility: CLINIC | Age: 47
End: 2022-08-23
Payer: COMMERCIAL

## 2022-08-23 VITALS
SYSTOLIC BLOOD PRESSURE: 126 MMHG | HEART RATE: 62 BPM | BODY MASS INDEX: 44.01 KG/M2 | OXYGEN SATURATION: 99 % | HEIGHT: 65 IN | RESPIRATION RATE: 17 BRPM | DIASTOLIC BLOOD PRESSURE: 80 MMHG | WEIGHT: 264.13 LBS

## 2022-08-23 VITALS — BODY MASS INDEX: 43.99 KG/M2 | HEIGHT: 65 IN | WEIGHT: 264 LBS

## 2022-08-23 DIAGNOSIS — J45.30 MILD PERSISTENT ASTHMA WITHOUT COMPLICATION: Primary | ICD-10-CM

## 2022-08-23 DIAGNOSIS — J45.30 MILD PERSISTENT ASTHMA WITHOUT COMPLICATION: ICD-10-CM

## 2022-08-23 DIAGNOSIS — M25.571 ACUTE RIGHT ANKLE PAIN: ICD-10-CM

## 2022-08-23 DIAGNOSIS — S82.64XA CLOSED NONDISPLACED FRACTURE OF LATERAL MALLEOLUS OF RIGHT FIBULA, INITIAL ENCOUNTER: Primary | ICD-10-CM

## 2022-08-23 DIAGNOSIS — G47.33 OSA ON CPAP: ICD-10-CM

## 2022-08-23 DIAGNOSIS — E66.01 MORBID OBESITY DUE TO EXCESS CALORIES: ICD-10-CM

## 2022-08-23 PROCEDURE — 3044F PR MOST RECENT HEMOGLOBIN A1C LEVEL <7.0%: ICD-10-PCS | Mod: CPTII,S$GLB,, | Performed by: NURSE PRACTITIONER

## 2022-08-23 PROCEDURE — 99214 OFFICE O/P EST MOD 30 MIN: CPT | Mod: 25,S$GLB,, | Performed by: NURSE PRACTITIONER

## 2022-08-23 PROCEDURE — 3008F PR BODY MASS INDEX (BMI) DOCUMENTED: ICD-10-PCS | Mod: CPTII,S$GLB,, | Performed by: PODIATRIST

## 2022-08-23 PROCEDURE — 3044F HG A1C LEVEL LT 7.0%: CPT | Mod: CPTII,S$GLB,, | Performed by: PODIATRIST

## 2022-08-23 PROCEDURE — 99999 PR PBB SHADOW E&M-EST. PATIENT-LVL I: CPT | Mod: PBBFAC,,,

## 2022-08-23 PROCEDURE — 3074F SYST BP LT 130 MM HG: CPT | Mod: CPTII,S$GLB,, | Performed by: NURSE PRACTITIONER

## 2022-08-23 PROCEDURE — 3079F PR MOST RECENT DIASTOLIC BLOOD PRESSURE 80-89 MM HG: ICD-10-PCS | Mod: CPTII,S$GLB,, | Performed by: NURSE PRACTITIONER

## 2022-08-23 PROCEDURE — 99999 PR PBB SHADOW E&M-EST. PATIENT-LVL I: ICD-10-PCS | Mod: PBBFAC,,,

## 2022-08-23 PROCEDURE — 3079F DIAST BP 80-89 MM HG: CPT | Mod: CPTII,S$GLB,, | Performed by: NURSE PRACTITIONER

## 2022-08-23 PROCEDURE — 99999 PR PBB SHADOW E&M-EST. PATIENT-LVL V: ICD-10-PCS | Mod: PBBFAC,,, | Performed by: PODIATRIST

## 2022-08-23 PROCEDURE — 3044F PR MOST RECENT HEMOGLOBIN A1C LEVEL <7.0%: ICD-10-PCS | Mod: CPTII,S$GLB,, | Performed by: PODIATRIST

## 2022-08-23 PROCEDURE — 3072F PR LOW RISK FOR RETINOPATHY: ICD-10-PCS | Mod: CPTII,S$GLB,, | Performed by: PODIATRIST

## 2022-08-23 PROCEDURE — 99999 PR PBB SHADOW E&M-EST. PATIENT-LVL V: CPT | Mod: PBBFAC,,, | Performed by: NURSE PRACTITIONER

## 2022-08-23 PROCEDURE — 3074F PR MOST RECENT SYSTOLIC BLOOD PRESSURE < 130 MM HG: ICD-10-PCS | Mod: CPTII,S$GLB,, | Performed by: NURSE PRACTITIONER

## 2022-08-23 PROCEDURE — 95012 PR NITRIC OXIDE EXPIRED GAS DETERMINATION: ICD-10-PCS | Mod: S$GLB,,, | Performed by: INTERNAL MEDICINE

## 2022-08-23 PROCEDURE — 3044F HG A1C LEVEL LT 7.0%: CPT | Mod: CPTII,S$GLB,, | Performed by: NURSE PRACTITIONER

## 2022-08-23 PROCEDURE — 3008F BODY MASS INDEX DOCD: CPT | Mod: CPTII,S$GLB,, | Performed by: NURSE PRACTITIONER

## 2022-08-23 PROCEDURE — 3008F PR BODY MASS INDEX (BMI) DOCUMENTED: ICD-10-PCS | Mod: CPTII,S$GLB,, | Performed by: NURSE PRACTITIONER

## 2022-08-23 PROCEDURE — 3072F LOW RISK FOR RETINOPATHY: CPT | Mod: CPTII,S$GLB,, | Performed by: NURSE PRACTITIONER

## 2022-08-23 PROCEDURE — 3072F LOW RISK FOR RETINOPATHY: CPT | Mod: CPTII,S$GLB,, | Performed by: PODIATRIST

## 2022-08-23 PROCEDURE — 99214 OFFICE O/P EST MOD 30 MIN: CPT | Mod: S$GLB,,, | Performed by: PODIATRIST

## 2022-08-23 PROCEDURE — 3072F PR LOW RISK FOR RETINOPATHY: ICD-10-PCS | Mod: CPTII,S$GLB,, | Performed by: NURSE PRACTITIONER

## 2022-08-23 PROCEDURE — 73610 XR ANKLE COMPLETE 3 VIEW RIGHT: ICD-10-PCS | Mod: 26,RT,, | Performed by: RADIOLOGY

## 2022-08-23 PROCEDURE — 99999 PR PBB SHADOW E&M-EST. PATIENT-LVL V: CPT | Mod: PBBFAC,,, | Performed by: PODIATRIST

## 2022-08-23 PROCEDURE — 99214 PR OFFICE/OUTPT VISIT, EST, LEVL IV, 30-39 MIN: ICD-10-PCS | Mod: S$GLB,,, | Performed by: PODIATRIST

## 2022-08-23 PROCEDURE — 73610 X-RAY EXAM OF ANKLE: CPT | Mod: 26,RT,, | Performed by: RADIOLOGY

## 2022-08-23 PROCEDURE — 73610 X-RAY EXAM OF ANKLE: CPT | Mod: TC,FY,PO,RT

## 2022-08-23 PROCEDURE — 3008F BODY MASS INDEX DOCD: CPT | Mod: CPTII,S$GLB,, | Performed by: PODIATRIST

## 2022-08-23 PROCEDURE — 99214 PR OFFICE/OUTPT VISIT, EST, LEVL IV, 30-39 MIN: ICD-10-PCS | Mod: 25,S$GLB,, | Performed by: NURSE PRACTITIONER

## 2022-08-23 PROCEDURE — 99999 PR PBB SHADOW E&M-EST. PATIENT-LVL V: ICD-10-PCS | Mod: PBBFAC,,, | Performed by: NURSE PRACTITIONER

## 2022-08-23 PROCEDURE — 95012 NITRIC OXIDE EXP GAS DETER: CPT | Mod: S$GLB,,, | Performed by: INTERNAL MEDICINE

## 2022-08-23 RX ORDER — ALBUTEROL SULFATE 0.83 MG/ML
2.5 SOLUTION RESPIRATORY (INHALATION) EVERY 4 HOURS PRN
Qty: 150 ML | Refills: 11 | Status: SHIPPED | OUTPATIENT
Start: 2022-08-23 | End: 2023-06-13 | Stop reason: SDUPTHER

## 2022-08-23 RX ORDER — ONDANSETRON 4 MG/1
4 TABLET, ORALLY DISINTEGRATING ORAL EVERY 8 HOURS PRN
COMMUNITY
Start: 2022-08-17 | End: 2023-02-02

## 2022-08-23 RX ORDER — ALBUTEROL SULFATE 90 UG/1
2 AEROSOL, METERED RESPIRATORY (INHALATION) EVERY 4 HOURS PRN
Qty: 18 G | Refills: 11 | Status: SHIPPED | OUTPATIENT
Start: 2022-08-23 | End: 2023-06-13 | Stop reason: SDUPTHER

## 2022-08-23 RX ORDER — PREDNISONE 20 MG/1
TABLET ORAL
Qty: 15 TABLET | Refills: 0 | Status: SHIPPED | OUTPATIENT
Start: 2022-08-23 | End: 2023-10-24

## 2022-08-23 RX ORDER — MECLIZINE HYDROCHLORIDE 25 MG/1
25 TABLET ORAL EVERY 8 HOURS PRN
COMMUNITY
Start: 2022-08-08 | End: 2023-02-02 | Stop reason: SDUPTHER

## 2022-08-23 RX ORDER — FLUTICASONE PROPIONATE AND SALMETEROL 100; 50 UG/1; UG/1
1 POWDER RESPIRATORY (INHALATION) 2 TIMES DAILY
Qty: 60 EACH | Refills: 11 | Status: SHIPPED | OUTPATIENT
Start: 2022-08-23 | End: 2023-05-29 | Stop reason: SDUPTHER

## 2022-08-23 RX ORDER — IBUPROFEN 800 MG/1
800 TABLET ORAL EVERY 8 HOURS PRN
Qty: 30 TABLET | Refills: 2 | Status: SHIPPED | OUTPATIENT
Start: 2022-08-23 | End: 2023-08-29

## 2022-08-23 NOTE — PROGRESS NOTES
[facility]  PODIATRIC MEDICINE AND SURGERY  PROGRESS NOTE    Reason for Visit   Chief Complaint   Patient presents with    Ankle Injury     C/o ankle pain, right, medial and lateral ankle pain, diagnosed w/ ankle fx, injury on 08/17/22, rates pain 8/10, x-rays today,wears walking boot on the right, diabetic, last seen PCP Dr. Gutierrez on 04/25/22       HPI  Yuli Milton is a 46 y.o. female w/ PMH of DM2, who presents today after sustaining injury to right ankle. Pt describes mechanism as direct fall. They deny any LOC or proximal leg pain. Pt states unable to bear any weight on right ankle. Severity of pain noted to be 8/10.      Patient denies other pedal complaints at this time.    DOI: 8/17/22      [facility]  Past Medical History:   Diagnosis Date    Abnormal Pap smear of cervix     treatment??    Abnormal Pap smear of vagina     Acute pain of right shoulder 10/25/2019    Adjustment disorder with depressed mood 11/15/2014    Overview:  Multiple recent medical problems     Allergy     Anemia     Anemia 12/14/2014 6:26:13 PM    [facility] - [facility]: Anemia-No Additional Notes    Anxiety     Anxiety and depression     Asthma     Asthma 4/14/2016    Asthma 6/18/2014 1:31:21 PM    [facility] - Respiratory: Asthma-No Additional Notes    Auditory hallucinations 2/17/2017    Bacterial vaginosis 1/5/2018    Bladder pain 9/1/2020    Bronchitis 10/15/2015    Bronchitis 9/18/2020    Bronchitis 10/15/2015    Cervical radiculopathy 3/10/2020    Chlamydia     Depression (emotion)     Diabetes mellitus     SANTSO (dyspnea on exertion) 10/14/2019    Dysuria 3/31/2018    Dysuria 3/31/2018    Early satiety 8/28/2020    Gallstones     Gastritis     High-risk sexual behavior 1/5/2018    History of ovarian cyst     History of syphilis     History of trichomoniasis     Hyperlipidemia     Hypertension     Interstitial cystitis 12/8/2020    Localized edema 10/14/2019    Lower abdominal pain 7/31/2017  "   Mental disorder     Obesity 6/18/2014 1:51:54 PM    [facility] - [facility]: Obesity, Unspecified-No Additional Notes    Obstructive sleep apnea 10/5/2021    Persistent cough 7/27/2018    PONV (postoperative nausea and vomiting)     Preop general physical exam 12/9/2019    Right foot pain 11/18/2019    Routine general medical examination at a health care facility 5/21/2021    Shortness of breath 6/26/2017    SOB (shortness of breath) 6/26/2017    Suicidal ideations 7/9/2019    Syphilis 6/24/2014 7:17:31 PM    [facility] Historical - Gynecologic: Syphilis-No Additional Notes    Type 2 diabetes mellitus without complication, without long-term current use of insulin 5/25/2014    Type 2 or unspecified type diabetes mellitus 6/24/2014 7:24:47 PM    [facility] - [facility]: Diabetes Mellitus Without Complication, Type II-"borderline"    Upper respiratory tract infection 1/5/2018    Urgency of micturition 11/20/2020    Vaginal candidiasis 7/24/2018    Vaginal itching 1/5/2018    Viral syndrome 12/1/2020       MEDS  Current Outpatient Medications on File Prior to Visit   Medication Sig Dispense Refill    albuterol (PROVENTIL) 2.5 mg /3 mL (0.083 %) nebulizer solution Take 3 mLs (2.5 mg total) by nebulization every 4 (four) hours as needed for Wheezing. Rescue 150 mL 11    albuterol (PROVENTIL/VENTOLIN HFA) 90 mcg/actuation inhaler Inhale 2 puffs into the lungs every 4 (four) hours as needed for Wheezing or Shortness of Breath. Rescue. Use with chamber. 18 g 11    azelaic acid (FINACEA) 15 % gel AAA bid 50 g 3    blood sugar diagnostic Strp 1 each by Misc.(Non-Drug; Combo Route) route 4 (four) times daily. 100 each 11    CEQUA 0.09 % Dpet Place 1 drop into both eyes 2 (two) times daily.      clindamycin (CLEOCIN T) 1 % lotion AAA bid 60 mL 3    ergocalciferol (ERGOCALCIFEROL) 50,000 unit Cap Take 1 capsule (50,000 Units total) by mouth twice a week. 24 capsule 0    fluticasone-salmeterol diskus inhaler " 100-50 mcg Inhale 1 puff into the lungs 2 (two) times daily. Controller 60 each 11    gabapentin (NEURONTIN) 300 MG capsule Take by mouth.      haloperidoL (HALDOL) 5 MG tablet Take 1 tablet (5 mg total) by mouth every evening. 30 tablet 0    hydroCHLOROthiazide (HYDRODIURIL) 12.5 MG Tab Take 1 tablet (12.5 mg total) by mouth daily as needed (leg swelling). 90 tablet 1    lancets Misc 1 each by Misc.(Non-Drug; Combo Route) route 4 (four) times daily. 200 each 11    LIDOcaine (LIDODERM) 5 % 1 patch once daily.      magnesium oxide (MAG-OX) 400 mg (241.3 mg magnesium) tablet Take 1 tablet (400 mg total) by mouth once daily. 90 tablet 1    meclizine (ANTIVERT) 25 mg tablet Take 25 mg by mouth every 8 (eight) hours as needed.      metFORMIN (GLUCOPHAGE) 1000 MG tablet Take 1 tablet (1,000 mg total) by mouth 2 (two) times daily with meals. 180 tablet 1    suman-m.blue-s.phos-phsal-hyo (URIBEL) 118-10-40.8-36 mg Cap Take 1 capsule by mouth 3 (three) times daily as needed (dysuria). 30 capsule 5    methocarbamoL (ROBAXIN) 500 MG Tab Take 500 mg by mouth 2 (two) times daily.      naproxen (NAPROSYN) 500 MG tablet Take 500 mg by mouth 2 (two) times daily.      nebulizer accessories Misc 1 each - needs replacement tubing and mask  0    omeprazole (PRILOSEC) 20 MG capsule Take 1 capsule (20 mg total) by mouth once daily. 30 capsule 11    ondansetron (ZOFRAN-ODT) 4 MG TbDL Take 4 mg by mouth every 8 (eight) hours as needed.      ONETOUCH VERIO FLEX METER Misc       oxyCODONE-acetaminophen (PERCOCET)  mg per tablet       predniSONE (DELTASONE) 20 MG tablet 40 mg x 5 days, then 20 mg x 5 days. 15 tablet 0    pregabalin (LYRICA) 100 MG capsule Take 100 mg by mouth 2 (two) times daily.      promethazine-dextromethorphan (PROMETHAZINE-DM) 6.25-15 mg/5 mL Syrp Take 5 mLs by mouth every evening. 118 mL 0    semaglutide (OZEMPIC) 1 mg/dose (4 mg/3 mL) Inject 1 mg into the skin every 7 days. 3 pen 1    thiamine 100 MG tablet Take  100 mg by mouth once daily.      tiZANidine (ZANAFLEX) 4 MG tablet Take 1 tablet (4 mg total) by mouth every 6 (six) hours as needed (pain/ spasms). 60 tablet 1    traMADoL (ULTRAM) 50 mg tablet       TRINTELLIX 5 mg Tab Take 1 tablet by mouth once daily.      atorvastatin (LIPITOR) 20 MG tablet Take 1 tablet (20 mg total) by mouth once daily. 90 tablet 0    FLUoxetine 20 MG capsule Take 1 capsule (20 mg total) by mouth once daily. 30 capsule 0    metoprolol succinate (TOPROL-XL) 25 MG 24 hr tablet Take 1 tablet (25 mg total) by mouth every evening. (Patient not taking: Reported on 6/3/2022) 90 tablet 1    OLANZapine (ZYPREXA) 5 MG tablet Take 1 tablet (5 mg total) by mouth every evening. 30 tablet 0     No current facility-administered medications on file prior to visit.       PSH     Past Surgical History:   Procedure Laterality Date    APPENDECTOMY      CHOLECYSTECTOMY      COLONOSCOPY N/A 7/31/2017    Procedure: COLONOSCOPY;  Surgeon: Yuliana Michael MD;  [facility]: [facility];  Service: Endoscopy;  Laterality: N/A;    DILATION AND CURETTAGE OF UTERUS      bleeding    FRACTURE SURGERY      right foot    HYSTERECTOMY  08/31/2016    RALH/BS for complex hyperplasia without atypia    OOPHORECTOMY      1 ovary removed        ALL  Review of patient's allergies indicates:   Allergen Reactions    Trulicity [dulaglutide] Shortness Of Breath and Other (See Comments)     Headaches    Aspirin Nausea Only    Hibiclens (isopropyl alcohol) Itching       SOC     Social History     Tobacco Use    Smoking status: Never    Smokeless tobacco: Never   Substance Use Topics    Alcohol use: No     Alcohol/week: 0.0 standard drinks     Comment: rarely; stop 12/11/19 prior to sx    Drug use: No       Family HX      Family History   Problem Relation Age of Onset    Breast cancer Paternal Grandmother     Diabetes Maternal Grandmother     Hypertension Mother     Thyroid disease Mother     Breast cancer Maternal Aunt     Cancer Maternal Aunt       "   colon cancer    Colon cancer Maternal Aunt     Miscarriages / Stillbirths Cousin     Stroke Maternal Aunt     No Known Problems Father     Thyroid disease Sister     Thyroid disease Brother     No Known Problems Daughter     No Known Problems Son     Ovarian cancer Neg Hx     Deep vein thrombosis Neg Hx     Pulmonary embolism Neg Hx             REVIEW OF SYSTEMS  General: Denies any fever or chills  Chest: Denies shortness of breath, wheezing, coughing, or sputum production  Heart: Denies chest pain, cold extremities, orthopenia, or reduced exercise tolerance  Musk: Positive for pain as noted to affected extremity in HPI   As noted above and per history of current illness above, otherwise negative in the remainder of the 14 systems.      PHYSICAL EXAM  Vitals:    08/23/22 1606   Weight: 119.7 kg (264 lb)   Height: 5' 5" (1.651 m)   PainSc:   8       General: This patient is well-developed, well-nourished and appears stated age, well-oriented to person, place and time, and cooperative and pleasant on today's visit    Lower Extremity Physical Exam    Vascular exam:   Dorsalis pedis and posterior tibial pulses palpable 2/4 bilaterally.   Capillary refill time immediate to the toes.   Feet are warm to the touch. Skin temperature warm to warm from proximally to distally   There are no varicosities, telangiectasias noted to bilateral foot and ankle regions.   There are no ecchymoses noted to bilateral foot and ankle regions.   There is  mild edema right ankle.     Dermatologic exam:   Skin moist with healthy texture and turgor.  There are no open ulcerations, lacerations, or fissures to bilateral foot and ankle regions.  There is no  evidence of ecchymosis or fracture blisters. There are no  open wounds noted.    Neuro:   Epicritic sensation is intact as the patient is able to sense light touch to bilateral foot and ankle regions.   Achilles and patellar deep tendon reflexes intact  Babinski reflex absent    MSK:   + " Wiggle toes   (-) pain at 5th metatarsal base  (-) pain at fibular malleolus  (-) pain at medial malleolus  (-) pain upon palpation of tib-fib prox syndesmosis  (-) pain at ATFL, CFL, or PTFL  (-) pain at deltoid ligaments    IMAGING   Reviewed by me and I agree with radiologist findings, 3 views of foot/ankle, reveal:  No results found for this or any previous visit.          No results found for this or any previous visit.      No results found for this or any previous visit.       Results for orders placed during the hospital encounter of 01/30/20    X-Ray Foot Complete Right    Narrative  EXAMINATION:  XR FOOT COMPLETE 3 VIEW RIGHT    CLINICAL HISTORY:  . Other specified postprocedural states    TECHNIQUE:  AP, lateral, and oblique views of the foot were performed.    COMPARISON:  01/02/2020    FINDINGS:  A screw seen across the base of the 5th metatarsal fracture.  There is continued interval healing of the fracture when compared to the prior exam.  The alignment of fracture is stable.  A plantar calcaneal enthesophyte is noted.    Impression  As above      Electronically signed by: Ramiro Dee DO  Date:    01/30/2020  Time:    10:02        ASSESSMENT  1. Closed nondisplaced fracture of lateral malleolus of right fibula, initial encounter  X-Ray Ankle Complete Right      2. Acute right ankle pain            PLAN  1. Patient was educated about clinical and imaging findings, and verbalizes understanding of above.     Diagnoses and all orders for this visit:  Closed nondisplaced fracture of lateral malleolus of right fibula, initial encounter  -     X-Ray Ankle Complete Right; Future; Expected date: 08/23/2022    Acute right ankle pain    Other orders  -     ibuprofen (ADVIL,MOTRIN) 800 MG tablet; Take 1 tablet (800 mg total) by mouth every 8 (eight) hours as needed for Pain.  Dispense: 30 tablet; Refill: 2    2. Treatment plan: PRICE Therapy  including protection, rest, ice, elevation,NSAID's, immobilization,  and/or surgical intervention. Verbal consent obtained by patient to proceed with fitting of CAM walking boot. Appropriate DME/signature obtained prior to fit. A CAM Walking boot was then fitted/dispensed and customized to the patient's right  lower extremity by the LPN/MA. Patient denied any discomfort/pains to the site with boot.  Patient was provided instructions in regards to ambulation. Patient was informed that they should not drive with CAM walker if dispensed on right lower extremity.   r    3. RTC  for follow up/evaluation as scheduled with xrays    Future Appointments   Date Time Provider Department Center   9/21/2022  1:45 PM [facility] XR1 [facility] XRAY [place]   9/21/2022  2:00 PM Constance Serrano DPM [facility] POD [place]   10/18/2022  9:45 AM PULMONARY LAB, [facility]   10/18/2022 10:40 AM Josefina Mack NP [facility]   11/16/2022  3:15 PM Scottie Herrera MD ON UROLOGY  [facility]       Report Electronically Signed By:  Constance Wagner DPM   Podiatric Medicine & Surgery  [facility]  9/3/2022

## 2022-08-23 NOTE — ASSESSMENT & PLAN NOTE
Encouraged calorie reduction and 30 minutes of exercise daily. Discussed impact of obesity on general health.  Thinking about gastric sleeve surgery with [facility]   Wt Readings from Last 9 Encounters:   08/23/22 119.7 kg (264 lb)   08/23/22 119.8 kg (264 lb 1.8 oz)   07/20/22 119.8 kg (264 lb 3.5 oz)   07/19/22 120 kg (264 lb 8.8 oz)   06/03/22 120.3 kg (265 lb 3.4 oz)   04/25/22 116 kg (255 lb 11.7 oz)   04/18/22 116.5 kg (256 lb 13.4 oz)   01/18/22 116.4 kg (256 lb 11.6 oz)   12/16/21 117.1 kg (258 lb 2.5 oz)   Body mass index is 43.95 kg/m².

## 2022-08-23 NOTE — PROGRESS NOTES
Subjective:      Patient ID: Yuli Milton is a 46 y.o. female.    Chief Complaint: Sleep Apnea and Asthma    HPI: Yuli Milton presents to clinic for acute care visit related to asthma flare.    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 Mucous clear. Chest tightness. No wheezing.     She is off her controller Breo 100 mcg over past 3-4 months.   Determined breo no longer on her formulary. New order Advair 100 mcg 1 puff twice daily. Prednisone taper.     Patient also request letter of accomodation to have air conditioned transit bus for work. She is employed by [employer] as a . Provided letter at this visit.     8/23/2022 FeNO 23, just below suspected inflammation.     Asthma Control Test  In the past 4  weeks, how much of the time did your asthma keep you from getting as much done at work, school or at home?: Most of the time  During the past 4 weeks, how often have you had shortness of breath?: 3 to 6 times a week  During the past 4 weeks, how often did your asthma symptoms (wheezing, couging, shortness of breath, chest tightness or pain) wake you up at night or earlier than usual in the morning?: Not at all  During the past 4 weeks, how often have you used your rescue inhaler or nebulizer medication (such as albuterol)?: 2 or 3 times a week  How would you rate your asthma control during the past 4 weeks?: Somewhat controlled  If your score is 19 or less, your asthma may not be under control: 16     SCOUT with CPAP complaince assessment.  She is on Auto CPAP of 4-20 cmH2O pressure which is optimally controlling sleep apnea with apneic index (AHI) 2.9 events an hour.   Patient reports benefit from CPAP use. Compliance affected by falling asleep without machine in use. Then jan 2022 she had malfunction would not blowing air, has not had  [facility] [place] and found working fine.   Patient reports no complaints. Full face mask was tolerated.     Obtained ResMed CPAP machine in about November 2021 9/24/2021, 9/27/2021 Home Sleep Study    2 night study  MILD OBSTRUCTIVE SLEEP APNEA with overall AHI 13.2/hr ( 52 events)  Oxygen desaturation: 80%. SpO2 between 85% to 89% for < 1 min.  Patient snored 78% time above 50 .  Heart rate range: 60 bpm -110 bpm     Present Compliance download available, needs to bring in machine  Payor Standard  Usage 12/08/2021 - 01/06/2022  Usage days 19/30 days (63%)  >= 4 hours 9 days (30%)  < 4 hours 10 days (33%)  Usage hours 68 hours 12 minutes  Average usage (total days) 2 hours 16 minutes  Average usage (days used) 3 hours 35 minutes  Median usage (days used) 3 hours 56 minutes  Total used hours (value since last reset - 01/06/2022) 202 hours  AirSense 11 AutoSet  Serial number [id number]  Mode AutoSet  Min Pressure 4 cmH2O  Max Pressure 20 cmH2O  EPR Fulltime  EPR level 3  Response Standard  Therapy  Pressure - cmH2O Median: 5.8 95th percentile: 7.7 Maximum: 8.5  Leaks - L/min Median: 13.7 95th percentile: 22.5 Maximum: 26.8  Events per hour AI: 2.5 HI: 0.4 AHI: 2.9  Apnea Index Central: 0.5 Obstructive: 1.9 Unknown: 0.1  RERA Index 0.2  Cheyne-Hernandez respiration (average duration per night) 0 minutes (0%)  Usage - hours        Employed full time as a [employer] , [employer].      Previous Report Reviewed: lab reports and office notes     Past Medical History: The following portions of the patient's history were reviewed and updated as appropriate:   She  has a past surgical history that includes Appendectomy; Dilation and curettage of uterus; Colonoscopy (N/A, 7/31/2017); Cholecystectomy; Hysterectomy (08/31/2016); Fracture surgery; and Oophorectomy.  Her family history includes Breast cancer in her maternal aunt and paternal grandmother; Cancer in her maternal aunt; Colon cancer  in her maternal aunt; Diabetes in her maternal grandmother; Hypertension in her mother; Miscarriages / Stillbirths in her cousin; No Known Problems in her daughter, father, and son; Stroke in her maternal aunt; Thyroid disease in her brother, mother, and sister.  She  reports that she has never smoked. She has never used smokeless tobacco. She reports that she does not drink alcohol and does not use drugs.  She has a current medication list which includes the following prescription(s): albuterol, albuterol, azelaic acid, blood sugar diagnostic, cequa, clindamycin, ergocalciferol, gabapentin, haloperidol, hydrochlorothiazide, lancets, lidocaine, magnesium oxide, meclizine, metformin, methen-m.blue-s.phos-phsal-hyo, methocarbamol, naproxen, nebulizer accessories, omeprazole, ondansetron, onetouch verio flex meter, oxybutynin, oxycodone-acetaminophen, pregabalin, promethazine-dextromethorphan, semaglutide, thiamine, tizanidine, tramadol, trintellix, atorvastatin, fluoxetine, fluticasone-salmeterol 100-50 mcg/dose, ibuprofen, metoprolol succinate, myrbetriq, olanzapine, and prednisone.  She is allergic to trulicity [dulaglutide], aspirin, and hibiclens (isopropyl alcohol)..    The following portions of the patient's history were reviewed and updated as appropriate: allergies, current medications, past family history, past medical history, past social history, past surgical history and problem list.    Review of Systems   Constitutional: Negative for fever, chills, weight loss, weight gain, activity change, appetite change, fatigue and night sweats.   HENT: Negative for postnasal drip, rhinorrhea, sinus pressure, voice change and congestion.    Eyes: Negative for redness and itching.   Respiratory: Negative for snoring, cough, sputum production, chest tightness, shortness of breath, wheezing, orthopnea, asthma nighttime symptoms, dyspnea on extertion, use of rescue inhaler and somnolence.    Cardiovascular: Negative.   "Negative for chest pain, palpitations and leg swelling.   Genitourinary: Negative for difficulty urinating and hematuria.   Endocrine: Negative for cold intolerance and heat intolerance.    Musculoskeletal: Negative for arthralgias, gait problem, joint swelling and myalgias.   Skin: Negative.    Gastrointestinal: Negative for nausea, vomiting, abdominal pain and acid reflux.   Neurological: Negative for dizziness, weakness, light-headedness and headaches.   Hematological: Negative for adenopathy. No excessive bruising.   All other systems reviewed and are negative.     Objective:   /80   Pulse 62   Resp 17   Ht 5' 5" (1.651 m)   Wt 119.8 kg (264 lb 1.8 oz)   LMP 08/21/2016   SpO2 99%   BMI 43.95 kg/m²   Physical Exam  Vitals and nursing note reviewed.   Constitutional:       General: She is not in acute distress.     Appearance: She is well-developed. She is not ill-appearing or toxic-appearing.   HENT:      Head: Normocephalic.      Right Ear: External ear normal.      Left Ear: External ear normal.      Nose: Nose normal.      Mouth/Throat:      Pharynx: No oropharyngeal exudate.   Eyes:      Conjunctiva/sclera: Conjunctivae normal.   Cardiovascular:      Rate and Rhythm: Normal rate and regular rhythm.      Heart sounds: Normal heart sounds.   Pulmonary:      Effort: Pulmonary effort is normal.      Breath sounds: Normal breath sounds. No stridor.   Abdominal:      Palpations: Abdomen is soft.   Musculoskeletal:         General: Normal range of motion.      Cervical back: Normal range of motion and neck supple.   Lymphadenopathy:      Cervical: No cervical adenopathy.   Skin:     General: Skin is warm and dry.   Neurological:      Mental Status: She is alert and oriented to person, place, and time.   Psychiatric:         Behavior: Behavior normal. Behavior is cooperative.         Thought Content: Thought content normal.         Judgment: Judgment normal.         Personal Diagnostic Review  CPAP " download    8/232/2022 FeNO 23   Clinical Guide to Interpretation or FeNO Levels :     FeNO  (ppb) LOW INTERMEDIATE HIGH   ADULT VALUES < 25 25-50          > 50   Th2-driven Inflammation Unlikely Likely Significant      Patients FeNO level at this visit : _23___ (ppb)    11/20/2020 6mwd No desaturations requiring supplemental oxygen at rest or exertion. Oxygen desaturation did not meet criteria for supplemental oxygen prescription. Exercise capacity is normal.      Phase Oxygen Assessment Supplemental O2 Heart   Rate Blood Pressure Ramon Dyspnea Scale Rating   Resting 98 % Room Air 73 bpm 116/77 3   Exercise         Minute         1 100 % Room Air 108 bpm     2 100 % Room Air 117 bpm     3 94 % Room Air 116 bpm     4 93 % Room Air 113 bpm     5 99 % Room Air 130 bpm     6  95 % Room Air 123 bpm 94/54 4   Recovery         Minute         1 100 % Room Air 86 bpm     2 100 % Room Air 77 bpm     3 100 % Room Air 74 bpm     4 99 % Room Air 77 bpm 117/75 2     Six Minute Walk Summary   6MWT Status: completed without stopping   Patient Reported: Dyspnea, Other (Comment)(leg numbness    Assessment:     1. Mild persistent asthma without complication    2. Morbid obesity due to excess calories    3. SCOUT on CPAP        Orders Placed This Encounter   Procedures    NEBULIZER KIT (SUPPLIES) FOR HOME USE     Medical Necessity of Nebulizer Treatment:  The use of a nebulizer is explicitly recommended on a daily basis for treatment of Chronic Obstructive Pulmonary Disease. Use of the nebulizer is medically necessary for mobilization of secretions for relief of pulmonary symptoms of coughing and airway obstruction. Additionally the use of nebulizer is medically necessary for administration of bronchodilator medications and in some cases inhaled steroids and inhaled antibiotics. The use of nebulizer is recommended at least twice a day and may be used as often as every 4- 6 hours depending on the clinical condition.     Order Specific  "Question:   Height:     Answer:   5' 5" (1.651 m)     Order Specific Question:   Weight:     Answer:   119.8 kg (264 lb 1.8 oz)     Order Specific Question:   Length of need (1-99 months):     Answer:   99     Order Specific Question:   Mask or Mouthpiece?     Answer:   Mouthpiece    NEBULIZER FOR HOME USE     Medical Necessity of Nebulizer Treatment:  The use of a nebulizer is explicitly recommended on a daily basis for treatment of Chronic Obstructive Pulmonary Disease. Use of the nebulizer is medically necessary for mobilization of secretions for relief of pulmonary symptoms of coughing and airway obstruction. Additionally the use of nebulizer is medically necessary for administration of bronchodilator medications and in some cases inhaled steroids and inhaled antibiotics. The use of nebulizer is recommended at least twice a day and may be used as often as every 4- 6 hours depending on the clinical condition.     Order Specific Question:   Height:     Answer:   5' 5" (1.651 m)     Order Specific Question:   Weight:     Answer:   119.8 kg (264 lb 1.8 oz)     Order Specific Question:   Length of need (1-99 months):     Answer:   99    CPAP/BIPAP SUPPLIES     Benefits and compliant  90 day supply. 4 refills  HME: [facility]     Order Specific Question:   Length of need (1-99 months):     Answer:   99     Order Specific Question:   Choose ONE mask type and its corresponding cushions and/or pillows:     Answer:    Full Face Mask, 1 per 90 days:  Full Face Cushion, (3 per 90 days)     Order Specific Question:   Choose EITHER Heated or Non-Heated Tubjing     Answer:    Non-Heated Tubing, 1 per 90 days     Order Specific Question:   Number of Days Needed:     Answer:   99     Order Specific Question:   All other supplies as needed as listed below:     Answer:    Headgear, 1 per 180 days     Order Specific Question:   All other supplies as needed as listed below:     Answer:    Chin Strap, 1 per " 180 days     Order Specific Question:   All other supplies as needed as listed below:     Answer:    Disposable Filter, 6 per 90 days     Order Specific Question:   All other supplies as needed as listed below:     Answer:    Humidifier Chamber, 1 per 180 days     Order Specific Question:   All other supplies as needed as listed below:     Answer:    Non-Disposable Filter, 1 per 180 days    Spirometry with/without bronchodilator     Standing Status:   Future     Standing Expiration Date:   8/23/2023     Order Specific Question:   Release to patient     Answer:   Immediate     Plan:     Problem List Items Addressed This Visit     SCOUT on CPAP     Prior diagnosis 2020, had to return CPAP inadequate use.  Restudied 9/24/2021, 9/27/2021 Home Sleep Study    2 night study  MILD OBSTRUCTIVE SLEEP APNEA with overall AHI 13.2/hr ( 52 events)  Oxygen desaturation: 80%. SpO2 between 85% to 89% for < 1 min.  On Auto CPAP 4-20 cm, stopped using Jan 2022, machine malfunction, met with Chuyita godwin.  Resume CPAP use  ResMed   Full face mask  Updated supplies  Improve adherence to use  Annual follow up                    Relevant Orders    CPAP/BIPAP SUPPLIES    Morbid obesity due to excess calories     Encouraged calorie reduction and 30 minutes of exercise daily. Discussed impact of obesity on general health.  Thinking about gastric sleeve surgery with [facility]   Wt Readings from Last 9 Encounters:   08/23/22 119.7 kg (264 lb)   08/23/22 119.8 kg (264 lb 1.8 oz)   07/20/22 119.8 kg (264 lb 3.5 oz)   07/19/22 120 kg (264 lb 8.8 oz)   06/03/22 120.3 kg (265 lb 3.4 oz)   04/25/22 116 kg (255 lb 11.7 oz)   04/18/22 116.5 kg (256 lb 13.4 oz)   01/18/22 116.4 kg (256 lb 11.6 oz)   12/16/21 117.1 kg (258 lb 2.5 oz)   Body mass index is 43.95 kg/m².               Mild persistent asthma without complication - Primary     Acute mild flare  Exam lungs clear  FeNO 23  Resume ICS/LABA controller, breo not on  formulary  Begin Advair 100 mcg 1 puff twice daily  Begin Albuterol nebs, machine provided in clinic, patient was using son's machine.  Albuterol inhaler as needed  Prednisone taper, only begin if not improving in next 3-5 days with resuming ICS/LABA controller   Follow up 2 months review cindy           Relevant Medications    fluticasone-salmeterol diskus inhaler 100-50 mcg    predniSONE (DELTASONE) 20 MG tablet    albuterol (PROVENTIL) 2.5 mg /3 mL (0.083 %) nebulizer solution    albuterol (PROVENTIL/VENTOLIN HFA) 90 mcg/actuation inhaler    Other Relevant Orders    NEBULIZER KIT (SUPPLIES) FOR HOME USE    NEBULIZER FOR HOME USE    Spirometry with/without bronchodilator        I spent a total of 30 minutes on the day of the visit.  This includes face to face time and non-face to face time preparing to see the patient (eg, review of tests), obtaining and/or reviewing separately obtained history, documenting clinical information in the electronic or other health record, independently interpreting results and communicating results to the patient/family/caregiver, or care coordinator.    Follow up in about 8 weeks (around 10/18/2022) for Asthma review cindy.

## 2022-08-23 NOTE — ASSESSMENT & PLAN NOTE
Prior diagnosis 2020, had to return CPAP inadequate use.  Restudied 9/24/2021, 9/27/2021 Home Sleep Study    2 night study  MILD OBSTRUCTIVE SLEEP APNEA with overall AHI 13.2/hr ( 52 events)  Oxygen desaturation: 80%. SpO2 between 85% to 89% for < 1 min.  On Auto CPAP 4-20 cm, stopped using Jan 2022, machine malfunction, met with Chuyita singleton fine.  Resume CPAP use  ResMed   Full face mask  Updated supplies  Improve adherence to use  Annual follow up

## 2022-08-23 NOTE — PROCEDURES
Clinical Guide to Interpretation or FeNO Levels :    FeNO  (ppb) LOW INTERMEDIATE HIGH   ADULT VALUES < 25 25-50          > 50   Th2-driven Inflammation Unlikely Likely Significant     Patients FeNO level at this visit : _23___ (ppb)    Interpretation of FeNO measurement in adults:  [x] FENO is less than 25 ppb implies non eosinophilic airway inflammation or the absence of airway inflammation.    Comment: Low FENO (<25 ppb) in adult asthmatics with persistent symptoms suggests other etiologies for these symptoms and a lower likelihood of benefit from adding or increasing inhaled glucocorticoids.    [] FENO between 25 and 50 ppb in adults should be interpreted cautiously with reference to the clinical situation (eg, symptomatic, on or off therapy, current smoking).    [] FENO greater than 50 ppb in adults  suggests eosinophilic airway inflammation   Comment: High FENO (>50 ppb) in adult asthmatics even with atypical symptoms suggests glucocorticoid responsiveness. High FENO (>50 ppb) can help identify poor adherence or uncontrolled inflammation in asthma patients with otherwise seemingly controlled asthma.    Discussion:  A FENO less than 25 ppb in adults and less than 20 ppb in children younger than 12 years of age implies noneosinophilic airway inflammation or the absence of airway inflammation.  A FENO greater than 50 ppb in adults or greater than 35 ppb in children suggests eosinophilic airway inflammation.   Values of FENO between 25 and 50 ppb in adults (20 to 35 ppb in children) should be interpreted cautiously with reference to the clinical situation (eg, symptomatic, on or off therapy, current smoking).  A rising FENO with a greater than 20 percent change and more than 25 ppb (20 ppb in children) from a previously stable level suggests increasing eosinophilic airway inflammation, but there are wide inter-individual differences.  A decrease in FENO greater than 20 percent for values over 50 ppb or more than  10 ppb for values less than 50 ppb may be clinically important.  ?FENO in other respiratory diseases - Several other diseases are associated with altered levels of exhaled NO: low levels of FENO have been noted in cystic fibrosis, current smoking, pulmonary hypertension, hypothermia, primary ciliary dyskinesia, and bronchopulmonary dysplasia. Elevated FENO has been noted in atopy, nonasthmatic eosinophilic bronchitis, COPD exacerbations, noncystic fibrosis bronchiectasis, and viral upper respiratory infections.    REFERENCE:  ATS Board of Directors, December 2004, and by the ERS Executive Committee, June 2004. ATS/ERS Recommendations for Standardized Procedures for the Online and Offline Measurement of Exhaled Lower Respiratory Nitric Oxide and Nasal Nitric Oxide. Guideline 2005     Baltazar Mosley MD

## 2022-08-23 NOTE — ASSESSMENT & PLAN NOTE
Acute mild flare  Exam lungs clear  FeNO 23  Resume ICS/LABA controller, breo not on formulary  Begin Advair 100 mcg 1 puff twice daily  Begin Albuterol nebs, machine provided in clinic, patient was using son's machine.  Albuterol inhaler as needed  Prednisone taper, only begin if not improving in next 3-5 days with resuming ICS/LABA controller   Follow up 2 months review cindy

## 2022-08-26 ENCOUNTER — TELEPHONE (OUTPATIENT)
Dept: PODIATRY | Facility: CLINIC | Age: 47
End: 2022-08-26
Payer: COMMERCIAL

## 2022-08-26 NOTE — TELEPHONE ENCOUNTER
LVM for the patient to call Podiatry    ----- Message from Karin Meneses sent at 8/26/2022  2:16 PM CDT -----  Please call pt @ [phone] regarding a paper to get a handicap sticker, pt states she broke foot, leave message if no answer.

## 2022-08-31 ENCOUNTER — PATIENT MESSAGE (OUTPATIENT)
Dept: PODIATRY | Facility: CLINIC | Age: 47
End: 2022-08-31
Payer: COMMERCIAL

## 2022-09-02 ENCOUNTER — OFFICE VISIT (OUTPATIENT)
Dept: UROLOGY | Facility: CLINIC | Age: 47
End: 2022-09-02
Payer: COMMERCIAL

## 2022-09-02 VITALS
SYSTOLIC BLOOD PRESSURE: 107 MMHG | HEART RATE: 83 BPM | DIASTOLIC BLOOD PRESSURE: 75 MMHG | BODY MASS INDEX: 43.99 KG/M2 | TEMPERATURE: 98 F | WEIGHT: 264 LBS | HEIGHT: 65 IN

## 2022-09-02 DIAGNOSIS — R39.15 URGENCY OF MICTURITION: ICD-10-CM

## 2022-09-02 DIAGNOSIS — R39.82 CHRONIC BLADDER PAIN: Primary | ICD-10-CM

## 2022-09-02 DIAGNOSIS — N30.10 INTERSTITIAL CYSTITIS: ICD-10-CM

## 2022-09-02 DIAGNOSIS — M62.89 PELVIC FLOOR DYSFUNCTION: ICD-10-CM

## 2022-09-02 PROCEDURE — 3008F PR BODY MASS INDEX (BMI) DOCUMENTED: ICD-10-PCS | Mod: CPTII,S$GLB,, | Performed by: UROLOGY

## 2022-09-02 PROCEDURE — 99999 PR PBB SHADOW E&M-EST. PATIENT-LVL V: ICD-10-PCS | Mod: PBBFAC,,, | Performed by: UROLOGY

## 2022-09-02 PROCEDURE — 1159F MED LIST DOCD IN RCRD: CPT | Mod: CPTII,S$GLB,, | Performed by: UROLOGY

## 2022-09-02 PROCEDURE — 3008F BODY MASS INDEX DOCD: CPT | Mod: CPTII,S$GLB,, | Performed by: UROLOGY

## 2022-09-02 PROCEDURE — 3074F PR MOST RECENT SYSTOLIC BLOOD PRESSURE < 130 MM HG: ICD-10-PCS | Mod: CPTII,S$GLB,, | Performed by: UROLOGY

## 2022-09-02 PROCEDURE — 99214 OFFICE O/P EST MOD 30 MIN: CPT | Mod: S$GLB,,, | Performed by: UROLOGY

## 2022-09-02 PROCEDURE — 99214 PR OFFICE/OUTPT VISIT, EST, LEVL IV, 30-39 MIN: ICD-10-PCS | Mod: S$GLB,,, | Performed by: UROLOGY

## 2022-09-02 PROCEDURE — 3072F PR LOW RISK FOR RETINOPATHY: ICD-10-PCS | Mod: CPTII,S$GLB,, | Performed by: UROLOGY

## 2022-09-02 PROCEDURE — 99999 PR PBB SHADOW E&M-EST. PATIENT-LVL V: CPT | Mod: PBBFAC,,, | Performed by: UROLOGY

## 2022-09-02 PROCEDURE — 3078F PR MOST RECENT DIASTOLIC BLOOD PRESSURE < 80 MM HG: ICD-10-PCS | Mod: CPTII,S$GLB,, | Performed by: UROLOGY

## 2022-09-02 PROCEDURE — 3078F DIAST BP <80 MM HG: CPT | Mod: CPTII,S$GLB,, | Performed by: UROLOGY

## 2022-09-02 PROCEDURE — 3072F LOW RISK FOR RETINOPATHY: CPT | Mod: CPTII,S$GLB,, | Performed by: UROLOGY

## 2022-09-02 PROCEDURE — 3044F PR MOST RECENT HEMOGLOBIN A1C LEVEL <7.0%: ICD-10-PCS | Mod: CPTII,S$GLB,, | Performed by: UROLOGY

## 2022-09-02 PROCEDURE — 3044F HG A1C LEVEL LT 7.0%: CPT | Mod: CPTII,S$GLB,, | Performed by: UROLOGY

## 2022-09-02 PROCEDURE — 3074F SYST BP LT 130 MM HG: CPT | Mod: CPTII,S$GLB,, | Performed by: UROLOGY

## 2022-09-02 PROCEDURE — 1159F PR MEDICATION LIST DOCUMENTED IN MEDICAL RECORD: ICD-10-PCS | Mod: CPTII,S$GLB,, | Performed by: UROLOGY

## 2022-09-02 RX ORDER — SOLIFENACIN SUCCINATE 10 MG/1
10 TABLET, FILM COATED ORAL DAILY
Qty: 30 TABLET | Refills: 11 | Status: SHIPPED | OUTPATIENT
Start: 2022-09-02 | End: 2022-11-16 | Stop reason: SDUPTHER

## 2022-09-02 RX ORDER — LEVOFLOXACIN 500 MG/1
500 TABLET, FILM COATED ORAL DAILY
Qty: 10 TABLET | Refills: 0 | Status: SHIPPED | OUTPATIENT
Start: 2022-09-02 | End: 2022-09-12

## 2022-09-02 RX ORDER — ESTRADIOL 0.1 MG/G
2 CREAM VAGINAL DAILY
Qty: 60 G | Refills: 1 | Status: SHIPPED | OUTPATIENT
Start: 2022-09-02 | End: 2022-11-16 | Stop reason: SDUPTHER

## 2022-09-02 NOTE — PROGRESS NOTES
Chief Complaint:   Encounter Diagnoses   Name Primary?    Chronic bladder pain Yes    Urgency of micturition     Pelvic floor dysfunction     Interstitial cystitis        HPI:   9/2/22- patient returns today after almost a year, having some vaginal odor, no specific odor to the urine though.  No specific dysuria, still has a bit of urgency currently taking no medications from our service.  45-year-old female who comes in with bladder discomfort.  Patient states that she has noted a recent pain and pressure in her suprapubic region.  She states that a urinalysis has been normal.  This been going on for the last week, nothing really consistent with this before.  Appears to be constant, with no change.  No correlation to voiding.  Possible dysuria, but not consistent with a UTI.  She gets up 1-2 times per evening, but does have increased frequency and urgency during the daytime.  No evidence of leakage.  No gross hematuria, no microscopic hematuria, she has never been a smoker.  She did take antibiotics, with no assistance.  She does have frequent UTIs, but again this does not appear to be UTI, nor did the antibiotics assist.  No previous urological history, she has had a total abdominal hysterectomy with unilateral salpingo-oophorectomy.  She has had no slings, as stated no incontinence, 3 natural deliveries without issue.  No family history of urological cancers or stones.  She states she has been treating constipation recently.    Allergies:  Trulicity [dulaglutide], Aspirin, and Hibiclens (isopropyl alcohol)    Medications:  has a current medication list which includes the following prescription(s): albuterol, albuterol, atorvastatin, azelaic acid, blood sugar diagnostic, cequa, clindamycin, ergocalciferol, fluoxetine, fluticasone-salmeterol 100-50 mcg/dose, gabapentin, haloperidol, hydrochlorothiazide, ibuprofen, lancets, lidocaine, magnesium oxide, meclizine, metformin, methen-m.blue-s.phos-phsal-hyo,  methocarbamol, metoprolol succinate, myrbetriq, naproxen, nebulizer accessories, olanzapine, omeprazole, ondansetron, onetouch verio flex meter, oxybutynin, oxycodone-acetaminophen, prednisone, pregabalin, promethazine-dextromethorphan, semaglutide, thiamine, tizanidine, tramadol, and trintellix.    Review of Systems:  General: No fever, chills, fatigability, or weight loss.  Skin: No rashes, itching, or changes in color or texture of skin.  Chest: Denies SANTOS, cyanosis, wheezing, cough, and sputum production.  Abdomen: Appetite fine. No weight loss. Denies diarrhea, abdominal pain, hematemesis, or blood in stool.  Musculoskeletal: No joint stiffness or swelling. Denies back pain.  : As above.  All other review of systems negative.    PMH:   has a past medical history of Abnormal Pap smear of cervix, Abnormal Pap smear of vagina, Acute pain of right shoulder (10/25/2019), Adjustment disorder with depressed mood (11/15/2014), Allergy, Anemia, Anemia (12/14/2014 6:26:13 PM), Anxiety, Anxiety and depression, Asthma, Asthma (4/14/2016), Asthma (6/18/2014 1:31:21 PM), Auditory hallucinations (2/17/2017), Bacterial vaginosis (1/5/2018), Bladder pain (9/1/2020), Bronchitis (10/15/2015), Bronchitis (9/18/2020), Bronchitis (10/15/2015), Cervical radiculopathy (3/10/2020), Chlamydia, Depression (emotion), Diabetes mellitus, SANTOS (dyspnea on exertion) (10/14/2019), Dysuria (3/31/2018), Dysuria (3/31/2018), Early satiety (8/28/2020), Gallstones, Gastritis, High-risk sexual behavior (1/5/2018), History of ovarian cyst, History of syphilis, History of trichomoniasis, Hyperlipidemia, Hypertension, Interstitial cystitis (12/8/2020), Localized edema (10/14/2019), Lower abdominal pain (7/31/2017), Mental disorder, Obesity (6/18/2014 1:51:54 PM), Obstructive sleep apnea (10/5/2021), Persistent cough (7/27/2018), PONV (postoperative nausea and vomiting), Preop general physical exam (12/9/2019), Right foot pain (11/18/2019), Routine  general medical examination at a health care facility (5/21/2021), Shortness of breath (6/26/2017), SOB (shortness of breath) (6/26/2017), Suicidal ideations (7/9/2019), Syphilis (6/24/2014 7:17:31 PM), Type 2 diabetes mellitus without complication, without long-term current use of insulin (5/25/2014), Type 2 or unspecified type diabetes mellitus (6/24/2014 7:24:47 PM), Upper respiratory tract infection (1/5/2018), Urgency of micturition (11/20/2020), Vaginal candidiasis (7/24/2018), Vaginal itching (1/5/2018), and Viral syndrome (12/1/2020).    PSH:   has a past surgical history that includes Appendectomy; Dilation and curettage of uterus; Colonoscopy (N/A, 7/31/2017); Cholecystectomy; Hysterectomy (08/31/2016); Fracture surgery; and Oophorectomy.    FamHx: family history includes Breast cancer in her maternal aunt and paternal grandmother; Cancer in her maternal aunt; Colon cancer in her maternal aunt; Diabetes in her maternal grandmother; Hypertension in her mother; Miscarriages / Stillbirths in her cousin; No Known Problems in her daughter, father, and son; Stroke in her maternal aunt; Thyroid disease in her brother, mother, and sister.    SocHx:  reports that she has never smoked. She has never used smokeless tobacco. She reports that she does not drink alcohol and does not use drugs.      Physical Exam:  There were no vitals filed for this visit.  General: A&Ox3, no apparent distress, no deformities  Neck: No masses, normal ROM  Lungs: normal inspiration, no use of accessory muscles  Heart: normal pulse, no arrhythmias  Abdomen: Soft, NT, ND, no masses, no hernias, no hepatosplenomegaly  Skin: The skin is warm and dry. No jaundice.  Ext: No c/c/e.    Labs/Studies:   UA trace protein, 100 glucose, trace blood 9/22  Cystoscopy negative 12/20  CT urine adrenal nodule 9/20  Urine culture negative 11/20    Impression/Plan:        1.  Urgency- myrbetriq really did not assist in the past, her biggest complaint now is  actually vaginal odor with some urgency.  She states that the has an ammonia smell, all cultures including vaginal cultures have been negative.  Do not believe that this is from a bladder source.  Either way Flagyl did not assist a few months ago, will attempt Levaquin 500 mg daily for 10 days and initiate Estrace as she has had a total hysterectomy with unilateral salpingo oophorectomy.  No gynecological malignancies in her or her family, except for an aunt who had bladder cancer.  In addition will initiate VESIcare 10 mg, call with any side effects, re-evaluate in 6 weeks.    2.  Interstitial cystitis- please see above.    3.  Pelvic floor dysfunction- thus far treatments have not assisted.  Patient did not pursue pelvic floor physical therapy.  Currently not a significant issue.

## 2022-09-06 ENCOUNTER — OFFICE VISIT (OUTPATIENT)
Dept: INTERNAL MEDICINE | Facility: CLINIC | Age: 47
End: 2022-09-06
Payer: COMMERCIAL

## 2022-09-06 ENCOUNTER — LAB VISIT (OUTPATIENT)
Dept: LAB | Facility: HOSPITAL | Age: 47
End: 2022-09-06
Attending: FAMILY MEDICINE
Payer: COMMERCIAL

## 2022-09-06 VITALS
SYSTOLIC BLOOD PRESSURE: 110 MMHG | DIASTOLIC BLOOD PRESSURE: 70 MMHG | WEIGHT: 263.88 LBS | HEART RATE: 72 BPM | TEMPERATURE: 98 F | BODY MASS INDEX: 43.91 KG/M2

## 2022-09-06 DIAGNOSIS — Z28.9 DELAYED IMMUNIZATIONS: ICD-10-CM

## 2022-09-06 DIAGNOSIS — Z12.31 SCREENING MAMMOGRAM, ENCOUNTER FOR: ICD-10-CM

## 2022-09-06 DIAGNOSIS — Z00.00 ROUTINE GENERAL MEDICAL EXAMINATION AT A HEALTH CARE FACILITY: Primary | ICD-10-CM

## 2022-09-06 DIAGNOSIS — E55.9 VITAMIN D DEFICIENCY: ICD-10-CM

## 2022-09-06 DIAGNOSIS — E78.2 MIXED HYPERLIPIDEMIA: ICD-10-CM

## 2022-09-06 DIAGNOSIS — E11.9 TYPE 2 DIABETES MELLITUS WITHOUT COMPLICATION, WITHOUT LONG-TERM CURRENT USE OF INSULIN: ICD-10-CM

## 2022-09-06 DIAGNOSIS — Z00.00 WELLNESS EXAMINATION: ICD-10-CM

## 2022-09-06 DIAGNOSIS — Z00.00 ROUTINE GENERAL MEDICAL EXAMINATION AT A HEALTH CARE FACILITY: ICD-10-CM

## 2022-09-06 DIAGNOSIS — I10 ESSENTIAL HYPERTENSION: ICD-10-CM

## 2022-09-06 LAB
ALBUMIN SERPL BCP-MCNC: 4.1 G/DL (ref 3.5–5.2)
ALP SERPL-CCNC: 70 U/L (ref 55–135)
ALT SERPL W/O P-5'-P-CCNC: 16 U/L (ref 10–44)
ANION GAP SERPL CALC-SCNC: 9 MMOL/L (ref 8–16)
AST SERPL-CCNC: 11 U/L (ref 10–40)
BASOPHILS # BLD AUTO: 0.05 K/UL (ref 0–0.2)
BASOPHILS NFR BLD: 0.8 % (ref 0–1.9)
BILIRUB SERPL-MCNC: 0.6 MG/DL (ref 0.1–1)
BUN SERPL-MCNC: 9 MG/DL (ref 6–20)
CALCIUM SERPL-MCNC: 10.1 MG/DL (ref 8.7–10.5)
CHLORIDE SERPL-SCNC: 101 MMOL/L (ref 95–110)
CHOLEST SERPL-MCNC: 173 MG/DL (ref 120–199)
CHOLEST/HDLC SERPL: 3.7 {RATIO} (ref 2–5)
CO2 SERPL-SCNC: 24 MMOL/L (ref 23–29)
CREAT SERPL-MCNC: 0.9 MG/DL (ref 0.5–1.4)
DIFFERENTIAL METHOD: NORMAL
EOSINOPHIL # BLD AUTO: 0.1 K/UL (ref 0–0.5)
EOSINOPHIL NFR BLD: 1 % (ref 0–8)
ERYTHROCYTE [DISTWIDTH] IN BLOOD BY AUTOMATED COUNT: 13.3 % (ref 11.5–14.5)
ERYTHROCYTE [SEDIMENTATION RATE] IN BLOOD BY WESTERGREN METHOD: 24 MM/HR (ref 0–20)
EST. GFR  (NO RACE VARIABLE): >60 ML/MIN/1.73 M^2
ESTIMATED AVG GLUCOSE: 255 MG/DL (ref 68–131)
GLUCOSE SERPL-MCNC: 281 MG/DL (ref 70–110)
HBA1C MFR BLD: 10.5 % (ref 4–5.6)
HCT VFR BLD AUTO: 42 % (ref 37–48.5)
HDLC SERPL-MCNC: 47 MG/DL (ref 40–75)
HDLC SERPL: 27.2 % (ref 20–50)
HGB BLD-MCNC: 13.9 G/DL (ref 12–16)
IMM GRANULOCYTES # BLD AUTO: 0.01 K/UL (ref 0–0.04)
IMM GRANULOCYTES NFR BLD AUTO: 0.2 % (ref 0–0.5)
IRON SERPL-MCNC: 91 UG/DL (ref 30–160)
LDH SERPL L TO P-CCNC: 210 U/L (ref 110–260)
LDLC SERPL CALC-MCNC: 102.8 MG/DL (ref 63–159)
LYMPHOCYTES # BLD AUTO: 2.9 K/UL (ref 1–4.8)
LYMPHOCYTES NFR BLD: 47 % (ref 18–48)
MCH RBC QN AUTO: 30 PG (ref 27–31)
MCHC RBC AUTO-ENTMCNC: 33.1 G/DL (ref 32–36)
MCV RBC AUTO: 91 FL (ref 82–98)
MONOCYTES # BLD AUTO: 0.3 K/UL (ref 0.3–1)
MONOCYTES NFR BLD: 4.9 % (ref 4–15)
NEUTROPHILS # BLD AUTO: 2.8 K/UL (ref 1.8–7.7)
NEUTROPHILS NFR BLD: 46.1 % (ref 38–73)
NONHDLC SERPL-MCNC: 126 MG/DL
NRBC BLD-RTO: 0 /100 WBC
PLATELET # BLD AUTO: 351 K/UL (ref 150–450)
PMV BLD AUTO: 10.7 FL (ref 9.2–12.9)
POTASSIUM SERPL-SCNC: 4.3 MMOL/L (ref 3.5–5.1)
PROT SERPL-MCNC: 7.7 G/DL (ref 6–8.4)
RBC # BLD AUTO: 4.64 M/UL (ref 4–5.4)
SATURATED IRON: 24 % (ref 20–50)
SODIUM SERPL-SCNC: 134 MMOL/L (ref 136–145)
TOTAL IRON BINDING CAPACITY: 379 UG/DL (ref 250–450)
TRANSFERRIN SERPL-MCNC: 256 MG/DL (ref 200–375)
TRIGL SERPL-MCNC: 116 MG/DL (ref 30–150)
URATE SERPL-MCNC: 3.8 MG/DL (ref 2.4–5.7)
WBC # BLD AUTO: 6.07 K/UL (ref 3.9–12.7)

## 2022-09-06 PROCEDURE — 3074F PR MOST RECENT SYSTOLIC BLOOD PRESSURE < 130 MM HG: ICD-10-PCS | Mod: CPTII,S$GLB,, | Performed by: FAMILY MEDICINE

## 2022-09-06 PROCEDURE — 84466 ASSAY OF TRANSFERRIN: CPT | Performed by: FAMILY MEDICINE

## 2022-09-06 PROCEDURE — 87389 HIV-1 AG W/HIV-1&-2 AB AG IA: CPT | Performed by: FAMILY MEDICINE

## 2022-09-06 PROCEDURE — 99999 PR PBB SHADOW E&M-EST. PATIENT-LVL IV: CPT | Mod: PBBFAC,,, | Performed by: FAMILY MEDICINE

## 2022-09-06 PROCEDURE — 1159F MED LIST DOCD IN RCRD: CPT | Mod: CPTII,S$GLB,, | Performed by: FAMILY MEDICINE

## 2022-09-06 PROCEDURE — 3078F DIAST BP <80 MM HG: CPT | Mod: CPTII,S$GLB,, | Performed by: FAMILY MEDICINE

## 2022-09-06 PROCEDURE — 82306 VITAMIN D 25 HYDROXY: CPT | Performed by: FAMILY MEDICINE

## 2022-09-06 PROCEDURE — 80061 LIPID PANEL: CPT | Performed by: FAMILY MEDICINE

## 2022-09-06 PROCEDURE — 3078F PR MOST RECENT DIASTOLIC BLOOD PRESSURE < 80 MM HG: ICD-10-PCS | Mod: CPTII,S$GLB,, | Performed by: FAMILY MEDICINE

## 2022-09-06 PROCEDURE — 84439 ASSAY OF FREE THYROXINE: CPT | Performed by: FAMILY MEDICINE

## 2022-09-06 PROCEDURE — 84443 ASSAY THYROID STIM HORMONE: CPT | Performed by: FAMILY MEDICINE

## 2022-09-06 PROCEDURE — 99396 PREV VISIT EST AGE 40-64: CPT | Mod: S$GLB,,, | Performed by: FAMILY MEDICINE

## 2022-09-06 PROCEDURE — 3044F PR MOST RECENT HEMOGLOBIN A1C LEVEL <7.0%: ICD-10-PCS | Mod: CPTII,S$GLB,, | Performed by: FAMILY MEDICINE

## 2022-09-06 PROCEDURE — 3072F LOW RISK FOR RETINOPATHY: CPT | Mod: CPTII,S$GLB,, | Performed by: FAMILY MEDICINE

## 2022-09-06 PROCEDURE — 3008F BODY MASS INDEX DOCD: CPT | Mod: CPTII,S$GLB,, | Performed by: FAMILY MEDICINE

## 2022-09-06 PROCEDURE — 3072F PR LOW RISK FOR RETINOPATHY: ICD-10-PCS | Mod: CPTII,S$GLB,, | Performed by: FAMILY MEDICINE

## 2022-09-06 PROCEDURE — 80053 COMPREHEN METABOLIC PANEL: CPT | Performed by: FAMILY MEDICINE

## 2022-09-06 PROCEDURE — 1159F PR MEDICATION LIST DOCUMENTED IN MEDICAL RECORD: ICD-10-PCS | Mod: CPTII,S$GLB,, | Performed by: FAMILY MEDICINE

## 2022-09-06 PROCEDURE — 99999 PR PBB SHADOW E&M-EST. PATIENT-LVL IV: ICD-10-PCS | Mod: PBBFAC,,, | Performed by: FAMILY MEDICINE

## 2022-09-06 PROCEDURE — 36415 COLL VENOUS BLD VENIPUNCTURE: CPT | Mod: PO | Performed by: FAMILY MEDICINE

## 2022-09-06 PROCEDURE — 84550 ASSAY OF BLOOD/URIC ACID: CPT | Performed by: FAMILY MEDICINE

## 2022-09-06 PROCEDURE — 99396 PR PREVENTIVE VISIT,EST,40-64: ICD-10-PCS | Mod: S$GLB,,, | Performed by: FAMILY MEDICINE

## 2022-09-06 PROCEDURE — 3044F HG A1C LEVEL LT 7.0%: CPT | Mod: CPTII,S$GLB,, | Performed by: FAMILY MEDICINE

## 2022-09-06 PROCEDURE — 3008F PR BODY MASS INDEX (BMI) DOCUMENTED: ICD-10-PCS | Mod: CPTII,S$GLB,, | Performed by: FAMILY MEDICINE

## 2022-09-06 PROCEDURE — 3074F SYST BP LT 130 MM HG: CPT | Mod: CPTII,S$GLB,, | Performed by: FAMILY MEDICINE

## 2022-09-06 PROCEDURE — 85025 COMPLETE CBC W/AUTO DIFF WBC: CPT | Performed by: FAMILY MEDICINE

## 2022-09-06 PROCEDURE — 1160F PR REVIEW ALL MEDS BY PRESCRIBER/CLIN PHARMACIST DOCUMENTED: ICD-10-PCS | Mod: CPTII,S$GLB,, | Performed by: FAMILY MEDICINE

## 2022-09-06 PROCEDURE — 82728 ASSAY OF FERRITIN: CPT | Performed by: FAMILY MEDICINE

## 2022-09-06 PROCEDURE — 85651 RBC SED RATE NONAUTOMATED: CPT | Performed by: FAMILY MEDICINE

## 2022-09-06 PROCEDURE — 83615 LACTATE (LD) (LDH) ENZYME: CPT | Performed by: FAMILY MEDICINE

## 2022-09-06 PROCEDURE — 83036 HEMOGLOBIN GLYCOSYLATED A1C: CPT | Performed by: FAMILY MEDICINE

## 2022-09-06 PROCEDURE — 1160F RVW MEDS BY RX/DR IN RCRD: CPT | Mod: CPTII,S$GLB,, | Performed by: FAMILY MEDICINE

## 2022-09-06 RX ORDER — SEMAGLUTIDE 2.68 MG/ML
2 INJECTION, SOLUTION SUBCUTANEOUS
Qty: 9 ML | Refills: 0 | Status: SHIPPED | OUTPATIENT
Start: 2022-09-06 | End: 2023-01-09

## 2022-09-06 RX ORDER — CELECOXIB 200 MG/1
200 CAPSULE ORAL 2 TIMES DAILY
COMMUNITY
Start: 2022-09-02 | End: 2022-09-06 | Stop reason: ALTCHOICE

## 2022-09-06 NOTE — PROGRESS NOTES
Subjective:       Patient ID: Yuli Milton is a 46 y.o. female.    Chief Complaint: Fatigue    Yuli Milton is a 46 y.o. female and is here for a comprehensive physical exam.    Do you take any herbs or supplements that were not prescribed by a doctor? no  Are you taking calcium supplements? no  Are you taking aspirin daily? no     History:  Any STD's in the past? none    The following portions of the patient's history were reviewed and updated as appropriate: allergies, current medications, past family history, past medical history, past social history, past surgical history and problem list.    Review of Systems  Do you have pain that bothers you in your daily life? no  Pertinent items are noted in HPI.      2. Patient Counseling:  --Nutrition: Stressed importance of moderation in sodium/caffeine intake, saturated fat and cholesterol, caloric balance.  --Exercise: Stressed the importance of regular exercise.   --Substance Abuse: Discussed cessation/primary prevention of tobacco, alcohol - nonuser   --Sexuality: Discussed sexually transmitted disease.  --Injury prevention: Discussed safety belts, smoke detector.   --Dental health: Discussed dental health.  --Immunizations reviewed.      3. Discussed the patient's BMI.  4. Follow up as needed for acute illness        Review of Systems   Constitutional:  Positive for activity change. Negative for fever.   HENT:  Negative for congestion.    Eyes:  Negative for discharge.   Respiratory:  Positive for shortness of breath.    Cardiovascular:  Negative for chest pain.   Gastrointestinal:  Negative for abdominal pain.   Genitourinary:  Negative for difficulty urinating.   Musculoskeletal:  Positive for arthralgias. Negative for joint swelling.   Neurological:  Negative for dizziness.   Psychiatric/Behavioral:  Negative for agitation.      Objective:      Physical Exam  Vitals and nursing note reviewed.   Constitutional:       General: She is not in acute  distress.     Appearance: Normal appearance. She is well-developed. She is obese. She is not diaphoretic.   HENT:      Head: Normocephalic and atraumatic.   Eyes:      General: No scleral icterus.     Conjunctiva/sclera: Conjunctivae normal.   Cardiovascular:      Rate and Rhythm: Normal rate and regular rhythm.   Pulmonary:      Effort: Pulmonary effort is normal. No respiratory distress.      Breath sounds: Normal breath sounds. No wheezing.   Abdominal:      General: There is no distension.      Palpations: Abdomen is soft.      Tenderness: There is no abdominal tenderness. There is no guarding.   Musculoskeletal:      Comments: Using scooter for ankle fx.    Boot in place to RLE   Skin:     General: Skin is warm and dry.      Coloration: Skin is not pale.      Findings: No erythema or rash.      Comments: Good turgor   Neurological:      Mental Status: She is alert.   Psychiatric:         Mood and Affect: Mood normal.         Thought Content: Thought content normal.       Assessment:       1. Routine general medical examination at a health care facility    2. Type 2 diabetes mellitus without complication, without long-term current use of insulin    3. Delayed immunizations    4. Screening mammogram, encounter for    5. Essential hypertension    6. Mixed hyperlipidemia    7. Wellness examination    8. Vitamin D deficiency          Plan:     Problem List Items Addressed This Visit          Cardiac/Vascular    Essential hypertension    Overview     Chronic, Stable, cont hydrochlorothiazide         Mixed hyperlipidemia    Overview     Chronic, Stable, cont statin            Renal/    Screening mammogram, encounter for    Relevant Orders    Mammo Digital Screening Bilat w/ Myles       ID    Delayed immunizations       Endocrine    Type 2 diabetes mellitus without complication, without long-term current use of insulin    Relevant Medications    semaglutide (OZEMPIC) 2 mg/dose (8 mg/3 mL) PnIj    Other Relevant Orders     Microalbumin/Creatinine Ratio, Urine    Ambulatory referral/consult to Podiatry    Ambulatory referral/consult to Optometry    Vitamin D deficiency    Overview     Chronic, Stable, cont vitamin-D over-the-counter.            Other    Routine general medical examination at a health care facility - Primary    Relevant Orders    CBC Auto Differential    Comprehensive Metabolic Panel    Hemoglobin A1C    Ferritin    Iron and TIBC    Lipid Panel    TSH    Vitamin D    T4, Free    HIV 1/2 Ag/Ab (4th Gen)    Lactate dehydrogenase (LDH)    Uric acid    Sedimentation rate    Urinalysis    Wellness examination

## 2022-09-07 ENCOUNTER — PATIENT MESSAGE (OUTPATIENT)
Dept: BARIATRICS | Facility: CLINIC | Age: 47
End: 2022-09-07
Payer: COMMERCIAL

## 2022-09-07 LAB
25(OH)D3+25(OH)D2 SERPL-MCNC: 22 NG/ML (ref 30–96)
FERRITIN SERPL-MCNC: 109 NG/ML (ref 20–300)
HIV 1+2 AB+HIV1 P24 AG SERPL QL IA: NORMAL
T4 FREE SERPL-MCNC: 1.09 NG/DL (ref 0.71–1.51)
TSH SERPL DL<=0.005 MIU/L-ACNC: 0.75 UIU/ML (ref 0.4–4)

## 2022-09-12 DIAGNOSIS — E11.9 TYPE 2 DIABETES MELLITUS WITHOUT COMPLICATION, WITHOUT LONG-TERM CURRENT USE OF INSULIN: Primary | ICD-10-CM

## 2022-09-13 ENCOUNTER — TELEPHONE (OUTPATIENT)
Dept: DIABETES | Facility: CLINIC | Age: 47
End: 2022-09-13
Payer: COMMERCIAL

## 2022-09-20 ENCOUNTER — TELEPHONE (OUTPATIENT)
Dept: DIABETES | Facility: CLINIC | Age: 47
End: 2022-09-20
Payer: COMMERCIAL

## 2022-09-28 ENCOUNTER — HOSPITAL ENCOUNTER (OUTPATIENT)
Dept: RADIOLOGY | Facility: HOSPITAL | Age: 47
Discharge: HOME | End: 2022-09-28
Attending: PODIATRIST
Payer: COMMERCIAL

## 2022-09-28 ENCOUNTER — OFFICE VISIT (OUTPATIENT)
Dept: PODIATRY | Facility: CLINIC | Age: 47
End: 2022-09-28
Payer: COMMERCIAL

## 2022-09-28 VITALS — BODY MASS INDEX: 43.82 KG/M2 | WEIGHT: 263 LBS | HEIGHT: 65 IN

## 2022-09-28 DIAGNOSIS — S82.64XA CLOSED NONDISPLACED FRACTURE OF LATERAL MALLEOLUS OF RIGHT FIBULA, INITIAL ENCOUNTER: Primary | ICD-10-CM

## 2022-09-28 DIAGNOSIS — E11.9 TYPE 2 DIABETES MELLITUS WITHOUT COMPLICATION, WITHOUT LONG-TERM CURRENT USE OF INSULIN: ICD-10-CM

## 2022-09-28 DIAGNOSIS — S82.64XA CLOSED NONDISPLACED FRACTURE OF LATERAL MALLEOLUS OF RIGHT FIBULA, INITIAL ENCOUNTER: ICD-10-CM

## 2022-09-28 PROCEDURE — 99214 OFFICE O/P EST MOD 30 MIN: CPT | Mod: S$GLB,,, | Performed by: PODIATRIST

## 2022-09-28 PROCEDURE — 3061F PR NEG MICROALBUMINURIA RESULT DOCUMENTED/REVIEW: ICD-10-PCS | Mod: CPTII,S$GLB,, | Performed by: PODIATRIST

## 2022-09-28 PROCEDURE — 3061F NEG MICROALBUMINURIA REV: CPT | Mod: CPTII,S$GLB,, | Performed by: PODIATRIST

## 2022-09-28 PROCEDURE — 1159F MED LIST DOCD IN RCRD: CPT | Mod: CPTII,S$GLB,, | Performed by: PODIATRIST

## 2022-09-28 PROCEDURE — 3066F NEPHROPATHY DOC TX: CPT | Mod: CPTII,S$GLB,, | Performed by: PODIATRIST

## 2022-09-28 PROCEDURE — 3008F BODY MASS INDEX DOCD: CPT | Mod: CPTII,S$GLB,, | Performed by: PODIATRIST

## 2022-09-28 PROCEDURE — 3066F PR DOCUMENTATION OF TREATMENT FOR NEPHROPATHY: ICD-10-PCS | Mod: CPTII,S$GLB,, | Performed by: PODIATRIST

## 2022-09-28 PROCEDURE — 3072F LOW RISK FOR RETINOPATHY: CPT | Mod: CPTII,S$GLB,, | Performed by: PODIATRIST

## 2022-09-28 PROCEDURE — 3072F PR LOW RISK FOR RETINOPATHY: ICD-10-PCS | Mod: CPTII,S$GLB,, | Performed by: PODIATRIST

## 2022-09-28 PROCEDURE — 1159F PR MEDICATION LIST DOCUMENTED IN MEDICAL RECORD: ICD-10-PCS | Mod: CPTII,S$GLB,, | Performed by: PODIATRIST

## 2022-09-28 PROCEDURE — 73610 XR ANKLE COMPLETE 3 VIEW RIGHT: ICD-10-PCS | Mod: 26,RT,, | Performed by: RADIOLOGY

## 2022-09-28 PROCEDURE — 99999 PR PBB SHADOW E&M-EST. PATIENT-LVL V: CPT | Mod: PBBFAC,,, | Performed by: PODIATRIST

## 2022-09-28 PROCEDURE — 3046F HEMOGLOBIN A1C LEVEL >9.0%: CPT | Mod: CPTII,S$GLB,, | Performed by: PODIATRIST

## 2022-09-28 PROCEDURE — 3008F PR BODY MASS INDEX (BMI) DOCUMENTED: ICD-10-PCS | Mod: CPTII,S$GLB,, | Performed by: PODIATRIST

## 2022-09-28 PROCEDURE — 99999 PR PBB SHADOW E&M-EST. PATIENT-LVL V: ICD-10-PCS | Mod: PBBFAC,,, | Performed by: PODIATRIST

## 2022-09-28 PROCEDURE — 73610 X-RAY EXAM OF ANKLE: CPT | Mod: 26,RT,, | Performed by: RADIOLOGY

## 2022-09-28 PROCEDURE — 99214 PR OFFICE/OUTPT VISIT, EST, LEVL IV, 30-39 MIN: ICD-10-PCS | Mod: S$GLB,,, | Performed by: PODIATRIST

## 2022-09-28 PROCEDURE — 73610 X-RAY EXAM OF ANKLE: CPT | Mod: TC,FY,PO,RT

## 2022-09-28 PROCEDURE — 3046F PR MOST RECENT HEMOGLOBIN A1C LEVEL > 9.0%: ICD-10-PCS | Mod: CPTII,S$GLB,, | Performed by: PODIATRIST

## 2022-09-28 NOTE — PROGRESS NOTES
[facility] -   PODIATRIC MEDICINE AND SURGERY  PROGRESS NOTE    Reason for Visit   Chief Complaint   Patient presents with    Ankle Injury     Right ankle fx f/u, rates pain 5/10, diabetic wears walking boot, Last seen PCP Dr. Gutierrez 09/06/2022       HPI  Yuli Milton is a 47 y.o. female w/ PMH of DM2, who presents today after sustaining injury to right ankle. Pt describes mechanism as direct fall. They deny any LOC or proximal leg pain. Pt states unable to bear any weight on right ankle. Severity of pain noted to be 8/10.      9/28/2022  Pt is here for four week follow up R ankle fracture. She has been partially compliant with BOOT and knee scooter. Patient denies other pedal complaints at this time.    DOI: 8/17/22      [facility]  Past Medical History:   Diagnosis Date    Abnormal Pap smear of cervix     treatment??    Abnormal Pap smear of vagina     Acute pain of right shoulder 10/25/2019    Adjustment disorder with depressed mood 11/15/2014    Overview:  Multiple recent medical problems     Allergy     Anemia     Anemia 12/14/2014 6:26:13 PM    [facility] - [facility]: Anemia-No Additional Notes    Anxiety     Anxiety and depression     Asthma     Asthma 4/14/2016    Asthma 6/18/2014 1:31:21 PM    [facility] Historical - Respiratory: Asthma-No Additional Notes    Auditory hallucinations 2/17/2017    Bacterial vaginosis 1/5/2018    Bladder pain 9/1/2020    Bronchitis 10/15/2015    Bronchitis 9/18/2020    Bronchitis 10/15/2015    Cervical radiculopathy 3/10/2020    Chlamydia     Depression (emotion)     Diabetes mellitus     SANTOS (dyspnea on exertion) 10/14/2019    Dysuria 3/31/2018    Dysuria 3/31/2018    Early satiety 8/28/2020    Gallstones     Gastritis     High-risk sexual behavior 1/5/2018    History of ovarian cyst     History of syphilis     History of trichomoniasis     Hyperlipidemia     Hypertension     Interstitial cystitis 12/8/2020    Localized edema 10/14/2019    Lower  "abdominal pain 7/31/2017    Mental disorder     Obesity 6/18/2014 1:51:54 PM    [facility] - [facility]: Obesity, Unspecified-No Additional Notes    Obstructive sleep apnea 10/5/2021    Persistent cough 7/27/2018    PONV (postoperative nausea and vomiting)     Preop general physical exam 12/9/2019    Right foot pain 11/18/2019    Routine general medical examination at a health care facility 5/21/2021    Shortness of breath 6/26/2017    SOB (shortness of breath) 6/26/2017    Suicidal ideations 7/9/2019    Syphilis 6/24/2014 7:17:31 PM    [facility] Historical - Gynecologic: Syphilis-No Additional Notes    Type 2 diabetes mellitus without complication, without long-term current use of insulin 5/25/2014    Type 2 or unspecified type diabetes mellitus 6/24/2014 7:24:47 PM    [facility] - [facility]: Diabetes Mellitus Without Complication, Type II-"borderline"    Upper respiratory tract infection 1/5/2018    Urgency of micturition 11/20/2020    Vaginal candidiasis 7/24/2018    Vaginal itching 1/5/2018    Viral syndrome 12/1/2020       MEDS  Current Outpatient Medications on File Prior to Visit   Medication Sig Dispense Refill    albuterol (PROVENTIL) 2.5 mg /3 mL (0.083 %) nebulizer solution Take 3 mLs (2.5 mg total) by nebulization every 4 (four) hours as needed for Wheezing. Rescue 150 mL 11    albuterol (PROVENTIL/VENTOLIN HFA) 90 mcg/actuation inhaler Inhale 2 puffs into the lungs every 4 (four) hours as needed for Wheezing or Shortness of Breath. Rescue. Use with chamber. 18 g 11    azelaic acid (FINACEA) 15 % gel AAA bid 50 g 3    blood sugar diagnostic Strp 1 each by Misc.(Non-Drug; Combo Route) route 4 (four) times daily. 100 each 11    CEQUA 0.09 % Dpet Place 1 drop into both eyes 2 (two) times daily.      clindamycin (CLEOCIN T) 1 % lotion AAA bid 60 mL 3    ergocalciferol (ERGOCALCIFEROL) 50,000 unit Cap Take 1 capsule (50,000 Units total) by mouth twice a week. 24 capsule 0    estradioL " (ESTRACE) 0.01 % (0.1 mg/gram) vaginal cream Place 2 g vaginally once daily. 60 g 1    fluticasone-salmeterol diskus inhaler 100-50 mcg Inhale 1 puff into the lungs 2 (two) times daily. Controller 60 each 11    gabapentin (NEURONTIN) 300 MG capsule Take by mouth.      haloperidoL (HALDOL) 5 MG tablet Take 1 tablet (5 mg total) by mouth every evening. 30 tablet 0    hydroCHLOROthiazide (HYDRODIURIL) 12.5 MG Tab Take 1 tablet (12.5 mg total) by mouth daily as needed (leg swelling). 90 tablet 1    ibuprofen (ADVIL,MOTRIN) 800 MG tablet Take 1 tablet (800 mg total) by mouth every 8 (eight) hours as needed for Pain. 30 tablet 2    lancets Misc 1 each by Misc.(Non-Drug; Combo Route) route 4 (four) times daily. 200 each 11    LIDOcaine (LIDODERM) 5 % 1 patch once daily.      magnesium oxide (MAG-OX) 400 mg (241.3 mg magnesium) tablet Take 1 tablet (400 mg total) by mouth once daily. 90 tablet 1    meclizine (ANTIVERT) 25 mg tablet Take 25 mg by mouth every 8 (eight) hours as needed.      metFORMIN (GLUCOPHAGE) 1000 MG tablet Take 1 tablet (1,000 mg total) by mouth 2 (two) times daily with meals. 180 tablet 1    methpaulo-m.blue-s.phos-phsal-hyo (URIBEL) 118-10-40.8-36 mg Cap Take 1 capsule by mouth 3 (three) times daily as needed (dysuria). 30 capsule 5    methocarbamoL (ROBAXIN) 500 MG Tab Take 500 mg by mouth 2 (two) times daily.      naproxen (NAPROSYN) 500 MG tablet Take 500 mg by mouth 2 (two) times daily.      nebulizer accessories Misc 1 each - needs replacement tubing and mask  0    omeprazole (PRILOSEC) 20 MG capsule Take 1 capsule (20 mg total) by mouth once daily. 30 capsule 11    ondansetron (ZOFRAN-ODT) 4 MG TbDL Take 4 mg by mouth every 8 (eight) hours as needed.      ONETOUCH VERIO FLEX METER Misc       oxyCODONE-acetaminophen (PERCOCET)  mg per tablet       predniSONE (DELTASONE) 20 MG tablet 40 mg x 5 days, then 20 mg x 5 days. 15 tablet 0    pregabalin (LYRICA) 100 MG capsule Take 100 mg by mouth 2  (two) times daily.      promethazine-dextromethorphan (PROMETHAZINE-DM) 6.25-15 mg/5 mL Syrp Take 5 mLs by mouth every evening. 118 mL 0    semaglutide (OZEMPIC) 2 mg/dose (8 mg/3 mL) PnIj Inject 2 mg into the skin every 7 days. 9 mL 0    solifenacin (VESICARE) 10 MG tablet Take 1 tablet (10 mg total) by mouth once daily. 30 tablet 11    thiamine 100 MG tablet Take 100 mg by mouth once daily.      tiZANidine (ZANAFLEX) 4 MG tablet Take 1 tablet (4 mg total) by mouth every 6 (six) hours as needed (pain/ spasms). 60 tablet 1    traMADoL (ULTRAM) 50 mg tablet       TRINTELLIX 5 mg Tab Take 1 tablet by mouth once daily.      atorvastatin (LIPITOR) 20 MG tablet Take 1 tablet (20 mg total) by mouth once daily. 90 tablet 0    FLUoxetine 20 MG capsule Take 1 capsule (20 mg total) by mouth once daily. 30 capsule 0    metoprolol succinate (TOPROL-XL) 25 MG 24 hr tablet Take 1 tablet (25 mg total) by mouth every evening. (Patient not taking: Reported on 6/3/2022) 90 tablet 1    OLANZapine (ZYPREXA) 5 MG tablet Take 1 tablet (5 mg total) by mouth every evening. 30 tablet 0     No current facility-administered medications on file prior to visit.       PSH     Past Surgical History:   Procedure Laterality Date    APPENDECTOMY      CHOLECYSTECTOMY      COLONOSCOPY N/A 7/31/2017    Procedure: COLONOSCOPY;  Surgeon: Yuliana Michael MD;  Location: [facility];  Service: Endoscopy;  Laterality: N/A;    DILATION AND CURETTAGE OF UTERUS      bleeding    FRACTURE SURGERY      right foot    HYSTERECTOMY  08/31/2016    RAL/BS for complex hyperplasia without atypia    OOPHORECTOMY      1 ovary removed        ALL  Review of patient's allergies indicates:   Allergen Reactions    Trulicity [dulaglutide] Shortness Of Breath and Other (See Comments)     Headaches    Aspirin Nausea Only    Hibiclens (isopropyl alcohol) Itching       SOC     Social History     Tobacco Use    Smoking status: Never    Smokeless tobacco: Never   Substance Use Topics     "Alcohol use: No     Alcohol/week: 0.0 standard drinks     Comment: rarely; stop 12/11/19 prior to sx    Drug use: No       Family HX      Family History   Problem Relation Age of Onset    Breast cancer Paternal Grandmother     Diabetes Maternal Grandmother     Hypertension Mother     Thyroid disease Mother     Breast cancer Maternal Aunt     Cancer Maternal Aunt         colon cancer    Colon cancer Maternal Aunt     Miscarriages / Stillbirths Cousin     Stroke Maternal Aunt     No Known Problems Father     Thyroid disease Sister     Thyroid disease Brother     No Known Problems Daughter     No Known Problems Son     Ovarian cancer Neg Hx     Deep vein thrombosis Neg Hx     Pulmonary embolism Neg Hx             REVIEW OF SYSTEMS  General: Denies any fever or chills  Chest: Denies shortness of breath, wheezing, coughing, or sputum production  Heart: Denies chest pain, cold extremities, orthopenia, or reduced exercise tolerance  Musk: Positive for pain as noted to affected extremity in HPI   As noted above and per history of current illness above, otherwise negative in the remainder of the 14 systems.      PHYSICAL EXAM  Vitals:    09/28/22 1125   Weight: 119.3 kg (263 lb)   Height: 5' 5" (1.651 m)   PainSc:   5       General: This patient is well-developed, well-nourished and appears stated age, well-oriented to person, place and time, and cooperative and pleasant on today's visit    Lower Extremity Physical Exam    Vascular exam:   Dorsalis pedis and posterior tibial pulses palpable 2/4 bilaterally.   Capillary refill time immediate to the toes.   Feet are warm to the touch. Skin temperature warm to warm from proximally to distally   There are no varicosities, telangiectasias noted to bilateral foot and ankle regions.   There are no ecchymoses noted to bilateral foot and ankle regions.   There is no edema right ankle.     Dermatologic exam:   Skin moist with healthy texture and turgor.  There are no open ulcerations, " lacerations, or fissures to bilateral foot and ankle regions.  There is no  evidence of ecchymosis or fracture blisters. There are no  open wounds noted.    Neuro:   Epicritic sensation is intact as the patient is able to sense light touch to bilateral foot and ankle regions.   Achilles and patellar deep tendon reflexes intact  Babinski reflex absent    MSK:   + Wiggle toes   (-) pain at 5th metatarsal base  (+)  pain at fibular malleolus  (-) pain at medial malleolus  (-) pain upon palpation of tib-fib prox syndesmosis  (-) pain at ATFL, CFL, or PTFL  (-) pain at deltoid ligaments    IMAGING   Reviewed by me and I agree with radiologist findings, 3 views of foot/ankle, reveal:  No results found for this or any previous visit.          No results found for this or any previous visit.      No results found for this or any previous visit.       Results for orders placed during the hospital encounter of 01/30/20    X-Ray Foot Complete Right    Narrative  EXAMINATION:  XR FOOT COMPLETE 3 VIEW RIGHT    CLINICAL HISTORY:  . Other specified postprocedural states    TECHNIQUE:  AP, lateral, and oblique views of the foot were performed.    COMPARISON:  01/02/2020    FINDINGS:  A screw seen across the base of the 5th metatarsal fracture.  There is continued interval healing of the fracture when compared to the prior exam.  The alignment of fracture is stable.  A plantar calcaneal enthesophyte is noted.    Impression  As above      Electronically signed by: Ramiro Dee DO  Date:    01/30/2020  Time:    10:02        ASSESSMENT  1. Closed nondisplaced fracture of lateral malleolus of right fibula, initial encounter  X-Ray Ankle Complete Right      2. Type 2 diabetes mellitus without complication, without long-term current use of insulin  Ambulatory referral/consult to Podiatry          PLAN  1. Patient was educated about clinical and imaging findings, and verbalizes understanding of above.     Diagnoses and all orders for  this visit:  Closed nondisplaced fracture of lateral malleolus of right fibula, initial encounter  -     X-Ray Ankle Complete Right; Future; Expected date: 09/28/2022    Type 2 diabetes mellitus without complication, without long-term current use of insulin  -     Ambulatory referral/consult to Podiatry    2. CAM boot, compression therapy, ankle ROM exercises     3. RTC  for follow up/evaluation as scheduled with xrays    Future Appointments   Date Time Provider Department Center   10/12/2022  8:00 AM Acacia Warner PA-C [facility] DIABETE [facility]   10/18/2022  9:45 AM PULMONARY LAB, [place] [facility] PULM[facility]   10/18/2022 10:40 AM Josefina Mack NP [facility] PULMSVC [facility]   10/19/2022 11:15 AM [facility] XR1 [facility] XRAY [facility]   10/19/2022 11:40 AM Constance Serrano DPM [facility] POD [facility]   11/16/2022  3:15 PM Scottie Herrera MD ON UROLOGY  [facility]   12/6/2022 11:40 AM Drew Gutierrez MD [facility] IM [facility]       Report Electronically Signed By:  Constance Wagner DPM   Podiatric Medicine & Surgery  [facility]airass[facility] Cortez  9/28/2022

## 2022-10-06 ENCOUNTER — PATIENT MESSAGE (OUTPATIENT)
Dept: BARIATRICS | Facility: CLINIC | Age: 47
End: 2022-10-06
Payer: COMMERCIAL

## 2022-10-06 LAB
LEFT EYE DM RETINOPATHY: NEGATIVE
RIGHT EYE DM RETINOPATHY: NEGATIVE

## 2022-10-10 ENCOUNTER — TELEPHONE (OUTPATIENT)
Dept: PULMONOLOGY | Facility: CLINIC | Age: 47
End: 2022-10-10
Payer: COMMERCIAL

## 2022-10-10 NOTE — TELEPHONE ENCOUNTER
Called pt at [phone] several times. Pt phone went straight to voicemail. I left a detailed message that pt needed a covid screening scheduled before her lab appts to please give me a call back to schedule.

## 2022-10-14 ENCOUNTER — PATIENT MESSAGE (OUTPATIENT)
Dept: PULMONOLOGY | Facility: CLINIC | Age: 47
End: 2022-10-14
Payer: COMMERCIAL

## 2022-10-18 ENCOUNTER — TELEPHONE (OUTPATIENT)
Dept: PULMONOLOGY | Facility: CLINIC | Age: 47
End: 2022-10-18
Payer: COMMERCIAL

## 2022-10-18 NOTE — TELEPHONE ENCOUNTER
Called patient to verify that she had a pre-procedural covid swab done. Patient did not. Patient would like to reschedule spirometry appointment and appointment with PRANAV Mack for another day. I instructed and educated patient on having a covid swab done within 72 hours of schedule spirometry and told her where to go and to bring results to the rescheduled appointment. Will notify Frederick, NP and staff to reschedule appointments for another day.

## 2022-10-20 ENCOUNTER — TELEPHONE (OUTPATIENT)
Dept: PULMONOLOGY | Facility: CLINIC | Age: 47
End: 2022-10-20
Payer: COMMERCIAL

## 2022-10-24 ENCOUNTER — PATIENT OUTREACH (OUTPATIENT)
Dept: ADMINISTRATIVE | Facility: HOSPITAL | Age: 47
End: 2022-10-24
Payer: COMMERCIAL

## 2022-11-03 ENCOUNTER — PATIENT MESSAGE (OUTPATIENT)
Dept: DIABETES | Facility: CLINIC | Age: 47
End: 2022-11-03
Payer: COMMERCIAL

## 2022-11-03 DIAGNOSIS — E11.65 TYPE 2 DIABETES MELLITUS WITH HYPERGLYCEMIA, WITHOUT LONG-TERM CURRENT USE OF INSULIN: Primary | ICD-10-CM

## 2022-11-04 ENCOUNTER — PATIENT MESSAGE (OUTPATIENT)
Dept: BARIATRICS | Facility: CLINIC | Age: 47
End: 2022-11-04
Payer: COMMERCIAL

## 2022-11-04 RX ORDER — SEMAGLUTIDE 1.34 MG/ML
1 INJECTION, SOLUTION SUBCUTANEOUS
Qty: 1 PEN | Refills: 11 | Status: SHIPPED | OUTPATIENT
Start: 2022-11-04 | End: 2023-01-09

## 2022-11-04 NOTE — TELEPHONE ENCOUNTER
Ozempic 1 mg as alternative to 2 mg until can order.     Acacia Warner PA-C  Diabetes Management

## 2022-11-16 ENCOUNTER — OFFICE VISIT (OUTPATIENT)
Dept: INTERNAL MEDICINE | Facility: CLINIC | Age: 47
End: 2022-11-16
Payer: COMMERCIAL

## 2022-11-16 ENCOUNTER — OFFICE VISIT (OUTPATIENT)
Dept: UROLOGY | Facility: CLINIC | Age: 47
End: 2022-11-16
Payer: COMMERCIAL

## 2022-11-16 ENCOUNTER — TELEPHONE (OUTPATIENT)
Dept: UROLOGY | Facility: CLINIC | Age: 47
End: 2022-11-16
Payer: COMMERCIAL

## 2022-11-16 VITALS
BODY MASS INDEX: 41.99 KG/M2 | TEMPERATURE: 98 F | RESPIRATION RATE: 18 BRPM | DIASTOLIC BLOOD PRESSURE: 70 MMHG | WEIGHT: 252 LBS | HEIGHT: 65 IN | SYSTOLIC BLOOD PRESSURE: 108 MMHG | HEART RATE: 75 BPM

## 2022-11-16 VITALS
RESPIRATION RATE: 18 BRPM | DIASTOLIC BLOOD PRESSURE: 69 MMHG | SYSTOLIC BLOOD PRESSURE: 100 MMHG | BODY MASS INDEX: 42.67 KG/M2 | WEIGHT: 256.38 LBS | HEART RATE: 82 BPM

## 2022-11-16 DIAGNOSIS — M54.2 NECK PAIN ON LEFT SIDE: Primary | ICD-10-CM

## 2022-11-16 DIAGNOSIS — R39.82 CHRONIC BLADDER PAIN: Primary | ICD-10-CM

## 2022-11-16 DIAGNOSIS — R39.15 URGENCY OF MICTURITION: ICD-10-CM

## 2022-11-16 DIAGNOSIS — R00.2 PALPITATIONS: ICD-10-CM

## 2022-11-16 DIAGNOSIS — N30.10 INTERSTITIAL CYSTITIS: ICD-10-CM

## 2022-11-16 DIAGNOSIS — M62.89 PELVIC FLOOR DYSFUNCTION: ICD-10-CM

## 2022-11-16 DIAGNOSIS — M79.602 LEFT ARM PAIN: ICD-10-CM

## 2022-11-16 PROCEDURE — 99213 PR OFFICE/OUTPT VISIT, EST, LEVL III, 20-29 MIN: ICD-10-PCS | Mod: S$GLB,,, | Performed by: PHYSICIAN ASSISTANT

## 2022-11-16 PROCEDURE — 3008F BODY MASS INDEX DOCD: CPT | Mod: CPTII,S$GLB,, | Performed by: UROLOGY

## 2022-11-16 PROCEDURE — 99999 PR PBB SHADOW E&M-EST. PATIENT-LVL V: CPT | Mod: PBBFAC,,, | Performed by: UROLOGY

## 2022-11-16 PROCEDURE — 3078F DIAST BP <80 MM HG: CPT | Mod: CPTII,S$GLB,, | Performed by: UROLOGY

## 2022-11-16 PROCEDURE — 99213 OFFICE O/P EST LOW 20 MIN: CPT | Mod: S$GLB,,, | Performed by: PHYSICIAN ASSISTANT

## 2022-11-16 PROCEDURE — 1159F PR MEDICATION LIST DOCUMENTED IN MEDICAL RECORD: ICD-10-PCS | Mod: CPTII,S$GLB,, | Performed by: PHYSICIAN ASSISTANT

## 2022-11-16 PROCEDURE — 1159F MED LIST DOCD IN RCRD: CPT | Mod: CPTII,S$GLB,, | Performed by: PHYSICIAN ASSISTANT

## 2022-11-16 PROCEDURE — 3072F LOW RISK FOR RETINOPATHY: CPT | Mod: CPTII,S$GLB,, | Performed by: UROLOGY

## 2022-11-16 PROCEDURE — 99999 PR PBB SHADOW E&M-EST. PATIENT-LVL V: CPT | Mod: PBBFAC,,, | Performed by: PHYSICIAN ASSISTANT

## 2022-11-16 PROCEDURE — 3066F NEPHROPATHY DOC TX: CPT | Mod: CPTII,S$GLB,, | Performed by: PHYSICIAN ASSISTANT

## 2022-11-16 PROCEDURE — 93005 EKG 12-LEAD: ICD-10-PCS | Mod: S$GLB,,, | Performed by: PHYSICIAN ASSISTANT

## 2022-11-16 PROCEDURE — 3046F HEMOGLOBIN A1C LEVEL >9.0%: CPT | Mod: CPTII,S$GLB,, | Performed by: UROLOGY

## 2022-11-16 PROCEDURE — 99214 PR OFFICE/OUTPT VISIT, EST, LEVL IV, 30-39 MIN: ICD-10-PCS | Mod: S$GLB,,, | Performed by: UROLOGY

## 2022-11-16 PROCEDURE — 3072F PR LOW RISK FOR RETINOPATHY: ICD-10-PCS | Mod: CPTII,S$GLB,, | Performed by: UROLOGY

## 2022-11-16 PROCEDURE — 1160F PR REVIEW ALL MEDS BY PRESCRIBER/CLIN PHARMACIST DOCUMENTED: ICD-10-PCS | Mod: CPTII,S$GLB,, | Performed by: PHYSICIAN ASSISTANT

## 2022-11-16 PROCEDURE — 3061F PR NEG MICROALBUMINURIA RESULT DOCUMENTED/REVIEW: ICD-10-PCS | Mod: CPTII,S$GLB,, | Performed by: UROLOGY

## 2022-11-16 PROCEDURE — 3061F NEG MICROALBUMINURIA REV: CPT | Mod: CPTII,S$GLB,, | Performed by: UROLOGY

## 2022-11-16 PROCEDURE — 3078F PR MOST RECENT DIASTOLIC BLOOD PRESSURE < 80 MM HG: ICD-10-PCS | Mod: CPTII,S$GLB,, | Performed by: UROLOGY

## 2022-11-16 PROCEDURE — 99214 OFFICE O/P EST MOD 30 MIN: CPT | Mod: S$GLB,,, | Performed by: UROLOGY

## 2022-11-16 PROCEDURE — 3061F NEG MICROALBUMINURIA REV: CPT | Mod: CPTII,S$GLB,, | Performed by: PHYSICIAN ASSISTANT

## 2022-11-16 PROCEDURE — 1160F RVW MEDS BY RX/DR IN RCRD: CPT | Mod: CPTII,S$GLB,, | Performed by: PHYSICIAN ASSISTANT

## 2022-11-16 PROCEDURE — 3046F PR MOST RECENT HEMOGLOBIN A1C LEVEL > 9.0%: ICD-10-PCS | Mod: CPTII,S$GLB,, | Performed by: PHYSICIAN ASSISTANT

## 2022-11-16 PROCEDURE — 3074F PR MOST RECENT SYSTOLIC BLOOD PRESSURE < 130 MM HG: ICD-10-PCS | Mod: CPTII,S$GLB,, | Performed by: UROLOGY

## 2022-11-16 PROCEDURE — 3008F BODY MASS INDEX DOCD: CPT | Mod: CPTII,S$GLB,, | Performed by: PHYSICIAN ASSISTANT

## 2022-11-16 PROCEDURE — 99999 PR PBB SHADOW E&M-EST. PATIENT-LVL V: ICD-10-PCS | Mod: PBBFAC,,, | Performed by: PHYSICIAN ASSISTANT

## 2022-11-16 PROCEDURE — 3072F PR LOW RISK FOR RETINOPATHY: ICD-10-PCS | Mod: CPTII,S$GLB,, | Performed by: PHYSICIAN ASSISTANT

## 2022-11-16 PROCEDURE — 3066F NEPHROPATHY DOC TX: CPT | Mod: CPTII,S$GLB,, | Performed by: UROLOGY

## 2022-11-16 PROCEDURE — 3008F PR BODY MASS INDEX (BMI) DOCUMENTED: ICD-10-PCS | Mod: CPTII,S$GLB,, | Performed by: PHYSICIAN ASSISTANT

## 2022-11-16 PROCEDURE — 3078F DIAST BP <80 MM HG: CPT | Mod: CPTII,S$GLB,, | Performed by: PHYSICIAN ASSISTANT

## 2022-11-16 PROCEDURE — 99999 PR PBB SHADOW E&M-EST. PATIENT-LVL V: ICD-10-PCS | Mod: PBBFAC,,, | Performed by: UROLOGY

## 2022-11-16 PROCEDURE — 93010 EKG 12-LEAD: ICD-10-PCS | Mod: S$GLB,,, | Performed by: INTERNAL MEDICINE

## 2022-11-16 PROCEDURE — 3066F PR DOCUMENTATION OF TREATMENT FOR NEPHROPATHY: ICD-10-PCS | Mod: CPTII,S$GLB,, | Performed by: PHYSICIAN ASSISTANT

## 2022-11-16 PROCEDURE — 3074F SYST BP LT 130 MM HG: CPT | Mod: CPTII,S$GLB,, | Performed by: UROLOGY

## 2022-11-16 PROCEDURE — 3008F PR BODY MASS INDEX (BMI) DOCUMENTED: ICD-10-PCS | Mod: CPTII,S$GLB,, | Performed by: UROLOGY

## 2022-11-16 PROCEDURE — 3074F SYST BP LT 130 MM HG: CPT | Mod: CPTII,S$GLB,, | Performed by: PHYSICIAN ASSISTANT

## 2022-11-16 PROCEDURE — 3046F HEMOGLOBIN A1C LEVEL >9.0%: CPT | Mod: CPTII,S$GLB,, | Performed by: PHYSICIAN ASSISTANT

## 2022-11-16 PROCEDURE — 93010 ELECTROCARDIOGRAM REPORT: CPT | Mod: S$GLB,,, | Performed by: INTERNAL MEDICINE

## 2022-11-16 PROCEDURE — 3046F PR MOST RECENT HEMOGLOBIN A1C LEVEL > 9.0%: ICD-10-PCS | Mod: CPTII,S$GLB,, | Performed by: UROLOGY

## 2022-11-16 PROCEDURE — 3066F PR DOCUMENTATION OF TREATMENT FOR NEPHROPATHY: ICD-10-PCS | Mod: CPTII,S$GLB,, | Performed by: UROLOGY

## 2022-11-16 PROCEDURE — 3074F PR MOST RECENT SYSTOLIC BLOOD PRESSURE < 130 MM HG: ICD-10-PCS | Mod: CPTII,S$GLB,, | Performed by: PHYSICIAN ASSISTANT

## 2022-11-16 PROCEDURE — 3078F PR MOST RECENT DIASTOLIC BLOOD PRESSURE < 80 MM HG: ICD-10-PCS | Mod: CPTII,S$GLB,, | Performed by: PHYSICIAN ASSISTANT

## 2022-11-16 PROCEDURE — 3061F PR NEG MICROALBUMINURIA RESULT DOCUMENTED/REVIEW: ICD-10-PCS | Mod: CPTII,S$GLB,, | Performed by: PHYSICIAN ASSISTANT

## 2022-11-16 PROCEDURE — 3072F LOW RISK FOR RETINOPATHY: CPT | Mod: CPTII,S$GLB,, | Performed by: PHYSICIAN ASSISTANT

## 2022-11-16 PROCEDURE — 93005 ELECTROCARDIOGRAM TRACING: CPT | Mod: S$GLB,,, | Performed by: PHYSICIAN ASSISTANT

## 2022-11-16 RX ORDER — SOLIFENACIN SUCCINATE 10 MG/1
10 TABLET, FILM COATED ORAL DAILY
Qty: 30 TABLET | Refills: 11 | Status: SHIPPED | OUTPATIENT
Start: 2022-11-16 | End: 2022-11-16

## 2022-11-16 RX ORDER — METHOCARBAMOL 750 MG/1
750 TABLET, FILM COATED ORAL EVERY 8 HOURS PRN
Qty: 20 TABLET | Refills: 0 | Status: SHIPPED | OUTPATIENT
Start: 2022-11-16 | End: 2023-08-29

## 2022-11-16 RX ORDER — NAPROXEN 500 MG/1
500 TABLET ORAL 2 TIMES DAILY WITH MEALS
Qty: 30 TABLET | Refills: 0 | Status: SHIPPED | OUTPATIENT
Start: 2022-11-16 | End: 2023-09-07

## 2022-11-16 RX ORDER — ESTRADIOL 0.1 MG/G
2 CREAM VAGINAL DAILY
Qty: 60 G | Refills: 1 | Status: SHIPPED | OUTPATIENT
Start: 2022-11-16 | End: 2023-08-29

## 2022-11-16 NOTE — PROGRESS NOTES
Subjective:       Patient ID: Yuli Milton is a 47 y.o. female.    Chief Complaint: Arm Pain and Palpitations      Patient Care Team:  Drew Gutierrez MD as PCP - General (Family Medicine)  Poonam Johnston MD as Obstetrician (Obstetrics)  Lorena Peng DPM as Consulting Physician (Podiatry)  Sparkle Mckeon LPN as Care Coordinator  Christiano Choi MD as Consulting Physician (Ophthalmology)  Justen Wei OD as Consulting Physician (Optometry)  Maria Guadalupe Penaloza RD as Diabetes Educator (Endocrinology)  Pavan Barrientos Jr., MD as Consulting Physician (Psychiatry)    HPI  Patient is new to me.    Yuli Milton is a 47 y.o. female who presents today with complaints of Arm Pain and Palpitations  Patient with intermittent palpitations for 2 days and left arm pain that started a few days ago. No CP or SOB. Reports nausea without vomiting. No sweats. No PMH of MI. Pt has HTN, HLD, PVCs, asthma, uncontrolled T2DM, CAIO and SCOUT on CPAP. Tried aspirin without relief yesterday. Reports left posterior neck pain. Arm pain constant and unchanged, worse with movements.    Review of Systems   Constitutional:  Negative for chills, diaphoresis, fatigue and fever.   Respiratory:  Negative for shortness of breath.    Cardiovascular:  Positive for palpitations. Negative for chest pain and leg swelling.   Gastrointestinal:  Positive for nausea. Negative for abdominal pain and vomiting.   Musculoskeletal:  Positive for myalgias and neck pain. Negative for gait problem.   Neurological:  Negative for weakness and numbness.     Objective:      Physical Exam  Vitals and nursing note reviewed.   Constitutional:       General: She is not in acute distress.     Appearance: She is well-developed. She is morbidly obese.   HENT:      Head: Normocephalic and atraumatic.   Eyes:      General: Lids are normal. No scleral icterus.     Extraocular Movements: Extraocular movements intact.      Conjunctiva/sclera:  Conjunctivae normal.   Cardiovascular:      Rate and Rhythm: Normal rate and regular rhythm.   Pulmonary:      Effort: Pulmonary effort is normal.      Breath sounds: Normal breath sounds. No decreased breath sounds, wheezing, rhonchi or rales.   Musculoskeletal:        Back:    Neurological:      Mental Status: She is alert.      Cranial Nerves: No cranial nerve deficit.   Psychiatric:         Mood and Affect: Mood and affect normal.       Assessment:       1. Neck pain on left side    2. Left arm pain    3. Palpitations          Plan:   1. Neck pain on left side  -     naproxen (NAPROSYN) 500 MG tablet; Take 1 tablet (500 mg total) by mouth 2 (two) times daily with meals. Take with food  Dispense: 30 tablet; Refill: 0  -     methocarbamoL (ROBAXIN) 750 MG Tab; Take 1 tablet (750 mg total) by mouth every 8 (eight) hours as needed (muscle spasm).  Dispense: 20 tablet; Refill: 0    2. Left arm pain    3. Palpitations  -     EKG 12-lead      EKG shows NSR   Patient given verbal instructions for warm compresses, medication use, stretches at home. If no improvement in 2 weeks, consider PT referral.

## 2022-11-16 NOTE — PROGRESS NOTES
Chief Complaint:   Encounter Diagnoses   Name Primary?    Chronic bladder pain Yes    Urgency of micturition     Pelvic floor dysfunction     Interstitial cystitis        HPI:   11/16/22- still with some motor and occasional dysuria, no evidence of urge incontinence.  Thus far Pyridium does control the dysuria.  45-year-old female who comes in with bladder discomfort.  Patient states that she has noted a recent pain and pressure in her suprapubic region.  She states that a urinalysis has been normal.  This been going on for the last week, nothing really consistent with this before.  Appears to be constant, with no change.  No correlation to voiding.  Possible dysuria, but not consistent with a UTI.  She gets up 1-2 times per evening, but does have increased frequency and urgency during the daytime.  No evidence of leakage.  No gross hematuria, no microscopic hematuria, she has never been a smoker.  She did take antibiotics, with no assistance.  She does have frequent UTIs, but again this does not appear to be UTI, nor did the antibiotics assist.  No previous urological history, she has had a total abdominal hysterectomy with unilateral salpingo-oophorectomy.  She has had no slings, as stated no incontinence, 3 natural deliveries without issue.  No family history of urological cancers or stones.  She states she has been treating constipation recently.    Allergies:  Trulicity [dulaglutide], Aspirin, and Hibiclens (isopropyl alcohol)    Medications:  has a current medication list which includes the following prescription(s): albuterol, albuterol, atorvastatin, azelaic acid, blood sugar diagnostic, cequa, clindamycin, ergocalciferol, estradiol, fluoxetine, fluticasone-salmeterol 100-50 mcg/dose, gabapentin, haloperidol, hydrochlorothiazide, ibuprofen, lancets, lidocaine, magnesium oxide, meclizine, metformin, methen-m.blue-s.phos-phsal-hyo, methocarbamol, metoprolol succinate, naproxen, nebulizer accessories,  olanzapine, omeprazole, ondansetron, onetouch verio flex meter, oxycodone-acetaminophen, prednisone, pregabalin, promethazine-dextromethorphan, ozempic, ozempic, solifenacin, thiamine, tizanidine, tramadol, and trintellix.    Review of Systems:  General: No fever, chills, fatigability, or weight loss.  Skin: No rashes, itching, or changes in color or texture of skin.  Chest: Denies SANTOS, cyanosis, wheezing, cough, and sputum production.  Abdomen: Appetite fine. No weight loss. Denies diarrhea, abdominal pain, hematemesis, or blood in stool.  Musculoskeletal: No joint stiffness or swelling. Denies back pain.  : As above.  All other review of systems negative.    PMH:   has a past medical history of Abnormal Pap smear of cervix, Abnormal Pap smear of vagina, Acute pain of right shoulder (10/25/2019), Adjustment disorder with depressed mood (11/15/2014), Allergy, Anemia, Anemia (12/14/2014 6:26:13 PM), Anxiety, Anxiety and depression, Asthma, Asthma (4/14/2016), Asthma (6/18/2014 1:31:21 PM), Auditory hallucinations (2/17/2017), Bacterial vaginosis (1/5/2018), Bladder pain (9/1/2020), Bronchitis (10/15/2015), Bronchitis (9/18/2020), Bronchitis (10/15/2015), Cervical radiculopathy (3/10/2020), Chlamydia, Depression (emotion), Diabetes mellitus, SANTOS (dyspnea on exertion) (10/14/2019), Dysuria (3/31/2018), Dysuria (3/31/2018), Early satiety (8/28/2020), Gallstones, Gastritis, High-risk sexual behavior (1/5/2018), History of ovarian cyst, History of syphilis, History of trichomoniasis, Hyperlipidemia, Hypertension, Interstitial cystitis (12/8/2020), Localized edema (10/14/2019), Lower abdominal pain (7/31/2017), Mental disorder, Obesity (6/18/2014 1:51:54 PM), Obstructive sleep apnea (10/5/2021), Persistent cough (7/27/2018), PONV (postoperative nausea and vomiting), Preop general physical exam (12/9/2019), Right foot pain (11/18/2019), Routine general medical examination at a health care facility (5/21/2021), Shortness  of breath (6/26/2017), SOB (shortness of breath) (6/26/2017), Suicidal ideations (7/9/2019), Syphilis (6/24/2014 7:17:31 PM), Type 2 diabetes mellitus without complication, without long-term current use of insulin (5/25/2014), Type 2 or unspecified type diabetes mellitus (6/24/2014 7:24:47 PM), Upper respiratory tract infection (1/5/2018), Urgency of micturition (11/20/2020), Vaginal candidiasis (7/24/2018), Vaginal itching (1/5/2018), and Viral syndrome (12/1/2020).    PSH:   has a past surgical history that includes Appendectomy; Dilation and curettage of uterus; Colonoscopy (N/A, 7/31/2017); Cholecystectomy; Hysterectomy (08/31/2016); Fracture surgery; and Oophorectomy.    FamHx: family history includes Breast cancer in her maternal aunt and paternal grandmother; Cancer in her maternal aunt; Colon cancer in her maternal aunt; Diabetes in her maternal grandmother; Hypertension in her mother; Miscarriages / Stillbirths in her cousin; No Known Problems in her daughter, father, and son; Stroke in her maternal aunt; Thyroid disease in her brother, mother, and sister.    SocHx:  reports that she has never smoked. She has never used smokeless tobacco. She reports that she does not drink alcohol and does not use drugs.      Physical Exam:  Vitals:    11/16/22 1537   BP: 100/69   Pulse: 82   Resp: 18     General: A&Ox3, no apparent distress, no deformities  Neck: No masses, normal ROM  Lungs: normal inspiration, no use of accessory muscles  Heart: normal pulse, no arrhythmias  Abdomen: Soft, NT, ND, no masses, no hernias, no hepatosplenomegaly  Skin: The skin is warm and dry. No jaundice.  Ext: No c/c/e.    Labs/Studies:   CT urine adrenal nodule 9/20  Urine culture negative 11/20    Impression/Plan:        1.  Urgency- the odor could be due to dehydration, patient does admit to drinking plenty of water though.  She will keep a close eye on this.  Antibiotics do help a little, but not alleviate, therefore I am uncertain  if this is actually due to an infectious etiology.  Pyridium does assist, will attempt Uribel instead.  Due to the fact there is no urgency will hold on anticholinergics, as they have previously failed.  Will get her on Estrace as she is not attempted this as this could also assist.  Bring her back in 6 months, unless she needs to be seen sooner.    2.  Interstitial cystitis- please see above.    3.  Pelvic floor dysfunction- thus far treatments have not assisted.  Patient did not pursue pelvic floor physical therapy.  Currently not a significant issue.

## 2022-11-16 NOTE — TELEPHONE ENCOUNTER
Pt currently here for appt, AL, LPN.     ----- Message from Ariela Galarza sent at 11/16/2022  3:10 PM CST -----  Pt stated she went to the wrong location for the appt and is running late. Call back number is [phone]. Thx JM

## 2022-11-18 ENCOUNTER — PATIENT MESSAGE (OUTPATIENT)
Dept: INTERNAL MEDICINE | Facility: CLINIC | Age: 47
End: 2022-11-18
Payer: COMMERCIAL

## 2022-11-25 ENCOUNTER — TELEPHONE (OUTPATIENT)
Dept: INTERNAL MEDICINE | Facility: CLINIC | Age: 47
End: 2022-11-25
Payer: COMMERCIAL

## 2022-11-25 NOTE — TELEPHONE ENCOUNTER
Pt [facility] and [facility].  EKG was normal.  Pt states she went to [facility] for her continued arm pain.  Pt states no extra testing was done, just was given meds. Pt says she is still having pain and would like an x ray    Pt is scheduled 11/28/22 w Smiley Quiñones NP

## 2022-11-25 NOTE — TELEPHONE ENCOUNTER
----- Message from Melania Mello sent at 11/25/2022  4:28 PM CST -----  Regarding: Call Back/Urgent  Pt has been having left arm pain and it's not getting any better. She said she did go to an ER and they gave her Toradol shot and muscle relaxer shot from pain. They gave her Flexeril but nothing is helping. She thought her appt was today but it was for Tuesday Nov 22. Pt said she's still in pain and her hands are knumb. She would like the nurse to call her back about this at [phone].

## 2022-11-28 ENCOUNTER — HOSPITAL ENCOUNTER (OUTPATIENT)
Dept: RADIOLOGY | Facility: HOSPITAL | Age: 47
Discharge: HOME | End: 2022-11-28
Attending: NURSE PRACTITIONER
Payer: COMMERCIAL

## 2022-11-28 ENCOUNTER — OFFICE VISIT (OUTPATIENT)
Dept: INTERNAL MEDICINE | Facility: CLINIC | Age: 47
End: 2022-11-28
Payer: COMMERCIAL

## 2022-11-28 VITALS
OXYGEN SATURATION: 99 % | DIASTOLIC BLOOD PRESSURE: 82 MMHG | TEMPERATURE: 98 F | WEIGHT: 255.31 LBS | HEIGHT: 65 IN | BODY MASS INDEX: 42.54 KG/M2 | SYSTOLIC BLOOD PRESSURE: 120 MMHG | HEART RATE: 106 BPM

## 2022-11-28 DIAGNOSIS — M25.512 ACUTE PAIN OF LEFT SHOULDER: ICD-10-CM

## 2022-11-28 DIAGNOSIS — M25.512 ACUTE PAIN OF LEFT SHOULDER: Primary | ICD-10-CM

## 2022-11-28 DIAGNOSIS — I10 ESSENTIAL HYPERTENSION: ICD-10-CM

## 2022-11-28 DIAGNOSIS — E11.9 TYPE 2 DIABETES MELLITUS WITHOUT COMPLICATION, WITHOUT LONG-TERM CURRENT USE OF INSULIN: ICD-10-CM

## 2022-11-28 PROCEDURE — 99214 OFFICE O/P EST MOD 30 MIN: CPT | Mod: S$GLB,,, | Performed by: NURSE PRACTITIONER

## 2022-11-28 PROCEDURE — 3046F PR MOST RECENT HEMOGLOBIN A1C LEVEL > 9.0%: ICD-10-PCS | Mod: CPTII,S$GLB,, | Performed by: NURSE PRACTITIONER

## 2022-11-28 PROCEDURE — 1159F MED LIST DOCD IN RCRD: CPT | Mod: CPTII,S$GLB,, | Performed by: NURSE PRACTITIONER

## 2022-11-28 PROCEDURE — 3066F PR DOCUMENTATION OF TREATMENT FOR NEPHROPATHY: ICD-10-PCS | Mod: CPTII,S$GLB,, | Performed by: NURSE PRACTITIONER

## 2022-11-28 PROCEDURE — 99214 PR OFFICE/OUTPT VISIT, EST, LEVL IV, 30-39 MIN: ICD-10-PCS | Mod: S$GLB,,, | Performed by: NURSE PRACTITIONER

## 2022-11-28 PROCEDURE — 3074F PR MOST RECENT SYSTOLIC BLOOD PRESSURE < 130 MM HG: ICD-10-PCS | Mod: CPTII,S$GLB,, | Performed by: NURSE PRACTITIONER

## 2022-11-28 PROCEDURE — 3061F PR NEG MICROALBUMINURIA RESULT DOCUMENTED/REVIEW: ICD-10-PCS | Mod: CPTII,S$GLB,, | Performed by: NURSE PRACTITIONER

## 2022-11-28 PROCEDURE — 3008F PR BODY MASS INDEX (BMI) DOCUMENTED: ICD-10-PCS | Mod: CPTII,S$GLB,, | Performed by: NURSE PRACTITIONER

## 2022-11-28 PROCEDURE — 1160F PR REVIEW ALL MEDS BY PRESCRIBER/CLIN PHARMACIST DOCUMENTED: ICD-10-PCS | Mod: CPTII,S$GLB,, | Performed by: NURSE PRACTITIONER

## 2022-11-28 PROCEDURE — 73030 X-RAY EXAM OF SHOULDER: CPT | Mod: 26,LT,, | Performed by: RADIOLOGY

## 2022-11-28 PROCEDURE — 3072F LOW RISK FOR RETINOPATHY: CPT | Mod: CPTII,S$GLB,, | Performed by: NURSE PRACTITIONER

## 2022-11-28 PROCEDURE — 99999 PR PBB SHADOW E&M-EST. PATIENT-LVL V: CPT | Mod: PBBFAC,,, | Performed by: NURSE PRACTITIONER

## 2022-11-28 PROCEDURE — 3074F SYST BP LT 130 MM HG: CPT | Mod: CPTII,S$GLB,, | Performed by: NURSE PRACTITIONER

## 2022-11-28 PROCEDURE — 73030 XR SHOULDER COMPLETE 2 OR MORE VIEWS LEFT: ICD-10-PCS | Mod: 26,LT,, | Performed by: RADIOLOGY

## 2022-11-28 PROCEDURE — 3066F NEPHROPATHY DOC TX: CPT | Mod: CPTII,S$GLB,, | Performed by: NURSE PRACTITIONER

## 2022-11-28 PROCEDURE — 1160F RVW MEDS BY RX/DR IN RCRD: CPT | Mod: CPTII,S$GLB,, | Performed by: NURSE PRACTITIONER

## 2022-11-28 PROCEDURE — 3046F HEMOGLOBIN A1C LEVEL >9.0%: CPT | Mod: CPTII,S$GLB,, | Performed by: NURSE PRACTITIONER

## 2022-11-28 PROCEDURE — 3061F NEG MICROALBUMINURIA REV: CPT | Mod: CPTII,S$GLB,, | Performed by: NURSE PRACTITIONER

## 2022-11-28 PROCEDURE — 3079F DIAST BP 80-89 MM HG: CPT | Mod: CPTII,S$GLB,, | Performed by: NURSE PRACTITIONER

## 2022-11-28 PROCEDURE — 99999 PR PBB SHADOW E&M-EST. PATIENT-LVL V: ICD-10-PCS | Mod: PBBFAC,,, | Performed by: NURSE PRACTITIONER

## 2022-11-28 PROCEDURE — 3079F PR MOST RECENT DIASTOLIC BLOOD PRESSURE 80-89 MM HG: ICD-10-PCS | Mod: CPTII,S$GLB,, | Performed by: NURSE PRACTITIONER

## 2022-11-28 PROCEDURE — 73030 X-RAY EXAM OF SHOULDER: CPT | Mod: TC,FY,PO,LT

## 2022-11-28 PROCEDURE — 3008F BODY MASS INDEX DOCD: CPT | Mod: CPTII,S$GLB,, | Performed by: NURSE PRACTITIONER

## 2022-11-28 PROCEDURE — 1159F PR MEDICATION LIST DOCUMENTED IN MEDICAL RECORD: ICD-10-PCS | Mod: CPTII,S$GLB,, | Performed by: NURSE PRACTITIONER

## 2022-11-28 PROCEDURE — 3072F PR LOW RISK FOR RETINOPATHY: ICD-10-PCS | Mod: CPTII,S$GLB,, | Performed by: NURSE PRACTITIONER

## 2022-11-28 NOTE — PROGRESS NOTES
"Subjective:       Patient ID: Yuli Milton is a 47 y.o. female.    Chief Complaint: Arm Pain    Pt presents to clinic today for left shoulder pain  Seen by another MD on 11/16-rx nsaids and muscle relaxants without relief  Pain continued so went to ER on 11/17- reports EKG neg, giving Toradol shot with temp relief  Shoulder pain has continued  Pain when elevated to shoulder level  Radiates down the arm   Sees pain management for chronic neck/back pain  Has appt later today and plans to discuss shoulder pain with him    BP stable  Reports compliance with meds       /82   Pulse 106   Temp 98.1 °F (36.7 °C) (Temporal)   Ht 5' 5" (1.651 m)   Wt 115.8 kg (255 lb 4.7 oz)   LMP 08/21/2016   SpO2 99%   BMI 42.48 kg/m²     Review of Systems   Constitutional:  Negative for activity change, appetite change, chills, diaphoresis, fatigue, fever and unexpected weight change.   HENT: Negative.     Respiratory:  Negative for cough and shortness of breath.    Cardiovascular:  Negative for chest pain, palpitations and leg swelling.   Gastrointestinal: Negative.    Genitourinary: Negative.    Musculoskeletal:  Positive for arthralgias and myalgias.   Skin:  Negative for color change, pallor, rash and wound.   Allergic/Immunologic: Negative for immunocompromised state.   Neurological: Negative.  Negative for dizziness and facial asymmetry.   Hematological:  Negative for adenopathy. Does not bruise/bleed easily.   Psychiatric/Behavioral:  Negative for agitation, behavioral problems and confusion.      Objective:      Physical Exam  Vitals and nursing note reviewed.   Constitutional:       General: She is not in acute distress.     Appearance: Normal appearance. She is well-developed. She is not diaphoretic.   HENT:      Head: Normocephalic and atraumatic.   Cardiovascular:      Rate and Rhythm: Normal rate and regular rhythm.      Heart sounds: Normal heart sounds. No murmur heard.  Pulmonary:      Effort: Pulmonary " effort is normal. No respiratory distress.      Breath sounds: Normal breath sounds.   Musculoskeletal:      Left shoulder: Bony tenderness present. Decreased range of motion. Decreased strength.   Skin:     General: Skin is warm and dry.      Findings: No rash.   Neurological:      Mental Status: She is alert.   Psychiatric:         Mood and Affect: Mood normal.         Behavior: Behavior normal. Behavior is cooperative.         Thought Content: Thought content normal.         Judgment: Judgment normal.       Assessment:       1. Acute pain of left shoulder    2. Type 2 diabetes mellitus without complication, without long-term current use of insulin    3. Essential hypertension          Plan:       Yuli was seen today for arm pain.    Diagnoses and all orders for this visit:    Acute pain of left shoulder  -     X-Ray Shoulder 2 or More Views Left; Future  - Will xray. Will notify pt of results via portal.  - Keep appt with pain management. Discussed PT referral but pt wishes to hold off for now    Type 2 diabetes mellitus without complication, without long-term current use of insulin        - Chronic, stable on current meds- continue   Essential hypertension         - Chronic, stable on present meds. Continue     Xray of shoulder  Consider PT referral if no improvement in symptoms  Continue other meds as prescribed  Follow up for worsening or no improvement in symptoms and PRN.

## 2022-12-07 ENCOUNTER — PATIENT MESSAGE (OUTPATIENT)
Dept: BARIATRICS | Facility: CLINIC | Age: 47
End: 2022-12-07
Payer: COMMERCIAL

## 2022-12-08 ENCOUNTER — LAB VISIT (OUTPATIENT)
Dept: LAB | Facility: HOSPITAL | Age: 47
End: 2022-12-08
Attending: PHYSICIAN ASSISTANT
Payer: COMMERCIAL

## 2022-12-08 ENCOUNTER — OFFICE VISIT (OUTPATIENT)
Dept: DIABETES | Facility: CLINIC | Age: 47
End: 2022-12-08
Payer: COMMERCIAL

## 2022-12-08 DIAGNOSIS — E27.8 ADRENAL MASS: ICD-10-CM

## 2022-12-08 DIAGNOSIS — E55.9 VITAMIN D DEFICIENCY: ICD-10-CM

## 2022-12-08 DIAGNOSIS — I10 ESSENTIAL HYPERTENSION: ICD-10-CM

## 2022-12-08 DIAGNOSIS — E11.65 TYPE 2 DIABETES MELLITUS WITH HYPERGLYCEMIA, WITHOUT LONG-TERM CURRENT USE OF INSULIN: ICD-10-CM

## 2022-12-08 DIAGNOSIS — E78.2 MIXED HYPERLIPIDEMIA: ICD-10-CM

## 2022-12-08 DIAGNOSIS — E11.65 TYPE 2 DIABETES MELLITUS WITH HYPERGLYCEMIA, WITHOUT LONG-TERM CURRENT USE OF INSULIN: Primary | ICD-10-CM

## 2022-12-08 DIAGNOSIS — E66.01 MORBID OBESITY DUE TO EXCESS CALORIES: ICD-10-CM

## 2022-12-08 PROCEDURE — 36415 COLL VENOUS BLD VENIPUNCTURE: CPT | Mod: PO | Performed by: PHYSICIAN ASSISTANT

## 2022-12-08 PROCEDURE — 3061F PR NEG MICROALBUMINURIA RESULT DOCUMENTED/REVIEW: ICD-10-PCS | Mod: CPTII,95,, | Performed by: PHYSICIAN ASSISTANT

## 2022-12-08 PROCEDURE — 3066F NEPHROPATHY DOC TX: CPT | Mod: CPTII,95,, | Performed by: PHYSICIAN ASSISTANT

## 2022-12-08 PROCEDURE — 99442 PR PHYSICIAN TELEPHONE EVALUATION 11-20 MIN: ICD-10-PCS | Mod: 95,,, | Performed by: PHYSICIAN ASSISTANT

## 2022-12-08 PROCEDURE — 3046F HEMOGLOBIN A1C LEVEL >9.0%: CPT | Mod: CPTII,95,, | Performed by: PHYSICIAN ASSISTANT

## 2022-12-08 PROCEDURE — 83036 HEMOGLOBIN GLYCOSYLATED A1C: CPT | Performed by: PHYSICIAN ASSISTANT

## 2022-12-08 PROCEDURE — 3066F PR DOCUMENTATION OF TREATMENT FOR NEPHROPATHY: ICD-10-PCS | Mod: CPTII,95,, | Performed by: PHYSICIAN ASSISTANT

## 2022-12-08 PROCEDURE — 3046F PR MOST RECENT HEMOGLOBIN A1C LEVEL > 9.0%: ICD-10-PCS | Mod: CPTII,95,, | Performed by: PHYSICIAN ASSISTANT

## 2022-12-08 PROCEDURE — 3061F NEG MICROALBUMINURIA REV: CPT | Mod: CPTII,95,, | Performed by: PHYSICIAN ASSISTANT

## 2022-12-08 PROCEDURE — 3072F LOW RISK FOR RETINOPATHY: CPT | Mod: CPTII,95,, | Performed by: PHYSICIAN ASSISTANT

## 2022-12-08 PROCEDURE — 99442 PR PHYSICIAN TELEPHONE EVALUATION 11-20 MIN: CPT | Mod: 95,,, | Performed by: PHYSICIAN ASSISTANT

## 2022-12-08 PROCEDURE — 3072F PR LOW RISK FOR RETINOPATHY: ICD-10-PCS | Mod: CPTII,95,, | Performed by: PHYSICIAN ASSISTANT

## 2022-12-08 NOTE — PROGRESS NOTES
PCP: Drew Gutierrez MD    Subjective:     Chief Complaint: Diabetes     Established Patient - Audio Only Telehealth Visit     The patient location is: Home  The chief complaint leading to consultation is: Diabetes follow up  Visit type: Virtual visit with audio only (telephone)  Total Time Spent with Patient: 12 minutes     The reason for the audio only service rather than synchronous audio and video virtual visit was related to technical difficulties or patient preference/necessity.     Each patient to whom I provide medical services by telemedicine is:  (1) informed of the relationship between the physician and patient and the respective role of any other health care provider with respect to management of the patient; and (2) notified that they may decline to receive medical services by telemedicine and may withdraw from such care at any time. Patient verbally consented to receive this service via voice-only telephone call.    This service was not originating from a related E/M service provided within the previous 7 days nor will  to an E/M service or procedure within the next 24 hours or my soonest available appointment.  Prevailing standard of care was able to be met in this audio-only visit.       HISTORY OF PRESENT ILLNESS: 47 y.o.   female presenting for diabetes management visit.   The patient's last visit with me was on 1/10/2022.  Patient has had Type II diabetes since 27 years ago.  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     Patient's most recent A1c of 10.5% was completed 3 months ago.   Patient states since Her last A1c Her blood glucose levels have been unknown since not checking regularly.   Patient monitors blood glucose 0 times per  day with meter : Fasting and Before Bed.   Patient blood glucose monitoring device will not be uploaded into Media Section today secondary to patient not checking BG regularly.   Patient endorses the following diabetes related symptoms:  None .   Patient is due today for the following diabetes-related health maintenance standards: Foot Exam , A1c, Influenza Vaccine, COVID-19 Vaccine , and Mammogram .   She denies recent hospital admissions or emergency room visits.   She denies having hypoglycemia.   Patient's concerns today include glycemic control. Bariatric work up deferred due to psych eval.   Patient medication regimen is as below.     CURRENT DM MEDICATIONS:   Ozempic 2 mg weekly  Metformin XR 1000 mg BID     Patient has failed the following Diabetes medications:   Trulicity - HA, nausea, SOB  Farxiga - nausea, feeling unwell  Basaglar - no longer using since no longer taking steroids      Labs Reviewed.       No results found for: CPEPTIDE  No results found for: GLUTAMICACID       //   , There is no height or weight on file to calculate BMI.  Her blood sugar in clinic today is:    No components found for: POCGLUC      Review of Systems   Constitutional:  Negative for activity change, appetite change, chills and fever.   HENT:  Negative for dental problem, mouth sores, nosebleeds, sore throat and trouble swallowing.    Eyes:  Negative for pain and discharge.   Respiratory:  Negative for shortness of breath, wheezing and stridor.    Cardiovascular:  Negative for chest pain, palpitations and leg swelling.   Gastrointestinal:  Negative for abdominal pain, diarrhea, nausea and vomiting.   Endocrine: Negative for polydipsia, polyphagia and polyuria.   Genitourinary:  Negative for dysuria, frequency and urgency.   Musculoskeletal:  Negative for joint swelling and myalgias.   Skin:  Negative for rash and wound.   Neurological:  Negative for dizziness, syncope, weakness and headaches.   Psychiatric/Behavioral:   Negative for behavioral problems and dysphoric mood.        Diabetes Management Status  Statin: Taking  ACE/ARB: Not taking    Screening or Prevention Patient's value Goal Complete/Controlled?   HgA1C Testing and Control   Lab Results   Component Value Date    HGBA1C 10.5 (H) 09/06/2022      Annually/Less than 8% Yes   Lipid profile : 09/06/2022 Annually Yes   LDL control Lab Results   Component Value Date    LDLCALC 102.8 09/06/2022    Annually/Less than 100 mg/dl  Yes   Nephropathy screening Lab Results   Component Value Date    MICALBCREAT 4.2 09/06/2022     Lab Results   Component Value Date    PROTEINUA Negative 04/25/2022    Annually Yes   Blood pressure BP Readings from Last 1 Encounters:   11/28/22 120/82    Less than 140/90 Yes   Dilated retinal exam : 10/06/2022 Annually Yes    Foot exam   : 09/29/2021 Annually Yes     Social History     Socioeconomic History    Marital status: Single    Number of children: 3   Tobacco Use    Smoking status: Never    Smokeless tobacco: Never   Substance and Sexual Activity    Alcohol use: No     Comment: rarely; stop 12/11/19 prior to sx    Drug use: No    Sexual activity: Yes     Partners: Male     Birth control/protection: None     Comment: mut monog   Social History Narrative    Full time employed - [place]     Social Determinants of Health     Financial Resource Strain: Low Risk     Difficulty of Paying Living Expenses: Not hard at all   Food Insecurity: No Food Insecurity    Worried About Running Out of Food in the Last Year: Never true    Ran Out of Food in the Last Year: Never true   Transportation Needs: No Transportation Needs    Lack of Transportation (Medical): No    Lack of Transportation (Non-Medical): No   Physical Activity: Unknown    Days of Exercise per Week: Patient refused    Minutes of Exercise per Session: 0 min   Stress: Stress Concern Present    Feeling of Stress : To some extent   Social Connections: Unknown    Frequency of Communication  with Friends and Family: Three times a week    Frequency of Social Gatherings with Friends and Family: Once a week    Active Member of Clubs or Organizations: No    Attends Club or Organization Meetings: Never    Marital Status:    Housing Stability: Unknown    Unable to Pay for Housing in the Last Year: No    Unstable Housing in the Last Year: No     Past Medical History:   Diagnosis Date    Abnormal Pap smear of cervix     treatment??    Abnormal Pap smear of vagina     Acute pain of right shoulder 10/25/2019    Adjustment disorder with depressed mood 11/15/2014    Overview:  Multiple recent medical problems     Allergy     Anemia     Anemia 12/14/2014 6:26:13 PM    [facility] - [facility]: Anemia-No Additional Notes    Anxiety     Anxiety and depression     Asthma     Asthma 4/14/2016    Asthma 6/18/2014 1:31:21 PM    [facility] Historical - Respiratory: Asthma-No Additional Notes    Auditory hallucinations 2/17/2017    Bacterial vaginosis 1/5/2018    Bladder pain 9/1/2020    Bronchitis 10/15/2015    Bronchitis 9/18/2020    Bronchitis 10/15/2015    Cervical radiculopathy 3/10/2020    Chlamydia     Depression (emotion)     Diabetes mellitus     SANTOS (dyspnea on exertion) 10/14/2019    Dysuria 3/31/2018    Dysuria 3/31/2018    Early satiety 8/28/2020    Gallstones     Gastritis     High-risk sexual behavior 1/5/2018    History of ovarian cyst     History of syphilis     History of trichomoniasis     Hyperlipidemia     Hypertension     Interstitial cystitis 12/8/2020    Localized edema 10/14/2019    Lower abdominal pain 7/31/2017    Mental disorder     Obesity 6/18/2014 1:51:54 PM    [facility]: Obesity, Unspecified-No Additional Notes    Obstructive sleep apnea 10/5/2021    Persistent cough 7/27/2018    PONV (postoperative nausea and vomiting)     Preop general physical exam 12/9/2019    Right foot pain 11/18/2019    Routine general medical examination at a health care facility  "5/21/2021    Shortness of breath 6/26/2017    SOB (shortness of breath) 6/26/2017    Suicidal ideations 7/9/2019    Syphilis 6/24/2014 7:17:31 PM    [facility] Historical - Gynecologic: Syphilis-No Additional Notes    Type 2 diabetes mellitus without complication, without long-term current use of insulin 5/25/2014    Type 2 or unspecified type diabetes mellitus 6/24/2014 7:24:47 PM    [facility] Historical - [facility]: Diabetes Mellitus Without Complication, Type II-"borderline"    Upper respiratory tract infection 1/5/2018    Urgency of micturition 11/20/2020    Vaginal candidiasis 7/24/2018    Vaginal itching 1/5/2018    Viral syndrome 12/1/2020       Objective:      Physical Exam  Neurological:      Mental Status: She is alert and oriented to person, place, and time. Mental status is at baseline.   Psychiatric:         Mood and Affect: Mood normal.         Behavior: Behavior normal.         Thought Content: Thought content normal.         Judgment: Judgment normal.       Physical Exam limited secondary to Telemedicine visit    Assessment / Plan:     Type 2 diabetes mellitus with hyperglycemia, without long-term current use of insulin  -     Hemoglobin A1C; Standing    Essential hypertension    Mixed hyperlipidemia    Vitamin D deficiency    Adrenal mass    Morbid obesity due to excess calories    Additional Plan Details:    - POCT Glucose  - Encouraged continuation of lifestyle changes including regular exercise and limiting carbohydrates to 30-45 grams per meal threes times daily and 15 grams per snack with a limit of two daily.   - Encouraged continued monitoring of blood glucose with maintenance of 2 times daily at Fasting and Before Bed. Encouraged monitoring.  - Current DM Medication Regimen: Continue Metformin XR 1000 mg BID. Continue Ozempic 2 mg weekly. Will update following A1c.   - Follow up with Bariatrics in future; recommend dietary and exercise changes for weight loss.  - Health Maintenance standards " addressed today: Foot Exam - deferred by patient today because Patient request, A1c to be scheduled, Flu shot - patient declined, COVID - 19 Vaccine - Declined by patient, and mammogram to be scheduled .   - Nursing Visit: Patient is below goal range for nursing visit for age group and will not need nursing visit at this time .   - Follow up with me in 6 weeks with A1c prior.       Blakeney McKnight, PA-C [facility]

## 2022-12-09 LAB
ESTIMATED AVG GLUCOSE: 134 MG/DL (ref 68–131)
HBA1C MFR BLD: 6.3 % (ref 4–5.6)

## 2022-12-12 PROBLEM — Z00.00 WELLNESS EXAMINATION: Status: RESOLVED | Noted: 2022-09-06 | Resolved: 2022-12-12

## 2022-12-12 PROBLEM — Z00.00 ROUTINE GENERAL MEDICAL EXAMINATION AT A HEALTH CARE FACILITY: Status: RESOLVED | Noted: 2021-05-21 | Resolved: 2022-12-12

## 2023-01-09 ENCOUNTER — PATIENT MESSAGE (OUTPATIENT)
Dept: BARIATRICS | Facility: CLINIC | Age: 48
End: 2023-01-09
Payer: COMMERCIAL

## 2023-01-09 DIAGNOSIS — E11.9 TYPE 2 DIABETES MELLITUS WITHOUT COMPLICATION, WITHOUT LONG-TERM CURRENT USE OF INSULIN: ICD-10-CM

## 2023-01-09 RX ORDER — SEMAGLUTIDE 2.68 MG/ML
2 INJECTION, SOLUTION SUBCUTANEOUS
Qty: 1 PEN | Refills: 0 | Status: SHIPPED | OUTPATIENT
Start: 2023-01-09 | End: 2023-01-24 | Stop reason: ALTCHOICE

## 2023-01-24 ENCOUNTER — OFFICE VISIT (OUTPATIENT)
Dept: DIABETES | Facility: CLINIC | Age: 48
End: 2023-01-24
Payer: COMMERCIAL

## 2023-01-24 DIAGNOSIS — E55.9 VITAMIN D DEFICIENCY: ICD-10-CM

## 2023-01-24 DIAGNOSIS — E78.2 MIXED HYPERLIPIDEMIA: ICD-10-CM

## 2023-01-24 DIAGNOSIS — E27.8 ADRENAL MASS: ICD-10-CM

## 2023-01-24 DIAGNOSIS — I10 ESSENTIAL HYPERTENSION: ICD-10-CM

## 2023-01-24 DIAGNOSIS — E11.65 TYPE 2 DIABETES MELLITUS WITH HYPERGLYCEMIA, WITHOUT LONG-TERM CURRENT USE OF INSULIN: Primary | ICD-10-CM

## 2023-01-24 DIAGNOSIS — E66.01 MORBID OBESITY DUE TO EXCESS CALORIES: ICD-10-CM

## 2023-01-24 PROCEDURE — 99442 PR PHYSICIAN TELEPHONE EVALUATION 11-20 MIN: CPT | Mod: 95,,, | Performed by: PHYSICIAN ASSISTANT

## 2023-01-24 PROCEDURE — 3072F LOW RISK FOR RETINOPATHY: CPT | Mod: CPTII,95,, | Performed by: PHYSICIAN ASSISTANT

## 2023-01-24 PROCEDURE — 99442 PR PHYSICIAN TELEPHONE EVALUATION 11-20 MIN: ICD-10-PCS | Mod: 95,,, | Performed by: PHYSICIAN ASSISTANT

## 2023-01-24 PROCEDURE — 3072F PR LOW RISK FOR RETINOPATHY: ICD-10-PCS | Mod: CPTII,95,, | Performed by: PHYSICIAN ASSISTANT

## 2023-01-24 RX ORDER — TIRZEPATIDE 5 MG/.5ML
5 INJECTION, SOLUTION SUBCUTANEOUS
Qty: 4 PEN | Refills: 11 | Status: SHIPPED | OUTPATIENT
Start: 2023-01-24 | End: 2023-02-15 | Stop reason: SDUPTHER

## 2023-01-24 NOTE — PROGRESS NOTES
PCP: Drew Gutierrez MD    Subjective:     Chief Complaint: Diabetes     Established Patient - Audio Only Telehealth Visit     The patient location is: Home  The chief complaint leading to consultation is: Diabetes follow up  Visit type: Virtual visit with audio only (telephone)  Total Time Spent with Patient: 12 minutes     The reason for the audio only service rather than synchronous audio and video virtual visit was related to technical difficulties or patient preference/necessity.     Each patient to whom I provide medical services by telemedicine is:  (1) informed of the relationship between the physician and patient and the respective role of any other health care provider with respect to management of the patient; and (2) notified that they may decline to receive medical services by telemedicine and may withdraw from such care at any time. Patient verbally consented to receive this service via voice-only telephone call.    This service was not originating from a related E/M service provided within the previous 7 days nor will  to an E/M service or procedure within the next 24 hours or my soonest available appointment.  Prevailing standard of care was able to be met in this audio-only visit.       HISTORY OF PRESENT ILLNESS: 47 y.o.   female presenting for diabetes management visit.   The patient's last visit with me was on 12/8/2022.  Patient has had Type II diabetes since 27 years ago.  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     Patient's most recent A1c of 6.3% was completed 1 months ago.   Patient states since Her last A1c Her blood glucose levels have been unknown since not checking regularly.   Patient monitors blood glucose 0 times per day  with meter : Fasting and Before Bed.   Patient blood glucose monitoring device will not be uploaded into Media Section today secondary to patient not checking BG regularly.   Patient endorses the following diabetes related symptoms:  None .   Patient is due today for the following diabetes-related health maintenance standards: Foot Exam  and Mammogram .   She denies recent hospital admissions or emergency room visits.   She denies having hypoglycemia.   Patient's concerns today include glycemic control. Bariatric work up deferred. Patient is interested in weight loss. Interested in seeing CDE. Not doing formal exercise,   Patient medication regimen is as below.     CURRENT DM MEDICATIONS:   Ozempic 2 mg weekly  Metformin XR 1000 mg BID     Patient has failed the following Diabetes medications:   Trulicity - HA, nausea, SOB  Farxiga - nausea, feeling unwell  Basaglar - no longer using since no longer taking steroids      Labs Reviewed.       No results found for: CPEPTIDE  No results found for: GLUTAMICACID       //   , There is no height or weight on file to calculate BMI.  Her blood sugar in clinic today is:    No components found for: POCGLUC      Review of Systems   Constitutional:  Negative for activity change, appetite change, chills and fever.   HENT:  Negative for dental problem, mouth sores, nosebleeds, sore throat and trouble swallowing.    Eyes:  Negative for pain and discharge.   Respiratory:  Negative for shortness of breath, wheezing and stridor.    Cardiovascular:  Negative for chest pain, palpitations and leg swelling.   Gastrointestinal:  Negative for abdominal pain, diarrhea, nausea and vomiting.   Endocrine: Negative for polydipsia, polyphagia and polyuria.   Genitourinary:  Negative for dysuria, frequency and urgency.   Musculoskeletal:  Negative for joint swelling and myalgias.   Skin:  Negative for rash and wound.   Neurological:  Negative for dizziness, syncope, weakness and headaches.    Psychiatric/Behavioral:  Negative for behavioral problems and dysphoric mood.        Diabetes Management Status  Statin: Taking  ACE/ARB: Not taking    Screening or Prevention Patient's value Goal Complete/Controlled?   HgA1C Testing and Control   Lab Results   Component Value Date    HGBA1C 6.3 (H) 12/08/2022      Annually/Less than 8% Yes   Lipid profile : 09/06/2022 Annually Yes   LDL control Lab Results   Component Value Date    LDLCALC 102.8 09/06/2022    Annually/Less than 100 mg/dl  Yes   Nephropathy screening Lab Results   Component Value Date    MICALBCREAT 4.2 09/06/2022     Lab Results   Component Value Date    PROTEINUA Negative 04/25/2022    Annually Yes   Blood pressure BP Readings from Last 1 Encounters:   11/28/22 120/82    Less than 140/90 Yes   Dilated retinal exam : 10/06/2022 Annually Yes    Foot exam   : 09/29/2021 Annually Yes     Social History     Socioeconomic History    Marital status: Single    Number of children: 3   Tobacco Use    Smoking status: Never    Smokeless tobacco: Never   Substance and Sexual Activity    Alcohol use: No     Comment: rarely; stop 12/11/19 prior to sx    Drug use: No    Sexual activity: Yes     Partners: Male     Birth control/protection: None     Comment: mut monog   Social History Narrative    Full time employed - [employer]     Social Determinants of Health     Financial Resource Strain: Low Risk     Difficulty of Paying Living Expenses: Not hard at all   Food Insecurity: No Food Insecurity    Worried About Running Out of Food in the Last Year: Never true    Ran Out of Food in the Last Year: Never true   Transportation Needs: No Transportation Needs    Lack of Transportation (Medical): No    Lack of Transportation (Non-Medical): No   Physical Activity: Unknown    Days of Exercise per Week: Patient refused    Minutes of Exercise per Session: 0 min   Stress: Stress Concern Present    Feeling of Stress : To some extent   Social Connections: Unknown     Frequency of Communication with Friends and Family: Three times a week    Frequency of Social Gatherings with Friends and Family: Once a week    Active Member of Clubs or Organizations: No    Attends Club or Organization Meetings: Never    Marital Status:    Housing Stability: Unknown    Unable to Pay for Housing in the Last Year: No    Unstable Housing in the Last Year: No     Past Medical History:   Diagnosis Date    Abnormal Pap smear of cervix     treatment??    Abnormal Pap smear of vagina     Acute pain of right shoulder 10/25/2019    Adjustment disorder with depressed mood 11/15/2014    Overview:  Multiple recent medical problems     Allergy     Anemia     Anemia 12/14/2014 6:26:13 PM    [facility] - [facility]: Anemia-No Additional Notes    Anxiety     Anxiety and depression     Asthma     Asthma 4/14/2016    Asthma 6/18/2014 1:31:21 PM    [facility] Historical - Respiratory: Asthma-No Additional Notes    Auditory hallucinations 2/17/2017    Bacterial vaginosis 1/5/2018    Bladder pain 9/1/2020    Bronchitis 10/15/2015    Bronchitis 9/18/2020    Bronchitis 10/15/2015    Cervical radiculopathy 3/10/2020    Chlamydia     Depression (emotion)     Diabetes mellitus     SANTOS (dyspnea on exertion) 10/14/2019    Dysuria 3/31/2018    Dysuria 3/31/2018    Early satiety 8/28/2020    Gallstones     Gastritis     High-risk sexual behavior 1/5/2018    History of ovarian cyst     History of syphilis     History of trichomoniasis     Hyperlipidemia     Hypertension     Interstitial cystitis 12/8/2020    Localized edema 10/14/2019    Lower abdominal pain 7/31/2017    Mental disorder     Obesity 6/18/2014 1:51:54 PM    [facility] Historical - [facility]: Obesity, Unspecified-No Additional Notes    Obstructive sleep apnea 10/5/2021    Persistent cough 7/27/2018    PONV (postoperative nausea and vomiting)     Preop general physical exam 12/9/2019    Right foot pain 11/18/2019    Routine general medical examination  "at a health care facility 5/21/2021    Shortness of breath 6/26/2017    SOB (shortness of breath) 6/26/2017    Suicidal ideations 7/9/2019    Syphilis 6/24/2014 7:17:31 PM    [facility] Historical - Gynecologic: Syphilis-No Additional Notes    Type 2 diabetes mellitus without complication, without long-term current use of insulin 5/25/2014    Type 2 or unspecified type diabetes mellitus 6/24/2014 7:24:47 PM    [facility] Historical - [facility]: Diabetes Mellitus Without Complication, Type II-"borderline"    Upper respiratory tract infection 1/5/2018    Urgency of micturition 11/20/2020    Vaginal candidiasis 7/24/2018    Vaginal itching 1/5/2018    Viral syndrome 12/1/2020       Objective:      Physical Exam  Neurological:      Mental Status: She is alert and oriented to person, place, and time. Mental status is at baseline.   Psychiatric:         Mood and Affect: Mood normal.         Behavior: Behavior normal.         Thought Content: Thought content normal.         Judgment: Judgment normal.       Physical Exam limited secondary to Telemedicine visit    Assessment / Plan:     Type 2 diabetes mellitus with hyperglycemia, without long-term current use of insulin    Essential hypertension    Mixed hyperlipidemia    Vitamin D deficiency    Adrenal mass    Morbid obesity due to excess calories      Additional Plan Details:    - POCT Glucose  - Encouraged continuation of lifestyle changes including regular exercise and limiting carbohydrates to 30-45 grams per meal threes times daily and 15 grams per snack with a limit of two daily.   - Encouraged continued monitoring of blood glucose with maintenance of 2 times daily at Fasting and Before Bed. Encouraged monitoring.  - Current DM Medication Regimen: Continue Metformin XR 1000 mg BID. Change Ozempic to Mounjaro 5 mg weekly.   - Referral to CDE for establishment of care for comprehensive Diabetes Education  - Health Maintenance standards addressed today: Foot Exam - " deferred by patient today because Telemedicine or Telephone visit and mammogram to be scheduled .   - Nursing Visit: Patient is below goal range for nursing visit for age group and will not need nursing visit at this time .   - Follow up with me in 6 months with A1c prior.       Blakeney McKnight, PA-C [facility]

## 2023-01-24 NOTE — Clinical Note
Education visit with Maria Guadalupe at [facility] on 2/6 at 330p  Sched mammo after 330p at [facility] any day  A1c first wk in June  Follow up in person 1 week after A1c

## 2023-01-25 ENCOUNTER — PATIENT MESSAGE (OUTPATIENT)
Dept: DIABETES | Facility: CLINIC | Age: 48
End: 2023-01-25
Payer: COMMERCIAL

## 2023-01-31 ENCOUNTER — TELEPHONE (OUTPATIENT)
Dept: DIABETES | Facility: CLINIC | Age: 48
End: 2023-01-31
Payer: COMMERCIAL

## 2023-01-31 NOTE — TELEPHONE ENCOUNTER
----- Message from Mayela Alaniz MA sent at 1/31/2023 11:18 AM CST -----  Contact: Yuli    ----- Message -----  From: Tram Ma  Sent: 1/30/2023   4:52 PM CST  To: Alana Roger Staff    Patient stated that the pharmacy needs PA on her diabetic medication. (Hazel) Please call her back at [phone]       [facility] - [place] - [address]  [address] [place] [address]  Phone: [phone] Fax: [phone]

## 2023-02-01 ENCOUNTER — PATIENT MESSAGE (OUTPATIENT)
Dept: INTERNAL MEDICINE | Facility: CLINIC | Age: 48
End: 2023-02-01
Payer: COMMERCIAL

## 2023-02-02 ENCOUNTER — PATIENT MESSAGE (OUTPATIENT)
Dept: INTERNAL MEDICINE | Facility: CLINIC | Age: 48
End: 2023-02-02
Payer: COMMERCIAL

## 2023-02-02 RX ORDER — MECLIZINE HYDROCHLORIDE 25 MG/1
25 TABLET ORAL 3 TIMES DAILY PRN
Qty: 30 TABLET | Refills: 0 | Status: SHIPPED | OUTPATIENT
Start: 2023-02-02 | End: 2023-10-21 | Stop reason: SDUPTHER

## 2023-02-02 NOTE — TELEPHONE ENCOUNTER
Pt states she is having vertigo again and she is out of her Antivert and wants a refill? Meds pended

## 2023-02-03 ENCOUNTER — TELEPHONE (OUTPATIENT)
Dept: INTERNAL MEDICINE | Facility: CLINIC | Age: 48
End: 2023-02-03
Payer: COMMERCIAL

## 2023-02-03 DIAGNOSIS — R42 VERTIGO: Primary | ICD-10-CM

## 2023-02-03 NOTE — TELEPHONE ENCOUNTER
Pt wants to go ahead and see an ENT since she has been dealing with this Vertigo for a while. Referral pended.

## 2023-02-06 ENCOUNTER — PATIENT MESSAGE (OUTPATIENT)
Dept: DIABETES | Facility: CLINIC | Age: 48
End: 2023-02-06

## 2023-02-06 ENCOUNTER — TELEPHONE (OUTPATIENT)
Dept: DIABETES | Facility: CLINIC | Age: 48
End: 2023-02-06
Payer: COMMERCIAL

## 2023-02-06 ENCOUNTER — CLINICAL SUPPORT (OUTPATIENT)
Dept: DIABETES | Facility: CLINIC | Age: 48
End: 2023-02-06
Payer: COMMERCIAL

## 2023-02-06 DIAGNOSIS — E11.65 TYPE 2 DIABETES MELLITUS WITH HYPERGLYCEMIA, WITHOUT LONG-TERM CURRENT USE OF INSULIN: ICD-10-CM

## 2023-02-06 DIAGNOSIS — E11.65 TYPE 2 DIABETES MELLITUS WITH HYPERGLYCEMIA, WITHOUT LONG-TERM CURRENT USE OF INSULIN: Primary | ICD-10-CM

## 2023-02-06 PROCEDURE — G0108 DIAB MANAGE TRN  PER INDIV: HCPCS | Mod: 95,,, | Performed by: DIETITIAN, REGISTERED

## 2023-02-06 PROCEDURE — G0108 PR DIAB MANAGE TRN  PER INDIV: ICD-10-PCS | Mod: 95,,, | Performed by: DIETITIAN, REGISTERED

## 2023-02-06 RX ORDER — LANCETS
1 EACH MISCELLANEOUS 4 TIMES DAILY
Qty: 100 EACH | Refills: 11 | Status: SHIPPED | OUTPATIENT
Start: 2023-02-06

## 2023-02-06 RX ORDER — INSULIN PUMP SYRINGE, 3 ML
EACH MISCELLANEOUS
Qty: 1 EACH | Refills: 0 | Status: SHIPPED | OUTPATIENT
Start: 2023-02-06

## 2023-02-06 NOTE — PROGRESS NOTES
"Diabetes Care Specialist Progress Note  Author: Maria Guadalupe Penaloza RD  Date: 2/6/2023    Program Intake  Reason for Diabetes Program Visit:: Initial Diabetes Assessment  Current diabetes risk level:: low  In the last 12 months, have you:: none  Permission to speak with others about care:: no    Lab Results   Component Value Date    HGBA1C 6.3 (H) 12/08/2022     CURRENT DM MEDICATIONS:   Metformin, 1000mg   Ozempic, 2 mg weekly - changing to Mounjaro, 5 mg     Clinical    Current wt at home is 256 lbs.   Height 5'5"  BMI 42.6    Patient Health Rating  Compared to other people your age, how would you rate your health?: Fair (pt feels that her wt limits her)    Problem Review  Reviewed Problem List with Patient: yes  Active comorbidities affecting diabetes self-care.: yes  Comorbidities: Other (comment) (obesity w/BMI 42)    Clinical Assessment  Have you ever experienced hypoglycemia (low blood sugar)?: no  Have you ever experienced hyperglycemia (high blood sugar)?: no (none recently)  Have you ever been hospitalized because your blood sugar was high?: no    Medication Information  How do you obtain your medications?: Patient drives  How many days a week do you miss your medications?: Never  Do you sometimes have difficulty refilling your medications?: No  Medication adherence impacting ability to self-manage diabetes?: No    Labs  Do you have regular lab work to monitor your medications?: Yes  Where do you get your labs drawn?: [facility]  Lab Compliance Barriers: No    Nutritional Status  Diet:  (pt loves vegetables and does include protein in meals. She loves fried foods and knows she needs to cut back and make better choices.)  Meal Plan 24 Hour Recall: Breakfast, Dinner, Lunch, Snack (pt drinks water all day)  Meal Plan 24 Hour Recall - Breakfast: none  Meal Plan 24 Hour Recall - Lunch: none  Meal Plan 24 Hour Recall - Dinner: 4p - baked fish, mixed vegetables, mashed potatoes; water  Meal Plan 24 Hour Recall - " Snack: none  Change in appetite?: No  Dentation:: Intact  Recent Changes in Weight: No Recent Weight Change  Current nutritional status an area of need that is impacting patient's ability to self-manage diabetes?: No    Additional Social History    Support  Does anyone support you with your diabetes care?: yes  Who supports you?: son/daughter (19 yo and 29 yo children live with pt)  Who takes you to your medical appointments?: self  Does the current support meet the patient's needs?: Yes  Is Support an area impacting ability to self-manage diabetes?: No    Access to Mass Media & Technology  Does the patient have access to any of the following devices or technologies?: Smart phone  Media or technology needs impacting ability to self-manage diabetes?: No    Cognitive/Behavioral Health  Difficulty Thinking: No  Requires Prompting: No  Requires assistance for routine expression?: No  Cognitive or behavioral barriers impacting ability to self-manage diabetes?: No    Culture/[employer]ist  Culture or Baptist beliefs that may impact ability to access healthcare: No    Communication  Language preference: English  Hearing Problems: No  Vision Problems: Yes  Vision problem type:: Decreased Vision  Vision Assistance: Glasses  Communication needs impacting ability to self-manage diabetes?: No    Health Literacy  Preferred Learning Method: Face to Face, Reading Materials, Hands On  How often do you need to have someone help you read instructions, pamphlets, or written material from your doctor or pharmacy?: Never  Health literacy needs impacting ability to self-manage diabetes?: No      Diabetes Self-Management Skills Assessment    Diabetes Disease Process/Treatment Options  Diabetes Disease Process/Treatment Options: Skills Assessment Completed: No  Deferred due to:: Time  Area of need?:  (deferred)    Nutrition/Healthy Eating  Challenges to healthy eating:: eating when feeling depressed/stressed (pt is now on meds for depression  and is eating better)  Method of carbohydrate measurement:: no method  Patient can identify foods that impact blood sugar.: yes  Patient-identified foods:: starchy vegetables (corn, peas, beans), starches (bread, pasta, rice, cereal)  Nutrition/Healthy Eating Skills Assessment Completed:: Yes  Assessment indicates:: Knowledge deficit, Instruction Needed  Area of need?: Yes    Physical Activity/Exercise  Physical Activity/Exercise Skills Assessment Completed: : No  Deffered due to:: Time  Area of need?:  (deferred)    Medications  Patient is able to describe current diabetes management routine.: yes  Diabetes management routine:: diet, oral medications, injectable medications  Patient is able to identify current diabetes medications, dosages, and appropriate timing of medications.: yes  Patient understands the purpose of the medications taken for diabetes.: yes  Patient reports problems or concerns with current medication regimen.: yes  Medication regimen problems/concerns:: does not feel like regimen is working, other (see comments) (Ozempic decreased appetite at first, but she feels it does not help any longer. She is switching to Mounjaro. Answered questions about Saxenda an Wegovy)  Medication Skills Assessment Completed:: Yes  Assessment indicates:: Knowledge deficit  Area of need?: Yes    Home Blood Glucose Monitoring  Patient states that blood sugar is checked at home daily.: no  Reasons for not monitoring:: equipment outdated  Assessment indicates:: Instruction Needed  Area of need?: Yes    Acute Complications  Acute Complications Skills Assessment Completed: : No  Deffered due to:: Time  Area of need?:  (deferred)    Chronic Complications  Chronic Complications Skills Assessment Completed: : No  Deferred due to:: Time  Area of need?:  (deferred)    Psychosocial/Coping  Psychosocial/Coping Skills Assessment Completed: : No  Deffered due to:: Time  Area of need?:  (deferred)      Assessment Summary and  Plan    Based on today's diabetes care assessment, the following areas of need were identified:      Social 2/6/2023   Support No   Access to Mass Media/Tech No   Cognitive/Behavioral Health No   Culture/Cheondoism No   Communication No   Health Literacy No        Clinical 2/6/2023   Medication Adherence No   Lab Compliance No   Nutritional Status No        Diabetes Self-Management Skills 2/6/2023   Diabetes Disease Process/Treatment Options Deferred    Nutrition/Healthy Eating Yes - care plan added      Physical Activity/Exercise Deferred    Medication Yes - Discussed MOA, onset, side effects, dosage of meds.     Home Blood Glucose Monitoring Yes - Encouraged to start monitoring BG again.   Will request new meter from [facility]     Acute Complications Deferred    Chronic Complications Deferred    Psychosocial/Coping Deferred           Today's interventions were provided through individual discussion, instruction, and written materials were provided.      Patient verbalized understanding of instruction and written materials.  Pt was able to return back demonstration of instructions today. Patient understood key points, needs reinforcement and further instruction.     Diabetes Self-Management Care Plan:    Today's Diabetes Self-Management Care Plan was developed with Yuli's input. Yuli has agreed to work toward the following goal(s) to improve his/her overall diabetes control.      Care Plan: Diabetes Management   Updates made since 1/7/2023 12:00 AM        Problem: Healthy Eating         Goal: pt will start eating 2 meals daily to prevent overeating at 1 meal each day. She will also make better choices. See below.    Start Date: 2/6/2023   Expected End Date: 4/6/2023   Priority: High   Barriers: Lack of Motivation to Change   Note:    Eat more vegetables  Bake foods rather than frying foods - decrease from 3x/wk to 2x/wk   [employer] - Eat 1/2 skin on fried chicken and 1/2 FF or beans with no rice   Subway  "- ok to eat 6" turkey sandwich   Buy measuring cups to measure portions   Cont water and SF drinks        Task: Reviewed the sources and role of Carbohydrate, Protein, and Fat and how each nutrient impacts blood sugar. Completed 2/6/2023        Task: Explained how to count carbohydrates using the food label and the use of dry measuring cups for accurate carb counting. Completed 2/6/2023        Task: Discussed strategies for choosing healthier menu options when dining out. Completed 2/6/2023        Task: Recommended replacing beverages containing high sugar content with noncaloric/sugar free options and/or water. Completed 2/6/2023        Task: Review the importance of balancing carbohydrates with each meal using portion control techniques to count servings of carbohydrate and label reading to identify serving size and amount of total carbs per serving. Completed 2/6/2023          Follow Up Plan     Follow up in about 6 weeks (around 3/20/2023).  Will review diet and evaluate goals     Today's care plan and follow up schedule was discussed with patient.  Yuli verbalized understanding of the care plan, goals, and agrees to follow up plan.        The patient was encouraged to communicate with his/her health care provider/physician and care team regarding his/her condition(s) and treatment.  I provided the patient with my contact information today and encouraged to contact me via phone or [facility] as needed.     Length of Visit   Total Time: 50 Minutes     "

## 2023-02-06 NOTE — LETTER
February 6, 2023        Acacia Warner PA-C  [address]  [place] [address]             [facility]  [address] [place] [address]  Phone: [phone]  Fax: [phone]   Patient: Yuli Milton   MR Number: [id number]   YOB: 1975   Date of Visit: 2/6/2023       Dear Dr. Warner:    Thank you for referring Yuli Milton to me for evaluation. Below are the relevant portions of my assessment and plan of care.    If you have questions, please do not hesitate to call me. I look forward to following Yuli along with you.    Sincerely,      Maria Guadalupe Penaloza RD           CC  No Recipients

## 2023-02-06 NOTE — Clinical Note
Pt needs a new glucometer. She really only needs to check once daily or less, since she is well-controlled, but she doesn't have a meter.

## 2023-02-07 ENCOUNTER — HOSPITAL ENCOUNTER (OUTPATIENT)
Dept: RADIOLOGY | Facility: HOSPITAL | Age: 48
Discharge: HOME | End: 2023-02-07
Attending: FAMILY MEDICINE
Payer: COMMERCIAL

## 2023-02-07 DIAGNOSIS — Z12.31 SCREENING MAMMOGRAM, ENCOUNTER FOR: ICD-10-CM

## 2023-02-07 PROCEDURE — 77063 BREAST TOMOSYNTHESIS BI: CPT | Mod: 26,,, | Performed by: RADIOLOGY

## 2023-02-07 PROCEDURE — 77067 MAMMO DIGITAL SCREENING BILAT WITH TOMO: ICD-10-PCS | Mod: 26,,, | Performed by: RADIOLOGY

## 2023-02-07 PROCEDURE — 77063 MAMMO DIGITAL SCREENING BILAT WITH TOMO: ICD-10-PCS | Mod: 26,,, | Performed by: RADIOLOGY

## 2023-02-07 PROCEDURE — 77067 SCR MAMMO BI INCL CAD: CPT | Mod: 26,,, | Performed by: RADIOLOGY

## 2023-02-07 PROCEDURE — 77067 SCR MAMMO BI INCL CAD: CPT | Mod: TC,PO

## 2023-02-09 ENCOUNTER — PATIENT MESSAGE (OUTPATIENT)
Dept: BARIATRICS | Facility: CLINIC | Age: 48
End: 2023-02-09
Payer: COMMERCIAL

## 2023-02-09 ENCOUNTER — PATIENT MESSAGE (OUTPATIENT)
Dept: DIABETES | Facility: CLINIC | Age: 48
End: 2023-02-09
Payer: COMMERCIAL

## 2023-02-13 ENCOUNTER — TELEPHONE (OUTPATIENT)
Dept: DIABETES | Facility: CLINIC | Age: 48
End: 2023-02-13
Payer: COMMERCIAL

## 2023-02-13 NOTE — TELEPHONE ENCOUNTER
Pt stated she need p.a. for medication. P.A. has been processed waiting on response ----- Message from Tram Ma sent at 2/13/2023 12:16 PM CST -----  Contact: Yuli  Patient stated that she needs a PA for  this medication, stated she has been trying to find a RX that has medication.     tirzepatide (MOUNJARO) 5 mg/0.5 mL PnIj 4 pen 11 1/24/2023   Sig: Inject 5 mg into the skin every 7 days.  Route: Subcutaneous  Prior authorization: Closed - Other    [facility] on [address] in [place] Please send it to this pharmacy

## 2023-02-14 ENCOUNTER — PATIENT MESSAGE (OUTPATIENT)
Dept: BARIATRICS | Facility: CLINIC | Age: 48
End: 2023-02-14
Payer: COMMERCIAL

## 2023-02-14 ENCOUNTER — PATIENT MESSAGE (OUTPATIENT)
Dept: DIABETES | Facility: CLINIC | Age: 48
End: 2023-02-14
Payer: COMMERCIAL

## 2023-02-15 DIAGNOSIS — E11.65 TYPE 2 DIABETES MELLITUS WITH HYPERGLYCEMIA, WITHOUT LONG-TERM CURRENT USE OF INSULIN: ICD-10-CM

## 2023-02-15 RX ORDER — TIRZEPATIDE 5 MG/.5ML
5 INJECTION, SOLUTION SUBCUTANEOUS
Qty: 4 PEN | Refills: 11 | Status: SHIPPED | OUTPATIENT
Start: 2023-02-15 | End: 2023-04-10

## 2023-02-15 NOTE — TELEPHONE ENCOUNTER
Spoke with patient, per varun in Cover my meds, Mounjaro has been approved [id number] exp 1/31/24. Pt wants it to sent to [facility] on [address] in [place] due to previous pharmacy not having med in stock. Rx sent in to [facility].

## 2023-02-22 ENCOUNTER — PATIENT MESSAGE (OUTPATIENT)
Dept: BARIATRICS | Facility: CLINIC | Age: 48
End: 2023-02-22
Payer: COMMERCIAL

## 2023-02-28 ENCOUNTER — OFFICE VISIT (OUTPATIENT)
Dept: OTOLARYNGOLOGY | Facility: CLINIC | Age: 48
End: 2023-02-28
Payer: COMMERCIAL

## 2023-02-28 ENCOUNTER — CLINICAL SUPPORT (OUTPATIENT)
Dept: AUDIOLOGY | Facility: CLINIC | Age: 48
End: 2023-02-28
Payer: COMMERCIAL

## 2023-02-28 DIAGNOSIS — G93.2 IIH (IDIOPATHIC INTRACRANIAL HYPERTENSION): Primary | ICD-10-CM

## 2023-02-28 DIAGNOSIS — R42 DIZZINESS: Primary | ICD-10-CM

## 2023-02-28 DIAGNOSIS — R42 VERTIGO: ICD-10-CM

## 2023-02-28 DIAGNOSIS — H93.13 TINNITUS, BILATERAL: ICD-10-CM

## 2023-02-28 PROCEDURE — 99203 OFFICE O/P NEW LOW 30 MIN: CPT | Mod: S$GLB,,, | Performed by: STUDENT IN AN ORGANIZED HEALTH CARE EDUCATION/TRAINING PROGRAM

## 2023-02-28 PROCEDURE — 92567 TYMPANOMETRY: CPT | Mod: S$GLB,,, | Performed by: AUDIOLOGIST-HEARING AID FITTER

## 2023-02-28 PROCEDURE — 92567 PR TYMPA2METRY: ICD-10-PCS | Mod: S$GLB,,, | Performed by: AUDIOLOGIST-HEARING AID FITTER

## 2023-02-28 PROCEDURE — 99999 PR PBB SHADOW E&M-EST. PATIENT-LVL I: CPT | Mod: PBBFAC,,, | Performed by: AUDIOLOGIST-HEARING AID FITTER

## 2023-02-28 PROCEDURE — 92557 PR COMPREHENSIVE HEARING TEST: ICD-10-PCS | Mod: S$GLB,,, | Performed by: AUDIOLOGIST-HEARING AID FITTER

## 2023-02-28 PROCEDURE — 99999 PR PBB SHADOW E&M-EST. PATIENT-LVL I: CPT | Mod: PBBFAC,,, | Performed by: STUDENT IN AN ORGANIZED HEALTH CARE EDUCATION/TRAINING PROGRAM

## 2023-02-28 PROCEDURE — 99999 PR PBB SHADOW E&M-EST. PATIENT-LVL I: ICD-10-PCS | Mod: PBBFAC,,, | Performed by: STUDENT IN AN ORGANIZED HEALTH CARE EDUCATION/TRAINING PROGRAM

## 2023-02-28 PROCEDURE — 92557 COMPREHENSIVE HEARING TEST: CPT | Mod: S$GLB,,, | Performed by: AUDIOLOGIST-HEARING AID FITTER

## 2023-02-28 PROCEDURE — 99203 PR OFFICE/OUTPT VISIT, NEW, LEVL III, 30-44 MIN: ICD-10-PCS | Mod: S$GLB,,, | Performed by: STUDENT IN AN ORGANIZED HEALTH CARE EDUCATION/TRAINING PROGRAM

## 2023-02-28 PROCEDURE — 99999 PR PBB SHADOW E&M-EST. PATIENT-LVL I: ICD-10-PCS | Mod: PBBFAC,,, | Performed by: AUDIOLOGIST-HEARING AID FITTER

## 2023-02-28 NOTE — PROGRESS NOTES
Chief complaint: No chief complaint on file.       Referring Provider:  Drew Gutierrez Md  [id number] [place] [address]    History of Present Illness:     Ms. Milton is a 47 y.o. female presenting for evaluation of dizziness.     Onset: a few months  Frequency of episodes:   Duration of individual episodes:minutes to day  Exacerbating factors:  random, can be any motion or turning head  Prior Medications: meclizine (Antivert) with improvement   Relieving factors:  remaining motionless  Quality: did have room spinning once, but since then episodes are more lightheadedness  Prior history of similar events: No    Associated signs and symptoms:    [] Hearing loss  [x] Ear fullness - bilateral (all the time, not worse during episodes)  [x] Tinnitus - bilateral, high pitched, brief, not during episodes   [x] Headache - newer symptoms recently, sometimes dizzy during the headaches, pressure feeling  [x] Light sensitivity  [] Sound sensitivity  [] Nausea   [] Vomiting  [] Dizziness with valsalva or weather change  [] Autophony    The patient denies significant hearing loss risk factors, ototoxic or vestibulotoxic medication exposure, chronic vestibular suppressant use, head/ facial/ emma trauma, and otologic surgery.          History        Past Medical History:   Past Medical History:   Diagnosis Date    Abnormal Pap smear of cervix     treatment??    Abnormal Pap smear of vagina     Acute pain of right shoulder 10/25/2019    Adjustment disorder with depressed mood 11/15/2014    Overview:  Multiple recent medical problems     Allergy     Anemia     Anemia 12/14/2014 6:26:13 PM    [facility] - [facility]: Anemia-No Additional Notes    Anxiety     Anxiety and depression     Asthma     Asthma 4/14/2016    Asthma 6/18/2014 1:31:21 PM    [facility] Historical - Respiratory: Asthma-No Additional Notes    Auditory hallucinations 2/17/2017    Bacterial vaginosis 1/5/2018    Bladder pain 9/1/2020     "Bronchitis 10/15/2015    Bronchitis 9/18/2020    Bronchitis 10/15/2015    Cervical radiculopathy 3/10/2020    Chlamydia     Depression (emotion)     Diabetes mellitus     SANTOS (dyspnea on exertion) 10/14/2019    Dysuria 3/31/2018    Dysuria 3/31/2018    Early satiety 8/28/2020    Gallstones     Gastritis     High-risk sexual behavior 1/5/2018    History of ovarian cyst     History of syphilis     History of trichomoniasis     Hyperlipidemia     Hypertension     Interstitial cystitis 12/8/2020    Localized edema 10/14/2019    Lower abdominal pain 7/31/2017    Mental disorder     Obesity 6/18/2014 1:51:54 PM    [facility] - [facility]: Obesity, Unspecified-No Additional Notes    Obstructive sleep apnea 10/5/2021    Persistent cough 7/27/2018    PONV (postoperative nausea and vomiting)     Preop general physical exam 12/9/2019    Right foot pain 11/18/2019    Routine general medical examination at a health care facility 5/21/2021    Shortness of breath 6/26/2017    SOB (shortness of breath) 6/26/2017    Suicidal ideations 7/9/2019    Syphilis 6/24/2014 7:17:31 PM    [facility] Historical - Gynecologic: Syphilis-No Additional Notes    Type 2 diabetes mellitus without complication, without long-term current use of insulin 5/25/2014    Type 2 or unspecified type diabetes mellitus 6/24/2014 7:24:47 PM    [facility] - [facility]: Diabetes Mellitus Without Complication, Type II-"borderline"    Upper respiratory tract infection 1/5/2018    Urgency of micturition 11/20/2020    Vaginal candidiasis 7/24/2018    Vaginal itching 1/5/2018    Viral syndrome 12/1/2020    .          Past Surgical History:  Past Surgical History:   Procedure Laterality Date    APPENDECTOMY      CHOLECYSTECTOMY      COLONOSCOPY N/A 07/31/2017    Procedure: COLONOSCOPY;  Surgeon: Yuliana Michael MD;  Location: [facility];  Service: Endoscopy;  Laterality: N/A;    DILATION AND CURETTAGE OF UTERUS      bleeding    FRACTURE SURGERY      right " foot    HYSTERECTOMY  08/31/2016    CARLOTA/RAMÓN for complex hyperplasia without atypia    OOPHORECTOMY      1 ovary removed   .         Medications: Medication list was reviewed. She  has a current medication list which includes the following prescription(s): albuterol, albuterol, atorvastatin, azelaic acid, blood sugar diagnostic, blood-glucose meter, cequa, clindamycin, ergocalciferol, estradiol, fluoxetine, fluticasone-salmeterol 100-50 mcg/dose, gabapentin, haloperidol, hydrochlorothiazide, ibuprofen, lancets, lidocaine, magnesium oxide, meclizine, metformin, methen-m.blue-s.phos-phsal-hyo, methocarbamol, metoprolol succinate, naproxen, nebulizer accessories, olanzapine, omeprazole, oxycodone-acetaminophen, prednisone, pregabalin, promethazine-dextromethorphan, thiamine, mounjaro, tramadol, and trintellix.         Allergies:   Review of patient's allergies indicates:   Allergen Reactions    Trulicity [dulaglutide] Shortness Of Breath and Other (See Comments)     Headaches    Aspirin Nausea Only    Hibiclens (isopropyl alcohol) Itching            Family history: family history includes Asthma in her son; Breast cancer in her maternal aunt and paternal grandmother; Cancer in her maternal aunt; Colon cancer in her maternal aunt; Diabetes in her maternal grandmother; Hypertension in her mother; Miscarriages / Stillbirths in her cousin; No Known Problems in her daughter, father, and son; Stroke in her maternal aunt; Thyroid disease in her brother, mother, and sister.         Social History          Alcohol use:  reports no history of alcohol use.            Tobacco:  reports that she has never smoked. She has never used smokeless tobacco.         Please see the patient's intake form for full details of past medical history, past surgical history, family history, social history and review of systems. ?This information was reviewed by me and noted.      Physical Examination     There were no vitals filed for this visit.      General: Well developed, well nourished, well hydrated. Verbal with a strong voice and not dysphonic.     Head/Face: Normocephalic, atraumatic. No scars or lesions. Facial musculature equal.     Eyes: No scleral icterus or conjunctival hemorrhage. EOMI. PERRLA.     Ears:     Right ear: No gross deformity. EAC is clear of debris and erythema. The TM is intact with a pneumatized middle ear. No signs of retraction, fluid or infection.      Left ear: No gross deformity. EAC is clear of debris and erythema. The TM is intact with a pneumatized middle ear. No signs of retraction, fluid or infection.     Neurologic: Moving all extremities without gross abnormality.CN II-XII grossly intact. House-Brackmann 1/6.     Motor strength:  Strength 5/5 throughout.    Sensation:  Sensory function to light touch and proprioception intact throughout.    Gait:  No ataxia and can tandem gait 6 steps.    Romberg test:  No abnormal sway or falls with eyes open and closed.     Finger-to-nose testing intact without dysmetria.    Nystagmus - none present with left and right lateral gaze    Teresa Hallpike - no torsional nystagmus        Data review    Audiogram     Audiogram was independently reviewed       VNG screen   negative DHP       Assessment     1. IIH (idiopathic intracranial hypertension)    2. Vertigo          Plan:      Dizziness is an extremely complex problem that may arise from many sources.  I requires the coordination between the visual system, the vestibular system as well as the proprioreceptive system.  Additionally, balance is compromised in the setting of musculoskeletal, cerebral, cardiac, and numerous physiologic disorders.  The complex interplay between these systems may also lead to dizziness if there is dysynchrony between the bilateral vestibular symptoms.    Central vestibular symptoms can generally be distinguished from peripheral vestibulopathies by the presence of other non vestibular neurologic symptoms  (focal weakness, headache), light headedness (rather than true vertigo), near syncope, weak limbs, panic, fuzziness/cloudiness in mentation, and clumsiness.  Peripheral vestibulopathies are generally, at some point during their course, characterized by a true vertiginous sensation of movement, difficulty with sudden head movements, nausea, difficulty with sudden head movement, and possibly oscicllopsia.    BPPV is the most common cause of episodic vertigo.  Symptoms generally last for seconds then resolve when motionless, otitic symptoms are absent and may be provoked with sudden head motion.  This may occur spontaneously, but frequently follow head trauma or recent vestibular neuronitis.  Nystagmus is typically geotropic and directed toward the affected ear (hyperactive input to the inferior vestibular nerve via the Singular nerve). Canalith repositioning maneuvers are the method of choice for treating this condition.  Mild imbalance following is common and may last 1-2 weeks.    Vestibular neuronitis and labyrinthitis can be distinguished by the presence of sensorineural hearing loss in labyrinthitis, but during their active phase, vertigo is constant.   MRI may be necessary during this phase to exclude CNS pathology.  Frequently this is preceded by a viral illness. Both result in permanent partial end organ weakness of the affected vestibular nerve (usually superior).  Otherwise, they are essentially the same.  The early (vertiginous, 1-3 days) phase is characterized by acute onset of vertigo that is essentially constant and present even in the absence of motion.  Nystagmus is directed away from the affected ear (hypoactive).  In the acute phase, steroids, limited use of vestibular suppressants and hydration are the most effective treatment. Patients then enter the second phase of uncompensated vestibulopathy where they have a general sense of imbalance.  The duration of this phase depends on several factors, but  is generally delayed in the presence of vestibular suppressants, advanced age, central/systemic balance issues and sessile behavior.   Phase 3 is compensated vestibulopathy where symptoms generally only occur with sudden head movements and can be seen with catch up saccades on head thrust.   However, in the presence of bilateral VN, oscillopsia is the hallmark of the disease and compensation is frequently very delayed and poor.    Other causes of vertigo that are typically constant are vestibulotoxic medications and autoimmune inner ear disease and these are generally bilateral in nature.    Meniere's disease is typically (85%) unilateral and defined by 2 spontaneous vertigo attacks lasting 20 min or more, documented hearing loss (typically low tone, frequently fluctuating) and otic fullness or tinnitus in the affected ear. However, the presence of endolymphatic hydrops may result in similar symptoms, not meeting these strict criteria.  This disease can be difficult to distinguish from vestibular migraines, which are not always associated with headaches.    Superior canal dehiscence presents with episodic vertigo lasting seconds to minutes, aural fullness, autophony, hyperacusis and tinnitus (michelle pulsatile).  Aural pressure is the most common presenting symptom.  Symptoms can be provoked with Valsalva.  Bone conduction thresholds are supranormal.    Perilymph fistula presents with fluctuating hearing loss, episodic vertigo lasting seconds to minutes and frequently is associated with prior barotrauma or otologic surgery.  Conservative measures such as stool softeners and bedrest frequently lead to resolution.  In cases of persistent symptoms, unfortunately there is no noninvasive diagnostic test with high specificity, and surgical exploration is sometimes necessary when this is expected.    Vestibular schwannomas may have constant or episodic vertigo, frequently SNHL and sometimes may be accompanied by headache or  facial numbness.    The diagnosis and options of management were discussed at length with the patient, including hearing, balance function and the risks associated with my recommendations. We spent a considerable amount of time discussing strategies to cope with dizziness. I emphasized the great importance of fall avoidance and activities that may be dangerous if Yuli has an episode of dizziness.  I answered all questions in layman's terms. Based on the history, physical exam and screening VNG/audio there is little evidence of a peripheral vestibular dysfunction.    I suspect her symptoms are most likely related to vestibular migraine or IIH. I recommend neurology referral.    States she had some finding on MRI and needed evaluation of fluid around her brain. Suspect possible IIH workup and LP.    She will call her outside neurologist to restart her workup.        Edgar Britton MD  [facility]   [facility]  [id number] [place] [address].  [place] [id number]  P: [phone]  F: [phone]

## 2023-02-28 NOTE — PROGRESS NOTES
Yuli Milton was seen 02/28/2023 for an audiological evaluation.  Patient complains of episodic dizziness. The dizziness is accompanied by headache and tinnitus. The problem began one year ago. PCP prescribed Meclizine.     Results reveal normal hearing 250-8000 Hz for the right ear, and normal hearing 250-8000 Hz for the left ear.   Speech Reception Thresholds were  10 dBHL for the right ear and 10 dBHL for the left ear.   Word recognition scores were excellent for the right ear and excellent for the left ear.   Tympanograms were Type A for the right ear and CNT (seal) for the left ear.    Otoscopy was unremarkable bilaterally.     DHP- negative to the right and left.     Patient was counseled on the above findings.     Recommendations:  ENT appt today

## 2023-03-02 ENCOUNTER — PATIENT MESSAGE (OUTPATIENT)
Dept: INTERNAL MEDICINE | Facility: CLINIC | Age: 48
End: 2023-03-02
Payer: COMMERCIAL

## 2023-03-02 NOTE — PROGRESS NOTES
Subjective:       Patient ID: Yuli Milton is a 47 y.o. female.    Chief Complaint: Follow-up and Asthma    Taking steroids currently for 7 days. Currently on day 1.    Asthma  She complains of chest tightness, cough and shortness of breath. The current episode started in the past 7 days. The cough is non-productive. Pertinent negatives include no chest pain. Her past medical history is significant for asthma.   Review of Systems   Respiratory:  Positive for cough and shortness of breath.    Cardiovascular:  Negative for chest pain.   Gastrointestinal:  Negative for abdominal pain.     Objective:      Physical Exam  Vitals and nursing note reviewed.   Constitutional:       General: She is not in acute distress.     Appearance: Normal appearance. She is well-developed. She is not diaphoretic.   HENT:      Head: Normocephalic and atraumatic.   Pulmonary:      Effort: Pulmonary effort is normal. No respiratory distress.      Breath sounds: Normal breath sounds. No wheezing.   Skin:     General: Skin is warm and dry.      Findings: No erythema or rash.   Neurological:      Mental Status: She is alert.       Assessment:       1. Mild persistent asthma without complication    2. Moderate episode of recurrent major depressive disorder    3. Essential hypertension    4. Mixed hyperlipidemia    5. Delayed immunizations    6. Environmental allergies    7. Type 2 diabetes mellitus without complication, without long-term current use of insulin        Plan:     Problem List Items Addressed This Visit          Psychiatric    Moderate episode of recurrent major depressive disorder       Pulmonary    Mild persistent asthma without complication - Primary    Overview     Advair 100 mcg. Albuterol inhaler. Albuterol nebs.          Current Assessment & Plan     Stable at this time, continue steroids         Relevant Medications    montelukast (SINGULAIR) 10 mg tablet       Cardiac/Vascular    Essential hypertension    Overview      Chronic, Stable, cont hydrochlorothiazide         Mixed hyperlipidemia    Overview     Chronic, Stable, cont statin            ID    Delayed immunizations       Endocrine    Type 2 diabetes mellitus without complication, without long-term current use of insulin    Relevant Orders    Ambulatory referral/consult to Optometry       Other    Environmental allergies    Relevant Orders    Ambulatory referral/consult to Allergy

## 2023-03-03 ENCOUNTER — OFFICE VISIT (OUTPATIENT)
Dept: INTERNAL MEDICINE | Facility: CLINIC | Age: 48
End: 2023-03-03
Payer: COMMERCIAL

## 2023-03-03 ENCOUNTER — OFFICE VISIT (OUTPATIENT)
Dept: OPHTHALMOLOGY | Facility: CLINIC | Age: 48
End: 2023-03-03
Payer: COMMERCIAL

## 2023-03-03 VITALS
TEMPERATURE: 98 F | SYSTOLIC BLOOD PRESSURE: 128 MMHG | DIASTOLIC BLOOD PRESSURE: 78 MMHG | OXYGEN SATURATION: 98 % | HEART RATE: 73 BPM | BODY MASS INDEX: 42.96 KG/M2 | WEIGHT: 258.19 LBS

## 2023-03-03 DIAGNOSIS — Z91.09 ENVIRONMENTAL ALLERGIES: ICD-10-CM

## 2023-03-03 DIAGNOSIS — E11.9 TYPE 2 DIABETES MELLITUS WITHOUT COMPLICATION, WITHOUT LONG-TERM CURRENT USE OF INSULIN: ICD-10-CM

## 2023-03-03 DIAGNOSIS — J45.30 MILD PERSISTENT ASTHMA WITHOUT COMPLICATION: Primary | ICD-10-CM

## 2023-03-03 DIAGNOSIS — I10 ESSENTIAL HYPERTENSION: ICD-10-CM

## 2023-03-03 DIAGNOSIS — M35.01 KERATOCONJUNCTIVITIS SICCA: ICD-10-CM

## 2023-03-03 DIAGNOSIS — Z28.9 DELAYED IMMUNIZATIONS: ICD-10-CM

## 2023-03-03 DIAGNOSIS — E11.36 DIABETIC CATARACT: ICD-10-CM

## 2023-03-03 DIAGNOSIS — E11.9 DIABETES MELLITUS WITHOUT COMPLICATION: Primary | ICD-10-CM

## 2023-03-03 DIAGNOSIS — E78.2 MIXED HYPERLIPIDEMIA: ICD-10-CM

## 2023-03-03 DIAGNOSIS — F33.1 MODERATE EPISODE OF RECURRENT MAJOR DEPRESSIVE DISORDER: ICD-10-CM

## 2023-03-03 PROCEDURE — 92014 COMPRE OPH EXAM EST PT 1/>: CPT | Mod: S$GLB,,, | Performed by: OPTOMETRIST

## 2023-03-03 PROCEDURE — 2023F DILAT RTA XM W/O RTNOPTHY: CPT | Mod: CPTII,S$GLB,, | Performed by: OPTOMETRIST

## 2023-03-03 PROCEDURE — 3072F LOW RISK FOR RETINOPATHY: CPT | Mod: CPTII,S$GLB,, | Performed by: FAMILY MEDICINE

## 2023-03-03 PROCEDURE — 2023F PR DILATED RETINAL EXAM W/O EVID OF RETINOPATHY: ICD-10-PCS | Mod: CPTII,S$GLB,, | Performed by: OPTOMETRIST

## 2023-03-03 PROCEDURE — 99214 OFFICE O/P EST MOD 30 MIN: CPT | Mod: S$GLB,,, | Performed by: FAMILY MEDICINE

## 2023-03-03 PROCEDURE — 99999 PR PBB SHADOW E&M-EST. PATIENT-LVL V: ICD-10-PCS | Mod: PBBFAC,,, | Performed by: FAMILY MEDICINE

## 2023-03-03 PROCEDURE — 3078F DIAST BP <80 MM HG: CPT | Mod: CPTII,S$GLB,, | Performed by: FAMILY MEDICINE

## 2023-03-03 PROCEDURE — 99214 PR OFFICE/OUTPT VISIT, EST, LEVL IV, 30-39 MIN: ICD-10-PCS | Mod: S$GLB,,, | Performed by: FAMILY MEDICINE

## 2023-03-03 PROCEDURE — 3074F SYST BP LT 130 MM HG: CPT | Mod: CPTII,S$GLB,, | Performed by: FAMILY MEDICINE

## 2023-03-03 PROCEDURE — 1159F MED LIST DOCD IN RCRD: CPT | Mod: CPTII,S$GLB,, | Performed by: FAMILY MEDICINE

## 2023-03-03 PROCEDURE — 3074F PR MOST RECENT SYSTOLIC BLOOD PRESSURE < 130 MM HG: ICD-10-PCS | Mod: CPTII,S$GLB,, | Performed by: FAMILY MEDICINE

## 2023-03-03 PROCEDURE — 3008F PR BODY MASS INDEX (BMI) DOCUMENTED: ICD-10-PCS | Mod: CPTII,S$GLB,, | Performed by: FAMILY MEDICINE

## 2023-03-03 PROCEDURE — 1159F PR MEDICATION LIST DOCUMENTED IN MEDICAL RECORD: ICD-10-PCS | Mod: CPTII,S$GLB,, | Performed by: OPTOMETRIST

## 2023-03-03 PROCEDURE — 1160F PR REVIEW ALL MEDS BY PRESCRIBER/CLIN PHARMACIST DOCUMENTED: ICD-10-PCS | Mod: CPTII,S$GLB,, | Performed by: FAMILY MEDICINE

## 2023-03-03 PROCEDURE — 1159F MED LIST DOCD IN RCRD: CPT | Mod: CPTII,S$GLB,, | Performed by: OPTOMETRIST

## 2023-03-03 PROCEDURE — 99999 PR PBB SHADOW E&M-EST. PATIENT-LVL III: CPT | Mod: PBBFAC,,, | Performed by: OPTOMETRIST

## 2023-03-03 PROCEDURE — 3078F PR MOST RECENT DIASTOLIC BLOOD PRESSURE < 80 MM HG: ICD-10-PCS | Mod: CPTII,S$GLB,, | Performed by: FAMILY MEDICINE

## 2023-03-03 PROCEDURE — 1159F PR MEDICATION LIST DOCUMENTED IN MEDICAL RECORD: ICD-10-PCS | Mod: CPTII,S$GLB,, | Performed by: FAMILY MEDICINE

## 2023-03-03 PROCEDURE — 99999 PR PBB SHADOW E&M-EST. PATIENT-LVL V: CPT | Mod: PBBFAC,,, | Performed by: FAMILY MEDICINE

## 2023-03-03 PROCEDURE — 3008F BODY MASS INDEX DOCD: CPT | Mod: CPTII,S$GLB,, | Performed by: FAMILY MEDICINE

## 2023-03-03 PROCEDURE — 92014 PR EYE EXAM, EST PATIENT,COMPREHESV: ICD-10-PCS | Mod: S$GLB,,, | Performed by: OPTOMETRIST

## 2023-03-03 PROCEDURE — 3072F PR LOW RISK FOR RETINOPATHY: ICD-10-PCS | Mod: CPTII,S$GLB,, | Performed by: FAMILY MEDICINE

## 2023-03-03 PROCEDURE — 1160F RVW MEDS BY RX/DR IN RCRD: CPT | Mod: CPTII,S$GLB,, | Performed by: FAMILY MEDICINE

## 2023-03-03 PROCEDURE — 99999 PR PBB SHADOW E&M-EST. PATIENT-LVL III: ICD-10-PCS | Mod: PBBFAC,,, | Performed by: OPTOMETRIST

## 2023-03-03 RX ORDER — MONTELUKAST SODIUM 10 MG/1
10 TABLET ORAL NIGHTLY
Qty: 90 TABLET | Refills: 3 | Status: SHIPPED | OUTPATIENT
Start: 2023-03-03 | End: 2023-05-29 | Stop reason: SDUPTHER

## 2023-03-03 RX ORDER — ALPRAZOLAM 0.5 MG/1
0.5 TABLET ORAL 2 TIMES DAILY PRN
COMMUNITY
Start: 2023-02-23

## 2023-03-03 RX ORDER — CELECOXIB 200 MG/1
200 CAPSULE ORAL 2 TIMES DAILY
COMMUNITY
Start: 2022-11-14 | End: 2023-03-03 | Stop reason: SDUPTHER

## 2023-03-03 NOTE — PROGRESS NOTES
HPI    Patient here today for yearly DM eye exam  Diagnosed with diabetes in 2002  Lab Results       Component                Value               Date                       HGBA1C                   6.3 (H)             12/08/2022            Vision changes since last eye exam?: Yes at near  Wears PAL glasses full-time     Any eye pain today: No    Other ocular symptoms: No    Interested in contact lens fitting today? No              Last edited by Tosha Arriola, PCT on 3/3/2023  1:23 PM.            Assessment /Plan     For exam results, see Encounter Report.    Diabetes mellitus without complication  Last A1c 6.3 Stressed importance of DM control to preserve vision. No diabetic retinopathy was seen in either eye today. Continue strict blood glucose control.  Reviewed importance of yearly dilated eye exams. Continue close care with PCP regarding diabetes.    Keratoconjunctivitis sicca  Doing well with Restasis BID Continue    Diabetic cataract  Cataracts are not visually significant and not affecting activities of daily living. Annual observation is recommended at this time. Patient to call or RTC with any significant change in vision prior to next visit.      RTC 1 yr for dilated eye exam or sooner if any changes to vision.   Discussed above and answered questions.                      cigarettes

## 2023-03-08 ENCOUNTER — PATIENT MESSAGE (OUTPATIENT)
Dept: BARIATRICS | Facility: CLINIC | Age: 48
End: 2023-03-08
Payer: COMMERCIAL

## 2023-03-14 ENCOUNTER — PATIENT MESSAGE (OUTPATIENT)
Dept: BARIATRICS | Facility: CLINIC | Age: 48
End: 2023-03-14
Payer: COMMERCIAL

## 2023-03-16 ENCOUNTER — PATIENT MESSAGE (OUTPATIENT)
Dept: DIABETES | Facility: CLINIC | Age: 48
End: 2023-03-16
Payer: COMMERCIAL

## 2023-04-05 ENCOUNTER — PATIENT MESSAGE (OUTPATIENT)
Dept: DIABETES | Facility: CLINIC | Age: 48
End: 2023-04-05
Payer: COMMERCIAL

## 2023-04-10 ENCOUNTER — TELEPHONE (OUTPATIENT)
Dept: DIABETES | Facility: CLINIC | Age: 48
End: 2023-04-10
Payer: COMMERCIAL

## 2023-04-10 ENCOUNTER — PATIENT MESSAGE (OUTPATIENT)
Dept: DIABETES | Facility: CLINIC | Age: 48
End: 2023-04-10
Payer: COMMERCIAL

## 2023-04-10 DIAGNOSIS — E11.65 TYPE 2 DIABETES MELLITUS WITH HYPERGLYCEMIA, WITHOUT LONG-TERM CURRENT USE OF INSULIN: Primary | ICD-10-CM

## 2023-05-15 ENCOUNTER — TELEPHONE (OUTPATIENT)
Dept: UROLOGY | Facility: CLINIC | Age: 48
End: 2023-05-15
Payer: COMMERCIAL

## 2023-05-29 ENCOUNTER — OFFICE VISIT (OUTPATIENT)
Dept: ALLERGY | Facility: CLINIC | Age: 48
End: 2023-05-29
Payer: COMMERCIAL

## 2023-05-29 ENCOUNTER — LAB VISIT (OUTPATIENT)
Dept: LAB | Facility: HOSPITAL | Age: 48
End: 2023-05-29
Attending: ALLERGY & IMMUNOLOGY
Payer: COMMERCIAL

## 2023-05-29 VITALS
HEIGHT: 65 IN | SYSTOLIC BLOOD PRESSURE: 109 MMHG | DIASTOLIC BLOOD PRESSURE: 69 MMHG | WEIGHT: 260.81 LBS | TEMPERATURE: 97 F | BODY MASS INDEX: 43.45 KG/M2 | HEART RATE: 72 BPM

## 2023-05-29 DIAGNOSIS — J45.40 MODERATE PERSISTENT ASTHMA WITHOUT COMPLICATION: Primary | ICD-10-CM

## 2023-05-29 DIAGNOSIS — Z91.09 ENVIRONMENTAL ALLERGIES: ICD-10-CM

## 2023-05-29 DIAGNOSIS — J45.30 MILD PERSISTENT ASTHMA WITHOUT COMPLICATION: ICD-10-CM

## 2023-05-29 LAB — IGE SERPL-ACNC: 59 IU/ML (ref 0–100)

## 2023-05-29 PROCEDURE — 1159F PR MEDICATION LIST DOCUMENTED IN MEDICAL RECORD: ICD-10-PCS | Mod: CPTII,S$GLB,, | Performed by: ALLERGY & IMMUNOLOGY

## 2023-05-29 PROCEDURE — 3008F BODY MASS INDEX DOCD: CPT | Mod: CPTII,S$GLB,, | Performed by: ALLERGY & IMMUNOLOGY

## 2023-05-29 PROCEDURE — 99999 PR PBB SHADOW E&M-EST. PATIENT-LVL V: ICD-10-PCS | Mod: PBBFAC,,, | Performed by: ALLERGY & IMMUNOLOGY

## 2023-05-29 PROCEDURE — 86003 ALLG SPEC IGE CRUDE XTRC EA: CPT | Performed by: ALLERGY & IMMUNOLOGY

## 2023-05-29 PROCEDURE — 3008F PR BODY MASS INDEX (BMI) DOCUMENTED: ICD-10-PCS | Mod: CPTII,S$GLB,, | Performed by: ALLERGY & IMMUNOLOGY

## 2023-05-29 PROCEDURE — 94640 PR INHAL RX, AIRWAY OBST/DX SPUTUM INDUCT: ICD-10-PCS | Mod: S$GLB,,, | Performed by: ALLERGY & IMMUNOLOGY

## 2023-05-29 PROCEDURE — 99204 PR OFFICE/OUTPT VISIT, NEW, LEVL IV, 45-59 MIN: ICD-10-PCS | Mod: 25,S$GLB,, | Performed by: ALLERGY & IMMUNOLOGY

## 2023-05-29 PROCEDURE — 3072F LOW RISK FOR RETINOPATHY: CPT | Mod: CPTII,S$GLB,, | Performed by: ALLERGY & IMMUNOLOGY

## 2023-05-29 PROCEDURE — 1159F MED LIST DOCD IN RCRD: CPT | Mod: CPTII,S$GLB,, | Performed by: ALLERGY & IMMUNOLOGY

## 2023-05-29 PROCEDURE — 3078F PR MOST RECENT DIASTOLIC BLOOD PRESSURE < 80 MM HG: ICD-10-PCS | Mod: CPTII,S$GLB,, | Performed by: ALLERGY & IMMUNOLOGY

## 2023-05-29 PROCEDURE — 3074F SYST BP LT 130 MM HG: CPT | Mod: CPTII,S$GLB,, | Performed by: ALLERGY & IMMUNOLOGY

## 2023-05-29 PROCEDURE — 36415 COLL VENOUS BLD VENIPUNCTURE: CPT | Performed by: ALLERGY & IMMUNOLOGY

## 2023-05-29 PROCEDURE — 82785 ASSAY OF IGE: CPT | Performed by: ALLERGY & IMMUNOLOGY

## 2023-05-29 PROCEDURE — 94640 AIRWAY INHALATION TREATMENT: CPT | Mod: S$GLB,,, | Performed by: ALLERGY & IMMUNOLOGY

## 2023-05-29 PROCEDURE — 99204 OFFICE O/P NEW MOD 45 MIN: CPT | Mod: 25,S$GLB,, | Performed by: ALLERGY & IMMUNOLOGY

## 2023-05-29 PROCEDURE — 3078F DIAST BP <80 MM HG: CPT | Mod: CPTII,S$GLB,, | Performed by: ALLERGY & IMMUNOLOGY

## 2023-05-29 PROCEDURE — 3074F PR MOST RECENT SYSTOLIC BLOOD PRESSURE < 130 MM HG: ICD-10-PCS | Mod: CPTII,S$GLB,, | Performed by: ALLERGY & IMMUNOLOGY

## 2023-05-29 PROCEDURE — 86003 ALLG SPEC IGE CRUDE XTRC EA: CPT | Mod: 59 | Performed by: ALLERGY & IMMUNOLOGY

## 2023-05-29 PROCEDURE — 99999 PR PBB SHADOW E&M-EST. PATIENT-LVL V: CPT | Mod: PBBFAC,,, | Performed by: ALLERGY & IMMUNOLOGY

## 2023-05-29 PROCEDURE — 3072F PR LOW RISK FOR RETINOPATHY: ICD-10-PCS | Mod: CPTII,S$GLB,, | Performed by: ALLERGY & IMMUNOLOGY

## 2023-05-29 RX ORDER — MONTELUKAST SODIUM 10 MG/1
10 TABLET ORAL NIGHTLY
Qty: 90 TABLET | Refills: 3 | Status: SHIPPED | OUTPATIENT
Start: 2023-05-29 | End: 2023-09-19 | Stop reason: SDUPTHER

## 2023-05-29 RX ORDER — PHENAZOPYRIDINE HYDROCHLORIDE 100 MG/1
TABLET, FILM COATED ORAL
COMMUNITY
Start: 2023-03-22 | End: 2023-08-29

## 2023-05-29 RX ORDER — NITROFURANTOIN 25; 75 MG/1; MG/1
100 CAPSULE ORAL 2 TIMES DAILY
COMMUNITY
Start: 2023-03-22 | End: 2023-08-29

## 2023-05-29 RX ORDER — OLANZAPINE 5 MG/1
5 TABLET ORAL NIGHTLY
COMMUNITY
Start: 2023-05-25

## 2023-05-29 RX ORDER — ALBUTEROL SULFATE 0.83 MG/ML
2.5 SOLUTION RESPIRATORY (INHALATION)
Status: COMPLETED | OUTPATIENT
Start: 2023-05-29 | End: 2023-05-29

## 2023-05-29 RX ORDER — METRONIDAZOLE 500 MG/1
500 TABLET ORAL EVERY 12 HOURS
COMMUNITY
Start: 2023-05-21 | End: 2023-08-29

## 2023-05-29 RX ORDER — FLUTICASONE PROPIONATE AND SALMETEROL 100; 50 UG/1; UG/1
1 POWDER RESPIRATORY (INHALATION) 2 TIMES DAILY
Qty: 60 EACH | Refills: 11 | Status: SHIPPED | OUTPATIENT
Start: 2023-05-29 | End: 2023-06-26 | Stop reason: SDUPTHER

## 2023-05-29 RX ORDER — ACETAZOLAMIDE 500 MG/1
CAPSULE, EXTENDED RELEASE ORAL
COMMUNITY
Start: 2023-05-17 | End: 2023-10-24

## 2023-05-29 RX ADMIN — ALBUTEROL SULFATE 2.5 MG: 0.83 SOLUTION RESPIRATORY (INHALATION) at 02:05

## 2023-05-29 NOTE — PROGRESS NOTES
"Subjective:      Patient ID: Yuli Milton is a 47 y.o. female.      Referred by Dr. Gutierrez for environmental allergies.    Chief Complaint:  Allergies      HPI: Allergic Rhinitis:  Patient's symptoms include nasal congestion. These symptoms are perennial. Current triggers include exposure to  smoke, animal dander . The patient has been suffering from these symptoms for approximately 5 years. The patient has tried over the counter medications with fair relief of symptoms. Immunotherapy has never been tried. The patient has never had nasal polyps. The patient has a history of asthma. The patient has no history of eczema. The patient does not suffer from frequent sinopulmonary infections. The patient has not had sinus surgery in the past.     Asthma- diagnosed as a child.  Steroids for asthma- ER March of this year  Albuterol- a few times  week  Trigger- unknown, smoke  Night time cough "sometimes"        Past Medical History:   Diagnosis Date    Abnormal Pap smear of cervix     treatment??    Abnormal Pap smear of vagina     Acute pain of right shoulder 10/25/2019    Adjustment disorder with depressed mood 11/15/2014    Overview:  Multiple recent medical problems     Allergy     Anemia     Anemia 12/14/2014 6:26:13 PM    [facility]: Anemia-No Additional Notes    Anxiety     Anxiety and depression     Asthma     Asthma 4/14/2016    Asthma 6/18/2014 1:31:21 PM    [facility] - Respiratory: Asthma-No Additional Notes    Auditory hallucinations 2/17/2017    Bacterial vaginosis 1/5/2018    Bladder pain 9/1/2020    Bronchitis 10/15/2015    Bronchitis 9/18/2020    Bronchitis 10/15/2015    Cervical radiculopathy 3/10/2020    Chlamydia     Depression (emotion)     Diabetes mellitus     SANTOS (dyspnea on exertion) 10/14/2019    Dysuria 3/31/2018    Dysuria 3/31/2018    Early satiety 8/28/2020    Gallstones     Gastritis     High-risk sexual behavior 1/5/2018    History of ovarian cyst     " "History of syphilis     History of trichomoniasis     Hyperlipidemia     Hypertension     Interstitial cystitis 12/8/2020    Localized edema 10/14/2019    Lower abdominal pain 7/31/2017    Mental disorder     Obesity 6/18/2014 1:51:54 PM    [facility]: Obesity, Unspecified-No Additional Notes    Obstructive sleep apnea 10/5/2021    Persistent cough 7/27/2018    PONV (postoperative nausea and vomiting)     Preop general physical exam 12/9/2019    Right foot pain 11/18/2019    Routine general medical examination at a health care facility 5/21/2021    Shortness of breath 6/26/2017    SOB (shortness of breath) 6/26/2017    Suicidal ideations 7/9/2019    Syphilis 6/24/2014 7:17:31 PM    [facility] - Gynecologic: Syphilis-No Additional Notes    Type 2 diabetes mellitus without complication, without long-term current use of insulin 5/25/2014    Type 2 or unspecified type diabetes mellitus 6/24/2014 7:24:47 PM    [facility]: Diabetes Mellitus Without Complication, Type II-"borderline"    Upper respiratory tract infection 1/5/2018    Urgency of micturition 11/20/2020    Vaginal candidiasis 7/24/2018    Vaginal itching 1/5/2018    Viral syndrome 12/1/2020        Family History   Problem Relation Age of Onset    Breast cancer Paternal Grandmother     Diabetes Maternal Grandmother     Hypertension Mother     Thyroid disease Mother     Breast cancer Maternal Aunt     Cancer Maternal Aunt         colon cancer    Colon cancer Maternal Aunt     Miscarriages / Stillbirths Cousin     Stroke Maternal Aunt     No Known Problems Father     Thyroid disease Sister     Thyroid disease Brother     No Known Problems Daughter     No Known Problems Son     Asthma Son     Ovarian cancer Neg Hx     Deep vein thrombosis Neg Hx     Pulmonary embolism Neg Hx         Current Outpatient Medications on File Prior to Visit   Medication Sig Dispense Refill    acetaZOLAMIDE (DIAMOX) 500 mg CpSR Take by " mouth.      albuterol (PROVENTIL) 2.5 mg /3 mL (0.083 %) nebulizer solution Take 3 mLs (2.5 mg total) by nebulization every 4 (four) hours as needed for Wheezing. Rescue 150 mL 11    albuterol (PROVENTIL/VENTOLIN HFA) 90 mcg/actuation inhaler Inhale 2 puffs into the lungs every 4 (four) hours as needed for Wheezing or Shortness of Breath. Rescue. Use with chamber. 18 g 11    ALPRAZolam (XANAX) 0.5 MG tablet Take 0.5 mg by mouth 2 (two) times daily as needed.      azelaic acid (FINACEA) 15 % gel AAA bid 50 g 3    blood sugar diagnostic (BLOOD GLUCOSE TEST) Strp 1 each by Misc.(Non-Drug; Combo Route) route 3 (three) times daily. 100 each 11    blood-glucose meter kit Test finger stick blood sugar once daily. 1 each 0    CEQUA 0.09 % Dpet Place 1 drop into both eyes 2 (two) times daily.      ergocalciferol (ERGOCALCIFEROL) 50,000 unit Cap Take 1 capsule (50,000 Units total) by mouth twice a week. 24 capsule 0    estradioL (ESTRACE) 0.01 % (0.1 mg/gram) vaginal cream Place 2 g vaginally once daily. 60 g 1    gabapentin (NEURONTIN) 300 MG capsule Take by mouth.      ibuprofen (ADVIL,MOTRIN) 800 MG tablet Take 1 tablet (800 mg total) by mouth every 8 (eight) hours as needed for Pain. 30 tablet 2    lancets Misc 1 each by Misc.(Non-Drug; Combo Route) route 4 (four) times daily. 100 each 11    LIDOcaine (LIDODERM) 5 % 1 patch once daily.      metFORMIN (GLUCOPHAGE) 1000 MG tablet Take 1 tablet (1,000 mg total) by mouth 2 (two) times daily with meals. 180 tablet 1    methen-m.blue-s.phos-phsal-hyo (URIBEL) 118-10-40.8-36 mg Cap Take 1 capsule by mouth 3 (three) times daily as needed (dysuria). 30 capsule 5    methocarbamoL (ROBAXIN) 750 MG Tab Take 1 tablet (750 mg total) by mouth every 8 (eight) hours as needed (muscle spasm). 20 tablet 0    metroNIDAZOLE (FLAGYL) 500 MG tablet Take 500 mg by mouth every 12 (twelve) hours.      naproxen (NAPROSYN) 500 MG tablet Take 1 tablet (500 mg total) by mouth 2 (two) times daily with  meals. Take with food 30 tablet 0    nebulizer accessories Misc 1 each - needs replacement tubing and mask  0    nitrofurantoin, macrocrystal-monohydrate, (MACROBID) 100 MG capsule Take 100 mg by mouth 2 (two) times daily.      OLANZapine (ZYPREXA) 5 MG tablet Take 5 mg by mouth every evening.      phenazopyridine (PYRIDIUM) 100 MG tablet Take by mouth.      predniSONE (DELTASONE) 20 MG tablet 40 mg x 5 days, then 20 mg x 5 days. 15 tablet 0    promethazine-dextromethorphan (PROMETHAZINE-DM) 6.25-15 mg/5 mL Syrp Take 5 mLs by mouth every evening. 118 mL 0    thiamine 100 MG tablet Take 100 mg by mouth once daily.      tirzepatide 7.5 mg/0.5 mL PnIj Inject 7.5 mg into the skin every 7 days. 4 pen 11    [DISCONTINUED] fluticasone-salmeterol diskus inhaler 100-50 mcg Inhale 1 puff into the lungs 2 (two) times daily. Controller 60 each 11    [DISCONTINUED] montelukast (SINGULAIR) 10 mg tablet Take 1 tablet (10 mg total) by mouth every evening. 90 tablet 3    atorvastatin (LIPITOR) 20 MG tablet Take 1 tablet (20 mg total) by mouth once daily. 90 tablet 0    FLUoxetine 20 MG capsule Take 1 capsule (20 mg total) by mouth once daily. 30 capsule 0    hydroCHLOROthiazide (HYDRODIURIL) 12.5 MG Tab Take 1 tablet (12.5 mg total) by mouth daily as needed (leg swelling). 90 tablet 1    meclizine (ANTIVERT) 25 mg tablet Take 1 tablet (25 mg total) by mouth 3 (three) times daily as needed for Dizziness. 30 tablet 0     No current facility-administered medications on file prior to visit.        Review of patient's allergies indicates:   Allergen Reactions    Trulicity [dulaglutide] Shortness Of Breath and Other (See Comments)     Headaches    Aspirin Nausea Only    Hibiclens (isopropyl alcohol) Itching            Environmental History: Pets in the home: none.  Tobacco Smoke in Home: no  Review of Systems   Constitutional:  Negative for chills and fever.   HENT:  Positive for congestion. Negative for rhinorrhea.    Eyes:  Negative for  discharge and itching.   Respiratory:  Positive for cough, chest tightness and shortness of breath. Negative for wheezing.    Cardiovascular:  Negative for chest pain and leg swelling.   Skin:  Negative for rash and wound.   Allergic/Immunologic: Positive for environmental allergies. Negative for food allergies and immunocompromised state.   Neurological:  Negative for facial asymmetry and speech difficulty.   Psychiatric/Behavioral:  Negative for behavioral problems and suicidal ideas.      Objective:   Physical Exam  Vitals reviewed.   Constitutional:       General: She is not in acute distress.     Appearance: Normal appearance. She is well-developed. She is not ill-appearing, toxic-appearing or diaphoretic.   HENT:      Head: Normocephalic and atraumatic.      Right Ear: Tympanic membrane, ear canal and external ear normal. There is no impacted cerumen.      Left Ear: Tympanic membrane, ear canal and external ear normal. There is no impacted cerumen.      Nose: Nose normal. No congestion or rhinorrhea.      Mouth/Throat:      Pharynx: No oropharyngeal exudate or posterior oropharyngeal erythema.   Eyes:      General: No scleral icterus.        Right eye: No discharge.         Left eye: No discharge.      Pupils: Pupils are equal, round, and reactive to light.   Neck:      Thyroid: No thyromegaly.   Cardiovascular:      Rate and Rhythm: Normal rate and regular rhythm.      Heart sounds: Normal heart sounds. No murmur heard.    No friction rub. No gallop.   Pulmonary:      Effort: Pulmonary effort is normal. No respiratory distress.      Breath sounds: Normal breath sounds. No stridor. No wheezing, rhonchi or rales.   Chest:      Chest wall: No tenderness.   Musculoskeletal:         General: Normal range of motion.      Cervical back: Normal range of motion and neck supple. No rigidity or tenderness. No muscular tenderness.   Lymphadenopathy:      Cervical: No cervical adenopathy.   Skin:     General: Skin is  warm.      Coloration: Skin is not jaundiced or pale.      Findings: No bruising or erythema.   Neurological:      Mental Status: She is alert and oriented to person, place, and time.      Gait: Gait normal.   Psychiatric:         Mood and Affect: Mood normal.         Behavior: Behavior normal.         Thought Content: Thought content normal.         Judgment: Judgment normal.     Spirometry   Pre albuterol  FEV1 86%  FVC  79%  FEV1/%  [id number] 91%  Mild restriction  Post albuterol  FEV1  90%  FVC   82%  FEV1/FVC  108%  [id number]  99%  Normal     I interpreted the results      Assessment:      1. Moderate persistent asthma without complication    2. Environmental allergies    3. Mild persistent asthma without complication        Plan:       Moderate persistent asthma without complication  -     fluticasone-salmeterol diskus inhaler 100-50 mcg; Inhale 1 puff into the lungs 2 (two) times daily. Controller  Dispense: 60 each; Refill: 11    Environmental allergies  -     Ambulatory referral/consult to Allergy  -     Allergen, Pecan Tree IgE; Future; Expected date: 05/29/2023  -     [place], black IgE; Future; Expected date: 05/29/2023  -     [place], bald IgE; Future; Expected date: 05/29/2023  -     Oak, white IgE; Future; Expected date: 05/29/2023  -     Allergen, Cocklebur; Future; Expected date: 05/29/2023  -     Cat epithelium IgE; Future; Expected date: 05/29/2023  -     IgE; Future; Expected date: 05/29/2023  -     [place] grass IgE; Future; Expected date: 05/29/2023  -     Aspergillus fumagatus IgE; Future; Expected date: 05/29/2023  -     Chaetomium globosum IgE; Future; Expected date: 05/29/2023  -     Cockroach, American IgE; Future; Expected date: 05/29/2023  -     Cladosporium IgE; Future; Expected date: 05/29/2023  -     Curvularia lunata IgE; Future; Expected date: 05/29/2023  -     D. farinae IgE; Future; Expected date: 05/29/2023  -     D. pteronyssinus IgE; Future; Expected date: 05/29/2023  -      Dog dander IgE; Future; Expected date: 05/29/2023  -     Plantain, English IgE; Future; Expected date: 05/29/2023  -     Eucalyptus IgE; Future; Expected date: 05/29/2023  -     Guevara elder, rough IgE; Future; Expected date: 05/29/2023  -     Mugwort IgE; Future; Expected date: 05/29/2023  -     Nettle IgE; Future; Expected date: 05/29/2023  -     Orchard grass IgE; Future; Expected date: 05/29/2023  -     [place], western white IgE; Future; Expected date: 05/29/2023  -     [place]et, common IgE; Future; Expected date: 05/29/2023  -     Ragweed, short, common IgE; Future; Expected date: 05/29/2023  -     Red top grass IgE; Future; Expected date: 05/29/2023  -     Rye grass, cultivated IgE; Future; Expected date: 05/29/2023  -     Thistle, Russian IgE; Future; Expected date: 05/29/2023  -     Stemphyllium IgE; Future; Expected date: 05/29/2023  -     Tony IgE; Future; Expected date: 05/29/2023  -     Nicolás grass IgE; Future; Expected date: 05/29/2023  -     Allergen, Hackberry Celtis; Future; Expected date: 05/29/2023  -     Allergen, Elm Cedar; Future; Expected date: 05/29/2023  -     Allergen-[place]; Future; Expected date: 05/29/2023  -     RAST Allergen for [place]; Future; Expected date: 05/29/2023  -     RAST Allergen Maple ([place]); Future; Expected date: 05/29/2023  -     Allergen, White [place]; Future; Expected date: 05/29/2023  -     Allergen, Meadow Grass ([place]); Future; Expected date: 05/29/2023  -     Allergen-[place]; Future; Expected date: 05/29/2023  -     RAST Allergen [place]; Future; Expected date: 05/29/2023  -     RAST Allergen, Sheep [place]); Future; Expected date: 05/29/2023  -     Allergen-Alternaria Alternata; Future; Expected date: 05/29/2023  -     Allergen-Maple [place]; Future; Expected date: 05/29/2023  -     Allergen, White [place]; Future; Expected date: 05/29/2023    Mild persistent asthma without complication  -     montelukast (SINGULAIR) 10  mg tablet; Take 1 tablet (10 mg total) by mouth every evening.  Dispense: 90 tablet; Refill: 3    Other orders  -     albuterol nebulizer solution 2.5 mg         Spirometry pre-albuterol was significant for restriction, post albuterol it was normal.  Recommend controller medications daily- Advair and Singulair.  Albuterol as needed.  Labs ordered.      RTC 4-6 weeks or sooner, if needed     SABA RAY                    Problems Address                                                 Amount and/or Complexity                                                                      Risk       3           [] 2 or more self-limited or minor problems                      [] Limited                                                                        [] Low                  [] 1 stable chronic illness                                                  Any combination of the two                                               OTC drugs                  []Acute, uncomplicated illness or injury                            Review of prior external notes from unique source           Minor surgery with no risk factors                                                                                                               [] 1 []2  []3+                                                                                                              Review of results from each unique test                                                                                                               [] 1 []2  [] 3+                                                                                                              Order of each unique test                                                                                                               [] 1 []2  [] 3+                                                                                                              Or                                                                                                              [] Assessment requiring an independent historian      4            [] One or more chronic illness with exacerbation,              [] Moderate                                                                      [x] Moderate                 Progression, or side effects of treatment                            -test documents or independent historians                        Prescription drug management                [x]  2 or more stable chronic illnesses                                    [x] Independent interpretation of tests                              Minor surgery with identifiable risk                [] 1 undiagnosed new problem with uncertain prognosis    [x] Discussion or management of test results                    elective major surgery                [] 1 acute illness with                systemic symptoms                                                                                                                                                              [] 1 acute complicated injury                                                                                                                                          Elective major surgery                                                                                                                                                                                                                                                                                                                                                                                                  5            [] 1 or more chronic illnesses with severe exacerbation,     [] Extensive(two from below)                                         [] High                                                                                                               [] Independent interpretation of results                         Drug therapy  requiring intensive                                                                                                               []Discussion of management or test interpretation           monitoring                                                                                                                                                                                                       Decision to de-escalate care                 [] 1 acute or chronic illness or injury that poses a threat                                                                                               Decision regarding hospitalization                                                                                                                                                                                                               CC: Dr. Gutierrez

## 2023-05-31 LAB — AMER SYCAMORE IGE QN: <0.1 KU/L

## 2023-06-01 LAB
A ALTERNATA IGE QN: <0.1 KU/L
A FUMIGATUS IGE QN: <0.1 KU/L
ALLERGEN BOXELDER MAPLE TREE IGE: <0.1 KU/L
ALLERGEN CHAETOMIUM GLOBOSUM IGE: <0.1 KU/L
ALLERGEN MAPLE (BOX ELDER) CLASS: NORMAL
ALLERGEN MULBERRY CLASS: NORMAL
ALLERGEN MULBERRY TREE IGE: <0.1 KU/L
ALLERGEN WHITE ASH TREE IGE: <0.1 KU/L
ALLERGEN WHITE ASH TREE IGE: <0.1 KU/L
ALLERGEN WHITE PINE TREE IGE: <0.1 KU/L
BAHIA GRASS IGE QN: 0.14 KU/L
BALD CYPRESS IGE QN: <0.1 KU/L
C HERBARUM IGE QN: <0.1 KU/L
C LUNATA IGE QN: <0.1 KU/L
CAT DANDER IGE QN: <0.1 KU/L
CHAETOMIUM GLOB. CLASS: NORMAL
COCKLEBUR IGE QN: <0.1 KU/L
COCKSFOOT IGE QN: 0.22 KU/L
COMMON RAGWEED IGE QN: <0.1 KU/L
COTTONWOOD IGE QN: <0.1 KU/L
D FARINAE IGE QN: 0.28 KU/L
D PTERONYSS IGE QN: 0.58 KU/L
DEPRECATED A ALTERNATA IGE RAST QL: NORMAL
DEPRECATED A FUMIGATUS IGE RAST QL: NORMAL
DEPRECATED BAHIA GRASS IGE RAST QL: ABNORMAL
DEPRECATED BALD CYPRESS IGE RAST QL: NORMAL
DEPRECATED C HERBARUM IGE RAST QL: NORMAL
DEPRECATED C LUNATA IGE RAST QL: NORMAL
DEPRECATED CAT DANDER IGE RAST QL: NORMAL
DEPRECATED COCKLEBUR IGE RAST QL: NORMAL
DEPRECATED COCKSFOOT IGE RAST QL: ABNORMAL
DEPRECATED COMMON RAGWEED IGE RAST QL: NORMAL
DEPRECATED COTTONWOOD IGE RAST QL: NORMAL
DEPRECATED D FARINAE IGE RAST QL: ABNORMAL
DEPRECATED D PTERONYSS IGE RAST QL: ABNORMAL
DEPRECATED DOG DANDER IGE RAST QL: NORMAL
DEPRECATED ELDER IGE RAST QL: NORMAL
DEPRECATED ENGL PLANTAIN IGE RAST QL: NORMAL
DEPRECATED GUM-TREE IGE RAST QL: NORMAL
DEPRECATED HACKBERRY TREE IGE RAST QL: NORMAL
DEPRECATED JOHNSON GRASS IGE RAST QL: NORMAL
DEPRECATED KENT BLUE GRASS IGE RAST QL: ABNORMAL
DEPRECATED LONDON PLANE IGE RAST QL: NORMAL
DEPRECATED MUGWORT IGE RAST QL: NORMAL
DEPRECATED NETTLE IGE RAST QL: NORMAL
DEPRECATED PECAN/HICK TREE IGE RAST QL: NORMAL
DEPRECATED PER RYE GRASS IGE RAST QL: ABNORMAL
DEPRECATED PRIVET IGE RAST QL: NORMAL
DEPRECATED RED TOP GRASS IGE RAST QL: ABNORMAL
DEPRECATED ROACH IGE RAST QL: NORMAL
DEPRECATED SALTWORT IGE RAST QL: NORMAL
DEPRECATED SHEEP SORREL IGE RAST QL: NORMAL
DEPRECATED SILVER BIRCH IGE RAST QL: NORMAL
DEPRECATED TIMOTHY IGE RAST QL: ABNORMAL
DEPRECATED WHITE OAK IGE RAST QL: NORMAL
DEPRECATED WILLOW IGE RAST QL: NORMAL
DOG DANDER IGE QN: <0.1 KU/L
ELDER IGE QN: <0.1 KU/L
ELM CEDAR CLASS: NORMAL
ELM CEDAR, IGE: <0.1 KU/L
ENGL PLANTAIN IGE QN: <0.1 KU/L
GUM-TREE IGE QN: <0.1 KU/L
HACKBERRY TREE IGE QN: <0.1 KU/L
JOHNSON GRASS IGE QN: <0.1 KU/L
KENT BLUE GRASS IGE QN: 0.3 KU/L
LONDON PLANE IGE QN: <0.1 KU/L
MUGWORT IGE QN: <0.1 KU/L
NETTLE IGE QN: <0.1 KU/L
PECAN/HICK TREE IGE QN: <0.1 KU/L
PER RYE GRASS IGE QN: 0.19 KU/L
PRIVET IGE QN: <0.1 KU/L
RED TOP GRASS IGE QN: 0.23 KU/L
ROACH IGE QN: <0.1 KU/L
SALTWORT IGE QN: <0.1 KU/L
SHEEP SORREL IGE QN: <0.1 KU/L
SILVER BIRCH IGE QN: <0.1 KU/L
STEMPHYLIUM HERBARUM CLASS: NORMAL
STEMPHYLLIUM, IGE: <0.1 KU/L
TIMOTHY IGE QN: 0.17 KU/L
WHITE ASH CLASS: NORMAL
WHITE ASH CLASS: NORMAL
WHITE OAK IGE QN: <0.1 KU/L
WHITE PINE CLASS: NORMAL
WILLOW IGE QN: <0.1 KU/L

## 2023-06-02 NOTE — TELEPHONE ENCOUNTER
----- Message from Terese Omer sent at 3/6/2020  9:19 AM CST -----  Contact: Pt   Type:  Needs Medical Advice    Who Called: pt   Symptoms (please be specific): coughing / probs with breathing    How long has patient had these symptoms: was recently in for the issues   Pharmacy name and phone #:    Would the patient rather a call back or a response via [facility]? phone  Best Call Back Number: [phone]  Additional Information: pt states she's still having issues with the coughing and breathing      Mirvaso Counseling: Mirvaso is a topical medication which can decrease superficial blood flow where applied. Side effects are uncommon and include stinging, redness and allergic reactions.

## 2023-06-05 ENCOUNTER — LAB VISIT (OUTPATIENT)
Dept: LAB | Facility: HOSPITAL | Age: 48
End: 2023-06-05
Attending: PHYSICIAN ASSISTANT
Payer: COMMERCIAL

## 2023-06-05 DIAGNOSIS — E11.65 TYPE 2 DIABETES MELLITUS WITH HYPERGLYCEMIA, WITHOUT LONG-TERM CURRENT USE OF INSULIN: ICD-10-CM

## 2023-06-05 PROCEDURE — 36415 COLL VENOUS BLD VENIPUNCTURE: CPT | Mod: PO | Performed by: PHYSICIAN ASSISTANT

## 2023-06-05 PROCEDURE — 83036 HEMOGLOBIN GLYCOSYLATED A1C: CPT | Performed by: PHYSICIAN ASSISTANT

## 2023-06-06 LAB
ESTIMATED AVG GLUCOSE: 108 MG/DL (ref 68–131)
HBA1C MFR BLD: 5.4 % (ref 4–5.6)

## 2023-06-11 ENCOUNTER — PATIENT MESSAGE (OUTPATIENT)
Dept: UROLOGY | Facility: CLINIC | Age: 48
End: 2023-06-11
Payer: COMMERCIAL

## 2023-06-12 ENCOUNTER — OFFICE VISIT (OUTPATIENT)
Dept: DIABETES | Facility: CLINIC | Age: 48
End: 2023-06-12
Payer: COMMERCIAL

## 2023-06-12 DIAGNOSIS — I10 ESSENTIAL HYPERTENSION: ICD-10-CM

## 2023-06-12 DIAGNOSIS — E11.65 TYPE 2 DIABETES MELLITUS WITH HYPERGLYCEMIA, WITHOUT LONG-TERM CURRENT USE OF INSULIN: Primary | ICD-10-CM

## 2023-06-12 DIAGNOSIS — E78.2 MIXED HYPERLIPIDEMIA: ICD-10-CM

## 2023-06-12 PROCEDURE — 3072F LOW RISK FOR RETINOPATHY: CPT | Mod: CPTII,95,, | Performed by: NURSE PRACTITIONER

## 2023-06-12 PROCEDURE — 3044F PR MOST RECENT HEMOGLOBIN A1C LEVEL <7.0%: ICD-10-PCS | Mod: CPTII,95,, | Performed by: NURSE PRACTITIONER

## 2023-06-12 PROCEDURE — 99214 OFFICE O/P EST MOD 30 MIN: CPT | Mod: 95,,, | Performed by: NURSE PRACTITIONER

## 2023-06-12 PROCEDURE — 3044F HG A1C LEVEL LT 7.0%: CPT | Mod: CPTII,95,, | Performed by: NURSE PRACTITIONER

## 2023-06-12 PROCEDURE — 1160F PR REVIEW ALL MEDS BY PRESCRIBER/CLIN PHARMACIST DOCUMENTED: ICD-10-PCS | Mod: CPTII,95,, | Performed by: NURSE PRACTITIONER

## 2023-06-12 PROCEDURE — 3072F PR LOW RISK FOR RETINOPATHY: ICD-10-PCS | Mod: CPTII,95,, | Performed by: NURSE PRACTITIONER

## 2023-06-12 PROCEDURE — 99214 PR OFFICE/OUTPT VISIT, EST, LEVL IV, 30-39 MIN: ICD-10-PCS | Mod: 95,,, | Performed by: NURSE PRACTITIONER

## 2023-06-12 PROCEDURE — 1159F MED LIST DOCD IN RCRD: CPT | Mod: CPTII,95,, | Performed by: NURSE PRACTITIONER

## 2023-06-12 PROCEDURE — 1160F RVW MEDS BY RX/DR IN RCRD: CPT | Mod: CPTII,95,, | Performed by: NURSE PRACTITIONER

## 2023-06-12 PROCEDURE — 1159F PR MEDICATION LIST DOCUMENTED IN MEDICAL RECORD: ICD-10-PCS | Mod: CPTII,95,, | Performed by: NURSE PRACTITIONER

## 2023-06-12 NOTE — PROGRESS NOTES
Established Patient - Virtual Telehealth Visit     The patient location is: Home ([place])  The chief complaint leading to consultation is: Diabetes Follow-up  Visit type: Virtual visit with synchronous audio and video via Epic Ha[employer] jeni  Total time spent with patient: 18 minutes     Each patient to whom I provide medical services by telemedicine is:  (1) informed of the relationship between the physician and patient and the respective role of any other health care provider with respect to management of the patient; and (2) notified that they may decline to receive medical services by telemedicine and may withdraw from such care at any time. Patient verbally consented to receive this service via voice-only telephone call.    PCP: Drew Gutierrez MD    Subjective:     Chief Complaint: Diabetes follow up.    History of Present Illness: 47 y.o.  female for diabetes consult.   Managed by JULISSA Warner PA-C.   Last visit (audio) 1/24/23.    Type II diabetes since 27 years ago.  Comorbidities: HTN, HLD, Obesity by BMI, Vitamin D Deficiency and Adrenal Mass.    Known diabetes complications:  DKA/HHS: no  Retinopathy: no   Peripheral neuropathy: no  Nephropathy: no    Interval history:  Since last visit with JULISSA Warner, Ozempic switched to Mounjaro.  Tolerating well.  However would like to increase on Mounjaro dose for appetite suppression.    Denies recent hospital admissions or ER visits.   Denies having hypoglycemia.   Endorses diabetes related symptoms: None.    Blood glucose monitoring: fingerstick:  Not done recently.  Has testing supplies.    Activity Level: Sedentary  Meal Planning:3 meals/day; infrequent snacks/day.    Medication compliance all of the time, diet compliance most of the time.   Has attended diabetes education in the past.     Previous regimen: Ozempic    Past failed treatment:   Trulicity - HA, nausea, SOB  Farxiga - nausea, feeling unwell  Basaglar - no longer using since no  longer taking steroids  Metformin XR - abdominal cramps    Current DM Medications:  Diabetes Medications               metFORMIN (GLUCOPHAGE) 1000 MG tablet Take 1 tablet (1,000 mg total) by mouth 2 (two) times daily with meals.  Taking XR, not taking x 2 months    tirzepatide 7.5 mg/0.5 mL PnIj Inject 7.5 mg into the skin every 7 days.  Taken 2 months       Allergies  Review of patient's allergies indicates:   Allergen Reactions    Trulicity [dulaglutide] Shortness Of Breath and Other (See Comments)     Headaches    Aspirin Nausea Only    Hibiclens (isopropyl alcohol) Itching       Labs Reviewed:  Her most recent A1C is:  Lab Results   Component Value Date    HGBA1C 5.4 06/05/2023    HGBA1C 6.3 (H) 12/08/2022    HGBA1C 10.5 (H) 09/06/2022       Her most recent BMP is:  Lab Results   Component Value Date     (L) 09/06/2022    K 4.3 09/06/2022     09/06/2022    CO2 24 09/06/2022    BUN 9 09/06/2022    CREATININE 0.9 09/06/2022    CALCIUM 10.1 09/06/2022    ANIONGAP 9 09/06/2022    ESTGFRAFRICA >60.0 12/16/2021    EGFRNONAA >60.0 12/16/2021       No results found for: CPEPTIDE  No results found for: GLUTAMICACID    There is no height or weight on file to calculate BMI.    Wt Readings from Last 3 Encounters:   05/29/23 1338 118.3 kg (260 lb 12.9 oz)   03/03/23 1152 117.1 kg (258 lb 2.5 oz)   11/28/22 1007 115.8 kg (255 lb 4.7 oz)        Her blood sugar in clinic today is: Not assessed due to type of visit.    Diabetes Management Status  Statin: Not taking  ACE/ARB: Not taking    Screening or Prevention Patient's value Goal Complete/Controlled?   HgA1C Testing and Control   Lab Results   Component Value Date    HGBA1C 5.4 06/05/2023      Annually/Less than 8% Yes   Lipid profile : 09/06/2022 Annually Yes   LDL control Lab Results   Component Value Date    LDLCALC 102.8 09/06/2022    Annually/Less than 100 mg/dl  No   Nephropathy screening Lab Results   Component Value Date    MICALBCREAT 4.2 09/06/2022      Lab Results   Component Value Date    PROTEINUA Negative 04/25/2022    Annually Yes   Blood pressure BP Readings from Last 1 Encounters:   05/29/23 109/69    Less than 140/90 Yes   Dilated retinal exam : 03/03/2023 Annually Yes    Foot exam   : 09/29/2021 Annually No     Social History     Socioeconomic History    Marital status: Single    Number of children: 3   Tobacco Use    Smoking status: Never    Smokeless tobacco: Never   Substance and Sexual Activity    Alcohol use: No     Comment: rarely; stop 12/11/19 prior to sx    Drug use: No    Sexual activity: Yes     Partners: Male     Birth control/protection: None     Comment: mut monog   Social History Narrative    Full time employed - [employer]     Social Determinants of Health     Financial Resource Strain: Low Risk     Difficulty of Paying Living Expenses: Not hard at all   Food Insecurity: No Food Insecurity    Worried About Running Out of Food in the Last Year: Never true    Ran Out of Food in the Last Year: Never true   Transportation Needs: No Transportation Needs    Lack of Transportation (Medical): No    Lack of Transportation (Non-Medical): No   Physical Activity: Unknown    Days of Exercise per Week: Patient refused    Minutes of Exercise per Session: 0 min   Stress: Stress Concern Present    Feeling of Stress : To some extent   Social Connections: Unknown    Frequency of Communication with Friends and Family: Three times a week    Frequency of Social Gatherings with Friends and Family: Once a week    Active Member of Clubs or Organizations: No    Attends Club or Organization Meetings: Never    Marital Status:    Housing Stability: Unknown    Unable to Pay for Housing in the Last Year: No    Unstable Housing in the Last Year: No     Past Medical History:   Diagnosis Date    Abnormal Pap smear of cervix     treatment??    Abnormal Pap smear of vagina     Acute pain of right shoulder 10/25/2019    Adjustment disorder with depressed  "mood 11/15/2014    Overview:  Multiple recent medical problems     Allergy     Anemia     Anemia 12/14/2014 6:26:13 PM    [facility]: Anemia-No Additional Notes    Anxiety     Anxiety and depression     Asthma     Asthma 4/14/2016    Asthma 6/18/2014 1:31:21 PM    [facility] - Respiratory: Asthma-No Additional Notes    Auditory hallucinations 2/17/2017    Bacterial vaginosis 1/5/2018    Bladder pain 9/1/2020    Bronchitis 10/15/2015    Bronchitis 9/18/2020    Bronchitis 10/15/2015    Cervical radiculopathy 3/10/2020    Chlamydia     Depression (emotion)     Diabetes mellitus     SANTOS (dyspnea on exertion) 10/14/2019    Dysuria 3/31/2018    Dysuria 3/31/2018    Early satiety 8/28/2020    Gallstones     Gastritis     High-risk sexual behavior 1/5/2018    History of ovarian cyst     History of syphilis     History of trichomoniasis     Hyperlipidemia     Hypertension     Interstitial cystitis 12/8/2020    Localized edema 10/14/2019    Lower abdominal pain 7/31/2017    Mental disorder     Obesity 6/18/2014 1:51:54 PM    [facility]: Obesity, Unspecified-No Additional Notes    Obstructive sleep apnea 10/5/2021    Persistent cough 7/27/2018    PONV (postoperative nausea and vomiting)     Preop general physical exam 12/9/2019    Right foot pain 11/18/2019    Routine general medical examination at a health care facility 5/21/2021    Shortness of breath 6/26/2017    SOB (shortness of breath) 6/26/2017    Suicidal ideations 7/9/2019    Syphilis 6/24/2014 7:17:31 PM    [facility] - Gynecologic: Syphilis-No Additional Notes    Type 2 diabetes mellitus without complication, without long-term current use of insulin 5/25/2014    Type 2 or unspecified type diabetes mellitus 6/24/2014 7:24:47 PM    [facility]: Diabetes Mellitus Without Complication, Type II-"borderline"    Upper respiratory tract infection 1/5/2018    Urgency of micturition " 11/20/2020    Vaginal candidiasis 7/24/2018    Vaginal itching 1/5/2018    Viral syndrome 12/1/2020     Review of Systems   Constitutional:  Negative for activity change, appetite change, fatigue and unexpected weight change.   HENT:  Negative for dental problem and trouble swallowing.    Eyes:  Negative for visual disturbance.   Respiratory:  Negative for chest tightness and shortness of breath.    Cardiovascular:  Negative for chest pain, palpitations and leg swelling.   Gastrointestinal:  Negative for abdominal pain, constipation, diarrhea, nausea and vomiting.   Endocrine: Negative for polydipsia, polyphagia and polyuria.   Genitourinary:  Negative for difficulty urinating, dysuria, frequency and urgency.        Negative yeast infection   Musculoskeletal:  Negative for gait problem, myalgias and neck pain.   Integumentary:  Negative for rash and wound.   Allergic/Immunologic: Negative.    Neurological:  Negative for dizziness, syncope, weakness, numbness and headaches.   Hematological: Negative.    Psychiatric/Behavioral:  Negative for confusion and sleep disturbance.    All other systems reviewed and are negative.     Objective:      Physical Exam  Constitutional:       General: She is awake.      Appearance: Normal appearance. She is well-developed and well-groomed. She is obese.   HENT:      Head: Normocephalic and atraumatic.   Eyes:      General: Lids are normal. Gaze aligned appropriately.   Pulmonary:      Effort: Pulmonary effort is normal. No respiratory distress.   Neurological:      Mental Status: She is alert and oriented to person, place, and time.   Psychiatric:         Attention and Perception: Attention normal.         Mood and Affect: Mood and affect normal.         Speech: Speech normal.         Behavior: Behavior normal.         Thought Content: Thought content normal.         Cognition and Memory: Cognition normal.         Judgment: Judgment normal.         Assessment / Plan:     Type 2  diabetes mellitus with hyperglycemia, without long-term current use of insulin  -     tirzepatide 10 mg/0.5 mL PnIj; Inject 10 mg into the skin every 7 days.  Dispense: 4 pen; Refill: 2  -     Hemoglobin A1C; Future; Expected date: 12/12/2023    Essential hypertension    Mixed hyperlipidemia    Additional Plan Details:  - Condition: Controlled, most recent A1C of 5.4% was completed 2 weeks ago. Improved from previous reading.  - Monitor blood glucose:  1x daily.Goals reviewed. Maintain BG log.   - Hypoglycemia protocol: Reviewed and risk discussed.  Notify if BG readings below 80. Protocol printout provided.   - Meal Planning: Limit carbohydrate intake 30-45 grams/meal, 3x daily and 15 grams/snack, limit 2/day.   - Activity level: Recommend at least 150 minutes of exercise in a week.  - BP and LDL: Recommend lifestyle modifications and follow up with PCP for management.   - Medication Regimen:     Stop Metformin  Increase Mounjaro to 10 mg once a week    - Medication consideration: Unable to tolerate Metformin XR, will stop. Proceed with Mounjaro dose increase.  - Health Maintenance standards addressed today: A1c to be scheduled.   - Risks of uncontrolled DM explained. Importance of routine foot and eye exams reviewed. Scheduled as appropriate.  -The patient was explained the above plan and given opportunity to ask questions.  She understands, chooses and consents to this plan and accepts all the risks, which include but are not limited to the risks mentioned above.   - Labs ordered as above.  - CDE follow up: Deferred   - Follow up: 6 months with JULISSA Warner PA-C, A1c lab draw prior.       Damaris Brooke, FNP-C [facility]    A total of 18 minutes was spent in face to face time, of which over 50 % was spent in counseling patient on disease process, complications, treatment, and side effects of medications.

## 2023-06-12 NOTE — PATIENT INSTRUCTIONS
Medication Regimen:   Stop Metformin  Increase Mounjaro to 10 mg once a week    Patient Instructions:  Carbohydrate Count: 30-45 grams/meal and 15 grams/snack with limit of 2 snacks per day.  Exercise: Goal is 150 minutes or more per week.  Bring meter and blood sugar log to each appointment.     Goals for Blood Sugar:  Fastin-130 mg/dl  2 hours after meal:  mg/dl  Before Bed: 100-150 mg/dl  If Blood sugar is below 70 mg/dl, DO NOT take diabetes medication. Eat first and then recheck blood sugar in 20 minutes.  Foods to eat if blood sugar is below 70 mg/dl  1/2 glass of orange juice   1/2 regular cola can   3-4 hard candies   3-4 small glucose tabs.   Foods to eat if blood sugar is below 50 mg/dl  1 glass of orange juice  1 regular cola can  8 hard candies  8 small glucose tabs.   If you have repeated eating/blood sugar recheck process 2 times and blood sugar still below 70 mg/dl, call 911.

## 2023-06-13 ENCOUNTER — TELEPHONE (OUTPATIENT)
Dept: PRIMARY CARE CLINIC | Facility: CLINIC | Age: 48
End: 2023-06-13
Payer: COMMERCIAL

## 2023-06-13 ENCOUNTER — OFFICE VISIT (OUTPATIENT)
Dept: PODIATRY | Facility: CLINIC | Age: 48
End: 2023-06-13
Payer: COMMERCIAL

## 2023-06-13 ENCOUNTER — PATIENT MESSAGE (OUTPATIENT)
Dept: INTERNAL MEDICINE | Facility: CLINIC | Age: 48
End: 2023-06-13

## 2023-06-13 ENCOUNTER — OFFICE VISIT (OUTPATIENT)
Dept: INTERNAL MEDICINE | Facility: CLINIC | Age: 48
End: 2023-06-13
Payer: COMMERCIAL

## 2023-06-13 ENCOUNTER — HOSPITAL ENCOUNTER (OUTPATIENT)
Dept: RADIOLOGY | Facility: HOSPITAL | Age: 48
Discharge: HOME | End: 2023-06-13
Attending: FAMILY MEDICINE
Payer: COMMERCIAL

## 2023-06-13 VITALS
WEIGHT: 265.19 LBS | DIASTOLIC BLOOD PRESSURE: 74 MMHG | BODY MASS INDEX: 44.18 KG/M2 | TEMPERATURE: 97 F | HEIGHT: 65 IN | HEART RATE: 76 BPM | SYSTOLIC BLOOD PRESSURE: 116 MMHG

## 2023-06-13 VITALS — BODY MASS INDEX: 44.15 KG/M2 | HEIGHT: 65 IN | WEIGHT: 265 LBS

## 2023-06-13 DIAGNOSIS — M79.671 PAIN IN BOTH FEET: ICD-10-CM

## 2023-06-13 DIAGNOSIS — I10 ESSENTIAL HYPERTENSION: ICD-10-CM

## 2023-06-13 DIAGNOSIS — M79.672 PAIN IN BOTH FEET: ICD-10-CM

## 2023-06-13 DIAGNOSIS — E78.2 MIXED HYPERLIPIDEMIA: ICD-10-CM

## 2023-06-13 DIAGNOSIS — M79.671 PAIN IN BOTH FEET: Primary | ICD-10-CM

## 2023-06-13 DIAGNOSIS — M79.672 PAIN IN BOTH FEET: Primary | ICD-10-CM

## 2023-06-13 DIAGNOSIS — Z28.9 DELAYED IMMUNIZATIONS: ICD-10-CM

## 2023-06-13 DIAGNOSIS — E66.01 CLASS 3 SEVERE OBESITY IN ADULT, UNSPECIFIED BMI, UNSPECIFIED OBESITY TYPE, UNSPECIFIED WHETHER SERIOUS COMORBIDITY PRESENT: ICD-10-CM

## 2023-06-13 DIAGNOSIS — M79.672 FOOT PAIN, BILATERAL: ICD-10-CM

## 2023-06-13 DIAGNOSIS — M54.10 RADICULOPATHY WITH LOWER EXTREMITY SYMPTOMS: Primary | ICD-10-CM

## 2023-06-13 DIAGNOSIS — J45.30 MILD PERSISTENT ASTHMA WITHOUT COMPLICATION: ICD-10-CM

## 2023-06-13 DIAGNOSIS — M79.671 FOOT PAIN, BILATERAL: ICD-10-CM

## 2023-06-13 PROCEDURE — 73630 X-RAY EXAM OF FOOT: CPT | Mod: 26,50,, | Performed by: RADIOLOGY

## 2023-06-13 PROCEDURE — 99213 PR OFFICE/OUTPT VISIT, EST, LEVL III, 20-29 MIN: ICD-10-PCS | Mod: S$GLB,,, | Performed by: PODIATRIST

## 2023-06-13 PROCEDURE — 99214 OFFICE O/P EST MOD 30 MIN: CPT | Mod: S$GLB,,, | Performed by: FAMILY MEDICINE

## 2023-06-13 PROCEDURE — 3072F LOW RISK FOR RETINOPATHY: CPT | Mod: CPTII,S$GLB,, | Performed by: FAMILY MEDICINE

## 2023-06-13 PROCEDURE — 3072F PR LOW RISK FOR RETINOPATHY: ICD-10-PCS | Mod: CPTII,S$GLB,, | Performed by: PODIATRIST

## 2023-06-13 PROCEDURE — 99999 PR PBB SHADOW E&M-EST. PATIENT-LVL IV: CPT | Mod: PBBFAC,,, | Performed by: PODIATRIST

## 2023-06-13 PROCEDURE — 99214 PR OFFICE/OUTPT VISIT, EST, LEVL IV, 30-39 MIN: ICD-10-PCS | Mod: S$GLB,,, | Performed by: FAMILY MEDICINE

## 2023-06-13 PROCEDURE — 1159F PR MEDICATION LIST DOCUMENTED IN MEDICAL RECORD: ICD-10-PCS | Mod: CPTII,S$GLB,, | Performed by: FAMILY MEDICINE

## 2023-06-13 PROCEDURE — 1159F MED LIST DOCD IN RCRD: CPT | Mod: CPTII,S$GLB,, | Performed by: FAMILY MEDICINE

## 2023-06-13 PROCEDURE — 1159F MED LIST DOCD IN RCRD: CPT | Mod: CPTII,S$GLB,, | Performed by: PODIATRIST

## 2023-06-13 PROCEDURE — 3008F BODY MASS INDEX DOCD: CPT | Mod: CPTII,S$GLB,, | Performed by: FAMILY MEDICINE

## 2023-06-13 PROCEDURE — 3072F PR LOW RISK FOR RETINOPATHY: ICD-10-PCS | Mod: CPTII,S$GLB,, | Performed by: FAMILY MEDICINE

## 2023-06-13 PROCEDURE — 1160F RVW MEDS BY RX/DR IN RCRD: CPT | Mod: CPTII,S$GLB,, | Performed by: FAMILY MEDICINE

## 2023-06-13 PROCEDURE — 3074F SYST BP LT 130 MM HG: CPT | Mod: CPTII,S$GLB,, | Performed by: FAMILY MEDICINE

## 2023-06-13 PROCEDURE — 73630 XR WEIGHT BEARING FOOT COMPLETE 3+ VIEW BILATERAL: ICD-10-PCS | Mod: 26,50,, | Performed by: RADIOLOGY

## 2023-06-13 PROCEDURE — 1160F PR REVIEW ALL MEDS BY PRESCRIBER/CLIN PHARMACIST DOCUMENTED: ICD-10-PCS | Mod: CPTII,S$GLB,, | Performed by: FAMILY MEDICINE

## 2023-06-13 PROCEDURE — 73630 X-RAY EXAM OF FOOT: CPT | Mod: TC,50,FY,PO

## 2023-06-13 PROCEDURE — 99999 PR PBB SHADOW E&M-EST. PATIENT-LVL V: ICD-10-PCS | Mod: PBBFAC,,, | Performed by: FAMILY MEDICINE

## 2023-06-13 PROCEDURE — 3008F PR BODY MASS INDEX (BMI) DOCUMENTED: ICD-10-PCS | Mod: CPTII,S$GLB,, | Performed by: FAMILY MEDICINE

## 2023-06-13 PROCEDURE — 3074F PR MOST RECENT SYSTOLIC BLOOD PRESSURE < 130 MM HG: ICD-10-PCS | Mod: CPTII,S$GLB,, | Performed by: FAMILY MEDICINE

## 2023-06-13 PROCEDURE — 99999 PR PBB SHADOW E&M-EST. PATIENT-LVL IV: ICD-10-PCS | Mod: PBBFAC,,, | Performed by: PODIATRIST

## 2023-06-13 PROCEDURE — 3078F PR MOST RECENT DIASTOLIC BLOOD PRESSURE < 80 MM HG: ICD-10-PCS | Mod: CPTII,S$GLB,, | Performed by: FAMILY MEDICINE

## 2023-06-13 PROCEDURE — 3078F DIAST BP <80 MM HG: CPT | Mod: CPTII,S$GLB,, | Performed by: FAMILY MEDICINE

## 2023-06-13 PROCEDURE — 3072F LOW RISK FOR RETINOPATHY: CPT | Mod: CPTII,S$GLB,, | Performed by: PODIATRIST

## 2023-06-13 PROCEDURE — 1159F PR MEDICATION LIST DOCUMENTED IN MEDICAL RECORD: ICD-10-PCS | Mod: CPTII,S$GLB,, | Performed by: PODIATRIST

## 2023-06-13 PROCEDURE — 3044F HG A1C LEVEL LT 7.0%: CPT | Mod: CPTII,S$GLB,, | Performed by: FAMILY MEDICINE

## 2023-06-13 PROCEDURE — 3008F BODY MASS INDEX DOCD: CPT | Mod: CPTII,S$GLB,, | Performed by: PODIATRIST

## 2023-06-13 PROCEDURE — 3044F HG A1C LEVEL LT 7.0%: CPT | Mod: CPTII,S$GLB,, | Performed by: PODIATRIST

## 2023-06-13 PROCEDURE — 3044F PR MOST RECENT HEMOGLOBIN A1C LEVEL <7.0%: ICD-10-PCS | Mod: CPTII,S$GLB,, | Performed by: FAMILY MEDICINE

## 2023-06-13 PROCEDURE — 3044F PR MOST RECENT HEMOGLOBIN A1C LEVEL <7.0%: ICD-10-PCS | Mod: CPTII,S$GLB,, | Performed by: PODIATRIST

## 2023-06-13 PROCEDURE — 3008F PR BODY MASS INDEX (BMI) DOCUMENTED: ICD-10-PCS | Mod: CPTII,S$GLB,, | Performed by: PODIATRIST

## 2023-06-13 PROCEDURE — 99213 OFFICE O/P EST LOW 20 MIN: CPT | Mod: S$GLB,,, | Performed by: PODIATRIST

## 2023-06-13 PROCEDURE — 99999 PR PBB SHADOW E&M-EST. PATIENT-LVL V: CPT | Mod: PBBFAC,,, | Performed by: FAMILY MEDICINE

## 2023-06-13 RX ORDER — TRAMADOL HYDROCHLORIDE 50 MG/1
50 TABLET ORAL 2 TIMES DAILY PRN
COMMUNITY
Start: 2023-06-05

## 2023-06-13 RX ORDER — OXYCODONE AND ACETAMINOPHEN 10; 325 MG/1; MG/1
1 TABLET ORAL
COMMUNITY
Start: 2023-06-05

## 2023-06-13 RX ORDER — PREGABALIN 100 MG/1
100 CAPSULE ORAL 2 TIMES DAILY
COMMUNITY
Start: 2023-06-05

## 2023-06-13 RX ORDER — ALBUTEROL SULFATE 90 UG/1
2 AEROSOL, METERED RESPIRATORY (INHALATION) EVERY 4 HOURS PRN
Qty: 18 G | Refills: 11 | Status: SHIPPED | OUTPATIENT
Start: 2023-06-13 | End: 2023-09-19 | Stop reason: SDUPTHER

## 2023-06-13 RX ORDER — ALBUTEROL SULFATE 0.83 MG/ML
2.5 SOLUTION RESPIRATORY (INHALATION) EVERY 4 HOURS PRN
Qty: 150 ML | Refills: 11 | Status: SHIPPED | OUTPATIENT
Start: 2023-06-13

## 2023-06-13 RX ORDER — CELECOXIB 200 MG/1
CAPSULE ORAL 2 TIMES DAILY
COMMUNITY
Start: 2023-06-05 | End: 2023-09-07

## 2023-06-13 NOTE — PROGRESS NOTES
Subjective:       Patient ID: Yuli Milton is a 47 y.o. female.    Chief Complaint: Foot Swelling    Foot Injury   The incident occurred more than 1 week ago. There was no injury mechanism. Pain location: joey feet. The pain is moderate.   Review of Systems   Respiratory:  Negative for shortness of breath.    Cardiovascular:  Negative for chest pain.   Gastrointestinal:  Negative for abdominal pain.     Objective:      Physical Exam  Vitals and nursing note reviewed.   Constitutional:       General: She is not in acute distress.     Appearance: Normal appearance. She is well-developed. She is not diaphoretic.   HENT:      Head: Normocephalic and atraumatic.   Pulmonary:      Effort: Pulmonary effort is normal. No respiratory distress.      Breath sounds: Normal breath sounds. No wheezing.   Skin:     General: Skin is warm and dry.      Findings: No erythema or rash.   Neurological:      Mental Status: She is alert.       Assessment:       1. Pain in both feet    2. Essential hypertension    3. Mixed hyperlipidemia    4. Mild persistent asthma without complication    5. Delayed immunizations    6. Class 3 severe obesity in adult, unspecified BMI, unspecified obesity type, unspecified whether serious comorbidity present        Plan:     Problem List Items Addressed This Visit          Pulmonary    Mild persistent asthma without complication    Overview     Advair 100 mcg. Albuterol inhaler. Albuterol nebs.          Relevant Medications    albuterol (PROVENTIL) 2.5 mg /3 mL (0.083 %) nebulizer solution    albuterol (PROVENTIL/VENTOLIN HFA) 90 mcg/actuation inhaler    Other Relevant Orders    Ambulatory referral/consult to Pulmonology       Cardiac/Vascular    Essential hypertension    Overview     Chronic, Stable, cont hydrochlorothiazide         Mixed hyperlipidemia    Overview     Chronic, Stable, cont statin            ID    Delayed immunizations     Other Visit Diagnoses       Pain in both feet    -  Primary     Relevant Orders    XR Weight Bearing Foot Complete 3+V Bilateral    Ambulatory referral/consult to Podiatry    Class 3 severe obesity in adult, unspecified BMI, unspecified obesity type, unspecified whether serious comorbidity present        Relevant Orders    Ambulatory referral/consult to [facility]

## 2023-06-13 NOTE — PROGRESS NOTES
PODIATRIC MEDICINE AND SURGERY  6/16/2023    PCP: Dr. Drew Gutierrez MD    CHIEF COMPLAINT   Chief Complaint   Patient presents with    Foot Pain     C/o bilateral foot pain, entire plantar aspect, pain started yesterday w/ swelling, rates pain 7/10, 0 injury to left, prev injury to the right, x-rays today, diabetic, wears tennis and socks, last seen PCP Dr. Gutierrez on 06/13/23       HPI:    Yuli Milton is a 47 y.o. female who has a past medical history of Abnormal Pap smear of cervix, Abnormal Pap smear of vagina, Acute pain of right shoulder (10/25/2019), Adjustment disorder with depressed mood (11/15/2014), Allergy, Anemia, Anemia (12/14/2014 6:26:13 PM), Anxiety, Anxiety and depression, Asthma, Asthma (4/14/2016), Asthma (6/18/2014 1:31:21 PM), Auditory hallucinations (2/17/2017), Bacterial vaginosis (1/5/2018), Bladder pain (9/1/2020), Bronchitis (10/15/2015), Bronchitis (9/18/2020), Bronchitis (10/15/2015), Cervical radiculopathy (3/10/2020), Chlamydia, Depression (emotion), Diabetes mellitus, SANTOS (dyspnea on exertion) (10/14/2019), Dysuria (3/31/2018), Dysuria (3/31/2018), Early satiety (8/28/2020), Gallstones, Gastritis, High-risk sexual behavior (1/5/2018), History of ovarian cyst, History of syphilis, History of trichomoniasis, Hyperlipidemia, Hypertension, Interstitial cystitis (12/8/2020), Localized edema (10/14/2019), Lower abdominal pain (7/31/2017), Mental disorder, Obesity (6/18/2014 1:51:54 PM), Obstructive sleep apnea (10/5/2021), Persistent cough (7/27/2018), PONV (postoperative nausea and vomiting), Preop general physical exam (12/9/2019), Right foot pain (11/18/2019), Routine general medical examination at a health care facility (5/21/2021), Shortness of breath (6/26/2017), SOB (shortness of breath) (6/26/2017), Suicidal ideations (7/9/2019), Syphilis (6/24/2014 7:17:31 PM), Type 2 diabetes mellitus without complication, without long-term current use of insulin (5/25/2014), Type 2 or  unspecified type diabetes mellitus (6/24/2014 7:24:47 PM), Upper respiratory tract infection (1/5/2018), Urgency of micturition (11/20/2020), Vaginal candidiasis (7/24/2018), Vaginal itching (1/5/2018), and Viral syndrome (12/1/2020).   Yuli presents to clinic today complaining of one day duration for bilateral foot pain. She is on multiple pain medications management by Dr. Desai. She denies any trauma. She is currently back started on Lyrica.      Patient denies other pedal complaints at this time.     Hemoglobin A1C   Date Value Ref Range Status   06/05/2023 5.4 4.0 - 5.6 % Final     Comment:     ADA Screening Guidelines:  5.7-6.4%  Consistent with prediabetes  >or=6.5%  Consistent with diabetes    High levels of fetal hemoglobin interfere with the HbA1C  assay. Heterozygous hemoglobin variants (HbS, HgC, etc)do  not significantly interfere with this assay.   However, presence of multiple variants may affect accuracy.     12/08/2022 6.3 (H) 4.0 - 5.6 % Final     Comment:     ADA Screening Guidelines:  5.7-6.4%  Consistent with prediabetes  >or=6.5%  Consistent with diabetes    High levels of fetal hemoglobin interfere with the HbA1C  assay. Heterozygous hemoglobin variants (HbS, HgC, etc)do  not significantly interfere with this assay.   However, presence of multiple variants may affect accuracy.     09/06/2022 10.5 (H) 4.0 - 5.6 % Final     Comment:     ADA Screening Guidelines:  5.7-6.4%  Consistent with prediabetes  >or=6.5%  Consistent with diabetes    High levels of fetal hemoglobin interfere with the HbA1C  assay. Heterozygous hemoglobin variants (HbS, HgC, etc)do  not significantly interfere with this assay.   However, presence of multiple variants may affect accuracy.         PMH  Past Medical History:   Diagnosis Date    Abnormal Pap smear of cervix     treatment??    Abnormal Pap smear of vagina     Acute pain of right shoulder 10/25/2019    Adjustment disorder with depressed mood 11/15/2014     "Overview:  Multiple recent medical problems     Allergy     Anemia     Anemia 12/14/2014 6:26:13 PM    [facility] Historical - [facility]: Anemia-No Additional Notes    Anxiety     Anxiety and depression     Asthma     Asthma 4/14/2016    Asthma 6/18/2014 1:31:21 PM    [facility] Historical - Respiratory: Asthma-No Additional Notes    Auditory hallucinations 2/17/2017    Bacterial vaginosis 1/5/2018    Bladder pain 9/1/2020    Bronchitis 10/15/2015    Bronchitis 9/18/2020    Bronchitis 10/15/2015    Cervical radiculopathy 3/10/2020    Chlamydia     Depression (emotion)     Diabetes mellitus     SANTOS (dyspnea on exertion) 10/14/2019    Dysuria 3/31/2018    Dysuria 3/31/2018    Early satiety 8/28/2020    Gallstones     Gastritis     High-risk sexual behavior 1/5/2018    History of ovarian cyst     History of syphilis     History of trichomoniasis     Hyperlipidemia     Hypertension     Interstitial cystitis 12/8/2020    Localized edema 10/14/2019    Lower abdominal pain 7/31/2017    Mental disorder     Obesity 6/18/2014 1:51:54 PM    [facility]: Obesity, Unspecified-No Additional Notes    Obstructive sleep apnea 10/5/2021    Persistent cough 7/27/2018    PONV (postoperative nausea and vomiting)     Preop general physical exam 12/9/2019    Right foot pain 11/18/2019    Routine general medical examination at a health care facility 5/21/2021    Shortness of breath 6/26/2017    SOB (shortness of breath) 6/26/2017    Suicidal ideations 7/9/2019    Syphilis 6/24/2014 7:17:31 PM    [facility] Historical - Gynecologic: Syphilis-No Additional Notes    Type 2 diabetes mellitus without complication, without long-term current use of insulin 5/25/2014    Type 2 or unspecified type diabetes mellitus 6/24/2014 7:24:47 PM    [facility] - [facility]: Diabetes Mellitus Without Complication, Type II-"borderline"    Upper respiratory tract infection 1/5/2018    Urgency of micturition 11/20/2020    Vaginal " candidiasis 7/24/2018    Vaginal itching 1/5/2018    Viral syndrome 12/1/2020       PROBLEM LIST  Patient Active Problem List    Diagnosis Date Noted    Gout 06/15/2023    Moderate persistent asthma without complication 05/29/2023    Environmental allergies 03/03/2023    Vertigo 02/03/2023    Cervical strain 04/25/2022    Viral hepatitis A without hepatic coma 02/24/2022    SCOUT on CPAP 10/05/2021    Encounter for screening colonoscopy 09/23/2021    Pelvic floor dysfunction 04/23/2021    PVC's (premature ventricular contractions) 04/19/2021    Type 2 diabetes mellitus with hyperglycemia, without long-term current use of insulin 03/22/2021    Low vitamin D level 03/22/2021    History of COVID-19 01/22/2021    Headache 01/22/2021    Interstitial cystitis 12/08/2020    Urgency of micturition 11/20/2020    Mild persistent asthma without complication 09/18/2020    Chronic bladder pain 09/01/2020    Snoring 05/22/2020    Status post percutaneous fixation of right 5th metatarsal fracture 12/12/2019    Essential hypertension 10/14/2019    Mixed hyperlipidemia 10/14/2019    Screening mammogram, encounter for 05/23/2019    Delayed immunizations 05/23/2019    Moderate episode of recurrent major depressive disorder 03/15/2018    Restless leg syndrome 02/09/2018    Class 3 severe obesity due to excess calories without serious comorbidity with body mass index (BMI) of 40.0 to 44.9 in adult 02/05/2018    Fatigue 02/05/2018    High risk heterosexual behavior 01/05/2018    Keratoconjunctivitis sicca 10/10/2017    Palpitations 06/26/2017    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      albuterol (PROVENTIL) 2.5 mg /3 mL (0.083 %) nebulizer solution Take 3 mLs (2.5 mg total) by nebulization every 4 (four) hours as needed for Wheezing. Rescue 150 mL 11    albuterol (PROVENTIL/VENTOLIN HFA) 90 mcg/actuation inhaler Inhale 2 puffs into the lungs every 4 (four) hours as needed for Wheezing or Shortness of Breath. Rescue. Use with chamber. 18 g 11    ALPRAZolam (XANAX) 0.5 MG tablet Take 0.5 mg by mouth 2 (two) times daily as needed.      atorvastatin (LIPITOR) 20 MG tablet Take 1 tablet (20 mg total) by mouth once daily. 90 tablet 0    azelaic acid (FINACEA) 15 % gel AAA bid 50 g 3    blood sugar diagnostic (BLOOD GLUCOSE TEST) Strp 1 each by Misc.(Non-Drug; Combo Route) route 3 (three) times daily. 100 each 11    blood-glucose meter kit Test finger stick blood sugar once daily. 1 each 0    celecoxib (CELEBREX) 200 MG capsule Take by mouth 2 (two) times daily.      CEQUA 0.09 % Dpet Place 1 drop into both eyes 2 (two) times daily.      ergocalciferol (ERGOCALCIFEROL) 50,000 unit Cap Take 1 capsule (50,000 Units total) by mouth twice a week. 24 capsule 0    estradioL (ESTRACE) 0.01 % (0.1 mg/gram) vaginal cream Place 2 g vaginally once daily. 60 g 1    FLUoxetine 20 MG capsule Take 1 capsule (20 mg total) by mouth once daily. 30 capsule 0    fluticasone-salmeterol diskus inhaler 100-50 mcg Inhale 1 puff into the lungs 2 (two) times daily. Controller 60 each 11    gabapentin (NEURONTIN) 300 MG capsule Take by mouth.      hydroCHLOROthiazide (HYDRODIURIL) 12.5 MG Tab Take 1 tablet (12.5 mg total) by mouth daily as needed (leg swelling). 90 tablet 1    ibuprofen (ADVIL,MOTRIN) 800 MG tablet Take 1 tablet (800 mg total) by mouth every 8 (eight) hours as needed for Pain. 30 tablet 2    lancets Misc 1 each by Misc.(Non-Drug; Combo Route) route 4 (four) times daily. 100 each 11    LIDOcaine (LIDODERM) 5 % 1 patch once  daily.      LYRICA 100 mg capsule Take 100 mg by mouth 2 (two) times daily.      meclizine (ANTIVERT) 25 mg tablet Take 1 tablet (25 mg total) by mouth 3 (three) times daily as needed for Dizziness. 30 tablet 0    methen-m.blue-s.phos-phsal-hyo (URIBEL) 118-10-40.8-36 mg Cap Take 1 capsule by mouth 3 (three) times daily as needed (dysuria). 30 capsule 5    methocarbamoL (ROBAXIN) 750 MG Tab Take 1 tablet (750 mg total) by mouth every 8 (eight) hours as needed (muscle spasm). 20 tablet 0    metroNIDAZOLE (FLAGYL) 500 MG tablet Take 500 mg by mouth every 12 (twelve) hours.      montelukast (SINGULAIR) 10 mg tablet Take 1 tablet (10 mg total) by mouth every evening. 90 tablet 3    naproxen (NAPROSYN) 500 MG tablet Take 1 tablet (500 mg total) by mouth 2 (two) times daily with meals. Take with food 30 tablet 0    nebulizer accessories Misc 1 each - needs replacement tubing and mask  0    nitrofurantoin, macrocrystal-monohydrate, (MACROBID) 100 MG capsule Take 100 mg by mouth 2 (two) times daily.      OLANZapine (ZYPREXA) 5 MG tablet Take 5 mg by mouth every evening.      oxyCODONE-acetaminophen (PERCOCET)  mg per tablet Take 1 tablet by mouth.      phenazopyridine (PYRIDIUM) 100 MG tablet Take by mouth.      predniSONE (DELTASONE) 20 MG tablet 40 mg x 5 days, then 20 mg x 5 days. 15 tablet 0    promethazine-dextromethorphan (PROMETHAZINE-DM) 6.25-15 mg/5 mL Syrp Take 5 mLs by mouth every evening. 118 mL 0    thiamine 100 MG tablet Take 100 mg by mouth once daily.      tirzepatide 10 mg/0.5 mL PnIj Inject 10 mg into the skin every 7 days. 4 pen 2    traMADoL (ULTRAM) 50 mg tablet Take 50 mg by mouth 2 (two) times daily as needed.       No current facility-administered medications on file prior to visit.       Medication List with Changes/Refills   New Medications    ALLOPURINOL (ZYLOPRIM) 100 MG TABLET    Take 1 tablet (100 mg total) by mouth once daily.   Current Medications    ACETAZOLAMIDE (DIAMOX) 500 MG CPSR     Take by mouth.    ALBUTEROL (PROVENTIL) 2.5 MG /3 ML (0.083 %) NEBULIZER SOLUTION    Take 3 mLs (2.5 mg total) by nebulization every 4 (four) hours as needed for Wheezing. Rescue    ALBUTEROL (PROVENTIL/VENTOLIN HFA) 90 MCG/ACTUATION INHALER    Inhale 2 puffs into the lungs every 4 (four) hours as needed for Wheezing or Shortness of Breath. Rescue. Use with chamber.    ALPRAZOLAM (XANAX) 0.5 MG TABLET    Take 0.5 mg by mouth 2 (two) times daily as needed.    ATORVASTATIN (LIPITOR) 20 MG TABLET    Take 1 tablet (20 mg total) by mouth once daily.    AZELAIC ACID (FINACEA) 15 % GEL    AAA bid    BLOOD SUGAR DIAGNOSTIC (BLOOD GLUCOSE TEST) STRP    1 each by Misc.(Non-Drug; Combo Route) route 3 (three) times daily.    BLOOD-GLUCOSE METER KIT    Test finger stick blood sugar once daily.    CELECOXIB (CELEBREX) 200 MG CAPSULE    Take by mouth 2 (two) times daily.    CEQUA 0.09 % DPET    Place 1 drop into both eyes 2 (two) times daily.    ERGOCALCIFEROL (ERGOCALCIFEROL) 50,000 UNIT CAP    Take 1 capsule (50,000 Units total) by mouth twice a week.    ESTRADIOL (ESTRACE) 0.01 % (0.1 MG/GRAM) VAGINAL CREAM    Place 2 g vaginally once daily.    FLUOXETINE 20 MG CAPSULE    Take 1 capsule (20 mg total) by mouth once daily.    FLUTICASONE-SALMETEROL DISKUS INHALER 100-50 MCG    Inhale 1 puff into the lungs 2 (two) times daily. Controller    GABAPENTIN (NEURONTIN) 300 MG CAPSULE    Take by mouth.    HYDROCHLOROTHIAZIDE (HYDRODIURIL) 12.5 MG TAB    Take 1 tablet (12.5 mg total) by mouth daily as needed (leg swelling).    IBUPROFEN (ADVIL,MOTRIN) 800 MG TABLET    Take 1 tablet (800 mg total) by mouth every 8 (eight) hours as needed for Pain.    LANCETS MISC    1 each by Misc.(Non-Drug; Combo Route) route 4 (four) times daily.    LIDOCAINE (LIDODERM) 5 %    1 patch once daily.    LYRICA 100 MG CAPSULE    Take 100 mg by mouth 2 (two) times daily.    MECLIZINE (ANTIVERT) 25 MG TABLET    Take 1 tablet (25 mg total) by mouth 3 (three)  times daily as needed for Dizziness.    METHEN-MJavonBLUE-S.PHOS-PHSAL-HYO (URIBEL) 118-10-40.8-36 MG CAP    Take 1 capsule by mouth 3 (three) times daily as needed (dysuria).    METHOCARBAMOL (ROBAXIN) 750 MG TAB    Take 1 tablet (750 mg total) by mouth every 8 (eight) hours as needed (muscle spasm).    METRONIDAZOLE (FLAGYL) 500 MG TABLET    Take 500 mg by mouth every 12 (twelve) hours.    MONTELUKAST (SINGULAIR) 10 MG TABLET    Take 1 tablet (10 mg total) by mouth every evening.    NAPROXEN (NAPROSYN) 500 MG TABLET    Take 1 tablet (500 mg total) by mouth 2 (two) times daily with meals. Take with food    NEBULIZER ACCESSORIES MISC    1 each - needs replacement tubing and mask    NITROFURANTOIN, MACROCRYSTAL-MONOHYDRATE, (MACROBID) 100 MG CAPSULE    Take 100 mg by mouth 2 (two) times daily.    OLANZAPINE (ZYPREXA) 5 MG TABLET    Take 5 mg by mouth every evening.    OXYCODONE-ACETAMINOPHEN (PERCOCET)  MG PER TABLET    Take 1 tablet by mouth.    PHENAZOPYRIDINE (PYRIDIUM) 100 MG TABLET    Take by mouth.    PREDNISONE (DELTASONE) 20 MG TABLET    40 mg x 5 days, then 20 mg x 5 days.    PROMETHAZINE-DEXTROMETHORPHAN (PROMETHAZINE-DM) 6.25-15 MG/5 ML SYRP    Take 5 mLs by mouth every evening.    THIAMINE 100 MG TABLET    Take 100 mg by mouth once daily.    TIRZEPATIDE 10 MG/0.5 ML PNIJ    Inject 10 mg into the skin every 7 days.    TRAMADOL (ULTRAM) 50 MG TABLET    Take 50 mg by mouth 2 (two) times daily as needed.       PSH     Past Surgical History:   Procedure Laterality Date    APPENDECTOMY      CHOLECYSTECTOMY      COLONOSCOPY N/A 07/31/2017    Procedure: COLONOSCOPY;  Surgeon: Yuliana Michael MD;  Location: [facility];  Service: Endoscopy;  Laterality: N/A;    DILATION AND CURETTAGE OF UTERUS      bleeding    FRACTURE SURGERY      right foot    HYSTERECTOMY  08/31/2016    RALH/BS for complex hyperplasia without atypia    OOPHORECTOMY      1 ovary removed        ALL  Review of patient's allergies indicates:  "  Allergen Reactions    Trulicity [dulaglutide] Shortness Of Breath and Other (See Comments)     Headaches    Aspirin Nausea Only    Hibiclens (isopropyl alcohol) Itching       SOC     Social History     Tobacco Use    Smoking status: Never    Smokeless tobacco: Never   Substance Use Topics    Alcohol use: No     Comment: rarely; stop 12/11/19 prior to sx    Drug use: No         FAMILY HX    Family History   Problem Relation Age of Onset    Breast cancer Paternal Grandmother     Diabetes Maternal Grandmother     Hypertension Mother     Thyroid disease Mother     Breast cancer Maternal Aunt     Cancer Maternal Aunt         colon cancer    Colon cancer Maternal Aunt     Miscarriages / Stillbirths Cousin     Stroke Maternal Aunt     No Known Problems Father     Thyroid disease Sister     Thyroid disease Brother     No Known Problems Daughter     No Known Problems Son     Asthma Son     Ovarian cancer Neg Hx     Deep vein thrombosis Neg Hx     Pulmonary embolism Neg Hx             REVIEW OF SYSTEMS  General: This patient is well-developed, well-nourished and appears stated age, well-oriented to person, place and time, and cooperative and pleasant on today's visit   Constitutional: Negative for chills and fever.   Respiratory: Negative for shortness of breath.    Cardiovascular: Negative for chest pain, palpitations, orthopnea  Gastrointestinal: Negative for diarrhea, nausea and vomiting.   Musculoskeletal: Positive for above noted in HPI  Skin: negative  for skin and/or nail changes   Neurological: positive  for tingling and sensory changes  Peripheral Vascular: no claudication or cyanosis  Psychiatric/Behavioral: Negative for altered mental status     PHYSICAL EXAM:      Vitals:    06/13/23 1525   Weight: 120.2 kg (265 lb)   Height: 5' 5" (1.651 m)   PainSc:   7         LOWER EXTREMITY PHYSICAL EXAM  VASCULAR  Dorsalis pedis and posterior tibial pulses palpable 2/4 bilaterally. Capillary refill time immediate to the " toes. Feet are warm to the touch. Skin temperature warm to warm from proximally to distally There are no varicosities, telangiectasias noted to bilateral foot and ankle regions. There are no ecchymoses noted to bilateral foot and ankle regions. There is no gross lower extremity edema.    DERMATOLOGIC  Skin moist with healthy texture and turgor.There are no open ulcerations, lacerations, or fissures to bilateral foot and ankle regions. There are no signs of infection as there is no erythema, no proximal-extending lymphangiitis, no fluctuance, or crepitus noted on palpation to bilateral foot and ankle regions. There is no interdigital maceration.   There are hyperkeratotic lesions noted to feet. Nails are well-trimmed.    NEUROLOGIC  Epicritic sensation is intact as the patient is able to sense light touch to bilateral foot and ankle regions. Achilles and patellar deep tendon reflexes intact. Babinski reflex absent    ORTHOPEDIC/BIOMECHANICAL  No symptomatic structural abnormalities noted. Muscle strength AT/EHL/EDL/PT: 5/5; Achilles/Gastroc/Soleus: 5/5; PB/PL: 5/5 Muscle tone is normal.     IMAGING   Reviewed by me and I agree with radiologist findings, 3 views of foot/ankle, reveal:  No results found for this or any previous visit.          No results found for this or any previous visit.      No results found for this or any previous visit.       Results for orders placed during the hospital encounter of 01/30/20    X-Ray Foot Complete Right    Narrative  EXAMINATION:  XR FOOT COMPLETE 3 VIEW RIGHT    CLINICAL HISTORY:  . Other specified postprocedural states    TECHNIQUE:  AP, lateral, and oblique views of the foot were performed.    COMPARISON:  01/02/2020    FINDINGS:  A screw seen across the base of the 5th metatarsal fracture.  There is continued interval healing of the fracture when compared to the prior exam.  The alignment of fracture is stable.  A plantar calcaneal enthesophyte is noted.    Impression  As  above      Electronically signed by: Ramiro Dee DO  Date:    01/30/2020  Time:    10:02          ASSESSMENT     Encounter Diagnoses   Name Primary?    Foot pain, bilateral     Radiculopathy with lower extremity symptoms Yes         PLAN  Patient was educated about clinical and imaging findings, and verbalizes understanding of above.     Diagnoses and all orders for this visit:  Radiculopathy with lower extremity symptoms    Foot pain, bilateral      Continue with pain management  We did discuss options for chronic pain + radiculopathy. Pt will follow up with options with her pain management physician  Shoe recommendations provided- extra depth, cushion  Restarted Lyrica- will monitor closely for improvement      Disclaimer:  This note may have been prepared using voice recognition software, it may have not been extensively proofed, as such there could be errors within the text such as sound alike errors.         Future Appointments   Date Time Provider Department Center   8/24/2023  2:40 PM Ninfa Carter MD [facility] LIFE [facility]   9/8/2023 11:00 AM Drew Gutierrez MD [facility] IM [place]   9/19/2023  8:00 AM Tiffany Goyal MD [facility] ALLERGY [facility]   12/12/2023  9:40 AM LABORATORY, [facility] LAB [place]   12/19/2023 11:30 AM Acacia Warner PA-C [facility] DIABETE [facility]   3/8/2024  3:30 PM Reza Reeves OD [facility] OPHTHAL [place]       Report Electronically Signed By:     Constance Serrano DPM   Podiatry  [facility]  6/16/2023

## 2023-06-15 DIAGNOSIS — M10.9 GOUT, UNSPECIFIED CAUSE, UNSPECIFIED CHRONICITY, UNSPECIFIED SITE: Primary | ICD-10-CM

## 2023-06-15 RX ORDER — ALLOPURINOL 100 MG/1
100 TABLET ORAL DAILY
Qty: 90 TABLET | Refills: 1 | Status: SHIPPED | OUTPATIENT
Start: 2023-06-15 | End: 2023-10-24

## 2023-06-18 ENCOUNTER — PATIENT MESSAGE (OUTPATIENT)
Dept: PULMONOLOGY | Facility: CLINIC | Age: 48
End: 2023-06-18
Payer: COMMERCIAL

## 2023-06-26 ENCOUNTER — CLINICAL SUPPORT (OUTPATIENT)
Dept: PULMONOLOGY | Facility: CLINIC | Age: 48
End: 2023-06-26
Payer: COMMERCIAL

## 2023-06-26 ENCOUNTER — OFFICE VISIT (OUTPATIENT)
Dept: PULMONOLOGY | Facility: CLINIC | Age: 48
End: 2023-06-26
Payer: COMMERCIAL

## 2023-06-26 VITALS
HEIGHT: 65 IN | OXYGEN SATURATION: 100 % | HEART RATE: 74 BPM | SYSTOLIC BLOOD PRESSURE: 126 MMHG | WEIGHT: 262.44 LBS | BODY MASS INDEX: 43.72 KG/M2 | RESPIRATION RATE: 18 BRPM | DIASTOLIC BLOOD PRESSURE: 80 MMHG

## 2023-06-26 DIAGNOSIS — J45.40 MODERATE PERSISTENT ASTHMA WITHOUT COMPLICATION: ICD-10-CM

## 2023-06-26 DIAGNOSIS — J45.40 MODERATE PERSISTENT ASTHMA WITHOUT COMPLICATION: Primary | ICD-10-CM

## 2023-06-26 DIAGNOSIS — G47.33 OSA ON CPAP: ICD-10-CM

## 2023-06-26 DIAGNOSIS — Z91.09 ENVIRONMENTAL ALLERGIES: ICD-10-CM

## 2023-06-26 DIAGNOSIS — E66.01 CLASS 3 SEVERE OBESITY DUE TO EXCESS CALORIES WITHOUT SERIOUS COMORBIDITY WITH BODY MASS INDEX (BMI) OF 40.0 TO 44.9 IN ADULT: ICD-10-CM

## 2023-06-26 DIAGNOSIS — E11.65 TYPE 2 DIABETES MELLITUS WITH HYPERGLYCEMIA, WITHOUT LONG-TERM CURRENT USE OF INSULIN: ICD-10-CM

## 2023-06-26 DIAGNOSIS — I10 ESSENTIAL HYPERTENSION: ICD-10-CM

## 2023-06-26 DIAGNOSIS — G25.81 RESTLESS LEG SYNDROME: ICD-10-CM

## 2023-06-26 PROCEDURE — 1159F PR MEDICATION LIST DOCUMENTED IN MEDICAL RECORD: ICD-10-PCS | Mod: CPTII,S$GLB,, | Performed by: NURSE PRACTITIONER

## 2023-06-26 PROCEDURE — 99215 PR OFFICE/OUTPT VISIT, EST, LEVL V, 40-54 MIN: ICD-10-PCS | Mod: 25,S$GLB,, | Performed by: NURSE PRACTITIONER

## 2023-06-26 PROCEDURE — 3044F PR MOST RECENT HEMOGLOBIN A1C LEVEL <7.0%: ICD-10-PCS | Mod: CPTII,S$GLB,, | Performed by: NURSE PRACTITIONER

## 2023-06-26 PROCEDURE — 99999 PR PBB SHADOW E&M-EST. PATIENT-LVL V: CPT | Mod: PBBFAC,,, | Performed by: NURSE PRACTITIONER

## 2023-06-26 PROCEDURE — 3008F PR BODY MASS INDEX (BMI) DOCUMENTED: ICD-10-PCS | Mod: CPTII,S$GLB,, | Performed by: NURSE PRACTITIONER

## 2023-06-26 PROCEDURE — 99999 PR PBB SHADOW E&M-EST. PATIENT-LVL V: ICD-10-PCS | Mod: PBBFAC,,, | Performed by: NURSE PRACTITIONER

## 2023-06-26 PROCEDURE — 99999 PR PBB SHADOW E&M-EST. PATIENT-LVL I: CPT | Mod: PBBFAC,,,

## 2023-06-26 PROCEDURE — 3072F PR LOW RISK FOR RETINOPATHY: ICD-10-PCS | Mod: CPTII,S$GLB,, | Performed by: NURSE PRACTITIONER

## 2023-06-26 PROCEDURE — 1160F PR REVIEW ALL MEDS BY PRESCRIBER/CLIN PHARMACIST DOCUMENTED: ICD-10-PCS | Mod: CPTII,S$GLB,, | Performed by: NURSE PRACTITIONER

## 2023-06-26 PROCEDURE — 3074F SYST BP LT 130 MM HG: CPT | Mod: CPTII,S$GLB,, | Performed by: NURSE PRACTITIONER

## 2023-06-26 PROCEDURE — 3044F HG A1C LEVEL LT 7.0%: CPT | Mod: CPTII,S$GLB,, | Performed by: NURSE PRACTITIONER

## 2023-06-26 PROCEDURE — 1160F RVW MEDS BY RX/DR IN RCRD: CPT | Mod: CPTII,S$GLB,, | Performed by: NURSE PRACTITIONER

## 2023-06-26 PROCEDURE — 3079F PR MOST RECENT DIASTOLIC BLOOD PRESSURE 80-89 MM HG: ICD-10-PCS | Mod: CPTII,S$GLB,, | Performed by: NURSE PRACTITIONER

## 2023-06-26 PROCEDURE — 95012 NITRIC OXIDE EXP GAS DETER: CPT | Mod: S$GLB,,, | Performed by: INTERNAL MEDICINE

## 2023-06-26 PROCEDURE — 3074F PR MOST RECENT SYSTOLIC BLOOD PRESSURE < 130 MM HG: ICD-10-PCS | Mod: CPTII,S$GLB,, | Performed by: NURSE PRACTITIONER

## 2023-06-26 PROCEDURE — 1159F MED LIST DOCD IN RCRD: CPT | Mod: CPTII,S$GLB,, | Performed by: NURSE PRACTITIONER

## 2023-06-26 PROCEDURE — 3079F DIAST BP 80-89 MM HG: CPT | Mod: CPTII,S$GLB,, | Performed by: NURSE PRACTITIONER

## 2023-06-26 PROCEDURE — 3008F BODY MASS INDEX DOCD: CPT | Mod: CPTII,S$GLB,, | Performed by: NURSE PRACTITIONER

## 2023-06-26 PROCEDURE — 99999 PR PBB SHADOW E&M-EST. PATIENT-LVL I: ICD-10-PCS | Mod: PBBFAC,,,

## 2023-06-26 PROCEDURE — 99215 OFFICE O/P EST HI 40 MIN: CPT | Mod: 25,S$GLB,, | Performed by: NURSE PRACTITIONER

## 2023-06-26 PROCEDURE — 3072F LOW RISK FOR RETINOPATHY: CPT | Mod: CPTII,S$GLB,, | Performed by: NURSE PRACTITIONER

## 2023-06-26 PROCEDURE — 95012 PR NITRIC OXIDE EXPIRED GAS DETERMINATION: ICD-10-PCS | Mod: S$GLB,,, | Performed by: INTERNAL MEDICINE

## 2023-06-26 RX ORDER — FLUTICASONE PROPIONATE AND SALMETEROL 100; 50 UG/1; UG/1
1 POWDER RESPIRATORY (INHALATION) 2 TIMES DAILY
Qty: 60 EACH | Refills: 11 | Status: SHIPPED | OUTPATIENT
Start: 2023-06-26 | End: 2023-09-19 | Stop reason: SDUPTHER

## 2023-06-26 NOTE — ASSESSMENT & PLAN NOTE
suboptimal with ED visit June 2023, adherence to regimen Advair 100 mcg 1 puff twice daily. Albuterol inhaler. Albuterol nebs  6/26/2023 FeNO 21   Follow up 3 months reevaluate continued stability review cindy/feno

## 2023-06-26 NOTE — ASSESSMENT & PLAN NOTE
Encouraged calorie reduction and 30 minutes of exercise daily. Discussed impact of obesity on general health.    Wt Readings from Last 9 Encounters:   06/26/23 119 kg (262 lb 7.3 oz)   06/13/23 120.2 kg (265 lb)   06/13/23 120.3 kg (265 lb 3.4 oz)   05/29/23 118.3 kg (260 lb 12.9 oz)   03/03/23 117.1 kg (258 lb 2.5 oz)   11/28/22 115.8 kg (255 lb 4.7 oz)   11/16/22 114.3 kg (251 lb 15.8 oz)   11/16/22 116.3 kg (256 lb 6.3 oz)   09/28/22 119.3 kg (263 lb)   Body mass index is 43.68 kg/m².

## 2023-06-26 NOTE — ASSESSMENT & PLAN NOTE
Stable and controlled. Continue current treatment plan as previously prescribed with your PCP.     Hemoglobin A1C   Date Value Ref Range Status   06/05/2023 5.4 4.0 - 5.6 % Final     Comment:     ADA Screening Guidelines:  5.7-6.4%  Consistent with prediabetes  >or=6.5%  Consistent with diabetes    High levels of fetal hemoglobin interfere with the HbA1C  assay. Heterozygous hemoglobin variants (HbS, HgC, etc)do  not significantly interfere with this assay.   However, presence of multiple variants may affect accuracy.     12/08/2022 6.3 (H) 4.0 - 5.6 % Final     Comment:     ADA Screening Guidelines:  5.7-6.4%  Consistent with prediabetes  >or=6.5%  Consistent with diabetes    High levels of fetal hemoglobin interfere with the HbA1C  assay. Heterozygous hemoglobin variants (HbS, HgC, etc)do  not significantly interfere with this assay.   However, presence of multiple variants may affect accuracy.     09/06/2022 10.5 (H) 4.0 - 5.6 % Final     Comment:     ADA Screening Guidelines:  5.7-6.4%  Consistent with prediabetes  >or=6.5%  Consistent with diabetes    High levels of fetal hemoglobin interfere with the HbA1C  assay. Heterozygous hemoglobin variants (HbS, HgC, etc)do  not significantly interfere with this assay.   However, presence of multiple variants may affect accuracy.

## 2023-06-26 NOTE — ASSESSMENT & PLAN NOTE
Prior diagnosis 2020, had to return CPAP inadequate use.  Restudied 9/24/2021, 9/27/2021 Home Sleep Study    2 night study MILD OBSTRUCTIVE SLEEP APNEA with overall AHI 13.2/hr ( 52 events)  Oxygen desaturation: 80%. SpO2 between 85% to 89% for < 1 min.  On Auto CPAP 4-20 cm, stopped using Jan 2022, machine malfunction, met with Chuyita singleton fine.  Never resumed CPAP since Jan 2022  [facility]   Full face mask  Updated supplies  Improve adherence to use  Annual follow up

## 2023-06-26 NOTE — PROCEDURES
Clinical Guide to Interpretation or FeNO Levels :    FeNO  (ppb) LOW INTERMEDIATE HIGH   ADULT VALUES < 25 25-50          > 50   Th2-driven Inflammation Unlikely Likely Significant     Patients FeNO level at this visit : __21__ (ppb)    Interpretation of FeNO measurement in adults:  [x] FENO is less than 25 ppb implies non eosinophilic airway inflammation or the absence of airway inflammation.    Comment: Low FENO (<25 ppb) in adult asthmatics with persistent symptoms suggests other etiologies for these symptoms and a lower likelihood of benefit from adding or increasing inhaled glucocorticoids.    [] FENO between 25 and 50 ppb in adults should be interpreted cautiously with reference to the clinical situation (eg, symptomatic, on or off therapy, current smoking).    [] FENO greater than 50 ppb in adults  suggests eosinophilic airway inflammation   Comment: High FENO (>50 ppb) in adult asthmatics even with atypical symptoms suggests glucocorticoid responsiveness. High FENO (>50 ppb) can help identify poor adherence or uncontrolled inflammation in asthma patients with otherwise seemingly controlled asthma.    Discussion:  A FENO less than 25 ppb in adults and less than 20 ppb in children younger than 12 years of age implies noneosinophilic airway inflammation or the absence of airway inflammation.  A FENO greater than 50 ppb in adults or greater than 35 ppb in children suggests eosinophilic airway inflammation.   Values of FENO between 25 and 50 ppb in adults (20 to 35 ppb in children) should be interpreted cautiously with reference to the clinical situation (eg, symptomatic, on or off therapy, current smoking).  A rising FENO with a greater than 20 percent change and more than 25 ppb (20 ppb in children) from a previously stable level suggests increasing eosinophilic airway inflammation, but there are wide inter-individual differences.  A decrease in FENO greater than 20 percent for values over 50 ppb or more than  10 ppb for values less than 50 ppb may be clinically important.  ?FENO in other respiratory diseases - Several other diseases are associated with altered levels of exhaled NO: low levels of FENO have been noted in cystic fibrosis, current smoking, pulmonary hypertension, hypothermia, primary ciliary dyskinesia, and bronchopulmonary dysplasia. Elevated FENO has been noted in atopy, nonasthmatic eosinophilic bronchitis, COPD exacerbations, noncystic fibrosis bronchiectasis, and viral upper respiratory infections.    REFERENCE:  ATS Board of Directors, December 2004, and by the ERS Executive Committee, June 2004. ATS/ERS Recommendations for Standardized Procedures for the Online and Offline Measurement of Exhaled Lower Respiratory Nitric Oxide and Nasal Nitric Oxide. Guideline 2005

## 2023-06-26 NOTE — PROGRESS NOTES
Subjective:      Patient ID: Yuli Milton is a 47 y.o. female.    Chief Complaint: Asthma and Sleep Apnea    HPI: Yuli Milton presents to clinic for follow up after reporting to ER with asthma flare on 6/18/2023 with [facility] ED. Needs work excuse for 6/23/2023 and 6/26/2023, states ready to return to work 6/27/2023, provided.     She reports she is back at baseline with treatment.     She is not adherent to treatment regimen, taking Advair 100 mcg once daily instead of twice daily as ordered.     6/26/2023 FeNO 21    She is employed by [employer] as a .     8/23/2022 FeNO 23    Asthma Control Test  In the past 4  weeks, how much of the time did your asthma keep you from getting as much done at work, school or at home?: Most of the time  During the past 4 weeks, how often have you had shortness of breath?: More than once a day  During the past 4 weeks, how often did your asthma symptoms (wheezing, couging, shortness of breath, chest tightness or pain) wake you up at night or earlier than usual in the morning?: Not at all  During the past 4 weeks, how often have you used your rescue inhaler or nebulizer medication (such as albuterol)?: 2 or 3 times a week  How would you rate your asthma control during the past 4 weeks?: Somewhat controlled  If your score is 19 or less, your asthma may not be under control: 14     Obstructive sleep apnea  Ordered to be on Auto CPAP of 4-20 cmH2O pressure which was optimally controlling sleep apnea with apneic index (AHI) 2.9 events an hour.   Patient reports some benefit from CPAP use.   She could not get comfortable with CPAP so stopped using in Jan 2022. In past compliance affected by falling asleep without machine in use. Then jan 2022 she had malfunction would not blowing air, has not had checked by [facility] and found working fine.   Full face mask was used in past.     Obtained ResMed CPAP  machine in about November 2021 9/24/2021, 9/27/2021 Home Sleep Study    2 night study  MILD OBSTRUCTIVE SLEEP APNEA with overall AHI 13.2/hr ( 52 events)  Oxygen desaturation: 80%. SpO2 between 85% to 89% for < 1 min.  Patient snored 78% time above 50 .  Heart rate range: 60 bpm -110 bpm     Present Compliance download available, needs to bring in machine. She has not used CPAP since Jan 2022.   Payor Standard  Usage 12/08/2021 - 01/06/2022  Usage days 19/30 days (63%)  >= 4 hours 9 days (30%)  < 4 hours 10 days (33%)  Usage hours 68 hours 12 minutes  Average usage (total days) 2 hours 16 minutes  Average usage (days used) 3 hours 35 minutes  Median usage (days used) 3 hours 56 minutes  Total used hours (value since last reset - 01/06/2022) 202 hours  AirSense 11 AutoSet  Serial number [id number]  Mode AutoSet  Min Pressure 4 cmH2O  Max Pressure 20 cmH2O  EPR Fulltime  EPR level 3  Response Standard  Therapy  Pressure - cmH2O Median: 5.8 95th percentile: 7.7 Maximum: 8.5  Leaks - L/min Median: 13.7 95th percentile: 22.5 Maximum: 26.8  Events per hour AI: 2.5 HI: 0.4 AHI: 2.9  Apnea Index Central: 0.5 Obstructive: 1.9 Unknown: 0.1  RERA Index 0.2  Cheyne-Hernandez respiration (average duration per night) 0 minutes (0%)  Usage - hours     Employed full time as a [employer] , [employer].      Previous Report Reviewed: lab reports and office notes     Past Medical History: The following portions of the patient's history were reviewed and updated as appropriate:   She  has a past surgical history that includes Appendectomy; Dilation and curettage of uterus; Colonoscopy (N/A, 07/31/2017); Cholecystectomy; Hysterectomy (08/31/2016); Fracture surgery; and Oophorectomy.  Her family history includes Asthma in her son; Breast cancer in her maternal aunt and paternal grandmother; Cancer in her maternal aunt; Colon cancer in her maternal aunt; Diabetes in her maternal grandmother; Hypertension in  her mother; Miscarriages / Stillbirths in her cousin; No Known Problems in her daughter, father, and son; Stroke in her maternal aunt; Thyroid disease in her brother, mother, and sister.  She  reports that she has never smoked. She has never used smokeless tobacco. She reports that she does not drink alcohol and does not use drugs.  She has a current medication list which includes the following prescription(s): acetazolamide, albuterol, albuterol, allopurinol, alprazolam, atorvastatin, azelaic acid, blood sugar diagnostic, blood-glucose meter, celecoxib, cequa, ergocalciferol, estradiol, fluoxetine, fluticasone-salmeterol 100-50 mcg/dose, gabapentin, hydrochlorothiazide, ibuprofen, lancets, lidocaine, lyrica, methen-m.blue-s.phos-phsal-hyo, methocarbamol, metronidazole, montelukast, naproxen, nebulizer accessories, nitrofurantoin (macrocrystal-monohydrate), olanzapine, oxycodone-acetaminophen, phenazopyridine, prednisone, promethazine-dextromethorphan, thiamine, tirzepatide, tramadol, and meclizine.  She is allergic to trulicity [dulaglutide], aspirin, and hibiclens (isopropyl alcohol)..    The following portions of the patient's history were reviewed and updated as appropriate: allergies, current medications, past family history, past medical history, past social history, past surgical history and problem list.    Review of Systems   Constitutional:  Negative for fever, chills, weight loss, weight gain, activity change, appetite change, fatigue and night sweats.   HENT:  Negative for postnasal drip, rhinorrhea, sinus pressure, voice change and congestion.    Eyes:  Negative for redness and itching.   Respiratory:  Negative for snoring, cough, sputum production, chest tightness, shortness of breath, wheezing, orthopnea, asthma nighttime symptoms, dyspnea on extertion, use of rescue inhaler and somnolence.    Cardiovascular: Negative.  Negative for chest pain, palpitations and leg swelling.   Genitourinary:  Negative  "for difficulty urinating and hematuria.   Endocrine:  Negative for cold intolerance and heat intolerance.    Musculoskeletal:  Negative for arthralgias, gait problem, joint swelling and myalgias.   Skin: Negative.    Gastrointestinal:  Negative for nausea, vomiting, abdominal pain and acid reflux.   Neurological:  Negative for dizziness, weakness, light-headedness and headaches.   Hematological:  Negative for adenopathy. No excessive bruising.   All other systems reviewed and are negative.   Objective:   /80   Pulse 74   Resp 18   Ht 5' 5" (1.651 m)   Wt 119 kg (262 lb 7.3 oz)   LMP 08/21/2016   SpO2 100%   BMI 43.68 kg/m²   Physical Exam  Vitals and nursing note reviewed.   Constitutional:       General: She is not in acute distress.     Appearance: She is well-developed. She is not ill-appearing or toxic-appearing.   HENT:      Head: Normocephalic.      Right Ear: External ear normal.      Left Ear: External ear normal.      Nose: Nose normal.      Mouth/Throat:      Pharynx: No oropharyngeal exudate.   Eyes:      Conjunctiva/sclera: Conjunctivae normal.   Cardiovascular:      Rate and Rhythm: Normal rate and regular rhythm.      Heart sounds: Normal heart sounds.   Pulmonary:      Effort: Pulmonary effort is normal.      Breath sounds: Normal breath sounds. No stridor.   Abdominal:      Palpations: Abdomen is soft.   Musculoskeletal:         General: Normal range of motion.      Cervical back: Normal range of motion and neck supple.   Lymphadenopathy:      Cervical: No cervical adenopathy.   Skin:     General: Skin is warm and dry.   Neurological:      Mental Status: She is alert and oriented to person, place, and time.   Psychiatric:         Behavior: Behavior normal. Behavior is cooperative.         Thought Content: Thought content normal.         Judgment: Judgment normal.       Personal Diagnostic Review  CPAP download    6/26/2023 FeNO 21  Clinical Guide to Interpretation or FeNO Levels :   "   FeNO  (ppb) LOW INTERMEDIATE HIGH   ADULT VALUES < 25 25-50          > 50   Th2-driven Inflammation Unlikely Likely Significant      Patients FeNO level at this visit : __21__ (ppb)    8/232/2022 FeNO 23   Clinical Guide to Interpretation or FeNO Levels :     FeNO  (ppb) LOW INTERMEDIATE HIGH   ADULT VALUES < 25 25-50          > 50   Th2-driven Inflammation Unlikely Likely Significant      Patients FeNO level at this visit : _23___ (ppb)    11/20/2020 6mwd No desaturations requiring supplemental oxygen at rest or exertion. Oxygen desaturation did not meet criteria for supplemental oxygen prescription. Exercise capacity is normal.      Phase Oxygen Assessment Supplemental O2 Heart   Rate Blood Pressure Ramon Dyspnea Scale Rating   Resting 98 % Room Air 73 bpm 116/77 3   Exercise         Minute         1 100 % Room Air 108 bpm     2 100 % Room Air 117 bpm     3 94 % Room Air 116 bpm     4 93 % Room Air 113 bpm     5 99 % Room Air 130 bpm     6  95 % Room Air 123 bpm 94/54 4   Recovery         Minute         1 100 % Room Air 86 bpm     2 100 % Room Air 77 bpm     3 100 % Room Air 74 bpm     4 99 % Room Air 77 bpm 117/75 2     Six Minute Walk Summary   6MWT Status: completed without stopping   Patient Reported: Dyspnea, Other (Comment)(leg numbness    Assessment:     1. Moderate persistent asthma without complication    2. Type 2 diabetes mellitus with hyperglycemia, without long-term current use of insulin    3. Restless leg syndrome    4. SCOUT on CPAP    5. Essential hypertension    6. Environmental allergies    7. Class 3 severe obesity due to excess calories without serious comorbidity with body mass index (BMI) of 40.0 to 44.9 in adult          Orders Placed This Encounter   Procedures    CPAP/BIPAP SUPPLIES     Benefits 90 day supply. 4 refills  HME: [facility]     Order Specific Question:   Length of need (1-99 months):     Answer:   99     Order Specific Question:   Choose ONE mask type and its corresponding  cushions and/or pillows:     Answer:    Full Face Mask, 1 per 90 days:  Full Face Cushion, (3 per 90 days)     Order Specific Question:   Choose EITHER Heated or Non-Heated Tubjing     Answer:    Non-Heated Tubing, 1 per 90 days     Order Specific Question:   Number of Days Needed:     Answer:   99     Order Specific Question:   All other supplies as needed as listed below:     Answer:    Headgear, 1 per 180 days     Order Specific Question:   All other supplies as needed as listed below:     Answer:    Chin Strap, 1 per 180 days     Order Specific Question:   All other supplies as needed as listed below:     Answer:    Disposable Filter, 6 per 90 days     Order Specific Question:   All other supplies as needed as listed below:     Answer:    Humidifier Chamber, 1 per 180 days     Order Specific Question:   All other supplies as needed as listed below:     Answer:    Non-Disposable Filter, 1 per 180 days    Fraction of  Nitric Oxide     Standing Status:   Future     Number of Occurrences:   1     Standing Expiration Date:   2024     Order Specific Question:   Release to patient     Answer:   Immediate    Spirometry with/without bronchodilator     Standing Status:   Future     Standing Expiration Date:   2024     Order Specific Question:   Release to patient     Answer:   Immediate    Fraction of  Nitric Oxide     Standing Status:   Future     Standing Expiration Date:   2024     Order Specific Question:   Release to patient     Answer:   Immediate     Plan:     Problem List Items Addressed This Visit       Type 2 diabetes mellitus with hyperglycemia, without long-term current use of insulin     Stable and controlled. Continue current treatment plan as previously prescribed with your PCP.     Hemoglobin A1C   Date Value Ref Range Status   2023 5.4 4.0 - 5.6 % Final     Comment:     ADA Screening Guidelines:  5.7-6.4%  Consistent with  prediabetes  >or=6.5%  Consistent with diabetes    High levels of fetal hemoglobin interfere with the HbA1C  assay. Heterozygous hemoglobin variants (HbS, HgC, etc)do  not significantly interfere with this assay.   However, presence of multiple variants may affect accuracy.     12/08/2022 6.3 (H) 4.0 - 5.6 % Final     Comment:     ADA Screening Guidelines:  5.7-6.4%  Consistent with prediabetes  >or=6.5%  Consistent with diabetes    High levels of fetal hemoglobin interfere with the HbA1C  assay. Heterozygous hemoglobin variants (HbS, HgC, etc)do  not significantly interfere with this assay.   However, presence of multiple variants may affect accuracy.     09/06/2022 10.5 (H) 4.0 - 5.6 % Final     Comment:     ADA Screening Guidelines:  5.7-6.4%  Consistent with prediabetes  >or=6.5%  Consistent with diabetes    High levels of fetal hemoglobin interfere with the HbA1C  assay. Heterozygous hemoglobin variants (HbS, HgC, etc)do  not significantly interfere with this assay.   However, presence of multiple variants may affect accuracy.              Restless leg syndrome     Controlled on Lyrica          SCOUT on CPAP     Prior diagnosis 2020, had to return CPAP inadequate use.  Restudied 9/24/2021, 9/27/2021 Home Sleep Study    2 night study MILD OBSTRUCTIVE SLEEP APNEA with overall AHI 13.2/hr ( 52 events)  Oxygen desaturation: 80%. SpO2 between 85% to 89% for < 1 min.  On Auto CPAP 4-20 cm, stopped using Jan 2022, machine malfunction, met with Chuyita godwin.  Never resumed CPAP since Jan 2022  ResMed   Full face mask  Updated supplies  Improve adherence to use  Annual follow up                  Relevant Orders    CPAP/BIPAP SUPPLIES    Moderate persistent asthma without complication - Primary     suboptimal with ED visit June 2023, adherence to regimen Advair 100 mcg 1 puff twice daily. Albuterol inhaler. Albuterol nebs  6/26/2023 FeNO 21   Follow up 3 months reevaluate continued stability review cindy/feno           Relevant Medications    fluticasone-salmeterol diskus inhaler 100-50 mcg    Other Relevant Orders    Fraction of  Nitric Oxide (Completed)    Spirometry with/without bronchodilator    Fraction of  Nitric Oxide    Essential hypertension     Stable and controlled. Continue current treatment plan as previously prescribed with your PCP.              Environmental allergies     Stable, continue Singulair, add on zyrtec and flonase if needed          Class 3 severe obesity due to excess calories without serious comorbidity with body mass index (BMI) of 40.0 to 44.9 in adult     Encouraged calorie reduction and 30 minutes of exercise daily. Discussed impact of obesity on general health.    Wt Readings from Last 9 Encounters:   23 119 kg (262 lb 7.3 oz)   23 120.2 kg (265 lb)   23 120.3 kg (265 lb 3.4 oz)   23 118.3 kg (260 lb 12.9 oz)   23 117.1 kg (258 lb 2.5 oz)   22 115.8 kg (255 lb 4.7 oz)   22 114.3 kg (251 lb 15.8 oz)   22 116.3 kg (256 lb 6.3 oz)   22 119.3 kg (263 lb)   Body mass index is 43.68 kg/m².                I spent a total of 54 minutes on the day of the visit.  This includes face to face time and non-face to face time preparing to see the patient (eg, review of tests), obtaining and/or reviewing separately obtained history, documenting clinical information in the electronic or other health record, independently interpreting results and communicating results to the patient/family/caregiver, or care coordinator.    Follow up in about 3 months (around 2023) for Asthma review cindy/feno .

## 2023-07-07 ENCOUNTER — PATIENT MESSAGE (OUTPATIENT)
Dept: INFECTIOUS DISEASES | Facility: CLINIC | Age: 48
End: 2023-07-07
Payer: COMMERCIAL

## 2023-07-11 ENCOUNTER — OFFICE VISIT (OUTPATIENT)
Dept: PODIATRY | Facility: CLINIC | Age: 48
End: 2023-07-11
Payer: COMMERCIAL

## 2023-07-11 VITALS — WEIGHT: 262 LBS | BODY MASS INDEX: 43.65 KG/M2 | HEIGHT: 65 IN

## 2023-07-11 DIAGNOSIS — B35.1 DERMATOPHYTOSIS OF NAIL: ICD-10-CM
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
## 2023-07-11 PROCEDURE — 99213 OFFICE O/P EST LOW 20 MIN: CPT | Mod: S$GLB,,, | Performed by: PODIATRIST

## 2023-07-11 PROCEDURE — 99999 PR PBB SHADOW E&M-EST. PATIENT-LVL V: ICD-10-PCS | Mod: PBBFAC,,, | Performed by: PODIATRIST

## 2023-07-11 PROCEDURE — 3072F PR LOW RISK FOR RETINOPATHY: ICD-10-PCS | Mod: CPTII,S$GLB,, | Performed by: PODIATRIST

## 2023-07-11 PROCEDURE — 1159F PR MEDICATION LIST DOCUMENTED IN MEDICAL RECORD: ICD-10-PCS | Mod: CPTII,S$GLB,, | Performed by: PODIATRIST

## 2023-07-11 PROCEDURE — 99999 PR PBB SHADOW E&M-EST. PATIENT-LVL V: CPT | Mod: PBBFAC,,, | Performed by: PODIATRIST

## 2023-07-11 PROCEDURE — 99213 PR OFFICE/OUTPT VISIT, EST, LEVL III, 20-29 MIN: ICD-10-PCS | Mod: S$GLB,,, | Performed by: PODIATRIST

## 2023-07-11 PROCEDURE — 3008F PR BODY MASS INDEX (BMI) DOCUMENTED: ICD-10-PCS | Mod: CPTII,S$GLB,, | Performed by: PODIATRIST

## 2023-07-11 PROCEDURE — 1159F MED LIST DOCD IN RCRD: CPT | Mod: CPTII,S$GLB,, | Performed by: PODIATRIST

## 2023-07-11 PROCEDURE — 3044F HG A1C LEVEL LT 7.0%: CPT | Mod: CPTII,S$GLB,, | Performed by: PODIATRIST

## 2023-07-11 PROCEDURE — 3008F BODY MASS INDEX DOCD: CPT | Mod: CPTII,S$GLB,, | Performed by: PODIATRIST

## 2023-07-11 PROCEDURE — 3072F LOW RISK FOR RETINOPATHY: CPT | Mod: CPTII,S$GLB,, | Performed by: PODIATRIST

## 2023-07-11 PROCEDURE — 3044F PR MOST RECENT HEMOGLOBIN A1C LEVEL <7.0%: ICD-10-PCS | Mod: CPTII,S$GLB,, | Performed by: PODIATRIST

## 2023-07-11 NOTE — LETTER
July 11, 2023      [facility]  [address] [place] [address]  Phone: [phone]       Patient: Yuli Milton   YOB: 1975  Date of Visit: 07/11/2023    To Whom It May Concern:    Dick Milton  was at [facility] on 07/11/2023. The patient may return to work on 07/12/2023 with no restrictions. If you have any questions or concerns, or if I can be of further assistance, please do not hesitate to contact me.    Sincerely,      Lorraine Amin MA

## 2023-07-19 ENCOUNTER — PATIENT MESSAGE (OUTPATIENT)
Dept: DIABETES | Facility: CLINIC | Age: 48
End: 2023-07-19
Payer: COMMERCIAL

## 2023-07-19 ENCOUNTER — TELEPHONE (OUTPATIENT)
Dept: DIABETES | Facility: CLINIC | Age: 48
End: 2023-07-19
Payer: COMMERCIAL

## 2023-07-19 DIAGNOSIS — E11.65 TYPE 2 DIABETES MELLITUS WITH HYPERGLYCEMIA, WITHOUT LONG-TERM CURRENT USE OF INSULIN: Primary | ICD-10-CM

## 2023-07-19 RX ORDER — TIRZEPATIDE 12.5 MG/.5ML
12.5 INJECTION, SOLUTION SUBCUTANEOUS
Qty: 4 PEN | Refills: 1 | Status: SHIPPED | OUTPATIENT
Start: 2023-07-19 | End: 2023-08-21

## 2023-07-19 NOTE — TELEPHONE ENCOUNTER
Pt wants to see if she can change the mounjaro 10 mg to something else because she is not losing any weight she has been on it for 2 months. She said she feels like she is gaining weight.

## 2023-07-19 NOTE — PROGRESS NOTES
For coverage of RYAN Perez. Mounjaro dose increased to 12.5 mg to promote weight loss.    Damaris Brooke, FNP-C  Diabetes Management

## 2023-07-21 ENCOUNTER — OFFICE VISIT (OUTPATIENT)
Dept: PODIATRY | Facility: CLINIC | Age: 48
End: 2023-07-21
Payer: COMMERCIAL

## 2023-07-21 VITALS — HEIGHT: 65 IN | BODY MASS INDEX: 43.65 KG/M2 | WEIGHT: 262 LBS
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
## 2023-07-21 PROCEDURE — 3044F HG A1C LEVEL LT 7.0%: CPT | Mod: CPTII,S$GLB,, | Performed by: PODIATRIST

## 2023-07-21 PROCEDURE — 99212 OFFICE O/P EST SF 10 MIN: CPT | Mod: S$GLB,,, | Performed by: PODIATRIST

## 2023-07-21 PROCEDURE — 3044F PR MOST RECENT HEMOGLOBIN A1C LEVEL <7.0%: ICD-10-PCS | Mod: CPTII,S$GLB,, | Performed by: PODIATRIST

## 2023-07-21 PROCEDURE — 1159F PR MEDICATION LIST DOCUMENTED IN MEDICAL RECORD: ICD-10-PCS | Mod: CPTII,S$GLB,, | Performed by: PODIATRIST

## 2023-07-21 PROCEDURE — 3008F PR BODY MASS INDEX (BMI) DOCUMENTED: ICD-10-PCS | Mod: CPTII,S$GLB,, | Performed by: PODIATRIST

## 2023-07-21 PROCEDURE — 1159F MED LIST DOCD IN RCRD: CPT | Mod: CPTII,S$GLB,, | Performed by: PODIATRIST

## 2023-07-21 PROCEDURE — 3008F BODY MASS INDEX DOCD: CPT | Mod: CPTII,S$GLB,, | Performed by: PODIATRIST

## 2023-07-21 PROCEDURE — 99999 PR PBB SHADOW E&M-EST. PATIENT-LVL IV: CPT | Mod: PBBFAC,,, | Performed by: PODIATRIST

## 2023-07-21 PROCEDURE — 3072F LOW RISK FOR RETINOPATHY: CPT | Mod: CPTII,S$GLB,, | Performed by: PODIATRIST

## 2023-07-21 PROCEDURE — 99212 PR OFFICE/OUTPT VISIT, EST, LEVL II, 10-19 MIN: ICD-10-PCS | Mod: S$GLB,,, | Performed by: PODIATRIST

## 2023-07-21 PROCEDURE — 99999 PR PBB SHADOW E&M-EST. PATIENT-LVL IV: ICD-10-PCS | Mod: PBBFAC,,, | Performed by: PODIATRIST

## 2023-07-21 PROCEDURE — 3072F PR LOW RISK FOR RETINOPATHY: ICD-10-PCS | Mod: CPTII,S$GLB,, | Performed by: PODIATRIST

## 2023-07-21 NOTE — PROGRESS NOTES
[facility]  PODIATRIC MEDICINE AND SURGERY      CHIEF COMPLAINT   Chief Complaint   Patient presents with    Follow-up     F/u for nail surgery, b/l hallux, 0 pain, diabetic, wears sandals         HPI:    Yuli Milton is a 47 y.o. female presenting to podiatry clinic with complaint of fungal great toenail pain. Pt has acrylic overlay. She states nail is no longer painful after debridement.       PMH  Past Medical History:   Diagnosis Date    Abnormal Pap smear of cervix     treatment??    Abnormal Pap smear of vagina     Acute pain of right shoulder 10/25/2019    Adjustment disorder with depressed mood 11/15/2014    Overview:  Multiple recent medical problems     Allergy     Anemia     Anemia 12/14/2014 6:26:13 PM    [facility] Historical - [facility]: Anemia-No Additional Notes    Anxiety     Anxiety and depression     Asthma     Asthma 4/14/2016    Asthma 6/18/2014 1:31:21 PM    [facility] Historical - Respiratory: Asthma-No Additional Notes    Auditory hallucinations 2/17/2017    Bacterial vaginosis 1/5/2018    Bladder pain 9/1/2020    Bronchitis 10/15/2015    Bronchitis 9/18/2020    Bronchitis 10/15/2015    Cervical radiculopathy 3/10/2020    Chlamydia     Depression (emotion)     Diabetes mellitus     SANTOS (dyspnea on exertion) 10/14/2019    Dysuria 3/31/2018    Dysuria 3/31/2018    Early satiety 8/28/2020    Gallstones     Gastritis     High-risk sexual behavior 1/5/2018    History of ovarian cyst     History of syphilis     History of trichomoniasis     Hyperlipidemia     Hypertension     Interstitial cystitis 12/8/2020    Localized edema 10/14/2019    Lower abdominal pain 7/31/2017    Mental disorder     Obesity 6/18/2014 1:51:54 PM    [facility]: Obesity, Unspecified-No Additional Notes    Obstructive sleep apnea 10/5/2021    Persistent cough 7/27/2018    PONV (postoperative nausea and vomiting)     Preop general physical exam 12/9/2019    Right foot pain 11/18/2019  "   Routine general medical examination at a health care facility 5/21/2021    Shortness of breath 6/26/2017    SOB (shortness of breath) 6/26/2017    Suicidal ideations 7/9/2019    Syphilis 6/24/2014 7:17:31 PM    [facility] Historical - Gynecologic: Syphilis-No Additional Notes    Type 2 diabetes mellitus without complication, without long-term current use of insulin 5/25/2014    Type 2 or unspecified type diabetes mellitus 6/24/2014 7:24:47 PM    [facility] - [facility]: Diabetes Mellitus Without Complication, Type II-"borderline"    Upper respiratory tract infection 1/5/2018    Urgency of micturition 11/20/2020    Vaginal candidiasis 7/24/2018    Vaginal itching 1/5/2018    Viral syndrome 12/1/2020       PROBLEM LIST  Patient Active Problem List    Diagnosis Date Noted    Gout 06/15/2023    Environmental allergies 03/03/2023    Vertigo 02/03/2023    Cervical strain 04/25/2022    Viral hepatitis A without hepatic coma 02/24/2022    SCOUT on CPAP 10/05/2021    Encounter for screening colonoscopy 09/23/2021    Pelvic floor dysfunction 04/23/2021    PVC's (premature ventricular contractions) 04/19/2021    Type 2 diabetes mellitus with hyperglycemia, without long-term current use of insulin 03/22/2021    Low vitamin D level 03/22/2021    History of COVID-19 01/22/2021    Headache 01/22/2021    Interstitial cystitis 12/08/2020    Urgency of micturition 11/20/2020    Moderate persistent asthma without complication 09/18/2020    Chronic bladder pain 09/01/2020    Status post percutaneous fixation of right 5th metatarsal fracture 12/12/2019    Essential hypertension 10/14/2019    Mixed hyperlipidemia 10/14/2019    Screening mammogram, encounter for 05/23/2019    Delayed immunizations 05/23/2019    Moderate episode of recurrent major depressive disorder 03/15/2018    Restless leg syndrome 02/09/2018    Class 3 severe obesity due to excess calories without serious comorbidity with body mass index (BMI) of 40.0 to " 44.9 in adult 02/05/2018    Fatigue 02/05/2018    High risk heterosexual behavior 01/05/2018    Keratoconjunctivitis sicca 10/10/2017    Palpitations 06/26/2017    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      albuterol (PROVENTIL) 2.5 mg /3 mL (0.083 %) nebulizer solution Take 3 mLs (2.5 mg total) by nebulization every 4 (four) hours as needed for Wheezing. Rescue 150 mL 11    albuterol (PROVENTIL/VENTOLIN HFA) 90 mcg/actuation inhaler Inhale 2 puffs into the lungs every 4 (four) hours as needed for Wheezing or Shortness of Breath. Rescue. Use with chamber. 18 g 11    allopurinoL (ZYLOPRIM) 100 MG tablet Take 1 tablet (100 mg total) by mouth once daily. 90 tablet 1    ALPRAZolam (XANAX) 0.5 MG tablet Take 0.5 mg by mouth 2 (two) times daily as needed.      azelaic acid (FINACEA) 15 % gel AAA bid 50 g 3    blood sugar diagnostic (BLOOD GLUCOSE TEST) Strp 1 each by Misc.(Non-Drug; Combo Route) route 3 (three) times daily. 100 each 11    blood-glucose meter kit Test finger stick blood sugar once daily. 1 each 0    celecoxib (CELEBREX) 200 MG capsule Take by mouth 2 (two) times daily.      CEQUA 0.09 % Dpet Place 1 drop into both eyes 2 (two) times daily.      FLUoxetine 20 MG capsule Take 1 capsule (20 mg total) by mouth once daily. 30 capsule 0    fluticasone-salmeterol diskus inhaler 100-50 mcg Inhale 1 puff into the lungs 2 (two) times daily. Controller 60 each 11    hydroCHLOROthiazide (HYDRODIURIL) 12.5 MG Tab Take 1 tablet (12.5 mg total) by mouth daily as needed (leg swelling). 90 tablet 1    lancets Misc 1 each by Misc.(Non-Drug; Combo Route) route 4 (four) times daily. 100  each 11    LIDOcaine (LIDODERM) 5 % 1 patch once daily.      LYRICA 100 mg capsule Take 100 mg by mouth 2 (two) times daily.      methen-annieblue-s.phos-phsal-hyo (URIBEL) 118-10-40.8-36 mg Cap Take 1 capsule by mouth 3 (three) times daily as needed (dysuria). 30 capsule 5    montelukast (SINGULAIR) 10 mg tablet Take 1 tablet (10 mg total) by mouth every evening. 90 tablet 3    naproxen (NAPROSYN) 500 MG tablet Take 1 tablet (500 mg total) by mouth 2 (two) times daily with meals. Take with food 30 tablet 0    nebulizer accessories Misc 1 each - needs replacement tubing and mask  0    OLANZapine (ZYPREXA) 5 MG tablet Take 5 mg by mouth every evening.      oxyCODONE-acetaminophen (PERCOCET)  mg per tablet Take 1 tablet by mouth.      phenazopyridine (PYRIDIUM) 100 MG tablet Take by mouth.      predniSONE (DELTASONE) 20 MG tablet 40 mg x 5 days, then 20 mg x 5 days. 15 tablet 0    thiamine 100 MG tablet Take 100 mg by mouth once daily.      tirzepatide (MOUNJARO) 12.5 mg/0.5 mL PnIj Inject 12.5 mg into the skin every 7 days. 4 pen 1    traMADoL (ULTRAM) 50 mg tablet Take 50 mg by mouth 2 (two) times daily as needed.      atorvastatin (LIPITOR) 20 MG tablet Take 1 tablet (20 mg total) by mouth once daily. 90 tablet 0    ergocalciferol (ERGOCALCIFEROL) 50,000 unit Cap Take 1 capsule (50,000 Units total) by mouth twice a week. 24 capsule 0    estradioL (ESTRACE) 0.01 % (0.1 mg/gram) vaginal cream Place 2 g vaginally once daily. 60 g 1    gabapentin (NEURONTIN) 300 MG capsule Take by mouth.      ibuprofen (ADVIL,MOTRIN) 800 MG tablet Take 1 tablet (800 mg total) by mouth every 8 (eight) hours as needed for Pain. 30 tablet 2    meclizine (ANTIVERT) 25 mg tablet Take 1 tablet (25 mg total) by mouth 3 (three) times daily as needed for Dizziness. 30 tablet 0    methocarbamoL (ROBAXIN) 750 MG Tab Take 1 tablet (750 mg total) by mouth every 8 (eight) hours as needed (muscle spasm). 20 tablet 0    metroNIDAZOLE (FLAGYL) 500  MG tablet Take 500 mg by mouth every 12 (twelve) hours.      nitrofurantoin, macrocrystal-monohydrate, (MACROBID) 100 MG capsule Take 100 mg by mouth 2 (two) times daily.      promethazine-dextromethorphan (PROMETHAZINE-DM) 6.25-15 mg/5 mL Syrp Take 5 mLs by mouth every evening. 118 mL 0     No current facility-administered medications on file prior to visit.       PSH     Past Surgical History:   Procedure Laterality Date    APPENDECTOMY      CHOLECYSTECTOMY      COLONOSCOPY N/A 07/31/2017    Procedure: COLONOSCOPY;  Surgeon: Yuliana Michael MD;  [facility] ENDO;  Service: Endoscopy;  Laterality: N/A;    DILATION AND CURETTAGE OF UTERUS      bleeding    FRACTURE SURGERY      right foot    HYSTERECTOMY  08/31/2016    RALH/BS for complex hyperplasia without atypia    OOPHORECTOMY      1 ovary removed        ALL  Review of patient's allergies indicates:   Allergen Reactions    Trulicity [dulaglutide] Shortness Of Breath and Other (See Comments)     Headaches    Aspirin Nausea Only    Hibiclens (isopropyl alcohol) Itching       SOC     Social History     Tobacco Use    Smoking status: Never    Smokeless tobacco: Never   Substance Use Topics    Alcohol use: No     Comment: rarely; stop 12/11/19 prior to sx    Drug use: No         Family HX    Family History   Problem Relation Age of Onset    Breast cancer Paternal Grandmother     Diabetes Maternal Grandmother     Hypertension Mother     Thyroid disease Mother     Breast cancer Maternal Aunt     Cancer Maternal Aunt         colon cancer    Colon cancer Maternal Aunt     Miscarriages / Stillbirths Cousin     Stroke Maternal Aunt     No Known Problems Father     Thyroid disease Sister     Thyroid disease Brother     No Known Problems Daughter     No Known Problems Son     Asthma Son     Ovarian cancer Neg Hx     Deep vein thrombosis Neg Hx     Pulmonary embolism Neg Hx             REVIEW OF SYSTEMS  General: Denies any fever or chills  Chest: Denies shortness of breath,  "wheezing, coughing, or sputum production  Heart: Denies chest pain, cold extremities, orthopenia, or reduced exercise tolerance  As noted above and per history of current illness above, otherwise negative in the remainder of the 14 systems.      PHYSICAL EXAM:      Vitals:    07/21/23 1336   Weight: 118.8 kg (262 lb)   Height: 5' 5" (1.651 m)   PainSc: 0-No pain       General: This patient is well-developed, well-nourished and appears stated age, well-oriented to person, place and time, and cooperative and pleasant on today's visit    LOWER EXTREMITY PHYSICAL EXAM  VASCULAR  Dorsalis pedis and posterior tibial pulses palpable 2/4 bilaterally.   Capillary refill time immediate to the toes.   Feet are warm to the touch. Skin temperature warm to warm from proximally to distally   There are no ecchymoses noted to bilateral foot and ankle regions.   There is no  edema noted to foot     DERMATOLOGIC  Skin moist with healthy texture and turgor.  Nail plate b/l hallux intact  There are no open ulcerations, lacerations, or fissures to bilateral foot and ankle regions.   There are no hyperkeratotic lesions noted to feet. Nails are well-trimmed.    NEUROLOGIC  Epicritic sensation is intact as the patient is able to sense light touch to bilateral foot and ankle regions.   Achilles and patellar deep tendon reflexes intact  Babinski reflex absent    ORTHOPEDIC/BIOMECHANICAL  NEGATIVE Tenderness to palpation b/l hallux  Muscle strength AT/EHL/EDL/PT: 5/5; Achilles/Gastroc/Soleus: 5/5; PB/PL: 5/5 Muscle tone is normal.  Ankle joint ROM INTACT DF/PF, non-crepitus      ASSESSMENT   Nail, injury by, subsequent encounter          PLAN    1. Patient was educated about clinical and imaging findings, and verbalizes understanding of above.  - ok to proceed with normal activity  -discussed nail removal  -RTC PRN    Future Appointments   Date Time Provider Department Center   8/24/2023  2:40 PM Ninfa Carter MD [facility]"   9/8/2023 11:00 AM Drew Gutierrez MD [facility] IM [place]   9/19/2023  8:00 AM Tiffany Goyal MD [facility] [place]   10/5/2023  8:30 AM PULMONARY LAB 2, [facility]   10/5/2023  8:45 AM PULMONARY LAB 2, [facility]  [facility]   10/5/2023  9:20 AM Josefina Mack NP [facility]  [facility]   12/12/2023  9:40 AM LABORATORY, [place] [facility] LAB [place]   12/19/2023 11:30 AM Acacia Warner PA-C [facility] DIABETE [place]   3/8/2024  3:30 PM Reza Reeves, DIANA [facility] OPHTHAL [place]           Report Electronically Signed By:     Constance Serrano DPM   Podiatry  [facility]-   7/21/2023

## 2023-08-04 NOTE — PROGRESS NOTES
[facility]  PODIATRIC MEDICINE AND SURGERY      CHIEF COMPLAINT   Chief Complaint   Patient presents with    Nail Problem     C/o cracked great toenail b/l, pt states that both nails cracked last night when wearing enclosed shoes, pt has acrylic on both toenails, rates pain 5/10, diabetic, wears sandals, last seen PCP Dr. Gutierrez on 06/13/23         HPI:    Yuli Milton is a 47 y.o. female presenting to podiatry clinic with complaint of fungal great toenail pain. Pt has acrylic overlay. States toenails cracked on dorsal aspect. Denies trauma. Admits to pain due to acrylic on toenail. Previous treatment includes none . Pt would like to proceed with nail removal. Patient denies other pedal complaints at this time. Denies any N/V/F/C/SOB/CP.      PMH  Past Medical History:   Diagnosis Date    Abnormal Pap smear of cervix     treatment??    Abnormal Pap smear of vagina     Acute pain of right shoulder 10/25/2019    Adjustment disorder with depressed mood 11/15/2014    Overview:  Multiple recent medical problems     Allergy     Anemia     Anemia 12/14/2014 6:26:13 PM    [facility] - [facility]: Anemia-No Additional Notes    Anxiety     Anxiety and depression     Asthma     Asthma 4/14/2016    Asthma 6/18/2014 1:31:21 PM    [facility] Historical - Respiratory: Asthma-No Additional Notes    Auditory hallucinations 2/17/2017    Bacterial vaginosis 1/5/2018    Bladder pain 9/1/2020    Bronchitis 10/15/2015    Bronchitis 9/18/2020    Bronchitis 10/15/2015    Cervical radiculopathy 3/10/2020    Chlamydia     Depression (emotion)     Diabetes mellitus     SANTOS (dyspnea on exertion) 10/14/2019    Dysuria 3/31/2018    Dysuria 3/31/2018    Early satiety 8/28/2020    Gallstones     Gastritis     High-risk sexual behavior 1/5/2018    History of ovarian cyst     History of syphilis     History of trichomoniasis     Hyperlipidemia     Hypertension     Interstitial cystitis 12/8/2020    Localized edema  This is our 64-year-old male patient with the above past surgical history who is presenting today with a one-day history of vague abdominal pain and nausea with evidence of small-bowel obstruction on CT scan.    -admit to General surgery service   -NG tube placement, decompression  -NPO  -discussed with him that given that he is now had multiple episodes of small-bowel obstruction over the last year he may require operative intervention this admission if he does not progress  -IV fluids  -follow-up lactic acid   "10/14/2019    Lower abdominal pain 7/31/2017    Mental disorder     Obesity 6/18/2014 1:51:54 PM    [facility] - [facility]: Obesity, Unspecified-No Additional Notes    Obstructive sleep apnea 10/5/2021    Persistent cough 7/27/2018    PONV (postoperative nausea and vomiting)     Preop general physical exam 12/9/2019    Right foot pain 11/18/2019    Routine general medical examination at a health care facility 5/21/2021    Shortness of breath 6/26/2017    SOB (shortness of breath) 6/26/2017    Suicidal ideations 7/9/2019    Syphilis 6/24/2014 7:17:31 PM    [facility] Historical - Gynecologic: Syphilis-No Additional Notes    Type 2 diabetes mellitus without complication, without long-term current use of insulin 5/25/2014    Type 2 or unspecified type diabetes mellitus 6/24/2014 7:24:47 PM    [facility] Historical - [facility]: Diabetes Mellitus Without Complication, Type II-"borderline"    Upper respiratory tract infection 1/5/2018    Urgency of micturition 11/20/2020    Vaginal candidiasis 7/24/2018    Vaginal itching 1/5/2018    Viral syndrome 12/1/2020       PROBLEM LIST  Patient Active Problem List    Diagnosis Date Noted    Gout 06/15/2023    Environmental allergies 03/03/2023    Vertigo 02/03/2023    Cervical strain 04/25/2022    Viral hepatitis A without hepatic coma 02/24/2022    SCOUT on CPAP 10/05/2021    Encounter for screening colonoscopy 09/23/2021    Pelvic floor dysfunction 04/23/2021    PVC's (premature ventricular contractions) 04/19/2021    Type 2 diabetes mellitus with hyperglycemia, without long-term current use of insulin 03/22/2021    Low vitamin D level 03/22/2021    History of COVID-19 01/22/2021    Headache 01/22/2021    Interstitial cystitis 12/08/2020    Urgency of micturition 11/20/2020    Moderate persistent asthma without complication 09/18/2020    Chronic bladder pain 09/01/2020    Status post percutaneous fixation of right 5th metatarsal fracture 12/12/2019    Essential hypertension " 10/14/2019    Mixed hyperlipidemia 10/14/2019    Screening mammogram, encounter for 05/23/2019    Delayed immunizations 05/23/2019    Moderate episode of recurrent major depressive disorder 03/15/2018    Restless leg syndrome 02/09/2018    Class 3 severe obesity due to excess calories without serious comorbidity with body mass index (BMI) of 40.0 to 44.9 in adult 02/05/2018    Fatigue 02/05/2018    High risk heterosexual behavior 01/05/2018    Keratoconjunctivitis sicca 10/10/2017    Palpitations 06/26/2017    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      albuterol (PROVENTIL) 2.5 mg /3 mL (0.083 %) nebulizer solution Take 3 mLs (2.5 mg total) by nebulization every 4 (four) hours as needed for Wheezing. Rescue 150 mL 11    albuterol (PROVENTIL/VENTOLIN HFA) 90 mcg/actuation inhaler Inhale 2 puffs into the lungs every 4 (four) hours as needed for Wheezing or Shortness of Breath. Rescue. Use with chamber. 18 g 11    allopurinoL (ZYLOPRIM) 100 MG tablet Take 1 tablet (100 mg total) by mouth once daily. 90 tablet 1    ALPRAZolam (XANAX) 0.5 MG tablet Take 0.5 mg by mouth 2 (two) times daily as needed.      atorvastatin (LIPITOR) 20 MG tablet Take 1 tablet (20 mg total) by mouth once daily. 90 tablet 0    azelaic acid (FINACEA) 15 % gel AAA bid 50 g 3    blood sugar diagnostic (BLOOD GLUCOSE TEST) Strp 1 each by Misc.(Non-Drug; Combo Route) route 3 (three) times daily. 100 each 11    blood-glucose meter kit Test finger stick blood sugar once daily. 1 each 0    celecoxib (CELEBREX) 200 MG capsule Take by mouth 2 (two) times daily.      CEQUA 0.09 % Dpet Place 1 drop into both eyes 2 (two)  times daily.      ergocalciferol (ERGOCALCIFEROL) 50,000 unit Cap Take 1 capsule (50,000 Units total) by mouth twice a week. 24 capsule 0    estradioL (ESTRACE) 0.01 % (0.1 mg/gram) vaginal cream Place 2 g vaginally once daily. 60 g 1    FLUoxetine 20 MG capsule Take 1 capsule (20 mg total) by mouth once daily. 30 capsule 0    fluticasone-salmeterol diskus inhaler 100-50 mcg Inhale 1 puff into the lungs 2 (two) times daily. Controller 60 each 11    gabapentin (NEURONTIN) 300 MG capsule Take by mouth.      hydroCHLOROthiazide (HYDRODIURIL) 12.5 MG Tab Take 1 tablet (12.5 mg total) by mouth daily as needed (leg swelling). 90 tablet 1    ibuprofen (ADVIL,MOTRIN) 800 MG tablet Take 1 tablet (800 mg total) by mouth every 8 (eight) hours as needed for Pain. 30 tablet 2    lancets Misc 1 each by Misc.(Non-Drug; Combo Route) route 4 (four) times daily. 100 each 11    LIDOcaine (LIDODERM) 5 % 1 patch once daily.      LYRICA 100 mg capsule Take 100 mg by mouth 2 (two) times daily.      methen-m.blue-s.phos-phsal-hyo (URIBEL) 118-10-40.8-36 mg Cap Take 1 capsule by mouth 3 (three) times daily as needed (dysuria). 30 capsule 5    methocarbamoL (ROBAXIN) 750 MG Tab Take 1 tablet (750 mg total) by mouth every 8 (eight) hours as needed (muscle spasm). 20 tablet 0    metroNIDAZOLE (FLAGYL) 500 MG tablet Take 500 mg by mouth every 12 (twelve) hours.      montelukast (SINGULAIR) 10 mg tablet Take 1 tablet (10 mg total) by mouth every evening. 90 tablet 3    naproxen (NAPROSYN) 500 MG tablet Take 1 tablet (500 mg total) by mouth 2 (two) times daily with meals. Take with food 30 tablet 0    nebulizer accessories Misc 1 each - needs replacement tubing and mask  0    nitrofurantoin, macrocrystal-monohydrate, (MACROBID) 100 MG capsule Take 100 mg by mouth 2 (two) times daily.      OLANZapine (ZYPREXA) 5 MG tablet Take 5 mg by mouth every evening.      oxyCODONE-acetaminophen (PERCOCET)  mg per tablet Take 1 tablet by mouth.       phenazopyridine (PYRIDIUM) 100 MG tablet Take by mouth.      predniSONE (DELTASONE) 20 MG tablet 40 mg x 5 days, then 20 mg x 5 days. 15 tablet 0    promethazine-dextromethorphan (PROMETHAZINE-DM) 6.25-15 mg/5 mL Syrp Take 5 mLs by mouth every evening. 118 mL 0    thiamine 100 MG tablet Take 100 mg by mouth once daily.      tirzepatide 10 mg/0.5 mL PnIj Inject 10 mg into the skin every 7 days. 4 pen 2    traMADoL (ULTRAM) 50 mg tablet Take 50 mg by mouth 2 (two) times daily as needed.      meclizine (ANTIVERT) 25 mg tablet Take 1 tablet (25 mg total) by mouth 3 (three) times daily as needed for Dizziness. 30 tablet 0     No current facility-administered medications on file prior to visit.       PSH     Past Surgical History:   Procedure Laterality Date    APPENDECTOMY      CHOLECYSTECTOMY      COLONOSCOPY N/A 07/31/2017    Procedure: COLONOSCOPY;  Surgeon: Yuliana Michael MD;  Location: [facility];  Service: Endoscopy;  Laterality: N/A;    DILATION AND CURETTAGE OF UTERUS      bleeding    FRACTURE SURGERY      right foot    HYSTERECTOMY  08/31/2016    RALH/BS for complex hyperplasia without atypia    OOPHORECTOMY      1 ovary removed        ALL  Review of patient's allergies indicates:   Allergen Reactions    Trulicity [dulaglutide] Shortness Of Breath and Other (See Comments)     Headaches    Aspirin Nausea Only    Hibiclens (isopropyl alcohol) Itching       SOC     Social History     Tobacco Use    Smoking status: Never    Smokeless tobacco: Never   Substance Use Topics    Alcohol use: No     Comment: rarely; stop 12/11/19 prior to sx    Drug use: No         Family HX    Family History   Problem Relation Age of Onset    Breast cancer Paternal Grandmother     Diabetes Maternal Grandmother     Hypertension Mother     Thyroid disease Mother     Breast cancer Maternal Aunt     Cancer Maternal Aunt         colon cancer    Colon cancer Maternal Aunt     Miscarriages / Stillbirths Cousin     Stroke Maternal Aunt     No Known  "Problems Father     Thyroid disease Sister     Thyroid disease Brother     No Known Problems Daughter     No Known Problems Son     Asthma Son     Ovarian cancer Neg Hx     Deep vein thrombosis Neg Hx     Pulmonary embolism Neg Hx             REVIEW OF SYSTEMS  General: Denies any fever or chills  Chest: Denies shortness of breath, wheezing, coughing, or sputum production  Heart: Denies chest pain, cold extremities, orthopenia, or reduced exercise tolerance  As noted above and per history of current illness above, otherwise negative in the remainder of the 14 systems.      PHYSICAL EXAM:      Vitals:    07/11/23 1403   Weight: 118.8 kg (262 lb)   Height: 5' 5" (1.651 m)   PainSc:   5       General: This patient is well-developed, well-nourished and appears stated age, well-oriented to person, place and time, and cooperative and pleasant on today's visit    LOWER EXTREMITY PHYSICAL EXAM  VASCULAR  Dorsalis pedis and posterior tibial pulses palpable 2/4 bilaterally.   Capillary refill time immediate to the toes.   Feet are warm to the touch. Skin temperature warm to warm from proximally to distally   There are no ecchymoses noted to bilateral foot and ankle regions.   There is no  edema noted to foot     DERMATOLOGIC  Skin moist with healthy texture and turgor.  There is fractured nail/acrylic noted b/l hallux, no acute SOI   There are no open ulcerations, lacerations, or fissures to bilateral foot and ankle regions.   There are no hyperkeratotic lesions noted to feet. Nails are well-trimmed.    NEUROLOGIC  Epicritic sensation is intact as the patient is able to sense light touch to bilateral foot and ankle regions.   Achilles and patellar deep tendon reflexes intact  Babinski reflex absent    ORTHOPEDIC/BIOMECHANICAL  Tenderness to palpation b/l hallux  Muscle strength AT/EHL/EDL/PT: 5/5; Achilles/Gastroc/Soleus: 5/5; PB/PL: 5/5 Muscle tone is normal.  Ankle joint ROM INTACT DF/PF, non-crepitus      ASSESSMENT "   Nail, injury by, initial encounter    Dermatophytosis of nail        PLAN    1. Patient was educated about clinical and imaging findings, and verbalizes understanding of above.    -With patient's permission, the elongated toenails, as outlined in the physical examination, were sharply debrided with a double action nail nipper to their soft tissue attachment.    Will schedule b/l total nail avulsion     Future Appointments   Date Time Provider Department Center   7/21/2023  1:45 PM Constance Serrano DPM [facility] POD [place]   8/24/2023  2:40 PM Ninfa Carter MD [facility] LIFE [facility]   9/8/2023 11:00 AM Drew Gutierrez MD [facility] IM [place]   9/19/2023  8:00 AM Tiffany Goyal MD [facility] ALLERGY [facility]   10/5/2023  8:30 AM PULMONARY LAB 2, [facility] PULMFS [facility]   10/5/2023  8:45 AM PULMONARY LAB 2, ONLC [facility] PULMFS [facility]   10/5/2023  9:20 AM Josefina Mack NP ONLC PULMSVC [facility]   12/12/2023  9:40 AM LABORATORY, [place] PRV LAB [place]   12/19/2023 11:30 AM Acacia Warner PA-C [facility] DIABETE [facility]   3/8/2024  3:30 PM Reza Reeves OD [facility] OPHTHAL [place]           Report Electronically Signed By:     Constance Serrano DPM   Podiatry  [facility]-   7/11/2023

## 2023-08-16 ENCOUNTER — TELEPHONE (OUTPATIENT)
Dept: INTERNAL MEDICINE | Facility: CLINIC | Age: 48
End: 2023-08-16
Payer: COMMERCIAL

## 2023-08-16 ENCOUNTER — OFFICE VISIT (OUTPATIENT)
Dept: URGENT CARE | Facility: CLINIC | Age: 48
End: 2023-08-16
Payer: COMMERCIAL

## 2023-08-16 VITALS
OXYGEN SATURATION: 98 % | HEIGHT: 65 IN | BODY MASS INDEX: 43.49 KG/M2 | WEIGHT: 261 LBS | SYSTOLIC BLOOD PRESSURE: 109 MMHG | RESPIRATION RATE: 18 BRPM | DIASTOLIC BLOOD PRESSURE: 58 MMHG | TEMPERATURE: 97 F | HEART RATE: 68 BPM

## 2023-08-16 DIAGNOSIS — N89.8 VAGINAL ITCHING: ICD-10-CM

## 2023-08-16 DIAGNOSIS — N89.8 VAGINAL ODOR: ICD-10-CM

## 2023-08-16 DIAGNOSIS — N89.8 VAGINAL IRRITATION: Primary | ICD-10-CM

## 2023-08-16 LAB
BILIRUB UR QL STRIP: NEGATIVE
GLUCOSE UR QL STRIP: NEGATIVE
KETONES UR QL STRIP: NEGATIVE
LEUKOCYTE ESTERASE UR QL STRIP: NEGATIVE
PH, POC UA: 5.5
POC BLOOD, URINE: NEGATIVE
POC NITRATES, URINE: NEGATIVE
PROT UR QL STRIP: NEGATIVE
SP GR UR STRIP: 1.02 (ref 1–1.03)
UROBILINOGEN UR STRIP-ACNC: NORMAL (ref 0.1–1.1)

## 2023-08-16 PROCEDURE — 99214 OFFICE O/P EST MOD 30 MIN: CPT | Mod: S$GLB,,, | Performed by: PHYSICIAN ASSISTANT

## 2023-08-16 PROCEDURE — 81003 POCT URINALYSIS, DIPSTICK, AUTOMATED, W/O SCOPE: ICD-10-PCS | Mod: QW,S$GLB,, | Performed by: PHYSICIAN ASSISTANT

## 2023-08-16 PROCEDURE — 87591 N.GONORRHOEAE DNA AMP PROB: CPT | Performed by: PHYSICIAN ASSISTANT

## 2023-08-16 PROCEDURE — 81003 URINALYSIS AUTO W/O SCOPE: CPT | Mod: QW,S$GLB,, | Performed by: PHYSICIAN ASSISTANT

## 2023-08-16 PROCEDURE — 81514 NFCT DS BV&VAGINITIS DNA ALG: CPT | Performed by: PHYSICIAN ASSISTANT

## 2023-08-16 PROCEDURE — 99214 PR OFFICE/OUTPT VISIT, EST, LEVL IV, 30-39 MIN: ICD-10-PCS | Mod: S$GLB,,, | Performed by: PHYSICIAN ASSISTANT

## 2023-08-16 RX ORDER — TIZANIDINE HYDROCHLORIDE 4 MG/1
4 TABLET ORAL NIGHTLY
COMMUNITY
Start: 2023-08-08

## 2023-08-16 NOTE — TELEPHONE ENCOUNTER
Spoke with patient she has a Vaginal discharge and wanted to be seen today. No openings at  nor [facility]. Patient stated that she took some old flagyl that she had and monistat and nothing is working. [facility] to be [facility]

## 2023-08-16 NOTE — PATIENT INSTRUCTIONS
YOU HAD SEXUALLY TRANSMITTED DISEASE CONCERNS TODAY:      - We will call you with test results within the next week  - Please use condoms for future sexual intercourse  - Please seek medical attention if you begin to experience any new symptoms.     Increase condom use to prevent further occurance.  Notify sexual partners of the need for testing.  Complete ALL medication prescribed!    NO sexual intercourse now and UNTIL 7 days after treatment.     Retest to ensure infection has cleared-there are infections that require more agressive treatment.  Retest for all STDS in 6 weeks and again in 6 months to ensure true negative results.      Please note: Today's testing will give no crediable information if you have unprotected sexual activities going forward. Syphillis cases are rising!  Gonorrhea has RESISTANT strains which is why repeat testing after treatment is important.  Gonorrhea may be present in multiple sites from just ONE area of exposure.  For those who have high risk sexual behaviors and are on Truvada for PrEP- you have additional protection against HIV ONLY.      REMEMBER WEAR CONDOMS AND GET TESTED OFTEN!     A culture of your vagina is pending. This tests for bacterial vaginosis, Trichomonas, and yeast. You will be contacted once it results in about 3-7 days and appropriate action will be taken.      Try taking an over the counter daily women's probiotic or eating Greek yogurt to balance your pH.     Please return or see your OBGYN/primary care doctor if you develop new or worsening symptoms.      If you have been discharged from the clinic prior to your point of care test results being completed, please make sure to check your [facility] account.  If there is a change in treatment, we will communicate with you through here.  If your test is positive, and medications are ordered, these will be sent to your preferred pharmacy.   If your test is negative, no further steps needed. If you do not hear from us or  have questions, please call the clinic.      - You must understand that you have received an Urgent Care treatment only and that you may be released before all of your medical problems are known or treated.   - You, the patient, will arrange for follow up care as instructed with your primary care provider or recommended specialist.   - If your condition worsens or fails to improve we recommend that you receive another evaluation at the ER immediately or contact your PCP to discuss your concerns, or return here.   - Please do not drive or make any important decisions for 24 hours if you have received any pain medications, sedatives or mood altering drugs during your visit.    Disclaimer: This document was drafted with the use of a voice recognition device and is likely to have sound alike errors.

## 2023-08-16 NOTE — TELEPHONE ENCOUNTER
----- Message from Ana Shipman sent at 8/16/2023  3:14 PM CDT -----  Contact: erasmo Roldan  .Type:  Same Day Appointment Request    Caller is requesting a same day appointment.  Caller declined first available appointment listed below.    Name of Caller: erasmo Roldan   When is the first available appointment? 8/17/23  Symptoms: Vaginal issues  Best Call Back Number: [phone]   Additional Information:

## 2023-08-16 NOTE — PROGRESS NOTES
"Subjective:      Patient ID: Yuli Milton is a 47 y.o. female.    Vitals:  height is 5' 5" (1.651 m) and weight is 118.4 kg (261 lb). Her tympanic temperature is 97.3 °F (36.3 °C). Her blood pressure is 109/58 (abnormal) and her pulse is 68. Her respiration is 18 and oxygen saturation is 98%.     Chief Complaint: Vaginal Itching    Patient present with vaginal itching, burning, and odor x 2 weeks. Patient is also experiencing pressure in lower abdomen.     Vaginal Itching  The patient's primary symptoms include genital itching, a genital odor and vaginal discharge. The current episode started 1 to 4 weeks ago. The problem occurs constantly. The problem has been unchanged. The patient is experiencing no pain. She is not pregnant. Pertinent negatives include no abdominal pain, anorexia, back pain, chills, constipation, diarrhea, discolored urine, dysuria, fever, flank pain, frequency, headaches, hematuria, joint pain, joint swelling, nausea, painful intercourse, rash, sore throat, urgency or vomiting. Nothing aggravates the symptoms. She has tried antifungals for the symptoms. The treatment provided no relief. She is sexually active. It is unknown whether or not her partner has an STD. She uses nothing for contraception. Menstrual history: hysterectomy. Her past medical history is significant for a gynecological surgery. There is no history of an abdominal surgery, a  section, an ectopic pregnancy, endometriosis, herpes simplex, menorrhagia, metrorrhagia, miscarriage, ovarian cysts, perineal abscess, PID, an STD, a terminated pregnancy or vaginosis.       Constitution: Negative for chills and fever.   HENT:  Negative for sore throat.    Gastrointestinal:  Negative for abdominal pain, history of abdominal surgery, nausea, vomiting, constipation and diarrhea.   Genitourinary:  Positive for vaginal pain, vaginal discharge and vaginal odor. Negative for dysuria, frequency, urgency, flank pain, hematuria and " "ovarian cysts.   Musculoskeletal:  Negative for back pain.   Skin:  Negative for rash.   Neurological:  Negative for headaches.      Objective:     Vitals:    08/16/23 1611   BP: (!) 109/58   BP Location: Left arm   Patient Position: Sitting   BP Method: Large (Automatic)   Pulse: 68   Resp: 18   Temp: 97.3 °F (36.3 °C)   TempSrc: Tympanic   SpO2: 98%   Weight: 118.4 kg (261 lb)   Height: 5' 5" (1.651 m)       Physical Exam   Constitutional: She is oriented to person, place, and time. She appears well-developed. She does not appear ill. obesity  HENT:   Head: Normocephalic and atraumatic.   Ears:   Right Ear: External ear normal.   Left Ear: External ear normal.   Nose: Nose normal. No nasal deformity. No epistaxis.   Mouth/Throat: Oropharynx is clear and moist and mucous membranes are normal.   Eyes: Conjunctivae and lids are normal.   Neck: Trachea normal and phonation normal. Neck supple.   Cardiovascular: Normal rate, regular rhythm, normal heart sounds and normal pulses.   Pulmonary/Chest: Effort normal and breath sounds normal.   Abdominal: Normal appearance and bowel sounds are normal. She exhibits no distension. Soft. There is no abdominal tenderness.   Genitourinary:         Comments: Patient elected to self swab     Neurological: She is alert and oriented to person, place, and time.   Skin: Skin is warm, dry and intact.   Psychiatric: Her speech is normal and behavior is normal.   Nursing note and vitals reviewed.      Assessment:     Results for orders placed or performed in visit on 08/16/23   POCT Urinalysis, Dipstick, Automated, W/O Scope   Result Value Ref Range    POC Blood, Urine Negative Negative    POC Bilirubin, Urine Negative Negative    POC Urobilinogen, Urine norm 0.1 - 1.1    POC Ketones, Urine Negative Negative    POC Protein, Urine Negative Negative    POC Nitrates, Urine Negative Negative    POC Glucose, Urine Negative Negative    pH, UA 5.5     POC Specific Gravity, Urine 1.025 1.003 - " 1.029    POC Leukocytes, Urine Negative Negative     *Note: Due to a large number of results and/or encounters for the requested time period, some results have not been displayed. A complete set of results can be found in Results Review.       1. Vaginal irritation    2. Vaginal odor    3. Vaginal itching        Plan:       Vaginal irritation  -     POCT Urinalysis, Dipstick, Automated, W/O Scope  -     Vaginosis Screen by DNA Probe  -     C. trachomatis/N. gonorrhoeae by AMP DNA [facility]; Urine    Vaginal odor    Vaginal itching          Medical Decision Making:   Initial Assessment:   Vital signs stable    Patient reports recent intercourse 3 weeks ago however it was protected per patient.  However she reports that symptoms started shortly after intercourse.  Differential Diagnosis:   STD  Vaginosis  Vaginitis  UTI  Clinical Tests:   Lab Tests: Ordered and Reviewed  The following lab test(s) were unremarkable: Urinalysis  Urgent Care Management:  Patient reports that she is already tried a 7 day course of Flagyl and over-the-counter yeast medication without relief.  Therefore we are waiting for results  for appropriate treatment.         Patient Instructions   YOU HAD SEXUALLY TRANSMITTED DISEASE CONCERNS TODAY:      - We will call you with test results within the next week  - Please use condoms for future sexual intercourse  - Please seek medical attention if you begin to experience any new symptoms.     Increase condom use to prevent further occurance.  Notify sexual partners of the need for testing.  Complete ALL medication prescribed!    NO sexual intercourse now and UNTIL 7 days after treatment.     Retest to ensure infection has cleared-there are infections that require more agressive treatment.  Retest for all STDS in 6 weeks and again in 6 months to ensure true negative results.      Please note: Today's testing will give no crediable information if you have unprotected sexual activities going forward.  Syphillis cases are rising!  Gonorrhea has RESISTANT strains which is why repeat testing after treatment is important.  Gonorrhea may be present in multiple sites from just ONE area of exposure.  For those who have high risk sexual behaviors and are on Truvada for PrEP- you have additional protection against HIV ONLY.      REMEMBER WEAR CONDOMS AND GET TESTED OFTEN!     A culture of your vagina is pending. This tests for bacterial vaginosis, Trichomonas, and yeast. You will be contacted once it results in about 3-7 days and appropriate action will be taken.      Try taking an over the counter daily women's probiotic or eating Greek yogurt to balance your pH.     Please return or see your OBGYN/primary care doctor if you develop new or worsening symptoms.      If you have been discharged from the clinic prior to your point of care test results being completed, please make sure to check your [facility] account.  If there is a change in treatment, we will communicate with you through here.  If your test is positive, and medications are ordered, these will be sent to your preferred pharmacy.   If your test is negative, no further steps needed. If you do not hear from us or have questions, please call the clinic.      - You must understand that you have received an Urgent Care treatment only and that you may be released before all of your medical problems are known or treated.   - You, the patient, will arrange for follow up care as instructed with your primary care provider or recommended specialist.   - If your condition worsens or fails to improve we recommend that you receive another evaluation at the ER immediately or contact your PCP to discuss your concerns, or return here.   - Please do not drive or make any important decisions for 24 hours if you have received any pain medications, sedatives or mood altering drugs during your visit.    Disclaimer: This document was drafted with the use of a voice recognition device  and is likely to have sound alike errors.

## 2023-08-18 ENCOUNTER — PATIENT MESSAGE (OUTPATIENT)
Dept: DIABETES | Facility: CLINIC | Age: 48
End: 2023-08-18
Payer: COMMERCIAL

## 2023-08-18 DIAGNOSIS — E11.65 TYPE 2 DIABETES MELLITUS WITH HYPERGLYCEMIA, WITHOUT LONG-TERM CURRENT USE OF INSULIN: Primary | ICD-10-CM

## 2023-08-18 LAB
C TRACH DNA SPEC QL NAA+PROBE: NOT DETECTED
N GONORRHOEA DNA SPEC QL NAA+PROBE: NOT DETECTED

## 2023-08-19 ENCOUNTER — TELEPHONE (OUTPATIENT)
Dept: URGENT CARE | Facility: CLINIC | Age: 48
End: 2023-08-19
Payer: COMMERCIAL

## 2023-08-19 NOTE — TELEPHONE ENCOUNTER
Patient notified of all negative results below.  She was pleased with her results and visit.  Reports that she is still having some vaginal itching but will follow-up with OBGYN.    Results for orders placed or performed in visit on 08/16/23   Vaginosis Screen by DNA Probe    Specimen: Vagina; Genital   Result Value Ref Range    Trichomonas vaginalis Negative Negative    Candida sp Negative Negative    Candida glabrata DNA Negative Negative    Candida krusei DNA Negative Negative    Bacterial vaginosis DNA Negative Negative   C. trachomatis/N. gonorrhoeae by AMP DNA [facility]; Urine    Specimen: Genital   Result Value Ref Range    Chlamydia, Amplified DNA Not Detected Not Detected    N gonorrhoeae, amplified DNA Not Detected Not Detected   POCT Urinalysis, Dipstick, Automated, W/O Scope   Result Value Ref Range    POC Blood, Urine Negative Negative    POC Bilirubin, Urine Negative Negative    POC Urobilinogen, Urine norm 0.1 - 1.1    POC Ketones, Urine Negative Negative    POC Protein, Urine Negative Negative    POC Nitrates, Urine Negative Negative    POC Glucose, Urine Negative Negative    pH, UA 5.5     POC Specific Gravity, Urine 1.025 1.003 - 1.029    POC Leukocytes, Urine Negative Negative     *Note: Due to a large number of results and/or encounters for the requested time period, some results have not been displayed. A complete set of results can be found in Results Review.

## 2023-08-21 RX ORDER — TIRZEPATIDE 15 MG/.5ML
15 INJECTION, SOLUTION SUBCUTANEOUS
Qty: 4 PEN | Refills: 11 | Status: SHIPPED | OUTPATIENT
Start: 2023-08-21 | End: 2023-12-27 | Stop reason: SDUPTHER

## 2023-08-22 ENCOUNTER — PATIENT MESSAGE (OUTPATIENT)
Dept: INTERNAL MEDICINE | Facility: CLINIC | Age: 48
End: 2023-08-22
Payer: COMMERCIAL

## 2023-08-24 ENCOUNTER — OFFICE VISIT (OUTPATIENT)
Dept: PRIMARY CARE CLINIC | Facility: CLINIC | Age: 48
End: 2023-08-24
Payer: COMMERCIAL

## 2023-08-24 VITALS
TEMPERATURE: 97 F | RESPIRATION RATE: 18 BRPM | HEART RATE: 75 BPM | WEIGHT: 259.25 LBS | BODY MASS INDEX: 43.19 KG/M2 | HEIGHT: 65 IN | OXYGEN SATURATION: 99 % | DIASTOLIC BLOOD PRESSURE: 80 MMHG | SYSTOLIC BLOOD PRESSURE: 128 MMHG

## 2023-08-24 DIAGNOSIS — F32.A ANXIETY AND DEPRESSION: ICD-10-CM

## 2023-08-24 DIAGNOSIS — E66.01 CLASS 3 SEVERE OBESITY DUE TO EXCESS CALORIES WITHOUT SERIOUS COMORBIDITY WITH BODY MASS INDEX (BMI) OF 40.0 TO 44.9 IN ADULT: ICD-10-CM

## 2023-08-24 DIAGNOSIS — E11.65 TYPE 2 DIABETES MELLITUS WITH HYPERGLYCEMIA, WITHOUT LONG-TERM CURRENT USE OF INSULIN: Primary | ICD-10-CM

## 2023-08-24 DIAGNOSIS — R00.2 PALPITATIONS: ICD-10-CM

## 2023-08-24 DIAGNOSIS — I10 ESSENTIAL HYPERTENSION: ICD-10-CM

## 2023-08-24 DIAGNOSIS — G47.33 OSA ON CPAP: ICD-10-CM

## 2023-08-24 DIAGNOSIS — E78.2 MIXED HYPERLIPIDEMIA: ICD-10-CM

## 2023-08-24 DIAGNOSIS — J45.40 MODERATE PERSISTENT ASTHMA WITHOUT COMPLICATION: ICD-10-CM

## 2023-08-24 DIAGNOSIS — F41.9 ANXIETY AND DEPRESSION: ICD-10-CM

## 2023-08-24 PROCEDURE — 3074F SYST BP LT 130 MM HG: CPT | Mod: CPTII,S$GLB,, | Performed by: FAMILY MEDICINE

## 2023-08-24 PROCEDURE — 3044F HG A1C LEVEL LT 7.0%: CPT | Mod: CPTII,S$GLB,, | Performed by: FAMILY MEDICINE

## 2023-08-24 PROCEDURE — 1160F PR REVIEW ALL MEDS BY PRESCRIBER/CLIN PHARMACIST DOCUMENTED: ICD-10-PCS | Mod: CPTII,S$GLB,, | Performed by: FAMILY MEDICINE

## 2023-08-24 PROCEDURE — 99999 PR PBB SHADOW E&M-EST. PATIENT-LVL V: CPT | Mod: PBBFAC,,, | Performed by: FAMILY MEDICINE

## 2023-08-24 PROCEDURE — 99214 PR OFFICE/OUTPT VISIT, EST, LEVL IV, 30-39 MIN: ICD-10-PCS | Mod: S$GLB,,, | Performed by: FAMILY MEDICINE

## 2023-08-24 PROCEDURE — 1159F PR MEDICATION LIST DOCUMENTED IN MEDICAL RECORD: ICD-10-PCS | Mod: CPTII,S$GLB,, | Performed by: FAMILY MEDICINE

## 2023-08-24 PROCEDURE — 1160F RVW MEDS BY RX/DR IN RCRD: CPT | Mod: CPTII,S$GLB,, | Performed by: FAMILY MEDICINE

## 2023-08-24 PROCEDURE — 3044F PR MOST RECENT HEMOGLOBIN A1C LEVEL <7.0%: ICD-10-PCS | Mod: CPTII,S$GLB,, | Performed by: FAMILY MEDICINE

## 2023-08-24 PROCEDURE — 3008F BODY MASS INDEX DOCD: CPT | Mod: CPTII,S$GLB,, | Performed by: FAMILY MEDICINE

## 2023-08-24 PROCEDURE — 3008F PR BODY MASS INDEX (BMI) DOCUMENTED: ICD-10-PCS | Mod: CPTII,S$GLB,, | Performed by: FAMILY MEDICINE

## 2023-08-24 PROCEDURE — 3074F PR MOST RECENT SYSTOLIC BLOOD PRESSURE < 130 MM HG: ICD-10-PCS | Mod: CPTII,S$GLB,, | Performed by: FAMILY MEDICINE

## 2023-08-24 PROCEDURE — 99214 OFFICE O/P EST MOD 30 MIN: CPT | Mod: S$GLB,,, | Performed by: FAMILY MEDICINE

## 2023-08-24 PROCEDURE — 1159F MED LIST DOCD IN RCRD: CPT | Mod: CPTII,S$GLB,, | Performed by: FAMILY MEDICINE

## 2023-08-24 PROCEDURE — 99999 PR PBB SHADOW E&M-EST. PATIENT-LVL V: ICD-10-PCS | Mod: PBBFAC,,, | Performed by: FAMILY MEDICINE

## 2023-08-24 PROCEDURE — 3079F PR MOST RECENT DIASTOLIC BLOOD PRESSURE 80-89 MM HG: ICD-10-PCS | Mod: CPTII,S$GLB,, | Performed by: FAMILY MEDICINE

## 2023-08-24 PROCEDURE — 3079F DIAST BP 80-89 MM HG: CPT | Mod: CPTII,S$GLB,, | Performed by: FAMILY MEDICINE

## 2023-08-24 NOTE — PROGRESS NOTES
Subjective   Pmhx, fam hx, soc hx, surg hx, allergies, med list reviewed  Referring MD: Alana  PCP: Matt  BMI noted 43  Diet: variable  Exercise/Activity: light  Sleep: fair--recently less; just had zoom with psychiatrist  (Dr. Ambrose in [place])  Stressors: as above  Anxiety/Depression Screen/PHQ-2: stable    Drives transit for [facility]    Labs reviewed  6/23 a1c 5.4 down from 10.5 11 mos ago  On mounjaro  Lipid 9/22 hdl < 50    Patient ID: Yuli Milton is a 47 y.o. female.  Wt Readings from Last 3 Encounters:   08/24/23 1443 117.6 kg (259 lb 4.2 oz)   08/16/23 1611 118.4 kg (261 lb)   07/21/23 1336 118.8 kg (262 lb)       Chief Complaint: weight gain/hx of Diabetes    Pt has been on mounjaro for a few months. Is about to start the higher dosage (15 mg).    Pt has considered bariatric surgery. When had psych magda (at that time) was working on anxiety and depression. Put back on prozac, zyprexa, and xanax. She noticed some weight gain with these. Helped moods.     Was on metformin but taken off due to gi upset.     Has seen Pulm - was on CPAP but now is not compliant. Hx of asthma; is med compliant.     Has been to diabetes education.    Was on Wellbutrin in the past; unsure why stopped. She cannot recall if had se's.   Pt denies hx of seizures or known bipolar d/o. Hx of auditory hallucinations with her depression.   No known topamax use. She may dw pyschiatrist. (Pt has had hys. NO hx of glaucoma or renal stones).  Used adipex and made heart race.    Failed ozempic-- was on 2 mg and did not have weight loss        Food recall:  Bfast: biscuit and egg, sometimes skips  Lunch: recently replaced fast food with hospital cafeteria  Chicken, fish, beans/rice, potatoes  Dinner: baked/air fried meats  Snack: no chips/candy  Water: adequate  Sugar Sweetened beverages: water, crystal light  Regular fruit punch  No tea or soda  Weakness: rice and fried meats  Etoh: occasional  daiquiri          HPI  Review of Systems   Constitutional:  Negative for activity change, appetite change, fatigue and fever.   HENT:  Negative for mouth dryness and goiter.    Eyes:  Negative for visual disturbance.   Respiratory:  Negative for apnea, cough, chest tightness and shortness of breath.    Cardiovascular:  Negative for chest pain, palpitations and leg swelling.   Gastrointestinal:  Negative for abdominal pain, constipation and diarrhea.   Endocrine: Negative for cold intolerance, heat intolerance, polydipsia, polyphagia and polyuria.   Genitourinary:  Negative for frequency and menstrual problem.   Musculoskeletal:  Negative for arthralgias and myalgias.   Integumentary:  Negative for color change and rash.   Psychiatric/Behavioral:  Negative for sleep disturbance. The patient is not nervous/anxious.           Objective     Physical Exam  Vitals and nursing note reviewed.   Constitutional:       General: She is not in acute distress.  HENT:      Head: Normocephalic and atraumatic.      Mouth/Throat:      Pharynx: Oropharynx is clear.   Eyes:      General: No scleral icterus.     Pupils: Pupils are equal, round, and reactive to light.   Neck:      Comments: No TM  Cardiovascular:      Rate and Rhythm: Normal rate and regular rhythm.      Pulses: Normal pulses.      Heart sounds: Normal heart sounds. No murmur heard.     No friction rub. No gallop.   Pulmonary:      Effort: Pulmonary effort is normal. No respiratory distress.      Breath sounds: Normal breath sounds. No wheezing, rhonchi or rales.   Abdominal:      General: Bowel sounds are normal. There is no distension.      Palpations: Abdomen is soft.      Tenderness: There is no abdominal tenderness.   Musculoskeletal:         General: No swelling.      Cervical back: Normal range of motion and neck supple. No tenderness.   Lymphadenopathy:      Cervical: No cervical adenopathy.   Skin:     General: Skin is warm.      Capillary Refill: Capillary  refill takes less than 2 seconds.      Findings: No erythema or rash.   Neurological:      Mental Status: She is alert and oriented to person, place, and time.   Psychiatric:         Mood and Affect: Mood normal.         Behavior: Behavior normal.            Assessment and Plan         ICD-10-CM ICD-9-CM   1. Type 2 diabetes mellitus with hyperglycemia, without long-term current use of insulin  E11.65 250.00     790.29   2. Class 3 severe obesity due to excess calories without serious comorbidity with body mass index (BMI) of 40.0 to 44.9 in adult  E66.01 278.01    Z68.41 V85.41   3. Essential hypertension  I10 401.9   4. Mixed hyperlipidemia  E78.2 272.2   5. Palpitations  R00.2 785.1   6. Anxiety and depression  F41.9 300.00    F32.A 311   7. SCOUT on CPAP  G47.33 327.23    Z99.89 V46.8   8. Moderate persistent asthma without complication  J45.40 493.90     Type 2 diabetes mellitus with hyperglycemia, without long-term current use of insulin  -     Ambulatory Referral/Consult to Nutrition Services - [facility]; Future; Expected date: 08/31/2023    Class 3 severe obesity due to excess calories without serious comorbidity with body mass index (BMI) of 40.0 to 44.9 in adult  Comments:  extensive dw pt about medications available for weight mgmt  She has CI or has failed all options-she may consider reapplying for bariatric program  She had recent increase in mounjaro to 15 mg; agree with this  (Failed ozempic)  Referral nutrition for better dietary control; she has at times heavy starch meals/fried foods/punches  Not checking glucose; declines cgm--->she will dw diabetes   Pt goal 200 lb    Orders:  -     Ambulatory Referral/Consult to Nutrition Services - [facility]; Future; Expected date: 08/31/2023    Essential hypertension  Comments:  chronic/stable; continue meds    Mixed hyperlipidemia  Comments:  chronic/stable  continue meds    Palpitations  Comments:  chronic/stable    Anxiety and  depression  Comments:  rx sent in today by psych for prozac, zyprexa, and xanax    SCOUT on CPAP  Comments:  chronic; non compliant due to anxiety    Moderate persistent asthma without complication  Comments:  chronic/stable

## 2023-08-24 NOTE — PATIENT INSTRUCTIONS
"Goal: < 25 grams added sugar/day  Have protein with every meal            PRODUCE  [] All fresh fruit   [] All fresh vegetables   [] All fresh herbs  [] All herb purees + pastes  [] Pre-spiralized vegetable noodles   [] Steam-In-The-Bag begetables  [] Riced cauliflower  [] Jicama sticks  [] Love Beets  all varieties  [] Wholly Guacamole  all varieties  [] Hummus  all varieties, chickpea + vegetable  [] Tofu Shirataki noodles    [] Tofu  all varieties  [] Tempeh  all varieties    PROTEIN  CHICKEN   [] Boneless, skinless breasts  [] Boneless, skinless thighs  [] Ground chicken breast, at least 93% lean  [] Chicken breast cutlet  [] Aidell's  Chicken Sausage + Chicken Meatballs    TURKEY   [] Turkey breast tenderloin   [] Ground turkey breast, at least 93% lean  [] [place] Naturals  Turkey Sausage    BEEF  [] Tenderloin  [] Sirloin  [] Top Loin  [] Flank Steak  [] Round Steak  [] Filet  [] Lean ground beef, at least 93% lean + grass-fed preferable    PORK  [] Tenderloin  [] Pork Chop  [] Center Cut  [] Kasandra Naturals  No-Sugar Bhandari    BISON  [] Ragland  90 - 95% lean    SEAFOOD  [] All fresh fish + seafood; locally sourced when possible  [] Smoked salmon    HEAT + EAT ENTREES   [] Julian's Natural Foods  Chicken, Pork, Beef  [] Alec  "All Natural" Grilled Chicken Breast + Strips, all varieties    SAUCES SPREADS + DIPS  [] Bitchin Sauce  Original, Chipotle, Cilantro [place]  [] Pancho's Kitchen  Tzatziki Yogurt Dip, Babaganoush, Hummus  [] Wholly Guacamole  all varieties  [] Hummus  all varieties  [] Ratcliff Gringo Salsa  all varieties  [] Mrs. Yoana's Salsa  all varieties  [] Stubb's All Natural BBQ Sauce  [] Primal Kitchen  Garvin, Ketchup, BBQ Sauce  [] Primal Kitchen Pasta Sauce  Roasted Garlic, Tomato Basil, no-dairy Vodka Sauce  [] Sal & Shelly's  HeartSmart Pasta Sauce    DAIRY/DAIRY SUBSTITUTES/EGGS  EGGS   [] All eggs  cage-free, pasture-raise preferable  [] Reynaldoi  egg wraps  [] " Vital Farms  Pasture-Raised Egg Bites  [] JUST Egg [vegan]     CREAMERS   [] Califia  Better Half, original + vanilla unsweetened  [] NutPods  all varieties    MILK   [] Horizon Organic  all varieties except chocolate  [] Organic Valley  all varieties except chocolate  [] Organic Valley  ultra-filtered, reduced fat milk     PLANT_BASED MILK ALTERNATIVES  [] All unsweetened almond milks  original, vanilla + chocolate  [] Ripple  unsweetened   [] Milkadamia  original +_ vanilla, unsweetened   [] Forager  original + vanilla, unsweetened   [] Silk Organic  soy milk, unsweetened  [] Oatly  unsweetened  [] Califia  regular + protein-fortified oat milk, unsweetened     CHEESES  [] Regular or reduced fat cheeses  [] BelGioso  Fresh Mozzarella Snack Packs, Parmesan Power-full Snack   [] Goat cheese  [] Fresh mozzarella  [] String cheese  all varieties  [] Mona Cottage Cheese  [] Chelsie's Cultured Cottage Cheese  [] Hermelinda Life 'Just Like Mozzarella'  plant-based shreds and other varieties  [] Parmela Creamery  plant-based shredded cheese    YOGURT  [] Fage  2% low-fat, plain  [] Siggi's  plain, vanilla  [] Chobani Greek  nonfat + whole milk yogurt, plain   [] Chobani Less Sugar  all flavored varieties   [] Oikos Greek  nonfat, plain  [] Two Good  all varieties   [] [facility] Provisions  plain  [] [place] Organic  low-fat + nonfat, plain  [] [facility]  goat milk yogurt, plain  [] Kefit  unsweetened, plain  [] Forager  Greek style unsweetened, plain [dairy-free]  [] [place]  unsweetened Greek style, plain [dairy-free]  [] [facility]  almond milk yogurt, vanilla or plain, unsweetened [dairy-free]    FREEZER SECTION  FROZEN VEGGIES  [] All plain frozen veggies + greens [e.g. broccoli, brussels, carrots, okra, mushrooms, zucchini, yellow squash, butternut squash, kale, spinach, andree greens]  [] Riced veggies [e.g. cauliflower, broccoli, butternut squash]  [] Edamame  all varieties  [] Green  Giant  [] Veggie Spirals  [] Marinated Veggies [e.g. eggplant, peppers, zucchini]  [] Simply Steam [place] Sprouts  [] Birds Eye  [] Power Blend Italian Style  lentils, broccoli, kale, zucchini  []  Keota Sprouts & Carrots  [] Oven Roasters Broccoli & Cauliflower  [] [place] Blend  [] Tattooed   [] Green Bean Blend  [] Farmer's Market Ratatouille  [] Butter Balsamic Glazed Vegetables  [] Riced Cauliflower & Quinoa Mediterranean Style  [] Marisela's Good Life  Southern Style Greens [sauteed kale + onion]    FROZEN FRUITS  [] All unsweetened frozen fruits  all varieties  [] Dole Fruits & Veggie Blends  Berries 'n Kale  [] Dole Mix-ins  Triple Berry     FROZEN ENTREES  [] The Good Kitchen meals  all varieties [ e.g. Chili Lime Chicken Over Riced Cauliflower]  [] Premium Paleo  Not Sebastian Momma's Meatloaf  [] Primal Kitchen  Chicken Pesto + Steak Fajitas w/ Peppers & Onions  [] Eating Well Frozen Entrees  Butter Chicken Masala, Steak Carne Asada, Creamy Pesto Chicken, Chicken + Wild Rice Stroganoff, Yellow [place] Chicken, Sun-dried Tomato Chicken, Chicken Lo Mein  [] Realgood Entree Bowls  Citizen of [place] Inspired Beef Bowl over Riced Cauliflower, Chicken Burrito Bowl   [] Great Karma Coconut [place]  [] Jaelyn's  [place]ale Alan with Black Beans, Vegetable Lasagna  [] Kashi Mayan [place] Bake  [] Healthy Choice  Simply Steamers Chicken Fried Rice  [] Basil Pesto Chicken & [place] Style Pork Power Bowls  [] Tattooed   Enchilada Bowl  [] Umberto Farms  Spicy Black Bean Burgers    FROZEN PIZZAS  [] Cauli'flour Foods  Cauliflower Pizza Crusts  [] Outer Aisle  Cauliflower Crust  [] Jaelyn's  Veggie Crust Cheese Pizza  [] Quest Pizza     VEGETARIAN PRODUCTS  [] Beyond Meat  ground 'meat' + grilled 'chicken' strips  [] Tofurkey  Original Italian Sausage + Original Tempeh  [] Alexandre  Beefless Ground + Meatless Meatballs  [] [facility] Grillers  Original Burger, Crumbles, Meatballs    ICE  CREAMS + FROZEN DESSERTS  [] Halo Top  regular + keto series, pops  [] Rebel  ice cream + dessert sandwiches  [] Enlightened  ice cream + bars  [] Nightfood  ice cream  [] Realgood  ice cream  [] Arctic Zero Fit  frozen pint  [] The Frozen Farmer  sorbets  [] Wholly Rollies  Protein Balls, all varieties  [] Dream Pops  Coconut Latte    FROZEN BREAKFASTS  [] Realgood  Breakfast Sandwiches on Cauliflower Cheesy Bread  [] Rebel  ice cream + dessert sandwiches  [] Enlightened  ice cream + bars  [] Nightfood  ice cream  [] Realgood  ice cream  [] Arctic Zero Fit  frozen pint  [] The Frozen Farmer  sorbets  [] Wholly Rollies  Protein Balls, all varieties  [] Dream Pops  Coconut Latte    BREADS/BUNS/WRAPS  [] Bryce Bread: All Types - In Freezer Section   [] Flat Out Light Wraps - All Varieties   [] Flat Out Protein Up Carb Down Flat Bread   [] Kontos Whole Wheat Pocket Florencia   [] Hany CORAZON 100% Whole Wheat Tortillas   [] LaTKaldooray Tortillas - Smart & Delicious; 50 or 80-calorie   [] Nature's Own 100% Whole Wheat Bread   [] Orowheat Healthful - 100% Whole Wheat Slice Bread and [place] Thins   [] Orowheat Healthful - Whole Wheat Nuts & Grain Bread; Flax & Seed Bread   [] Pepperidge Farm Natural Whole Grain 15 Bread   [] Pepperidge Farm Natural Whole Grain English Muffin - 100% Whole Wheat   [] [place] Farm Very Thin 100% Whole Wheat   [] Michelle Costa 45 Calories and Delightful   [] Yi 100% Whole Wheat Thin-Sliced Bagels and English Muffins   [] Western Bagel: Perfect 10     GLUTEN FREE  [] Juan Carlos's Gluten Free Bread   [] [place] Bakehouse 7-Grain Gluten Free Bread     LEGUME PASTA   [] Explore Asian Organic Black Bean Spaghetti   [] Modern Table   [] Tolerant Foods       NUT BUTTERS & JELLIES    NUT BUTTERS   [] Better'n Chocolate: Coconut Chocolate Peanut Butter Spread   [] Better'n Peanut Butter - All Types   [] Earth Balance Coconut and Peanut Spread   [] Josse's Nut [place]   [] MaraNatha:  All Natural Roasted Cashew Butter - [place] or Creamy   [] MaraNatha: Roasted Peanut Butter   [] Nuts 'N More Peanut Dennis - All Flavors   [] PB2 Powder - Original or Chocolate   [] Skippy Natural - Creamy, Super Chunk   [] Smart Balance Peanut Butter - [place] or Creamy   [] Peanut Butter & Company:   [] Smooth , Crunch Time, The Heat Is On, Old Fashioned Smooth, Mighty Nut- Powdered Peanut Butter, Squeeze Pack   [] Smucker's Natural Peanut Butter - [place] or Creamy   [] Sunbutter Nut Butter   [] Wild Friends Protein Peanut Butter/[place] o Butter - Vanilla or Chocolate     JELLIES  o Polaner's All Fruit   o Clearly Organic Best Choice: Strawberry Fruit Spread       SNACKS    BARS  [] Kashi Bars - Chewy or Crunchy; Honey [place] o Flax or Peanut Butter   [] KIND Bars - 5 Grams of Sugar or Less   [] KIND Protein Bars - Strong and KIND   [] Nature Valley Protein Bar - All Varieties   [] Nature Valley Roasted Nut Crunch - [place] Crunch; Peanut Crunch   [] [facility] Simple Nut Bar - Roasted Peanut & Honey   [] [facility] Simple Nut Bar - [place], Cashew & Sea Salt   [] [facility] Nut Amelia Bar - Salted Caramel Peanut   [] Think Thin Protein Bars   [] Quest Bars, Power Crunch Bars, Pure Protein Bars     BEEF JERKY - NITRATE FREE   [] Game On   [] Grass Run Farms   [] Krave   [] Ostrim   [] Perky Jerky   [] Primal Strips Meatless Vegan Jerky   [] [place]     CHIPS   [] Beanitos Chips   [] Fruit Crisps - e.g. Brother's-All-Natural, Bare Fruit, Yoga Chips   [] Eneida's Soy Crisps: 1.3 ounce bag   [] Quest Protein Chips   [] Wasa Whole Wheat Crisp Bread     CRACKERS  [] Gita's Gone Crackers   [] Nabisco Triscuit: Regular and Thin Crisp Crackers   [] Vans Say Cheese Crackers (G-F)     POPCORN/NUTS   [] Kaiden Moreira's Smart Pop Popcorn - Single Serving   [] 100-Calorie Pack of Nuts - All Varieties     PROTEIN POWDERS & DRINKS  []  Protein -  Whey Protein Powder   [] Garden of Life Raw  Protein Powder   [] Iconic Ready-To-Drink Protein Drink   [] Wylie One Protein Powder   [] VegaSport Protein Powder     SALSA/HUMMUS/DIPS   [] Eat Well Embrace Life: Zesty Sriracha Carrot o Hummus   [] Pre-Portioned Guacamole Packs   [] Louie's   [] Tostitos Restaurant Style Salsa       SOUPS   [] Jaelyn's Organic Soups - Lentil, Vegetable, Split Pea, Low-Sodium     CANNED GOODS   [] 100% Pure Pumpkin   [] BlueRunner Creole Cream-Style Red Beans or Navy Beans   [] Cajun Power Chicken Gumbo Base   [] Chicken of the Sea Pyote Weiner   [] Dewayne Fresh Cut Sliced Beets   [] Hormel Breast of Chicken in Water   [] LeSuer Tender Baby Whole Carrots   [] Nadiya Tabasco [place] Starter   [] Coronaist: Chunk Lite Tuna in Water, Gourmet Select Pouches   [] StarKist: Yellowfin Tuna Fillets   [] Olesyapey's: Kidney, Butter, Alexander, Black Eye, Field, and Black Beans   [] JASE Carter's Turnip Greens or Manuel Spinach     CONDIMENTS/ SAUCES/SPREADS/ SPICES  [] Ricky Rangel's Magic Seasonings - Regular or Salt Free   [] Marry Conklin's Sauces - All Flavors   [] Laughing Cow Light - All Flavors   [] Dash Salt-Free Marinade - All Flavors   [] Claude & Shelly's: Heart Smart Pasta Sauces   [] Tabasco     SALAD DRESSINGS  [] Leena's Naturals: Lite Honey Mustard   [] Harsha's Own: Lighten Up Salad Dressing - All Varieties   [] OPA Greek Yogurt Dressings - Ranch, Blue o Cheese, Caesar, Feta Dill     SWEETENERS  [] Sweet [place] Sweetener   [] Swerve   [] Truvia     BEVERAGES  [] Coconut Water   [] Crystal Light PURE - All Flavors   [] Honest Tea: Just Green Tea, Unsweetened   [] Kombucha Tea   [] [place]   [] [place] Sisters Bloody Gita Mix   [] Metromint - Zero-Sugar; All Natural Flavored   [] Deyanira - Plain or Flavored   [] Spindriff [place]   [] Steaz - Zero-Sugar, All-Natural, Sparkling Tea   [] Tea Bags: Any Brand - e.g. Eunice, Yogi, Tazzo, Celestial   [] V8 100% Vegetable Juice   [] Vitamin Water Zero   [] Water   [] Zevia - Stevia Sweetened  Soft Drink     BEER/HÉCTOR/LIQUORS  []Stroud's Premier Light 64 Calories   [] Bud Select - 55 Calories   [] [facility] Bloody Gita Mix   [] Henry Genuine Draft - 64 Calories   [] Red or White Wine - All Varieties     CEREALS: HOT/COLD   [] Sara's Puffin's Original Cereal  [] Ray's Mill Oat Bran Hot Cereal - Cracked Wheat, Multi-Grain  [] Kashi GoLean Cereal  [] Kashi GoLean Hot Cereal packets - Vanilla; Honey Cinnamon  [] Malcom's Special K Protein Cereal  [] Lyssa's Steel Cut Faroese Oatmeal  [] Nature's Path Smart Bran  [] Mormonism Instant Oatmeal packet, Original  [] Mormonism Old Fashioned Mormonism Oats  [] Uncle Rafiq's Whole Wheat & Flaxseed Original Cereal

## 2023-08-25 ENCOUNTER — PATIENT MESSAGE (OUTPATIENT)
Dept: UROLOGY | Facility: CLINIC | Age: 48
End: 2023-08-25
Payer: COMMERCIAL

## 2023-08-25 RX ORDER — CEFDINIR 300 MG/1
300 CAPSULE ORAL 2 TIMES DAILY
Qty: 20 CAPSULE | Refills: 0 | Status: SHIPPED | OUTPATIENT
Start: 2023-08-25 | End: 2023-08-25

## 2023-08-25 RX ORDER — CEFDINIR 300 MG/1
300 CAPSULE ORAL 2 TIMES DAILY
Qty: 20 CAPSULE | Refills: 0 | Status: SHIPPED | OUTPATIENT
Start: 2023-08-25 | End: 2023-08-29

## 2023-08-29 ENCOUNTER — TELEPHONE (OUTPATIENT)
Dept: UROLOGY | Facility: CLINIC | Age: 48
End: 2023-08-29
Payer: COMMERCIAL

## 2023-08-29 ENCOUNTER — OFFICE VISIT (OUTPATIENT)
Dept: UROLOGY | Facility: CLINIC | Age: 48
End: 2023-08-29
Payer: COMMERCIAL

## 2023-08-29 VITALS
DIASTOLIC BLOOD PRESSURE: 77 MMHG | HEART RATE: 69 BPM | BODY MASS INDEX: 43.38 KG/M2 | RESPIRATION RATE: 18 BRPM | SYSTOLIC BLOOD PRESSURE: 109 MMHG | WEIGHT: 260.69 LBS

## 2023-08-29 DIAGNOSIS — R30.0 DYSURIA: Primary | ICD-10-CM

## 2023-08-29 PROBLEM — R42 VERTIGO: Status: RESOLVED | Noted: 2023-02-03 | Resolved: 2023-08-29

## 2023-08-29 PROBLEM — Z12.31 SCREENING MAMMOGRAM, ENCOUNTER FOR: Status: RESOLVED | Noted: 2019-05-23 | Resolved: 2023-08-29

## 2023-08-29 PROBLEM — R39.15 URGENCY OF MICTURITION: Status: RESOLVED | Noted: 2020-11-20 | Resolved: 2023-08-29

## 2023-08-29 PROBLEM — Z98.890 STATUS POST RIGHT FOOT SURGERY: Status: RESOLVED | Noted: 2019-12-12 | Resolved: 2023-08-29

## 2023-08-29 PROBLEM — Z28.9 DELAYED IMMUNIZATIONS: Status: RESOLVED | Noted: 2019-05-23 | Resolved: 2023-08-29

## 2023-08-29 PROBLEM — M62.89 PELVIC FLOOR DYSFUNCTION: Status: RESOLVED | Noted: 2021-04-23 | Resolved: 2023-08-29

## 2023-08-29 PROBLEM — Z72.51 HIGH RISK HETEROSEXUAL BEHAVIOR: Status: RESOLVED | Noted: 2018-01-05 | Resolved: 2023-08-29

## 2023-08-29 PROBLEM — R39.82 CHRONIC BLADDER PAIN: Status: RESOLVED | Noted: 2020-09-01 | Resolved: 2023-08-29

## 2023-08-29 PROBLEM — R00.2 PALPITATIONS: Status: RESOLVED | Noted: 2017-06-26 | Resolved: 2023-08-29

## 2023-08-29 PROBLEM — Z91.09 ENVIRONMENTAL ALLERGIES: Status: RESOLVED | Noted: 2023-03-03 | Resolved: 2023-08-29

## 2023-08-29 PROBLEM — S16.1XXA CERVICAL STRAIN: Status: RESOLVED | Noted: 2022-04-25 | Resolved: 2023-08-29

## 2023-08-29 PROBLEM — M10.9 GOUT: Status: RESOLVED | Noted: 2023-06-15 | Resolved: 2023-08-29

## 2023-08-29 PROBLEM — R53.83 FATIGUE: Status: RESOLVED | Noted: 2018-02-05 | Resolved: 2023-08-29

## 2023-08-29 PROBLEM — Z86.16 HISTORY OF COVID-19: Status: RESOLVED | Noted: 2021-01-22 | Resolved: 2023-08-29

## 2023-08-29 PROBLEM — N30.10 INTERSTITIAL CYSTITIS: Status: RESOLVED | Noted: 2020-12-08 | Resolved: 2023-08-29

## 2023-08-29 PROCEDURE — 1159F PR MEDICATION LIST DOCUMENTED IN MEDICAL RECORD: ICD-10-PCS | Mod: CPTII,S$GLB,, | Performed by: NURSE PRACTITIONER

## 2023-08-29 PROCEDURE — 3008F PR BODY MASS INDEX (BMI) DOCUMENTED: ICD-10-PCS | Mod: CPTII,S$GLB,, | Performed by: NURSE PRACTITIONER

## 2023-08-29 PROCEDURE — 99999 PR PBB SHADOW E&M-EST. PATIENT-LVL III: CPT | Mod: PBBFAC,,, | Performed by: NURSE PRACTITIONER

## 2023-08-29 PROCEDURE — 99999 PR PBB SHADOW E&M-EST. PATIENT-LVL III: ICD-10-PCS | Mod: PBBFAC,,, | Performed by: NURSE PRACTITIONER

## 2023-08-29 PROCEDURE — 87086 URINE CULTURE/COLONY COUNT: CPT | Performed by: NURSE PRACTITIONER

## 2023-08-29 PROCEDURE — 3074F SYST BP LT 130 MM HG: CPT | Mod: CPTII,S$GLB,, | Performed by: NURSE PRACTITIONER

## 2023-08-29 PROCEDURE — 99214 OFFICE O/P EST MOD 30 MIN: CPT | Mod: S$GLB,,, | Performed by: NURSE PRACTITIONER

## 2023-08-29 PROCEDURE — 1159F MED LIST DOCD IN RCRD: CPT | Mod: CPTII,S$GLB,, | Performed by: NURSE PRACTITIONER

## 2023-08-29 PROCEDURE — 3044F PR MOST RECENT HEMOGLOBIN A1C LEVEL <7.0%: ICD-10-PCS | Mod: CPTII,S$GLB,, | Performed by: NURSE PRACTITIONER

## 2023-08-29 PROCEDURE — 99214 PR OFFICE/OUTPT VISIT, EST, LEVL IV, 30-39 MIN: ICD-10-PCS | Mod: S$GLB,,, | Performed by: NURSE PRACTITIONER

## 2023-08-29 PROCEDURE — 3008F BODY MASS INDEX DOCD: CPT | Mod: CPTII,S$GLB,, | Performed by: NURSE PRACTITIONER

## 2023-08-29 PROCEDURE — 3078F DIAST BP <80 MM HG: CPT | Mod: CPTII,S$GLB,, | Performed by: NURSE PRACTITIONER

## 2023-08-29 PROCEDURE — 3078F PR MOST RECENT DIASTOLIC BLOOD PRESSURE < 80 MM HG: ICD-10-PCS | Mod: CPTII,S$GLB,, | Performed by: NURSE PRACTITIONER

## 2023-08-29 PROCEDURE — 3074F PR MOST RECENT SYSTOLIC BLOOD PRESSURE < 130 MM HG: ICD-10-PCS | Mod: CPTII,S$GLB,, | Performed by: NURSE PRACTITIONER

## 2023-08-29 PROCEDURE — 3044F HG A1C LEVEL LT 7.0%: CPT | Mod: CPTII,S$GLB,, | Performed by: NURSE PRACTITIONER

## 2023-08-29 NOTE — PROGRESS NOTES
Chief Complaint:   Chronic bladder pain  Pelvic floor dysfunction  Interstitial cystitis    HPI:  Patient 47-year-old female that has been followed by Dr. Herrera for interstitial cystitis, pelvic floor dysfunction and urinary frequency.  Patient was seen 6 months ago with concerns of urinary frequency, daily bladder pain and mixed incontinence.  Patient did not want to pursue pelvic floor training, states that her schedule is too busy.  Patient was prescribedPyridum but states that she is no longer taking this.  Was prescribed Uribel, but admits that she takes it only sporadically.  Urine in clinic indicates leukocytes all other parameters were negative.  PVR was 14  Javier RAY  11/16/22- still with some motor and occasional dysuria, no evidence of urge incontinence.  Thus far Pyridium does control the dysuria.  45-year-old female who comes in with bladder discomfort.  Patient states that she has noted a recent pain and pressure in her suprapubic region.  She states that a urinalysis has been normal.  This been going on for the last week, nothing really consistent with this before.  Appears to be constant, with no change.  No correlation to voiding.  Possible dysuria, but not consistent with a UTI.  She gets up 1-2 times per evening, but does have increased frequency and urgency during the daytime.  No evidence of leakage.  No gross hematuria, no microscopic hematuria, she has never been a smoker.  She did take antibiotics, with no assistance.  She does have frequent UTIs, but again this does not appear to be UTI, nor did the antibiotics assist.  No previous urological history, she has had a total abdominal hysterectomy with unilateral salpingo-oophorectomy.  She has had no slings, as stated no incontinence, 3 natural deliveries without issue.  No family history of urological cancers or stones.  She states she has been treating constipation recently.     Allergies:  Trulicity [dulaglutide], Aspirin, and Hibiclens  (isopropyl alcohol)    Medications:  has a current medication list which includes the following prescription(s): acetazolamide, albuterol, albuterol, allopurinol, alprazolam, atorvastatin, azelaic acid, blood sugar diagnostic, blood-glucose meter, cefdinir, celecoxib, cequa, ergocalciferol, estradiol, fluoxetine, fluticasone-salmeterol 100-50 mcg/dose, gabapentin, hydrochlorothiazide, ibuprofen, lancets, lidocaine, lyrica, meclizine, methen-m.blue-s.phos-phsal-hyo, methocarbamol, metronidazole, montelukast, naproxen, nebulizer accessories, nitrofurantoin (macrocrystal-monohydrate), olanzapine, oxycodone-acetaminophen, phenazopyridine, prednisone, promethazine-dextromethorphan, thiamine, mounjaro, tramadol, and zanaflex.    Review of Systems:  General: No fever, chills, fatigability, or weight loss.  Skin: No rashes, itching, or changes in color or texture of skin.  Chest: Denies SANTOS, cyanosis, wheezing, cough, and sputum production.  Abdomen: Appetite fine. No weight loss. Denies diarrhea, abdominal pain, hematemesis, or blood in stool.  Musculoskeletal: No joint stiffness or swelling. Denies back pain.  : As above.  All other review of systems negative.    PMH:   has a past medical history of Abnormal Pap smear of cervix, Abnormal Pap smear of vagina, Acute pain of right shoulder (10/25/2019), Adjustment disorder with depressed mood (11/15/2014), Allergy, Anemia, Anemia (12/14/2014 6:26:13 PM), Anxiety, Anxiety and depression, Asthma, Asthma (4/14/2016), Asthma (6/18/2014 1:31:21 PM), Auditory hallucinations (2/17/2017), Bacterial vaginosis (1/5/2018), Bladder pain (9/1/2020), Bronchitis (10/15/2015), Bronchitis (9/18/2020), Bronchitis (10/15/2015), Cervical radiculopathy (3/10/2020), Chlamydia, Depression (emotion), Diabetes mellitus, SANTOS (dyspnea on exertion) (10/14/2019), Dysuria (3/31/2018), Dysuria (3/31/2018), Early satiety (8/28/2020), Gallstones, Gastritis, High-risk sexual behavior (1/5/2018), History of  ovarian cyst, History of syphilis, History of trichomoniasis, Hyperlipidemia, Hypertension, Interstitial cystitis (12/8/2020), Localized edema (10/14/2019), Lower abdominal pain (7/31/2017), Mental disorder, Obesity (6/18/2014 1:51:54 PM), Obstructive sleep apnea (10/5/2021), Persistent cough (7/27/2018), PONV (postoperative nausea and vomiting), Preop general physical exam (12/9/2019), Right foot pain (11/18/2019), Routine general medical examination at a health care facility (5/21/2021), Shortness of breath (6/26/2017), SOB (shortness of breath) (6/26/2017), Suicidal ideations (7/9/2019), Syphilis (6/24/2014 7:17:31 PM), Type 2 diabetes mellitus without complication, without long-term current use of insulin (5/25/2014), Type 2 or unspecified type diabetes mellitus (6/24/2014 7:24:47 PM), Upper respiratory tract infection (1/5/2018), Urgency of micturition (11/20/2020), Vaginal candidiasis (7/24/2018), Vaginal itching (1/5/2018), and Viral syndrome (12/1/2020).    PSH:   has a past surgical history that includes Appendectomy; Dilation and curettage of uterus; Colonoscopy (N/A, 07/31/2017); Cholecystectomy; Hysterectomy (08/31/2016); Fracture surgery; and Oophorectomy.    FamHx: family history includes Asthma in her son; Breast cancer in her maternal aunt and paternal grandmother; Cancer in her maternal aunt; Colon cancer in her maternal aunt; Diabetes in her maternal grandmother; Hypertension in her mother; Miscarriages / Stillbirths in her cousin; No Known Problems in her daughter, father, and son; Stroke in her maternal aunt; Thyroid disease in her brother, mother, and sister.    SocHx:  reports that she has never smoked. She has never used smokeless tobacco. She reports that she does not drink alcohol and does not use drugs.      Physical Exam:  General: A&Ox3, no apparent distress, no deformities  Neck: No masses, normal thyroid  Lungs: normal inspiration, no use of accessory muscles  Heart: normal pulse, no  arrhythmias  Abdomen: Soft, NT, ND, no masses, no hernias, no hepatosplenomegaly  Lymphatic: Neck and groin nodes negative  Skin: The skin is warm and dry. No jaundice.    Labs/Studies:   See HPI  Impression/Plan:   Patient is presenting with same concerns at her last visit, 6 months ago.  Patient will be scheduled in 1 week with Dr. Herrera to discuss possible InterStim or Botox.

## 2023-08-29 NOTE — TELEPHONE ENCOUNTER
Pt stated that antibiotics were supposed to have been sent to her pharmacy on Friday but she didn't pick them up because they were sent to the incorrect pharmacy; please resend to [facility] on [address].

## 2023-08-30 PROBLEM — Z12.11 ENCOUNTER FOR SCREENING COLONOSCOPY: Status: RESOLVED | Noted: 2021-09-23 | Resolved: 2023-08-30

## 2023-08-30 NOTE — TELEPHONE ENCOUNTER
I personally call patient.  She aware that urine culture results are pending.  Patient is asymptomatic, no antibiotics will be sent to her pharmacy without a positive urine culture.

## 2023-08-31 LAB
BACTERIA UR CULT: NORMAL
BACTERIA UR CULT: NORMAL

## 2023-09-01 ENCOUNTER — TELEPHONE (OUTPATIENT)
Dept: UROLOGY | Facility: CLINIC | Age: 48
End: 2023-09-01
Payer: COMMERCIAL

## 2023-09-01 ENCOUNTER — PATIENT MESSAGE (OUTPATIENT)
Dept: DERMATOLOGY | Facility: CLINIC | Age: 48
End: 2023-09-01
Payer: COMMERCIAL

## 2023-09-01 NOTE — TELEPHONE ENCOUNTER
----- Message from Mary Grace Garcia NP sent at 9/1/2023  2:07 PM CDT -----  Please call her and inform her that urine cx is negative.

## 2023-09-05 ENCOUNTER — PATIENT MESSAGE (OUTPATIENT)
Dept: DERMATOLOGY | Facility: CLINIC | Age: 48
End: 2023-09-05
Payer: COMMERCIAL

## 2023-09-06 ENCOUNTER — OFFICE VISIT (OUTPATIENT)
Dept: UROLOGY | Facility: CLINIC | Age: 48
End: 2023-09-06
Payer: COMMERCIAL

## 2023-09-06 ENCOUNTER — TELEPHONE (OUTPATIENT)
Dept: DERMATOLOGY | Facility: CLINIC | Age: 48
End: 2023-09-06
Payer: COMMERCIAL

## 2023-09-06 VITALS
DIASTOLIC BLOOD PRESSURE: 82 MMHG | SYSTOLIC BLOOD PRESSURE: 119 MMHG | WEIGHT: 260 LBS | HEART RATE: 76 BPM | BODY MASS INDEX: 43.27 KG/M2

## 2023-09-06 DIAGNOSIS — R39.82 CHRONIC BLADDER PAIN: Primary | ICD-10-CM

## 2023-09-06 DIAGNOSIS — N30.10 INTERSTITIAL CYSTITIS: ICD-10-CM

## 2023-09-06 DIAGNOSIS — M62.89 PELVIC FLOOR DYSFUNCTION: ICD-10-CM

## 2023-09-06 PROBLEM — R51.9 HEADACHE: Status: RESOLVED | Noted: 2021-01-22 | Resolved: 2023-09-06

## 2023-09-06 PROCEDURE — 3074F PR MOST RECENT SYSTOLIC BLOOD PRESSURE < 130 MM HG: ICD-10-PCS | Mod: CPTII,S$GLB,, | Performed by: NURSE PRACTITIONER

## 2023-09-06 PROCEDURE — 99214 PR OFFICE/OUTPT VISIT, EST, LEVL IV, 30-39 MIN: ICD-10-PCS | Mod: S$GLB,,, | Performed by: NURSE PRACTITIONER

## 2023-09-06 PROCEDURE — 3079F DIAST BP 80-89 MM HG: CPT | Mod: CPTII,S$GLB,, | Performed by: NURSE PRACTITIONER

## 2023-09-06 PROCEDURE — 1159F MED LIST DOCD IN RCRD: CPT | Mod: CPTII,S$GLB,, | Performed by: NURSE PRACTITIONER

## 2023-09-06 PROCEDURE — 3044F PR MOST RECENT HEMOGLOBIN A1C LEVEL <7.0%: ICD-10-PCS | Mod: CPTII,S$GLB,, | Performed by: NURSE PRACTITIONER

## 2023-09-06 PROCEDURE — 1159F PR MEDICATION LIST DOCUMENTED IN MEDICAL RECORD: ICD-10-PCS | Mod: CPTII,S$GLB,, | Performed by: NURSE PRACTITIONER

## 2023-09-06 PROCEDURE — 99999 PR PBB SHADOW E&M-EST. PATIENT-LVL IV: ICD-10-PCS | Mod: PBBFAC,,, | Performed by: NURSE PRACTITIONER

## 2023-09-06 PROCEDURE — 3008F BODY MASS INDEX DOCD: CPT | Mod: CPTII,S$GLB,, | Performed by: NURSE PRACTITIONER

## 2023-09-06 PROCEDURE — 3044F HG A1C LEVEL LT 7.0%: CPT | Mod: CPTII,S$GLB,, | Performed by: NURSE PRACTITIONER

## 2023-09-06 PROCEDURE — 3079F PR MOST RECENT DIASTOLIC BLOOD PRESSURE 80-89 MM HG: ICD-10-PCS | Mod: CPTII,S$GLB,, | Performed by: NURSE PRACTITIONER

## 2023-09-06 PROCEDURE — 3008F PR BODY MASS INDEX (BMI) DOCUMENTED: ICD-10-PCS | Mod: CPTII,S$GLB,, | Performed by: NURSE PRACTITIONER

## 2023-09-06 PROCEDURE — 3074F SYST BP LT 130 MM HG: CPT | Mod: CPTII,S$GLB,, | Performed by: NURSE PRACTITIONER

## 2023-09-06 PROCEDURE — 99999 PR PBB SHADOW E&M-EST. PATIENT-LVL IV: CPT | Mod: PBBFAC,,, | Performed by: NURSE PRACTITIONER

## 2023-09-06 PROCEDURE — 99214 OFFICE O/P EST MOD 30 MIN: CPT | Mod: S$GLB,,, | Performed by: NURSE PRACTITIONER

## 2023-09-06 RX ORDER — ESTRADIOL 0.1 MG/G
2 CREAM VAGINAL DAILY
Qty: 60 G | Refills: 1 | Status: SHIPPED | OUTPATIENT
Start: 2023-09-06 | End: 2023-10-24

## 2023-09-06 RX ORDER — MIRABEGRON 50 MG/1
50 TABLET, FILM COATED, EXTENDED RELEASE ORAL DAILY
Qty: 30 TABLET | Refills: 11 | Status: SHIPPED | OUTPATIENT
Start: 2023-09-06 | End: 2024-09-05

## 2023-09-06 NOTE — PROGRESS NOTES
Chief Complaint:   OAB  Interstitial cystitis    HPI:   Patient is a 47-year-old female that is presenting to follow-up with Dr. Herrera secondary to overactive bladder symptoms.  Patient states that she has bladder pain and pelvic pressure, several times a week.  Is currently on Uribel, Estrace cream and as needed Pyridium.  Was told that Pyridium should be discontinue secondary to Prozac.  Urine in clinic is negative and PVR is 13 mL.  In reviewing EMR, patient has not been prescribed Myrbetriq,  in the past.  08/29/2023  Patient 47-year-old female that has been followed by Dr. Herrera for interstitial cystitis, pelvic floor dysfunction and urinary frequency.  Patient was seen 6 months ago with concerns of urinary frequency, daily bladder pain and mixed incontinence.  Patient did not want to pursue pelvic floor training, states that her schedule is too busy.  Patient was prescribedPyridum but states that she is no longer taking this.  Was prescribed Uribel, but admits that she takes it only sporadically.  Urine in clinic indicates leukocytes all other parameters were negative.  PVR was 14  Javier RYA  11/16/22- still with some motor and occasional dysuria, no evidence of urge incontinence.  Thus far Pyridium does control the dysuria.  45-year-old female who comes in with bladder discomfort.  Patient states that she has noted a recent pain and pressure in her suprapubic region.  She states that a urinalysis has been normal.  This been going on for the last week, nothing really consistent with this before.  Appears to be constant, with no change.  No correlation to voiding.  Possible dysuria, but not consistent with a UTI.  She gets up 1-2 times per evening, but does have increased frequency and urgency during the daytime.  No evidence of leakage.  No gross hematuria, no microscopic hematuria, she has never been a smoker.  She did take antibiotics, with no assistance.  She does have frequent UTIs, but again this  does not appear to be UTI, nor did the antibiotics assist.  No previous urological history, she has had a total abdominal hysterectomy with unilateral salpingo-oophorectomy.  She has had no slings, as stated no incontinence, 3 natural deliveries without issue.  No family history of urological cancers or stones.  She states she has been treating constipation recently.   Allergies:  Trulicity [dulaglutide], Aspirin, and Hibiclens (isopropyl alcohol)    Medications:  has a current medication list which includes the following prescription(s): acetazolamide, albuterol, albuterol, allopurinol, alprazolam, azelaic acid, blood sugar diagnostic, blood-glucose meter, celecoxib, cequa, fluoxetine, fluticasone-salmeterol 100-50 mcg/dose, hydrochlorothiazide, lancets, lidocaine, lyrica, montelukast, naproxen, nebulizer accessories, olanzapine, oxycodone-acetaminophen, prednisone, thiamine, mounjaro, tramadol, zanaflex, estradiol, meclizine, methen-m.blue-s.phos-phsal-hyo, and myrbetriq.    Review of Systems:  General: No fever, chills, fatigability, or weight loss.  Skin: No rashes, itching, or changes in color or texture of skin.  Chest: Denies SANTOS, cyanosis, wheezing, cough, and sputum production.  Abdomen: Appetite fine. No weight loss. Denies diarrhea, abdominal pain, hematemesis, or blood in stool.  Musculoskeletal: No joint stiffness or swelling. Denies back pain.  : As above.  All other review of systems negative.    PMH:   has a past medical history of Abnormal Pap smear of cervix, Abnormal Pap smear of vagina, Acute pain of right shoulder (10/25/2019), Adjustment disorder with depressed mood (11/15/2014), Allergy, Anemia, Anemia (12/14/2014 6:26:13 PM), Anxiety, Anxiety and depression, Asthma, Asthma (4/14/2016), Asthma (6/18/2014 1:31:21 PM), Auditory hallucinations (2/17/2017), Bacterial vaginosis (1/5/2018), Bladder pain (9/1/2020), Bronchitis (10/15/2015), Bronchitis (9/18/2020), Bronchitis (10/15/2015), Cervical  radiculopathy (3/10/2020), Chlamydia, Depression (emotion), Diabetes mellitus, SANTOS (dyspnea on exertion) (10/14/2019), Dysuria (3/31/2018), Dysuria (3/31/2018), Early satiety (8/28/2020), Gallstones, Gastritis, High-risk sexual behavior (1/5/2018), History of ovarian cyst, History of syphilis, History of trichomoniasis, Hyperlipidemia, Hypertension, Interstitial cystitis (12/8/2020), Localized edema (10/14/2019), Lower abdominal pain (7/31/2017), Mental disorder, Obesity (6/18/2014 1:51:54 PM), Obstructive sleep apnea (10/5/2021), Persistent cough (7/27/2018), PONV (postoperative nausea and vomiting), Preop general physical exam (12/9/2019), Right foot pain (11/18/2019), Routine general medical examination at a health care facility (5/21/2021), Shortness of breath (6/26/2017), SOB (shortness of breath) (6/26/2017), Suicidal ideations (7/9/2019), Syphilis (6/24/2014 7:17:31 PM), Type 2 diabetes mellitus without complication, without long-term current use of insulin (5/25/2014), Type 2 or unspecified type diabetes mellitus (6/24/2014 7:24:47 PM), Upper respiratory tract infection (1/5/2018), Urgency of micturition (11/20/2020), Vaginal candidiasis (7/24/2018), Vaginal itching (1/5/2018), and Viral syndrome (12/1/2020).    PSH:   has a past surgical history that includes Appendectomy; Dilation and curettage of uterus; Colonoscopy (N/A, 07/31/2017); Cholecystectomy; Hysterectomy (08/31/2016); Fracture surgery; and Oophorectomy.    FamHx: family history includes Asthma in her son; Breast cancer in her maternal aunt and paternal grandmother; Cancer in her maternal aunt; Colon cancer in her maternal aunt; Diabetes in her maternal grandmother; Hypertension in her mother; Miscarriages / Stillbirths in her cousin; No Known Problems in her daughter, father, and son; Stroke in her maternal aunt; Thyroid disease in her brother, mother, and sister.    SocHx:  reports that she has never smoked. She has never used smokeless  tobacco. She reports that she does not drink alcohol and does not use drugs.      Physical Exam:  Vitals:    09/06/23 0812   BP: 119/82   Pulse: 76     General:  Morbidly obese female, A&Ox3, no apparent distress, no deformities  Neck: No masses, normal thyroid  Lungs: normal inspiration, no use of accessory muscles  Heart: normal pulse, no arrhythmias  Abdomen: Soft, NT, ND, no masses, no hernias, no hepatosplenomegaly  Lymphatic: Neck and groin nodes negative  Labs/Studies:   See HPI    Impression/Plan:   1. Overactive bladder  Patient was urged to drink plenty of water secondary to possible dehydration.  Patient states that she occasionally has a urinary odor.  Will discontinue Pyridium, see HPI.  Patient to remain on Uribel and Estrace cream, refills were sent to her pharmacy.  Was prescribed Myrbetriq and will follow-up with Dr. Herrera in 3 months to discuss possible Botox or InterStim if current treatment does not completely resolve her symptoms.

## 2023-09-06 NOTE — TELEPHONE ENCOUNTER
Returned call. Pt reports she could not get on virtual due to have old jeni, pt rescheduled for an in person tomorrow with Dr. Nazario at the [facility].   ----- Message from Evelina Serrano sent at 9/6/2023  1:00 PM CDT -----  Contact: Yuli zimmerman [phone]  [email or web address]     Patient is calling for Medical Advice regarding:    How long has patient had these symptoms:    Pharmacy name and phone#:    Would like response via VIPA[facility]: call back    Comments: Pt is requesting a call back from the nurse because she was on the old my chart jeni and had to download [facility] so it's past her time and she may need a new appt because she still can't get in

## 2023-09-07 ENCOUNTER — OFFICE VISIT (OUTPATIENT)
Dept: DERMATOLOGY | Facility: CLINIC | Age: 48
End: 2023-09-07
Payer: COMMERCIAL

## 2023-09-07 DIAGNOSIS — L81.9 DYSCHROMIA: Primary | ICD-10-CM

## 2023-09-07 DIAGNOSIS — M54.32 SCIATICA OF LEFT SIDE: ICD-10-CM

## 2023-09-07 PROCEDURE — 99999 PR PBB SHADOW E&M-EST. PATIENT-LVL II: CPT | Mod: PBBFAC,,, | Performed by: DERMATOLOGY

## 2023-09-07 PROCEDURE — 99214 OFFICE O/P EST MOD 30 MIN: CPT | Mod: S$GLB,,, | Performed by: DERMATOLOGY

## 2023-09-07 PROCEDURE — 1159F PR MEDICATION LIST DOCUMENTED IN MEDICAL RECORD: ICD-10-PCS | Mod: CPTII,S$GLB,, | Performed by: DERMATOLOGY

## 2023-09-07 PROCEDURE — 99214 PR OFFICE/OUTPT VISIT, EST, LEVL IV, 30-39 MIN: ICD-10-PCS | Mod: S$GLB,,, | Performed by: DERMATOLOGY

## 2023-09-07 PROCEDURE — 1159F MED LIST DOCD IN RCRD: CPT | Mod: CPTII,S$GLB,, | Performed by: DERMATOLOGY

## 2023-09-07 PROCEDURE — 3044F HG A1C LEVEL LT 7.0%: CPT | Mod: CPTII,S$GLB,, | Performed by: DERMATOLOGY

## 2023-09-07 PROCEDURE — 3044F PR MOST RECENT HEMOGLOBIN A1C LEVEL <7.0%: ICD-10-PCS | Mod: CPTII,S$GLB,, | Performed by: DERMATOLOGY

## 2023-09-07 PROCEDURE — 1160F PR REVIEW ALL MEDS BY PRESCRIBER/CLIN PHARMACIST DOCUMENTED: ICD-10-PCS | Mod: CPTII,S$GLB,, | Performed by: DERMATOLOGY

## 2023-09-07 PROCEDURE — 1160F RVW MEDS BY RX/DR IN RCRD: CPT | Mod: CPTII,S$GLB,, | Performed by: DERMATOLOGY

## 2023-09-07 PROCEDURE — 99999 PR PBB SHADOW E&M-EST. PATIENT-LVL II: ICD-10-PCS | Mod: PBBFAC,,, | Performed by: DERMATOLOGY

## 2023-09-07 RX ORDER — HYDROQUINONE 40 MG/G
CREAM TOPICAL
Qty: 28 G | Refills: 1 | Status: SHIPPED | OUTPATIENT
Start: 2023-09-07

## 2023-09-07 RX ORDER — IBUPROFEN 800 MG/1
800 TABLET ORAL 3 TIMES DAILY
Qty: 21 TABLET | Refills: 0 | Status: SHIPPED | OUTPATIENT
Start: 2023-09-07

## 2023-09-07 NOTE — PROGRESS NOTES
Subjective:      Patient ID:  Yuli Milton is a 48 y.o. female who presents for   Chief Complaint   Patient presents with    Rash     Pt c/o burning sensation to leg, rates pain 6,      Hx of acne, rosacea    History of Present Illness: The patient presents with chief complaint of burning.  Location: left posterior leg  Duration: 3 week  Signs/Symptoms: pain, burning    Prior treatments: none    She also c/o residual dark spots on legs    Pt has taken ibuprofen previously without issue.  + allergy to ASA.  She sees pain management and ortho.           Review of Systems   Constitutional:  Negative for fever and chills.   Gastrointestinal:  Negative for nausea and vomiting.   Skin:  Positive for activity-related sunscreen use. Negative for daily sunscreen use and recent sunburn.   Hematologic/Lymphatic: Does not bruise/bleed easily.       Objective:   Physical Exam   Constitutional: She appears well-developed and well-nourished. No distress.   Neurological: She is alert and oriented to person, place, and time. She is not disoriented.   Psychiatric: She has a normal mood and affect.   Skin:   Areas Examined (abnormalities noted in diagram):   Head / Face Inspection Performed  Neck Inspection Performed  RUE Inspected  LUE Inspection Performed           Assessment / Plan:        Dyschromia  -     hydroquinone 4 % Crea; Apply to dark spots once daily. Use with sunscreen if outdoors  Dispense: 28 g; Refill: 1  -     few residual areas of legs and arms.  We will start hydroquinone 8% daily.    Sciatica of left side  -     ibuprofen (ADVIL,MOTRIN) 800 MG tablet; Take 1 tablet (800 mg total) by mouth 3 (three) times daily.  Dispense: 21 tablet; Refill: 0  -     recommend pt f/u with Dr. Gutierrez or pain management for further recommendations.  Discussed symptoms may be consistent with exacerbation of sciatica.  The patient acknowledged understanding.             Follow up if symptoms worsen or fail to improve.

## 2023-09-08 ENCOUNTER — OFFICE VISIT (OUTPATIENT)
Dept: INTERNAL MEDICINE | Facility: CLINIC | Age: 48
End: 2023-09-08
Payer: COMMERCIAL

## 2023-09-08 ENCOUNTER — LAB VISIT (OUTPATIENT)
Dept: LAB | Facility: HOSPITAL | Age: 48
End: 2023-09-08
Attending: FAMILY MEDICINE
Payer: COMMERCIAL

## 2023-09-08 VITALS
TEMPERATURE: 98 F | SYSTOLIC BLOOD PRESSURE: 114 MMHG | DIASTOLIC BLOOD PRESSURE: 78 MMHG | WEIGHT: 255.06 LBS | BODY MASS INDEX: 42.45 KG/M2

## 2023-09-08 DIAGNOSIS — Z00.00 WELLNESS EXAMINATION: Primary | ICD-10-CM

## 2023-09-08 DIAGNOSIS — Z00.00 WELLNESS EXAMINATION: ICD-10-CM

## 2023-09-08 PROCEDURE — 83036 HEMOGLOBIN GLYCOSYLATED A1C: CPT | Performed by: FAMILY MEDICINE

## 2023-09-08 PROCEDURE — 1160F PR REVIEW ALL MEDS BY PRESCRIBER/CLIN PHARMACIST DOCUMENTED: ICD-10-PCS | Mod: CPTII,S$GLB,, | Performed by: FAMILY MEDICINE

## 2023-09-08 PROCEDURE — 87389 HIV-1 AG W/HIV-1&-2 AB AG IA: CPT | Performed by: FAMILY MEDICINE

## 2023-09-08 PROCEDURE — 86694 HERPES SIMPLEX NES ANTBDY: CPT | Performed by: FAMILY MEDICINE

## 2023-09-08 PROCEDURE — 99396 PREV VISIT EST AGE 40-64: CPT | Mod: S$GLB,,, | Performed by: FAMILY MEDICINE

## 2023-09-08 PROCEDURE — 1159F PR MEDICATION LIST DOCUMENTED IN MEDICAL RECORD: ICD-10-PCS | Mod: CPTII,S$GLB,, | Performed by: FAMILY MEDICINE

## 2023-09-08 PROCEDURE — 85025 COMPLETE CBC W/AUTO DIFF WBC: CPT | Performed by: FAMILY MEDICINE

## 2023-09-08 PROCEDURE — 36415 COLL VENOUS BLD VENIPUNCTURE: CPT | Mod: PO | Performed by: FAMILY MEDICINE

## 2023-09-08 PROCEDURE — 1160F RVW MEDS BY RX/DR IN RCRD: CPT | Mod: CPTII,S$GLB,, | Performed by: FAMILY MEDICINE

## 2023-09-08 PROCEDURE — 99999 PR PBB SHADOW E&M-EST. PATIENT-LVL III: CPT | Mod: PBBFAC,,, | Performed by: FAMILY MEDICINE

## 2023-09-08 PROCEDURE — 99396 PR PREVENTIVE VISIT,EST,40-64: ICD-10-PCS | Mod: S$GLB,,, | Performed by: FAMILY MEDICINE

## 2023-09-08 PROCEDURE — 3044F HG A1C LEVEL LT 7.0%: CPT | Mod: CPTII,S$GLB,, | Performed by: FAMILY MEDICINE

## 2023-09-08 PROCEDURE — 3074F PR MOST RECENT SYSTOLIC BLOOD PRESSURE < 130 MM HG: ICD-10-PCS | Mod: CPTII,S$GLB,, | Performed by: FAMILY MEDICINE

## 2023-09-08 PROCEDURE — 3008F PR BODY MASS INDEX (BMI) DOCUMENTED: ICD-10-PCS | Mod: CPTII,S$GLB,, | Performed by: FAMILY MEDICINE

## 2023-09-08 PROCEDURE — 3044F PR MOST RECENT HEMOGLOBIN A1C LEVEL <7.0%: ICD-10-PCS | Mod: CPTII,S$GLB,, | Performed by: FAMILY MEDICINE

## 2023-09-08 PROCEDURE — 3078F PR MOST RECENT DIASTOLIC BLOOD PRESSURE < 80 MM HG: ICD-10-PCS | Mod: CPTII,S$GLB,, | Performed by: FAMILY MEDICINE

## 2023-09-08 PROCEDURE — 83540 ASSAY OF IRON: CPT | Performed by: FAMILY MEDICINE

## 2023-09-08 PROCEDURE — 84443 ASSAY THYROID STIM HORMONE: CPT | Performed by: FAMILY MEDICINE

## 2023-09-08 PROCEDURE — 3078F DIAST BP <80 MM HG: CPT | Mod: CPTII,S$GLB,, | Performed by: FAMILY MEDICINE

## 2023-09-08 PROCEDURE — 82728 ASSAY OF FERRITIN: CPT | Performed by: FAMILY MEDICINE

## 2023-09-08 PROCEDURE — 3074F SYST BP LT 130 MM HG: CPT | Mod: CPTII,S$GLB,, | Performed by: FAMILY MEDICINE

## 2023-09-08 PROCEDURE — 86592 SYPHILIS TEST NON-TREP QUAL: CPT | Performed by: FAMILY MEDICINE

## 2023-09-08 PROCEDURE — 99999 PR PBB SHADOW E&M-EST. PATIENT-LVL III: ICD-10-PCS | Mod: PBBFAC,,, | Performed by: FAMILY MEDICINE

## 2023-09-08 PROCEDURE — 84439 ASSAY OF FREE THYROXINE: CPT | Performed by: FAMILY MEDICINE

## 2023-09-08 PROCEDURE — 1159F MED LIST DOCD IN RCRD: CPT | Mod: CPTII,S$GLB,, | Performed by: FAMILY MEDICINE

## 2023-09-08 PROCEDURE — 80061 LIPID PANEL: CPT | Performed by: FAMILY MEDICINE

## 2023-09-08 PROCEDURE — 84466 ASSAY OF TRANSFERRIN: CPT | Performed by: FAMILY MEDICINE

## 2023-09-08 PROCEDURE — 80053 COMPREHEN METABOLIC PANEL: CPT | Performed by: FAMILY MEDICINE

## 2023-09-08 PROCEDURE — 3008F BODY MASS INDEX DOCD: CPT | Mod: CPTII,S$GLB,, | Performed by: FAMILY MEDICINE

## 2023-09-08 NOTE — PROGRESS NOTES
Subjective:       Patient ID: Yuli Milton is a 48 y.o. female.    Chief Complaint: Diabetes    Yuli Milton is a 48 y.o. female and is here for a comprehensive physical exam.    Do you take any herbs or supplements that were not prescribed by a doctor? no  Are you taking calcium supplements? no  Are you taking aspirin daily? no     History:  Any STD's in the past? none    The following portions of the patient's history were reviewed and updated as appropriate: allergies, current medications, past family history, past medical history, past social history, past surgical history and problem list.    Review of Systems  Do you have pain that bothers you in your daily life? sciatica  Pertinent items are noted in HPI.      2. Patient Counseling:  --Nutrition: Stressed importance of moderation in sodium/caffeine intake, saturated fat and cholesterol, caloric balance.  --Exercise: Stressed the importance of regular exercise.   --Substance Abuse: Discussed cessation/primary prevention of tobacco, alcohol - nonuser   --Sexuality: Discussed sexually transmitted disease.  --Injury prevention: Discussed safety belts, smoke detector.   --Dental health: Discussed dental health.  --Immunizations reviewed.    3. Discussed the patient's BMI.  4. Follow up as needed for acute illness          Review of Systems   Constitutional:  Negative for fever.   HENT:  Negative for congestion.    Eyes:  Negative for discharge.   Respiratory:  Negative for shortness of breath.    Cardiovascular:  Negative for chest pain.   Gastrointestinal:  Negative for abdominal pain.   Genitourinary:  Negative for difficulty urinating.   Musculoskeletal:  Negative for joint swelling.   Neurological:  Negative for dizziness.   Psychiatric/Behavioral:  Negative for agitation.        Objective:      Physical Exam  Vitals and nursing note reviewed.   Constitutional:       General: She is not in acute distress.     Appearance: Normal appearance. She is  well-developed. She is not diaphoretic.   HENT:      Head: Normocephalic and atraumatic.   Eyes:      General: No scleral icterus.     Conjunctiva/sclera: Conjunctivae normal.   Cardiovascular:      Rate and Rhythm: Normal rate and regular rhythm.   Pulmonary:      Effort: Pulmonary effort is normal. No respiratory distress.      Breath sounds: Normal breath sounds. No wheezing.   Abdominal:      General: There is no distension.      Palpations: Abdomen is soft.      Tenderness: There is no abdominal tenderness. There is no guarding.   Skin:     General: Skin is warm.      Coloration: Skin is not pale.      Findings: No erythema or rash.      Comments: Good turgor   Neurological:      Mental Status: She is alert.   Psychiatric:         Mood and Affect: Mood normal.         Thought Content: Thought content normal.         Assessment:       1. Wellness examination        Plan:     Problem List Items Addressed This Visit          Other    Wellness examination - Primary    Relevant Orders    CBC Auto Differential    Comprehensive Metabolic Panel    Hemoglobin A1C    Ferritin    Iron and TIBC    Lipid Panel    TSH    Microalbumin/Creatinine Ratio, Urine    (In Office Administered) Zoster Recombinant Vaccine    C. trachomatis/N. gonorrhoeae by AMP DNA    HSV 1 & 2, IgM    HIV 1/2 Ag/Ab (4th Gen)    RPR        No significant past surgical history (2) very limited

## 2023-09-09 LAB
ALBUMIN SERPL BCP-MCNC: 3.7 G/DL (ref 3.5–5.2)
ALP SERPL-CCNC: 58 U/L (ref 55–135)
ALT SERPL W/O P-5'-P-CCNC: 15 U/L (ref 10–44)
ANION GAP SERPL CALC-SCNC: 12 MMOL/L (ref 8–16)
AST SERPL-CCNC: 17 U/L (ref 10–40)
BASOPHILS # BLD AUTO: 0.06 K/UL (ref 0–0.2)
BASOPHILS NFR BLD: 1.2 % (ref 0–1.9)
BILIRUB SERPL-MCNC: 0.5 MG/DL (ref 0.1–1)
BUN SERPL-MCNC: 10 MG/DL (ref 6–20)
CALCIUM SERPL-MCNC: 9.7 MG/DL (ref 8.7–10.5)
CHLORIDE SERPL-SCNC: 106 MMOL/L (ref 95–110)
CHOLEST SERPL-MCNC: 151 MG/DL (ref 120–199)
CHOLEST/HDLC SERPL: 4 {RATIO} (ref 2–5)
CO2 SERPL-SCNC: 23 MMOL/L (ref 23–29)
CREAT SERPL-MCNC: 0.8 MG/DL (ref 0.5–1.4)
DIFFERENTIAL METHOD: ABNORMAL
EOSINOPHIL # BLD AUTO: 0.1 K/UL (ref 0–0.5)
EOSINOPHIL NFR BLD: 1.2 % (ref 0–8)
ERYTHROCYTE [DISTWIDTH] IN BLOOD BY AUTOMATED COUNT: 13.2 % (ref 11.5–14.5)
EST. GFR  (NO RACE VARIABLE): >60 ML/MIN/1.73 M^2
ESTIMATED AVG GLUCOSE: 111 MG/DL (ref 68–131)
FERRITIN SERPL-MCNC: 121 NG/ML (ref 20–300)
GLUCOSE SERPL-MCNC: 73 MG/DL (ref 70–110)
HBA1C MFR BLD: 5.5 % (ref 4–5.6)
HCT VFR BLD AUTO: 38.9 % (ref 37–48.5)
HDLC SERPL-MCNC: 38 MG/DL (ref 40–75)
HDLC SERPL: 25.2 % (ref 20–50)
HGB BLD-MCNC: 12.3 G/DL (ref 12–16)
HIV 1+2 AB+HIV1 P24 AG SERPL QL IA: NORMAL
IMM GRANULOCYTES # BLD AUTO: 0.01 K/UL (ref 0–0.04)
IMM GRANULOCYTES NFR BLD AUTO: 0.2 % (ref 0–0.5)
IRON SERPL-MCNC: 78 UG/DL (ref 30–160)
LDLC SERPL CALC-MCNC: 101.8 MG/DL (ref 63–159)
LYMPHOCYTES # BLD AUTO: 2.5 K/UL (ref 1–4.8)
LYMPHOCYTES NFR BLD: 50.9 % (ref 18–48)
MCH RBC QN AUTO: 29.1 PG (ref 27–31)
MCHC RBC AUTO-ENTMCNC: 31.6 G/DL (ref 32–36)
MCV RBC AUTO: 92 FL (ref 82–98)
MONOCYTES # BLD AUTO: 0.3 K/UL (ref 0.3–1)
MONOCYTES NFR BLD: 6.3 % (ref 4–15)
NEUTROPHILS # BLD AUTO: 2 K/UL (ref 1.8–7.7)
NEUTROPHILS NFR BLD: 40.2 % (ref 38–73)
NONHDLC SERPL-MCNC: 113 MG/DL
NRBC BLD-RTO: 0 /100 WBC
PLATELET # BLD AUTO: 336 K/UL (ref 150–450)
PMV BLD AUTO: 11.6 FL (ref 9.2–12.9)
POTASSIUM SERPL-SCNC: 4.1 MMOL/L (ref 3.5–5.1)
PROT SERPL-MCNC: 7.6 G/DL (ref 6–8.4)
RBC # BLD AUTO: 4.23 M/UL (ref 4–5.4)
RPR SER QL: NORMAL
SATURATED IRON: 24 % (ref 20–50)
SODIUM SERPL-SCNC: 141 MMOL/L (ref 136–145)
T4 FREE SERPL-MCNC: 1.12 NG/DL (ref 0.71–1.51)
TOTAL IRON BINDING CAPACITY: 323 UG/DL (ref 250–450)
TRANSFERRIN SERPL-MCNC: 218 MG/DL (ref 200–375)
TRIGL SERPL-MCNC: 56 MG/DL (ref 30–150)
TSH SERPL DL<=0.005 MIU/L-ACNC: 0.36 UIU/ML (ref 0.4–4)
WBC # BLD AUTO: 4.91 K/UL (ref 3.9–12.7)

## 2023-09-11 LAB — HSV AB, IGM BY EIA: 0.62 INDEX

## 2023-09-19 ENCOUNTER — OFFICE VISIT (OUTPATIENT)
Dept: ALLERGY | Facility: CLINIC | Age: 48
End: 2023-09-19
Payer: COMMERCIAL

## 2023-09-19 VITALS
WEIGHT: 259.94 LBS | DIASTOLIC BLOOD PRESSURE: 78 MMHG | BODY MASS INDEX: 43.25 KG/M2 | SYSTOLIC BLOOD PRESSURE: 112 MMHG | HEART RATE: 80 BPM | TEMPERATURE: 98 F

## 2023-09-19 DIAGNOSIS — J45.40 MODERATE PERSISTENT ASTHMA WITHOUT COMPLICATION: Primary | ICD-10-CM

## 2023-09-19 DIAGNOSIS — J30.89 ALLERGIC RHINITIS DUE TO AMERICAN HOUSE DUST MITE: ICD-10-CM

## 2023-09-19 DIAGNOSIS — J30.1 ALLERGIC RHINITIS DUE TO GRASS POLLEN: ICD-10-CM

## 2023-09-19 PROCEDURE — 3044F PR MOST RECENT HEMOGLOBIN A1C LEVEL <7.0%: ICD-10-PCS | Mod: CPTII,S$GLB,, | Performed by: ALLERGY & IMMUNOLOGY

## 2023-09-19 PROCEDURE — 3066F NEPHROPATHY DOC TX: CPT | Mod: CPTII,S$GLB,, | Performed by: ALLERGY & IMMUNOLOGY

## 2023-09-19 PROCEDURE — 3061F NEG MICROALBUMINURIA REV: CPT | Mod: CPTII,S$GLB,, | Performed by: ALLERGY & IMMUNOLOGY

## 2023-09-19 PROCEDURE — 99214 PR OFFICE/OUTPT VISIT, EST, LEVL IV, 30-39 MIN: ICD-10-PCS | Mod: S$GLB,,, | Performed by: ALLERGY & IMMUNOLOGY

## 2023-09-19 PROCEDURE — 99999 PR PBB SHADOW E&M-EST. PATIENT-LVL V: CPT | Mod: PBBFAC,,, | Performed by: ALLERGY & IMMUNOLOGY

## 2023-09-19 PROCEDURE — 3061F PR NEG MICROALBUMINURIA RESULT DOCUMENTED/REVIEW: ICD-10-PCS | Mod: CPTII,S$GLB,, | Performed by: ALLERGY & IMMUNOLOGY

## 2023-09-19 PROCEDURE — 3066F PR DOCUMENTATION OF TREATMENT FOR NEPHROPATHY: ICD-10-PCS | Mod: CPTII,S$GLB,, | Performed by: ALLERGY & IMMUNOLOGY

## 2023-09-19 PROCEDURE — 99214 OFFICE O/P EST MOD 30 MIN: CPT | Mod: S$GLB,,, | Performed by: ALLERGY & IMMUNOLOGY

## 2023-09-19 PROCEDURE — 99999 PR PBB SHADOW E&M-EST. PATIENT-LVL V: ICD-10-PCS | Mod: PBBFAC,,, | Performed by: ALLERGY & IMMUNOLOGY

## 2023-09-19 PROCEDURE — 1159F PR MEDICATION LIST DOCUMENTED IN MEDICAL RECORD: ICD-10-PCS | Mod: CPTII,S$GLB,, | Performed by: ALLERGY & IMMUNOLOGY

## 2023-09-19 PROCEDURE — 3044F HG A1C LEVEL LT 7.0%: CPT | Mod: CPTII,S$GLB,, | Performed by: ALLERGY & IMMUNOLOGY

## 2023-09-19 PROCEDURE — 3074F PR MOST RECENT SYSTOLIC BLOOD PRESSURE < 130 MM HG: ICD-10-PCS | Mod: CPTII,S$GLB,, | Performed by: ALLERGY & IMMUNOLOGY

## 2023-09-19 PROCEDURE — 3078F PR MOST RECENT DIASTOLIC BLOOD PRESSURE < 80 MM HG: ICD-10-PCS | Mod: CPTII,S$GLB,, | Performed by: ALLERGY & IMMUNOLOGY

## 2023-09-19 PROCEDURE — 1159F MED LIST DOCD IN RCRD: CPT | Mod: CPTII,S$GLB,, | Performed by: ALLERGY & IMMUNOLOGY

## 2023-09-19 PROCEDURE — 3008F BODY MASS INDEX DOCD: CPT | Mod: CPTII,S$GLB,, | Performed by: ALLERGY & IMMUNOLOGY

## 2023-09-19 PROCEDURE — 3078F DIAST BP <80 MM HG: CPT | Mod: CPTII,S$GLB,, | Performed by: ALLERGY & IMMUNOLOGY

## 2023-09-19 PROCEDURE — 3074F SYST BP LT 130 MM HG: CPT | Mod: CPTII,S$GLB,, | Performed by: ALLERGY & IMMUNOLOGY

## 2023-09-19 PROCEDURE — 3008F PR BODY MASS INDEX (BMI) DOCUMENTED: ICD-10-PCS | Mod: CPTII,S$GLB,, | Performed by: ALLERGY & IMMUNOLOGY

## 2023-09-19 RX ORDER — ALBUTEROL SULFATE 90 UG/1
2 AEROSOL, METERED RESPIRATORY (INHALATION) EVERY 4 HOURS PRN
Qty: 18 G | Refills: 2 | Status: SHIPPED | OUTPATIENT
Start: 2023-09-19

## 2023-09-19 RX ORDER — FLUTICASONE PROPIONATE AND SALMETEROL 100; 50 UG/1; UG/1
1 POWDER RESPIRATORY (INHALATION) 2 TIMES DAILY
Qty: 60 EACH | Refills: 11 | Status: SHIPPED | OUTPATIENT
Start: 2023-09-19 | End: 2024-01-23 | Stop reason: SDUPTHER

## 2023-09-19 RX ORDER — MONTELUKAST SODIUM 10 MG/1
10 TABLET ORAL NIGHTLY
Qty: 90 TABLET | Refills: 3 | Status: SHIPPED | OUTPATIENT
Start: 2023-09-19 | End: 2024-09-13

## 2023-09-19 NOTE — LETTER
September 19, 2023    Yuli Milton  [address]  [facility] [address]             [facility]  All[facility]  [address]  [place] [address]  Phone: [phone]  Fax: [phone]   September 19, 2023     Patient: Yuli Milton   YOB: 1975   Date of Visit: 9/19/2023       To Whom it May Concern:    Yuli Milton was seen in my clinic on 9/19/2023. She may return to work on 9/19/2023 .    Please excuse her from any classes or work missed.    If you have any questions or concerns, please don't hesitate to call.    Sincerely,         Tiffany Goyal MD

## 2023-09-19 NOTE — PROGRESS NOTES
"Subjective:      Patient ID: Yuli Milton is a 48 y.o. female.        Chief Complaint:  Asthma (Doing well. Pt states she has some tightness in chest over the weekend. Used Albuterol inhaler and symptoms resolved.)      HPI:   9/19/2023:  48 year old female with a history of Allergic rhinitis (grass and dust mites) and Asthma  Inhaler two weeks ago due to chest tightness- alleviated with albuterol  Triggers- smoke, weather changing.  Not required oral steroids or steroid injection  Advair once a day  She is taking Singulair. She is not taking nasal sprays.  Covid 19 twice- "I noticed the change".      5/29/2023: Allergic Rhinitis:  Patient's symptoms include nasal congestion. These symptoms are perennial. Current triggers include exposure to  smoke, animal dander . The patient has been suffering from these symptoms for approximately 5 years. The patient has tried over the counter medications with fair relief of symptoms. Immunotherapy has never been tried. The patient has never had nasal polyps. The patient has a history of asthma. The patient has no history of eczema. The patient does not suffer from frequent sinopulmonary infections. The patient has not had sinus surgery in the past.     Asthma- diagnosed as a child.  Steroids for asthma- ER March of this year  Albuterol- a few times  week  Trigger- unknown, smoke  Night time cough "sometimes"        Past Medical History:   Diagnosis Date    Abnormal Pap smear of cervix     treatment??    Abnormal Pap smear of vagina     Acute pain of right shoulder 10/25/2019    Adjustment disorder with depressed mood 11/15/2014    Overview:  Multiple recent medical problems     Allergy     Anemia     Anemia 12/14/2014 6:26:13 PM    [facility] - [facility]: Anemia-No Additional Notes    Anxiety     Anxiety and depression     Asthma     Asthma 4/14/2016    Asthma 6/18/2014 1:31:21 PM    [facility] Historical - Respiratory: Asthma-No Additional Notes    Auditory " "hallucinations 2/17/2017    Bacterial vaginosis 1/5/2018    Bladder pain 9/1/2020    Bronchitis 10/15/2015    Bronchitis 9/18/2020    Bronchitis 10/15/2015    Cervical radiculopathy 3/10/2020    Chlamydia     Depression (emotion)     Diabetes mellitus     SANTOS (dyspnea on exertion) 10/14/2019    Dysuria 3/31/2018    Dysuria 3/31/2018    Early satiety 8/28/2020    Gallstones     Gastritis     High-risk sexual behavior 1/5/2018    History of ovarian cyst     History of syphilis     History of trichomoniasis     Hyperlipidemia     Hypertension     Interstitial cystitis 12/8/2020    Localized edema 10/14/2019    Lower abdominal pain 7/31/2017    Mental disorder     Obesity 6/18/2014 1:51:54 PM    [facility] Historical - [facility]: Obesity, Unspecified-No Additional Notes    Obstructive sleep apnea 10/5/2021    Persistent cough 7/27/2018    PONV (postoperative nausea and vomiting)     Preop general physical exam 12/9/2019    Right foot pain 11/18/2019    Routine general medical examination at a health care facility 5/21/2021    Shortness of breath 6/26/2017    SOB (shortness of breath) 6/26/2017    Suicidal ideations 7/9/2019    Syphilis 6/24/2014 7:17:31 PM    [facility] Historical - Gynecologic: Syphilis-No Additional Notes    Type 2 diabetes mellitus without complication, without long-term current use of insulin 5/25/2014    Type 2 or unspecified type diabetes mellitus 6/24/2014 7:24:47 PM    [facility] - [facility]: Diabetes Mellitus Without Complication, Type II-"borderline"    Upper respiratory tract infection 1/5/2018    Urgency of micturition 11/20/2020    Vaginal candidiasis 7/24/2018    Vaginal itching 1/5/2018    Viral syndrome 12/1/2020        Family History   Problem Relation Age of Onset    Breast cancer Paternal Grandmother     Diabetes Maternal Grandmother     Hypertension Mother     Thyroid disease Mother     Breast cancer Maternal Aunt     Cancer Maternal Aunt         colon cancer    Colon cancer " Maternal Aunt     Miscarriages / Stillbirths Cousin     Stroke Maternal Aunt     No Known Problems Father     Thyroid disease Sister     Thyroid disease Brother     No Known Problems Daughter     No Known Problems Son     Asthma Son     Ovarian cancer Neg Hx     Deep vein thrombosis Neg Hx     Pulmonary embolism Neg Hx         Current Outpatient Medications on File Prior to Visit   Medication Sig Dispense Refill    acetaZOLAMIDE (DIAMOX) 500 mg CpSR Take by mouth.      albuterol (PROVENTIL) 2.5 mg /3 mL (0.083 %) nebulizer solution Take 3 mLs (2.5 mg total) by nebulization every 4 (four) hours as needed for Wheezing. Rescue 150 mL 11    allopurinoL (ZYLOPRIM) 100 MG tablet Take 1 tablet (100 mg total) by mouth once daily. 90 tablet 1    ALPRAZolam (XANAX) 0.5 MG tablet Take 0.5 mg by mouth 2 (two) times daily as needed.      azelaic acid (FINACEA) 15 % gel AAA bid 50 g 3    estradioL (ESTRACE) 0.01 % (0.1 mg/gram) vaginal cream Place 2 g vaginally once daily. 60 g 1    FLUoxetine 20 MG capsule Take 1 capsule (20 mg total) by mouth once daily. 30 capsule 0    hydroCHLOROthiazide (HYDRODIURIL) 12.5 MG Tab Take 1 tablet (12.5 mg total) by mouth daily as needed (leg swelling). 90 tablet 1    hydroquinone 4 % Crea Apply to dark spots once daily. Use with sunscreen if outdoors 28 g 1    ibuprofen (ADVIL,MOTRIN) 800 MG tablet Take 1 tablet (800 mg total) by mouth 3 (three) times daily. 21 tablet 0    LIDOcaine (LIDODERM) 5 % 1 patch once daily.      LYRICA 100 mg capsule Take 100 mg by mouth 2 (two) times daily.      methen-m.blue-s.phos-phsal-hyo (URIBEL) 118-10-40.8-36 mg Cap Take 1 capsule by mouth 3 (three) times daily as needed. 30 capsule 5    mirabegron (MYRBETRIQ) 50 mg Tb24 Take 1 tablet (50 mg total) by mouth once daily. 30 tablet 11    nebulizer accessories Misc 1 each - needs replacement tubing and mask  0    OLANZapine (ZYPREXA) 5 MG tablet Take 5 mg by mouth every evening.      oxyCODONE-acetaminophen  (PERCOCET)  mg per tablet Take 1 tablet by mouth.      thiamine 100 MG tablet Take 100 mg by mouth once daily.      tirzepatide (MOUNJARO) 15 mg/0.5 mL PnIj Inject 15 mg into the skin every 7 days. 4 pen 11    traMADoL (ULTRAM) 50 mg tablet Take 50 mg by mouth 2 (two) times daily as needed.      ZANAFLEX 4 mg tablet Take 4 mg by mouth every evening.      [DISCONTINUED] albuterol (PROVENTIL/VENTOLIN HFA) 90 mcg/actuation inhaler Inhale 2 puffs into the lungs every 4 (four) hours as needed for Wheezing or Shortness of Breath. Rescue. Use with chamber. 18 g 11    [DISCONTINUED] fluticasone-salmeterol diskus inhaler 100-50 mcg Inhale 1 puff into the lungs 2 (two) times daily. Controller 60 each 11    [DISCONTINUED] montelukast (SINGULAIR) 10 mg tablet Take 1 tablet (10 mg total) by mouth every evening. 90 tablet 3    blood sugar diagnostic (BLOOD GLUCOSE TEST) Strp 1 each by Misc.(Non-Drug; Combo Route) route 3 (three) times daily. (Patient not taking: Reported on 9/19/2023) 100 each 11    blood-glucose meter kit Test finger stick blood sugar once daily. (Patient not taking: Reported on 9/19/2023) 1 each 0    CEQUA 0.09 % Dpet Place 1 drop into both eyes 2 (two) times daily.      lancets Misc 1 each by Misc.(Non-Drug; Combo Route) route 4 (four) times daily. (Patient not taking: Reported on 9/19/2023) 100 each 11    meclizine (ANTIVERT) 25 mg tablet Take 1 tablet (25 mg total) by mouth 3 (three) times daily as needed for Dizziness. 30 tablet 0    predniSONE (DELTASONE) 20 MG tablet 40 mg x 5 days, then 20 mg x 5 days. (Patient not taking: Reported on 9/19/2023) 15 tablet 0     No current facility-administered medications on file prior to visit.        Review of patient's allergies indicates:   Allergen Reactions    Trulicity [dulaglutide] Shortness Of Breath and Other (See Comments)     Headaches    Aspirin Nausea Only    Hibiclens (isopropyl alcohol) Itching            Environmental History: Pets in the home:  none.  Tobacco Smoke in Home: no  Review of Systems   Constitutional:  Negative for chills and fever.   HENT:  Negative for congestion and rhinorrhea.    Eyes:  Negative for discharge and itching.   Respiratory:  Negative for cough, chest tightness, shortness of breath and wheezing.    Cardiovascular:  Negative for chest pain and leg swelling.   Skin:  Negative for rash and wound.   Allergic/Immunologic: Positive for environmental allergies. Negative for food allergies and immunocompromised state.   Neurological:  Negative for facial asymmetry and speech difficulty.   Psychiatric/Behavioral:  Negative for behavioral problems and suicidal ideas.        Objective:   Physical Exam  Vitals reviewed.   Constitutional:       General: She is not in acute distress.     Appearance: Normal appearance. She is well-developed. She is not ill-appearing, toxic-appearing or diaphoretic.   HENT:      Head: Normocephalic and atraumatic.      Right Ear: Tympanic membrane, ear canal and external ear normal. There is no impacted cerumen.      Left Ear: Tympanic membrane, ear canal and external ear normal. There is no impacted cerumen.      Nose: Nose normal. No congestion or rhinorrhea.      Mouth/Throat:      Pharynx: No oropharyngeal exudate or posterior oropharyngeal erythema.   Eyes:      General: No scleral icterus.        Right eye: No discharge.         Left eye: No discharge.      Pupils: Pupils are equal, round, and reactive to light.   Neck:      Thyroid: No thyromegaly.   Cardiovascular:      Rate and Rhythm: Normal rate and regular rhythm.      Heart sounds: Normal heart sounds. No murmur heard.     No friction rub. No gallop.   Pulmonary:      Effort: Pulmonary effort is normal. No respiratory distress.      Breath sounds: Normal breath sounds. No stridor. No wheezing, rhonchi or rales.   Chest:      Chest wall: No tenderness.   Musculoskeletal:         General: Normal range of motion.      Cervical back: Normal range of  motion and neck supple. No rigidity or tenderness. No muscular tenderness.   Lymphadenopathy:      Cervical: No cervical adenopathy.   Skin:     General: Skin is warm.      Coloration: Skin is not jaundiced or pale.      Findings: No bruising or erythema.   Neurological:      Mental Status: She is alert and oriented to person, place, and time.      Gait: Gait normal.   Psychiatric:         Mood and Affect: Mood normal.         Behavior: Behavior normal.         Thought Content: Thought content normal.         Judgment: Judgment normal.       Spirometry during previous visit- normal after albuterol.    Assessment:      1. Moderate persistent asthma without complication    2. Allergic rhinitis due to grass pollen    3. Allergic rhinitis due to [place] house dust mite          Plan:       Moderate persistent asthma without complication  -     albuterol (PROVENTIL/VENTOLIN HFA) 90 mcg/actuation inhaler; Inhale 2 puffs into the lungs every 4 (four) hours as needed for Wheezing or Shortness of Breath. Rescue. Use with chamber.  Dispense: 18 g; Refill: 2  -     fluticasone-salmeterol diskus inhaler 100-50 mcg; Inhale 1 puff into the lungs 2 (two) times daily. Controller  Dispense: 60 each; Refill: 11  -     montelukast (SINGULAIR) 10 mg tablet; Take 1 tablet (10 mg total) by mouth every evening.  Dispense: 90 tablet; Refill: 3    Allergic rhinitis due to grass pollen  -     montelukast (SINGULAIR) 10 mg tablet; Take 1 tablet (10 mg total) by mouth every evening.  Dispense: 90 tablet; Refill: 3    Allergic rhinitis due to [place] house dust mite  -     montelukast (SINGULAIR) 10 mg tablet; Take 1 tablet (10 mg total) by mouth every evening.  Dispense: 90 tablet; Refill: 3      Discussed labs.  Allergy avoidance measures discussed and placed on AVS.    Continue current medications     Advised that if she requires albuterol more than twice a month, she need to increase Advair to twice day.    RTC 4-6 months or sooner, if  needed     MD,FACSAMEERAI                    Problems Address                                                 Amount and/or Complexity                                                                      Risk       3           [] 2 or more self-limited or minor problems                      [] Limited                                                                        [] Low                  [] 1 stable chronic illness                                                  Any combination of the two                                               OTC drugs                  []Acute, uncomplicated illness or injury                            Review of prior external notes from unique source           Minor surgery with no risk factors                                                                                                               [] 1 []2  []3+                                                                                                              Review of results from each unique test                                                                                                               [] 1 []2  [] 3+                                                                                                              Order of each unique test                                                                                                               [] 1 []2  [] 3+                                                                                                              Or                                                                                                             [] Assessment requiring an independent historian      4            [] One or more chronic illness with exacerbation,              [] Moderate                                                                      [x] Moderate                 Progression, or side effects of treatment                            -test documents or  independent historians                        Prescription drug management                [x]  2 or more stable chronic illnesses                                    [x] Independent interpretation of tests                              Minor surgery with identifiable risk                [] 1 undiagnosed new problem with uncertain prognosis    [x] Discussion or management of test results                    elective major surgery                [] 1 acute illness with                systemic symptoms                                                                                                                                                              [] 1 acute complicated injury                                                                                                                                          Elective major surgery                                                                                                                                                                                                                                                                                                                                                                                                  5            [] 1 or more chronic illnesses with severe exacerbation,     [] Extensive(two from below)                                         [] High                                                                                                               [] Independent interpretation of results                         Drug therapy requiring intensive                                                                                                               []Discussion of management or test interpretation           monitoring                                                                                                                                                                                                        Decision to de-escalate care                 [] 1 acute or chronic illness or injury that poses a threat                                                                                               Decision regarding hospitalization

## 2023-09-28 ENCOUNTER — NUTRITION (OUTPATIENT)
Dept: PRIMARY CARE CLINIC | Facility: CLINIC | Age: 48
End: 2023-09-28
Payer: COMMERCIAL

## 2023-09-28 VITALS — BODY MASS INDEX: 42.37 KG/M2 | WEIGHT: 254.63 LBS

## 2023-09-28 DIAGNOSIS — E66.01 CLASS 3 SEVERE OBESITY DUE TO EXCESS CALORIES WITHOUT SERIOUS COMORBIDITY WITH BODY MASS INDEX (BMI) OF 40.0 TO 44.9 IN ADULT: ICD-10-CM

## 2023-09-28 DIAGNOSIS — E11.65 TYPE 2 DIABETES MELLITUS WITH HYPERGLYCEMIA, WITHOUT LONG-TERM CURRENT USE OF INSULIN: ICD-10-CM

## 2023-09-28 PROCEDURE — 97802 PR MED NUTR THER, 1ST, INDIV, EA 15 MIN: ICD-10-PCS | Mod: S$GLB,,, | Performed by: DIETITIAN, REGISTERED

## 2023-09-28 PROCEDURE — 97802 MEDICAL NUTRITION INDIV IN: CPT | Mod: S$GLB,,, | Performed by: DIETITIAN, REGISTERED

## 2023-09-28 PROCEDURE — 99999 PR PBB SHADOW E&M-EST. PATIENT-LVL II: CPT | Mod: PBBFAC,,, | Performed by: DIETITIAN, REGISTERED

## 2023-09-28 PROCEDURE — 99999 PR PBB SHADOW E&M-EST. PATIENT-LVL II: ICD-10-PCS | Mod: PBBFAC,,, | Performed by: DIETITIAN, REGISTERED

## 2023-09-28 NOTE — PATIENT INSTRUCTIONS
Name: Yuli Milton  Date: 09/28/2023  NUTRITION PROTOCOL    Energy Needs:  Calories: 1400  Protein: 150 grams   Carbohydrates: 140 grams  Fat: 47 grams  Focus on Heart Healthy Fats  Hydration: At least ½ your body weight in ounces + an additional 20 oz for each hour of activity or sweat loss  Added Sugar: Limit to under 25g per day      Action Items:  Make a plate at lunch that mimics the [place] Healthy Eating Plate  Cook dinner at home 2 times per week  Eat breakfast or high protein snack  Drink 3 additional bottles of water after work  Try 1 yoga video    Additional Recommendations  30 Sheet Pan Dinners  30 minute meals   30 Days of Yoga   Look for pre-made or frozen foods with the following criteria:  More protein than fat  <40g of carbohydrates per portion you are eating  <700mg of sodium per portion of sodium you are eating  In general, the fewer the ingredients, the better  When trying to see if a pre-packaged meat is a lean meat look for Protein > Fat  When looking at if a pre-made meal is balanced look for at least 20 grams of protein for the portion you will eat. Also look for the total of fiber + fat + protein > total of carbohydrates    Restaurant Tips   Pick 1 out of the 4 Rule: Instead of eating bread/tortilla chips, an appetizer, alcoholic drink and dessert, choose just one to have with your entrée   Focus on lean proteins: Refer to lean meat/meat substitutes page in meal planning guidebook. Select items grilled, baked, broiled, braised, poached or roasted   For your heart health, avoid crispy, crunchy, breaded, paneed or stuffed items and items that are cream based, au gratin or buttered   Order sauces, dressings, and gravies on the side. This way you can add 1-2 tablespoons yourself. This helps with portion control    Request extra non-starchy vegetables instead of a starchy side dish. If the starchy side is something you love, consider splitting it with someone else at the table    Beverages: Order water with lemon, sparkling water, or unsweetened tea. Avoid sugary soft drinks, juices and mixers   Deep fried foods: Enjoy no more than 2x/month    Breakfast Options  Weekend Option 1:  2 Veggies Made Great cinnamon roll muffins  3 Kasandra Chicken breakfast sausage links - found in freezer section   1 cup fresh strawberries  Weekend Option 2:   ½ cup egg whites/beaters scrambled  2 Blue Bell cake buttermilk & vanilla waffles  ½ cup low sugar vanilla Greek yogurt  ½ cup fresh blueberries  2 tbsp sugar-free syrup  Weekday Option 1:  Yogurt Parfait  2/3 - 1 cup Oikos Triple Zero Greek yogurt, any flavor  1 cup mixed berries  ½ cup low sugar cereal (see brand recommendations at end of document)  Weekday Option 2:  High Protein Overnight Oats    Weekday Option 3:  Smoothie Placido, 20 ounce size  High Intensity Chocolate Cinnamon  High Intensity Veggie Lico  Activator Series  Strawberry Banana  Blueberry Tart Cherry  Pineapple Spinach  Original High Protein - any flavor  Gladiator - any flavor + 2 add ins  Recommended add ins: almond butter or peanut butter + any fruit  Weekday Option 4:  Protein bar or shake + piece of fruit  Try any variety protein water  OWYN Protein Shakes  Ripple Protein Shake  Atkins Protein Shakes  One Protein Bar  Fit Crunch Protein Bar  Pure Protein Bar  Quest Protein Bar  Powercrunch Bar    Lunch/Dinner Options  Option 1:  Any salad kit + packet or small can of tuna/salmon or 4-5 ounces of rotisserie chicken (skin pulled off)  Option 2:  Burger Night  3 ounce 93/7 lean ground turkey or beef giovanna + 1 slice cheese + Whole Wheat bun served with at least 1 cup non-starchy vegetable such as broccoli  Option 3:  Build a better sandwich:   2 slices Michelle Igor Delightful Wheat Bread (or other whole grain, thin sliced bread)   3-4 ounces deli meat  1 sliced reduced-fat cheese or ¼ sliced avocado  unlimited non-starchy vegetables such as lettuce, tomato, onion, pickle, etc.  1  tablespoons sauce (weaver, Ranch, honey mustard, BBQ sauce, hummus), unlimited yellow or brown mustard  1 cup of raw non-starchy vegetables (baby carrots, sugar snap peas, cucumbers, etc.) or ½ cup cooked non-starchy vegetables  Option 4:  Snack Box Meal  Option 1:  2 boiled eggs  2 ounces deli meat  ½ bell pepper  11 mini pretzels  ½ cup grapes  Light string cheese  Option 2:   1 serving Nut Thins  Light string cheese  Tuna pack, any flavor  1 cup chopped strawberries  Fiber one brownie bar  ½ cup blueberries  Option 3:  1 ounce pretzel crisps  ½ cup apple sliced  2 slices ultra thin provolone cheese  2 ounces rotisserie chicken  4 tbsp powdered peanut butter mixed with water (see directions)  ½ cup blackberries  Option 4:   2 tbsp hummus  3 ounces baby carrots  2 hard boiled eggs  ½ cup grapes  2 ounces deli turkey wrapped around 2 low fat string cheese  Option 5:  3-5 ounces of baked or broiled fish, seafood, or lean meat cooked with spray olive oil  1 cup or more of cooked non-starchy vegetables  1 serving of 100% Whole Wheat or 1-2 servings of carbohydrates from the Meal Planning Guide Book  Option 6:  Build a grain/power bowl:   Choose a starch (2-3 servings feel free to mix/match)  Brown rice, farro, quinoa, millet, barley, couscous  White or sweet potatoes  High Amana squash  Choose a protein (4-5 ounces)  [place], steak (lean cut), chicken, shrimp, edamame, tofu  Vegetables (unlimited amounts)  Try them roasted, steamed, or grilled for variety  Eggplant, broccoli, carrots, bell peppers, green beans, radishes, cucumbers, parsnips, lettuce, cabbage  Dressing/sauce  Vinaigrette, yogurt, warm broth, hot sauce, soy sauce, chimichurri, pesto, or any combinations!  Garnish (optional)  Fresh herbs, micro greens, nuts, seeds, toasted coconut  Note that nuts/seeds/coconut contain a lot of calories and should be used sparingly  Option 7:  Tacos  1-2 carb smart tortillas  ¼ cup cheese of choice  2 tbsp sour cream  3-4 ounces  lean (93/7) ground beef or turkey or rotisserie chicken  Option 8:  Better-for-you pizza  2-Ingredient High Protein Dough, (or use a whole wheat garfield) + ¼ cup red sauce + 1/3 cup cheese + 2-4 ounces of protein (rotisserie chicken, turkey pepperoni, sliced grilled chicken/steak) + veggies of choice  Serve with at least 1 cup of non-starchy vegetables or side salad  Option 9:  Better-for-you pasta  1 cup cooked Banza/Whole Wheat pasta + 2-4 ounces lean protein + ¼ cup delfina sauce or ½ cup red sauce or 1-2 tablespoons pesto   Serve with 1-2 cups non-starchy vegetables    Snack Ideas  Oikos Pro 20g Yogurt + piece of fruit  1 portion bag of nuts + piece of fruit  Piece of fruit + 2 tablespoons no sugar added nut butter  Josse's and Skippy make on-the-go pre-portioned packets  Protein bar  Look for at least 15 grams of protein  Limit to 1 serving carbohydrates (15 grams)  Ready to drink protein shake   At least 25 grams protein  Bag of Skinny Pop or Smart Food popcorn + 1 low-fat cheese stick/babybel   ½ cup cottage cheese + ¼ cup freeze dried fruit  Any smoothie under 300 calories and 15+ grams of protein  Turkey rollup: 1 carb smart tortilla + 3 oz turkey + 1 tablespoon weaver  3 oz can or packet of tuna + 1 tablespoon weaver with 1 serving of crackers of your choice  P3 Protein Pack  Bare Apple Chips + 2 low-fat string cheese  1-2 cups raw vegetables + 1 portion container guacamole or 2-3 tbsp Greek yogurt ranch or 2-3 tbsp hummus  1 serving Druze Rice Crisps + ¼ cup salsa and 1 portion container guacamole  1-2 Chomps Sticks + 1 piece fruit  Sweet Treats  Protein shake  ½ cup berries + a dollop of whipped cream  Outshine Frozen Fruit Bars (2-3 if wanted)  Chocolate Dipped Banana Bites  Raspberry Lemon Greek Frozen Yogurt Bark  1 cup strawberries with 2-3 tbsp chocolate hummus  Sugar Free Pudding or Jello  5.3 ounce 0% Fage yogurt mixed with 2 tablespoons sugar-free pudding mix  All the Best Banana Nice Cream Recipes    Any sweet treat under 200 calories (think mini/fun-sized)    Favorite Websites for Recipe Inspiration  Skinnytaste  Real Food Dietitians   Eating Well  Budget Bytes  Eating Bird Food  Fit Foodie Finds  Oh Snap Macros   35 Macro Friendly Meal Prep Recipes   9 Meal Prep Success Tips     [place] Meal Prep/Delivery Services  Freshter Meals   Fully Fit Meals   Fresh  Eats     Additional Recipe Ideas:  Breakfast  Pick any Freshter Meals under 400 calories and pair with ½ cup fruit  Oats Overnight + 8 ounces Fairlife milk   Breakfast Toast Ideas + Protein Shake  Meal Prep Breakfast Options:  [place] Chicken Egg Cups  Ham & Broccoli Breakfast Casserole  Bagel Ham and Cheese Quiche  Blueberry Protein Pancakes  English Muffin Breakfast Bake     Raspberry Fiber Smoothie (makes 1 smoothie)  1 cup frozen raspberries  1 cup unsweetened almond milk  2/3 cup nonfat vanilla Greek yogurt (or 5.3 oz cup) or 1 scoop protein powder  ½ cup frozen cauliflower (florets or riced)  1 tbsp almond butter  1 tbsp old fashioned oats  1 tbsp nate seeds  ½ tbsp honey  PB&J Smoothie Bowl  ¼ cup milk of your choice  2/3 cup frozen blueberries  2/3 cup sliced strawberries, frozen  1 scoop vanilla protein powder  1 tbsp peanut butter  Optional toppings: 1 tbsp melted peanut butter, 1-2 tbsp granola, nate seeds, blue berries  Spinach Avocado Smoothie  1 cup nonfait plain Greek yogurt  1 cup fresh spinach  1 frozen banana  ¼ avocado  2 tbsp water  1 tsp honey  Lunch/Dinner  Chicken Enchiladas - great for freezing serving with a side salad or at least 1 cup non-starchy vegetables  Ground Turkey Skillet with Zucchni, Corn, Black Beans, and Tomato great by itself or serve over 1 cup cauliflower rice or 1/3 cup brown rice    Air Fryer Chicken Thighs with Air Fryer Frozen Broccoli & 1 cup cubed roasted potatoes  BBQ Grilled chicken breast with Brown Sugar Baked Beans + 1 cup non-starchy vegetables   Juicy Chicken Breast 6 Ways with 2-3 servings  carbohydrates (from Meal Planning Guide) + at least 1 cup non-starchy vegetables  Chicken Fajita Kabobs serve with brown rice, quinoa, or carb smart tortillas + additional non-starchy vegetables  Pasta Primavera add in additional protein such as shrimp, sliced steak, ground beef, or rotisserie chicken    Sheet Pan Turkey Meatloaf + Broccoli serve with 1 whole baked potato and ¼ cup reduced fat cheese  Ranch Chicken Meal Prep    Favorite Brands  Protein Bars/Shakes/Powders  Bars  RX Bars  Fit Crunch  THINK  Quest  One  Shakes  Core Power Shakes (26g or 42g protein)  Fairlife Nutrition Plan (30g protein)   Ensure Max Protein (30g protein)  Premier Protein Shakes (30g protein)  Boost High Protein (20g protein)   Muscle Milk Genuine Protein Shakes (25g protein)   Quest Protein Shakes (30g protein)   Orgain Protein Shakes (20-26g protein)   Powders  Ghost  Optimum Nutrition   Muscle Milk  Garden of Life  Wylie   Naked   Pasta/Rice  Banza Chickpea Pasta  Barilla Whole Wheat  [place] Rice  Success Boil in Bag Brown Rice  Bread  Abner's Killer Bread thin sliced  Nature's Own 100% Whole Grain Sugar-Free  Orowheat Whole Wheat Small Slice  Michelle Igor Delightful White Whole Wheat  [place] Carb Balance Tortillas  Ole Extreme Wellness Tortillas  Flatout Wraps  [place] Banderita Carb Counter Tortillas  Toufayan Keto Wraps  Dairy  Chobani less sugar  Dannon Light and Fit Yogurt  Fage 2% plain  Oikos Triple Zero  [facility] 2% Milk  Ripple Milk, unsweetened(milk alternative)  Soy Milk, Vanilla Unsweetened  Good Culture Low-Fat Cottage Cheese  Dressings/Sauces  [facility] Yogurt Ran   Sweet Baby Ray's BBQ Sauce - no added sugar  Any ketchup with no added/less added sugar  [place] or Jose Alfredo or Primal Kitchen  G Sharp Sugar Free Sauces   Mcleod's Own Dressings   Jung's Dressings   Cereal  Kashi GO!  Shredded Wheat  Fiber One  Grape Nuts  Special K Protein  Snacks  Nature's Garden Trail Mix Snack Packs  Pop Chips (like healthier Lays)  Pop  Corners (like healthier Doritos)  Silver's Balanced Breaks  P3 Protein Packs  Skinny Pop Popcorn  Lesser Evil Popcorn  Beanitos Chips  Zoroastrian Oats Rice Crisps   Late July Rafael & Quinoa Tortilla Chips  Great Value Zero Sugar Beef Jerky (be mindful of sodium)          K.I.S.S. (Keep It Simple Silly)  Pick 2-3 non-starchy vegetables  Rotate during the week having them for lunches and dinners.  Pick 1-2 different types of meats  Use different sauces   BBQ  Faison  Teriyaki  Pesto  Salsa  Seasonings  Slap Ya Mama  Lemon Pepper  Everything but the [employer]'s Chipotle Cherry  Pick 2 different starches to have during the week  For example:  Potatoes  sweet potatoes  Corn  butternut squash  Banza chickpea pasta  brown rice  Keep breakfast simple, have two options and stick with those two options the whole week  For example, making Overnight Oats with a serving of fruit for 3 breakfasts and eat 2 Healthy Egg Muffin Cups with a slice of whole grain toast and 1 serving fruit for the other 4 breakfasts    For lunch/dinner you could do something simple like the following: This would allow you to not waste a lot of ingredients but have a wide variety of flavors that you could mix match all week.  Chicken thighs 3 ways:   BBQ Sauce  Lemon Pepper Seasoning  Ranch Seasoning  Potatoes 2 ways:   cubed and roasted  mashed with low-fat milk, Greek yogurt (instead of butter or sour cream) and low-fat cheese  Brown rice - made in chicken stock, low sodium (gives it extra flavor)  Frozen Broccoli with ranch seasoning, microwaved  Frozen broccoli roasted, sprinkled with lemon juice and 1 tbsp parmesan  Green beans with sliced mushrooms  Green beans sauteed with garlic              Name: Yuli Milton   Date: 09/28/2023    Recommended daily energy requirements to reach your goals:  1400 Calories  105 grams Protein  140 grams Carbohydrates  47 grams Fat  100 ounces of fluid per day

## 2023-09-28 NOTE — PROGRESS NOTES
"Nutrition Assessment    Visit Type: Insurance initial  Session Time:  2 Hours  Reason for MNT visit: Pt in for education and nutrition counseling regarding T2DM, HTN, Obesity, and Low Vitamin D .     Age: 48 y.o.  Wt:   Wt Readings from Last 10 Encounters:   09/28/23 115.5 kg (254 lb 10.1 oz)   09/19/23 117.9 kg (259 lb 14.8 oz)   09/08/23 115.7 kg (255 lb 1.2 oz)   09/06/23 117.9 kg (260 lb)   08/29/23 118.2 kg (260 lb 11.1 oz)   08/24/23 117.6 kg (259 lb 4.2 oz)   08/16/23 118.4 kg (261 lb)   07/21/23 118.8 kg (262 lb)   07/11/23 118.8 kg (262 lb)   06/26/23 119 kg (262 lb 7.3 oz)     Ht:   Ht Readings from Last 1 Encounters:   08/24/23 5' 5" (1.651 m)     BMI:   BMI Readings from Last 5 Encounters:   09/19/23 43.25 kg/m²   09/08/23 42.45 kg/m²   09/06/23 43.27 kg/m²   08/29/23 43.38 kg/m²   08/24/23 43.14 kg/m²       Client states:  Yuli has tried various diets and had varying levels of success in the past. She has limitations to starting exercise. States her feet and legs will be very sore if she does a lot of standing or walking. She is open to trying yoga. Currently she dines out for every meal. She does not like cooking and she has limited motivation for making breakfast in the morning but is open to ideas. Is willing to make dinner 1-2 times per week. Does not snack. Was unable to go over serving vs portion sizes or meal planning guide. Goal is to find internal motivation or rewards system to make the better-for-you options. Goal is to get to 200 pounds. First goal is to hit 249 pounds.  Discussed  How to build a better-for-you plate at a restaurant  Getting breakfast most days  Limiting added sugars  Limiting high fat foods such as biscuits/fried foods  Reading a nutrition label  Internal vs. External motivation    Vitamins, Minerals, and/or Supplements:  None     Medical History  Reviewed and noted  Of note: Iron Def. Anemia, HTN, Mixed Hyperlipidemia, Type 2 DM, Vitamin D Deficiency    Medications  "   Prior to Admission medications    Medication Sig Start Date End Date Taking? Authorizing Provider   acetaZOLAMIDE (DIAMOX) 500 mg CpSR Take by mouth. 5/17/23   Provider, Historical   albuterol (PROVENTIL) 2.5 mg /3 mL (0.083 %) nebulizer solution Take 3 mLs (2.5 mg total) by nebulization every 4 (four) hours as needed for Wheezing. Rescue 6/13/23   Drew Gutierrez MD   albuterol (PROVENTIL/VENTOLIN HFA) 90 mcg/actuation inhaler Inhale 2 puffs into the lungs every 4 (four) hours as needed for Wheezing or Shortness of Breath. Rescue. Use with chamber. 9/19/23   Tiffany Goyal MD   allopurinoL (ZYLOPRIM) 100 MG tablet Take 1 tablet (100 mg total) by mouth once daily. 6/15/23   Drew Gutierrez MD   ALPRAZolam (XANAX) 0.5 MG tablet Take 0.5 mg by mouth 2 (two) times daily as needed. 2/23/23   Provider, Historical   azelaic acid (FINACEA) 15 % gel AAA bid 12/16/21   Drew Gutierrez MD   blood sugar diagnostic (BLOOD GLUCOSE TEST) Strp 1 each by Misc.(Non-Drug; Combo Route) route 3 (three) times daily.  Patient not taking: Reported on 9/19/2023 2/6/23   Acacia Warner PA-C   blood-glucose meter kit Test finger stick blood sugar once daily.  Patient not taking: Reported on 9/19/2023 2/6/23   Acacia Warner PA-C   CEQUA 0.09 % Dpet Place 1 drop into both eyes 2 (two) times daily. 3/19/21   Provider, Historical   estradioL (ESTRACE) 0.01 % (0.1 mg/gram) vaginal cream Place 2 g vaginally once daily. 9/6/23 9/5/24  Scottie Herrera MD   FLUoxetine 20 MG capsule Take 1 capsule (20 mg total) by mouth once daily. 2/24/22   Drew Gutierrez MD   fluticasone-salmeterol diskus inhaler 100-50 mcg Inhale 1 puff into the lungs 2 (two) times daily. Controller 9/19/23   Tiffany Goyal MD   hydroCHLOROthiazide (HYDRODIURIL) 12.5 MG Tab Take 1 tablet (12.5 mg total) by mouth daily as needed (leg swelling). 12/16/21   Drew Gutierrez MD   hydroquinone 4 % Crea Apply to dark spots once daily. Use with  sunscreen if outdoors 9/7/23   Juju Nazario MD   ibuprofen (ADVIL,MOTRIN) 800 MG tablet Take 1 tablet (800 mg total) by mouth 3 (three) times daily. 9/7/23   Juju Nazario MD   lancets Misc 1 each by Misc.(Non-Drug; Combo Route) route 4 (four) times daily.  Patient not taking: Reported on 9/19/2023 2/6/23   Acacia Warner PA-C   LIDOcaine (LIDODERM) 5 % 1 patch once daily. 7/3/22   Provider, Historical   LYRICA 100 mg capsule Take 100 mg by mouth 2 (two) times daily. 6/5/23   Provider, Historical   meclizine (ANTIVERT) 25 mg tablet Take 1 tablet (25 mg total) by mouth 3 (three) times daily as needed for Dizziness. 2/2/23 8/16/23  Matt, Drew P., MD   methen-m.blue-s.phos-phsal-hyo (URIBEL) 118-10-40.8-36 mg Cap Take 1 capsule by mouth 3 (three) times daily as needed. 9/6/23   Scottie Herrera MD   mirabegron (MYRBETRIQ) 50 mg Tb24 Take 1 tablet (50 mg total) by mouth once daily. 9/6/23 9/5/24  Scottie Herrera MD   montelukast (SINGULAIR) 10 mg tablet Take 1 tablet (10 mg total) by mouth every evening. 9/19/23 9/13/24  Tiffany Goyal MD   nebulizer accessories Misc 1 each - needs replacement tubing and mask 1/20/21   Raysa Trevino PA-C   OLANZapine (ZYPREXA) 5 MG tablet Take 5 mg by mouth every evening. 5/25/23   Provider, Historical   oxyCODONE-acetaminophen (PERCOCET)  mg per tablet Take 1 tablet by mouth. 6/5/23   Provider, Historical   predniSONE (DELTASONE) 20 MG tablet 40 mg x 5 days, then 20 mg x 5 days.  Patient not taking: Reported on 9/19/2023 8/23/22   Josefina Mack NP   thiamine 100 MG tablet Take 100 mg by mouth once daily.    Provider, Historical   tirzepatide (MOUNJARO) 15 mg/0.5 mL PnIj Inject 15 mg into the skin every 7 days. 8/21/23   Acacia Warner, MAJOR   traMADoL (ULTRAM) 50 mg tablet Take 50 mg by mouth 2 (two) times daily as needed. 6/5/23   Provider, Historical   ZANAFLEX 4 mg tablet Take 4 mg by mouth every evening. 8/8/23   Provider,  Historical        Social History    Marital status:  Single    Social History     Tobacco Use    Smoking status: Never    Smokeless tobacco: Never   Substance Use Topics    Alcohol use: No     Comment: rarely; stop 12/11/19 prior to sx       Labs   Reviewed and noted    Food Allergies or Intolerances:  NKFAI      Beverages  Water: 2-3 16 ounces bottles per day, sometimes with Crystal Light packets  Alcohol: daiquiri, occasional  Coffee: none  Energy Drinks: none  Tea: occasional lavender or chamomile tea  Soda: None  Milk: none  Juice: none  Other: Fruit Punch or Lemonade with lunch    24-hour Recall    Breakfast: skips or gets eggs/villasenor/biscuits/grits  Lunch: at cafeteria fried fish/fried or baked chicken/baked pork chops/ red beans & rice/pasta + always get a vegetable  Dinner: usually out, place varies, rarely cooks at home, whatever she is craving  Snacks: none    LIFESTYLE FACTORS    Dining out: 6+ x per week, mostly restaurants  Types of restaurants/foods: home/[facility] cooked meals    Frequency of consumption of fried foods: multiple times per week     Meal preparation/shopping: self    Sleep: fair  Hours: 5-6  Wake: 5 am  Sleep: 10:30 pm    Stress Level: moderate to high  Stress management: self-care / take time to cool down / hot tea    Support System:  friends    Physical Activity: Sedentary (little or no exercise)  Types of activity: none  Frequency of activity: none    Diagnosis    Excessive energy intake related to habit of eating out for most meals as evidenced by patient report of food choices.    Intervention    Estimated Energy Requirements:   Calories: 1400  Protein: 105 grams   Carbohydrates: 140 grams   Fat: 47 grams   Baseline for fluids: 100 ounces + sweat loss    Written Materials Provided  Meal Planning Guide  Fueling Well On-the-Go Food Guide  [place] Healthy Eating Plate  Eat Fit Shopping Guide  RD contact information    Goals:  1.  Make a plate at lunch that mimics the [place] Healthy  Eating Plate 2 times per week  2.  Cook dinner at home 2 times per week  3.  Eat breakfast or high protein snack 3 times per week  4.  Drink 3 additional bottles of water after work  5.  Try one yoga video      Monitoring/Evaluation    Monitor the following:  Weight  Sleep  Stress Management  Movement    Communicated with healthcare provider and documented plan for referral to appropriate agency/healthcare provider as needed    Supervising Physician: Danny Patterson MD    Patient motivation, anticipated barriers, expected compliance: Patient is motivated and has verbalized understanding and intent to comply.     Comprehension: fair     Follow-up: 1 month

## 2023-09-28 NOTE — LETTER
September 28, 2023      [facility]  [address]  [place] [address]  Phone: [phone]  Fax: [phone]       Patient: Yuli Milton   YOB: 1975  Date of Visit: 09/28/2023    To Whom It May Concern:    Dick Milton  was at [facility] on 09/28/2023. The patient may return to work/school on 09/29/23 with no restrictions. If you have any questions or concerns, or if I can be of further assistance, please do not hesitate to contact me.    Sincerely,    Mally Lenz RD

## 2023-09-29 ENCOUNTER — PATIENT MESSAGE (OUTPATIENT)
Dept: PULMONOLOGY | Facility: CLINIC | Age: 48
End: 2023-09-29
Payer: COMMERCIAL

## 2023-10-08 NOTE — PROGRESS NOTES
Subjective:       Patient ID: Yuli Milton is a 42 y.o. female.    Chief Complaint: Follow-up; Urinary Tract Infection; and Shortness of Breath    Urinary Tract Infection    This is a recurrent problem. The current episode started gradual onset. The problem occurs every urination. The problem has been gradually worsening. The quality of the pain is described as aching. The pain is moderate. There has been no fever. She is sexually active. There is no history of pyelonephritis. Associated symptoms include frequency and urgency. Pertinent negatives include no behavior changes, chills, discharge, flank pain, hematuria, hesitancy, nausea, possible pregnancy, sweats, vomiting, weight loss, bubble bath use, constipation, rash or withholding. Treatments tried: finished round of cipro recently. The treatment provided no relief. Her past medical history is significant for recurrent UTIs.     Review of Systems   Constitutional: Negative for appetite change, chills, diaphoresis, fatigue, fever and weight loss.   Gastrointestinal: Negative for constipation, nausea and vomiting.   Genitourinary: Positive for frequency and urgency. Negative for flank pain, hematuria and hesitancy.   Musculoskeletal: Negative for myalgias.   Skin: Negative for rash.   Neurological: Negative.        Objective:      Physical Exam   Constitutional: She is oriented to person, place, and time. She appears well-developed. No distress.   Eyes: Pupils are equal, round, and reactive to light.   Cardiovascular: Normal rate and regular rhythm.    Pulmonary/Chest: Effort normal and breath sounds normal. No respiratory distress. She has no wheezes.   Abdominal: Soft. Bowel sounds are normal. There is tenderness (suprapubic).   Neurological: She is alert and oriented to person, place, and time.   Skin: Skin is warm and dry. No rash noted. She is not diaphoretic.   Psychiatric: She has a normal mood and affect. Her behavior is normal.   Nursing note and  vitals reviewed.      Assessment:       1. Acute cystitis with hematuria        Plan:       *Acute cystitis with hematuria  RTC if any issues will change ABX if indicted per culture  -     Urine culture  -     POCT URINALYSIS  -     nitrofurantoin, macrocrystal-monohydrate, (MACROBID) 100 MG capsule; Take 1 capsule (100 mg total) by mouth 2 (two) times daily.  Dispense: 20 capsule; Refill: 0    **     Male

## 2023-10-10 ENCOUNTER — OFFICE VISIT (OUTPATIENT)
Dept: OPHTHALMOLOGY | Facility: CLINIC | Age: 48
End: 2023-10-10
Payer: COMMERCIAL

## 2023-10-10 ENCOUNTER — PATIENT MESSAGE (OUTPATIENT)
Dept: OPHTHALMOLOGY | Facility: CLINIC | Age: 48
End: 2023-10-10

## 2023-10-10 DIAGNOSIS — M35.01 KERATOCONJUNCTIVITIS SICCA: Primary | ICD-10-CM

## 2023-10-10 PROCEDURE — 3061F PR NEG MICROALBUMINURIA RESULT DOCUMENTED/REVIEW: ICD-10-PCS | Mod: CPTII,S$GLB,, | Performed by: OPTOMETRIST

## 2023-10-10 PROCEDURE — 99213 PR OFFICE/OUTPT VISIT, EST, LEVL III, 20-29 MIN: ICD-10-PCS | Mod: S$GLB,,, | Performed by: OPTOMETRIST

## 2023-10-10 PROCEDURE — 3066F PR DOCUMENTATION OF TREATMENT FOR NEPHROPATHY: ICD-10-PCS | Mod: CPTII,S$GLB,, | Performed by: OPTOMETRIST

## 2023-10-10 PROCEDURE — 99213 OFFICE O/P EST LOW 20 MIN: CPT | Mod: S$GLB,,, | Performed by: OPTOMETRIST

## 2023-10-10 PROCEDURE — 3044F HG A1C LEVEL LT 7.0%: CPT | Mod: CPTII,S$GLB,, | Performed by: OPTOMETRIST

## 2023-10-10 PROCEDURE — 3061F NEG MICROALBUMINURIA REV: CPT | Mod: CPTII,S$GLB,, | Performed by: OPTOMETRIST

## 2023-10-10 PROCEDURE — 99999 PR PBB SHADOW E&M-EST. PATIENT-LVL II: ICD-10-PCS | Mod: PBBFAC,,, | Performed by: OPTOMETRIST

## 2023-10-10 PROCEDURE — 1159F PR MEDICATION LIST DOCUMENTED IN MEDICAL RECORD: ICD-10-PCS | Mod: CPTII,S$GLB,, | Performed by: OPTOMETRIST

## 2023-10-10 PROCEDURE — 99999 PR PBB SHADOW E&M-EST. PATIENT-LVL II: CPT | Mod: PBBFAC,,, | Performed by: OPTOMETRIST

## 2023-10-10 PROCEDURE — 3066F NEPHROPATHY DOC TX: CPT | Mod: CPTII,S$GLB,, | Performed by: OPTOMETRIST

## 2023-10-10 PROCEDURE — 3044F PR MOST RECENT HEMOGLOBIN A1C LEVEL <7.0%: ICD-10-PCS | Mod: CPTII,S$GLB,, | Performed by: OPTOMETRIST

## 2023-10-10 PROCEDURE — 1159F MED LIST DOCD IN RCRD: CPT | Mod: CPTII,S$GLB,, | Performed by: OPTOMETRIST

## 2023-10-10 RX ORDER — LIFITEGRAST 50 MG/ML
1 SOLUTION/ DROPS OPHTHALMIC 2 TIMES DAILY
Qty: 60 EACH | Refills: 2 | Status: SHIPPED | OUTPATIENT
Start: 2023-10-10 | End: 2024-01-02 | Stop reason: SDUPTHER

## 2023-10-10 NOTE — PROGRESS NOTES
HPI     Eye Pain            Comments: Laterality: OS  Pain Scale:  2-3  Onset:   1-2 days  Discharge:   watery  A.M. Matting:  no  Itch:   no, just  painful  Redness:   no  Photophobia:   slightly  Foreign body sensation:   yes  Deep pain:   no  Previous occurrence:   no, just with dry eyes  Drops:   xiidra, systane as needed  Contact lens wear? no            Last edited by Madalyn Oneal on 10/10/2023  2:15 PM.            Assessment /Plan     For exam results, see Encounter Report.    Keratoconjunctivitis sicca  -     lifitegrast (XIIDRA) 5 % Dpet; Apply 1 drop to eye 2 (two) times daily.  Dispense: 60 each; Refill: 2  Patient stopped using xiidra drops, restart today. Continue Systane PRN.   No FB seen on lid eversion to cause FBS. Eye pain improved upon proparacaine instillation.     RTC as scheduled for annual eye exam, sooner if any changes to vision worsening symptoms.

## 2023-10-10 NOTE — LETTER
October 10, 2023      [facility]  [address] [place] [address]  Phone: [phone]  Fax: [phone]       Patient: Yuli Milton   YOB: 1975  Date of Visit: 10/10/2023    To Whom It May Concern:    Dick Milton  was at [facility] on 10/10/2023. The patient may return to work/school on 10/11/2023 with no restrictions. If you have any questions or concerns, or if I can be of further assistance, please do not hesitate to contact me.    Sincerely,    Stacey Milan MA

## 2023-10-24 ENCOUNTER — OFFICE VISIT (OUTPATIENT)
Dept: PRIMARY CARE CLINIC | Facility: CLINIC | Age: 48
End: 2023-10-24
Payer: COMMERCIAL

## 2023-10-24 VITALS
HEIGHT: 65 IN | SYSTOLIC BLOOD PRESSURE: 98 MMHG | HEART RATE: 60 BPM | DIASTOLIC BLOOD PRESSURE: 72 MMHG | BODY MASS INDEX: 43.6 KG/M2 | TEMPERATURE: 98 F | WEIGHT: 261.69 LBS | OXYGEN SATURATION: 99 %

## 2023-10-24 DIAGNOSIS — E11.65 TYPE 2 DIABETES MELLITUS WITH HYPERGLYCEMIA, WITHOUT LONG-TERM CURRENT USE OF INSULIN: ICD-10-CM

## 2023-10-24 DIAGNOSIS — I10 ESSENTIAL HYPERTENSION: ICD-10-CM

## 2023-10-24 DIAGNOSIS — E78.2 MIXED HYPERLIPIDEMIA: ICD-10-CM

## 2023-10-24 DIAGNOSIS — F33.1 MODERATE EPISODE OF RECURRENT MAJOR DEPRESSIVE DISORDER: ICD-10-CM

## 2023-10-24 DIAGNOSIS — E11.65 TYPE 2 DIABETES MELLITUS WITH HYPERGLYCEMIA, WITHOUT LONG-TERM CURRENT USE OF INSULIN: Primary | ICD-10-CM

## 2023-10-24 DIAGNOSIS — E66.01 CLASS 3 SEVERE OBESITY DUE TO EXCESS CALORIES WITHOUT SERIOUS COMORBIDITY WITH BODY MASS INDEX (BMI) OF 40.0 TO 44.9 IN ADULT: ICD-10-CM

## 2023-10-24 DIAGNOSIS — G47.33 OSA ON CPAP: ICD-10-CM

## 2023-10-24 DIAGNOSIS — E66.01 CLASS 3 SEVERE OBESITY DUE TO EXCESS CALORIES WITHOUT SERIOUS COMORBIDITY WITH BODY MASS INDEX (BMI) OF 40.0 TO 44.9 IN ADULT: Primary | ICD-10-CM

## 2023-10-24 PROCEDURE — 3074F SYST BP LT 130 MM HG: CPT | Mod: CPTII,S$GLB,, | Performed by: FAMILY MEDICINE

## 2023-10-24 PROCEDURE — 99999 PR PBB SHADOW E&M-EST. PATIENT-LVL V: CPT | Mod: PBBFAC,,, | Performed by: FAMILY MEDICINE

## 2023-10-24 PROCEDURE — 1160F PR REVIEW ALL MEDS BY PRESCRIBER/CLIN PHARMACIST DOCUMENTED: ICD-10-PCS | Mod: CPTII,S$GLB,, | Performed by: FAMILY MEDICINE

## 2023-10-24 PROCEDURE — 3066F NEPHROPATHY DOC TX: CPT | Mod: CPTII,S$GLB,, | Performed by: FAMILY MEDICINE

## 2023-10-24 PROCEDURE — 3044F PR MOST RECENT HEMOGLOBIN A1C LEVEL <7.0%: ICD-10-PCS | Mod: CPTII,S$GLB,, | Performed by: FAMILY MEDICINE

## 2023-10-24 PROCEDURE — 99214 PR OFFICE/OUTPT VISIT, EST, LEVL IV, 30-39 MIN: ICD-10-PCS | Mod: S$GLB,,, | Performed by: FAMILY MEDICINE

## 2023-10-24 PROCEDURE — 3061F PR NEG MICROALBUMINURIA RESULT DOCUMENTED/REVIEW: ICD-10-PCS | Mod: CPTII,S$GLB,, | Performed by: FAMILY MEDICINE

## 2023-10-24 PROCEDURE — 3074F PR MOST RECENT SYSTOLIC BLOOD PRESSURE < 130 MM HG: ICD-10-PCS | Mod: CPTII,S$GLB,, | Performed by: FAMILY MEDICINE

## 2023-10-24 PROCEDURE — 3078F PR MOST RECENT DIASTOLIC BLOOD PRESSURE < 80 MM HG: ICD-10-PCS | Mod: CPTII,S$GLB,, | Performed by: FAMILY MEDICINE

## 2023-10-24 PROCEDURE — 3044F HG A1C LEVEL LT 7.0%: CPT | Mod: CPTII,S$GLB,, | Performed by: FAMILY MEDICINE

## 2023-10-24 PROCEDURE — 1160F RVW MEDS BY RX/DR IN RCRD: CPT | Mod: CPTII,S$GLB,, | Performed by: FAMILY MEDICINE

## 2023-10-24 PROCEDURE — 99999 PR PBB SHADOW E&M-EST. PATIENT-LVL V: ICD-10-PCS | Mod: PBBFAC,,, | Performed by: FAMILY MEDICINE

## 2023-10-24 PROCEDURE — 3008F BODY MASS INDEX DOCD: CPT | Mod: CPTII,S$GLB,, | Performed by: FAMILY MEDICINE

## 2023-10-24 PROCEDURE — 3066F PR DOCUMENTATION OF TREATMENT FOR NEPHROPATHY: ICD-10-PCS | Mod: CPTII,S$GLB,, | Performed by: FAMILY MEDICINE

## 2023-10-24 PROCEDURE — 3078F DIAST BP <80 MM HG: CPT | Mod: CPTII,S$GLB,, | Performed by: FAMILY MEDICINE

## 2023-10-24 PROCEDURE — 3061F NEG MICROALBUMINURIA REV: CPT | Mod: CPTII,S$GLB,, | Performed by: FAMILY MEDICINE

## 2023-10-24 PROCEDURE — 1159F MED LIST DOCD IN RCRD: CPT | Mod: CPTII,S$GLB,, | Performed by: FAMILY MEDICINE

## 2023-10-24 PROCEDURE — 3008F PR BODY MASS INDEX (BMI) DOCUMENTED: ICD-10-PCS | Mod: CPTII,S$GLB,, | Performed by: FAMILY MEDICINE

## 2023-10-24 PROCEDURE — 1159F PR MEDICATION LIST DOCUMENTED IN MEDICAL RECORD: ICD-10-PCS | Mod: CPTII,S$GLB,, | Performed by: FAMILY MEDICINE

## 2023-10-24 PROCEDURE — 99214 OFFICE O/P EST MOD 30 MIN: CPT | Mod: S$GLB,,, | Performed by: FAMILY MEDICINE

## 2023-10-24 RX ORDER — CELECOXIB 200 MG/1
CAPSULE ORAL
COMMUNITY
Start: 2023-10-18

## 2023-10-24 RX ORDER — MECLIZINE HYDROCHLORIDE 25 MG/1
25 TABLET ORAL 3 TIMES DAILY PRN
Qty: 30 TABLET | Refills: 0 | Status: SHIPPED | OUTPATIENT
Start: 2023-10-24 | End: 2023-11-23

## 2023-10-24 NOTE — LETTER
October 24, 2023      [facility]  [address]  [place] [address]  Phone: [phone]  Fax: [phone]       Patient: Yuli Milton   YOB: 1975  Date of Visit: 10/24/2023    To Whom It May Concern:    Dick Milton  was at [facility] on 10/24/2023. The patient may return to work/school on 10/25/2023 with no restrictions. If you have any questions or concerns, or if I can be of further assistance, please do not hesitate to contact me.    Sincerely,    Ninfa Carter MD

## 2023-10-24 NOTE — PROGRESS NOTES
"Subjective     Patient ID: Yuli Milton is a 48 y.o. female.    Chief Complaint: Follow-up      On mounjaro for diabetes.     Nutrition referral done and pt was able to see Mally.     Has psychiatrist for anxiety/depression. Currently taking prozac and xyprexa. Has not made f/u appt so is out of medication. She has with Dr. Ambrose. She declines to get back on medication. Denies any mh crisis.   I have reviewed with pt she is not a candidate for phentermine. I will not put her on other medications unless she is cotreated with the psychiatrist. She continues to see her counseolor.    Has had recent pain medication (over summer).    Needs refill of monjaro. No weight loss. Nausea  has improved. Some constipation.   NO mh changes. Hx of stable anxiety. NO throat/neck pain or mass. No pancreatitis, no hx of gastroparesis. No interval change in fam hx. No med thyroid cancer.    States no junk food; is eating regular food. Eats "what I feel like." Had onion/peppers/steak/rice.   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    Pt has had consultation for bariatric surgery in [place] in the past. She states was told to reconsider due to anxiety /depression. She states was stable on medication and compliant. She got discouraged and stopped the process.       Fam hx of underactive thyroid; no nodules or ca; pt reports no personal hx of nodules    HPI       Objective     PAST MEDICAL HISTORY:  Past Medical History:   Diagnosis Date    Abnormal Pap smear of cervix     treatment??    Abnormal Pap smear of vagina     Acute pain of right shoulder 10/25/2019    Adjustment disorder with depressed mood 11/15/2014    Overview:  Multiple recent medical problems     Allergy     Anemia     Anemia 12/14/2014 6:26:13 PM    [facility] Historical - [facility]: Anemia-No Additional Notes    Anxiety     Anxiety and depression     Asthma     Asthma 4/14/2016    Asthma 6/18/2014 1:31:21 PM    [facility] " "Historical - Respiratory: Asthma-No Additional Notes    Auditory hallucinations 2/17/2017    Bacterial vaginosis 1/5/2018    Bladder pain 9/1/2020    Bronchitis 10/15/2015    Bronchitis 9/18/2020    Bronchitis 10/15/2015    Cervical radiculopathy 3/10/2020    Chlamydia     Depression (emotion)     Diabetes mellitus     SANTOS (dyspnea on exertion) 10/14/2019    Dysuria 3/31/2018    Dysuria 3/31/2018    Early satiety 8/28/2020    Gallstones     Gastritis     High-risk sexual behavior 1/5/2018    History of ovarian cyst     History of syphilis     History of trichomoniasis     Hyperlipidemia     Hypertension     Interstitial cystitis 12/8/2020    Localized edema 10/14/2019    Lower abdominal pain 7/31/2017    Mental disorder     Obesity 6/18/2014 1:51:54 PM    [facility] Historical - [facility]: Obesity, Unspecified-No Additional Notes    Obstructive sleep apnea 10/5/2021    Persistent cough 7/27/2018    PONV (postoperative nausea and vomiting)     Preop general physical exam 12/9/2019    Right foot pain 11/18/2019    Routine general medical examination at a health care facility 5/21/2021    Shortness of breath 6/26/2017    SOB (shortness of breath) 6/26/2017    Suicidal ideations 7/9/2019    Syphilis 6/24/2014 7:17:31 PM    [facility] Historical - Gynecologic: Syphilis-No Additional Notes    Type 2 diabetes mellitus without complication, without long-term current use of insulin 5/25/2014    Type 2 or unspecified type diabetes mellitus 6/24/2014 7:24:47 PM    [facility] Historical - [facility]: Diabetes Mellitus Without Complication, Type II-"borderline"    Upper respiratory tract infection 1/5/2018    Urgency of micturition 11/20/2020    Vaginal candidiasis 7/24/2018    Vaginal itching 1/5/2018    Viral syndrome 12/1/2020         PAST SURGICAL HISTORY:  Past Surgical History:   Procedure Laterality Date    APPENDECTOMY      CHOLECYSTECTOMY      COLONOSCOPY N/A 07/31/2017    Procedure: COLONOSCOPY;  Surgeon: Yuliana Michael MD;  " Location: [facility];  Service: Endoscopy;  Laterality: N/A;    DILATION AND CURETTAGE OF UTERUS      bleeding    FRACTURE SURGERY      right foot    HYSTERECTOMY  08/31/2016    RALH/BS for complex hyperplasia without atypia    OOPHORECTOMY      1 ovary removed       FAMILY HISTORY:  Family History   Problem Relation Age of Onset    Breast cancer Paternal Grandmother     Diabetes Maternal Grandmother     Hypertension Mother     Thyroid disease Mother     Breast cancer Maternal Aunt     Cancer Maternal Aunt         colon cancer    Colon cancer Maternal Aunt     Miscarriages / Stillbirths Cousin     Stroke Maternal Aunt     No Known Problems Father     Thyroid disease Sister     Thyroid disease Brother     No Known Problems Daughter     No Known Problems Son     Asthma Son     Ovarian cancer Neg Hx     Deep vein thrombosis Neg Hx     Pulmonary embolism Neg Hx           SOCIAL HISTORY:  Social History     Social History Narrative    Full time employed - [employer]       MEDICATIONS:  Medications have been reviewed.    ALLERGIES:  Allergies have been reviewed.    Vitals:    10/24/23 1453   BP: 98/72   Pulse: 60   Temp: 97.8 °F (36.6 °C)     Wt Readings from Last 10 Encounters:   10/24/23 118.7 kg (261 lb 11 oz)   09/28/23 115.5 kg (254 lb 10.1 oz)   09/19/23 117.9 kg (259 lb 14.8 oz)   09/08/23 115.7 kg (255 lb 1.2 oz)   09/06/23 117.9 kg (260 lb)   08/29/23 118.2 kg (260 lb 11.1 oz)   08/24/23 117.6 kg (259 lb 4.2 oz)   08/16/23 118.4 kg (261 lb)   07/21/23 118.8 kg (262 lb)   07/11/23 118.8 kg (262 lb)       Lab Results   Component Value Date    WBC 4.91 09/08/2023    HGB 12.3 09/08/2023    HCT 38.9 09/08/2023     09/08/2023    CHOL 151 09/08/2023    TRIG 56 09/08/2023    HDL 38 (L) 09/08/2023    ALT 15 09/08/2023    AST 17 09/08/2023     09/08/2023    K 4.1 09/08/2023     09/08/2023    CREATININE 0.8 09/08/2023    BUN 10 09/08/2023    CO2 23 09/08/2023    TSH 0.362 (L) 09/08/2023    INR 0.9  09/23/2021    GLUF 107 06/16/2016    HGBA1C 5.5 09/08/2023       Review of Systems   Constitutional:  Negative for activity change, appetite change, fatigue and fever.   HENT:  Negative for mouth dryness and goiter.    Eyes:  Negative for visual disturbance.   Respiratory:  Negative for apnea, cough, chest tightness and shortness of breath.    Cardiovascular:  Negative for chest pain, palpitations and leg swelling.   Gastrointestinal:  Negative for abdominal distention, abdominal pain, blood in stool, constipation, diarrhea, nausea, rectal pain, vomiting and reflux.   Endocrine: Negative for cold intolerance, heat intolerance, polydipsia, polyphagia and polyuria.   Genitourinary:  Negative for frequency and menstrual problem.   Musculoskeletal:  Negative for arthralgias and myalgias.   Integumentary:  Negative for color change and rash.   Neurological:  Negative for dizziness, vertigo, tremors, syncope, weakness and headaches.   Psychiatric/Behavioral:  Negative for agitation, behavioral problems, confusion, decreased concentration, dysphoric mood, hallucinations, self-injury, sleep disturbance and suicidal ideas. The patient is not nervous/anxious and is not hyperactive.        Physical Exam  Vitals and nursing note reviewed.   Constitutional:       General: She is not in acute distress.  HENT:      Head: Normocephalic and atraumatic.      Mouth/Throat:      Pharynx: Oropharynx is clear.   Eyes:      General: No scleral icterus.     Pupils: Pupils are equal, round, and reactive to light.   Neck:      Comments: No TM  Cardiovascular:      Rate and Rhythm: Normal rate and regular rhythm.      Pulses: Normal pulses.      Heart sounds: Normal heart sounds. No murmur heard.     No friction rub. No gallop.   Pulmonary:      Effort: Pulmonary effort is normal. No respiratory distress.      Breath sounds: Normal breath sounds. No wheezing, rhonchi or rales.   Abdominal:      General: Bowel sounds are normal. There is no  distension.      Palpations: Abdomen is soft.      Tenderness: There is no abdominal tenderness.   Musculoskeletal:         General: No swelling.      Cervical back: Normal range of motion and neck supple. No tenderness.   Lymphadenopathy:      Cervical: No cervical adenopathy.   Skin:     General: Skin is warm.      Capillary Refill: Capillary refill takes less than 2 seconds.      Findings: No erythema or rash.   Neurological:      Mental Status: She is alert and oriented to person, place, and time.   Psychiatric:         Mood and Affect: Mood normal.         Behavior: Behavior normal.              Assessment and Plan       ICD-10-CM ICD-9-CM   1. Type 2 diabetes mellitus with hyperglycemia, without long-term current use of insulin  E11.65 250.00     790.29   2. Class 3 severe obesity due to excess calories without serious comorbidity with body mass index (BMI) of 40.0 to 44.9 in adult  E66.01 278.01    Z68.41 V85.41   3. SCOUT on CPAP  G47.33 327.23   4. Essential hypertension  I10 401.9   5. Moderate episode of recurrent major depressive disorder  F33.1 296.32            Type 2 diabetes mellitus with hyperglycemia, without long-term current use of insulin  -     Ambulatory referral/consult to Bariatric Surgery; Future; Expected date: 10/31/2023  Had pt call pharmacy while here; out of mounjaro x 1 week:verified with pharmacy has refills available; ordered and she will come  tomorrow  Overall helping modestly with appetite but no weight loss    Class 3 severe obesity due to excess calories without serious comorbidity with body mass index (BMI) of 40.0 to 44.9 in adult  Comments:  pt with refractory weight despite maximum mounjaro  Orders:  -     Ambulatory referral/consult to Bariatric Surgery; Future; Expected date: 10/31/2023  Pt would like to consider bariatric referral again   Keep nutrition f/u appt    She recalls topamax and wellbutrin: she does not recall if she took  Have reviewed with pt needs  psychiatrist to determine if this would be an option    SCOUT on CPAP  Comments:  chronic/stable; pt has non compliance    Essential hypertension  Chronic/stable-     Ambulatory referral/consult to Bariatric Surgery; Future; Expected date: 10/31/2023    Moderate episode of recurrent major depressive disorder  Comments:  pt reports stable; advised to f/u with psychiatry  she will continue with therapy (weekly)              Seek care if any acute crisis             Follow up in about 3 months (around 1/24/2024), or if symptoms worsen or fail to improve.

## 2023-10-31 ENCOUNTER — NUTRITION (OUTPATIENT)
Dept: PRIMARY CARE CLINIC | Facility: CLINIC | Age: 48
End: 2023-10-31
Payer: COMMERCIAL

## 2023-10-31 VITALS — WEIGHT: 258.19 LBS | BODY MASS INDEX: 42.96 KG/M2

## 2023-10-31 DIAGNOSIS — E66.01 CLASS 3 SEVERE OBESITY DUE TO EXCESS CALORIES WITHOUT SERIOUS COMORBIDITY WITH BODY MASS INDEX (BMI) OF 40.0 TO 44.9 IN ADULT: ICD-10-CM

## 2023-10-31 DIAGNOSIS — E78.2 MIXED HYPERLIPIDEMIA: ICD-10-CM

## 2023-10-31 DIAGNOSIS — E11.65 TYPE 2 DIABETES MELLITUS WITH HYPERGLYCEMIA, WITHOUT LONG-TERM CURRENT USE OF INSULIN: ICD-10-CM

## 2023-10-31 PROCEDURE — 99999 PR PBB SHADOW E&M-EST. PATIENT-LVL II: ICD-10-PCS | Mod: PBBFAC,,, | Performed by: DIETITIAN, REGISTERED

## 2023-10-31 PROCEDURE — 97803 PR MED NUTR THER, SUBSQ, INDIV, EA 15 MIN: ICD-10-PCS | Mod: S$GLB,,, | Performed by: DIETITIAN, REGISTERED

## 2023-10-31 PROCEDURE — 99999 PR PBB SHADOW E&M-EST. PATIENT-LVL II: CPT | Mod: PBBFAC,,, | Performed by: DIETITIAN, REGISTERED

## 2023-10-31 PROCEDURE — 97803 MED NUTRITION INDIV SUBSEQ: CPT | Mod: S$GLB,,, | Performed by: DIETITIAN, REGISTERED

## 2023-10-31 NOTE — PROGRESS NOTES
"Nutrition Assessment    Visit Type: Insurance initial  Session Time:  2 Hours  Reason for MNT visit: Pt in for education and nutrition counseling regarding T2DM, HTN, Obesity, and Low Vitamin D .     Age: 48 y.o.  Wt:   Wt Readings from Last 10 Encounters:   10/24/23 118.7 kg (261 lb 11 oz)   09/28/23 115.5 kg (254 lb 10.1 oz)   09/19/23 117.9 kg (259 lb 14.8 oz)   09/08/23 115.7 kg (255 lb 1.2 oz)   09/06/23 117.9 kg (260 lb)   08/29/23 118.2 kg (260 lb 11.1 oz)   08/24/23 117.6 kg (259 lb 4.2 oz)   08/16/23 118.4 kg (261 lb)   07/21/23 118.8 kg (262 lb)   07/11/23 118.8 kg (262 lb)     Ht:   Ht Readings from Last 1 Encounters:   10/24/23 5' 5" (1.651 m)     BMI:   BMI Readings from Last 5 Encounters:   10/24/23 43.55 kg/m²   09/28/23 42.37 kg/m²   09/19/23 43.25 kg/m²   09/08/23 42.45 kg/m²   09/06/23 43.27 kg/m²       Client states:    9/28/23 Yuli has tried various diets and had varying levels of success in the past. She has limitations to starting exercise. States her feet and legs will be very sore if she does a lot of standing or walking. She is open to trying yoga. Currently she dines out for every meal. She does not like cooking and she has limited motivation for making breakfast in the morning but is open to ideas. Is willing to make dinner 1-2 times per week. Does not snack. Was unable to go over serving vs portion sizes or meal planning guide. Goal is to find internal motivation or rewards system to make the better-for-you options. Goal is to get to 200 pounds. First goal is to hit 249 pounds.  Discussed  How to build a better-for-you plate at a restaurant  Getting breakfast most days  Limiting added sugars  Limiting high fat foods such as biscuits/fried foods  Reading a nutrition label  Internal vs. External motivation  10/31/23  Has not started on any changes. Did make a grocery list and boyfriend is helping motivate her to cook at home 1-2 times per week. He also has discussed walking and/or " "exercising together. She is interested in weight loss surgery. Discussed importance of building good habits not whether she does weight loss surgery or not. Discussed building internal motivation and writing down her "why" and keeping it posted where she can see it.     Vitamins, Minerals, and/or Supplements:  None     Medical History  Reviewed and noted  Of note: Iron Def. Anemia, HTN, Mixed Hyperlipidemia, Type 2 DM, Vitamin D Deficiency    Medications    Prior to Admission medications    Medication Sig Start Date End Date Taking? Authorizing Provider   acetaZOLAMIDE (DIAMOX) 500 mg CpSR Take by mouth. 5/17/23   Provider, Historical   albuterol (PROVENTIL) 2.5 mg /3 mL (0.083 %) nebulizer solution Take 3 mLs (2.5 mg total) by nebulization every 4 (four) hours as needed for Wheezing. Rescue 6/13/23   Drew Gutierrez MD   albuterol (PROVENTIL/VENTOLIN HFA) 90 mcg/actuation inhaler Inhale 2 puffs into the lungs every 4 (four) hours as needed for Wheezing or Shortness of Breath. Rescue. Use with chamber. 9/19/23   Tiffany Goyal MD   allopurinoL (ZYLOPRIM) 100 MG tablet Take 1 tablet (100 mg total) by mouth once daily. 6/15/23   Drew Gutierrez MD   ALPRAZolam (XANAX) 0.5 MG tablet Take 0.5 mg by mouth 2 (two) times daily as needed. 2/23/23   Provider, Historical   azelaic acid (FINACEA) 15 % gel AAA bid 12/16/21   Drew Gutierrez MD   blood sugar diagnostic (BLOOD GLUCOSE TEST) Strp 1 each by Misc.(Non-Drug; Combo Route) route 3 (three) times daily.  Patient not taking: Reported on 9/19/2023 2/6/23   Acacia Warner PA-C   blood-glucose meter kit Test finger stick blood sugar once daily.  Patient not taking: Reported on 9/19/2023 2/6/23   Acacia Warner PA-C   CEQUA 0.09 % Dpet Place 1 drop into both eyes 2 (two) times daily. 3/19/21   Provider, Historical   estradioL (ESTRACE) 0.01 % (0.1 mg/gram) vaginal cream Place 2 g vaginally once daily. 9/6/23 9/5/24  Scottie Herrera MD   FLUoxetine " 20 MG capsule Take 1 capsule (20 mg total) by mouth once daily. 2/24/22   Drew Gutierrez MD   fluticasone-salmeterol diskus inhaler 100-50 mcg Inhale 1 puff into the lungs 2 (two) times daily. Controller 9/19/23   Tiffany Goyal MD   hydroCHLOROthiazide (HYDRODIURIL) 12.5 MG Tab Take 1 tablet (12.5 mg total) by mouth daily as needed (leg swelling). 12/16/21   Drew Gutierrez MD   hydroquinone 4 % Crea Apply to dark spots once daily. Use with sunscreen if outdoors 9/7/23   Juju Nazario MD   ibuprofen (ADVIL,MOTRIN) 800 MG tablet Take 1 tablet (800 mg total) by mouth 3 (three) times daily. 9/7/23   Juju Nazario MD   lancets Misc 1 each by Misc.(Non-Drug; Combo Route) route 4 (four) times daily.  Patient not taking: Reported on 9/19/2023 2/6/23   Acacia Warner PA-C   LIDOcaine (LIDODERM) 5 % 1 patch once daily. 7/3/22   Provider, Historical   LYRICA 100 mg capsule Take 100 mg by mouth 2 (two) times daily. 6/5/23   Provider, Historical   meclizine (ANTIVERT) 25 mg tablet Take 1 tablet (25 mg total) by mouth 3 (three) times daily as needed for Dizziness. 2/2/23 8/16/23  Matt, Drew P., MD   methen-m.blue-s.phos-phsal-hyo (URIBEL) 118-10-40.8-36 mg Cap Take 1 capsule by mouth 3 (three) times daily as needed. 9/6/23   Scottie Herrera MD   mirabegron (MYRBETRIQ) 50 mg Tb24 Take 1 tablet (50 mg total) by mouth once daily. 9/6/23 9/5/24  Scottie Herrera MD   montelukast (SINGULAIR) 10 mg tablet Take 1 tablet (10 mg total) by mouth every evening. 9/19/23 9/13/24  Tiffany Goyal MD   nebulizer accessories Misc 1 each - needs replacement tubing and mask 1/20/21   Raysa Trevino, PA-C   OLANZapine (ZYPREXA) 5 MG tablet Take 5 mg by mouth every evening. 5/25/23   Provider, Historical   oxyCODONE-acetaminophen (PERCOCET)  mg per tablet Take 1 tablet by mouth. 6/5/23   Provider, Historical   predniSONE (DELTASONE) 20 MG tablet 40 mg x 5 days, then 20 mg x 5 days.  Patient not taking:  Reported on 9/19/2023 8/23/22   Josefina Mack, NP   thiamine 100 MG tablet Take 100 mg by mouth once daily.    Provider, Historical   tirzepatide (MOUNJARO) 15 mg/0.5 mL PnIj Inject 15 mg into the skin every 7 days. 8/21/23   Acacia Warner, PACleveC   traMADoL (ULTRAM) 50 mg tablet Take 50 mg by mouth 2 (two) times daily as needed. 6/5/23   Provider, Historical   ZANAFLEX 4 mg tablet Take 4 mg by mouth every evening. 8/8/23   Provider, Historical        Social History    Marital status:  Single    Social History     Tobacco Use    Smoking status: Never    Smokeless tobacco: Never   Substance Use Topics    Alcohol use: No     Comment: rarely; stop 12/11/19 prior to sx       Labs   Reviewed and noted    Food Allergies or Intolerances:  NKFAI      Beverages  Water: 2-3 16 ounces bottles per day, sometimes with Crystal Light packets  Alcohol: daiquiri, occasional  Coffee: none  Energy Drinks: none  Tea: occasional lavender or chamomile tea  Soda: None  Milk: none  Juice: none  Other: Fruit Punch or Lemonade with lunch    24-hour Recall    Breakfast: skips or gets eggs/villasenor/biscuits/grits  Lunch: at cafeteria fried fish/fried or baked chicken/baked pork chops/ red beans & rice/pasta + always get a vegetable  Dinner: usually out, place varies, rarely cooks at home, whatever she is craving  Snacks: none    LIFESTYLE FACTORS    Dining out: 6+ x per week, mostly restaurants  Types of restaurants/foods: home/[facility] cooked meals    Frequency of consumption of fried foods: multiple times per week     Meal preparation/shopping: self    Sleep: fair  Hours: 5-6  Wake: 5 am  Sleep: 10:30 pm    Stress Level: moderate to high  Stress management: self-care / take time to cool down / hot tea    Support System:  friends    Physical Activity: Sedentary (little or no exercise)  Types of activity: none  Frequency of activity: none    Diagnosis    Excessive energy intake related to habit of eating out for most meals as evidenced  by patient report of food choices.  10/31/23 continue, not improved    Intervention    Estimated Energy Requirements:   Calories: 1400  Protein: 105 grams   Carbohydrates: 140 grams   Fat: 47 grams   Baseline for fluids: 100 ounces + sweat loss    Written Materials Provided  Meal Planning Guide  [employer]uel[employer] Well On-the-Go Food Guide  [employer] Healthy Eating Plate  Eat Fit Shopping Guide  RD contact information    Goals:  1.  Make a plate at lunch that mimics the [employer] Healthy Eating Plate 2 times per week  10/31/23 - continue goal    2.  Cook dinner at home 2 times per week  10/31/23 - continue goal    3.  Eat breakfast or high protein snack 3 times per week  10/31/23 - continue goal    4.  Drink 3 additional bottles of water after work  10/31/23 - continue goal    5.  Try one yoga video  10/31/23 d/c goal - no longer interested in pursuing      Monitoring/Evaluation    Monitor the following:  Weight  Sleep  Stress Management  Movement    Communicated with healthcare provider and documented plan for referral to appropriate agency/healthcare provider as needed    Supervising Physician: Ninfa Carter MD    Patient motivation, anticipated barriers, expected compliance: Patient is motivated and has verbalized understanding and intent to comply.     Comprehension: fair     Follow-up: 1 month

## 2023-10-31 NOTE — PATIENT INSTRUCTIONS
See bottom of document for meal ideas    K.I.S.S. (Keep It Simple Silly)  Pick 2-3 non-starchy vegetables  Rotate during the week having them for lunches and dinners.  Pick 1-2 different types of meats  Use different sauces   BBQ  [place]  Teriyaki  Pesto  Salsa  Seasonings  Slap Ya Mama  Lemon Pepper  Everything but the Bagel  Quinten's Chipotle Cherry  Pick 2 different starches to have during the week  For example:  Potatoes  sweet potatoes  Corn  butternut squash  Banza chickpea pasta  brown rice  Keep breakfast simple, have two options and stick with those two options the whole week  For example, making Overnight Oats with a serving of fruit for 3 breakfasts and eat 2 Healthy Egg Muffin Cups with a slice of whole grain toast and 1 serving fruit for the other 4 breakfasts    For lunch/dinner you could do something simple like the following: This would allow you to not waste a lot of ingredients but have a wide variety of flavors that you could mix match all week.  Chicken thighs 3 ways:   BBQ Sauce  Lemon Pepper Seasoning  Ranch Seasoning  Potatoes 2 ways:   cubed and roasted  mashed with low-fat milk, Greek yogurt (instead of butter or sour cream) and low-fat cheese  Brown rice - made in chicken stock, low sodium (gives it extra flavor)  Frozen Broccoli with ranch seasoning, microwaved  Frozen broccoli roasted, sprinkled with lemon juice and 1 tbsp parmesan  Green beans with sliced mushrooms  Green beans sauteed with garlic  15-Minute Meals  Two slices whole wheat bread + frozen turkey burger + 1 slice 2% american cheese + microwave broccoli  Chickpea pasta + jar tomato sauce + chicken sausage + side salad kit  Microwave rice + rotisserie chicken + frozen veggie mix + teriyaki sauce + chopped peanuts  Alec grilled chicken rotisserie or blackened grilled chicken tenders + frozen garlic bread + baby carrots + hummus  Stir Ordoñez Chicken - make with Banza pasta + add in double vegetables   Shrimp + BBQ sauce + canned  corn + canned beans + microwave vegetables  Can tuna/salmon/chicken + 2 tablespoons Greek yogurt/avocado oil weaver + 1 serving Nut Thins + apple + cheese stick  Rotisserie Chicken + bag of microwave veggies + BBQ sauce + 1 cup fries made in the air fryer + 2 tbsp ketchup  Carb balance tortillas (soft taco size) + 1/3 cup reduced fat cheese + frozen bell pepper/onion mix + 3 ounces shrimp with spray oil and make into a quesadilla in the oven  Pizza night: Florencia bread (whole wheat if you can find it) + 1/4 cup cheese + 1/4 cup marinara + turkey pepperoni + side salad   Stuffed baked potato: 1 small potato topped with 1/4 cup reduced fat cheese + 3-4 ounces shrimp/chicken/ground meat + 2 tbsp sour cream served with 2 cup non-starchy vegetables or side salad   Chicken or turkey smoked sausage or chicken sausage links of choices sliced on a sheet pan with bag of frozen broccoli + bag of frozen cauliflower topped with Slap Ya Mamma seasoning and roasted for 15-20 minutes at 400 served with 1/2-2/3 cup white or brown rice  Ramen Noodle Salad: 2 bag coleslaw mix + 1-2 packets ramen noodles (toss seasoning packet) crushed + rotisserie chicken + asian style vinaigrette (store bought or homemade): 1/4 cup olive oil + 1/4 cup seasoned rice vinegar + 1 tbsp soy sauce + 1 tsp sesame oil + 1/4 tsp garlic powder

## 2023-12-11 ENCOUNTER — TELEPHONE (OUTPATIENT)
Dept: DERMATOLOGY | Facility: CLINIC | Age: 48
End: 2023-12-11
Payer: COMMERCIAL

## 2023-12-11 PROBLEM — Z00.00 WELLNESS EXAMINATION: Status: RESOLVED | Noted: 2022-09-06 | Resolved: 2023-12-11

## 2023-12-11 NOTE — TELEPHONE ENCOUNTER
Returned call to patient. Patient stated she needed an appointment.  Informed pt all of our Dermatologist are booked out until next year.  Offered pt next available and to be added to the waitlist.  Pt declined.  ----- Message from Raudel Elise sent at 12/11/2023  9:52 AM CST -----  Contact: patient  Type:  Sooner Apoointment Request    Caller is requesting a sooner appointment.  Caller declined first available appointment listed below.  Caller will not accept being placed on the waitlist and is requesting a message be sent to doctor.  Name of Caller:Yuli Milton   When is the first available appointment? 1/16/2024   Symptoms: Painful Keloid on nose   Would the patient rather a call back or a response via [facility]?  Call back   Best Call Back Number: [phone]  Additional Information:

## 2023-12-12 ENCOUNTER — TELEPHONE (OUTPATIENT)
Dept: INTERNAL MEDICINE | Facility: CLINIC | Age: 48
End: 2023-12-12
Payer: COMMERCIAL

## 2023-12-12 DIAGNOSIS — Z01.419 WELL WOMAN EXAM: Primary | ICD-10-CM

## 2023-12-12 NOTE — TELEPHONE ENCOUNTER
Spoke to pt.  Pt is going to [facility] to address vaginal itching unrelieved by monistat.  There are no clinic openings with anyone until Friday.  Asked pt if she has an OB/Gyn and she stated she does not.  Can we place a referral so I can get her established with one?

## 2023-12-27 ENCOUNTER — OFFICE VISIT (OUTPATIENT)
Dept: DIABETES | Facility: CLINIC | Age: 48
End: 2023-12-27
Payer: COMMERCIAL

## 2023-12-27 DIAGNOSIS — I10 ESSENTIAL HYPERTENSION: ICD-10-CM

## 2023-12-27 DIAGNOSIS — E66.01 MORBID OBESITY DUE TO EXCESS CALORIES: ICD-10-CM

## 2023-12-27 DIAGNOSIS — E11.65 TYPE 2 DIABETES MELLITUS WITH HYPERGLYCEMIA, WITHOUT LONG-TERM CURRENT USE OF INSULIN: Primary | ICD-10-CM

## 2023-12-27 DIAGNOSIS — E78.2 MIXED HYPERLIPIDEMIA: ICD-10-CM

## 2023-12-27 DIAGNOSIS — Z12.31 ENCOUNTER FOR SCREENING MAMMOGRAM FOR MALIGNANT NEOPLASM OF BREAST: ICD-10-CM

## 2023-12-27 DIAGNOSIS — E27.8 ADRENAL MASS: ICD-10-CM

## 2023-12-27 PROCEDURE — 99442 PR PHYSICIAN TELEPHONE EVALUATION 11-20 MIN: ICD-10-PCS | Mod: 95,,, | Performed by: PHYSICIAN ASSISTANT

## 2023-12-27 PROCEDURE — 3061F PR NEG MICROALBUMINURIA RESULT DOCUMENTED/REVIEW: ICD-10-PCS | Mod: CPTII,95,, | Performed by: PHYSICIAN ASSISTANT

## 2023-12-27 PROCEDURE — 99442 PR PHYSICIAN TELEPHONE EVALUATION 11-20 MIN: CPT | Mod: 95,,, | Performed by: PHYSICIAN ASSISTANT

## 2023-12-27 PROCEDURE — 3066F NEPHROPATHY DOC TX: CPT | Mod: CPTII,95,, | Performed by: PHYSICIAN ASSISTANT

## 2023-12-27 PROCEDURE — 3044F HG A1C LEVEL LT 7.0%: CPT | Mod: CPTII,95,, | Performed by: PHYSICIAN ASSISTANT

## 2023-12-27 PROCEDURE — 3066F PR DOCUMENTATION OF TREATMENT FOR NEPHROPATHY: ICD-10-PCS | Mod: CPTII,95,, | Performed by: PHYSICIAN ASSISTANT

## 2023-12-27 PROCEDURE — 3061F NEG MICROALBUMINURIA REV: CPT | Mod: CPTII,95,, | Performed by: PHYSICIAN ASSISTANT

## 2023-12-27 PROCEDURE — 3044F PR MOST RECENT HEMOGLOBIN A1C LEVEL <7.0%: ICD-10-PCS | Mod: CPTII,95,, | Performed by: PHYSICIAN ASSISTANT

## 2023-12-27 RX ORDER — TIRZEPATIDE 15 MG/.5ML
15 INJECTION, SOLUTION SUBCUTANEOUS
Qty: 4 PEN | Refills: 11 | Status: SHIPPED | OUTPATIENT
Start: 2023-12-27 | End: 2024-01-17 | Stop reason: ALTCHOICE

## 2023-12-27 NOTE — Clinical Note
6 mo f/u in person  Mammo after 2/7/24  Dr. Reeves staff - needs eye exam in March   Bariatric team - referral placed; please call about work up for bar surgery no concerns

## 2023-12-27 NOTE — PROGRESS NOTES
PCP: Drew Gutierrez MD    Subjective:     Chief Complaint: Diabetes     Established Patient - Audio Only Telehealth Visit     The patient location is: Home  The chief complaint leading to consultation is: Diabetes follow up  Visit type: Virtual visit with audio only (telephone)  Total Time Spent with Patient: 13 minutes     The reason for the audio only service rather than synchronous audio and video virtual visit was related to technical difficulties or patient preference/necessity.     Each patient to whom I provide medical services by telemedicine is:  (1) informed of the relationship between the physician and patient and the respective role of any other health care provider with respect to management of the patient; and (2) notified that they may decline to receive medical services by telemedicine and may withdraw from such care at any time. Patient verbally consented to receive this service via voice-only telephone call.    This service was not originating from a related E/M service provided within the previous 7 days nor will  to an E/M service or procedure within the next 24 hours or my soonest available appointment.  Prevailing standard of care was able to be met in this audio-only visit.       HISTORY OF PRESENT ILLNESS: 48 y.o.   female presenting for diabetes management visit.   The patient's last visit with me was on 1/24/2023.  Patient has had Type II diabetes since 27 years ago.  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     Patient's most recent A1c of 5.5% was completed 3 months ago.   Patient states since Her last A1c Her blood glucose levels have been unknown since not checking regularly.   Patient monitors blood glucose 0 times per  "day with meter : Fasting and Before Bed.   Patient blood glucose monitoring device will not be uploaded into Media Section today secondary to patient not checking BG regularly.   Patient endorses the following diabetes related symptoms:  None .   Patient is due today for the following diabetes-related health maintenance standards: Foot Exam , Eye Exam, and Mammogram .   She denies recent hospital admissions or emergency room visits.   She denies having hypoglycemia.   Patient's concerns today include glycemic control. Bariatric referral placed.  Patient is interested in weight loss.   Patient medication regimen is as below.     CURRENT DM MEDICATIONS:   Mounjaro 15 mg weekly - off x 1 mo; just resumed    Patient has failed the following Diabetes medications:   Trulicity - HA, nausea, SOB  Farxiga - nausea, feeling unwell  Basaglar - no longer using since no longer taking steroids  Metformin - GI       Labs Reviewed.       No results found for: "CPEPTIDE"  No results found for: "GLUTAMICACID"       //   , There is no height or weight on file to calculate BMI.  Her blood sugar in clinic today is:    No components found for: "POCGLUC"      Review of Systems   Constitutional:  Negative for activity change, appetite change, chills and fever.   HENT:  Negative for dental problem, mouth sores, nosebleeds, sore throat and trouble swallowing.    Eyes:  Negative for pain and discharge.   Respiratory:  Negative for shortness of breath, wheezing and stridor.    Cardiovascular:  Negative for chest pain, palpitations and leg swelling.   Gastrointestinal:  Negative for abdominal pain, diarrhea, nausea and vomiting.   Endocrine: Negative for polydipsia, polyphagia and polyuria.   Genitourinary:  Negative for dysuria, frequency and urgency.   Musculoskeletal:  Negative for joint swelling and myalgias.   Skin:  Negative for rash and wound.   Neurological:  Negative for dizziness, syncope, weakness and headaches. "   Psychiatric/Behavioral:  Negative for behavioral problems and dysphoric mood.          Diabetes Management Status  Statin: Taking  ACE/ARB: Not taking    Screening or Prevention Patient's value Goal Complete/Controlled?   HgA1C Testing and Control   Lab Results   Component Value Date    HGBA1C 5.5 09/08/2023      Annually/Less than 8% Yes   Lipid profile : 09/08/2023 Annually Yes   LDL control Lab Results   Component Value Date    LDLCALC 101.8 09/08/2023    Annually/Less than 100 mg/dl  Yes   Nephropathy screening Lab Results   Component Value Date    MICALBCREAT 3.0 09/08/2023     Lab Results   Component Value Date    PROTEINUA Negative 06/19/2022    Annually Yes   Blood pressure BP Readings from Last 1 Encounters:   10/24/23 98/72    Less than 140/90 Yes   Dilated retinal exam : 03/03/2023 Annually Yes    Foot exam   : 09/29/2021 Annually Yes     Social History     Socioeconomic History    Marital status: Single    Number of children: 3   Tobacco Use    Smoking status: Never    Smokeless tobacco: Never   Substance and Sexual Activity    Alcohol use: No     Comment: rarely; stop 12/11/19 prior to sx    Drug use: No    Sexual activity: Yes     Partners: Male     Birth control/protection: None     Comment: mut monog   Social History Narrative    Full time employed - [employer]     Social Determinants of Health     Financial Resource Strain: Low Risk  (11/16/2022)    Overall Financial Resource Strain (CARDIA)     Difficulty of Paying Living Expenses: Not hard at all   Food Insecurity: Patient Declined (9/8/2023)    Hunger Vital Sign     Worried About Running Out of Food in the Last Year: Patient declined     Ran Out of Food in the Last Year: Patient declined   Transportation Needs: Patient Declined (9/8/2023)    PRAPARE - Transportation     Lack of Transportation (Medical): Patient declined     Lack of Transportation (Non-Medical): Patient declined   Physical Activity: Inactive (9/8/2023)    Exercise Vital  Sign     Days of Exercise per Week: 1 day     Minutes of Exercise per Session: 0 min   Stress: No Stress Concern Present (9/8/2023)    [employer] - Occupational Stress Questionnaire     Feeling of Stress : Only a little   Social Connections: Unknown (9/8/2023)    Social Connection and Isolation Panel [NHANES]     Active Member of Clubs or Organizations: No     Attends Club or Organization Meetings: Never     Marital Status: Patient declined   Housing Stability: Unknown (9/8/2023)    Housing Stability Vital Sign     Unable to Pay for Housing in the Last Year: No     Unstable Housing in the Last Year: No     Past Medical History:   Diagnosis Date    Abnormal Pap smear of cervix     treatment??    Abnormal Pap smear of vagina     Acute pain of right shoulder 10/25/2019    Adjustment disorder with depressed mood 11/15/2014    Overview:  Multiple recent medical problems     Allergy     Anemia     Anemia 12/14/2014 6:26:13 PM    [facility] Historical - [facility]: Anemia-No Additional Notes    Anxiety     Anxiety and depression     Asthma     Asthma 4/14/2016    Asthma 6/18/2014 1:31:21 PM    [facility] Historical - Respiratory: Asthma-No Additional Notes    Auditory hallucinations 2/17/2017    Bacterial vaginosis 1/5/2018    Bladder pain 9/1/2020    Bronchitis 10/15/2015    Bronchitis 9/18/2020    Bronchitis 10/15/2015    Cervical radiculopathy 3/10/2020    Chlamydia     Depression (emotion)     Diabetes mellitus     SANTOS (dyspnea on exertion) 10/14/2019    Dysuria 3/31/2018    Dysuria 3/31/2018    Early satiety 8/28/2020    Gallstones     Gastritis     High-risk sexual behavior 1/5/2018    History of ovarian cyst     History of syphilis     History of trichomoniasis     Hyperlipidemia     Hypertension     Interstitial cystitis 12/8/2020    Localized edema 10/14/2019    Lower abdominal pain 7/31/2017    Mental disorder     Obesity 6/18/2014 1:51:54 PM    [facility] Historical - [facility]: Obesity,  "Unspecified-No Additional Notes    Obstructive sleep apnea 10/5/2021    Persistent cough 7/27/2018    PONV (postoperative nausea and vomiting)     Preop general physical exam 12/9/2019    Right foot pain 11/18/2019    Routine general medical examination at a health care facility 5/21/2021    Shortness of breath 6/26/2017    SOB (shortness of breath) 6/26/2017    Suicidal ideations 7/9/2019    Syphilis 6/24/2014 7:17:31 PM    [facility] Historical - Gynecologic: Syphilis-No Additional Notes    Type 2 diabetes mellitus without complication, without long-term current use of insulin 5/25/2014    Type 2 or unspecified type diabetes mellitus 6/24/2014 7:24:47 PM    [facility] Historical - [facility]: Diabetes Mellitus Without Complication, Type II-"borderline"    Upper respiratory tract infection 1/5/2018    Urgency of micturition 11/20/2020    Vaginal candidiasis 7/24/2018    Vaginal itching 1/5/2018    Viral syndrome 12/1/2020       Objective:      Physical Exam  Neurological:      Mental Status: She is alert and oriented to person, place, and time. Mental status is at baseline.   Psychiatric:         Mood and Affect: Mood normal.         Behavior: Behavior normal.         Thought Content: Thought content normal.         Judgment: Judgment normal.         Physical Exam limited secondary to Telemedicine visit    Assessment / Plan:     Type 2 diabetes mellitus with hyperglycemia, without long-term current use of insulin  -     tirzepatide (MOUNJARO) 15 mg/0.5 mL PnIj; Inject 15 mg into the skin every 7 days.  Dispense: 4 pen ; Refill: 11    Essential hypertension    Mixed hyperlipidemia    Adrenal mass    Morbid obesity due to excess calories    Encounter for screening mammogram for malignant neoplasm of breast  -     Mammo Digital Screening Bilat w/ Myles; Future; Expected date: 12/27/2023      Additional Plan Details:    - POCT Glucose  - Encouraged continuation of lifestyle changes including regular exercise and limiting " carbohydrates to 30-45 grams per meal threes times daily and 15 grams per snack with a limit of two daily.   - Encouraged continued monitoring of blood glucose with maintenance of 2 times daily at Fasting and Before Bed. Encouraged monitoring.  - Current DM Medication Regimen: Continue Mounjaro 15 mg weekly.   - Follow up with Bariatric team   - Health Maintenance standards addressed today: Foot Exam - deferred by patient today because Telemedicine or Telephone visit, Eye Exam - will be completed within [facility] system and scheduled today, and mammogram to be scheduled .   - Nursing Visit: Patient is below goal range for nursing visit for age group and will not need nursing visit at this time .   - Follow up with me in 6 months with A1c prior.       Blakeney McKnight, PA-C [facility]

## 2024-01-02 ENCOUNTER — TELEPHONE (OUTPATIENT)
Dept: DIABETES | Facility: CLINIC | Age: 49
End: 2024-01-02
Payer: COMMERCIAL

## 2024-01-02 ENCOUNTER — OFFICE VISIT (OUTPATIENT)
Dept: OPHTHALMOLOGY | Facility: CLINIC | Age: 49
End: 2024-01-02
Payer: COMMERCIAL

## 2024-01-02 DIAGNOSIS — H52.03 HYPEROPIA WITH PRESBYOPIA OF BOTH EYES: ICD-10-CM

## 2024-01-02 DIAGNOSIS — H52.4 HYPEROPIA WITH PRESBYOPIA OF BOTH EYES: ICD-10-CM

## 2024-01-02 DIAGNOSIS — E11.9 DIABETES MELLITUS WITHOUT COMPLICATION: Primary | ICD-10-CM

## 2024-01-02 DIAGNOSIS — H43.393 VITREOUS SYNERESIS OF BOTH EYES: ICD-10-CM

## 2024-01-02 DIAGNOSIS — E11.36 DIABETIC CATARACT: ICD-10-CM

## 2024-01-02 DIAGNOSIS — M35.01 KERATOCONJUNCTIVITIS SICCA: ICD-10-CM

## 2024-01-02 PROCEDURE — 1159F MED LIST DOCD IN RCRD: CPT | Mod: CPTII,S$GLB,, | Performed by: OPTOMETRIST

## 2024-01-02 PROCEDURE — 99999 PR PBB SHADOW E&M-EST. PATIENT-LVL III: CPT | Mod: PBBFAC,,, | Performed by: OPTOMETRIST

## 2024-01-02 PROCEDURE — 2023F DILAT RTA XM W/O RTNOPTHY: CPT | Mod: CPTII,S$GLB,, | Performed by: OPTOMETRIST

## 2024-01-02 PROCEDURE — 92014 COMPRE OPH EXAM EST PT 1/>: CPT | Mod: S$GLB,,, | Performed by: OPTOMETRIST

## 2024-01-02 RX ORDER — LIFITEGRAST 50 MG/ML
1 SOLUTION/ DROPS OPHTHALMIC 2 TIMES DAILY
Qty: 60 EACH | Refills: 5 | Status: SHIPPED | OUTPATIENT
Start: 2024-01-02 | End: 2024-04-01

## 2024-01-02 NOTE — PROGRESS NOTES
HPI     Diabetic Eye Exam            Comments: Patient here today for yearly DM eye exam          Comments    Diagnosed with diabetes in 2002  Lab Results       Component                Value               Date                       HGBA1C                   5.5                 09/08/2023            Vision changes since last eye exam?: Yes at near  Wears PAL glasses     Any eye pain today: No    Other ocular symptoms: Floaters    Interested in contact lens fitting today? No      1. DM  2. NSC OU  3. K Sicca          Last edited by Tosha Arriola, PCT on 1/2/2024 10:39 AM.            Assessment /Plan     For exam results, see Encounter Report.    Diabetes mellitus without complication  Last A1c 5.5 There was no diabetic retinopathy present on either eye on examination today. Recommend good blood pressure control, strict blood glucose control, and good cholesterol control.  Continue close care with Dr Gutierrez regarding diabetes.    Diabetic cataract  Cataracts are not visually significant and not affecting activities of daily living. Annual observation is recommended at this time. Patient to call or return to clinic with any significant change in vision prior to next visit.    Vitreous syneresis of both eyes  Retina flat, RD precautions reviewed. Observe.     Keratoconjunctivitis sicca  -     lifitegrast (XIIDRA) 5 % Dpet; Apply 1 drop to eye 2 (two) times daily.  Dispense: 60 each; Refill: 5  Responds well to Xiidra, continue    Hyperopia with presbyopia of both eyes  Eyeglass Final Rx       Eyeglass Final Rx         Sphere Cylinder    Right +1.50 DS    Left +1.50 DS      Type: SVL Distance    Expiration Date: 1/2/2025              Eyeglass Final Rx #2         Sphere Cylinder    Right +2.75 DS    Left +2.75 DS      Type: SVL - Reading    Expiration Date: 1/2/2025                  RTC 1 yr for dilated eye exam or sooner if any changes to vision.   Discussed above and answered questions.

## 2024-01-03 ENCOUNTER — OFFICE VISIT (OUTPATIENT)
Dept: URGENT CARE | Facility: CLINIC | Age: 49
End: 2024-01-03
Payer: COMMERCIAL

## 2024-01-03 ENCOUNTER — TELEPHONE (OUTPATIENT)
Dept: INTERNAL MEDICINE | Facility: CLINIC | Age: 49
End: 2024-01-03
Payer: COMMERCIAL

## 2024-01-03 VITALS
HEART RATE: 70 BPM | OXYGEN SATURATION: 99 % | HEIGHT: 65 IN | TEMPERATURE: 98 F | SYSTOLIC BLOOD PRESSURE: 123 MMHG | RESPIRATION RATE: 18 BRPM | BODY MASS INDEX: 42.99 KG/M2 | WEIGHT: 258 LBS | DIASTOLIC BLOOD PRESSURE: 66 MMHG

## 2024-01-03 DIAGNOSIS — R52 BODY ACHES: ICD-10-CM

## 2024-01-03 DIAGNOSIS — J02.9 SORE THROAT: ICD-10-CM

## 2024-01-03 DIAGNOSIS — B34.9 VIRAL SYNDROME: Primary | ICD-10-CM

## 2024-01-03 LAB
CTP QC/QA: YES
CTP QC/QA: YES
POC MOLECULAR INFLUENZA A AGN: NEGATIVE
POC MOLECULAR INFLUENZA B AGN: NEGATIVE
SARS-COV-2 AG RESP QL IA.RAPID: NEGATIVE

## 2024-01-03 PROCEDURE — 87502 INFLUENZA DNA AMP PROBE: CPT | Mod: QW,S$GLB,, | Performed by: PHYSICIAN ASSISTANT

## 2024-01-03 PROCEDURE — 87811 SARS-COV-2 COVID19 W/OPTIC: CPT | Mod: QW,S$GLB,, | Performed by: PHYSICIAN ASSISTANT

## 2024-01-03 PROCEDURE — 99213 OFFICE O/P EST LOW 20 MIN: CPT | Mod: S$GLB,,, | Performed by: PHYSICIAN ASSISTANT

## 2024-01-03 NOTE — LETTER
January 3, 2024      [facility]  [address], [address]  [place] [address]  Phone: [phone]       Patient: Yuli Milton   YOB: 1975  Date of Visit: 01/03/2024    To Whom It May Concern:    Dick Milton  was at [facility] on 01/03/2024. The patient may return to work/school on 1/5/24 with no restrictions. If you have any questions or concerns, or if I can be of further assistance, please do not hesitate to contact me.    Sincerely,      Amarilis Jacobson PA-C

## 2024-01-03 NOTE — PROGRESS NOTES
"Subjective:      Patient ID: Yuli Milton is a 48 y.o. female.    Vitals:  height is 5' 5" (1.651 m) and weight is 117 kg (258 lb). Her oral temperature is 98.3 °F (36.8 °C). Her blood pressure is 123/66 and her pulse is 70. Her respiration is 18 and oxygen saturation is 99%.     Chief Complaint: Generalized Body Aches    48 year old female, with a history of asthma, presents c/o a sore throat, body aches and SOB x 8 days.  She has been using her inhaler more than usual.  She denies fever, chills, shortness of breath, nausea, vomiting, diarrhea, abdominal pain, and/or any other symptoms associated with this complaint.    Sore Throat   This is a new problem. The current episode started in the past 7 days. The problem has been gradually worsening. There has been no fever. The pain is at a severity of 0/10. Associated symptoms include shortness of breath. Pertinent negatives include no abdominal pain, congestion, coughing, diarrhea, drooling, ear discharge, ear pain, headaches, hoarse voice, plugged ear sensation, neck pain, stridor, swollen glands, trouble swallowing or vomiting. She has had no exposure to strep. She has tried NSAIDs for the symptoms. The treatment provided no relief.       HENT:  Positive for sore throat. Negative for ear pain, ear discharge, drooling, congestion and trouble swallowing.    Neck: Negative for neck pain.   Respiratory:  Positive for shortness of breath. Negative for cough and stridor.    Gastrointestinal:  Negative for abdominal pain, vomiting and diarrhea.   Neurological:  Negative for headaches.      Objective:     Physical Exam   Constitutional: She is oriented to person, place, and time. She appears well-developed. She is cooperative.   HENT:   Head: Normocephalic and atraumatic.   Ears:   Right Ear: Hearing and external ear normal.   Left Ear: Hearing and external ear normal.   Nose: Nose normal.   Mouth/Throat: Uvula is midline, oropharynx is clear and moist and mucous " membranes are normal.   Eyes: Conjunctivae and lids are normal.   Neck: Trachea normal and phonation normal. Neck supple.   Cardiovascular: Normal rate, regular rhythm, normal heart sounds and normal pulses.   Pulmonary/Chest: Effort normal and breath sounds normal. No respiratory distress. She has no wheezes. She has no rhonchi.   Abdominal: Normal appearance.   Musculoskeletal: Normal range of motion.         General: Normal range of motion.   Neurological: She is alert and oriented to person, place, and time. She exhibits normal muscle tone.   Skin: Skin is warm, dry and intact.   Psychiatric: Her speech is normal and behavior is normal. Judgment and thought content normal.   Nursing note and vitals reviewed.      Assessment:     1. Viral syndrome    2. Body aches    3. Sore throat      Results for orders placed or performed in visit on 01/03/24   POCT Influenza A/B MOLECULAR   Result Value Ref Range    POC Molecular Influenza A Ag Negative Negative, Not Reported    POC Molecular Influenza B Ag Negative Negative, Not Reported     Acceptable Yes    SARS Coronavirus 2 Antigen, POCT Manual Read   Result Value Ref Range    SARS Coronavirus 2 Antigen Negative Negative     Acceptable Yes      *Note: Due to a large number of results and/or encounters for the requested time period, some results have not been displayed. A complete set of results can be found in Results Review.       Plan:   VSS. Patient non-toxic appearing. Advised patient to follow up with PCP as needed.  Patient verbalized understanding, agrees with the plan, and is comfortable with discharge.      Viral syndrome    Body aches  -     POCT Influenza A/B MOLECULAR    Sore throat  -     SARS Coronavirus 2 Antigen, POCT Manual Read      Medical Decision Making:   Clinical Tests:   Lab Tests: Ordered and Reviewed       <> Summary of Lab: COVID negative  Flu negative

## 2024-01-03 NOTE — TELEPHONE ENCOUNTER
----- Message from Koko Hughes MA sent at 1/3/2024 10:47 AM CST -----  Contact: noemi@ [phone]  Pt  called              In regards to trying to be seen/fitted in today with any provider that's available for body aches,sore throat, and breathing issues.

## 2024-01-05 ENCOUNTER — TELEPHONE (OUTPATIENT)
Dept: INTERNAL MEDICINE | Facility: CLINIC | Age: 49
End: 2024-01-05
Payer: COMMERCIAL

## 2024-01-05 NOTE — TELEPHONE ENCOUNTER
I spoke to Ms. Milton and she stated that she went to urgent care and got tested Wednesday for Covid and flu but was negative. She is feeling worse today and wants to be retested. I let her know that the [facility] clinic doesn't have any open slots today and she stated that she will go back to the urgent care she went to before to get retested.

## 2024-01-05 NOTE — TELEPHONE ENCOUNTER
----- Message from Erik Frost sent at 1/5/2024  8:38 AM CST -----  Contact: noemi Roldan is calling regarding getting tested for covid and the flue.  Please give her a call back at [phone].  She has been exposed and is now experiencing symptoms.

## 2024-01-17 DIAGNOSIS — E11.65 TYPE 2 DIABETES MELLITUS WITH HYPERGLYCEMIA, WITHOUT LONG-TERM CURRENT USE OF INSULIN: ICD-10-CM

## 2024-01-17 RX ORDER — TIRZEPATIDE 15 MG/.5ML
15 INJECTION, SOLUTION SUBCUTANEOUS
Qty: 4 PEN | Refills: 3 | Status: SHIPPED | OUTPATIENT
Start: 2024-01-17 | End: 2024-04-16 | Stop reason: RX

## 2024-01-22 ENCOUNTER — PATIENT MESSAGE (OUTPATIENT)
Dept: ALLERGY | Facility: CLINIC | Age: 49
End: 2024-01-22
Payer: COMMERCIAL

## 2024-01-23 ENCOUNTER — CLINICAL SUPPORT (OUTPATIENT)
Dept: PULMONOLOGY | Facility: CLINIC | Age: 49
End: 2024-01-23
Payer: COMMERCIAL

## 2024-01-23 ENCOUNTER — OFFICE VISIT (OUTPATIENT)
Dept: PULMONOLOGY | Facility: CLINIC | Age: 49
End: 2024-01-23
Payer: COMMERCIAL

## 2024-01-23 ENCOUNTER — PATIENT MESSAGE (OUTPATIENT)
Dept: PULMONOLOGY | Facility: CLINIC | Age: 49
End: 2024-01-23

## 2024-01-23 ENCOUNTER — HOSPITAL ENCOUNTER (OUTPATIENT)
Dept: RADIOLOGY | Facility: HOSPITAL | Age: 49
Discharge: HOME | End: 2024-01-23
Attending: NURSE PRACTITIONER
Payer: COMMERCIAL

## 2024-01-23 VITALS — BODY MASS INDEX: 43.82 KG/M2 | WEIGHT: 263 LBS | HEIGHT: 65 IN

## 2024-01-23 VITALS
RESPIRATION RATE: 20 BRPM | OXYGEN SATURATION: 97 % | HEIGHT: 65 IN | HEART RATE: 78 BPM | BODY MASS INDEX: 43.82 KG/M2 | WEIGHT: 263 LBS

## 2024-01-23 DIAGNOSIS — J45.40 MODERATE PERSISTENT ASTHMA WITHOUT COMPLICATION: ICD-10-CM

## 2024-01-23 DIAGNOSIS — R06.02 SOB (SHORTNESS OF BREATH): Primary | ICD-10-CM

## 2024-01-23 DIAGNOSIS — E66.01 MORBID OBESITY DUE TO EXCESS CALORIES: ICD-10-CM

## 2024-01-23 DIAGNOSIS — R06.02 SOB (SHORTNESS OF BREATH) ON EXERTION: ICD-10-CM

## 2024-01-23 DIAGNOSIS — E11.65 TYPE 2 DIABETES MELLITUS WITH HYPERGLYCEMIA, WITHOUT LONG-TERM CURRENT USE OF INSULIN: ICD-10-CM

## 2024-01-23 DIAGNOSIS — G25.81 RESTLESS LEG SYNDROME: ICD-10-CM

## 2024-01-23 DIAGNOSIS — E66.01 CLASS 3 SEVERE OBESITY DUE TO EXCESS CALORIES WITHOUT SERIOUS COMORBIDITY WITH BODY MASS INDEX (BMI) OF 40.0 TO 44.9 IN ADULT: ICD-10-CM

## 2024-01-23 DIAGNOSIS — R06.02 SOB (SHORTNESS OF BREATH): ICD-10-CM

## 2024-01-23 DIAGNOSIS — J45.20 MILD INTERMITTENT ASTHMA WITHOUT COMPLICATION: Primary | ICD-10-CM

## 2024-01-23 DIAGNOSIS — I10 ESSENTIAL HYPERTENSION: ICD-10-CM

## 2024-01-23 DIAGNOSIS — G47.33 OSA ON CPAP: ICD-10-CM

## 2024-01-23 PROCEDURE — 95012 NITRIC OXIDE EXP GAS DETER: CPT | Mod: S$GLB,,, | Performed by: INTERNAL MEDICINE

## 2024-01-23 PROCEDURE — 1159F MED LIST DOCD IN RCRD: CPT | Mod: CPTII,S$GLB,, | Performed by: NURSE PRACTITIONER

## 2024-01-23 PROCEDURE — 99999 PR PBB SHADOW E&M-EST. PATIENT-LVL I: CPT | Mod: PBBFAC,,,

## 2024-01-23 PROCEDURE — 3008F BODY MASS INDEX DOCD: CPT | Mod: CPTII,S$GLB,, | Performed by: NURSE PRACTITIONER

## 2024-01-23 PROCEDURE — 99999 PR PBB SHADOW E&M-EST. PATIENT-LVL II: CPT | Mod: PBBFAC,,,

## 2024-01-23 PROCEDURE — 71046 X-RAY EXAM CHEST 2 VIEWS: CPT | Mod: TC

## 2024-01-23 PROCEDURE — 99215 OFFICE O/P EST HI 40 MIN: CPT | Mod: 25,S$GLB,, | Performed by: NURSE PRACTITIONER

## 2024-01-23 PROCEDURE — 71046 X-RAY EXAM CHEST 2 VIEWS: CPT | Mod: 26,,, | Performed by: RADIOLOGY

## 2024-01-23 PROCEDURE — 1160F RVW MEDS BY RX/DR IN RCRD: CPT | Mod: CPTII,S$GLB,, | Performed by: NURSE PRACTITIONER

## 2024-01-23 PROCEDURE — 94618 PULMONARY STRESS TESTING: CPT | Mod: S$GLB,,, | Performed by: INTERNAL MEDICINE

## 2024-01-23 PROCEDURE — 99999 PR PBB SHADOW E&M-EST. PATIENT-LVL V: CPT | Mod: PBBFAC,,, | Performed by: NURSE PRACTITIONER

## 2024-01-23 PROCEDURE — 94060 EVALUATION OF WHEEZING: CPT | Mod: 59,S$GLB,, | Performed by: INTERNAL MEDICINE

## 2024-01-23 RX ORDER — FLUTICASONE PROPIONATE AND SALMETEROL 100; 50 UG/1; UG/1
1 POWDER RESPIRATORY (INHALATION) 2 TIMES DAILY
Qty: 60 EACH | Refills: 11 | Status: SHIPPED | OUTPATIENT
Start: 2024-01-23

## 2024-01-23 NOTE — ASSESSMENT & PLAN NOTE
Stable and controlled. Continue current treatment plan as previously prescribed with your PCP.     Hemoglobin A1C   Date Value Ref Range Status   09/08/2023 5.5 4.0 - 5.6 % Final     Comment:     ADA Screening Guidelines:  5.7-6.4%  Consistent with prediabetes  >or=6.5%  Consistent with diabetes    High levels of fetal hemoglobin interfere with the HbA1C  assay. Heterozygous hemoglobin variants (HbS, HgC, etc)do  not significantly interfere with this assay.   However, presence of multiple variants may affect accuracy.     06/05/2023 5.4 4.0 - 5.6 % Final     Comment:     ADA Screening Guidelines:  5.7-6.4%  Consistent with prediabetes  >or=6.5%  Consistent with diabetes    High levels of fetal hemoglobin interfere with the HbA1C  assay. Heterozygous hemoglobin variants (HbS, HgC, etc)do  not significantly interfere with this assay.   However, presence of multiple variants may affect accuracy.     12/08/2022 6.3 (H) 4.0 - 5.6 % Final     Comment:     ADA Screening Guidelines:  5.7-6.4%  Consistent with prediabetes  >or=6.5%  Consistent with diabetes    High levels of fetal hemoglobin interfere with the HbA1C  assay. Heterozygous hemoglobin variants (HbS, HgC, etc)do  not significantly interfere with this assay.   However, presence of multiple variants may affect accuracy.

## 2024-01-23 NOTE — ASSESSMENT & PLAN NOTE
Encouraged calorie reduction and 30 minutes of exercise daily. Discussed impact of obesity on general health.    Wt Readings from Last 9 Encounters:   01/23/24 119.3 kg (263 lb 0.1 oz)   01/03/24 117 kg (258 lb)   10/31/23 117.1 kg (258 lb 2.5 oz)   10/24/23 118.7 kg (261 lb 11 oz)   09/28/23 115.5 kg (254 lb 10.1 oz)   09/19/23 117.9 kg (259 lb 14.8 oz)   09/08/23 115.7 kg (255 lb 1.2 oz)   09/06/23 117.9 kg (260 lb)   08/29/23 118.2 kg (260 lb 11.1 oz)   Body mass index is 43.77 kg/m².

## 2024-01-23 NOTE — PROCEDURES
Clinical Guide to Interpretation or FeNO Levels :    FeNO  (ppb) LOW INTERMEDIATE HIGH   ADULT VALUES < 25 25-50          > 50   Th2-driven Inflammation Unlikely Likely Significant     Patients FeNO level at this visit : __11__ (ppb)    Interpretation of FeNO measurement in adults:  [x] FENO is less than 25 ppb implies non eosinophilic airway inflammation or the absence of airway inflammation.    Comment: Low FENO (<25 ppb) in adult asthmatics with persistent symptoms suggests other etiologies for these symptoms and a lower likelihood of benefit from adding or increasing inhaled glucocorticoids.    [] FENO between 25 and 50 ppb in adults should be interpreted cautiously with reference to the clinical situation (eg, symptomatic, on or off therapy, current smoking).    [] FENO greater than 50 ppb in adults  suggests eosinophilic airway inflammation   Comment: High FENO (>50 ppb) in adult asthmatics even with atypical symptoms suggests glucocorticoid responsiveness. High FENO (>50 ppb) can help identify poor adherence or uncontrolled inflammation in asthma patients with otherwise seemingly controlled asthma.    Discussion:  A FENO less than 25 ppb in adults and less than 20 ppb in children younger than 12 years of age implies noneosinophilic airway inflammation or the absence of airway inflammation.  A FENO greater than 50 ppb in adults or greater than 35 ppb in children suggests eosinophilic airway inflammation.   Values of FENO between 25 and 50 ppb in adults (20 to 35 ppb in children) should be interpreted cautiously with reference to the clinical situation (eg, symptomatic, on or off therapy, current smoking).  A rising FENO with a greater than 20 percent change and more than 25 ppb (20 ppb in children) from a previously stable level suggests increasing eosinophilic airway inflammation, but there are wide inter-individual differences.  A decrease in FENO greater than 20 percent for values over 50 ppb or more than  10 ppb for values less than 50 ppb may be clinically important.  ?FENO in other respiratory diseases - Several other diseases are associated with altered levels of exhaled NO: low levels of FENO have been noted in cystic fibrosis, current smoking, pulmonary hypertension, hypothermia, primary ciliary dyskinesia, and bronchopulmonary dysplasia. Elevated FENO has been noted in atopy, nonasthmatic eosinophilic bronchitis, COPD exacerbations, noncystic fibrosis bronchiectasis, and viral upper respiratory infections.    REFERENCE:  ATS Board of Directors, December 2004, and by the ERS Executive Committee, June 2004. ATS/ERS Recommendations for Standardized Procedures for the Online and Offline Measurement of Exhaled Lower Respiratory Nitric Oxide and Nasal Nitric Oxide. Guideline 2005

## 2024-01-23 NOTE — PROCEDURES
"[facility]  Six Minute Walk     SUMMARY     Ordering Provider: Josefina Mack NP   Interpreting Provider: Baltazar Mosley MD  Performing nurse/tech/RT: VT, RT  Diagnosis:  (Moderate persistent asthma without complication;  SOB (shortness of breath) on exertion)  Height: 5' 5" (165.1 cm)  Weight: 119.3 kg (263 lb 0.1 oz)  BMI (Calculated): 43.8   Patient Race:             Phase Oxygen Assessment Supplemental O2 Heart   Rate Blood Pressure Ramon Dyspnea Scale Rating   Resting 98 % Room Air 73 bpm 114/63 3   Exercise        Minute        1 100 % Room Air 106 bpm     2 97 % Room Air 109 bpm     3 97 % Room Air 108 bpm     4 96 % Room Air 109 bpm     5 98 % Room Air 114 bpm     6  97 % Room Air 111 bpm   4   Recovery        Minute        1 100 % Room Air 105 bpm     2 100 % Room Air 88 bpm     3 100 % Room Air 80 bpm     4 100 % Room Air 87 bpm 131/79 4     Six Minute Walk Summary  6MWT Status: completed without stopping  Patient Reported: Dyspnea     Interpretation:  Did the patient stop or pause?: No                                         Total Time Walked (Calculated): 360 seconds  Final Partial Lap Distance (feet): 125 feet  Total Distance Meters (Calculated): 342.9 meters  Predicted Distance Meters (Calculated): 453.16 meters  Percentage of Predicted (Calculated): 75.67  Peak VO2 (Calculated): 14.27  Mets: 4.08  Has The Patient Had a Previous Six Minute Walk Test?: Yes       Previous 6MWT Results  Has The Patient Had a Previous Six Minute Walk Test?: Yes  Date of Previous Test: 11/20/20  Total Time Walked: 360 seconds  Total Distance (meters): 411.48  Predicted Distance (meters): 480.58 meters  Percentage of Predicted: 85.62  Percent Change (Calculated): 0.17      Interpretation:  Total distance walked in six minutes is mildly reduced indicating a reduction in overall  functional capacity. The patient did not meet criteria for supplemental oxygen prescription.  Clinical " correlation suggested.  [] Mild exercise-induced hypoxemia described as an arterial oxygen saturation of 93-95% (with a fall of 3-4% with exercise),   [] Moderate exercise-induced hypoxemia as a fall in oxygen saturation to  89-93% (with a fall of 3-4 % with exercise)  [] Severe exercise induced hypoxemia as < 89% O2 saturation (88% and below).  Medicare Criteria for Oxygen prescription comments: When arterial oxygen saturation is at or below 88% during exercise (severe exercise induced hypoxemia) then the patient falls under   Details about Medicare Group Criteria coverage can be found at http://[email or web address]/manuals/downloads/     Baltazar Mosley MD

## 2024-01-23 NOTE — ASSESSMENT & PLAN NOTE
Encouraged calorie reduction and 30 minutes of exercise daily. Discussed impact of obesity on general health.   Wt Readings from Last 9 Encounters:   01/23/24 119.3 kg (263 lb 0.1 oz)   01/23/24 119.3 kg (263 lb 0.1 oz)   01/03/24 117 kg (258 lb)   10/31/23 117.1 kg (258 lb 2.5 oz)   10/24/23 118.7 kg (261 lb 11 oz)   09/28/23 115.5 kg (254 lb 10.1 oz)   09/19/23 117.9 kg (259 lb 14.8 oz)   09/08/23 115.7 kg (255 lb 1.2 oz)   09/06/23 117.9 kg (260 lb)   Body mass index is 43.77 kg/m².

## 2024-01-23 NOTE — PROGRESS NOTES
Subjective:      Patient ID: Yuli Milton is a 48 y.o. female.    Chief Complaint: Shortness of Breath and Asthma      HPI  Yuli Milton presents to pulmonary clinic related to ER visit 1/14/2024 with diagnosis bronchitis. Patient reports her complaint is shortness of breath on exertion.   She is requesting work excuse.   She also requesting letter stating she has pulmonary condition that she might have to be off work at times. Advised with asthma she should maintain good control that will not affect her work on a regular basis, we are happy to provide work excuse when seen in clinic for acute flares. Provided work excuse requested for recent bronchitis flare.     She is not adherent to treatment regimen, not taking Advair 100 mcg. Difficulty obtaining from pharmacy, request sent to [facility] today.    She is back at there base line except for chronic shortness of breath on exertion. Patient evaluated today with spirometry, feno, 6mwd. No findings of asthma flare, oxygenation is normal at rest and activity.   Suspect morbid obesity and deconditioning at least in part related to shortness of breath with activity.     Patient has chronic clearing of throat in clinic, advice to follow up with allergist and possibly ENT.    She reports she has appointment today with primary care provider to discuss possible GERD.     1/23/2024 spirometry is normal.  Airflow is not clearly improved post-bronchodilator  1/23/2024 6 minute walk distance no desaturations requiring supplemental oxygen at rest or exertion.  Total distance walked in six minutes is mildly reduced indicating a reduction in overall  functional capacity.    1/23/2024 FeNO 11       HPI: Yuli Milton presents to clinic for follow up after reporting to ER with asthma flare on 6/18/2023 with [facility] ED. Needs work excuse for 6/23/2023 and 6/26/2023, states ready to return to work 6/27/2023, provided.     She  reports she is back at baseline with treatment.     She is not adherent to treatment regimen, taking Advair 100 mcg once daily instead of twice daily as ordered.     1/23/2024 spirometry is normal.  Airflow is not clearly improved post-bronchodilator  1/23/2024 6 minute walk distance no desaturations requiring supplemental oxygen at rest or exertion.  Total distance walked in six minutes is mildly reduced indicating a reduction in overall  functional capacity.    1/23/2024 FeNO 11     6/26/2023 FeNO 21     8/23/2022 FeNO 23      She is employed by [employer] as a .         Obstructive sleep apnea  Ordered to be on Auto CPAP of 4-20 cmH2O pressure which was optimally controlling sleep apnea with apneic index (AHI) 2.9 events an hour.   Patient reports some benefit from CPAP use.   She could not get comfortable with CPAP so stopped using in Jan 2022. In past compliance affected by falling asleep without machine in use. Then jan 2022 she had malfunction would not blowing air, has not had checked by [facility] and found working fine.   Full face mask was used in past.     Obtained ResMed CPAP machine in about November 2021 9/24/2021, 9/27/2021 Home Sleep Study    2 night study  MILD OBSTRUCTIVE SLEEP APNEA with overall AHI 13.2/hr ( 52 events)  Oxygen desaturation: 80%. SpO2 between 85% to 89% for < 1 min.  Patient snored 78% time above 50 .  Heart rate range: 60 bpm -110 bpm     Present Compliance download available, needs to bring in machine. She has not used CPAP since Jan 2022.     Payor Standard  Usage 12/08/2021 - 01/06/2022  Usage days 19/30 days (63%)  >= 4 hours 9 days (30%)  < 4 hours 10 days (33%)  Usage hours 68 hours 12 minutes  Average usage (total days) 2 hours 16 minutes  Average usage (days used) 3 hours 35 minutes  Median usage (days used) 3 hours 56 minutes  Total used hours (value since last reset - 01/06/2022) 202 hours  AirSense 11 AutoSet  Serial number  [id number]  Mode AutoSet  Min Pressure 4 cmH2O  Max Pressure 20 cmH2O  EPR Fulltime  EPR level 3  Response Standard  Therapy  Pressure - cmH2O Median: 5.8 95th percentile: 7.7 Maximum: 8.5  Leaks - L/min Median: 13.7 95th percentile: 22.5 Maximum: 26.8  Events per hour AI: 2.5 HI: 0.4 AHI: 2.9  Apnea Index Central: 0.5 Obstructive: 1.9 Unknown: 0.1  RERA Index 0.2  Cheyne-Hernandez respiration (average duration per night) 0 minutes (0%)  Usage - hours     Employed full time as a [employer], [employer].      Previous Report Reviewed: lab reports and office notes     Past Medical History: The following portions of the patient's history were reviewed and updated as appropriate:   She  has a past surgical history that includes Appendectomy; Dilation and curettage of uterus; Colonoscopy (N/A, 07/31/2017); Cholecystectomy; Hysterectomy (08/31/2016); Fracture surgery; and Oophorectomy.  Her family history includes Asthma in her son; Breast cancer in her maternal aunt and paternal grandmother; Cancer in her maternal aunt; Colon cancer in her maternal aunt; Diabetes in her maternal grandmother; Hypertension in her mother; Miscarriages / Stillbirths in her cousin; No Known Problems in her daughter, father, and son; Stroke in her maternal aunt; Thyroid disease in her brother, mother, and sister.  She  reports that she has never smoked. She has never used smokeless tobacco. She reports that she does not drink alcohol and does not use drugs.  She has a current medication list which includes the following prescription(s): albuterol, albuterol, alprazolam, azelaic acid, blood sugar diagnostic, blood-glucose meter, celecoxib, fluoxetine, hydroquinone, ibuprofen, lancets, lidocaine, xiidra, lyrica, myrbetriq, montelukast, nebulizer accessories, olanzapine, oxycodone-acetaminophen, thiamine, mounjaro, tramadol, zanaflex, fluticasone-salmeterol 100-50 mcg/dose, and meclizine.  She is allergic to trulicity  "[dulaglutide], aspirin, and hibiclens (isopropyl alcohol)..    The following portions of the patient's history were reviewed and updated as appropriate: allergies, current medications, past family history, past medical history, past social history, past surgical history and problem list.    Review of Systems   Constitutional:  Negative for fever, chills, weight loss, weight gain, activity change, appetite change, fatigue and night sweats.   HENT:  Negative for postnasal drip, rhinorrhea, sinus pressure, voice change and congestion.    Eyes:  Negative for redness and itching.   Respiratory:  Negative for snoring, cough, sputum production, chest tightness, shortness of breath, wheezing, orthopnea, asthma nighttime symptoms, dyspnea on extertion, use of rescue inhaler and somnolence.    Cardiovascular: Negative.  Negative for chest pain, palpitations and leg swelling.   Genitourinary:  Negative for difficulty urinating and hematuria.   Endocrine:  Negative for cold intolerance and heat intolerance.    Musculoskeletal:  Negative for arthralgias, gait problem, joint swelling and myalgias.   Skin: Negative.    Gastrointestinal:  Negative for nausea, vomiting, abdominal pain and acid reflux.   Neurological:  Negative for dizziness, weakness, light-headedness and headaches.   Hematological:  Negative for adenopathy. No excessive bruising.   All other systems reviewed and are negative.     Objective:   Pulse 78   Resp 20   Ht 5' 5" (1.651 m)   Wt 119.3 kg (263 lb 0.1 oz)   LMP 08/21/2016   SpO2 97%   BMI 43.77 kg/m²   Physical Exam  Vitals and nursing note reviewed.   Constitutional:       General: She is not in acute distress.     Appearance: She is well-developed. She is not ill-appearing or toxic-appearing.   HENT:      Head: Normocephalic.      Right Ear: External ear normal.      Left Ear: External ear normal.      Nose: Nose normal.      Mouth/Throat:      Pharynx: No oropharyngeal exudate.   Eyes:      " Conjunctiva/sclera: Conjunctivae normal.   Cardiovascular:      Rate and Rhythm: Normal rate and regular rhythm.      Heart sounds: Normal heart sounds.   Pulmonary:      Effort: Pulmonary effort is normal.      Breath sounds: Normal breath sounds. No stridor.   Abdominal:      Palpations: Abdomen is soft.   Musculoskeletal:         General: Normal range of motion.      Cervical back: Normal range of motion and neck supple.   Lymphadenopathy:      Cervical: No cervical adenopathy.   Skin:     General: Skin is warm and dry.   Neurological:      Mental Status: She is alert and oriented to person, place, and time.   Psychiatric:         Behavior: Behavior normal. Behavior is cooperative.         Thought Content: Thought content normal.         Judgment: Judgment normal.         Personal Diagnostic Review  CPAP download    1/23/2024 spirometry is normal.  Airflow is not clearly improve post-bronchodilator.    1/23/2024 FeNO 11        Procedure Note    Clinical Guide to Interpretation or FeNO Levels :     FeNO  (ppb) LOW INTERMEDIATE HIGH   ADULT VALUES < 25 25-50          > 50   Th2-driven Inflammation Unlikely Likely Significant      Patients FeNO level at this visit : __11__ (ppb)     Interpretation of FeNO measurement in adults:  [x] FENO is less than 25 ppb implies non eosinophilic airway inflammation or the absence of airway inflammation.           1/23/2024 6 minute walk distance no desaturations requiring supplemental oxygen at rest or exertion.  Phase Oxygen Assessment Supplemental O2 Heart   Rate Blood Pressure Ramon Dyspnea Scale Rating   Resting 98 % Room Air 73 bpm 114/63 3   Exercise             Minute             1 100 % Room Air 106 bpm       2 97 % Room Air 109 bpm       3 97 % Room Air 108 bpm       4 96 % Room Air 109 bpm       5 98 % Room Air 114 bpm       6  97 % Room Air 111 bpm  4   Recovery             Minute             1 100 % Room Air 105 bpm       2 100 % Room Air 88 bpm       3 100 % Room Air  80 bpm       4 100 % Room Air 87 bpm 131/79 4      Six Minute Walk Summary  6MWT Status: completed without stopping  Patient Reported: Dyspnea         Interpretation:  Did the patient stop or pause?: No  Total Time Walked (Calculated): 360 seconds  Final Partial Lap Distance (feet): 125 feet  Total Distance Meters (Calculated): 342.9 meters  Predicted Distance Meters (Calculated): 453.16 meters  Percentage of Predicted (Calculated): 75.67  Peak VO2 (Calculated): 14.27  Mets: 4.08  Has The Patient Had a Previous Six Minute Walk Test?: Yes     Previous 6MWT Results  Has The Patient Had a Previous Six Minute Walk Test?: Yes  Date of Previous Test: 11/20/20  Total Time Walked: 360 seconds  Total Distance (meters): 411.48  Predicted Distance (meters): 480.58 meters  Percentage of Predicted: 85.62  Percent Change (Calculated): 0.17        Interpretation:  Total distance walked in six minutes is mildly reduced indicating a reduction in overall  functional capacity. The patient did not meet criteria for supplemental oxygen prescription.  Clinical correlation suggested.  [] Mild exercise-induced hypoxemia described as an arterial oxygen saturation of 93-95% (with a fall of 3-4% with exercise),   [] Moderate exercise-induced hypoxemia as a fall in oxygen saturation to  89-93% (with a fall of 3-4 % with exercise)  [] Severe exercise induced hypoxemia as < 89% O2 saturation (88% and below).  Medicare Criteria for Oxygen prescription comments: When arterial oxygen saturation is at or below 88% during exercise (severe exercise induced hypoxemia) then the patient falls under   Details about Medicare Group Criteria coverage can be found at http://[email or web address].[facility].gov/manuals/downloads/      Baltazar Mosley MD             6/26/2023 FeNO 21  Clinical Guide to Interpretation or FeNO Levels :     FeNO  (ppb) LOW INTERMEDIATE HIGH   ADULT VALUES < 25 25-50          > 50   Th2-driven Inflammation Unlikely Likely Significant      Patients FeNO  level at this visit : __21__ (ppb)    8/232/2022 FeNO 23   Clinical Guide to Interpretation or FeNO Levels :     FeNO  (ppb) LOW INTERMEDIATE HIGH   ADULT VALUES < 25 25-50          > 50   Th2-driven Inflammation Unlikely Likely Significant      Patients FeNO level at this visit : _23___ (ppb)    11/20/2020 6mwd No desaturations requiring supplemental oxygen at rest or exertion. Oxygen desaturation did not meet criteria for supplemental oxygen prescription. Exercise capacity is normal.      Phase Oxygen Assessment Supplemental O2 Heart   Rate Blood Pressure Ramon Dyspnea Scale Rating   Resting 98 % Room Air 73 bpm 116/77 3   Exercise         Minute         1 100 % Room Air 108 bpm     2 100 % Room Air 117 bpm     3 94 % Room Air 116 bpm     4 93 % Room Air 113 bpm     5 99 % Room Air 130 bpm     6  95 % Room Air 123 bpm 94/54 4   Recovery         Minute         1 100 % Room Air 86 bpm     2 100 % Room Air 77 bpm     3 100 % Room Air 74 bpm     4 99 % Room Air 77 bpm 117/75 2     Six Minute Walk Summary   6MWT Status: completed without stopping   Patient Reported: Dyspnea, Other (Comment)(leg numbness    Assessment:     1. Mild intermittent asthma without complication    2. Moderate persistent asthma without complication    3. Class 3 severe obesity due to excess calories without serious comorbidity with body mass index (BMI) of 40.0 to 44.9 in adult    4. SOB (shortness of breath) on exertion    5. SCOUT on CPAP    6. Type 2 diabetes mellitus with hyperglycemia, without long-term current use of insulin    7. Restless leg syndrome    8. Morbid obesity due to excess calories    9. Essential hypertension          Orders Placed This Encounter   Procedures    CPAP/BIPAP SUPPLIES     Benefits and compliant  90 day supply. 4 refills  HME: [facility]     Order Specific Question:   Length of need (1-99 months):     Answer:   99     Order Specific Question:   Choose ONE mask type and its corresponding cushions and/or pillows:      Answer:    Full Face Mask, 1 per 90 days:  Full Face Cushion, (3 per 90 days)     Order Specific Question:   Choose EITHER Heated or Non-Heated Tubjing     Answer:    Non-Heated Tubing, 1 per 90 days     Order Specific Question:   Number of Days Needed:     Answer:   99     Order Specific Question:   All other supplies as needed as listed below:     Answer:    Headgear, 1 per 180 days     Order Specific Question:   All other supplies as needed as listed below:     Answer:    Chin Strap, 1 per 180 days     Order Specific Question:   All other supplies as needed as listed below:     Answer:    Disposable Filter, 6 per 90 days     Order Specific Question:   All other supplies as needed as listed below:     Answer:    Humidifier Chamber, 1 per 180 days     Order Specific Question:   All other supplies as needed as listed below:     Answer:    Non-Disposable Filter, 1 per 180 days    Six Minute Walk Test to qualify for Home Oxygen     Standing Status:   Future     Number of Occurrences:   1     Standing Expiration Date:   1/23/2025     Order Specific Question:   Release to patient     Answer:   Immediate     Plan:     Problem List Items Addressed This Visit       Type 2 diabetes mellitus with hyperglycemia, without long-term current use of insulin     Stable and controlled. Continue current treatment plan as previously prescribed with your PCP.     Hemoglobin A1C   Date Value Ref Range Status   09/08/2023 5.5 4.0 - 5.6 % Final     Comment:     ADA Screening Guidelines:  5.7-6.4%  Consistent with prediabetes  >or=6.5%  Consistent with diabetes    High levels of fetal hemoglobin interfere with the HbA1C  assay. Heterozygous hemoglobin variants (HbS, HgC, etc)do  not significantly interfere with this assay.   However, presence of multiple variants may affect accuracy.     06/05/2023 5.4 4.0 - 5.6 % Final     Comment:     ADA Screening Guidelines:  5.7-6.4%  Consistent with  prediabetes  >or=6.5%  Consistent with diabetes    High levels of fetal hemoglobin interfere with the HbA1C  assay. Heterozygous hemoglobin variants (HbS, HgC, etc)do  not significantly interfere with this assay.   However, presence of multiple variants may affect accuracy.     12/08/2022 6.3 (H) 4.0 - 5.6 % Final     Comment:     ADA Screening Guidelines:  5.7-6.4%  Consistent with prediabetes  >or=6.5%  Consistent with diabetes    High levels of fetal hemoglobin interfere with the HbA1C  assay. Heterozygous hemoglobin variants (HbS, HgC, etc)do  not significantly interfere with this assay.   However, presence of multiple variants may affect accuracy.              Restless leg syndrome     Controlled on Lyrica          SCOUT on CPAP     Prior diagnosis 2020, had to return CPAP inadequate use.  Restudied 9/24/2021, 9/27/2021 Home Sleep Study    2 night study MILD OBSTRUCTIVE SLEEP APNEA with overall AHI 13.2/hr ( 52 events)  Oxygen desaturation: 80%. SpO2 between 85% to 89% for < 1 min.  On Auto CPAP 4-20 cm, stopped using Jan 2022, machine malfunction, met with Chuyita godwin.  Never resumed CPAP since Jan 2022  ResMed   Full face mask  Updated supplies  Improve adherence to use  Annual follow up                  Relevant Orders    CPAP/BIPAP SUPPLIES    Morbid obesity due to excess calories     Encouraged calorie reduction and 30 minutes of exercise daily. Discussed impact of obesity on general health.   Wt Readings from Last 9 Encounters:   01/23/24 119.3 kg (263 lb 0.1 oz)   01/23/24 119.3 kg (263 lb 0.1 oz)   01/03/24 117 kg (258 lb)   10/31/23 117.1 kg (258 lb 2.5 oz)   10/24/23 118.7 kg (261 lb 11 oz)   09/28/23 115.5 kg (254 lb 10.1 oz)   09/19/23 117.9 kg (259 lb 14.8 oz)   09/08/23 115.7 kg (255 lb 1.2 oz)   09/06/23 117.9 kg (260 lb)   Body mass index is 43.77 kg/m².             Mild intermittent asthma without complication - Primary    Relevant Medications    fluticasone-salmeterol diskus inhaler  100-50 mcg    Essential hypertension     Stable and controlled. Continue current treatment plan as previously prescribed with your PCP.              Class 3 severe obesity due to excess calories without serious comorbidity with body mass index (BMI) of 40.0 to 44.9 in adult     Encouraged calorie reduction and 30 minutes of exercise daily. Discussed impact of obesity on general health.    Wt Readings from Last 9 Encounters:   01/23/24 119.3 kg (263 lb 0.1 oz)   01/03/24 117 kg (258 lb)   10/31/23 117.1 kg (258 lb 2.5 oz)   10/24/23 118.7 kg (261 lb 11 oz)   09/28/23 115.5 kg (254 lb 10.1 oz)   09/19/23 117.9 kg (259 lb 14.8 oz)   09/08/23 115.7 kg (255 lb 1.2 oz)   09/06/23 117.9 kg (260 lb)   08/29/23 118.2 kg (260 lb 11.1 oz)   Body mass index is 43.77 kg/m².              Other Visit Diagnoses       SOB (shortness of breath) on exertion        Relevant Orders    Six Minute Walk Test to qualify for Home Oxygen (Completed)            I spent a total of 49 minutes on the day of the visit.  This includes face to face time and non-face to face time preparing to see the patient (eg, review of tests), obtaining and/or reviewing separately obtained history, documenting clinical information in the electronic or other health record, independently interpreting results and communicating results to the patient/family/caregiver, or care coordinator.    Follow up in about 1 year (around 1/23/2025) for CPAP compliance dnld. Asthma. .

## 2024-01-24 LAB
BRPFT: NORMAL
FEF 25 75 CHG: 15.6 %
FEF 25 75 LLN: 1.19
FEF 25 75 POST REF: 103 %
FEF 25 75 PRE REF: 89.1 %
FEF 25 75 REF: 2.45
FET100 CHG: -10.8 %
FEV1 CHG: 5.4 %
FEV1 FVC CHG: 2.7 %
FEV1 FVC LLN: 70
FEV1 FVC POST REF: 98 %
FEV1 FVC PRE REF: 95.5 %
FEV1 FVC REF: 81
FEV1 LLN: 1.85
FEV1 POST REF: 106 %
FEV1 PRE REF: 100.6 %
FEV1 REF: 2.45
FVC CHG: 2.7 %
FVC LLN: 2.33
FVC POST REF: 107.7 %
FVC PRE REF: 104.9 %
FVC REF: 3.05
PEF CHG: 9.1 %
PEF LLN: 4.28
PEF POST REF: 86.4 %
PEF PRE REF: 79.2 %
PEF REF: 6.35
POST FEF 25 75: 2.52 L/S (ref 1.19–3.71)
POST FET 100: 8.34 SEC
POST FEV1 FVC: 79.2 % (ref 69.97–91.64)
POST FEV1: 2.6 L (ref 1.85–3.05)
POST FVC: 3.28 L (ref 2.33–3.77)
POST PEF: 5.48 L/S (ref 4.28–8.41)
PRE FEF 25 75: 2.18 L/S (ref 1.19–3.71)
PRE FET 100: 9.34 SEC
PRE FEV1 FVC: 77.14 % (ref 69.97–91.64)
PRE FEV1: 2.47 L (ref 1.85–3.05)
PRE FVC: 3.2 L (ref 2.33–3.77)
PRE PEF: 5.03 L/S (ref 4.28–8.41)

## 2024-02-05 ENCOUNTER — TELEPHONE (OUTPATIENT)
Dept: OPHTHALMOLOGY | Facility: CLINIC | Age: 49
End: 2024-02-05
Payer: COMMERCIAL

## 2024-02-05 ENCOUNTER — OFFICE VISIT (OUTPATIENT)
Dept: OPHTHALMOLOGY | Facility: CLINIC | Age: 49
End: 2024-02-05
Payer: COMMERCIAL

## 2024-02-05 DIAGNOSIS — H10.9 BACTERIAL CONJUNCTIVITIS OF LEFT EYE: Primary | ICD-10-CM

## 2024-02-05 PROCEDURE — 1159F MED LIST DOCD IN RCRD: CPT | Mod: CPTII,S$GLB,, | Performed by: OPTOMETRIST

## 2024-02-05 PROCEDURE — 99999 PR PBB SHADOW E&M-EST. PATIENT-LVL III: CPT | Mod: PBBFAC,,, | Performed by: OPTOMETRIST

## 2024-02-05 PROCEDURE — 99213 OFFICE O/P EST LOW 20 MIN: CPT | Mod: S$GLB,,, | Performed by: OPTOMETRIST

## 2024-02-05 RX ORDER — TOBRAMYCIN AND DEXAMETHASONE 3; 1 MG/ML; MG/ML
1-2 SUSPENSION/ DROPS OPHTHALMIC 4 TIMES DAILY
Qty: 5 ML | Refills: 0 | Status: SHIPPED | OUTPATIENT
Start: 2024-02-05 | End: 2024-02-12

## 2024-02-05 NOTE — TELEPHONE ENCOUNTER
----- Message from Cristina Pollack sent at 2/5/2024 10:16 AM CST -----  Type:  Same Day Appointment Request    Caller is requesting a same day appointment.  Caller declined first available appointment listed below.    Name of Caller:pt  When is the first available appointment?unknown  Symptoms:left eye blurry/dry/itchy  Best Call Back Number: [phone]  Additional Information:

## 2024-02-05 NOTE — PROGRESS NOTES
HPI     Eye Problem            Comments: Patient here today for fb senstion OS associated with blurred   vision and light sensitivity   Symptoms have been present for about 2-3 weeks. Patient was seen at   [facility] was given drops but problems persist.          Comments    1. DM          Last edited by Tosha Arriola, PCT on 2/5/2024  3:16 PM.            Assessment /Plan     For exam results, see Encounter Report.    Bacterial conjunctivitis of left eye  -     tobramycin-dexAMETHasone 0.3-0.1% (TOBRADEX) 0.3-0.1 % DrpS; Place 1-2 drops into the left eye 4 (four) times daily. for 7 days  Dispense: 5 mL; Refill: 0  Start tobradex, continue xiidra bid with ivizia drops for dryness PRN.     RTC as scheduled for annual eye exam, sooner if any changes to vision worsening symptoms.

## 2024-02-06 ENCOUNTER — TELEPHONE (OUTPATIENT)
Dept: OPHTHALMOLOGY | Facility: CLINIC | Age: 49
End: 2024-02-06
Payer: COMMERCIAL

## 2024-02-06 ENCOUNTER — PATIENT MESSAGE (OUTPATIENT)
Dept: OPHTHALMOLOGY | Facility: CLINIC | Age: 49
End: 2024-02-06
Payer: COMMERCIAL

## 2024-02-13 ENCOUNTER — OFFICE VISIT (OUTPATIENT)
Dept: OPHTHALMOLOGY | Facility: CLINIC | Age: 49
End: 2024-02-13
Payer: COMMERCIAL

## 2024-02-13 DIAGNOSIS — H10.9 BACTERIAL CONJUNCTIVITIS OF LEFT EYE: Primary | ICD-10-CM

## 2024-02-13 DIAGNOSIS — H02.883 MEIBOMIAN GLAND DYSFUNCTION (MGD) OF BOTH EYES: ICD-10-CM

## 2024-02-13 DIAGNOSIS — H02.886 MEIBOMIAN GLAND DYSFUNCTION (MGD) OF BOTH EYES: ICD-10-CM

## 2024-02-13 PROCEDURE — 99999 PR PBB SHADOW E&M-EST. PATIENT-LVL III: CPT | Mod: PBBFAC,,, | Performed by: OPTOMETRIST

## 2024-02-13 PROCEDURE — 99213 OFFICE O/P EST LOW 20 MIN: CPT | Mod: S$GLB,,, | Performed by: OPTOMETRIST

## 2024-02-13 PROCEDURE — 1159F MED LIST DOCD IN RCRD: CPT | Mod: CPTII,S$GLB,, | Performed by: OPTOMETRIST

## 2024-02-13 NOTE — PROGRESS NOTES
HPI     Follow-up            Comments: Pt here today for 1 wk f/u  Pt states she is still having trouble with OS  Pt is compliant with drops         Last edited by Madalyn Oneal on 2/13/2024 10:19 AM.            Assessment /Plan     For exam results, see Encounter Report.    Bacterial conjunctivitis of left eye  Improved, stop tobradex. Discussed pataday qam for itching symptoms.    Meibomian gland dysfunction (MGD) of both eyes  Recommended patient begin Warm compresses, Gentle Lid Scrubs, and Thera Tears Sterilids.    RTC as scheduled for annual eye exam, sooner if any changes to vision worsening symptoms.

## 2024-03-08 ENCOUNTER — TELEPHONE (OUTPATIENT)
Dept: OPHTHALMOLOGY | Facility: CLINIC | Age: 49
End: 2024-03-08
Payer: COMMERCIAL

## 2024-03-20 ENCOUNTER — PATIENT MESSAGE (OUTPATIENT)
Dept: DIABETES | Facility: CLINIC | Age: 49
End: 2024-03-20
Payer: COMMERCIAL

## 2024-03-20 DIAGNOSIS — E27.8 ADRENAL MASS: Primary | ICD-10-CM

## 2024-03-20 DIAGNOSIS — E66.01 MORBID OBESITY DUE TO EXCESS CALORIES: ICD-10-CM

## 2024-03-22 RX ORDER — TIRZEPATIDE 15 MG/.5ML
15 INJECTION, SOLUTION SUBCUTANEOUS
Qty: 4 PEN | Refills: 2 | Status: SHIPPED | OUTPATIENT
Start: 2024-03-22 | End: 2024-04-16 | Stop reason: RX

## 2024-04-03 ENCOUNTER — PATIENT MESSAGE (OUTPATIENT)
Dept: PULMONOLOGY | Facility: CLINIC | Age: 49
End: 2024-04-03
Payer: COMMERCIAL

## 2024-04-15 ENCOUNTER — PATIENT MESSAGE (OUTPATIENT)
Dept: DIABETES | Facility: CLINIC | Age: 49
End: 2024-04-15
Payer: COMMERCIAL

## 2024-04-15 DIAGNOSIS — E11.65 TYPE 2 DIABETES MELLITUS WITH HYPERGLYCEMIA, WITHOUT LONG-TERM CURRENT USE OF INSULIN: Primary | ICD-10-CM

## 2024-04-16 RX ORDER — SEMAGLUTIDE 1.34 MG/ML
1 INJECTION, SOLUTION SUBCUTANEOUS
Qty: 3 ML | Refills: 11 | Status: SHIPPED | OUTPATIENT
Start: 2024-04-16 | End: 2024-05-17 | Stop reason: ALTCHOICE

## 2024-05-13 ENCOUNTER — PATIENT MESSAGE (OUTPATIENT)
Dept: DIABETES | Facility: CLINIC | Age: 49
End: 2024-05-13
Payer: COMMERCIAL

## 2024-05-17 ENCOUNTER — OFFICE VISIT (OUTPATIENT)
Dept: DIABETES | Facility: CLINIC | Age: 49
End: 2024-05-17
Payer: COMMERCIAL

## 2024-05-17 DIAGNOSIS — E78.2 MIXED HYPERLIPIDEMIA: ICD-10-CM

## 2024-05-17 DIAGNOSIS — E11.65 TYPE 2 DIABETES MELLITUS WITH HYPERGLYCEMIA, WITHOUT LONG-TERM CURRENT USE OF INSULIN: Primary | ICD-10-CM

## 2024-05-17 DIAGNOSIS — Z12.31 ENCOUNTER FOR SCREENING MAMMOGRAM FOR MALIGNANT NEOPLASM OF BREAST: ICD-10-CM

## 2024-05-17 DIAGNOSIS — E66.01 MORBID OBESITY DUE TO EXCESS CALORIES: ICD-10-CM

## 2024-05-17 DIAGNOSIS — I10 ESSENTIAL HYPERTENSION: ICD-10-CM

## 2024-05-17 PROCEDURE — 3044F HG A1C LEVEL LT 7.0%: CPT | Mod: CPTII,95,, | Performed by: PHYSICIAN ASSISTANT

## 2024-05-17 PROCEDURE — 99213 OFFICE O/P EST LOW 20 MIN: CPT | Mod: 95,,, | Performed by: PHYSICIAN ASSISTANT

## 2024-05-17 RX ORDER — TIRZEPATIDE 2.5 MG/.5ML
2.5 INJECTION, SOLUTION SUBCUTANEOUS
Qty: 4 PEN | Refills: 11 | Status: SHIPPED | OUTPATIENT
Start: 2024-05-17 | End: 2024-06-14

## 2024-05-17 NOTE — PROGRESS NOTES
PCP: Drew Gutierrez MD    Subjective:     Chief Complaint: Diabetes     TELEMEDICINE VISIT:     The patient location is: Home  The chief complaint leading to consultation is: Diabetes Follow up  Visit type: Virtual visit with synchronous audio and video  Total time spent with patient: 8  Each patient to whom he or she provides medical services by telemedicine is:  (1) informed of the relationship between the physician and patient and the respective role of any other health care provider with respect to management of the patient; and (2) notified that he or she may decline to receive medical services by telemedicine and may withdraw from such care at any time.    Notes: N/A      HISTORY OF PRESENT ILLNESS: 48 y.o.   female presenting for diabetes management visit.   The patient's last visit with me was on 12/27/2023.    Patient has had Type II diabetes since 27 years ago.  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     Patient's most recent A1c of 6.2% was completed 4 days ago.   Patient states since Her last A1c Her blood glucose levels have been unknown since not checking regularly. Regression likely due to being off Mounjaro with backorder.   Patient monitors blood glucose 0 times per day with meter : Fasting and Before Bed.   Patient blood glucose monitoring device will not be uploaded into [employer] today secondary to patient not checking BG regularly.   Patient endorses the following diabetes related symptoms:  None .   Patient is due today for the following diabetes-related health maintenance standards: Foot Exam  and Mammogram .   She denies recent hospital admissions or emergency room visits.   She denies having hypoglycemia.   Patient's concerns  "today include glycemic control. Patient is interested in weight loss. Bariatric surg pending.   Patient medication regimen is as below.     CURRENT DM MEDICATIONS:   Ozempc 1 mg weekly - off Mounjaro due to backorder    Patient has failed the following Diabetes medications:   Trulicity - HA, nausea, SOB  Farxiga - nausea, feeling unwell  Basaglar - no longer using since no longer taking steroids  Metformin - GI       Labs Reviewed.       No results found for: "CPEPTIDE"  No results found for: "GLUTAMICACID"       //   , There is no height or weight on file to calculate BMI.  Her blood sugar in clinic today is:    No components found for: "POCGLUC"      Review of Systems   Constitutional:  Negative for activity change, appetite change, chills and fever.   HENT:  Negative for dental problem, mouth sores, nosebleeds, sore throat and trouble swallowing.    Eyes:  Negative for pain and discharge.   Respiratory:  Negative for shortness of breath, wheezing and stridor.    Cardiovascular:  Negative for chest pain, palpitations and leg swelling.   Gastrointestinal:  Negative for abdominal pain, diarrhea, nausea and vomiting.   Endocrine: Negative for polydipsia, polyphagia and polyuria.   Genitourinary:  Negative for dysuria, frequency and urgency.   Musculoskeletal:  Negative for joint swelling and myalgias.   Skin:  Negative for rash and wound.   Neurological:  Negative for dizziness, syncope, weakness and headaches.   Psychiatric/Behavioral:  Negative for behavioral problems and dysphoric mood.          Diabetes Management Status  Statin: Taking  ACE/ARB: Not taking    Screening or Prevention Patient's value Goal Complete/Controlled?   HgA1C Testing and Control   Lab Results   Component Value Date    HGBA1C 6.2 (H) 05/13/2024      Annually/Less than 8% Yes   Lipid profile : 09/08/2023 Annually Yes   LDL control Lab Results   Component Value Date    LDLCALC 101.8 09/08/2023    Annually/Less than 100 mg/dl  Yes "   Nephropathy screening Lab Results   Component Value Date    MICALBCREAT 3.0 09/08/2023     Lab Results   Component Value Date    PROTEINUA Negative 06/19/2022    Annually Yes   Blood pressure BP Readings from Last 1 Encounters:   01/03/24 123/66    Less than 140/90 Yes   Dilated retinal exam : 01/02/2024 Annually Yes    Foot exam   : 09/29/2021 Annually Yes     Social History     Socioeconomic History    Marital status: Single    Number of children: 3   Tobacco Use    Smoking status: Never    Smokeless tobacco: Never   Substance and Sexual Activity    Alcohol use: No     Comment: rarely; stop 12/11/19 prior to sx    Drug use: No    Sexual activity: Yes     Partners: Male     Birth control/protection: None     Comment: mut monog   Social History Narrative    Full time employed - [employer]     Social Determinants of Health     Financial Resource Strain: Low Risk  (11/16/2022)    Overall Financial Resource Strain (CARDIA)     Difficulty of Paying Living Expenses: Not hard at all   Food Insecurity: Patient Declined (9/8/2023)    Hunger Vital Sign     Worried About Running Out of Food in the Last Year: Patient declined     Ran Out of Food in the Last Year: Patient declined   Transportation Needs: Patient Declined (9/8/2023)    PRAPARE - Transportation     Lack of Transportation (Medical): Patient declined     Lack of Transportation (Non-Medical): Patient declined   Physical Activity: Unknown (9/8/2023)    Exercise Vital Sign     Days of Exercise per Week: 1 day   Recent Concern: Physical Activity - Inactive (9/8/2023)    Exercise Vital Sign     Days of Exercise per Week: 1 day     Minutes of Exercise per Session: 0 min   Stress: No Stress Concern Present (9/8/2023)    [employer] - Occupational Stress Questionnaire     Feeling of Stress : Only a little   Housing Stability: Unknown (9/8/2023)    Housing Stability Vital Sign     Unable to Pay for Housing in the Last Year: No      Unstable Housing in the Last Year: No     Past Medical History:   Diagnosis Date    Abnormal Pap smear of cervix     treatment??    Abnormal Pap smear of vagina     Acute pain of right shoulder 10/25/2019    Adjustment disorder with depressed mood 11/15/2014    Overview:  Multiple recent medical problems     Allergy     Anemia     Anemia 12/14/2014 6:26:13 PM    [facility] Historical - [facility]: Anemia-No Additional Notes    Anxiety     Anxiety and depression     Asthma     Asthma 4/14/2016    Asthma 6/18/2014 1:31:21 PM    [facility] Historical - Respiratory: Asthma-No Additional Notes    Auditory hallucinations 2/17/2017    Bacterial vaginosis 1/5/2018    Bladder pain 9/1/2020    Bronchitis 10/15/2015    Bronchitis 9/18/2020    Bronchitis 10/15/2015    Cervical radiculopathy 3/10/2020    Chlamydia     Depression (emotion)     Diabetes mellitus     SANTOS (dyspnea on exertion) 10/14/2019    Dysuria 3/31/2018    Dysuria 3/31/2018    Early satiety 8/28/2020    Gallstones     Gastritis     High-risk sexual behavior 1/5/2018    History of ovarian cyst     History of syphilis     History of trichomoniasis     Hyperlipidemia     Hypertension     Interstitial cystitis 12/8/2020    Localized edema 10/14/2019    Lower abdominal pain 7/31/2017    Mental disorder     Obesity 6/18/2014 1:51:54 PM    [facility] Historical - [facility]: Obesity, Unspecified-No Additional Notes    Obstructive sleep apnea 10/5/2021    Persistent cough 7/27/2018    PONV (postoperative nausea and vomiting)     Preop general physical exam 12/9/2019    Right foot pain 11/18/2019    Routine general medical examination at a health care facility 5/21/2021    Shortness of breath 6/26/2017    SOB (shortness of breath) 6/26/2017    Suicidal ideations 7/9/2019    Syphilis 6/24/2014 7:17:31 PM    [facility] Historical - Gynecologic: Syphilis-No Additional Notes    Type 2 diabetes mellitus without complication, without long-term current use of insulin 5/25/2014     "Type 2 or unspecified type diabetes mellitus 6/24/2014 7:24:47 PM    [facility] Historical - [facility]: Diabetes Mellitus Without Complication, Type II-"borderline"    Upper respiratory tract infection 1/5/2018    Urgency of micturition 11/20/2020    Vaginal candidiasis 7/24/2018    Vaginal itching 1/5/2018    Viral syndrome 12/1/2020       Objective:      Physical Exam  Neurological:      Mental Status: She is alert and oriented to person, place, and time. Mental status is at baseline.   Psychiatric:         Mood and Affect: Mood normal.         Behavior: Behavior normal.         Thought Content: Thought content normal.         Judgment: Judgment normal.         Physical Exam limited secondary to Telemedicine visit    Assessment / Plan:     Type 2 diabetes mellitus with hyperglycemia, without long-term current use of insulin  -     tirzepatide (MOUNJARO) 2.5 mg/0.5 mL PnIj; Inject 2.5 mg into the skin every 7 days.  Dispense: 4 Pen; Refill: 11    Essential hypertension    Mixed hyperlipidemia    Morbid obesity due to excess calories    Encounter for screening mammogram for malignant neoplasm of breast  -     Mammo Digital Screening Bilat w/ Myles; Future; Expected date: 05/17/2024        Additional Plan Details:    - POCT Glucose  - Encouraged continuation of lifestyle changes including regular exercise and limiting carbohydrates to 30-45 grams per meal threes times daily and 15 grams per snack with a limit of two daily.   - Encouraged continued monitoring of blood glucose with maintenance of 2 times daily at Fasting and Before Bed. Encouraged monitoring.  - Current DM Medication Regimen: Change Mounjaro 2.5 mg weekly.   - Follow up with Bariatric team   - Health Maintenance standards addressed today: Foot Exam - deferred by patient today because Telemedicine or Telephone visit and mammogram to be scheduled .   - Nursing Visit: Patient is below goal range for nursing visit for age group and will not need nursing visit " at this time .   - Follow up with me in 6 months with A1c prior.       Blakeney McKnight, PA-C [facility]

## 2024-05-17 NOTE — Clinical Note
"6 mo in person "no dev"  A1c 1 week before 6 mo f/u in [facility]  Mammo in [facility] after 3p "

## 2024-06-14 ENCOUNTER — PATIENT MESSAGE (OUTPATIENT)
Dept: DIABETES | Facility: CLINIC | Age: 49
End: 2024-06-14
Payer: COMMERCIAL

## 2024-06-14 DIAGNOSIS — E11.65 TYPE 2 DIABETES MELLITUS WITH HYPERGLYCEMIA, WITHOUT LONG-TERM CURRENT USE OF INSULIN: Primary | ICD-10-CM

## 2024-06-14 RX ORDER — TIRZEPATIDE 5 MG/.5ML
5 INJECTION, SOLUTION SUBCUTANEOUS
Qty: 4 PEN | Refills: 11 | Status: SHIPPED | OUTPATIENT
Start: 2024-06-14

## 2024-10-28 ENCOUNTER — TELEPHONE (OUTPATIENT)
Dept: DIABETES | Facility: CLINIC | Age: 49
End: 2024-10-28
Payer: COMMERCIAL

## 2024-10-28 NOTE — TELEPHONE ENCOUNTER
Called to reschedule Pt's upcoming Appt that's on 11/18/2024 for 2:00pm with Acacia Warner PA-C, for a different day due to the Provider being out of the office / unavailable that day ; No Answer - Unable to LVM due to VM Box not being set up.

## 2024-10-28 NOTE — TELEPHONE ENCOUNTER
Called to reschedule Pt's upcoming Appt that's on 11/18/2024 for 2:00pm with Acacia Warner PA-C, for a different day due to the Provider being out of the office / unavailable that day ; No Answer - LVM regarding this.

## 2024-11-04 NOTE — TELEPHONE ENCOUNTER
Called to reschedule Pt's upcoming Appt that's on 11/18/2024 for 2:00pm with Acacia Warner PA-C, for a different day due to the Provider being out of the office / unavailable that day ; Pt stated her PCP has been treating / managing her diabetes so she just wanted to cancel her Appt with Acacia Warner PA-C.

## 2025-03-10 ENCOUNTER — OFFICE VISIT (OUTPATIENT)
Dept: URGENT CARE | Facility: CLINIC | Age: 50
End: 2025-03-10
Payer: COMMERCIAL

## 2025-03-10 VITALS
TEMPERATURE: 98 F | DIASTOLIC BLOOD PRESSURE: 69 MMHG | SYSTOLIC BLOOD PRESSURE: 128 MMHG | HEART RATE: 90 BPM | OXYGEN SATURATION: 100 % | RESPIRATION RATE: 18 BRPM

## 2025-03-10 DIAGNOSIS — U07.1 COVID-19 VIRUS DETECTED: ICD-10-CM

## 2025-03-10 DIAGNOSIS — U07.1 COVID-19: Primary | ICD-10-CM

## 2025-03-10 DIAGNOSIS — R52 BODY ACHES: ICD-10-CM

## 2025-03-10 LAB
CTP QC/QA: YES
CTP QC/QA: YES
POC MOLECULAR INFLUENZA A AGN: NEGATIVE
POC MOLECULAR INFLUENZA B AGN: NEGATIVE
SARS CORONAVIRUS 2 ANTIGEN: POSITIVE

## 2025-03-10 PROCEDURE — 87811 SARS-COV-2 COVID19 W/OPTIC: CPT | Mod: QW,S$GLB,, | Performed by: NURSE PRACTITIONER

## 2025-03-10 PROCEDURE — 99213 OFFICE O/P EST LOW 20 MIN: CPT | Mod: S$GLB,,, | Performed by: NURSE PRACTITIONER

## 2025-03-10 PROCEDURE — 87502 INFLUENZA DNA AMP PROBE: CPT | Mod: QW,S$GLB,, | Performed by: NURSE PRACTITIONER

## 2025-03-10 RX ORDER — FLUTICASONE PROPIONATE 50 MCG
2 SPRAY, SUSPENSION (ML) NASAL DAILY
Qty: 16 G | Refills: 0 | Status: SHIPPED | OUTPATIENT
Start: 2025-03-10

## 2025-03-10 RX ORDER — BENZONATATE 200 MG/1
200 CAPSULE ORAL 3 TIMES DAILY PRN
Qty: 30 CAPSULE | Refills: 0 | Status: SHIPPED | OUTPATIENT
Start: 2025-03-10 | End: 2025-03-20

## 2025-03-10 RX ORDER — PROMETHAZINE HYDROCHLORIDE AND DEXTROMETHORPHAN HYDROBROMIDE 6.25; 15 MG/5ML; MG/5ML
5 SYRUP ORAL NIGHTLY PRN
Qty: 120 ML | Refills: 0 | Status: SHIPPED | OUTPATIENT
Start: 2025-03-10

## 2025-03-10 NOTE — PROGRESS NOTES
Subjective:      Patient ID: Yuli Milton is a 49 y.o. female.    Vitals:  oral temperature is 97.8 °F (36.6 °C). Her blood pressure is 128/69 and her pulse is 90. Her respiration is 18 and oxygen saturation is 100%.     Chief Complaint: Cough    C/o cough, congestion, sore throat, body aches and headache, x yesterday, rates pain 7/10, associated yellow/greenish sputum, pt admits to being around others with similar symptoms, Pt has a hx of asthma, pt has taken robitussin, theraflu, mucinex. No fever. Does have chills    Cough  This is a new problem. The current episode started yesterday. The problem has been unchanged. The problem occurs constantly. The cough is Productive of sputum. Associated symptoms include headaches, myalgias, nasal congestion and a sore throat. Pertinent negatives include no chest pain, chills, ear congestion, ear pain, fever, heartburn, hemoptysis, postnasal drip, rash, rhinorrhea, shortness of breath, sweats, weight loss or wheezing. Nothing aggravates the symptoms. She has tried OTC cough suppressant for the symptoms. The treatment provided mild relief. Her past medical history is significant for asthma. There is no history of bronchiectasis, bronchitis, COPD, emphysema, environmental allergies or pneumonia.       Constitution: Positive for activity change. Negative for chills and fever.   HENT:  Positive for congestion and sore throat. Negative for ear pain and postnasal drip.    Cardiovascular:  Negative for chest pain.   Respiratory:  Positive for cough. Negative for bloody sputum, shortness of breath and wheezing.    Gastrointestinal:  Negative for heartburn.   Musculoskeletal:  Positive for muscle ache.   Skin:  Negative for rash.   Allergic/Immunologic: Negative for environmental allergies.   Neurological:  Positive for headaches.      Objective:     Physical Exam   Constitutional: She is oriented to person, place, and time. She appears well-developed. She is cooperative.   Non-toxic appearance. She does not appear ill. No distress.   HENT:   Head: Normocephalic and atraumatic.   Ears:   Right Ear: Hearing, tympanic membrane, external ear and ear canal normal.   Left Ear: Hearing, tympanic membrane, external ear and ear canal normal.   Nose: Nose normal. No mucosal edema, rhinorrhea or nasal deformity. No epistaxis. Right sinus exhibits no maxillary sinus tenderness and no frontal sinus tenderness. Left sinus exhibits no maxillary sinus tenderness and no frontal sinus tenderness.   Mouth/Throat: Uvula is midline, oropharynx is clear and moist and mucous membranes are normal. No trismus in the jaw. Normal dentition. No uvula swelling. No oropharyngeal exudate, posterior oropharyngeal edema or posterior oropharyngeal erythema.   Eyes: Conjunctivae and lids are normal. No scleral icterus.   Neck: Trachea normal and phonation normal. Neck supple. No edema present. No erythema present. No neck rigidity present.   Cardiovascular: Normal rate, regular rhythm, normal heart sounds and normal pulses.   Pulmonary/Chest: Effort normal and breath sounds normal. No respiratory distress. She has no decreased breath sounds. She has no rhonchi.   Abdominal: Normal appearance.   Musculoskeletal: Normal range of motion.         General: No deformity. Normal range of motion.   Neurological: She is alert and oriented to person, place, and time. She exhibits normal muscle tone. Coordination normal.   Skin: Skin is warm, dry, intact, not diaphoretic and not pale.   Psychiatric: Her speech is normal and behavior is normal. Judgment and thought content normal.   Nursing note and vitals reviewed.      Assessment:     1. COVID-19    2. Body aches        Plan:       COVID-19  -     promethazine-dextromethorphan (PROMETHAZINE-DM) 6.25-15 mg/5 mL Syrp; Take 5 mLs by mouth nightly as needed (Cough).  Dispense: 120 mL; Refill: 0  -     benzonatate (TESSALON) 200 MG capsule; Take 1 capsule (200 mg total) by mouth 3  (three) times daily as needed for Cough.  Dispense: 30 capsule; Refill: 0  -     fluticasone propionate (FLONASE) 50 mcg/actuation nasal spray; 2 sprays (100 mcg total) by Each Nostril route once daily.  Dispense: 16 g; Refill: 0  -     nirmatrelvir-ritonavir 300 mg (150 mg x 2)-100 mg copackaged tablets (EUA); Take 3 tablets by mouth 2 (two) times daily for 5 days. Each dose contains 2 nirmatrelvir (pink tablets) and 1 ritonavir (white tablet). Take all 3 tablets together  Dispense: 30 tablet; Refill: 0    Body aches  -     POCT Influenza A/B MOLECULAR  -     SARS Coronavirus 2 Antigen, POCT Manual Read    Covid positive   Flu Negative  recommended rest, hydration, supportive care, deep breathing  CDC recommended quarantine discussed  Infection precautions discussed  Vitamins c, d, zinc discussed  mucinex with lots of fluids  Emergency Room if shortness of breath, issues breathing

## 2025-03-10 NOTE — LETTER
March 10, 2025    Yuli Milton  [address]  [facility] [address]             [facility]  [address], [address]  [facility] [address]  Phone: [phone]   March 10, 2025     Patient: Yuli Milton   YOB: 1975   Date of Visit: 3/10/2025       To Whom it May Concern:    Yuli Milton was seen in my clinic on 3/10/2025. She may return to work on 3/13/25 .    Please excuse her from any classes or work missed.    If you have any questions or concerns, please don't hesitate to call.    Sincerely,           Joann Vazquez, ARIP-C

## 2025-03-10 NOTE — LETTER
[address] [facility], [address] ? [place], [address] ? Phone [phone] ? Fax             Return to Work/School    Patient: Yuli Milton  YOB: 1975   Date: 03/10/2025      To Whom It May Concern:     Yuli Milton was in contact with/seen in my office on 03/10/2025. COVID-19 is present in our communities across the state. Not all patients are eligible or appropriate to be tested. In this situation, your employee meets the following criteria:     Yuli Milton has met the criteria for COVID-19 testing and has a POSITIVE result. She can return to work once they are asymptomatic for 24 hours without the use of fever reducing medications AND at least five days from the start of symptoms (or from the first positive result if they have no symptoms).      If you have any questions or concerns, or if I can be of further assistance, please do not hesitate to contact me.     Sincerely,    SHAGUFTA Hernandez

## (undated) DEVICE — MANIFOLD 4 PORT

## (undated) DEVICE — TOURNIQUET SB QC DP 18X4IN

## (undated) DEVICE — GLOVE SURGICAL LATEX SZ 7

## (undated) DEVICE — SEE MEDLINE ITEM 146308

## (undated) DEVICE — SEE MEDLINE ITEM 157131

## (undated) DEVICE — UNDERGLOVES BIOGEL PI SZ 7 LF

## (undated) DEVICE — PAD UNDERPAD 30X30

## (undated) DEVICE — SPONGE DERMACEA GAUZE 4X4

## (undated) DEVICE — SUT VICRYL 3-0 27 SH

## (undated) DEVICE — GLOVE SURG PLYSPHRN ORTH SZ7.5

## (undated) DEVICE — TIP SUCTION YANKAUER

## (undated) DEVICE — PAD ABD 8X10 STERILE

## (undated) DEVICE — PAD CAST SPECIALIST STRL 6

## (undated) DEVICE — SEE MEDLINE ITEM 146231

## (undated) DEVICE — SEE MEDLINE ITEM 146298

## (undated) DEVICE — DRAPE MOBILE C-ARM

## (undated) DEVICE — SUT VICRYL CTD 2-0 GI 27 SH

## (undated) DEVICE — SEE MEDLINE ITEM 152528

## (undated) DEVICE — GOWN SMARTGOWN LVL4 X-LONG XL

## (undated) DEVICE — UNDERGLOVES BIOGEL PI SIZE 8.5

## (undated) DEVICE — BLADE SURG #15 CARBON STEEL

## (undated) DEVICE — APPLICATOR CHLORAPREP ORN 26ML

## (undated) DEVICE — GLOVE BIOGEL PI MICRO SZ 6.5

## (undated) DEVICE — SUT ETHILON 4-0 PS2 18 BLK

## (undated) DEVICE — SEE MEDLINE ITEM 152522

## (undated) DEVICE — SEE MEDLINE ITEM 152764

## (undated) DEVICE — GLOVE SURGICAL LATEX SZ 8

## (undated) DEVICE — PAD CAST SPECIALIST STRL 4

## (undated) DEVICE — GLOVE BIOGEL PI ORTHO PRO 7.5

## (undated) DEVICE — COVER OVERHEAD SURG LT BLUE

## (undated) DEVICE — ELECTRODE REM PLYHSV RETURN 9

## (undated) DEVICE — SYR 10CC LUER LOCK

## (undated) DEVICE — SEE MEDLINE ITEM 157027

## (undated) DEVICE — DRESSING SPONGE 16PLY 4X4 NS

## (undated) DEVICE — UNDERGLOVES BIOGEL PI SIZE 8

## (undated) DEVICE — SEE MEDLINE ITEM 146347

## (undated) DEVICE — GUIDEWIRE FIXOS UNTHRD 2.0X150
Type: IMPLANTABLE DEVICE | Site: FOOT | Status: NON-FUNCTIONAL
Removed: 2019-12-12

## (undated) DEVICE — NDL SAFETY 25G X 1.5 ECLIPSE

## (undated) DEVICE — GLOVE SURG BIOGEL LATEX SZ 7.5

## (undated) DEVICE — BANDAGE DERMACEA STRETCH 4X1IN